# Patient Record
Sex: FEMALE | Race: WHITE | NOT HISPANIC OR LATINO | Employment: OTHER | URBAN - METROPOLITAN AREA
[De-identification: names, ages, dates, MRNs, and addresses within clinical notes are randomized per-mention and may not be internally consistent; named-entity substitution may affect disease eponyms.]

---

## 2017-01-17 ENCOUNTER — OFFICE VISIT (OUTPATIENT)
Dept: ENDOCRINOLOGY | Facility: CLINIC | Age: 47
End: 2017-01-17

## 2017-01-17 VITALS — DIASTOLIC BLOOD PRESSURE: 60 MMHG | SYSTOLIC BLOOD PRESSURE: 102 MMHG | OXYGEN SATURATION: 95 % | HEART RATE: 68 BPM

## 2017-01-17 DIAGNOSIS — IMO0001 UNCONTROLLED TYPE 2 DIABETES MELLITUS WITHOUT COMPLICATION, WITH LONG-TERM CURRENT USE OF INSULIN: Primary | ICD-10-CM

## 2017-01-17 DIAGNOSIS — E55.9 VITAMIN D DEFICIENCY: ICD-10-CM

## 2017-01-17 DIAGNOSIS — E11.40 TYPE 2 DIABETES MELLITUS WITH DIABETIC NEUROPATHY, WITH LONG-TERM CURRENT USE OF INSULIN (HCC): ICD-10-CM

## 2017-01-17 DIAGNOSIS — Z79.4 TYPE 2 DIABETES MELLITUS WITH DIABETIC NEUROPATHY, WITH LONG-TERM CURRENT USE OF INSULIN (HCC): ICD-10-CM

## 2017-01-17 DIAGNOSIS — E03.9 ACQUIRED HYPOTHYROIDISM: ICD-10-CM

## 2017-01-17 LAB
GLUCOSE BLDC GLUCOMTR-MCNC: 201 MG/DL (ref 70–130)
HBA1C MFR BLD: 7.9 %

## 2017-01-17 PROCEDURE — 82947 ASSAY GLUCOSE BLOOD QUANT: CPT | Performed by: INTERNAL MEDICINE

## 2017-01-17 PROCEDURE — 99214 OFFICE O/P EST MOD 30 MIN: CPT | Performed by: INTERNAL MEDICINE

## 2017-01-17 PROCEDURE — 83036 HEMOGLOBIN GLYCOSYLATED A1C: CPT | Performed by: INTERNAL MEDICINE

## 2017-01-17 NOTE — MR AVS SNAPSHOT
"                        Vilma Ayala   1/17/2017 11:30 AM   Office Visit    Dept Phone:  [phone]   Encounter #:  [id number]    Provider:  Selina Lopez MD   Department:  [facility] INTERNAL MEDICINE AND ENDOCRINOLOGY                Your Full Care Plan              Today's Medication Changes          These changes are accurate as of: 1/17/17  1:17 PM.  If you have any questions, ask your nurse or doctor.               New Medication(s)Ordered:     SITagliptin 100 MG tablet   Commonly known as:  JANUVIA   Take 1 tablet by mouth Daily.   Started by:  Selina Lopez MD         Stop taking medication(s)listed here:     INVOKANA 300 MG tablet   Generic drug:  Canagliflozin   Stopped by:  Selina Lopez MD                Where to Get Your Medications      These medications were sent to [facility] [address] [place] [address] - [phone]  - [phone] [place], [place] [address]     Phone:  [phone]     SITagliptin 100 MG tablet                  Your Updated Medication List          This list is accurate as of: 1/17/17  1:17 PM.  Always use your most recent med list.                CANDIDO BREEZE 2 SYSTEM W/DEVICE kit       * CANDIDO BREEZE 2 TEST disk   Generic drug:  Glucose Blood       * RELION PRIME TEST VI       * RELION CONFIRM/MICRO TEST VI       BD INSULIN SYRINGE ULTRAFINE 31G X 15/64\" 0.5 ML misc   Generic drug:  Insulin Syringe-Needle U-100       busPIRone 10 MG tablet   Commonly known as:  BUSPAR       citalopram 40 MG tablet   Commonly known as:  CeleXA       glipiZIDE 10 MG tablet   Commonly known as:  GLUCOTROL       insulin lispro protamine-insulin lispro (75-25) 100 UNIT/ML suspension injection   Commonly known as:  HUMALOG MIX 75/25   14 units q am and 50 units q pm       levothyroxine 200 MCG tablet   Commonly known as:  SYNTHROID, LEVOTHROID   Take 1 tablet by mouth daily.       metFORMIN 500 MG tablet   Commonly " known as:  GLUCOPHAGE       * RISPERDAL 2 MG tablet   Generic drug:  risperiDONE       * RISPERDAL 4 MG tablet   Generic drug:  risperiDONE       SITagliptin 100 MG tablet   Commonly known as:  JANUVIA   Take 1 tablet by mouth Daily.       vitamin D 57641 UNITS capsule capsule   Commonly known as:  ERGOCALCIFEROL       * Notice:  This list has 5 medication(s) that are the same as other medications prescribed for you. Read the directions carefully, and ask your doctor or other care provider to review them with you.            We Performed the Following     POC Glucose Fingerstick     POC Glycated Hemoglobin, Total       You Were Diagnosed With        Codes Comments    Uncontrolled type 2 diabetes mellitus without complication, with long-term current use of insulin    -  Primary ICD-10-CM: E11.65, Z79.4  ICD-9-CM: 250.02, V58.67     Acquired hypothyroidism     ICD-10-CM: E03.9  ICD-9-CM: 244.9     Type 2 diabetes mellitus with diabetic neuropathy, with long-term current use of insulin     ICD-10-CM: E11.40, Z79.4  ICD-9-CM: 250.60, 357.2, V58.67     Vitamin D deficiency     ICD-10-CM: E55.9  ICD-9-CM: 268.9       Instructions     None    Patient Instructions History      Upcoming Appointments     Visit Type Date Time Department    OFFICE VISIT 2017 11:30 AM [facility]    OFFICE VISIT 2017  2:00 PM [facility]      MyChart Signup     [facility] [employer] allows you to send messages to your doctor, view your test results, renew your prescriptions, schedule appointments, and more. To sign up, go to [employer] and click on the Sign Up Now link in the New User? box. Enter your [employer] Activation Code exactly as it appears below along with the last four digits of your Social Security Number and your Date of Birth () to complete the sign-up process. If you do not sign up before the expiration date, you must request a new code.    [employer] Activation Code: [email or web address]  Expires:  1/31/2017  1:17 PM    If you have questions, you can email [email or web address] or call [phone] to talk to our MyChart staff. Remember, [employer] is NOT to be used for urgent needs. For medical emergencies, dial 911.               Other Info from Your Visit           Your Appointments     May 18, 2017  2:00 PM EDT   Office Visit with Selina Lopez MD   [facility] INTERNAL MEDICINE AND ENDOCRINOLOGY (--)    [address] [place] [id number] [phone]           Arrive 15 minutes prior to appointment.              Allergies     Other        Reason for Visit     Follow-up     Diabetes Type II with diabetic neuropathy    Hypothyroidism     Vitamin D Deficiency           Vital Signs     Blood Pressure Pulse Oxygen Saturation Smoking Status          102/60 68 95% Never Smoker        Problems and Diagnoses Noted     Anxiety and depression    Diabetes with neurologic complications    Underactive thyroid    Obesity    Type II diabetes mellitus without control    Vitamin D deficiency      Results     POC Glucose Fingerstick      Component Value Standard Range & Units    Glucose 201 70 - 130 mg/dL                POC Glycated Hemoglobin, Total      Component Value Standard Range & Units    Hemoglobin A1C 7.9 %

## 2017-01-17 NOTE — ASSESSMENT & PLAN NOTE
Blood sugar and 90 day average sugar reviewed  Results for orders placed or performed in visit on 01/17/17   POC Glucose Fingerstick   Result Value Ref Range    Glucose 201 (A) 70 - 130 mg/dL   POC Glycated Hemoglobin, Total   Result Value Ref Range    Hemoglobin A1C 7.9 %     Average is still higher than recommended- will add januvia 100 mg daily and have reminded her to stop drinking cokes  She is utd with eye exam  Neuropathy stable with preulcerative callus bilateral heels   Ur alb neg 8/16  Reviewed with her and discussed dietary goals, need to lose weight

## 2017-01-17 NOTE — PROGRESS NOTES
Vilma Ayala 46 y.o.  [facility]:Follow-up; Diabetes (Type II with diabetic neuropathy); Hypothyroidism; and Vitamin D Deficiency    [place]: Follow-up; Diabetes (Type II with diabetic neuropathy); Hypothyroidism; and Vitamin D Deficiency    Blood sugar and 90 day average sugar reviewed  Results for orders placed or performed in visit on 01/17/17   POC Glucose Fingerstick   Result Value Ref Range    Glucose 201 (A) 70 - 130 mg/dL   POC Glycated Hemoglobin, Total   Result Value Ref Range    Hemoglobin A1C 7.9 %     She is working on diet and exercise, is on 75/25 insulin   Average sugar discussed with her   She is trying to give up cokes  She is now drinking small bottles  Usually gets 2 cases at a time  She is up to date with eye exam  No neuropathy   Ur alb neg 8/16  Lab work in August reviewed  Phlegm clear to yellow- has had uri with sinus congestion for the last 2 weeks   She is using claritin     Allergies   Allergen Reactions   • Other        Current Outpatient Prescriptions:   •  busPIRone (BUSPAR) 10 MG tablet, Take  by mouth daily., Disp: , Rfl:   •  citalopram (CeleXA) 40 MG tablet, Take  by mouth daily., Disp: , Rfl:   •  glipiZIDE (GLUCOTROL) 10 MG tablet, Take 1 tablet by mouth 2 (two) times a day., Disp: , Rfl:   •  Glucose Blood (JAYLENE BREEZE 2 TEST) disk, Jaylene Breeze 2 Test In Vitro Disk; Patient Sig: Jaylene Breeze 2 Test In Vitro Disk Check blood sugar BID or as directed; 5; 1; 09-Jan-2015; Active, Disp: , Rfl:   •  Glucose Blood (RELION CONFIRM/MICRO TEST VI), 4 (four) times a day., Disp: , Rfl:   •  Glucose Blood (RELION PRIME TEST VI), ReliOn Prime Test In Vitro Strip; Patient Sig: ReliOn Prime Test In Vitro Strip TEST TWICE DAILY. DX E11.65; 60; 3; 21-Oct-2015; Active, Disp: , Rfl:   •  insulin lispro protamine-insulin lispro (HUMALOG MIX 75/25) (75-25) 100 UNIT/ML suspension injection, 14 units q am and 50 units q pm (Patient taking differently: Inject 22 units in am and 52 units before supper), Disp:  "20 mL, Rfl: 3  •  levothyroxine (SYNTHROID, LEVOTHROID) 200 MCG tablet, Take 1 tablet by mouth daily., Disp: 30 tablet, Rfl: 5  •  metFORMIN (GLUCOPHAGE) 500 MG tablet, Take  by mouth., Disp: , Rfl:   •  risperiDONE (RISPERDAL) 2 MG tablet, Take  by mouth., Disp: , Rfl:   •  risperiDONE (RISPERDAL) 4 MG tablet, Take  by mouth., Disp: , Rfl:   •  vitamin D (ERGOCALCIFEROL) 72909 UNITS capsule capsule, Take  by mouth., Disp: , Rfl:   •  Blood Glucose Monitoring Suppl (MeilimeiEZE 2 SYSTEM) W/DEVICE kit, , Disp: , Rfl:   •  Insulin Syringe-Needle U-100 (BD INSULIN SYRINGE ULTRAFINE) 31G X 15/64\" 0.5 ML misc, , Disp: , Rfl:   Patient Active Problem List    Diagnosis   • Bronchitis [J40]   • Anxiety and depression [F41.9, F32.9]     Overview Note:     Last Impression: 09 Jan 2015  on risperidone and has been stable  Selina Lopez (Endocrinology)       • Type II diabetes mellitus, uncontrolled [E11.65]     Overview Note:     Last Impression: 21 Jan 2016  blood sugar 89, hgn a1c 6.6% nighttime low sugars-      lower pm insulin to 40 units email or call if continued lows is up to date with eye      exam neuropathy stable wearing diabetic shoes discussed diet  Selina Lopez      (Endocrinology)       • Hypothyroid [E03.9]     Overview Note:     Last Impression: 21 Jan 2016  tachycardia- update tfts  Selina Lopez      (Endocrinology)       • Obesity [E66.9]     Overview Note:     Last Impression: 09 Jan 2015  provided diet infor and discussed goal of significant      weight loss  Selina Lopez (Endocrinology)       • Vitamin D deficiency [E55.9]   • Diabetic neuropathy, type II diabetes mellitus [E11.40]     Overview Note:     in specialty shoes- callus and bunion- seeing podiatry       Review of Systems   Constitutional: Negative for activity change, appetite change, chills, diaphoresis, fatigue, fever and unexpected weight change.   HENT: Negative for congestion, dental problem, " drooling, ear discharge, ear pain, facial swelling, hearing loss, mouth sores, nosebleeds, postnasal drip, rhinorrhea, sinus pressure, sneezing, sore throat, tinnitus, trouble swallowing and voice change.    Eyes: Negative for photophobia, pain, discharge, redness, itching and visual disturbance.   Respiratory: Negative for apnea, cough, choking, chest tightness, shortness of breath, wheezing and stridor.    Cardiovascular: Negative for chest pain, palpitations and leg swelling.   Gastrointestinal: Negative for abdominal distention, abdominal pain, anal bleeding, blood in stool, constipation, diarrhea, nausea, rectal pain and vomiting.   Endocrine: Negative for cold intolerance, heat intolerance, polydipsia, polyphagia and polyuria.   Genitourinary: Negative for decreased urine volume, difficulty urinating, dysuria, enuresis, flank pain, frequency, genital sores, hematuria and urgency.   Musculoskeletal: Negative for arthralgias, back pain, gait problem, joint swelling, myalgias, neck pain and neck stiffness.   Skin: Negative for color change, pallor, rash and wound.   Allergic/Immunologic: Negative for environmental allergies, food allergies and immunocompromised state.   Neurological: Negative for dizziness, tremors, seizures, syncope, facial asymmetry, speech difficulty, weakness, light-headedness, numbness and headaches.   Hematological: Negative for adenopathy. Does not bruise/bleed easily.   Psychiatric/Behavioral: Negative for agitation, behavioral problems, confusion, decreased concentration, dysphoric mood, hallucinations, self-injury, sleep disturbance and suicidal ideas. The patient is nervous/anxious. The patient is not hyperactive.      Social History     Social History   • Marital status: Single     Spouse name: N/A   • Number of children: N/A   • Years of education: N/A     Occupational History   • Not on file.     Social History Main Topics   • Smoking status: Never Smoker   • Smokeless tobacco: Not  on file   • Alcohol use Not on file   • Drug use: Not on file   • Sexual activity: Not on file     Other Topics Concern   • Not on file     Social History Narrative     Family History   Problem Relation Age of Onset   • Hypertension Other    • Thyroid disease Other    • Diabetes Other    • Obesity Other    • Diabetes Mother    • Hypertension Mother    • Heart disease Mother      Visit Vitals   • /60   • Pulse 68   • SpO2 95%     Physical Exam   Constitutional: She is oriented to person, place, and time. She appears well-developed and well-nourished.   HENT:   Head: Normocephalic and atraumatic.   Nose: Nose normal.   Mouth/Throat: Oropharynx is clear and moist.   Eyes: Conjunctivae, EOM and lids are normal. Pupils are equal, round, and reactive to light.   Neck: Trachea normal and normal range of motion. Neck supple. Carotid bruit is not present. No tracheal deviation present. No thyroid mass and no thyromegaly present.   Cardiovascular: Normal rate, regular rhythm, normal heart sounds and intact distal pulses.  Exam reveals no gallop and no friction rub.    No murmur heard.  Pulmonary/Chest: Effort normal and breath sounds normal. No respiratory distress. She has no wheezes.   Musculoskeletal: Normal range of motion. She exhibits no edema or deformity.    Diabetic foot exam performed: bilateral heel callus without ulceration or open wound     Neurological Sensory Findings - Altered hot/cold right ankle/foot discrimination and altered hot/cold left ankle/foot discrimination. Altered sharp/dull right ankle/foot discrimination and altered sharp/dull left ankle/foot discrimination. Right ankle/foot altered proprioception and left ankle/foot altered proprioception.    Vascular Status -  Her exam exhibits right foot vasculature normal. Her exam exhibits no right foot edema. Her exam exhibits left foot vasculature normal. Her exam exhibits no left foot edema.   Skin Integrity  -  Her right foot skin is intact.    Vilma's left foot skin is intact. .  Lymphadenopathy:     She has no cervical adenopathy.   Neurological: She is alert and oriented to person, place, and time. She has normal reflexes. She displays normal reflexes. No cranial nerve deficit.   Skin: Skin is warm and dry. No rash noted. No cyanosis or erythema. Nails show no clubbing.   Psychiatric: She has a normal mood and affect. Her speech is normal and behavior is normal. Judgment and thought content normal. Cognition and memory are normal.   Nursing note and vitals reviewed.    Results for orders placed or performed in visit on 08/16/16   Microalbumin / creatinine urine ratio   Result Value Ref Range    Creatinine, Urine 90.7 Not Estab. mg/dL    Microalbumin, Urine 3.4 Not Estab. ug/mL    Microalbumin/Creatinine Ratio Urine 3.7 0.0 - 30.0 mg/g creat   Comprehensive metabolic panel   Result Value Ref Range    Glucose 90 70 - 100 mg/dL    BUN 13 9 - 23 mg/dL    Creatinine 1.00 0.60 - 1.30 mg/dL    Sodium 139 132 - 146 mmol/L    Potassium 4.2 3.5 - 5.5 mmol/L    Chloride 105 99 - 109 mmol/L    CO2 17.0 (L) 20.0 - 31.0 mmol/L    Calcium 9.7 8.7 - 10.4 mg/dL    Total Protein 7.2 5.7 - 8.2 g/dL    Albumin 4.20 3.20 - 4.80 g/dL    ALT (SGPT) 34 7 - 40 U/L    AST (SGOT) 25 0 - 33 U/L    Alkaline Phosphatase 133 (H) 25 - 100 U/L    Total Bilirubin 0.3 0.3 - 1.2 mg/dL    eGFR Non African Amer 60 (L) >60 mL/min/1.73    Globulin 3.0 gm/dL    A/G Ratio 1.4 g/dL    BUN/Creatinine Ratio 13.0 7.0 - 25.0    Anion Gap 17.0 (H) 3.0 - 11.0 mmol/L   TSH   Result Value Ref Range    TSH 1.146 0.350 - 5.350 mIU/mL   T4, free   Result Value Ref Range    Free T4 0.86 (L) 0.89 - 1.76 ng/dL   Lipid panel   Result Value Ref Range    Total Cholesterol 185 0 - 200 mg/dL    Triglycerides 165 (H) 0 - 150 mg/dL    HDL Cholesterol 40 40 - 60 mg/dL    LDL Cholesterol  129 0 - 130 mg/dL   Vitamin D 25 hydroxy   Result Value Ref Range    25 Hydroxy, Vitamin D 26.4 ng/ml   POCT glucose  fingerstick   Result Value Ref Range    Glucose 77 70 - 130 mg/dL   POCT glycated hemoglobin, total   Result Value Ref Range    Hemoglobin A1C 7.0 %     Problem List Items Addressed This Visit        Digestive    Vitamin D deficiency     Is on vitamin D supplement             Endocrine    Uncontrolled type 2 diabetes mellitus - Primary     Blood sugar and 90 day average sugar reviewed  Results for orders placed or performed in visit on 01/17/17   POC Glucose Fingerstick   Result Value Ref Range    Glucose 201 (A) 70 - 130 mg/dL   POC Glycated Hemoglobin, Total   Result Value Ref Range    Hemoglobin A1C 7.9 %     Average is still higher than recommended- will add januvia 100 mg daily and have reminded her to stop drinking cokes  She is utd with eye exam  Neuropathy stable with preulcerative callus bilateral heels   Ur alb neg 8/16  Reviewed with her and discussed dietary goals, need to lose weight          Relevant Orders    POC Glucose Fingerstick (Completed)    POC Glycated Hemoglobin, Total (Completed)    Hypothyroid     Check tfts          Diabetic neuropathy, type II diabetes mellitus     Goals for hgn a1c and foot care discussed         Relevant Medications    SITagliptin (JANUVIA) 100 MG tablet        Return in about 4 months (around 5/17/2017) for Recheck 30 min .      Jayna Kapoor MA

## 2017-03-02 RX ORDER — BLOOD-GLUCOSE METER
KIT MISCELLANEOUS
Qty: 200 EACH | Refills: 5 | Status: SHIPPED | OUTPATIENT
Start: 2017-03-02 | End: 2017-03-02 | Stop reason: SDUPTHER

## 2017-03-02 NOTE — TELEPHONE ENCOUNTER
Received fax from pharmacy requesting test strips be resent with Diagnosis code per medicare guidelines, strips resent.

## 2017-03-06 ENCOUNTER — TELEPHONE (OUTPATIENT)
Dept: INTERNAL MEDICINE | Facility: CLINIC | Age: 47
End: 2017-03-06

## 2017-03-06 NOTE — TELEPHONE ENCOUNTER
----- Message from Ana Webster sent at 3/6/2017  1:28 PM EST -----  THE PATIENT WOULD LIKE FOR YOU TO GIVE HER A CALL BACK IN REGARDS TO HER MEDICATION CHANGE. PATIENT DIDN'T GIVE ME THE NAME OF THE MEDICATION THAT WAS BEING CHANGED. PT'S CALL BACK NUMBER [phone]

## 2017-03-06 NOTE — TELEPHONE ENCOUNTER
Ok.  Thanks, vickie Bhakta (pts mother) states pt is not at home but did have a question about a new rx that was ordered by Dr Corbin and they are asking if it is ok with Dr Lopez to take it but she does not know the name of it  She will ask pt to call here when she gets home

## 2017-03-07 ENCOUNTER — TELEPHONE (OUTPATIENT)
Dept: INTERNAL MEDICINE | Facility: CLINIC | Age: 47
End: 2017-03-07

## 2017-03-07 NOTE — TELEPHONE ENCOUNTER
----- Message from Sarah Gomez sent at 3/7/2017 12:08 PM EST -----  Contact: PATIENT  PATIENT WANTS TO TALK TO YOU ABOUT HER LISINPRIL MEDICATION. PATIENT HAS TAKEN 1 DOSE OF THE MEDICATION. SHE WANTS TO KNOW THE SIDE EFFECTS OF THIS MEDICATION AS SHE HAS ALREADY STARTED TAKING. YOU CAN REACH HER BACK -[phone]

## 2017-03-07 NOTE — TELEPHONE ENCOUNTER
Pt states Dr Corbin started her on Lisinopril for kidney protection and she is asking if there are any side effects   Pt was advised she would need to ask Dr Corbin or the pharmacist about side effects  Pt voiced understanding

## 2017-05-10 RX ORDER — LEVOTHYROXINE SODIUM 0.2 MG/1
TABLET ORAL
Qty: 30 TABLET | Refills: 5 | Status: SHIPPED | OUTPATIENT
Start: 2017-05-10 | End: 2017-10-21 | Stop reason: SDUPTHER

## 2017-06-06 ENCOUNTER — TELEPHONE (OUTPATIENT)
Dept: INTERNAL MEDICINE | Facility: CLINIC | Age: 47
End: 2017-06-06

## 2017-06-06 NOTE — TELEPHONE ENCOUNTER
ROB,    MS WOOTEN CALLED REQUESTING AN APPT WITH DR SWANSON ON 08/18/2017. HER MOTHER HAS AN APPT THIS DAY AND SHE WOULD LIKE TO BE SEEN AS WELL.  HER MOTHER IS LUCIO LOYOLA.

## 2017-07-13 ENCOUNTER — HOSPITAL ENCOUNTER (EMERGENCY)
Facility: HOSPITAL | Age: 47
Discharge: HOME | End: 2017-07-13
Attending: EMERGENCY MEDICINE | Admitting: EMERGENCY MEDICINE

## 2017-07-13 VITALS
WEIGHT: 245 LBS | DIASTOLIC BLOOD PRESSURE: 88 MMHG | RESPIRATION RATE: 16 BRPM | OXYGEN SATURATION: 98 % | HEART RATE: 99 BPM | TEMPERATURE: 98.1 F | SYSTOLIC BLOOD PRESSURE: 134 MMHG | BODY MASS INDEX: 36.29 KG/M2 | HEIGHT: 69 IN

## 2017-07-13 DIAGNOSIS — R21 FACIAL RASH: Primary | ICD-10-CM

## 2017-07-13 PROCEDURE — 99282 EMERGENCY DEPT VISIT SF MDM: CPT

## 2017-07-13 NOTE — ED PROVIDER NOTES
Subjective   HPI Comments: This patient is a 46-year-old  female who states that she has had a rash on the right lower jaw times one week.  She is not sexually active.  No herpes history by report.  No difficulty with chewing.  No rash elsewhere on the body.  No abdominal pain, nausea, vomiting, fevers, chills, chest pain, shortness of breath.  No ocular complaints.  Patient reports that the rash is very itchy.  She has not had any new soaps introduced.  No new facial moisturizers or creams.  No new medications.  Patient went to local pharmacy and talk to the pharmacist about what she should do.  She was instructed to use a Benadryl cream which she has been doing.  This is been helping with the pruritus but is not resolved the rash.  She states that the rash is not spreading, but continues to cause problems and therefore she came in for evaluation.  She has not seen her primary care physician for this.      Past Medical History: DM, Depression, Anxiety, No hx of HTN, HLD, or cardiac histories     Past Family History: Negative    Patient is a 46 y.o. female presenting with rash.   History provided by:  Patient  Rash   Location:  Face  Facial rash location:  Chin  Quality: itchiness    Severity:  Mild  Onset quality:  Sudden  Duration:  1 week  Timing:  Constant  Progression:  Unchanged  Chronicity:  New  Relieved by:  None tried  Worsened by:  Nothing  Ineffective treatments: Benadryl.  Associated symptoms: no diarrhea, no fatigue, no fever, no headaches, no myalgias, no nausea, no shortness of breath and not vomiting        Review of Systems   Constitutional: Negative for chills, fatigue, fever and unexpected weight change.   HENT: Negative for dental problem, ear pain, hearing loss and sinus pressure.    Eyes: Negative for pain and visual disturbance.   Respiratory: Negative for chest tightness and shortness of breath.    Cardiovascular: Negative for chest pain, palpitations and leg swelling.    Gastrointestinal: Negative for diarrhea, nausea and vomiting.   Genitourinary: Negative for difficulty urinating, dyspareunia, hematuria and urgency.   Musculoskeletal: Negative for myalgias, neck pain and neck stiffness.   Skin: Positive for rash.   Neurological: Negative for seizures, syncope, speech difficulty, light-headedness and headaches.   Psychiatric/Behavioral: Negative for confusion.   All other systems reviewed and are negative.      Past Medical History:   Diagnosis Date   • Diabetes mellitus        Allergies   Allergen Reactions   • Other        Past Surgical History:   Procedure Laterality Date   • EYE SURGERY     • TONSILLECTOMY         Family History   Problem Relation Age of Onset   • Hypertension Other    • Thyroid disease Other    • Diabetes Other    • Obesity Other    • Diabetes Mother    • Hypertension Mother    • Heart disease Mother        Social History     Social History   • Marital status: Single     Spouse name: N/A   • Number of children: N/A   • Years of education: N/A     Social History Main Topics   • Smoking status: Never Smoker   • Smokeless tobacco: None   • Alcohol use No   • Drug use: No   • Sexual activity: Defer     Other Topics Concern   • None     Social History Narrative   • None         Objective   Physical Exam   Constitutional: She is oriented to person, place, and time. She appears well-developed. No distress.   HENT:   Head: Normocephalic and atraumatic.   Right Ear: External ear normal.   Left Ear: External ear normal.   Nose: Nose normal.   Mouth/Throat: Oropharynx is clear and moist.   Eyes: EOM are normal. Pupils are equal, round, and reactive to light.   Neck: Normal range of motion. Neck supple. No JVD present.   Cardiovascular: Normal rate, regular rhythm and normal heart sounds.  Exam reveals no gallop and no friction rub.    Pulmonary/Chest: Effort normal and breath sounds normal. She has no wheezes. She has no rales. She exhibits no tenderness.   Abdominal:  Soft. Bowel sounds are normal. She exhibits no distension and no mass. There is no tenderness. There is no rebound and no guarding.   No signs of acute abdomen.  No pain at McBurney's point.  No pulsatile abdominal mass   Musculoskeletal: Normal range of motion. She exhibits no edema.   Lymphadenopathy:     She has no cervical adenopathy.   Neurological: She is alert and oriented to person, place, and time.   5/5 strength bilaterally with flexion and extension of fingers, wrist, elbows, knees and hips as well as plantar and dorsiflexion of the foot.   Skin: Skin is warm and dry. Rash noted. No erythema. No pallor.   Right chin rash that is 3x3 cm. Multiple small papules. No cellulitis.   Psychiatric: She has a normal mood and affect. Her behavior is normal. Judgment and thought content normal.   Nursing note and vitals reviewed.      Procedures         ED Course  ED Course   Comment By Time   I let the patient know I would talked her endocrinologist, per her request.  I'm not concerned about the medication she will be discharged with we have talked about the potential effect on her glycemic control.  The patient will follow up with her PCP, Dr. Oiv Corbin, in one week.  Return here immediately for new or changing concerns.  Impression will include facial rash plan to include oral steroids, Benadryl the RN as is already been instructed.  Avoid excoriation and scratching.  Watch for signs of secondary infection.  Patient verbalized an understanding of the plan.  She feels safe at home has good primary care, and will be discharged home accordingly after I discussed the case with her endocrinologist, Dr. Selina Noonan. Jayy Vega MD 07/13 1500   Dr. Vega is discussing case with Dr. Lopez. [facility] 07/13 1550   Dr. Vega is at the bedside re-evaluating the patient. [facility] 07/13 5253   I talked to the patient's endocrinologist.  She is going to call and steroids tomorrow if the patient is still  "feeling poorly.  Try oral Pepcid and Benadryl as needed.  She states that the patient is acting not well controlled and she is quite concerned about the patient's ability to stay up with her elevated glucose levels should steroids be provided.  I discussed this with the patient and she is agreeable to the plan.  She'll be discharged home with the impression that includes facial rash, unspecified.  He turned here immediately for new or changing concerns. Jayy Vega MD 07/13 6303     No results found for this or any previous visit (from the past 24 hour(s)).  Note: In addition to lab results from this visit, the labs listed above may include labs taken at another facility or during a different encounter within the last 24 hours. Please correlate lab times with ED admission and discharge times for further clarification of the services performed during this visit.    No orders to display     Vitals:    07/13/17 1258   BP: 139/82   BP Location: Left arm   Patient Position: Sitting   Pulse: 109   Resp: 16   Temp: 98 °F (36.7 °C)   TempSrc: Oral   SpO2: 96%   Weight: 245 lb (111 kg)   Height: 69\" (175.3 cm)     Medications - No data to display  ECG/EMG Results (last 24 hours)     ** No results found for the last 24 hours. **                          [facility]    Final diagnoses:   Facial rash   EMR Dragon/Transcription disclaimer:  Much of this encounter note is an electronic transcription/translation of spoken language to printed text. The electronic translation of spoken language may permit erroneous, or at times, nonsensical words or phrases to be inadvertently transcribed; Although I have reviewed the note for such errors, some may still exist.    Documentation assistance provided by vinay SULTANA.  Information recorded by the vinay was done at my direction and has been verified and validated by me.     Shane Sultana  07/13/17 1451       Shane Sultana  07/13/17 6546       Jayy Vega MD  07/13/17 1627    "

## 2017-07-13 NOTE — DISCHARGE INSTRUCTIONS
Follow up with your primary medical doctor in one week.    Avoid scratching the rash.     Pepcid and benadryl OTC as we discussed. Pepcid once daily and Benadryl twice daily.  Stop after 5 days.    Immediately return to the emergency department for any new or worsening symptoms or if rash is not improved.    Call endocrinologist tomorrow if no improvement or if any worsening.

## 2017-08-18 ENCOUNTER — OFFICE VISIT (OUTPATIENT)
Dept: ENDOCRINOLOGY | Facility: CLINIC | Age: 47
End: 2017-08-18

## 2017-08-18 VITALS
SYSTOLIC BLOOD PRESSURE: 122 MMHG | HEIGHT: 65 IN | WEIGHT: 244 LBS | BODY MASS INDEX: 40.65 KG/M2 | OXYGEN SATURATION: 97 % | DIASTOLIC BLOOD PRESSURE: 80 MMHG | HEART RATE: 101 BPM

## 2017-08-18 DIAGNOSIS — Z79.4 TYPE 2 DIABETES MELLITUS WITH DIABETIC NEUROPATHY, WITH LONG-TERM CURRENT USE OF INSULIN (HCC): Primary | ICD-10-CM

## 2017-08-18 DIAGNOSIS — E11.40 TYPE 2 DIABETES MELLITUS WITH DIABETIC NEUROPATHY, WITH LONG-TERM CURRENT USE OF INSULIN (HCC): Primary | ICD-10-CM

## 2017-08-18 DIAGNOSIS — E55.9 VITAMIN D DEFICIENCY: ICD-10-CM

## 2017-08-18 LAB
25(OH)D3 SERPL-MCNC: 20.6 NG/ML
ALBUMIN SERPL-MCNC: 4.4 G/DL (ref 3.2–4.8)
ALBUMIN/GLOB SERPL: 1.6 G/DL (ref 1.5–2.5)
ALP SERPL-CCNC: 142 U/L (ref 25–100)
ALT SERPL W P-5'-P-CCNC: 34 U/L (ref 7–40)
ANION GAP SERPL CALCULATED.3IONS-SCNC: 11 MMOL/L (ref 3–11)
ARTICHOKE IGE QN: 166 MG/DL (ref 0–130)
AST SERPL-CCNC: 23 U/L (ref 0–33)
BILIRUB SERPL-MCNC: 0.3 MG/DL (ref 0.3–1.2)
BUN BLD-MCNC: 13 MG/DL (ref 9–23)
BUN/CREAT SERPL: 13 (ref 7–25)
CALCIUM SPEC-SCNC: 9.7 MG/DL (ref 8.7–10.4)
CHLORIDE SERPL-SCNC: 103 MMOL/L (ref 99–109)
CHOLEST SERPL-MCNC: 206 MG/DL (ref 0–200)
CO2 SERPL-SCNC: 28 MMOL/L (ref 20–31)
CREAT BLD-MCNC: 1 MG/DL (ref 0.6–1.3)
GFR SERPL CREATININE-BSD FRML MDRD: 60 ML/MIN/1.73
GLOBULIN UR ELPH-MCNC: 2.8 GM/DL
GLUCOSE BLD-MCNC: 60 MG/DL (ref 70–100)
GLUCOSE BLDC GLUCOMTR-MCNC: 81 MG/DL (ref 70–130)
HBA1C MFR BLD: 8.1 %
HDLC SERPL-MCNC: 43 MG/DL (ref 40–60)
POTASSIUM BLD-SCNC: 4 MMOL/L (ref 3.5–5.5)
PROT SERPL-MCNC: 7.2 G/DL (ref 5.7–8.2)
SODIUM BLD-SCNC: 142 MMOL/L (ref 132–146)
T4 FREE SERPL-MCNC: 1.24 NG/DL (ref 0.89–1.76)
TRIGL SERPL-MCNC: 110 MG/DL (ref 0–150)
TSH SERPL DL<=0.05 MIU/L-ACNC: 2.01 MIU/ML (ref 0.35–5.35)

## 2017-08-18 PROCEDURE — 84443 ASSAY THYROID STIM HORMONE: CPT | Performed by: INTERNAL MEDICINE

## 2017-08-18 PROCEDURE — 80061 LIPID PANEL: CPT | Performed by: INTERNAL MEDICINE

## 2017-08-18 PROCEDURE — 80053 COMPREHEN METABOLIC PANEL: CPT | Performed by: INTERNAL MEDICINE

## 2017-08-18 PROCEDURE — 83036 HEMOGLOBIN GLYCOSYLATED A1C: CPT | Performed by: INTERNAL MEDICINE

## 2017-08-18 PROCEDURE — 84439 ASSAY OF FREE THYROXINE: CPT | Performed by: INTERNAL MEDICINE

## 2017-08-18 PROCEDURE — 82306 VITAMIN D 25 HYDROXY: CPT | Performed by: INTERNAL MEDICINE

## 2017-08-18 PROCEDURE — 82043 UR ALBUMIN QUANTITATIVE: CPT | Performed by: INTERNAL MEDICINE

## 2017-08-18 PROCEDURE — 82947 ASSAY GLUCOSE BLOOD QUANT: CPT | Performed by: INTERNAL MEDICINE

## 2017-08-18 PROCEDURE — 99214 OFFICE O/P EST MOD 30 MIN: CPT | Performed by: INTERNAL MEDICINE

## 2017-08-18 PROCEDURE — 82570 ASSAY OF URINE CREATININE: CPT | Performed by: INTERNAL MEDICINE

## 2017-08-18 NOTE — ASSESSMENT & PLAN NOTE
Blood sugar and 90 day average sugar reviewed  Results for orders placed or performed in visit on 08/18/17   POC Glycosylated Hemoglobin (Hb A1C)   Result Value Ref Range    Hemoglobin A1C 8.1 %   POC Glucose Fingerstick   Result Value Ref Range    Glucose 81 70 - 130 mg/dL     Discussed diet and she will cut portions and watch carb intake   Intol januvia  Is on metformin and glimepiride   Intol statin- ?lethargy   No low sugars- discussed raising insulin to 25 units in am   Discussed adjusting insulin if smaller meal   utd with eye exam  Neuropathy stable without callus or ulcer   Ur alb due today

## 2017-08-18 NOTE — PROGRESS NOTES
Vilma Ayala 46 y.o.  [facility]:Follow-up; Diabetes (Type II, last eye exam was 2 weeks ago by [facility]); Peripheral Neuropathy (stopped Januvia due to insomnia and also stopped atorvastatin due to insomnia); Hypothyroidism; and Vitamin D Deficiency (stopped weekly dose due to cost)      [place]: Follow-up; Diabetes (Type II, last eye exam was 2 weeks ago by [facility]); Peripheral Neuropathy (stopped Januvia due to insomnia and also stopped atorvastatin due to insomnia); Hypothyroidism; and Vitamin D Deficiency (stopped weekly dose due to cost)    Blood sugar and 90 day average sugar reviewed  Results for orders placed or performed in visit on 08/18/17   POC Glycosylated Hemoglobin (Hb A1C)   Result Value Ref Range    Hemoglobin A1C 8.1 %   POC Glucose Fingerstick   Result Value Ref Range    Glucose 81 70 - 130 mg/dL     Stopped januvia- trouble sleeping (also stopped atorvastatin for this reason as well)  90 day average sugar is higher compared to 1/17  Discussed goal average sugar 150 or less (hgn a1c 7 or less)   Is up to date with eye exam  Neuropathy without callus or ulcer  Ur alb due today   Rash on chin- using hydrocortisone cream   Dr Corbin recommended using twice daily    Allergies   Allergen Reactions   • Januvia [Sitagliptin]      Insomnia   • Other        Current Outpatient Prescriptions:   •  busPIRone (BUSPAR) 10 MG tablet, Take  by mouth daily., Disp: , Rfl:   •  citalopram (CeleXA) 40 MG tablet, Take  by mouth daily., Disp: , Rfl:   •  glipiZIDE (GLUCOTROL) 10 MG tablet, Take 1 tablet by mouth 2 (two) times a day., Disp: , Rfl:   •  insulin lispro protamine-insulin lispro (HUMALOG MIX 75/25) (75-25) 100 UNIT/ML suspension injection, 14 units q am and 50 units q pm (Patient taking differently: Inject 22 units in am and 52 units before supper), Disp: 20 mL, Rfl: 3  •  levothyroxine (SYNTHROID, LEVOTHROID) 200 MCG tablet, TAKE ONE TABLET BY MOUTH ONCE DAILY, Disp: 30 tablet, Rfl: 5  •  metFORMIN (GLUCOPHAGE)  "500 MG tablet, Take  by mouth., Disp: , Rfl:   •  risperiDONE (RISPERDAL) 2 MG tablet, Take  by mouth., Disp: , Rfl:   •  risperiDONE (RISPERDAL) 4 MG tablet, Take  by mouth., Disp: , Rfl:   •  Blood Glucose Monitoring Suppl (JAYLENE BREEZE 2 SYSTEM) W/DEVICE kit, , Disp: , Rfl:   •  Glucose Blood (JAYLENE BREEZE 2 TEST) disk, Jaylene Breeze 2 Test In Vitro Disk; Patient Sig: Jaylene Breeze 2 Test In Vitro Disk Check blood sugar BID or as directed; 5; 1; 09-Jan-2015; Active, Disp: , Rfl:   •  Glucose Blood (RELION CONFIRM/MICRO TEST VI), 4 (four) times a day., Disp: , Rfl:   •  glucose blood (RELION CONFIRM/MICRO TEST) test strip, Test four times daily., Disp: 200 each, Rfl: 5  •  Glucose Blood (RELION PRIME TEST VI), ReliOn Prime Test In Vitro Strip; Patient Sig: ReliOn Prime Test In Vitro Strip TEST TWICE DAILY. DX E11.65; 60; 3; 21-Oct-2015; Active, Disp: , Rfl:   •  Insulin Syringe-Needle U-100 (BD INSULIN SYRINGE ULTRAFINE) 31G X 15/64\" 0.5 ML misc, , Disp: , Rfl:   Patient Active Problem List    Diagnosis   • Bronchitis [J40]   • Anxiety and depression [F41.9, F32.9]     Overview Note:     Last Impression: 09 Jan 2015  on risperidone and has been stable  Selina Lopez (Endocrinology)       • Uncontrolled type 2 diabetes mellitus [E11.65]     Overview Note:     Last Impression: 21 Jan 2016  blood sugar 89, hgn a1c 6.6% nighttime low sugars-      lower pm insulin to 40 units email or call if continued lows is up to date with eye      exam neuropathy stable wearing diabetic shoes discussed diet  Selina Lopez      (Endocrinology)       • Hypothyroid [E03.9]     Overview Note:     Impression: 01/21/2016 - tachycardia- update tfts  Impression: 08/24/2015 - no c/o- tsh normal 1/15, repeat yearly  Impression: 04/02/2015 - normal tsh 1/9  Impression: 01/09/2015 - check tft;   Last Impression: 21 Jan 2016  tachycardia- update tfts  Selina Lopez      (Endocrinology)       • Obesity [E66.9]     " Overview Note:     Last Impression: 09 Jan 2015  provided diet infor and discussed goal of significant      weight loss  Selina Lopez (Endocrinology)       • Vitamin D deficiency [E55.9]   • Diabetic neuropathy, type II diabetes mellitus [E11.40]     Overview Note:     in specialty shoes- callus and bunion- seeing podiatry       Review of Systems   Constitutional: Negative for activity change, appetite change, chills, diaphoresis, fatigue, fever and unexpected weight change.   HENT: Negative for congestion, dental problem, drooling, ear discharge, ear pain, facial swelling, hearing loss, mouth sores, nosebleeds, postnasal drip, rhinorrhea, sinus pressure, sneezing, sore throat, tinnitus, trouble swallowing and voice change.    Eyes: Negative for photophobia, pain, discharge, redness, itching and visual disturbance.   Respiratory: Negative for apnea, cough, choking, chest tightness, shortness of breath, wheezing and stridor.    Cardiovascular: Negative for chest pain, palpitations and leg swelling.   Gastrointestinal: Negative for abdominal distention, abdominal pain, anal bleeding, blood in stool, constipation, diarrhea, nausea, rectal pain and vomiting.   Endocrine: Negative for cold intolerance, heat intolerance, polydipsia, polyphagia and polyuria.   Genitourinary: Negative for decreased urine volume, difficulty urinating, dysuria, enuresis, flank pain, frequency, genital sores, hematuria and urgency.   Musculoskeletal: Negative for arthralgias, back pain, gait problem, joint swelling, myalgias, neck pain and neck stiffness.   Skin: Negative for color change, pallor, rash and wound.   Allergic/Immunologic: Negative for environmental allergies, food allergies and immunocompromised state.   Neurological: Negative for dizziness, tremors, seizures, syncope, facial asymmetry, speech difficulty, weakness, light-headedness, numbness and headaches.   Hematological: Negative for adenopathy. Does not bruise/bleed  "easily.   Psychiatric/Behavioral: Negative for agitation, behavioral problems, confusion, decreased concentration, dysphoric mood, hallucinations, self-injury, sleep disturbance and suicidal ideas. The patient is not nervous/anxious and is not hyperactive.      Social History     Social History   • Marital status: Single     Spouse name: N/A   • Number of children: N/A   • Years of education: N/A     Occupational History   • Not on file.     Social History Main Topics   • Smoking status: Never Smoker   • Smokeless tobacco: Not on file   • Alcohol use No   • Drug use: No   • Sexual activity: Defer     Other Topics Concern   • Not on file     Social History Narrative     Family History   Problem Relation Age of Onset   • Hypertension Other    • Thyroid disease Other    • Diabetes Other    • Obesity Other    • Diabetes Mother    • Hypertension Mother    • Heart disease Mother      /80  Pulse 101  Ht 65\" (165.1 cm)  Wt 244 lb (111 kg)  SpO2 97%  BMI 40.6 kg/m2  Physical Exam   Constitutional: She is oriented to person, place, and time. She appears well-developed and well-nourished.   HENT:   Head: Normocephalic and atraumatic.   Nose: Nose normal.   Mouth/Throat: Oropharynx is clear and moist.   Eyes: Conjunctivae, EOM and lids are normal. Pupils are equal, round, and reactive to light.   Neck: Trachea normal and normal range of motion. Neck supple. Carotid bruit is not present. No tracheal deviation present. No thyroid mass and no thyromegaly present.   Cardiovascular: Normal rate, regular rhythm, normal heart sounds and intact distal pulses.  Exam reveals no gallop and no friction rub.    No murmur heard.  Pulmonary/Chest: Effort normal and breath sounds normal. No respiratory distress. She has no wheezes.   Musculoskeletal: Normal range of motion. She exhibits no edema or deformity.       Neurological Sensory Findings - Unaltered hot/cold right ankle/foot discrimination and unaltered hot/cold left " ankle/foot discrimination. Unaltered sharp/dull right ankle/foot discrimination and unaltered sharp/dull left ankle/foot discrimination. No right ankle/foot altered proprioception and no left ankle/foot altered proprioception    Vascular Status -  Her exam exhibits right foot vasculature normal. Her exam exhibits no right foot edema. Her exam exhibits left foot vasculature normal. Her exam exhibits no left foot edema.   Skin Integrity  -  Her right foot skin is intact.     Vilma 's left foot skin is intact. .  Lymphadenopathy:     She has no cervical adenopathy.   Neurological: She is alert and oriented to person, place, and time. She has normal reflexes. She displays normal reflexes. No cranial nerve deficit.   Skin: Skin is warm and dry. No rash noted. No cyanosis or erythema. Nails show no clubbing.   Psychiatric: She has a normal mood and affect. Her speech is normal and behavior is normal. Judgment and thought content normal. Cognition and memory are normal.   Nursing note and vitals reviewed.    Results for orders placed or performed in visit on 01/17/17   POC Glucose Fingerstick   Result Value Ref Range    Glucose 201 (A) 70 - 130 mg/dL   POC Glycated Hemoglobin, Total   Result Value Ref Range    Hemoglobin A1C 7.9 %     Problem List Items Addressed This Visit        Digestive    Vitamin D deficiency     Check vitamin d levels          Relevant Orders    Vitamin D 25 Hydroxy       Endocrine    Diabetic neuropathy, type II diabetes mellitus - Primary     Blood sugar and 90 day average sugar reviewed  Results for orders placed or performed in visit on 08/18/17   POC Glycosylated Hemoglobin (Hb A1C)   Result Value Ref Range    Hemoglobin A1C 8.1 %   POC Glucose Fingerstick   Result Value Ref Range    Glucose 81 70 - 130 mg/dL     Discussed diet and she will cut portions and watch carb intake   Intol tabathauvia  Is on metformin and glimepiride   Intol statin- ?lethargy   No low sugars- discussed raising insulin  to 25 units in am   Discussed adjusting insulin if smaller meal   utd with eye exam  Neuropathy stable without callus or ulcer   Ur alb due today              Relevant Medications    insulin lispro protamine-insulin lispro (HUMALOG MIX 75/25) (75-25) 100 UNIT/ML suspension injection    Other Relevant Orders    POC Glycosylated Hemoglobin (Hb A1C) (Completed)    POC Glucose Fingerstick (Completed)    Comprehensive Metabolic Panel    TSH    T4, Free    Microalbumin / Creatinine Urine Ratio    Lipid Panel    Vitamin D 25 Hydroxy        Return in about 4 months (around 12/18/2017) for Recheck 30 min .    Jayna Kapoor MA  Signed Selina Lopez MD

## 2017-08-20 LAB
CREAT 24H UR-MCNC: 179 MG/DL
MICROALB/CRT. RATIO UR: 2.3 MG/G CREAT (ref 0–30)
MICROALBUMIN UR-MCNC: 4.1 UG/ML

## 2017-10-09 NOTE — TELEPHONE ENCOUNTER
PATIENT NEEDS US TO CHANGE HER TEST STRIPS TO RELY ON PRIME. THE RELION CONFIRM TEST STRIPS IS NO LONGER AVAILABLE. SHE NEEDS A NEW SCIPRT FOR RELION PRIME TEST STRIPS.

## 2017-10-21 RX ORDER — LEVOTHYROXINE SODIUM 0.2 MG/1
TABLET ORAL
Qty: 30 TABLET | Refills: 5 | Status: SHIPPED | OUTPATIENT
Start: 2017-10-21 | End: 2018-03-12 | Stop reason: SDUPTHER

## 2017-12-29 ENCOUNTER — OFFICE VISIT (OUTPATIENT)
Dept: ENDOCRINOLOGY | Facility: CLINIC | Age: 47
End: 2017-12-29

## 2017-12-29 VITALS
WEIGHT: 247.2 LBS | SYSTOLIC BLOOD PRESSURE: 124 MMHG | BODY MASS INDEX: 41.19 KG/M2 | HEIGHT: 65 IN | DIASTOLIC BLOOD PRESSURE: 72 MMHG

## 2017-12-29 DIAGNOSIS — E11.40 TYPE 2 DIABETES MELLITUS WITH DIABETIC NEUROPATHY, WITH LONG-TERM CURRENT USE OF INSULIN (HCC): Primary | ICD-10-CM

## 2017-12-29 DIAGNOSIS — IMO0001 UNCONTROLLED TYPE 2 DIABETES MELLITUS WITHOUT COMPLICATION, WITH LONG-TERM CURRENT USE OF INSULIN: ICD-10-CM

## 2017-12-29 DIAGNOSIS — E03.9 ACQUIRED HYPOTHYROIDISM: ICD-10-CM

## 2017-12-29 DIAGNOSIS — Z79.4 TYPE 2 DIABETES MELLITUS WITH DIABETIC NEUROPATHY, WITH LONG-TERM CURRENT USE OF INSULIN (HCC): Primary | ICD-10-CM

## 2017-12-29 LAB
GLUCOSE BLDC GLUCOMTR-MCNC: 322 MG/DL (ref 70–130)
HBA1C MFR BLD: 9.3 %

## 2017-12-29 PROCEDURE — 99214 OFFICE O/P EST MOD 30 MIN: CPT | Performed by: INTERNAL MEDICINE

## 2017-12-29 PROCEDURE — 82962 GLUCOSE BLOOD TEST: CPT | Performed by: INTERNAL MEDICINE

## 2017-12-29 PROCEDURE — 83036 HEMOGLOBIN GLYCOSYLATED A1C: CPT | Performed by: INTERNAL MEDICINE

## 2017-12-29 NOTE — PROGRESS NOTES
"Vilma Ayala 47 y.o.  CC: Diabetes (F/u for type 2 diabetes, testing 2x qd)      [place]: Diabetes (F/u for type 2 diabetes, testing 2x qd)    Blood sugar and 90 day average sugar reviewed  Results for orders placed or performed in visit on 12/29/17   POC Glucose Fingerstick   Result Value Ref Range    Glucose 322 (A) 70 - 130 mg/dL   90 day average sugar 220   (hgn a1c 9.3%)  This is higher than last check but she   Is taking metformin and 75/25 insulin   Insulin is 25 u am and 55 u pm   Fasting sugar 194-334   Pp sugars 297-436  Discussed raising insulin dose   Also could consider adding oral agent   She is taking insulin shots  She is not following diet, cannot give up pop and went kind of crazy on pop  She will start drinking diet drinks again   Neuropathy in feet     Allergies   Allergen Reactions   • Januvia [Sitagliptin]      Insomnia   • Other        Current Outpatient Prescriptions:   •  Blood Glucose Monitoring Suppl (JAYLENE BREEZE 2 SYSTEM) W/DEVICE kit, , Disp: , Rfl:   •  busPIRone (BUSPAR) 10 MG tablet, Take  by mouth daily., Disp: , Rfl:   •  citalopram (CeleXA) 40 MG tablet, Take  by mouth daily., Disp: , Rfl:   •  glipiZIDE (GLUCOTROL) 10 MG tablet, Take 1 tablet by mouth 2 (two) times a day., Disp: , Rfl:   •  Glucose Blood (JAYLENE BREEZE 2 TEST) disk, Jaylene Breeze 2 Test In Vitro Disk; Patient Sig: Jaylene Breeze 2 Test In Vitro Disk Check blood sugar BID or as directed; 5; 1; 09-Jan-2015; Active, Disp: , Rfl:   •  Glucose Blood (RELION CONFIRM/MICRO TEST VI), 4 (four) times a day., Disp: , Rfl:   •  glucose blood (RELION PRIME TEST) test strip, Test tid, Disp: 100 each, Rfl: 5  •  insulin lispro protamine-insulin lispro (HUMALOG MIX 75/25) (75-25) 100 UNIT/ML suspension injection, Inject 25 units in am and 55 units before supper, Disp: 30 mL, Rfl: 3  •  Insulin Syringe-Needle U-100 (BD INSULIN SYRINGE ULTRAFINE) 31G X 15/64\" 0.5 ML misc, , Disp: , Rfl:   •  levothyroxine (SYNTHROID, LEVOTHROID) 200 " MCG tablet, TAKE ONE TABLET BY MOUTH ONCE DAILY, Disp: 30 tablet, Rfl: 5  •  metFORMIN (GLUCOPHAGE) 500 MG tablet, Take 2 tablets BID with food, Disp: 120 tablet, Rfl: 2  •  risperiDONE (RISPERDAL) 2 MG tablet, Take  by mouth., Disp: , Rfl:   •  risperiDONE (RISPERDAL) 4 MG tablet, Take  by mouth., Disp: , Rfl:   Patient Active Problem List    Diagnosis   • Bronchitis [J40]   • Anxiety and depression [F41.8]     Overview Note:     Last Impression: 09 Jan 2015  on risperidone and has been stable  Selina Lopez (Endocrinology)       • Uncontrolled type 2 diabetes mellitus [E11.65]     Overview Note:     Last Impression: 21 Jan 2016  blood sugar 89, hgn a1c 6.6% nighttime low sugars-      lower pm insulin to 40 units email or call if continued lows is up to date with eye      exam neuropathy stable wearing diabetic shoes discussed diet  Selina Lopez      (Endocrinology)       • Hypothyroid [E03.9]     Overview Note:     Impression: 01/21/2016 - tachycardia- update tfts  Impression: 08/24/2015 - no c/o- tsh normal 1/15, repeat yearly  Impression: 04/02/2015 - normal tsh 1/9  Impression: 01/09/2015 - check tft;   Last Impression: 21 Jan 2016  tachycardia- update tfts  Selina Lopez      (Endocrinology)       • Obesity [E66.9]     Overview Note:     Last Impression: 09 Jan 2015  provided diet infor and discussed goal of significant      weight loss  Selina Lopez (Endocrinology)       • Vitamin D deficiency [E55.9]   • Diabetic neuropathy, type II diabetes mellitus [E11.40]     Overview Note:     in specialty shoes- callus and bunion- seeing podiatry       Review of Systems   Constitutional: Positive for fatigue. Negative for activity change, appetite change, chills, diaphoresis, fever and unexpected weight change.   HENT: Negative for congestion, dental problem, drooling, ear discharge, ear pain, facial swelling, hearing loss, mouth sores, nosebleeds, postnasal drip, rhinorrhea,  sinus pressure, sneezing, sore throat, tinnitus, trouble swallowing and voice change.    Eyes: Negative for photophobia, pain, discharge, redness, itching and visual disturbance.   Respiratory: Negative for apnea, cough, choking, chest tightness, shortness of breath, wheezing and stridor.    Cardiovascular: Negative for chest pain, palpitations and leg swelling.   Gastrointestinal: Negative for abdominal distention, abdominal pain, anal bleeding, blood in stool, constipation, diarrhea, nausea, rectal pain and vomiting.   Endocrine: Negative for cold intolerance, heat intolerance, polydipsia, polyphagia and polyuria.   Genitourinary: Negative for decreased urine volume, difficulty urinating, dysuria, enuresis, flank pain, frequency, genital sores, hematuria and urgency.   Musculoskeletal: Negative for arthralgias, back pain, gait problem, joint swelling, myalgias, neck pain and neck stiffness.   Skin: Negative for color change, pallor, rash and wound.   Allergic/Immunologic: Negative for environmental allergies, food allergies and immunocompromised state.   Neurological: Negative for dizziness, tremors, seizures, syncope, facial asymmetry, speech difficulty, weakness, light-headedness, numbness and headaches.   Hematological: Negative for adenopathy. Does not bruise/bleed easily.   Psychiatric/Behavioral: Negative for agitation, behavioral problems, confusion, decreased concentration, dysphoric mood, hallucinations, self-injury, sleep disturbance and suicidal ideas. The patient is not nervous/anxious and is not hyperactive.      Social History     Social History   • Marital status: Single     Spouse name: N/A   • Number of children: N/A   • Years of education: N/A     Occupational History   • Not on file.     Social History Main Topics   • Smoking status: Never Smoker   • Smokeless tobacco: Not on file   • Alcohol use No   • Drug use: No   • Sexual activity: Defer     Other Topics Concern   • Not on file     Social  "History Narrative     Family History   Problem Relation Age of Onset   • Hypertension Other    • Thyroid disease Other    • Diabetes Other    • Obesity Other    • Diabetes Mother    • Hypertension Mother    • Heart disease Mother      /72  Ht 165.1 cm (65\")  Wt 112 kg (247 lb 3.2 oz)  BMI 41.14 kg/m2  Physical Exam   Constitutional: She is oriented to person, place, and time. She appears well-developed and well-nourished.   HENT:   Head: Normocephalic and atraumatic.   Nose: Nose normal.   Mouth/Throat: Oropharynx is clear and moist.   Eyes: Conjunctivae, EOM and lids are normal. Pupils are equal, round, and reactive to light.   Neck: Trachea normal and normal range of motion. Neck supple. Carotid bruit is not present. No tracheal deviation present. No thyroid mass and no thyromegaly present.   Cardiovascular: Normal rate, regular rhythm, normal heart sounds and intact distal pulses.  Exam reveals no gallop and no friction rub.    No murmur heard.  Pulmonary/Chest: Effort normal and breath sounds normal. No respiratory distress. She has no wheezes.   Musculoskeletal: Normal range of motion. She exhibits no edema or deformity.       Neurological Sensory Findings - Altered hot/cold right ankle/foot discrimination and altered hot/cold left ankle/foot discrimination. Altered sharp/dull right ankle/foot discrimination and altered sharp/dull left ankle/foot discrimination. Right ankle/foot altered proprioception and left ankle/foot altered proprioception.    Vascular Status -  Her exam exhibits right foot vasculature normal and right foot edema. Her exam exhibits left foot vasculature normal and left foot edema.   Skin Integrity  -  Her right foot skin is intact.     Vilma 's left foot skin is intact. .   Foot Structure and Biomechanics -  Her right foot exhibits hallux valgus.  Her left foot exhibits hallux valgus.  Lymphadenopathy:     She has no cervical adenopathy.   Neurological: She is alert and " oriented to person, place, and time. She has normal reflexes. She displays normal reflexes. No cranial nerve deficit.   Skin: Skin is warm and dry. No rash noted. No cyanosis or erythema. Nails show no clubbing.   Psychiatric: She has a normal mood and affect. Her speech is normal and behavior is normal. Judgment and thought content normal. Cognition and memory are normal.   Nursing note and vitals reviewed.    Results for orders placed or performed in visit on 12/29/17   POC Glucose Fingerstick   Result Value Ref Range    Glucose 322 (A) 70 - 130 mg/dL     Vilma was seen today for diabetes.    Diagnoses and all orders for this visit:    Type 2 diabetes mellitus with diabetic neuropathy, with long-term current use of insulin  -     POC Glucose Fingerstick  -     POC Glycosylated Hemoglobin (Hb A1C)      Problem List Items Addressed This Visit        Endocrine    Diabetic neuropathy, type II diabetes mellitus - Primary     Blood sugar and 90 day average sugar reviewed  Results for orders placed or performed in visit on 12/29/17   POC Glucose Fingerstick   Result Value Ref Range    Glucose 322 (A) 70 - 130 mg/dL   POC Glycosylated Hemoglobin (Hb A1C)   Result Value Ref Range    Hemoglobin A1C 9.3 %     She is back to drinking sugared sodas  She is increasing pm insulin - sugars as high as 400  Discussed risk of higher sugars including risk of progressive neuropathy, nephropathy, retinopathy   She will try to go back to diet sodas and understands the effect the sugared drinks have on her blood sugar  Reviewed foot care  Ur alb neg 8/17  Is utd with eye exam  Can increase insulin for higher sugars but does understand that no amount of insulin will control sugar if she is drinking regular sodas  Recommended f/u 3-4 months          Relevant Orders    POC Glucose Fingerstick (Completed)    POC Glycosylated Hemoglobin (Hb A1C) (Completed)    Hypothyroid     No missed doses - normal tsh at last visit           Uncontrolled type 2 diabetes mellitus     See above  Discussed goal overall improved fitness and weight loss              Return in about 3 months (around 3/29/2018) for Recheck 30 min .    Selina Lopez MD  Signed Selina Lopez MD

## 2017-12-30 NOTE — ASSESSMENT & PLAN NOTE
Blood sugar and 90 day average sugar reviewed  Results for orders placed or performed in visit on 12/29/17   POC Glucose Fingerstick   Result Value Ref Range    Glucose 322 (A) 70 - 130 mg/dL   POC Glycosylated Hemoglobin (Hb A1C)   Result Value Ref Range    Hemoglobin A1C 9.3 %     She is back to drinking sugared sodas  She is increasing pm insulin - sugars as high as 400  Discussed risk of higher sugars including risk of progressive neuropathy, nephropathy, retinopathy   She will try to go back to diet sodas and understands the effect the sugared drinks have on her blood sugar  Reviewed foot care  Ur alb neg 8/17  Is utd with eye exam  Can increase insulin for higher sugars but does understand that no amount of insulin will control sugar if she is drinking regular sodas  Recommended f/u 3-4 months

## 2018-03-06 ENCOUNTER — OFFICE VISIT (OUTPATIENT)
Dept: ENDOCRINOLOGY | Facility: CLINIC | Age: 48
End: 2018-03-06

## 2018-03-06 VITALS
BODY MASS INDEX: 40.77 KG/M2 | HEART RATE: 121 BPM | DIASTOLIC BLOOD PRESSURE: 68 MMHG | WEIGHT: 245 LBS | SYSTOLIC BLOOD PRESSURE: 130 MMHG | OXYGEN SATURATION: 99 %

## 2018-03-06 DIAGNOSIS — Z79.4 TYPE 2 DIABETES MELLITUS WITH DIABETIC NEUROPATHY, WITH LONG-TERM CURRENT USE OF INSULIN (HCC): ICD-10-CM

## 2018-03-06 DIAGNOSIS — E03.9 ACQUIRED HYPOTHYROIDISM: ICD-10-CM

## 2018-03-06 DIAGNOSIS — E11.40 TYPE 2 DIABETES MELLITUS WITH DIABETIC NEUROPATHY, WITH LONG-TERM CURRENT USE OF INSULIN (HCC): ICD-10-CM

## 2018-03-06 DIAGNOSIS — E78.00 PURE HYPERCHOLESTEROLEMIA: ICD-10-CM

## 2018-03-06 DIAGNOSIS — IMO0001 UNCONTROLLED TYPE 2 DIABETES MELLITUS WITHOUT COMPLICATION, WITH LONG-TERM CURRENT USE OF INSULIN: Primary | ICD-10-CM

## 2018-03-06 LAB
ALBUMIN SERPL-MCNC: 4.1 G/DL (ref 3.2–4.8)
ALBUMIN/GLOB SERPL: 1.7 G/DL (ref 1.5–2.5)
ALP SERPL-CCNC: 147 U/L (ref 25–100)
ALT SERPL W P-5'-P-CCNC: 23 U/L (ref 7–40)
ANION GAP SERPL CALCULATED.3IONS-SCNC: 7 MMOL/L (ref 3–11)
AST SERPL-CCNC: 18 U/L (ref 0–33)
BASOPHILS # BLD AUTO: 0.03 10*3/MM3 (ref 0–0.2)
BASOPHILS NFR BLD AUTO: 0.2 % (ref 0–1)
BILIRUB SERPL-MCNC: 0.3 MG/DL (ref 0.3–1.2)
BUN BLD-MCNC: 14 MG/DL (ref 9–23)
BUN/CREAT SERPL: 11.7 (ref 7–25)
CALCIUM SPEC-SCNC: 9.1 MG/DL (ref 8.7–10.4)
CHLORIDE SERPL-SCNC: 106 MMOL/L (ref 99–109)
CO2 SERPL-SCNC: 26 MMOL/L (ref 20–31)
CORTIS SERPL-MCNC: 8.1 MCG/DL
CREAT BLD-MCNC: 1.2 MG/DL (ref 0.6–1.3)
DEPRECATED RDW RBC AUTO: 48.4 FL (ref 37–54)
EOSINOPHIL # BLD AUTO: 0.58 10*3/MM3 (ref 0–0.3)
EOSINOPHIL NFR BLD AUTO: 4.1 % (ref 0–3)
ERYTHROCYTE [DISTWIDTH] IN BLOOD BY AUTOMATED COUNT: 14.9 % (ref 11.3–14.5)
GFR SERPL CREATININE-BSD FRML MDRD: 48 ML/MIN/1.73
GLOBULIN UR ELPH-MCNC: 2.4 GM/DL
GLUCOSE BLD-MCNC: 251 MG/DL (ref 70–100)
GLUCOSE BLDC GLUCOMTR-MCNC: 289 MG/DL (ref 70–130)
HBA1C MFR BLD: 8.6 %
HCT VFR BLD AUTO: 37 % (ref 34.5–44)
HGB BLD-MCNC: 11.9 G/DL (ref 11.5–15.5)
IMM GRANULOCYTES # BLD: 0.06 10*3/MM3 (ref 0–0.03)
IMM GRANULOCYTES NFR BLD: 0.4 % (ref 0–0.6)
LYMPHOCYTES # BLD AUTO: 3.73 10*3/MM3 (ref 0.6–4.8)
LYMPHOCYTES NFR BLD AUTO: 26.3 % (ref 24–44)
MCH RBC QN AUTO: 29 PG (ref 27–31)
MCHC RBC AUTO-ENTMCNC: 32.2 G/DL (ref 32–36)
MCV RBC AUTO: 90 FL (ref 80–99)
MONOCYTES # BLD AUTO: 0.66 10*3/MM3 (ref 0–1)
MONOCYTES NFR BLD AUTO: 4.7 % (ref 0–12)
NEUTROPHILS # BLD AUTO: 9.11 10*3/MM3 (ref 1.5–8.3)
NEUTROPHILS NFR BLD AUTO: 64.3 % (ref 41–71)
PLAT MORPH BLD: NORMAL
PLATELET # BLD AUTO: 375 10*3/MM3 (ref 150–450)
PMV BLD AUTO: 10.8 FL (ref 6–12)
POTASSIUM BLD-SCNC: 4.2 MMOL/L (ref 3.5–5.5)
PROT SERPL-MCNC: 6.5 G/DL (ref 5.7–8.2)
RBC # BLD AUTO: 4.11 10*6/MM3 (ref 3.89–5.14)
RBC MORPH BLD: NORMAL
SODIUM BLD-SCNC: 139 MMOL/L (ref 132–146)
TSH SERPL DL<=0.05 MIU/L-ACNC: 3.62 MIU/ML (ref 0.35–5.35)
WBC MORPH BLD: NORMAL
WBC NRBC COR # BLD: 14.17 10*3/MM3 (ref 3.5–10.8)

## 2018-03-06 PROCEDURE — 82024 ASSAY OF ACTH: CPT | Performed by: INTERNAL MEDICINE

## 2018-03-06 PROCEDURE — 80053 COMPREHEN METABOLIC PANEL: CPT | Performed by: INTERNAL MEDICINE

## 2018-03-06 PROCEDURE — 83036 HEMOGLOBIN GLYCOSYLATED A1C: CPT | Performed by: INTERNAL MEDICINE

## 2018-03-06 PROCEDURE — 85025 COMPLETE CBC W/AUTO DIFF WBC: CPT | Performed by: INTERNAL MEDICINE

## 2018-03-06 PROCEDURE — 99214 OFFICE O/P EST MOD 30 MIN: CPT | Performed by: INTERNAL MEDICINE

## 2018-03-06 PROCEDURE — 85007 BL SMEAR W/DIFF WBC COUNT: CPT | Performed by: INTERNAL MEDICINE

## 2018-03-06 PROCEDURE — 84443 ASSAY THYROID STIM HORMONE: CPT | Performed by: INTERNAL MEDICINE

## 2018-03-06 PROCEDURE — 82947 ASSAY GLUCOSE BLOOD QUANT: CPT | Performed by: INTERNAL MEDICINE

## 2018-03-06 PROCEDURE — 82533 TOTAL CORTISOL: CPT | Performed by: INTERNAL MEDICINE

## 2018-03-06 RX ORDER — LOSARTAN POTASSIUM 25 MG/1
1 TABLET ORAL DAILY
COMMUNITY
Start: 2018-02-07 | End: 2019-04-15 | Stop reason: SINTOL

## 2018-03-06 RX ORDER — LEVOTHYROXINE SODIUM 0.03 MG/1
1 TABLET ORAL DAILY
COMMUNITY
Start: 2018-03-04 | End: 2018-03-12 | Stop reason: SDUPTHER

## 2018-03-06 RX ORDER — TOPIRAMATE 25 MG/1
1 TABLET ORAL DAILY
COMMUNITY
Start: 2018-02-07 | End: 2018-06-21 | Stop reason: ALTCHOICE

## 2018-03-06 RX ORDER — ATORVASTATIN CALCIUM 40 MG/1
1 TABLET, FILM COATED ORAL DAILY
COMMUNITY
Start: 2018-03-04 | End: 2019-01-21 | Stop reason: SDDI

## 2018-03-06 RX ORDER — FEXOFENADINE HCL 180 MG/1
180 TABLET ORAL DAILY
Qty: 30 TABLET | Refills: 5 | Status: SHIPPED | OUTPATIENT
Start: 2018-03-06 | End: 2019-01-21

## 2018-03-06 RX ORDER — TOPIRAMATE 50 MG/1
1 TABLET, FILM COATED ORAL DAILY
COMMUNITY
Start: 2018-02-07 | End: 2018-06-21 | Stop reason: ALTCHOICE

## 2018-03-06 RX ORDER — ALBUTEROL SULFATE 90 UG/1
1 AEROSOL, METERED RESPIRATORY (INHALATION) EVERY 4 HOURS PRN
Qty: 8 G | Refills: 3 | Status: SHIPPED | OUTPATIENT
Start: 2018-03-06 | End: 2018-03-19 | Stop reason: CLARIF

## 2018-03-06 NOTE — ASSESSMENT & PLAN NOTE
Blood sugar and 90 day average sugar reviewed  Results for orders placed or performed in visit on 03/06/18   POC Glucose Fingerstick   Result Value Ref Range    Glucose 289 (A) 70 - 130 mg/dL   POC Glycosylated Hemoglobin (Hb A1C)   Result Value Ref Range    Hemoglobin A1C 8.6 %     Average sugar is high, but improved compared to last ov  On metformin and insulin - discussed once again need for her to stop drinking sugared drinks and she will continue to work on this  Take 5 extra units for coke or sugared beverage   F/u 3 months   Is utd with preventive care

## 2018-03-06 NOTE — PROGRESS NOTES
Vilma Ayala 47 y.o.  CC: Follow-up (type 2 diabetes)    [place]: Follow-up (type 2 diabetes)    Blood sugar and 90 day average sugar reviewed  Results for orders placed or performed in visit on 03/06/18   POC Glucose Fingerstick   Result Value Ref Range    Glucose 289 (A) 70 - 130 mg/dL   POC Glycosylated Hemoglobin (Hb A1C)   Result Value Ref Range    Hemoglobin A1C 8.6 %     Average sugar is improved from 9.2%  She is utd with eye exam  Neuropathy stable with foot deformity but no callus or ulcer  Ur alb neg 8/17  Reviewed outside lab work - has chronic white blood cell elevation and tsh was high 1/18 as well  She is taking synthroid 0.225 mg daily as prescribed  LDL was 84 at last ov   Coughing and wheezing every now and then (worse for last 2 days)   Non productive cough, no runny nose or fever    Allergies   Allergen Reactions   • Januvia [Sitagliptin]      Insomnia   • Other        Current Outpatient Prescriptions:   •  atorvastatin (LIPITOR) 40 MG tablet, Take 1 tablet by mouth Daily., Disp: , Rfl:   •  Blood Glucose Monitoring Suppl (JAYLENE BREEZE 2 SYSTEM) W/DEVICE kit, , Disp: , Rfl:   •  busPIRone (BUSPAR) 10 MG tablet, Take  by mouth daily., Disp: , Rfl:   •  citalopram (CeleXA) 40 MG tablet, Take  by mouth daily., Disp: , Rfl:   •  glipiZIDE (GLUCOTROL) 10 MG tablet, Take 1 tablet by mouth 2 (two) times a day., Disp: , Rfl:   •  Glucose Blood (JAYLENE BREEZE 2 TEST) disk, Jaylene Breeze 2 Test In Vitro Disk; Patient Sig: Jaylene Breeze 2 Test In Vitro Disk Check blood sugar BID or as directed; 5; 1; 09-Jan-2015; Active, Disp: , Rfl:   •  Glucose Blood (RELION CONFIRM/MICRO TEST VI), 4 (four) times a day., Disp: , Rfl:   •  glucose blood (RELION PRIME TEST) test strip, Test tid, Disp: 100 each, Rfl: 5  •  insulin lispro protamine-insulin lispro (HUMALOG MIX 75/25) (75-25) 100 UNIT/ML suspension injection, Inject 25 units in am and 55 units before supper, Disp: 30 mL, Rfl: 3  •  Insulin Syringe-Needle U-100 (BD  "INSULIN SYRINGE ULTRAFINE) 31G X 15/64\" 0.5 ML misc, , Disp: , Rfl:   •  levothyroxine (SYNTHROID, LEVOTHROID) 200 MCG tablet, TAKE ONE TABLET BY MOUTH ONCE DAILY, Disp: 30 tablet, Rfl: 5  •  levothyroxine (SYNTHROID, LEVOTHROID) 25 MCG tablet, Take 1 tablet by mouth Daily., Disp: , Rfl:   •  losartan (COZAAR) 25 MG tablet, Take 1 tablet by mouth Daily., Disp: , Rfl:   •  metFORMIN (GLUCOPHAGE) 500 MG tablet, Take 2 tablets BID with food, Disp: 120 tablet, Rfl: 2  •  risperiDONE (RISPERDAL) 2 MG tablet, Take  by mouth., Disp: , Rfl:   •  risperiDONE (RISPERDAL) 4 MG tablet, Take  by mouth., Disp: , Rfl:   •  topiramate (TOPAMAX) 25 MG tablet, Take 1 tablet by mouth Daily., Disp: , Rfl:   •  topiramate (TOPAMAX) 50 MG tablet, Take 1 tablet by mouth Daily., Disp: , Rfl:   Patient Active Problem List    Diagnosis   • Pure hypercholesterolemia [E78.00]     Assessment & Plan Note:     On lipitor - ldl 84  Continue to work on improving blood sugar control, low fat diet, exercise     • Bronchitis [J40]   • Anxiety and depression [F41.8]     Overview Note:     Last Impression: 09 Jan 2015  on risperidone and has been stable  Selina Lopez (Endocrinology)       • Uncontrolled type 2 diabetes mellitus [E11.65]     Overview Note:     Last Impression: 21 Jan 2016  blood sugar 89, hgn a1c 6.6% nighttime low sugars-      lower pm insulin to 40 units email or call if continued lows is up to date with eye      exam neuropathy stable wearing diabetic shoes discussed diet  Selina Lopez      (Endocrinology)         Assessment & Plan Note:     Blood sugar and 90 day average sugar reviewed  Results for orders placed or performed in visit on 03/06/18   POC Glucose Fingerstick   Result Value Ref Range    Glucose 289 (A) 70 - 130 mg/dL   POC Glycosylated Hemoglobin (Hb A1C)   Result Value Ref Range    Hemoglobin A1C 8.6 %     Average sugar is high, but improved compared to last ov  On metformin and insulin - discussed " once again need for her to stop drinking sugared drinks and she will continue to work on this  Take 5 extra units for coke or sugared beverage   F/u 3 months   Is utd with preventive care        • Hypothyroid [E03.9]     Overview Note:     Impression: 01/21/2016 - tachycardia- update tfts  Impression: 08/24/2015 - no c/o- tsh normal 1/15, repeat yearly  Impression: 04/02/2015 - normal tsh 1/9  Impression: 01/09/2015 - check tft;   Last Impression: 21 Jan 2016  tachycardia- update tfts  Selina Lopez      (Endocrinology)         Assessment & Plan Note:     Check tfts - on higher dose of medication now      • Obesity [E66.9]     Overview Note:     Last Impression: 09 Jan 2015  provided diet infor and discussed goal of significant      weight loss  Selina Lopez (Endocrinology)       • Vitamin D deficiency [E55.9]   • Diabetic neuropathy, type II diabetes mellitus [E11.40]     Overview Note:     in specialty shoes- callus and bunion- seeing podiatry       Assessment & Plan Note:     Sees podiatry- utd with exam- foot care reviewed  Higher risk due to deformity        Review of Systems   Constitutional: Negative for activity change, appetite change, chills, diaphoresis, fatigue, fever and unexpected weight change.   HENT: Negative for congestion, dental problem, drooling, ear discharge, ear pain, facial swelling, hearing loss, mouth sores, nosebleeds, postnasal drip, rhinorrhea, sinus pressure, sneezing, sore throat, tinnitus, trouble swallowing and voice change.    Eyes: Negative for photophobia, pain, discharge, redness, itching and visual disturbance.   Respiratory: Negative for apnea, cough, choking, chest tightness, shortness of breath, wheezing and stridor.    Cardiovascular: Negative for chest pain, palpitations and leg swelling.   Gastrointestinal: Negative for abdominal distention, abdominal pain, anal bleeding, blood in stool, constipation, diarrhea, nausea, rectal pain and vomiting.    Endocrine: Negative for cold intolerance, heat intolerance, polydipsia, polyphagia and polyuria.   Genitourinary: Negative for decreased urine volume, difficulty urinating, dysuria, enuresis, flank pain, frequency, genital sores, hematuria and urgency.   Musculoskeletal: Negative for arthralgias, back pain, gait problem, joint swelling, myalgias, neck pain and neck stiffness.   Skin: Negative for color change, pallor, rash and wound.   Allergic/Immunologic: Negative for environmental allergies, food allergies and immunocompromised state.   Neurological: Negative for dizziness, tremors, seizures, syncope, facial asymmetry, speech difficulty, weakness, light-headedness, numbness and headaches.   Hematological: Negative for adenopathy. Does not bruise/bleed easily.   Psychiatric/Behavioral: Negative for agitation, behavioral problems, confusion, decreased concentration, dysphoric mood, hallucinations, self-injury, sleep disturbance and suicidal ideas. The patient is not nervous/anxious and is not hyperactive.      Social History     Social History   • Marital status: Single     Spouse name: N/A   • Number of children: N/A   • Years of education: N/A     Occupational History   • Not on file.     Social History Main Topics   • Smoking status: Never Smoker   • Smokeless tobacco: Not on file   • Alcohol use No   • Drug use: No   • Sexual activity: Defer     Other Topics Concern   • Not on file     Social History Narrative     Family History   Problem Relation Age of Onset   • Hypertension Other    • Thyroid disease Other    • Diabetes Other    • Obesity Other    • Diabetes Mother    • Hypertension Mother    • Heart disease Mother      /68  Pulse (!) 121  Wt 111 kg (245 lb)  SpO2 99%  BMI 40.77 kg/m2  Physical Exam   Constitutional: She is oriented to person, place, and time. She appears well-developed and well-nourished.   HENT:   Head: Normocephalic and atraumatic.   Nose: Nose normal.   Mouth/Throat:  Oropharynx is clear and moist.   Eyes: Conjunctivae, EOM and lids are normal. Pupils are equal, round, and reactive to light.   Neck: Trachea normal and normal range of motion. Neck supple. Carotid bruit is not present. No tracheal deviation present. No thyroid mass and no thyromegaly present.   Cardiovascular: Normal rate, regular rhythm, normal heart sounds and intact distal pulses.  Exam reveals no gallop and no friction rub.    No murmur heard.  Pulmonary/Chest: Effort normal and breath sounds normal. No respiratory distress. She has no wheezes.   Musculoskeletal: Normal range of motion. She exhibits no edema or deformity.    Vilma had a diabetic foot exam performed today.   During the foot exam she had a monofilament test performed.    Neurological Sensory Findings - Altered hot/cold right ankle/foot discrimination and altered hot/cold left ankle/foot discrimination. Altered sharp/dull right ankle/foot discrimination and altered sharp/dull left ankle/foot discrimination. Right ankle/foot altered proprioception and left ankle/foot altered proprioception.    Vascular Status -  Her exam exhibits right foot vasculature normal. Her exam exhibits no right foot edema. Her exam exhibits left foot vasculature normal. Her exam exhibits no left foot edema.   Skin Integrity  -  Her right foot skin is intact.     Vilma 's left foot skin is intact. .     Lymphadenopathy:     She has no cervical adenopathy.   Neurological: She is alert and oriented to person, place, and time. She has normal reflexes. She displays normal reflexes. No cranial nerve deficit.   Skin: Skin is warm and dry. No rash noted. No cyanosis or erythema. Nails show no clubbing.   Psychiatric: She has a normal mood and affect. Her speech is normal and behavior is normal. Judgment and thought content normal. Cognition and memory are normal.   Nursing note and vitals reviewed.    Results for orders placed or performed in visit on 03/06/18   POC  Glucose Fingerstick   Result Value Ref Range    Glucose 289 (A) 70 - 130 mg/dL   POC Glycosylated Hemoglobin (Hb A1C)   Result Value Ref Range    Hemoglobin A1C 8.6 %     Problem List Items Addressed This Visit        Cardiovascular and Mediastinum    Pure hypercholesterolemia     On lipitor - ldl 84  Continue to work on improving blood sugar control, low fat diet, exercise         Relevant Medications    atorvastatin (LIPITOR) 40 MG tablet    Other Relevant Orders    Comprehensive Metabolic Panel    CBC & Differential    TSH    ACTH    Cortisol       Endocrine    Uncontrolled type 2 diabetes mellitus - Primary     Blood sugar and 90 day average sugar reviewed  Results for orders placed or performed in visit on 03/06/18   POC Glucose Fingerstick   Result Value Ref Range    Glucose 289 (A) 70 - 130 mg/dL   POC Glycosylated Hemoglobin (Hb A1C)   Result Value Ref Range    Hemoglobin A1C 8.6 %     Average sugar is high, but improved compared to last ov  On metformin and insulin - discussed once again need for her to stop drinking sugared drinks and she will continue to work on this  Take 5 extra units for coke or sugared beverage   F/u 3 months   Is utd with preventive care            Relevant Orders    POC Glucose Fingerstick (Completed)    POC Glycosylated Hemoglobin (Hb A1C) (Completed)    Comprehensive Metabolic Panel    CBC & Differential    TSH    ACTH    Cortisol    Hypothyroid     Check tfts - on higher dose of medication now          Relevant Medications    levothyroxine (SYNTHROID, LEVOTHROID) 25 MCG tablet    Other Relevant Orders    Comprehensive Metabolic Panel    CBC & Differential    TSH    ACTH    Cortisol    Diabetic neuropathy, type II diabetes mellitus     Sees podiatry- utd with exam- foot care reviewed  Higher risk due to deformity          Relevant Orders    Comprehensive Metabolic Panel    CBC & Differential    TSH    ACTH    Cortisol        Return in about 3 months (around 6/6/2018) for Recheck  30 min .    Selina Lopez MD  Signed Selina Lopez MD

## 2018-03-08 LAB — ACTH PLAS-MCNC: 41.9 PG/ML (ref 7.2–63.3)

## 2018-03-09 ENCOUNTER — TELEPHONE (OUTPATIENT)
Dept: INTERNAL MEDICINE | Facility: CLINIC | Age: 48
End: 2018-03-09

## 2018-03-14 RX ORDER — LEVOTHYROXINE SODIUM 0.2 MG/1
200 TABLET ORAL DAILY
Qty: 90 TABLET | Refills: 1 | Status: SHIPPED | OUTPATIENT
Start: 2018-03-14 | End: 2018-06-12 | Stop reason: SDUPTHER

## 2018-03-14 RX ORDER — LEVOTHYROXINE SODIUM 0.03 MG/1
25 TABLET ORAL DAILY
Qty: 90 TABLET | Refills: 1 | Status: SHIPPED | OUTPATIENT
Start: 2018-03-14 | End: 2018-07-19 | Stop reason: SDUPTHER

## 2018-03-19 ENCOUNTER — TELEPHONE (OUTPATIENT)
Dept: INTERNAL MEDICINE | Facility: CLINIC | Age: 48
End: 2018-03-19

## 2018-03-19 RX ORDER — ALBUTEROL SULFATE 90 UG/1
2 AEROSOL, METERED RESPIRATORY (INHALATION) EVERY 4 HOURS PRN
Qty: 1 INHALER | Refills: 2 | Status: SHIPPED | OUTPATIENT
Start: 2018-03-19 | End: 2019-01-10 | Stop reason: SDUPTHER

## 2018-03-19 NOTE — TELEPHONE ENCOUNTER
MARYCRUZ FROM [facility] HAS CALLED TO INFORM YOU THAT THE PHARMACIST HAD A FEW THINGS THAT NEEDED CLARITY FOR PROVENTIL    PROVENTIL:  WANT TO VERIFY QUANTITY, DOSE,  DAY SUPPLY, AND STRENGTH    PT WILL NEED TO HAVE TRIED THESE   PRO AIR HFA  LEV ALBUTEROL HFA    OR NEED A SUPPORTING STATEMENT FOR AS TO WHY THE MEDICATION IS APPROPRIATE.       ORDER # [id number]

## 2018-03-20 ENCOUNTER — PRIOR AUTHORIZATION (OUTPATIENT)
Dept: ENDOCRINOLOGY | Facility: CLINIC | Age: 48
End: 2018-03-20

## 2018-03-20 NOTE — TELEPHONE ENCOUNTER
PA denied for proventil HFA inhaler per [facility]  rx was changed to proair inhaler and rx sent to pharmacy

## 2018-05-01 RX ORDER — LEVOTHYROXINE SODIUM 0.2 MG/1
TABLET ORAL
Qty: 30 TABLET | Refills: 5 | Status: SHIPPED | OUTPATIENT
Start: 2018-05-01 | End: 2018-06-21 | Stop reason: SDUPTHER

## 2018-05-29 ENCOUNTER — TELEPHONE (OUTPATIENT)
Dept: INTERNAL MEDICINE | Facility: CLINIC | Age: 48
End: 2018-05-29

## 2018-05-29 DIAGNOSIS — E03.9 ACQUIRED HYPOTHYROIDISM: Primary | ICD-10-CM

## 2018-05-29 NOTE — TELEPHONE ENCOUNTER
Patient is having problems sleeping and thinks she is taking too much levothyroxine and would like the dosage decreased.  Pt advised she will need labs and lab order will not be created until Thursday  Please put in lab order

## 2018-06-04 LAB
T4 FREE SERPL-MCNC: 1.29 NG/DL (ref 0.89–1.76)
TSH SERPL DL<=0.05 MIU/L-ACNC: 2.24 MIU/ML (ref 0.35–5.35)

## 2018-06-04 PROCEDURE — 84443 ASSAY THYROID STIM HORMONE: CPT | Performed by: INTERNAL MEDICINE

## 2018-06-04 PROCEDURE — 84439 ASSAY OF FREE THYROXINE: CPT | Performed by: INTERNAL MEDICINE

## 2018-06-12 RX ORDER — LEVOTHYROXINE SODIUM 0.2 MG/1
200 TABLET ORAL DAILY
Qty: 90 TABLET | Refills: 1 | Status: SHIPPED | OUTPATIENT
Start: 2018-06-12 | End: 2019-09-13

## 2018-06-13 RX ORDER — BUSPIRONE HYDROCHLORIDE 10 MG/1
10 TABLET ORAL DAILY
Qty: 90 TABLET | Refills: 1 | Status: SHIPPED | OUTPATIENT
Start: 2018-06-13 | End: 2022-05-18 | Stop reason: SDUPTHER

## 2018-06-13 RX ORDER — CITALOPRAM 40 MG/1
40 TABLET ORAL DAILY
Qty: 90 TABLET | Refills: 1 | Status: SHIPPED | OUTPATIENT
Start: 2018-06-13 | End: 2022-05-18 | Stop reason: SDUPTHER

## 2018-06-21 ENCOUNTER — OFFICE VISIT (OUTPATIENT)
Dept: ENDOCRINOLOGY | Facility: CLINIC | Age: 48
End: 2018-06-21

## 2018-06-21 VITALS — SYSTOLIC BLOOD PRESSURE: 112 MMHG | HEART RATE: 102 BPM | DIASTOLIC BLOOD PRESSURE: 68 MMHG | OXYGEN SATURATION: 98 %

## 2018-06-21 DIAGNOSIS — E03.9 ACQUIRED HYPOTHYROIDISM: ICD-10-CM

## 2018-06-21 DIAGNOSIS — E55.9 VITAMIN D DEFICIENCY: ICD-10-CM

## 2018-06-21 DIAGNOSIS — IMO0001 UNCONTROLLED TYPE 2 DIABETES MELLITUS WITHOUT COMPLICATION, WITH LONG-TERM CURRENT USE OF INSULIN: Primary | ICD-10-CM

## 2018-06-21 DIAGNOSIS — E78.00 PURE HYPERCHOLESTEROLEMIA: ICD-10-CM

## 2018-06-21 LAB
GLUCOSE BLDC GLUCOMTR-MCNC: 150 MG/DL (ref 70–130)
HBA1C MFR BLD: 8.8 %

## 2018-06-21 PROCEDURE — 82947 ASSAY GLUCOSE BLOOD QUANT: CPT | Performed by: INTERNAL MEDICINE

## 2018-06-21 PROCEDURE — 83036 HEMOGLOBIN GLYCOSYLATED A1C: CPT | Performed by: INTERNAL MEDICINE

## 2018-06-21 PROCEDURE — 99214 OFFICE O/P EST MOD 30 MIN: CPT | Performed by: INTERNAL MEDICINE

## 2018-06-21 RX ORDER — RISPERIDONE 3 MG/1
3 TABLET ORAL DAILY
COMMUNITY
End: 2019-09-13

## 2018-06-21 NOTE — PROGRESS NOTES
Vilma Ayala 47 y.o.  CC:Follow-up; Diabetes (Type II, last eye exam was over one year ago); Peripheral Neuropathy; Hypothyroidism; Hyperlipidemia; and Hypertension    Sun'aq: Follow-up; Diabetes (Type II, last eye exam was over one year ago); Peripheral Neuropathy; Hypothyroidism; Hyperlipidemia; and Hypertension    Blood sugar and 90 day average sugar reviewed  Results for orders placed or performed in visit on 06/21/18   POC Glycosylated Hemoglobin (Hb A1C)   Result Value Ref Range    Hemoglobin A1C 8.8 %   POC Glucose Fingerstick   Result Value Ref Range    Glucose 150 (A) 70 - 130 mg/dL     Average sugar is 200   She is due for eye exam - reminded   Neuropathy without callus or ulcer  Energy is good   Ur alb 8/17 neg  LDL high - discussed chol lowering medication   Psychiatry is adjusting medication   No low sugars  She is trying to cut back on sugared drinks    Allergies   Allergen Reactions   • Januvia [Sitagliptin]      Insomnia   • Other        Current Outpatient Prescriptions:   •  risperiDONE (risperDAL) 3 MG tablet, Take 3 mg by mouth 2 (Two) Times a Day., Disp: , Rfl:   •  albuterol (PROVENTIL HFA;VENTOLIN HFA) 108 (90 Base) MCG/ACT inhaler, Inhale 2 puffs Every 4 (Four) Hours As Needed for Wheezing., Disp: 1 inhaler, Rfl: 2  •  atorvastatin (LIPITOR) 40 MG tablet, Take 1 tablet by mouth Daily., Disp: , Rfl:   •  Blood Glucose Monitoring Suppl (JAYLENE BREEZE 2 SYSTEM) W/DEVICE kit, , Disp: , Rfl:   •  busPIRone (BUSPAR) 10 MG tablet, Take 1 tablet by mouth Daily. (Patient taking differently: Take 10 mg by mouth 2 (Two) Times a Day.), Disp: 90 tablet, Rfl: 1  •  citalopram (CeleXA) 40 MG tablet, Take 1 tablet by mouth Daily., Disp: 90 tablet, Rfl: 1  •  fexofenadine (ALLEGRA ALLERGY) 180 MG tablet, Take 1 tablet by mouth Daily., Disp: 30 tablet, Rfl: 5  •  glipiZIDE (GLUCOTROL) 10 MG tablet, Take 1 tablet by mouth 2 (two) times a day., Disp: , Rfl:   •  Glucose Blood (JAYLENE BREEZE 2 TEST) disk, Jaylene  "Breeze 2 Test In Vitro Disk; Patient Sig: Jaylene Breeze 2 Test In Vitro Disk Check blood sugar BID or as directed; 5; 1; 09-Jan-2015; Active, Disp: , Rfl:   •  Glucose Blood (RELION CONFIRM/MICRO TEST VI), 4 (four) times a day., Disp: , Rfl:   •  glucose blood (RELION PRIME TEST) test strip, Test tid, Disp: 100 each, Rfl: 5  •  insulin lispro protamine-insulin lispro (HUMALOG MIX 75/25) (75-25) 100 UNIT/ML suspension injection, Inject 25 units in am and 55 units before supper (Patient taking differently: Inject 26 units in am and 55 units before supper), Disp: 30 mL, Rfl: 3  •  Insulin Syringe-Needle U-100 (BD INSULIN SYRINGE ULTRAFINE) 31G X 15/64\" 0.5 ML misc, , Disp: , Rfl:   •  levothyroxine (SYNTHROID, LEVOTHROID) 200 MCG tablet, Take 1 tablet by mouth Daily., Disp: 90 tablet, Rfl: 1  •  levothyroxine (SYNTHROID, LEVOTHROID) 25 MCG tablet, Take 1 tablet by mouth Daily., Disp: 90 tablet, Rfl: 1  •  losartan (COZAAR) 25 MG tablet, Take 1 tablet by mouth Daily., Disp: , Rfl:   •  metFORMIN (GLUCOPHAGE) 500 MG tablet, Take 2 tablets BID with food, Disp: 120 tablet, Rfl: 2  •  risperiDONE (RISPERDAL) 4 MG tablet, Take  by mouth., Disp: , Rfl:   Patient Active Problem List    Diagnosis   • Pure hypercholesterolemia [E78.00]   • Bronchitis [J40]   • Anxiety and depression [F41.8]     Overview Note:     Last Impression: 09 Jan 2015  on risperidone and has been stable  Selina Lopez (Endocrinology)       • Uncontrolled type 2 diabetes mellitus [E11.65]     Overview Note:     Last Impression: 21 Jan 2016  blood sugar 89, hgn a1c 6.6% nighttime low sugars-      lower pm insulin to 40 units email or call if continued lows is up to date with eye      exam neuropathy stable wearing diabetic shoes discussed diet  Selina Lopez      (Endocrinology)       • Hypothyroid [E03.9]     Overview Note:     Impression: 01/21/2016 - tachycardia- update tfts  Impression: 08/24/2015 - no c/o- tsh normal 1/15, repeat " yearly  Impression: 04/02/2015 - normal tsh 1/9  Impression: 01/09/2015 - check tft;   Last Impression: 21 Jan 2016  tachycardia- update tfts  Selina Lopez      (Endocrinology)       • Obesity [E66.9]     Overview Note:     Last Impression: 09 Jan 2015  provided diet infor and discussed goal of significant      weight loss  Selina Lopez (Endocrinology)       • Vitamin D deficiency [E55.9]   • Diabetic neuropathy, type II diabetes mellitus [E11.40]     Overview Note:     in specialty shoes- callus and bunion- seeing podiatry       Review of Systems   Constitutional: Positive for fatigue. Negative for activity change, appetite change, chills, diaphoresis, fever and unexpected weight change.   HENT: Negative for congestion, dental problem, drooling, ear discharge, ear pain, facial swelling, hearing loss, mouth sores, nosebleeds, postnasal drip, rhinorrhea, sinus pressure, sneezing, sore throat, tinnitus, trouble swallowing and voice change.    Eyes: Negative for photophobia, pain, discharge, redness, itching and visual disturbance.   Respiratory: Positive for cough. Negative for apnea, choking, chest tightness, shortness of breath, wheezing and stridor.    Cardiovascular: Positive for leg swelling. Negative for chest pain and palpitations.   Gastrointestinal: Negative for abdominal distention, abdominal pain, anal bleeding, blood in stool, constipation, diarrhea, nausea, rectal pain and vomiting.   Endocrine: Negative for cold intolerance, heat intolerance, polydipsia, polyphagia and polyuria.   Genitourinary: Negative for decreased urine volume, difficulty urinating, dysuria, enuresis, flank pain, frequency, genital sores, hematuria and urgency.   Musculoskeletal: Negative for arthralgias, back pain, gait problem, joint swelling, myalgias, neck pain and neck stiffness.   Skin: Negative for color change, pallor, rash and wound.   Allergic/Immunologic: Negative for environmental allergies, food  allergies and immunocompromised state.   Neurological: Negative for dizziness, tremors, seizures, syncope, facial asymmetry, speech difficulty, weakness, light-headedness, numbness and headaches.   Hematological: Negative for adenopathy. Does not bruise/bleed easily.   Psychiatric/Behavioral: Negative for agitation, behavioral problems, confusion, decreased concentration, dysphoric mood, hallucinations, self-injury, sleep disturbance and suicidal ideas. The patient is not nervous/anxious and is not hyperactive.      Social History     Social History   • Marital status: Single     Spouse name: N/A   • Number of children: N/A   • Years of education: N/A     Occupational History   • Not on file.     Social History Main Topics   • Smoking status: Never Smoker   • Smokeless tobacco: Not on file   • Alcohol use No   • Drug use: No   • Sexual activity: Defer     Other Topics Concern   • Not on file     Social History Narrative   • No narrative on file     Family History   Problem Relation Age of Onset   • Hypertension Other    • Thyroid disease Other    • Diabetes Other    • Obesity Other    • Diabetes Mother    • Hypertension Mother    • Heart disease Mother      /68   Pulse 102   SpO2 98%   Physical Exam   Constitutional: She is oriented to person, place, and time. She appears well-developed and well-nourished.   HENT:   Head: Normocephalic and atraumatic.   Nose: Nose normal.   Mouth/Throat: Oropharynx is clear and moist.   Eyes: Conjunctivae, EOM and lids are normal. Pupils are equal, round, and reactive to light.   Neck: Trachea normal and normal range of motion. Neck supple. Carotid bruit is not present. No tracheal deviation present. No thyroid mass and no thyromegaly present.   Cardiovascular: Normal rate, regular rhythm, normal heart sounds and intact distal pulses.  Exam reveals no gallop and no friction rub.    No murmur heard.  Pulmonary/Chest: Effort normal and breath sounds normal. No respiratory  distress. She has no wheezes.   Musculoskeletal: Normal range of motion. She exhibits no edema or deformity.    Vilma had a diabetic foot exam performed today.   During the foot exam she had a monofilament test performed.    Neurological Sensory Findings - Unaltered hot/cold right ankle/foot discrimination and unaltered hot/cold left ankle/foot discrimination. Altered sharp/dull right ankle/foot discrimination and altered sharp/dull left ankle/foot discrimination. No right ankle/foot altered proprioception and no left ankle/foot altered proprioception  Vascular Status -  Her right foot exhibits abnormal foot edema. Her right foot exhibits normal foot vasculature . Her left foot exhibits abnormal foot edema. Her left foot exhibits normal foot vasculature .  Skin Integrity  -  Her right foot skin is intact.Her left foot skin is intact..  Lymphadenopathy:     She has no cervical adenopathy.   Neurological: She is alert and oriented to person, place, and time. She has normal reflexes. She displays normal reflexes. No cranial nerve deficit.   Skin: Skin is warm and dry. No rash noted. No cyanosis or erythema. Nails show no clubbing.   Psychiatric: She has a normal mood and affect. Her speech is normal and behavior is normal. Judgment and thought content normal. Cognition and memory are normal.   Nursing note and vitals reviewed.    Results for orders placed or performed in visit on 05/29/18   T4, Free   Result Value Ref Range    Free T4 1.29 0.89 - 1.76 ng/dL   TSH   Result Value Ref Range    TSH 2.240 0.350 - 5.350 mIU/mL     Vilma was seen today for follow-up, diabetes, peripheral neuropathy, hypothyroidism, hyperlipidemia and hypertension.    Diagnoses and all orders for this visit:    Uncontrolled type 2 diabetes mellitus without complication, with long-term current use of insulin  -     POC Glycosylated Hemoglobin (Hb A1C)  -     POC Glucose Fingerstick      Problem List Items Addressed This Visit         Cardiovascular and Mediastinum    Pure hypercholesterolemia     On low fat diet   Is not on statin currently             Digestive    Vitamin D deficiency     Continue supplement             Endocrine    Uncontrolled type 2 diabetes mellitus - Primary     Blood sugar and 90 day average sugar reviewed  Results for orders placed or performed in visit on 06/21/18   POC Glycosylated Hemoglobin (Hb A1C)   Result Value Ref Range    Hemoglobin A1C 8.8 %   POC Glucose Fingerstick   Result Value Ref Range    Glucose 150 (A) 70 - 130 mg/dL     Average sugar is 200   Goal average sugar 150 or less  Is utd with eye exam   Neuropathy without foot lesion   Foot care reviewed  Ur alb 8/17          Relevant Orders    POC Glycosylated Hemoglobin (Hb A1C) (Completed)    POC Glucose Fingerstick (Completed)    Hypothyroid     Continue supplement- last tfts acceptable             Return in about 3 months (around 9/21/2018) for Recheck 30 min .    Jayna Kapoor MA  Signed Selina Lopez MD

## 2018-06-23 NOTE — ASSESSMENT & PLAN NOTE
Blood sugar and 90 day average sugar reviewed  Results for orders placed or performed in visit on 06/21/18   POC Glycosylated Hemoglobin (Hb A1C)   Result Value Ref Range    Hemoglobin A1C 8.8 %   POC Glucose Fingerstick   Result Value Ref Range    Glucose 150 (A) 70 - 130 mg/dL     Average sugar is 200   Goal average sugar 150 or less  Is utd with eye exam   Neuropathy without foot lesion   Foot care reviewed  Ur alb 8/17

## 2018-07-19 ENCOUNTER — TELEPHONE (OUTPATIENT)
Dept: INTERNAL MEDICINE | Facility: CLINIC | Age: 48
End: 2018-07-19

## 2018-07-19 NOTE — TELEPHONE ENCOUNTER
Patient called in regards to her losartan medication. This is not prescribed by Dr. Lopez but the patient stated at her last appt Dr. Lopez gave her a specific time to take her medication she just could not remember what she was told. Pt thinks she was told to take it before dinner but wanted to clarify.   Please advise.

## 2018-07-20 RX ORDER — LEVOTHYROXINE SODIUM 0.03 MG/1
25 TABLET ORAL DAILY
Qty: 90 TABLET | Refills: 0 | Status: SHIPPED | OUTPATIENT
Start: 2018-07-20 | End: 2019-04-15 | Stop reason: DRUGHIGH

## 2018-08-13 ENCOUNTER — TELEPHONE (OUTPATIENT)
Dept: INTERNAL MEDICINE | Facility: CLINIC | Age: 48
End: 2018-08-13

## 2018-08-13 NOTE — TELEPHONE ENCOUNTER
Patient states her HS blood sugars have been around  300 - 334 and the fasting numbers are about the same.  She admits to drinking regular soda with supper but is trying to cut back on it.  She states it is usually 200 - 300 during the day  She is currently taking 26 units in the am and 55 units in the evening of insulin  Please advise

## 2018-08-13 NOTE — TELEPHONE ENCOUNTER
PT'S SUGARS ARE RUNNING HIGH- MOSTLY IN THE MORNING. RUNNING IN 's. SHE HAD AN APPT SCHEDULED FOR TOMORROW WITH [facility] BUT HAD TO CANCEL BECAUSE SHE HAS TO GO TO THE [facility]. SHE WOULD LIKE TO KNOW WHAT DR. SWANSON ADVISES HER TO DO. PLEASE ADVICE. THANKS.

## 2018-08-14 NOTE — TELEPHONE ENCOUNTER
lmom to advise pt with insulin changes and recommendations from Dr Lopez  Advised her to call if any questions

## 2018-08-14 NOTE — TELEPHONE ENCOUNTER
Increase insulin by 4 units am and pm  Take prior to meals  Stop drinking regular soda   Update 1-2 days

## 2018-08-20 NOTE — TELEPHONE ENCOUNTER
Patient called to report that the insulin she was given is working and her numbers are back to normal.

## 2018-10-04 ENCOUNTER — OFFICE VISIT (OUTPATIENT)
Dept: ENDOCRINOLOGY | Facility: CLINIC | Age: 48
End: 2018-10-04

## 2018-10-04 VITALS
SYSTOLIC BLOOD PRESSURE: 100 MMHG | BODY MASS INDEX: 40.48 KG/M2 | HEART RATE: 107 BPM | OXYGEN SATURATION: 98 % | HEIGHT: 66 IN | WEIGHT: 251.9 LBS | DIASTOLIC BLOOD PRESSURE: 64 MMHG

## 2018-10-04 DIAGNOSIS — E78.00 PURE HYPERCHOLESTEROLEMIA: ICD-10-CM

## 2018-10-04 DIAGNOSIS — E11.40 TYPE 2 DIABETES MELLITUS WITH DIABETIC NEUROPATHY, WITH LONG-TERM CURRENT USE OF INSULIN (HCC): ICD-10-CM

## 2018-10-04 DIAGNOSIS — E55.9 VITAMIN D DEFICIENCY: ICD-10-CM

## 2018-10-04 DIAGNOSIS — E03.9 ACQUIRED HYPOTHYROIDISM: ICD-10-CM

## 2018-10-04 DIAGNOSIS — Z29.9 PREVENTIVE MEASURE: ICD-10-CM

## 2018-10-04 DIAGNOSIS — E11.65 UNCONTROLLED TYPE 2 DIABETES MELLITUS WITH HYPERGLYCEMIA (HCC): Primary | ICD-10-CM

## 2018-10-04 DIAGNOSIS — Z79.4 TYPE 2 DIABETES MELLITUS WITH DIABETIC NEUROPATHY, WITH LONG-TERM CURRENT USE OF INSULIN (HCC): ICD-10-CM

## 2018-10-04 LAB
GLUCOSE BLDC GLUCOMTR-MCNC: 279 MG/DL (ref 70–130)
HBA1C MFR BLD: 7.9 %

## 2018-10-04 PROCEDURE — 90632 HEPA VACCINE ADULT IM: CPT | Performed by: INTERNAL MEDICINE

## 2018-10-04 PROCEDURE — 90674 CCIIV4 VAC NO PRSV 0.5 ML IM: CPT | Performed by: INTERNAL MEDICINE

## 2018-10-04 PROCEDURE — 82947 ASSAY GLUCOSE BLOOD QUANT: CPT | Performed by: INTERNAL MEDICINE

## 2018-10-04 PROCEDURE — 83036 HEMOGLOBIN GLYCOSYLATED A1C: CPT | Performed by: INTERNAL MEDICINE

## 2018-10-04 PROCEDURE — 90471 IMMUNIZATION ADMIN: CPT | Performed by: INTERNAL MEDICINE

## 2018-10-04 PROCEDURE — 99214 OFFICE O/P EST MOD 30 MIN: CPT | Performed by: INTERNAL MEDICINE

## 2018-10-04 PROCEDURE — G0008 ADMIN INFLUENZA VIRUS VAC: HCPCS | Performed by: INTERNAL MEDICINE

## 2018-10-04 NOTE — PROGRESS NOTES
Vilma Ayala 47 y.o.  CC:Follow-up; Diabetes (Type II, eye exam was one year ago); Peripheral Neuropathy; Hypothyroidism; Hyperlipidemia; and Hypertension      [place]: Follow-up; Diabetes (Type II, eye exam was one year ago); Peripheral Neuropathy; Hypothyroidism; Hyperlipidemia; and Hypertension    Blood sugar and 90 day average sugar reviewed  Results for orders placed or performed in visit on 10/04/18   POC Glycosylated Hemoglobin (Hb A1C)   Result Value Ref Range    Hemoglobin A1C 7.9 %   POC Glucose Fingerstick   Result Value Ref Range    Glucose 279 (A) 70 - 130 mg/dL     Sugar is high- had peppermint patties for lunch   Reviewed outside lab work   Would like hep a and flu shot   Is due for eye exam- Dr Osorio reminded   Neuropathy - bilateral medial toe callus- using pumice stone and we discussed foot care, risk of callus (related to ulcer and amputation) - podiatry also reviewed  She is still drinking sugared drinks/sodas  She is trying to cut back on this  Ur alb neg 8/16  Reviewed outside lab work on file     Allergies   Allergen Reactions   • Januvia [Sitagliptin]      Insomnia   • Other        Current Outpatient Prescriptions:   •  albuterol (PROVENTIL HFA;VENTOLIN HFA) 108 (90 Base) MCG/ACT inhaler, Inhale 2 puffs Every 4 (Four) Hours As Needed for Wheezing., Disp: 1 inhaler, Rfl: 2  •  atorvastatin (LIPITOR) 40 MG tablet, Take 1 tablet by mouth Daily., Disp: , Rfl:   •  Blood Glucose Monitoring Suppl (Excelsoft BREEZE 2 SYSTEM) W/DEVICE kit, , Disp: , Rfl:   •  busPIRone (BUSPAR) 10 MG tablet, Take 1 tablet by mouth Daily. (Patient taking differently: Take 10 mg by mouth 2 (Two) Times a Day.), Disp: 90 tablet, Rfl: 1  •  citalopram (CeleXA) 40 MG tablet, Take 1 tablet by mouth Daily., Disp: 90 tablet, Rfl: 1  •  fexofenadine (ALLEGRA ALLERGY) 180 MG tablet, Take 1 tablet by mouth Daily., Disp: 30 tablet, Rfl: 5  •  glipiZIDE (GLUCOTROL) 10 MG tablet, Take 1 tablet by mouth 2 (two) times a day., Disp: , Rfl:  "  •  Glucose Blood (JAYLENE BREEZE 2 TEST) disk, Jaylene Breeze 2 Test In Vitro Disk; Patient Sig: Jaylene Breeze 2 Test In Vitro Disk Check blood sugar BID or as directed; 5; 1; 09-Jan-2015; Active, Disp: , Rfl:   •  Glucose Blood (RELION CONFIRM/MICRO TEST VI), 4 (four) times a day., Disp: , Rfl:   •  glucose blood (RELION PRIME TEST) test strip, Test tid, Disp: 100 each, Rfl: 5  •  insulin lispro protamine-insulin lispro (HUMALOG MIX 75/25) (75-25) 100 UNIT/ML suspension injection, Inject 30 units in am and 59 units before supper (Patient taking differently: Inject 30 units in am and 60units before supper), Disp: 30 mL, Rfl: 3  •  Insulin Syringe-Needle U-100 (BD INSULIN SYRINGE ULTRAFINE) 31G X 15/64\" 0.5 ML misc, , Disp: , Rfl:   •  levothyroxine (SYNTHROID, LEVOTHROID) 200 MCG tablet, Take 1 tablet by mouth Daily., Disp: 90 tablet, Rfl: 1  •  levothyroxine (SYNTHROID, LEVOTHROID) 25 MCG tablet, TAKE 1 TABLET BY MOUTH DAILY, Disp: 90 tablet, Rfl: 0  •  losartan (COZAAR) 25 MG tablet, Take 1 tablet by mouth Daily., Disp: , Rfl:   •  metFORMIN (GLUCOPHAGE) 500 MG tablet, Take 2 tablets BID with food, Disp: 120 tablet, Rfl: 2  •  risperiDONE (risperDAL) 3 MG tablet, Take 3 mg by mouth 2 (Two) Times a Day., Disp: , Rfl:   •  risperiDONE (RISPERDAL) 4 MG tablet, Take  by mouth., Disp: , Rfl:   Patient Active Problem List    Diagnosis   • Pure hypercholesterolemia [E78.00]   • Bronchitis [J40]   • Anxiety and depression [F41.9, F32.9]     Overview Note:     Last Impression: 09 Jan 2015  on risperidone and has been stable  Selina Lopez (Endocrinology)       • Uncontrolled type 2 diabetes mellitus (CMS/[facility]) [E11.65]     Overview Note:     Last Impression: 21 Jan 2016  blood sugar 89, hgn a1c 6.6% nighttime low sugars-      lower pm insulin to 40 units email or call if continued lows is up to date with eye      exam neuropathy stable wearing diabetic shoes discussed Selina Phillips      " (Endocrinology)       • Hypothyroid [E03.9]     Overview Note:     Impression: 01/21/2016 - tachycardia- update tfts  Impression: 08/24/2015 - no c/o- tsh normal 1/15, repeat yearly  Impression: 04/02/2015 - normal tsh 1/9  Impression: 01/09/2015 - check tft;   Last Impression: 21 Jan 2016  tachycardia- update tfts  Selina Lopez      (Endocrinology)       • Obesity [E66.9]     Overview Note:     Last Impression: 09 Jan 2015  provided diet infor and discussed goal of significant      weight loss  Selina Lopez (Endocrinology)       • Vitamin D deficiency [E55.9]   • Diabetic neuropathy, type II diabetes mellitus (CMS/[facility]) [E11.40]     Overview Note:     in specialty shoes- callus and bunion- seeing podiatry       Review of Systems   Constitutional: Negative for activity change, appetite change, chills, diaphoresis, fatigue, fever and unexpected weight change.   HENT: Negative for congestion, dental problem, drooling, ear discharge, ear pain, facial swelling, hearing loss, mouth sores, nosebleeds, postnasal drip, rhinorrhea, sinus pressure, sneezing, sore throat, tinnitus, trouble swallowing and voice change.    Eyes: Negative for photophobia, pain, discharge, redness, itching and visual disturbance.   Respiratory: Negative for apnea, cough, choking, chest tightness, shortness of breath, wheezing and stridor.    Cardiovascular: Negative for chest pain, palpitations and leg swelling.   Gastrointestinal: Negative for abdominal distention, abdominal pain, anal bleeding, blood in stool, constipation, diarrhea, nausea, rectal pain and vomiting.   Endocrine: Negative for cold intolerance, heat intolerance, polydipsia, polyphagia and polyuria.   Genitourinary: Negative for decreased urine volume, difficulty urinating, dysuria, enuresis, flank pain, frequency, genital sores, hematuria and urgency.   Musculoskeletal: Negative for arthralgias, back pain, gait problem, joint swelling, myalgias, neck pain and  "neck stiffness.   Skin: Negative for color change, pallor, rash and wound.   Allergic/Immunologic: Negative for environmental allergies, food allergies and immunocompromised state.   Neurological: Negative for dizziness, tremors, seizures, syncope, facial asymmetry, speech difficulty, weakness, light-headedness, numbness and headaches.   Hematological: Negative for adenopathy. Does not bruise/bleed easily.   Psychiatric/Behavioral: Negative for agitation, behavioral problems, confusion, decreased concentration, dysphoric mood, hallucinations, self-injury, sleep disturbance and suicidal ideas. The patient is not nervous/anxious and is not hyperactive.      Social History     Social History   • Marital status: Single     Spouse name: N/A   • Number of children: N/A   • Years of education: N/A     Occupational History   • Not on file.     Social History Main Topics   • Smoking status: Never Smoker   • Smokeless tobacco: Not on file   • Alcohol use No   • Drug use: No   • Sexual activity: Defer     Other Topics Concern   • Not on file     Social History Narrative   • No narrative on file     Family History   Problem Relation Age of Onset   • Hypertension Other    • Thyroid disease Other    • Diabetes Other    • Obesity Other    • Diabetes Mother    • Hypertension Mother    • Heart disease Mother      /64   Pulse 107   Ht 167.6 cm (66\")   Wt 114 kg (251 lb 14.4 oz)   SpO2 98%   Breastfeeding? No   BMI 40.66 kg/m²   Physical Exam   Constitutional: She is oriented to person, place, and time. She appears well-developed and well-nourished.   HENT:   Head: Normocephalic and atraumatic.   Nose: Nose normal.   Mouth/Throat: Oropharynx is clear and moist.   Eyes: Pupils are equal, round, and reactive to light. Conjunctivae, EOM and lids are normal.   Neck: Trachea normal and normal range of motion. Neck supple. Carotid bruit is not present. No tracheal deviation present. No thyroid mass and no thyromegaly present. "   Cardiovascular: Normal rate, regular rhythm, normal heart sounds and intact distal pulses.  Exam reveals no gallop and no friction rub.    No murmur heard.  Pulmonary/Chest: Effort normal and breath sounds normal. No respiratory distress. She has no wheezes.   Musculoskeletal: Normal range of motion. She exhibits no edema or deformity.       Neurological Sensory Findings - Altered hot/cold right ankle/foot discrimination and altered hot/cold left ankle/foot discrimination. Unaltered sharp/dull right ankle/foot discrimination and unaltered sharp/dull left ankle/foot discrimination. No right ankle/foot altered proprioception and no left ankle/foot altered proprioception  Vascular Status -  Her right foot exhibits normal foot vasculature  and no edema. Her left foot exhibits normal foot vasculature  and no edema.  Skin Integrity  -  Her right foot skin is intact.Her left foot skin is intact..     Lymphadenopathy:     She has no cervical adenopathy.   Neurological: She is alert and oriented to person, place, and time. She has normal reflexes. She displays normal reflexes. No cranial nerve deficit.   Skin: Skin is warm and dry. No rash noted. No cyanosis or erythema. Nails show no clubbing.   Psychiatric: She has a normal mood and affect. Her speech is normal and behavior is normal. Judgment and thought content normal. Cognition and memory are normal.   Nursing note and vitals reviewed.    Results for orders placed or performed in visit on 06/21/18   POC Glycosylated Hemoglobin (Hb A1C)   Result Value Ref Range    Hemoglobin A1C 8.8 %   POC Glucose Fingerstick   Result Value Ref Range    Glucose 150 (A) 70 - 130 mg/dL     Vilma was seen today for follow-up, diabetes, peripheral neuropathy, hypothyroidism, hyperlipidemia and hypertension.    Diagnoses and all orders for this visit:    Uncontrolled type 2 diabetes mellitus with hyperglycemia (CMS/[facility])  -     POC Glycosylated Hemoglobin (Hb A1C)  -     POC Glucose  Fingerstick      Problem List Items Addressed This Visit        Cardiovascular and Mediastinum    Pure hypercholesterolemia     Continue low fat diet  Update FLP via PCP Dr Corbin- last lab work 8/18  Consider statin therapy             Digestive    Vitamin D deficiency     Continue daily vitamin D supplement             Endocrine    Uncontrolled type 2 diabetes mellitus (CMS/HCC) - Primary     Blood sugar and 90 day average sugar reviewed  Results for orders placed or performed in visit on 10/04/18   POC Glycosylated Hemoglobin (Hb A1C)   Result Value Ref Range    Hemoglobin A1C 7.9 %   POC Glucose Fingerstick   Result Value Ref Range    Glucose 279 (A) 70 - 130 mg/dL     Recommended stop sugared drinks  Recommended update eye exam  Ur alb neg 8/18  Neuropathy- foot care discussed - also discussed podiatry / foot care   Continue all current medications  Ok to raise insulin if not following diet  Also discussed candy intake and need to be more careful with carb sources         Relevant Orders    POC Glycosylated Hemoglobin (Hb A1C) (Completed)    POC Glucose Fingerstick (Completed)    Hypothyroid     tsh 3.2   No changes - outside lab work reviewed         Diabetic neuropathy, type II diabetes mellitus (CMS/HCC)     With callus - foot care reviewed           Other Visit Diagnoses     Preventive measure        Relevant Medications    hepatitis A (HAVRIX) vaccine 1,440 Units (Completed)        Return in about 3 months (around 1/4/2019) for Recheck 30 min .    Jayna Kapoor MA  Signed Selina Lopez MD

## 2018-10-07 NOTE — ASSESSMENT & PLAN NOTE
Blood sugar and 90 day average sugar reviewed  Results for orders placed or performed in visit on 10/04/18   POC Glycosylated Hemoglobin (Hb A1C)   Result Value Ref Range    Hemoglobin A1C 7.9 %   POC Glucose Fingerstick   Result Value Ref Range    Glucose 279 (A) 70 - 130 mg/dL     Recommended stop sugared drinks  Recommended update eye exam  Ur alb neg 8/18  Neuropathy- foot care discussed - also discussed podiatry / foot care   Continue all current medications  Ok to raise insulin if not following diet  Also discussed candy intake and need to be more careful with carb sources

## 2019-01-10 ENCOUNTER — OFFICE VISIT (OUTPATIENT)
Dept: PULMONOLOGY | Facility: CLINIC | Age: 49
End: 2019-01-10

## 2019-01-10 VITALS
HEART RATE: 104 BPM | SYSTOLIC BLOOD PRESSURE: 118 MMHG | TEMPERATURE: 98.7 F | HEIGHT: 62 IN | OXYGEN SATURATION: 99 % | BODY MASS INDEX: 45.64 KG/M2 | DIASTOLIC BLOOD PRESSURE: 68 MMHG | WEIGHT: 248 LBS

## 2019-01-10 DIAGNOSIS — E66.01 CLASS 3 SEVERE OBESITY DUE TO EXCESS CALORIES WITH SERIOUS COMORBIDITY AND BODY MASS INDEX (BMI) OF 45.0 TO 49.9 IN ADULT (HCC): ICD-10-CM

## 2019-01-10 DIAGNOSIS — R06.02 SOB (SHORTNESS OF BREATH): Primary | ICD-10-CM

## 2019-01-10 DIAGNOSIS — J45.991 COUGH VARIANT ASTHMA: ICD-10-CM

## 2019-01-10 DIAGNOSIS — R06.09 DYSPNEA ON EXERTION: ICD-10-CM

## 2019-01-10 DIAGNOSIS — R05.9 COUGH: ICD-10-CM

## 2019-01-10 PROCEDURE — 86003 ALLG SPEC IGE CRUDE XTRC EA: CPT | Performed by: INTERNAL MEDICINE

## 2019-01-10 PROCEDURE — 99204 OFFICE O/P NEW MOD 45 MIN: CPT | Performed by: INTERNAL MEDICINE

## 2019-01-10 PROCEDURE — 94618 PULMONARY STRESS TESTING: CPT | Performed by: INTERNAL MEDICINE

## 2019-01-10 PROCEDURE — 86480 TB TEST CELL IMMUN MEASURE: CPT | Performed by: INTERNAL MEDICINE

## 2019-01-10 PROCEDURE — 86606 ASPERGILLUS ANTIBODY: CPT | Performed by: INTERNAL MEDICINE

## 2019-01-10 PROCEDURE — 86698 HISTOPLASMA ANTIBODY: CPT | Performed by: INTERNAL MEDICINE

## 2019-01-10 PROCEDURE — 86738 MYCOPLASMA ANTIBODY: CPT | Performed by: INTERNAL MEDICINE

## 2019-01-10 PROCEDURE — 36415 COLL VENOUS BLD VENIPUNCTURE: CPT | Performed by: INTERNAL MEDICINE

## 2019-01-10 PROCEDURE — 86615 BORDETELLA ANTIBODY: CPT | Performed by: INTERNAL MEDICINE

## 2019-01-10 PROCEDURE — 94375 RESPIRATORY FLOW VOLUME LOOP: CPT | Performed by: INTERNAL MEDICINE

## 2019-01-10 PROCEDURE — 94729 DIFFUSING CAPACITY: CPT | Performed by: INTERNAL MEDICINE

## 2019-01-10 PROCEDURE — 94726 PLETHYSMOGRAPHY LUNG VOLUMES: CPT | Performed by: INTERNAL MEDICINE

## 2019-01-10 PROCEDURE — 86612 BLASTOMYCES ANTIBODY: CPT | Performed by: INTERNAL MEDICINE

## 2019-01-10 RX ORDER — DESVENLAFAXINE SUCCINATE 50 MG/1
TABLET, EXTENDED RELEASE ORAL
COMMUNITY
Start: 2018-10-09 | End: 2019-01-21 | Stop reason: ALTCHOICE

## 2019-01-10 RX ORDER — BUPROPION HYDROCHLORIDE 75 MG/1
TABLET ORAL
COMMUNITY
Start: 2018-10-17 | End: 2019-01-21 | Stop reason: SDDI

## 2019-01-10 RX ORDER — BENZONATATE 200 MG/1
200 CAPSULE ORAL 3 TIMES DAILY PRN
Qty: 42 CAPSULE | Refills: 3 | Status: SHIPPED | OUTPATIENT
Start: 2019-01-10 | End: 2019-01-21

## 2019-01-10 RX ORDER — ALBUTEROL SULFATE 90 UG/1
2 AEROSOL, METERED RESPIRATORY (INHALATION) EVERY 4 HOURS PRN
Qty: 1 INHALER | Refills: 2 | Status: SHIPPED | OUTPATIENT
Start: 2019-01-10 | End: 2020-02-20 | Stop reason: SDUPTHER

## 2019-01-10 NOTE — PROGRESS NOTES
Subjective   Vilma Ayala is a 48 y.o. female is being seen for consultation today at the request of Dr. Corbin for the evaluation of persistent cough and dyspnea.    History of Present Illness  She states she's had a cough for at least 2 months.  She cannot remember being around others who are coughing.  She has paroxysmal coughing and sometimes coughs until she vomits.  This is occurring up to 3 times per week.  She denies any fevers or chills.  She has not been on antibiotics.  She has received an albuterol inhaler.  She denies any history of asthma.  She is a nonsmoker.  She denies any occupational exposure history.  She denies any history of TB exposure or positive PPD.  She cannot identify any particular triggers.  sHe still is not around others who are coughing.  She is not taking any cough medicines.  She complains occasional dyspnea with coughing and has noted some slight wheezing after coughing episode.  She has occasional dyspnea on exertion she's noted this after mopping.  She says she can walk a half mile or climb one flight of steps.    She has a history of diabetes known for roughly 5 years.  She denies any history of hypertension coronary artery disease.  She has a history of hypothyroidism.  Allergies   Allergen Reactions   • Januvia [Sitagliptin]      Insomnia   • Other           Current Outpatient Medications:   •  albuterol sulfate  (90 Base) MCG/ACT inhaler, Inhale 2 puffs Every 4 (Four) Hours As Needed for Wheezing., Disp: 1 inhaler, Rfl: 2  •  Blood Glucose Monitoring Suppl (CANDIDO BREEZE 2 SYSTEM) W/DEVICE kit, , Disp: , Rfl:   •  busPIRone (BUSPAR) 10 MG tablet, Take 1 tablet by mouth Daily. (Patient taking differently: Take 10 mg by mouth 2 (Two) Times a Day.), Disp: 90 tablet, Rfl: 1  •  citalopram (CeleXA) 40 MG tablet, Take 1 tablet by mouth Daily., Disp: 90 tablet, Rfl: 1  •  fexofenadine (ALLEGRA ALLERGY) 180 MG tablet, Take 1 tablet by mouth Daily., Disp: 30 tablet, Rfl: 5  •   "glipiZIDE (GLUCOTROL) 10 MG tablet, Take 1 tablet by mouth 2 (two) times a day., Disp: , Rfl:   •  Glucose Blood (JAYLENE BREEZE 2 TEST) disk, Jaylene Breeze 2 Test In Vitro Disk; Patient Sig: Jaylene Breeze 2 Test In Vitro Disk Check blood sugar BID or as directed; 5; 1; 09-Jan-2015; Active, Disp: , Rfl:   •  Glucose Blood (RELION CONFIRM/MICRO TEST VI), 4 (four) times a day., Disp: , Rfl:   •  glucose blood (RELION PRIME TEST) test strip, Test tid, Disp: 100 each, Rfl: 5  •  insulin lispro protamine-insulin lispro (HUMALOG MIX 75/25) (75-25) 100 UNIT/ML suspension injection, Inject 30 units in am and 59 units before supper (Patient taking differently: Inject 30 units in am and 60units before supper), Disp: 30 mL, Rfl: 3  •  Insulin Syringe-Needle U-100 (BD INSULIN SYRINGE ULTRAFINE) 31G X 15/64\" 0.5 ML misc, , Disp: , Rfl:   •  levothyroxine (SYNTHROID, LEVOTHROID) 200 MCG tablet, Take 1 tablet by mouth Daily., Disp: 90 tablet, Rfl: 1  •  levothyroxine (SYNTHROID, LEVOTHROID) 25 MCG tablet, TAKE 1 TABLET BY MOUTH DAILY, Disp: 90 tablet, Rfl: 0  •  losartan (COZAAR) 25 MG tablet, Take 1 tablet by mouth Daily., Disp: , Rfl:   •  metFORMIN (GLUCOPHAGE) 500 MG tablet, Take 2 tablets BID with food, Disp: 120 tablet, Rfl: 2  •  risperiDONE (risperDAL) 3 MG tablet, Take 3 mg by mouth 2 (Two) Times a Day., Disp: , Rfl:   •  risperiDONE (RISPERDAL) 4 MG tablet, Take  by mouth., Disp: , Rfl:   •  atorvastatin (LIPITOR) 40 MG tablet, Take 1 tablet by mouth Daily., Disp: , Rfl:   •  benzonatate (TESSALON) 200 MG capsule, Take 1 capsule by mouth 3 (Three) Times a Day As Needed for Cough., Disp: 42 capsule, Rfl: 3  •  buPROPion (WELLBUTRIN) 75 MG tablet, , Disp: , Rfl:   •  desvenlafaxine (PRISTIQ) 50 MG 24 hr tablet, , Disp: , Rfl:     Social History    Tobacco Use      Smoking status: Never Smoker       Social History     Substance and Sexual Activity   Alcohol Use No       Caffeine: She has 2-3 fanny per day    Past Medical " "History:   Diagnosis Date   • Diabetes mellitus (CMS/[facility])        Past Surgical History:   Procedure Laterality Date   • EYE SURGERY     • TONSILLECTOMY         Family History   Problem Relation Age of Onset   • Hypertension Other    • Thyroid disease Other    • Diabetes Other    • Obesity Other    • Diabetes Mother    • Hypertension Mother    • Heart disease Mother    • Cancer Father        The following portions of the patient's history were reviewed and updated as appropriate: allergies, current medications, past family history, past medical history, past social history, past surgical history and problem list.    Review of Systems   Constitutional: Negative.    HENT: Positive for sinus pressure and sneezing.    Eyes: Negative.    Respiratory: Positive for cough.    Cardiovascular: Negative.    Gastrointestinal: Positive for vomiting.   Endocrine: Positive for polydipsia and polyuria.   Genitourinary: Negative.    Musculoskeletal: Negative.    Skin: Negative.    Allergic/Immunologic: Negative.    Neurological: Negative.    Hematological: Negative.    Psychiatric/Behavioral: Positive for dysphoric mood. The patient is nervous/anxious.        Objective     /68   Pulse 104   Temp 98.7 °F (37.1 °C)   Ht 157.5 cm (62\")   Wt 112 kg (248 lb)   SpO2 99% Comment: room air at rest  BMI 45.36 kg/m²      Physical Exam   Constitutional: She is oriented to person, place, and time. She appears well-developed and well-nourished.   She is morbidly obese.   HENT:   Head: Normocephalic and atraumatic.   His Mallampati class for anatomy.   Eyes: EOM are normal. Pupils are equal, round, and reactive to light.   Neck: Normal range of motion. Neck supple.   Cardiovascular: Normal rate, regular rhythm and normal heart sounds.   Pulmonary/Chest: Effort normal and breath sounds normal.   Abdominal: Soft. Bowel sounds are normal.   Musculoskeletal: Normal range of motion. She exhibits no edema.   Neurological: She is alert and " oriented to person, place, and time.   Skin: Skin is warm and dry.   Psychiatric: She has a normal mood and affect. Her behavior is normal.   X-rays remarkable for low lung volumes but otherwise fairly clear.    Pulmonary function tests show small airways obstruction.  TLC is increased consistent air trapping and seen obstruction diffusion capacity is mildly decreased in absolute value but normal and corrected for alveolar volume.      Assessment/Plan   Vilma was seen today for shortness of breath and cough.    Diagnoses and all orders for this visit:    SOB (shortness of breath)  -     XR Chest PA & Lateral  -     Pulmonary Function Test  -     albuterol sulfate  (90 Base) MCG/ACT inhaler; Inhale 2 puffs Every 4 (Four) Hours As Needed for Wheezing.  -     Walking Oximetry  -     Allergens, Zone 8  -     Bordetella Pertussis Antibody  -     Fungal Antibodies, DID  -     Mycoplasma Pneu. IgG / IgM Antibodies  -     QuantiFERON TB Plus Client Incubated    Cough  -     benzonatate (TESSALON) 200 MG capsule; Take 1 capsule by mouth 3 (Three) Times a Day As Needed for Cough.  -     Allergens, Zone 8  -     Bordetella Pertussis Antibody  -     Fungal Antibodies, DID  -     Mycoplasma Pneu. IgG / IgM Antibodies  -     QuantiFERON TB Plus Client Incubated    Dyspnea on exertion  -     Walking Oximetry; Future  -     albuterol sulfate  (90 Base) MCG/ACT inhaler; Inhale 2 puffs Every 4 (Four) Hours As Needed for Wheezing.  -     Allergens, Zone 8  -     Bordetella Pertussis Antibody  -     Fungal Antibodies, DID  -     Mycoplasma Pneu. IgG / IgM Antibodies  -     QuantiFERON TB Plus Client Incubated    Class 3 severe obesity due to excess calories with serious comorbidity and body mass index (BMI) of 45.0 to 49.9 in adult (CMS/[facility])  -     Allergens, Zone 8  -     Bordetella Pertussis Antibody  -     Fungal Antibodies, DID  -     Mycoplasma Pneu. IgG / IgM Antibodies  -     QuantiFERON TB Plus Client  Incubated    Cough variant asthma   -     Allergens, Zone 8    Patient presents with persistent cough and occasional slight wheezing.  Inc. She may have experienced an upper respiratory infection her that triggered significant airway hyperreactivity.  We will check labs for AFB and fungus.  We will check for mycoplasma and pertussis.  We will also check the rast panel. We will try to suppress her cough with Tessalon Perles.  She is to continue with albuterol inhaler as needed.  I've refilled that.  See her back in 6 weeks.  She is to contact us earlier symptoms worsen.         Magno Mckenzie MD [facility]  Sleep Medicine  Pulmonary and Critical Care Medicine

## 2019-01-10 NOTE — PATIENT INSTRUCTIONS
Cough, Adult  Coughing is a reflex that clears your throat and your airways. Coughing helps to heal and protect your lungs. It is normal to cough occasionally, but a cough that happens with other symptoms or lasts a long time may be a sign of a condition that needs treatment. A cough may last only 2-3 weeks (acute), or it may last longer than 8 weeks (chronic).  What are the causes?  Coughing is commonly caused by:  · Breathing in substances that irritate your lungs.  · A viral or bacterial respiratory infection.  · Allergies.  · Asthma.  · Postnasal drip.  · Smoking.  · Acid backing up from the stomach into the esophagus (gastroesophageal reflux).  · Certain medicines.  · Chronic lung problems, including COPD (or rarely, lung cancer).  · Other medical conditions such as heart failure.    Follow these instructions at home:  Pay attention to any changes in your symptoms. Take these actions to help with your discomfort:  · Take medicines only as told by your health care provider.  ? If you were prescribed an antibiotic medicine, take it as told by your health care provider. Do not stop taking the antibiotic even if you start to feel better.  ? Talk with your health care provider before you take a cough suppressant medicine.  · Drink enough fluid to keep your urine clear or pale yellow.  · If the air is dry, use a cold steam vaporizer or humidifier in your bedroom or your home to help loosen secretions.  · Avoid anything that causes you to cough at work or at home.  · If your cough is worse at night, try sleeping in a semi-upright position.  · Avoid cigarette smoke. If you smoke, quit smoking. If you need help quitting, ask your health care provider.  · Avoid caffeine.  · Avoid alcohol.  · Rest as needed.    Contact a health care provider if:  · You have new symptoms.  · You cough up pus.  · Your cough does not get better after 2-3 weeks, or your cough gets worse.  · You cannot control your cough with suppressant  medicines and you are losing sleep.  · You develop pain that is getting worse or pain that is not controlled with pain medicines.  · You have a fever.  · You have unexplained weight loss.  · You have night sweats.  Get help right away if:  · You cough up blood.  · You have difficulty breathing.  · Your heartbeat is very fast.  This information is not intended to replace advice given to you by your health care provider. Make sure you discuss any questions you have with your health care provider.  Document Released: 06/15/2012 Document Revised: 05/25/2017 Document Reviewed: 02/24/2016  [employer] Patient Education © 2018 [employer]

## 2019-01-14 LAB
M PNEUMONIAE IGG ABS: 491 U/ML (ref 0–99)
M PNEUMONIAE IGM ABS: <770 U/ML (ref 0–769)

## 2019-01-15 LAB
A ALTERNATA IGE QN: <0.1 KU/L
A FUMIGATUS IGE QN: <0.1 KU/L
AMER ROACH IGE QN: <0.1 KU/L
B PERT IGA SER QL IA: <1 INDEX (ref 0–0.9)
B PERT IGG SER-ACNC: <0.95 INDEX (ref 0–0.94)
B PERT IGM SER QL IA: 1.8 INDEX (ref 0–0.9)
BAHIA GRASS IGE QN: <0.1 KU/L
BERMUDA GRASS IGE QN: <0.1 KU/L
BOXELDER IGE QN: <0.1 KU/L
C HERBARUM IGE QN: <0.1 KU/L
CAT DANDER IGG QN: <0.1 KU/L
CMN PIGWEED IGE QN: <0.1 KU/L
COMMON RAGWEED IGE QN: <0.1 KU/L
CONV CLASS DESCRIPTION: NORMAL
D FARINAE IGE QN: <0.1 KU/L
D PTERONYSS IGE QN: <0.1 KU/L
DOG DANDER IGE QN: <0.1 KU/L
ENGL PLANTAIN IGE QN: <0.1 KU/L
HAZELNUT POLN IGE QN: <0.1 KU/L
JOHNSON GRASS IGE QN: <0.1 KU/L
KENT BLUE GRASS IGE QN: <0.1 KU/L
M RACEMOSUS IGE QN: <0.1 KU/L
MT JUNIPER IGE QN: <0.1 KU/L
MUGWORT IGE QN: <0.1 KU/L
NETTLE IGE QN: <0.1 KU/L
P NOTATUM IGE QN: <0.1 KU/L
QUANTIFERON CRITERIA: NORMAL
QUANTIFERON MITOGEN VALUE: >10 IU/ML
QUANTIFERON NIL VALUE: 0.18 IU/ML
QUANTIFERON TB1 AG VALUE: 0.13 IU/ML
QUANTIFERON TB2 AG VALUE: 0.2 IU/ML
QUANTIFERON-TB GOLD PLUS: NEGATIVE
S BOTRYOSUM IGE QN: <0.1 KU/L
SHEEP SORREL IGE QN: <0.1 KU/L
SWEET GUM IGE QN: <0.1 KU/L
T011-IGE MAPLE LEAF SYCAMORE: <0.1 KU/L
WHITE ELM IGE QN: <0.1 KU/L
WHITE HICKORY IGE QN: <0.1 KU/L
WHITE MULBERRY IGE QN: <0.1 KU/L
WHITE OAK IGE QN: <0.1 KU/L

## 2019-01-16 DIAGNOSIS — R05.9 COUGH: Primary | ICD-10-CM

## 2019-01-16 LAB
A FLAVUS AB SER QL ID: NEGATIVE
A FUMIGATUS AB SER QL ID: NEGATIVE
A NIGER AB SER QL ID: NEGATIVE
B DERMAT AB TITR SER: NEGATIVE {TITER}
H CAPSUL AB TITR SER ID: NEGATIVE {TITER}

## 2019-01-16 RX ORDER — AZITHROMYCIN 250 MG/1
TABLET, FILM COATED ORAL
Qty: 6 TABLET | Refills: 0 | Status: SHIPPED | OUTPATIENT
Start: 2019-01-16 | End: 2019-01-21

## 2019-01-16 NOTE — PROGRESS NOTES
Lab positive for pertussis. Will prescribe azithromycin. Called and left message for patient.    Magno Mckenzie MD [facility]  Sleep Medicine  Pulmonary and Critical Care Medicine

## 2019-01-21 ENCOUNTER — OFFICE VISIT (OUTPATIENT)
Dept: ENDOCRINOLOGY | Facility: CLINIC | Age: 49
End: 2019-01-21

## 2019-01-21 VITALS
HEART RATE: 91 BPM | HEIGHT: 66 IN | WEIGHT: 249 LBS | OXYGEN SATURATION: 99 % | SYSTOLIC BLOOD PRESSURE: 128 MMHG | DIASTOLIC BLOOD PRESSURE: 72 MMHG | BODY MASS INDEX: 40.02 KG/M2

## 2019-01-21 DIAGNOSIS — E78.00 PURE HYPERCHOLESTEROLEMIA: ICD-10-CM

## 2019-01-21 DIAGNOSIS — E55.9 VITAMIN D DEFICIENCY: ICD-10-CM

## 2019-01-21 DIAGNOSIS — E11.65 UNCONTROLLED TYPE 2 DIABETES MELLITUS WITH HYPERGLYCEMIA (HCC): Primary | ICD-10-CM

## 2019-01-21 DIAGNOSIS — E03.9 ACQUIRED HYPOTHYROIDISM: ICD-10-CM

## 2019-01-21 LAB
25(OH)D3 SERPL-MCNC: 24.8 NG/ML
ALBUMIN SERPL-MCNC: 4.32 G/DL (ref 3.2–4.8)
ALBUMIN/GLOB SERPL: 2.1 G/DL (ref 1.5–2.5)
ALP SERPL-CCNC: 122 U/L (ref 25–100)
ALT SERPL W P-5'-P-CCNC: 36 U/L (ref 7–40)
ANION GAP SERPL CALCULATED.3IONS-SCNC: 4 MMOL/L (ref 3–11)
ARTICHOKE IGE QN: 140 MG/DL (ref 0–130)
AST SERPL-CCNC: 26 U/L (ref 0–33)
BILIRUB SERPL-MCNC: 0.3 MG/DL (ref 0.3–1.2)
BUN BLD-MCNC: 14 MG/DL (ref 9–23)
BUN/CREAT SERPL: 12.1 (ref 7–25)
CALCIUM SPEC-SCNC: 9.5 MG/DL (ref 8.7–10.4)
CHLORIDE SERPL-SCNC: 103 MMOL/L (ref 99–109)
CHOLEST SERPL-MCNC: 178 MG/DL (ref 0–200)
CO2 SERPL-SCNC: 30 MMOL/L (ref 20–31)
CREAT BLD-MCNC: 1.16 MG/DL (ref 0.6–1.3)
GFR SERPL CREATININE-BSD FRML MDRD: 50 ML/MIN/1.73
GLOBULIN UR ELPH-MCNC: 2.1 GM/DL
GLUCOSE BLD-MCNC: 68 MG/DL (ref 70–100)
GLUCOSE BLDC GLUCOMTR-MCNC: 115 MG/DL (ref 70–130)
HBA1C MFR BLD: 8.2 %
HDLC SERPL-MCNC: 37 MG/DL (ref 40–60)
POTASSIUM BLD-SCNC: 4.3 MMOL/L (ref 3.5–5.5)
PROT SERPL-MCNC: 6.4 G/DL (ref 5.7–8.2)
SODIUM BLD-SCNC: 137 MMOL/L (ref 132–146)
TRIGL SERPL-MCNC: 131 MG/DL (ref 0–150)
TSH SERPL DL<=0.05 MIU/L-ACNC: 0.99 MIU/ML (ref 0.35–5.35)

## 2019-01-21 PROCEDURE — 83036 HEMOGLOBIN GLYCOSYLATED A1C: CPT | Performed by: INTERNAL MEDICINE

## 2019-01-21 PROCEDURE — 84443 ASSAY THYROID STIM HORMONE: CPT | Performed by: INTERNAL MEDICINE

## 2019-01-21 PROCEDURE — 82947 ASSAY GLUCOSE BLOOD QUANT: CPT | Performed by: INTERNAL MEDICINE

## 2019-01-21 PROCEDURE — 82570 ASSAY OF URINE CREATININE: CPT | Performed by: INTERNAL MEDICINE

## 2019-01-21 PROCEDURE — 82306 VITAMIN D 25 HYDROXY: CPT | Performed by: INTERNAL MEDICINE

## 2019-01-21 PROCEDURE — 82043 UR ALBUMIN QUANTITATIVE: CPT | Performed by: INTERNAL MEDICINE

## 2019-01-21 PROCEDURE — 80053 COMPREHEN METABOLIC PANEL: CPT | Performed by: INTERNAL MEDICINE

## 2019-01-21 PROCEDURE — 99214 OFFICE O/P EST MOD 30 MIN: CPT | Performed by: INTERNAL MEDICINE

## 2019-01-21 PROCEDURE — 80061 LIPID PANEL: CPT | Performed by: INTERNAL MEDICINE

## 2019-01-21 NOTE — PROGRESS NOTES
Vilma Ayala 48 y.o.  CC:Follow-up; Diabetes (TYpe II, eye exam was over one year ago); Peripheral Neuropathy; Hypothyroidism; Hyperlipidemia; and Hypertension      [place]: Follow-up; Diabetes (TYpe II, eye exam was over one year ago); Peripheral Neuropathy; Hypothyroidism; Hyperlipidemia; and Hypertension    Blood sugar and 90 day average sugar reviewed  Results for orders placed or performed in visit on 01/21/19   POC Glycosylated Hemoglobin (Hb A1C)   Result Value Ref Range    Hemoglobin A1C 8.2 %   POC Glucose Fingerstick   Result Value Ref Range    Glucose 115 70 - 130 mg/dL     Average sugar higher due to stress related to her mother's hospitalization   Discussed lower sugars around 10 pm- recommended reducing pre dinner insulin to 45 units   She is still drinking sugared drinks   Discussed cutting these back /goals of control discussed  Is utd with eye exam  No foot ulcer- callus bilateral heels   Ur alb due today   Is on low fat diet  Energy is good overall  bp is good     Allergies   Allergen Reactions   • Januvia [Sitagliptin]      Insomnia   • Other        Current Outpatient Medications:   •  levothyroxine (SYNTHROID, LEVOTHROID) 200 MCG tablet, Take 1 tablet by mouth Daily., Disp: 90 tablet, Rfl: 1  •  levothyroxine (SYNTHROID, LEVOTHROID) 25 MCG tablet, TAKE 1 TABLET BY MOUTH DAILY, Disp: 90 tablet, Rfl: 0  •  albuterol sulfate  (90 Base) MCG/ACT inhaler, Inhale 2 puffs Every 4 (Four) Hours As Needed for Wheezing., Disp: 1 inhaler, Rfl: 2  •  Blood Glucose Monitoring Suppl (9SLIDES BREEZE 2 SYSTEM) W/DEVICE kit, , Disp: , Rfl:   •  busPIRone (BUSPAR) 10 MG tablet, Take 1 tablet by mouth Daily. (Patient taking differently: Take 10 mg by mouth 2 (Two) Times a Day.), Disp: 90 tablet, Rfl: 1  •  citalopram (CeleXA) 40 MG tablet, Take 1 tablet by mouth Daily., Disp: 90 tablet, Rfl: 1  •  glipiZIDE (GLUCOTROL) 10 MG tablet, Take 1 tablet by mouth 2 (two) times a day., Disp: , Rfl:   •  Glucose Blood  "(RELION CONFIRM/MICRO TEST VI), 4 (four) times a day., Disp: , Rfl:   •  glucose blood (RELION PRIME TEST) test strip, Test tid, Disp: 100 each, Rfl: 5  •  insulin lispro protamine-insulin lispro (HUMALOG MIX 75/25) (75-25) 100 UNIT/ML suspension injection, Inject 30 units in am and 59 units before supper (Patient taking differently: Inject 30 units in am and 50 units before supper), Disp: 30 mL, Rfl: 3  •  Insulin Syringe-Needle U-100 (BD INSULIN SYRINGE ULTRAFINE) 31G X 15/64\" 0.5 ML misc, , Disp: , Rfl:   •  losartan (COZAAR) 25 MG tablet, Take 1 tablet by mouth Daily., Disp: , Rfl:   •  metFORMIN (GLUCOPHAGE) 500 MG tablet, Take 2 tablets BID with food, Disp: 120 tablet, Rfl: 2  •  risperiDONE (risperDAL) 3 MG tablet, Take 3 mg by mouth 2 (Two) Times a Day., Disp: , Rfl:   •  risperiDONE (RISPERDAL) 4 MG tablet, Take  by mouth., Disp: , Rfl:   Patient Active Problem List    Diagnosis   • Cough [R05]   • Dyspnea on exertion [R06.09]   • Pure hypercholesterolemia [E78.00]   • Bronchitis [J40]   • Anxiety and depression [F41.9, F32.9]   • Uncontrolled type 2 diabetes mellitus (CMS/HCC) [E11.65]   • Hypothyroid [E03.9]   • Obesity [E66.9]   • Vitamin D deficiency [E55.9]   • Diabetic neuropathy, type II diabetes mellitus (CMS/HCC) [E11.40]     Review of Systems   Constitutional: Negative for activity change, appetite change, chills, diaphoresis, fatigue, fever and unexpected weight change.   HENT: Negative for congestion, dental problem, drooling, ear discharge, ear pain, facial swelling, hearing loss, mouth sores, nosebleeds, postnasal drip, rhinorrhea, sinus pressure, sneezing, sore throat, tinnitus, trouble swallowing and voice change.    Eyes: Negative for photophobia, pain, discharge, redness, itching and visual disturbance.   Respiratory: Negative for apnea, cough, choking, chest tightness, shortness of breath, wheezing and stridor.    Cardiovascular: Negative for chest pain, palpitations and leg swelling. "   Gastrointestinal: Negative for abdominal distention, abdominal pain, anal bleeding, blood in stool, constipation, diarrhea, nausea, rectal pain and vomiting.   Endocrine: Negative for cold intolerance, heat intolerance, polydipsia, polyphagia and polyuria.   Genitourinary: Negative for decreased urine volume, difficulty urinating, dysuria, enuresis, flank pain, frequency, genital sores, hematuria and urgency.   Musculoskeletal: Negative for arthralgias, back pain, gait problem, joint swelling, myalgias, neck pain and neck stiffness.   Skin: Negative for color change, pallor, rash and wound.   Allergic/Immunologic: Negative for environmental allergies, food allergies and immunocompromised state.   Neurological: Negative for dizziness, tremors, seizures, syncope, facial asymmetry, speech difficulty, weakness, light-headedness, numbness and headaches.   Hematological: Negative for adenopathy. Does not bruise/bleed easily.   Psychiatric/Behavioral: Negative for agitation, behavioral problems, confusion, decreased concentration, dysphoric mood, hallucinations, self-injury, sleep disturbance and suicidal ideas. The patient is not nervous/anxious and is not hyperactive.      Social History     Socioeconomic History   • Marital status: Single     Spouse name: Not on file   • Number of children: Not on file   • Years of education: Not on file   • Highest education level: Not on file   Social Needs   • Financial resource strain: Not on file   • Food insecurity - worry: Not on file   • Food insecurity - inability: Not on file   • Transportation needs - medical: Not on file   • Transportation needs - non-medical: Not on file   Occupational History   • Not on file   Tobacco Use   • Smoking status: Never Smoker   • Smokeless tobacco: Never Used   Substance and Sexual Activity   • Alcohol use: No   • Drug use: No   • Sexual activity: Defer   Other Topics Concern   • Not on file   Social History Narrative   • Not on file  "    Family History   Problem Relation Age of Onset   • Hypertension Other    • Thyroid disease Other    • Diabetes Other    • Obesity Other    • Diabetes Mother    • Hypertension Mother    • Heart disease Mother    • Cancer Father      /72   Pulse 91   Ht 167.6 cm (66\")   Wt 113 kg (249 lb)   SpO2 99%   Breastfeeding? No   BMI 40.19 kg/m²   Physical Exam   Constitutional: She is oriented to person, place, and time. She appears well-developed and well-nourished.   HENT:   Head: Normocephalic and atraumatic.   Nose: Nose normal.   Mouth/Throat: Oropharynx is clear and moist.   Eyes: Conjunctivae, EOM and lids are normal. Pupils are equal, round, and reactive to light.   Neck: Trachea normal and normal range of motion. Neck supple. Carotid bruit is not present. No tracheal deviation present. No thyroid mass and no thyromegaly present.   Cardiovascular: Normal rate, regular rhythm, normal heart sounds and intact distal pulses. Exam reveals no gallop and no friction rub.   No murmur heard.  Pulmonary/Chest: Effort normal and breath sounds normal. No respiratory distress. She has no wheezes.   Musculoskeletal: Normal range of motion. She exhibits no edema or deformity.    Vilma had a diabetic foot exam performed today.   During the foot exam she had a monofilament test performed.    Neurological Sensory Findings - Altered hot/cold right ankle/foot discrimination and altered hot/cold left ankle/foot discrimination. Altered sharp/dull right ankle/foot discrimination and altered sharp/dull left ankle/foot discrimination. No right ankle/foot altered proprioception and no left ankle/foot altered proprioception  Vascular Status -  Her right foot exhibits normal foot vasculature  and no edema. Her left foot exhibits normal foot vasculature  and no edema.  Skin Integrity  -  Her right foot skin is intact.Her left foot skin is intact..     Lymphadenopathy:     She has no cervical adenopathy.   Neurological: She is " alert and oriented to person, place, and time. She has normal reflexes. She displays normal reflexes. No cranial nerve deficit.   Skin: Skin is warm and dry. No rash noted. No cyanosis or erythema. Nails show no clubbing.   Psychiatric: She has a normal mood and affect. Her speech is normal and behavior is normal. Judgment and thought content normal. Cognition and memory are normal.   Nursing note and vitals reviewed.    Results for orders placed or performed in visit on 01/10/19   Allergens, Zone 8   Result Value Ref Range    Class Description Comment     D. pteronyssinus (dust mite) <0.10 Class 0 kU/L    D. farinae (dust mite) <0.10 Class 0 kU/L    Cat Dander <0.10 Class 0 kU/L    Dog Dander, IgE <0.10 Class 0 kU/L    [place] Grass <0.10 Class 0 kU/L    [place] Bluegrass IgE <0.10 Class 0 kU/L    [place] Grass <0.10 Class 0 kU/L    [place] Grass <0.10 Class 0 kU/L    Cockroach, American <0.10 Class 0 kU/L    Penicillium chrysogen <0.10 Class 0 kU/L    Cladosporium herbarum <0.10 Class 0 kU/L    Aspergillus fumigatus <0.10 Class 0 kU/L    Mucor racemosus <0.10 Class 0 kU/L    Alternaria alternata <0.10 Class 0 kU/L    Stemphylium herbarum <0.10 Class 0 kU/L    [place], White <0.10 Class 0 kU/L    Elm, American <0.10 Class 0 kU/L    Maple/[place] <0.10 Class 0 kU/L    Hazelnut Tree <0.10 Class 0 kU/L    [place], White <0.10 Class 0 kU/L    Maple [place] <0.10 Class 0 kU/L    White [place] <0.10 Class 0 kU/L    [place], Mountain <0.10 Class 0 kU/L    Sweet Gum <0.10 Class 0 kU/L    Ragweed, Common/Short <0.10 Class 0 kU/L    Mugwort <0.10 Class 0 kU/L    English Plantain <0.10 Class 0 kU/L    Pigweed, Rough/Common <0.10 Class 0 kU/L    Sheep Sorrel <0.10 Class 0 kU/L    Nettle <0.10 Class 0 kU/L   Bordetella Pertussis Antibody   Result Value Ref Range    B pertussis IgG Ab, Quant <0.95 0.00 - 0.94 index    B pertussis IgM Ab, Quant 1.8 (H) 0.0 - 0.9 index    Bordetella pertussis IgA <1.0 0.0 - 0.9 index   Fungal  Antibodies, DID   Result Value Ref Range    Aspergillus fumigatus Negative Neg:<1:1    Aspergillus flavus Negative Neg:<1:1    Aspergillus niger Negative Neg:<1:1    Blastomyces Abs, Qn, DID Negative Neg:<1:1    Histoplasma Ab, Qn DID Negative Neg:<1:1   Mycoplasma Pneu. IgG / IgM Antibodies   Result Value Ref Range    M pneumoniae IgG Abs 491 (H) 0 - 99 U/mL    M pneumoniae IgM Abs <770 0 - 769 U/mL   QuantiFERON TB Plus Client Incubated   Result Value Ref Range    QuantiFERON Criteria Comment     QUANTIFERON TB1 AG VALUE 0.13 IU/mL    QUANTIFERON TB2 AG VALUE 0.20 IU/mL    QuantiFERON Nil Value 0.18 IU/mL    QuantiFERON Mitogen Value >10.00 IU/mL    QUANTIFERON-TB GOLD PLUS Negative Negative     Vilma was seen today for follow-up, diabetes, peripheral neuropathy, hypothyroidism, hyperlipidemia and hypertension.    Diagnoses and all orders for this visit:    Uncontrolled type 2 diabetes mellitus with hyperglycemia (CMS/HCC)  -     POC Glycosylated Hemoglobin (Hb A1C)  -     POC Glucose Fingerstick      Problem List Items Addressed This Visit        Cardiovascular and Mediastinum    Pure hypercholesterolemia     Check flp          Relevant Orders    Lipid Panel (Completed)       Digestive    Vitamin D deficiency     Update vitamin D levels   Continue supplement          Relevant Orders    Vitamin D 25 Hydroxy (Completed)       Endocrine    Uncontrolled type 2 diabetes mellitus (CMS/HCC) - Primary     Blood sugar and 90 day average sugar reviewed  Results for orders placed or performed in visit on 01/21/19   Comprehensive Metabolic Panel   Result Value Ref Range    Glucose 68 (L) 70 - 100 mg/dL    BUN 14 9 - 23 mg/dL    Creatinine 1.16 0.60 - 1.30 mg/dL    Sodium 137 132 - 146 mmol/L    Potassium 4.3 3.5 - 5.5 mmol/L    Chloride 103 99 - 109 mmol/L    CO2 30.0 20.0 - 31.0 mmol/L    Calcium 9.5 8.7 - 10.4 mg/dL    Total Protein 6.4 5.7 - 8.2 g/dL    Albumin 4.32 3.20 - 4.80 g/dL    ALT (SGPT) 36 7 - 40 U/L    AST  (SGOT) 26 0 - 33 U/L    Alkaline Phosphatase 122 (H) 25 - 100 U/L    Total Bilirubin 0.3 0.3 - 1.2 mg/dL    eGFR Non African Amer 50 (L) >60 mL/min/1.73    Globulin 2.1 gm/dL    A/G Ratio 2.1 1.5 - 2.5 g/dL    BUN/Creatinine Ratio 12.1 7.0 - 25.0    Anion Gap 4.0 3.0 - 11.0 mmol/L   Lipid Panel   Result Value Ref Range    Total Cholesterol 178 0 - 200 mg/dL    Triglycerides 131 0 - 150 mg/dL    HDL Cholesterol 37 (L) 40 - 60 mg/dL    LDL Cholesterol  140 (H) 0 - 130 mg/dL   TSH   Result Value Ref Range    TSH 0.989 0.350 - 5.350 mIU/mL   Vitamin D 25 Hydroxy   Result Value Ref Range    25 Hydroxy, Vitamin D 24.8 ng/ml   POC Glycosylated Hemoglobin (Hb A1C)   Result Value Ref Range    Hemoglobin A1C 8.2 %   POC Glucose Fingerstick   Result Value Ref Range    Glucose 115 70 - 130 mg/dL     Average sugar is 180   Is utd with eye exam  No foot callus or ulcer/ stable neuropathy   High average sugar (180) with low sugar in the evening   Lower pre dinner insulin to 45 units   Cut back on sweets and sugared drinks  Discussed goal average 150 or less  F/u 3-4 months          Relevant Orders    POC Glycosylated Hemoglobin (Hb A1C) (Completed)    POC Glucose Fingerstick (Completed)    Comprehensive Metabolic Panel (Completed)    Microalbumin / Creatinine Urine Ratio - Urine, Clean Catch    Hypothyroid     Update tsh          Relevant Orders    TSH (Completed)        Return in about 4 months (around 5/21/2019) for Recheck 30 min .    Jayna Kapoor MA  Signed Selina Lopez MD

## 2019-01-21 NOTE — ASSESSMENT & PLAN NOTE
Blood sugar and 90 day average sugar reviewed  Results for orders placed or performed in visit on 01/21/19   Comprehensive Metabolic Panel   Result Value Ref Range    Glucose 68 (L) 70 - 100 mg/dL    BUN 14 9 - 23 mg/dL    Creatinine 1.16 0.60 - 1.30 mg/dL    Sodium 137 132 - 146 mmol/L    Potassium 4.3 3.5 - 5.5 mmol/L    Chloride 103 99 - 109 mmol/L    CO2 30.0 20.0 - 31.0 mmol/L    Calcium 9.5 8.7 - 10.4 mg/dL    Total Protein 6.4 5.7 - 8.2 g/dL    Albumin 4.32 3.20 - 4.80 g/dL    ALT (SGPT) 36 7 - 40 U/L    AST (SGOT) 26 0 - 33 U/L    Alkaline Phosphatase 122 (H) 25 - 100 U/L    Total Bilirubin 0.3 0.3 - 1.2 mg/dL    eGFR Non African Amer 50 (L) >60 mL/min/1.73    Globulin 2.1 gm/dL    A/G Ratio 2.1 1.5 - 2.5 g/dL    BUN/Creatinine Ratio 12.1 7.0 - 25.0    Anion Gap 4.0 3.0 - 11.0 mmol/L   Lipid Panel   Result Value Ref Range    Total Cholesterol 178 0 - 200 mg/dL    Triglycerides 131 0 - 150 mg/dL    HDL Cholesterol 37 (L) 40 - 60 mg/dL    LDL Cholesterol  140 (H) 0 - 130 mg/dL   TSH   Result Value Ref Range    TSH 0.989 0.350 - 5.350 mIU/mL   Vitamin D 25 Hydroxy   Result Value Ref Range    25 Hydroxy, Vitamin D 24.8 ng/ml   POC Glycosylated Hemoglobin (Hb A1C)   Result Value Ref Range    Hemoglobin A1C 8.2 %   POC Glucose Fingerstick   Result Value Ref Range    Glucose 115 70 - 130 mg/dL     Average sugar is 180   Is utd with eye exam  No foot callus or ulcer/ stable neuropathy   High average sugar (180) with low sugar in the evening   Lower pre dinner insulin to 45 units   Cut back on sweets and sugared drinks  Discussed goal average 150 or less  F/u 3-4 months

## 2019-01-23 LAB
CREAT 24H UR-MCNC: 208.1 MG/DL
MICROALBUMIN UR-MCNC: 4.3 UG/ML
MICROALBUMIN/CREAT UR: 2.1 MG/G CREAT (ref 0–30)

## 2019-01-24 ENCOUNTER — TELEPHONE (OUTPATIENT)
Dept: ENDOCRINOLOGY | Facility: CLINIC | Age: 49
End: 2019-01-24

## 2019-01-24 RX ORDER — ATORVASTATIN CALCIUM 10 MG/1
10 TABLET, FILM COATED ORAL DAILY
Qty: 30 TABLET | Refills: 11 | Status: SHIPPED | OUTPATIENT
Start: 2019-01-24 | End: 2019-04-15 | Stop reason: ALTCHOICE

## 2019-02-05 ENCOUNTER — TELEPHONE (OUTPATIENT)
Dept: INTERNAL MEDICINE | Facility: CLINIC | Age: 49
End: 2019-02-05

## 2019-02-05 RX ORDER — L. ACIDOPHILUS/BIFIDO. LONGUM 15 MG
CAPSULE,DELAYED RELEASE (ENTERIC COATED) ORAL
Qty: 100 EACH | Refills: 12 | Status: SHIPPED | OUTPATIENT
Start: 2019-02-05 | End: 2020-01-02 | Stop reason: SDUPTHER

## 2019-02-05 NOTE — TELEPHONE ENCOUNTER
Patient would like [employer] test strips called in for her. Please advise and send to [facility]. Thanks.

## 2019-04-15 ENCOUNTER — OFFICE VISIT (OUTPATIENT)
Dept: ENDOCRINOLOGY | Facility: CLINIC | Age: 49
End: 2019-04-15

## 2019-04-15 VITALS
DIASTOLIC BLOOD PRESSURE: 68 MMHG | BODY MASS INDEX: 39.89 KG/M2 | HEART RATE: 89 BPM | WEIGHT: 248.2 LBS | HEIGHT: 66 IN | OXYGEN SATURATION: 98 % | SYSTOLIC BLOOD PRESSURE: 112 MMHG

## 2019-04-15 DIAGNOSIS — E78.00 PURE HYPERCHOLESTEROLEMIA: ICD-10-CM

## 2019-04-15 DIAGNOSIS — E11.649 UNCONTROLLED TYPE 2 DIABETES MELLITUS WITH HYPOGLYCEMIA WITHOUT COMA (HCC): Primary | ICD-10-CM

## 2019-04-15 DIAGNOSIS — E03.9 ACQUIRED HYPOTHYROIDISM: ICD-10-CM

## 2019-04-15 LAB
GLUCOSE BLDC GLUCOMTR-MCNC: 160 MG/DL (ref 70–130)
HBA1C MFR BLD: 9.4 %

## 2019-04-15 PROCEDURE — 83036 HEMOGLOBIN GLYCOSYLATED A1C: CPT | Performed by: INTERNAL MEDICINE

## 2019-04-15 PROCEDURE — 82947 ASSAY GLUCOSE BLOOD QUANT: CPT | Performed by: INTERNAL MEDICINE

## 2019-04-15 PROCEDURE — 99214 OFFICE O/P EST MOD 30 MIN: CPT | Performed by: INTERNAL MEDICINE

## 2019-04-15 RX ORDER — ROSUVASTATIN CALCIUM 5 MG/1
5 TABLET, COATED ORAL NIGHTLY
COMMUNITY
Start: 2019-04-06 | End: 2022-03-30 | Stop reason: SDUPTHER

## 2019-04-15 NOTE — PROGRESS NOTES
Vilma Ayala 48 y.o.  CC:Follow-up; Diabetes (Type II, eye exam was 2 weeks ago [facility]); Peripheral Neuropathy; Hypothyroidism; Hyperlipidemia; and Hypertension      Torres Martinez: Follow-up; Diabetes (Type II, eye exam was 2 weeks ago [facility]); Peripheral Neuropathy; Hypothyroidism; Hyperlipidemia; and Hypertension    Blood sugar and 90 day average sugar reviewed  Results for orders placed or performed in visit on 04/15/19   POC Glycosylated Hemoglobin (Hb A1C)   Result Value Ref Range    Hemoglobin A1C 9.4 %   POC Glucose Fingerstick   Result Value Ref Range    Glucose 160 (A) 70 - 130 mg/dL     Average sugar is 225   Is still drinking sugared drinks but is trying harder   Is eating cheezits during appt today and we discussed lower carb options for snacks  Regular exercise recommended.  bp is good  Is on lipitor   Is on glucotrol 10 mg twice daily and metformin 500 mg (2 pills twice a day)  Is on 75/25/ insulin- 30 u am and 59 u pm premeal  No low sugars   Is utd with eye exam  No change in neuropathy     Allergies   Allergen Reactions   • Januvia [Sitagliptin]      Insomnia   • Other        Current Outpatient Medications:   •  albuterol sulfate  (90 Base) MCG/ACT inhaler, Inhale 2 puffs Every 4 (Four) Hours As Needed for Wheezing., Disp: 1 inhaler, Rfl: 2  •  atorvastatin (LIPITOR) 10 MG tablet, Take 1 tablet by mouth Daily., Disp: 30 tablet, Rfl: 11  •  Blood Glucose Monitoring Suppl ([employer] BREEZE 2 SYSTEM) W/DEVICE kit, , Disp: , Rfl:   •  busPIRone (BUSPAR) 10 MG tablet, Take 1 tablet by mouth Daily. (Patient taking differently: Take 10 mg by mouth 2 (Two) Times a Day.), Disp: 90 tablet, Rfl: 1  •  citalopram (CeleXA) 40 MG tablet, Take 1 tablet by mouth Daily., Disp: 90 tablet, Rfl: 1  •  glipiZIDE (GLUCOTROL) 10 MG tablet, Take 1 tablet by mouth 2 (two) times a day., Disp: , Rfl:   •  insulin lispro protamine-insulin lispro (HUMALOG MIX 75/25) (75-25) 100 UNIT/ML suspension injection, Inject 30  "units in am and 59 units before supper (Patient taking differently: Inject 30 units in am and 50 units before supper), Disp: 30 mL, Rfl: 3  •  Insulin Syringe-Needle U-100 (BD INSULIN SYRINGE ULTRAFINE) 31G X 15/64\" 0.5 ML misc, , Disp: , Rfl:   •  [employer] BLOOD GLUCOSE TEST test strip, Test blood sugars TID, Disp: 100 each, Rfl: 12  •  levothyroxine (SYNTHROID, LEVOTHROID) 200 MCG tablet, Take 1 tablet by mouth Daily., Disp: 90 tablet, Rfl: 1  •  levothyroxine (SYNTHROID, LEVOTHROID) 25 MCG tablet, TAKE 1 TABLET BY MOUTH DAILY, Disp: 90 tablet, Rfl: 0  •  losartan (COZAAR) 25 MG tablet, Take 1 tablet by mouth Daily., Disp: , Rfl:   •  metFORMIN (GLUCOPHAGE) 500 MG tablet, Take 2 tablets BID with food, Disp: 120 tablet, Rfl: 2  •  risperiDONE (risperDAL) 3 MG tablet, Take 3 mg by mouth 2 (Two) Times a Day., Disp: , Rfl:   •  risperiDONE (RISPERDAL) 4 MG tablet, Take  by mouth., Disp: , Rfl:   Patient Active Problem List    Diagnosis   • Cough [R05]   • Dyspnea on exertion [R06.09]   • Pure hypercholesterolemia [E78.00]   • Bronchitis [J40]   • Anxiety and depression [F41.9, F32.9]   • Uncontrolled type 2 diabetes mellitus (CMS/HCC) [E11.65]   • Hypothyroid [E03.9]   • Obesity [E66.9]   • Vitamin D deficiency [E55.9]   • Diabetic neuropathy, type II diabetes mellitus (CMS/HCC) [E11.40]     Review of Systems   Constitutional: Positive for fatigue. Negative for activity change, appetite change, chills, diaphoresis, fever and unexpected weight change.   HENT: Negative for congestion, dental problem, drooling, ear discharge, ear pain, facial swelling, hearing loss, mouth sores, nosebleeds, postnasal drip, rhinorrhea, sinus pressure, sneezing, sore throat, tinnitus, trouble swallowing and voice change.    Eyes: Negative for photophobia, pain, discharge, redness, itching and visual disturbance.   Respiratory: Positive for shortness of breath. Negative for apnea, cough, choking, chest tightness, wheezing and stridor.  " "  Cardiovascular: Negative for chest pain, palpitations and leg swelling.   Gastrointestinal: Negative for abdominal distention, abdominal pain, anal bleeding, blood in stool, constipation, diarrhea, nausea, rectal pain and vomiting.   Endocrine: Negative for cold intolerance, heat intolerance, polydipsia, polyphagia and polyuria.   Genitourinary: Negative for decreased urine volume, difficulty urinating, dysuria, enuresis, flank pain, frequency, genital sores, hematuria and urgency.   Musculoskeletal: Negative for arthralgias, back pain, gait problem, joint swelling, myalgias, neck pain and neck stiffness.   Skin: Negative for color change, pallor, rash and wound.   Allergic/Immunologic: Negative for environmental allergies, food allergies and immunocompromised state.   Neurological: Negative for dizziness, tremors, seizures, syncope, facial asymmetry, speech difficulty, weakness, light-headedness, numbness and headaches.   Hematological: Negative for adenopathy. Does not bruise/bleed easily.   Psychiatric/Behavioral: Negative for agitation, behavioral problems, confusion, decreased concentration, dysphoric mood, hallucinations, self-injury, sleep disturbance and suicidal ideas. The patient is not nervous/anxious and is not hyperactive.      Social History     Socioeconomic History   • Marital status: Single     Spouse name: Not on file   • Number of children: Not on file   • Years of education: Not on file   • Highest education level: Not on file   Tobacco Use   • Smoking status: Never Smoker   • Smokeless tobacco: Never Used   Substance and Sexual Activity   • Alcohol use: No   • Drug use: No   • Sexual activity: Defer     Family History   Problem Relation Age of Onset   • Hypertension Other    • Thyroid disease Other    • Diabetes Other    • Obesity Other    • Diabetes Mother    • Hypertension Mother    • Heart disease Mother    • Cancer Father      /68   Pulse 89   Ht 167.6 cm (66\")   Wt 113 kg (248 lb " 3.2 oz)   SpO2 98%   BMI 40.06 kg/m²   Physical Exam   Constitutional: She is oriented to person, place, and time. She appears well-developed and well-nourished.   HENT:   Head: Normocephalic and atraumatic.   Nose: Nose normal.   Mouth/Throat: Oropharynx is clear and moist.   Eyes: Conjunctivae, EOM and lids are normal. Pupils are equal, round, and reactive to light.   Neck: Trachea normal and normal range of motion. Neck supple. Carotid bruit is not present. No tracheal deviation present. No thyroid mass and no thyromegaly present.   Cardiovascular: Normal rate, regular rhythm, normal heart sounds and intact distal pulses. Exam reveals no gallop and no friction rub.   No murmur heard.  Pulmonary/Chest: Effort normal and breath sounds normal. No respiratory distress. She has no wheezes.   Musculoskeletal: Normal range of motion. She exhibits no edema or deformity.    Vilma had a diabetic foot exam performed today.   During the foot exam she had a monofilament test performed.    Neurological Sensory Findings - Unaltered hot/cold right ankle/foot discrimination and unaltered hot/cold left ankle/foot discrimination. Unaltered sharp/dull right ankle/foot discrimination and unaltered sharp/dull left ankle/foot discrimination. No right ankle/foot altered proprioception and no left ankle/foot altered proprioception  Vascular Status -  Her right foot exhibits normal foot vasculature  and no edema. Her left foot exhibits normal foot vasculature  and no edema.  Skin Integrity  -  Her right foot skin is intact.Her left foot skin is intact..  Lymphadenopathy:     She has no cervical adenopathy.   Neurological: She is alert and oriented to person, place, and time. She has normal reflexes. She displays normal reflexes. No cranial nerve deficit.   Skin: Skin is warm and dry. No rash noted. No cyanosis or erythema. Nails show no clubbing.   Psychiatric: She has a normal mood and affect. Her speech is normal and behavior is  normal. Judgment and thought content normal. Cognition and memory are normal.   Nursing note and vitals reviewed.    Results for orders placed or performed in visit on 01/21/19   Comprehensive Metabolic Panel   Result Value Ref Range    Glucose 68 (L) 70 - 100 mg/dL    BUN 14 9 - 23 mg/dL    Creatinine 1.16 0.60 - 1.30 mg/dL    Sodium 137 132 - 146 mmol/L    Potassium 4.3 3.5 - 5.5 mmol/L    Chloride 103 99 - 109 mmol/L    CO2 30.0 20.0 - 31.0 mmol/L    Calcium 9.5 8.7 - 10.4 mg/dL    Total Protein 6.4 5.7 - 8.2 g/dL    Albumin 4.32 3.20 - 4.80 g/dL    ALT (SGPT) 36 7 - 40 U/L    AST (SGOT) 26 0 - 33 U/L    Alkaline Phosphatase 122 (H) 25 - 100 U/L    Total Bilirubin 0.3 0.3 - 1.2 mg/dL    eGFR Non African Amer 50 (L) >60 mL/min/1.73    Globulin 2.1 gm/dL    A/G Ratio 2.1 1.5 - 2.5 g/dL    BUN/Creatinine Ratio 12.1 7.0 - 25.0    Anion Gap 4.0 3.0 - 11.0 mmol/L   Microalbumin / Creatinine Urine Ratio - Urine, Clean Catch   Result Value Ref Range    Creatinine, Urine 208.1 Not Estab. mg/dL    Microalbumin, Urine 4.3 Not Estab. ug/mL    Microalbumin/Creatinine Ratio 2.1 0.0 - 30.0 mg/g creat   Lipid Panel   Result Value Ref Range    Total Cholesterol 178 0 - 200 mg/dL    Triglycerides 131 0 - 150 mg/dL    HDL Cholesterol 37 (L) 40 - 60 mg/dL    LDL Cholesterol  140 (H) 0 - 130 mg/dL   TSH   Result Value Ref Range    TSH 0.989 0.350 - 5.350 mIU/mL   Vitamin D 25 Hydroxy   Result Value Ref Range    25 Hydroxy, Vitamin D 24.8 ng/ml   POC Glycosylated Hemoglobin (Hb A1C)   Result Value Ref Range    Hemoglobin A1C 8.2 %   POC Glucose Fingerstick   Result Value Ref Range    Glucose 115 70 - 130 mg/dL     Vilma was seen today for follow-up, diabetes, peripheral neuropathy, hypothyroidism, hyperlipidemia and hypertension.    Diagnoses and all orders for this visit:    Uncontrolled type 2 diabetes mellitus with hypoglycemia without coma (CMS/HCC)  -     POC Glycosylated Hemoglobin (Hb A1C)  -     POC Glucose  Fingerstick      Problem List Items Addressed This Visit        Cardiovascular and Mediastinum    Pure hypercholesterolemia     High LDL reviewed- continue low fat diet   Be sure she is taking crestor 5 mg daily          Relevant Medications    rosuvastatin (CRESTOR) 5 MG tablet       Endocrine    Uncontrolled type 2 diabetes mellitus (CMS/HCC) - Primary     Blood sugar and 90 day average sugar reviewed  Results for orders placed or performed in visit on 04/15/19   POC Glycosylated Hemoglobin (Hb A1C)   Result Value Ref Range    Hemoglobin A1C 9.4 %   POC Glucose Fingerstick   Result Value Ref Range    Glucose 160 (A) 70 - 130 mg/dL     Average sugar is high  Is utd with eye exam  No neuropathy / callus or ulcer  Ur alb neg 1/19  Discussed better choices with diet, avoid sweet/sugared drinks   If sugar over 200 take 5 extra units of insulin pre breakfast and pre dinner  F/u 3 months   F/u 3-4 months          Relevant Orders    POC Glycosylated Hemoglobin (Hb A1C) (Completed)    POC Glucose Fingerstick (Completed)    Hypothyroid     TSH is normal 1/19   Continue current supplement              Return in about 3 months (around 7/15/2019) for Recheck 30 min .    Jayna Kapoor MA  Signed Selina Lopez MD

## 2019-04-16 NOTE — ASSESSMENT & PLAN NOTE
Blood sugar and 90 day average sugar reviewed  Results for orders placed or performed in visit on 04/15/19   POC Glycosylated Hemoglobin (Hb A1C)   Result Value Ref Range    Hemoglobin A1C 9.4 %   POC Glucose Fingerstick   Result Value Ref Range    Glucose 160 (A) 70 - 130 mg/dL     Average sugar is high  Is utd with eye exam  No neuropathy / callus or ulcer  Ur alb neg 1/19  Discussed better choices with diet, avoid sweet/sugared drinks   If sugar over 200 take 5 extra units of insulin pre breakfast and pre dinner  F/u 3 months   F/u 3-4 months

## 2019-05-30 PROBLEM — R05.9 COUGH: Status: RESOLVED | Noted: 2019-01-10 | Resolved: 2019-05-30

## 2019-05-30 PROBLEM — E78.2 MIXED HYPERLIPIDEMIA: Status: ACTIVE | Noted: 2018-03-06

## 2019-05-30 PROCEDURE — 93000 ELECTROCARDIOGRAM COMPLETE: CPT | Performed by: INTERNAL MEDICINE

## 2019-05-30 NOTE — PROGRESS NOTES
Subjective   Vilma Ayala is a 48 y.o. female.  Primary Care: Ovi Corbin MD  Referring: Ovi Corbin MD  [address] [facility] [place] [address]      Chief Complaint   Patient presents with   • Dyspnea on exertion       Patient Active Problem List    Diagnosis Date Noted   1 Dyspnea on exertion      A. PFT 1-10-19: Suggest small airway disease   2 Uncontrolled type 2 diabetes mellitus (CMS/HCC)    3 Obesity    4 Cough    5 Anxiety and depression    6 Hypothyroid    7 Vitamin D deficiency    8 Diabetic neuropathy, type II diabetes mellitus (CMS/HCC)       History of Present Illness   This is a 48-year old dyslipidemic diabetic female with no prior cardiac history.  She is referred by primary care for exertional dyspnea.  She complains of cough resulting in vomiting occurring once every 1 to 2 days.  She has been evaluated by pulmonology and has a pending follow-up with that service.  She complains of occasional exertional dyspnea while walking which has been present for approximately 5 years.  She denies orthopnea, PND, claudication.  She has no prior history of cardiac testing.  She has never had a sleep study.  She was recently started on Crestor 5 mg by endocrinology.  She has no awareness of tachyarrhythmias, no dizziness or syncope.  She admits to sedentary lifestyle.    Past Surgical History:   Procedure Laterality Date   • EYE SURGERY     • TONSILLECTOMY         The following portions of the patient's history were reviewed and updated as appropriate: allergies, current medications, past family history, past medical history, past social history, past surgical history and problem list.    Allergies   Allergen Reactions   • Januvia [Sitagliptin]      Insomnia   • Other          Current Outpatient Medications:   •  albuterol sulfate  (90 Base) MCG/ACT inhaler, Inhale 2 puffs Every 4 (Four) Hours As Needed for Wheezing., Disp: 1 inhaler, Rfl: 2  •  Blood Glucose Monitoring Suppl  "([employer] BREEZE 2 SYSTEM) W/DEVICE kit, , Disp: , Rfl:   •  busPIRone (BUSPAR) 10 MG tablet, Take 1 tablet by mouth Daily. (Patient taking differently: Take 10 mg by mouth 2 (Two) Times a Day.), Disp: 90 tablet, Rfl: 1  •  citalopram (CeleXA) 40 MG tablet, Take 1 tablet by mouth Daily., Disp: 90 tablet, Rfl: 1  •  glipiZIDE (GLUCOTROL) 10 MG tablet, Take 1 tablet by mouth 2 (two) times a day., Disp: , Rfl:   •  insulin lispro protamine-insulin lispro (HUMALOG MIX 75/25) (75-25) 100 UNIT/ML suspension injection, Inject 30 units in am and 59 units before supper (Patient taking differently: Inject 30 units in am and 40 units before supper), Disp: 30 mL, Rfl: 3  •  Insulin Syringe-Needle U-100 (BD INSULIN SYRINGE ULTRAFINE) 31G X 15/64\" 0.5 ML misc, , Disp: , Rfl:   •  [employer] BLOOD GLUCOSE TEST test strip, Test blood sugars TID, Disp: 100 each, Rfl: 12  •  levothyroxine (SYNTHROID, LEVOTHROID) 200 MCG tablet, Take 1 tablet by mouth Daily., Disp: 90 tablet, Rfl: 1  •  levothyroxine (SYNTHROID, LEVOTHROID) 25 MCG tablet, Take 1 tablet by mouth Daily., Disp: , Rfl:   •  metFORMIN (GLUCOPHAGE) 500 MG tablet, Take 2 tablets BID with food, Disp: 120 tablet, Rfl: 2  •  risperiDONE (risperDAL) 3 MG tablet, Take 3 mg by mouth Daily., Disp: , Rfl:   •  risperiDONE (RISPERDAL) 4 MG tablet, Take 4 mg by mouth Every Night., Disp: , Rfl:   •  rosuvastatin (CRESTOR) 5 MG tablet, Take 5 mg by mouth Every Night. Take one po daily, Disp: , Rfl:     Review of Systems   Constitution: Negative.   HENT: Negative.    Eyes: Negative.    Cardiovascular: Positive for dyspnea on exertion.   Respiratory: Positive for cough.    Endocrine: Positive for heat intolerance.   Hematologic/Lymphatic: Negative.    Skin: Negative.    Musculoskeletal: Negative.    Gastrointestinal: Positive for vomiting.   Genitourinary: Positive for bladder incontinence and frequency.   Neurological: Negative.    Psychiatric/Behavioral: Positive for depression. The patient is " "nervous/anxious.    Allergic/Immunologic: Negative.    All other systems reviewed and are negative.      Social History     Socioeconomic History   • Marital status: Single     Spouse name: Not on file   • Number of children: Not on file   • Years of education: Not on file   • Highest education level: Not on file   Tobacco Use   • Smoking status: Never Smoker   • Smokeless tobacco: Never Used   Substance and Sexual Activity   • Alcohol use: No   • Drug use: No   • Sexual activity: Defer       Family History   Problem Relation Age of Onset   • Hypertension Other    • Thyroid disease Other    • Diabetes Other    • Obesity Other    • Diabetes Mother    • Hypertension Mother    • Heart disease Mother    • Cancer Father    • Diabetes Sister    • Diabetes Brother    • No Known Problems Brother        Objective      /64 (BP Location: Left arm, Patient Position: Sitting)   Pulse 93   Ht 167.6 cm (66\")   Wt 111 kg (245 lb 12.8 oz)   SpO2 98%   BMI 39.67 kg/m²     Physical Exam   Constitutional: She is oriented to person, place, and time. She appears well-developed and well-nourished. No distress.   HENT:   Head: Normocephalic and atraumatic.   Mouth/Throat: Oropharynx is clear and moist.   Eyes: Pupils are equal, round, and reactive to light. No scleral icterus.   Neck: Neck supple. No JVD present. No tracheal deviation present. No thyromegaly present.   Cardiovascular: Normal rate, regular rhythm and normal heart sounds. Exam reveals no gallop and no friction rub.   No murmur heard.  Pulmonary/Chest: Effort normal and breath sounds normal. No respiratory distress. She has no wheezes. She has no rales.   Abdominal: Soft. Bowel sounds are normal. She exhibits no distension and no mass. There is no tenderness. There is no rebound and no guarding.   Musculoskeletal: Normal range of motion. She exhibits no edema or deformity.   Lymphadenopathy:     She has no cervical adenopathy.   Neurological: She is alert and " oriented to person, place, and time. No cranial nerve deficit.   Skin: Skin is warm and dry. No rash noted. She is not diaphoretic.   Psychiatric: She has a normal mood and affect.   Nursing note and vitals reviewed.        ECG 12 Lead  Date/Time: 5/30/2019 12:03 PM  Performed by: Colton Barahona MD  Authorized by: Colton Barahona MD   Previous ECG: no previous ECG available  Rhythm: sinus rhythm  Rate: normal  BPM: 93  Conduction: conduction normal  ST Segments: ST segments normal  T Waves: T waves normal  QRS axis: normal  Other: no other findings    Clinical impression: normal ECG            Lab Review:   Lab Results   Component Value Date    GLUCOSE 68 (L) 01/21/2019    BUN 14 01/21/2019    CREATININE 1.16 01/21/2019    EGFRIFNONA 50 (L) 01/21/2019    BCR 12.1 01/21/2019    CO2 30.0 01/21/2019    CALCIUM 9.5 01/21/2019    ALBUMIN 4.32 01/21/2019    AST 26 01/21/2019    ALT 36 01/21/2019       Lab Results   Component Value Date    WBC 14.17 (H) 03/06/2018    HGB 11.9 03/06/2018    HCT 37.0 03/06/2018    MCV 90.0 03/06/2018     03/06/2018       Lab Results   Component Value Date    HGBA1C 9.4 04/15/2019       Lab Results   Component Value Date    TSH 0.989 01/21/2019       Lab Results   Component Value Date    CHOL 178 01/21/2019    CHOL 206 (H) 08/18/2017     Lab Results   Component Value Date    TRIG 131 01/21/2019    TRIG 110 08/18/2017     Lab Results   Component Value Date    HDL 37 (L) 01/21/2019    HDL 43 08/18/2017     Lab Results   Component Value Date     (H) 01/21/2019     (H) 08/18/2017     Assessment:   Diagnosis Plan   1. Dyspnea, unspecified type  ECG 12 Lead    Adult Transthoracic Echo Complete W/ Cont if Necessary Per Protocol   2. Dyspnea on exertion     3. Cough   it appears that some of this may be related to acid reflux disease, we recommended Zantac 150 mill grams twice daily.       4. Mixed hyperlipidemia   agree with Crestor 5 mg daily      Plan:  1. Patient reports  episodes of coughing and associated shortness of breath, it appears that she may have acid reflux disease triggering the symptoms.  2. She already has pulmonology consultation scheduled and was advised to keep that appointment.  We will give her a trial of Zantac 150 mg twice daily and get an echocardiogram to assess her cardiac function.  If the echo is unremarkable there would be no need for further cardiac testing.  3. Her history and physical exam is unremarkable for any evidence of significant chest pain, heart failure or valvular disease type symptoms.  4. Continue all other current medications.   5. Follow-up in 3 months, sooner as needed.  6. Thank you for allowing us to participate in the care of your patient.     Scribed for Colton Barahona MD by Electronically signed by Electronically signed by MANUELA Howard, 05/31/19, 2:25 PM.    I, Colton Barahona MD, personally performed the services described in this documentation as scribed by the above named individual in my presence, and it is both accurate and complete.  5/31/2019  4:25 PM

## 2019-05-31 ENCOUNTER — CONSULT (OUTPATIENT)
Dept: CARDIOLOGY | Facility: CLINIC | Age: 49
End: 2019-05-31

## 2019-05-31 VITALS
OXYGEN SATURATION: 98 % | SYSTOLIC BLOOD PRESSURE: 104 MMHG | WEIGHT: 245.8 LBS | HEIGHT: 66 IN | DIASTOLIC BLOOD PRESSURE: 64 MMHG | BODY MASS INDEX: 39.5 KG/M2 | HEART RATE: 93 BPM

## 2019-05-31 DIAGNOSIS — R06.09 DYSPNEA ON EXERTION: ICD-10-CM

## 2019-05-31 DIAGNOSIS — E78.2 MIXED HYPERLIPIDEMIA: ICD-10-CM

## 2019-05-31 DIAGNOSIS — R05.9 COUGH: ICD-10-CM

## 2019-05-31 DIAGNOSIS — R06.00 DYSPNEA, UNSPECIFIED TYPE: Primary | ICD-10-CM

## 2019-05-31 PROCEDURE — 99204 OFFICE O/P NEW MOD 45 MIN: CPT | Performed by: INTERNAL MEDICINE

## 2019-05-31 RX ORDER — RANITIDINE 150 MG/1
150 TABLET ORAL 2 TIMES DAILY
Status: DISCONTINUED | OUTPATIENT
Start: 2019-05-31 | End: 2019-11-26 | Stop reason: SDUPTHER

## 2019-05-31 RX ORDER — LEVOTHYROXINE SODIUM 0.03 MG/1
1 TABLET ORAL DAILY
COMMUNITY
Start: 2019-05-13 | End: 2019-07-12 | Stop reason: SDUPTHER

## 2019-06-19 ENCOUNTER — TRANSCRIBE ORDERS (OUTPATIENT)
Dept: DIABETES SERVICES | Facility: HOSPITAL | Age: 49
End: 2019-06-19

## 2019-06-19 DIAGNOSIS — IMO0002 UNCONTROLLED DIABETES MELLITUS WITH COMPLICATIONS: Primary | ICD-10-CM

## 2019-07-05 ENCOUNTER — APPOINTMENT (OUTPATIENT)
Dept: DIABETES SERVICES | Facility: HOSPITAL | Age: 49
End: 2019-07-05

## 2019-07-11 ENCOUNTER — APPOINTMENT (OUTPATIENT)
Dept: CARDIOLOGY | Facility: HOSPITAL | Age: 49
End: 2019-07-11

## 2019-07-15 RX ORDER — LEVOTHYROXINE SODIUM 0.03 MG/1
25 TABLET ORAL DAILY
Qty: 90 TABLET | Refills: 1 | Status: SHIPPED | OUTPATIENT
Start: 2019-07-15 | End: 2022-11-02 | Stop reason: SDUPTHER

## 2019-08-05 ENCOUNTER — APPOINTMENT (OUTPATIENT)
Dept: CARDIOLOGY | Facility: HOSPITAL | Age: 49
End: 2019-08-05

## 2019-08-09 ENCOUNTER — HOSPITAL ENCOUNTER (OUTPATIENT)
Dept: DIABETES SERVICES | Facility: HOSPITAL | Age: 49
Setting detail: RECURRING SERIES
Discharge: HOME | End: 2019-08-09

## 2019-08-09 PROCEDURE — G0109 DIAB MANAGE TRN IND/GROUP: HCPCS | Performed by: DIETITIAN, REGISTERED

## 2019-08-23 ENCOUNTER — TELEPHONE (OUTPATIENT)
Dept: INTERNAL MEDICINE | Facility: CLINIC | Age: 49
End: 2019-08-23

## 2019-08-23 ENCOUNTER — OFFICE VISIT (OUTPATIENT)
Dept: ENDOCRINOLOGY | Facility: CLINIC | Age: 49
End: 2019-08-23

## 2019-08-23 VITALS
BODY MASS INDEX: 40.08 KG/M2 | DIASTOLIC BLOOD PRESSURE: 66 MMHG | OXYGEN SATURATION: 99 % | HEART RATE: 82 BPM | SYSTOLIC BLOOD PRESSURE: 108 MMHG | WEIGHT: 248.3 LBS

## 2019-08-23 DIAGNOSIS — E78.2 MIXED HYPERLIPIDEMIA: ICD-10-CM

## 2019-08-23 DIAGNOSIS — E55.9 VITAMIN D DEFICIENCY: ICD-10-CM

## 2019-08-23 DIAGNOSIS — E03.9 ACQUIRED HYPOTHYROIDISM: ICD-10-CM

## 2019-08-23 DIAGNOSIS — E11.65 UNCONTROLLED TYPE 2 DIABETES MELLITUS WITH HYPERGLYCEMIA (HCC): Primary | ICD-10-CM

## 2019-08-23 LAB
25(OH)D3 SERPL-MCNC: 23.4 NG/ML (ref 30–100)
ALBUMIN SERPL-MCNC: 4.6 G/DL (ref 3.5–5.2)
ALBUMIN/GLOB SERPL: 1.7 G/DL
ALP SERPL-CCNC: 138 U/L (ref 39–117)
ALT SERPL W P-5'-P-CCNC: 16 U/L (ref 1–33)
ANION GAP SERPL CALCULATED.3IONS-SCNC: 17.3 MMOL/L (ref 5–15)
AST SERPL-CCNC: 15 U/L (ref 1–32)
BILIRUB SERPL-MCNC: 0.2 MG/DL (ref 0.2–1.2)
BUN BLD-MCNC: 14 MG/DL (ref 6–20)
BUN/CREAT SERPL: 11.5 (ref 7–25)
CALCIUM SPEC-SCNC: 10.3 MG/DL (ref 8.6–10.5)
CHLORIDE SERPL-SCNC: 99 MMOL/L (ref 98–107)
CHOLEST SERPL-MCNC: 119 MG/DL (ref 0–200)
CO2 SERPL-SCNC: 22.7 MMOL/L (ref 22–29)
CREAT BLD-MCNC: 1.22 MG/DL (ref 0.57–1)
GFR SERPL CREATININE-BSD FRML MDRD: 47 ML/MIN/1.73
GLOBULIN UR ELPH-MCNC: 2.7 GM/DL
GLUCOSE BLD-MCNC: 176 MG/DL (ref 65–99)
GLUCOSE BLDC GLUCOMTR-MCNC: 222 MG/DL (ref 70–130)
HBA1C MFR BLD: 8.8 %
HDLC SERPL-MCNC: 34 MG/DL (ref 40–60)
LDLC SERPL CALC-MCNC: 63 MG/DL (ref 0–100)
LDLC/HDLC SERPL: 1.86 {RATIO}
POTASSIUM BLD-SCNC: 4.7 MMOL/L (ref 3.5–5.2)
PROT SERPL-MCNC: 7.3 G/DL (ref 6–8.5)
SODIUM BLD-SCNC: 139 MMOL/L (ref 136–145)
T4 FREE SERPL-MCNC: 1.48 NG/DL (ref 0.93–1.7)
TRIGL SERPL-MCNC: 108 MG/DL (ref 0–150)
TSH SERPL DL<=0.05 MIU/L-ACNC: 1.68 MIU/ML (ref 0.27–4.2)
VLDLC SERPL-MCNC: 21.6 MG/DL (ref 5–40)

## 2019-08-23 PROCEDURE — 84443 ASSAY THYROID STIM HORMONE: CPT | Performed by: INTERNAL MEDICINE

## 2019-08-23 PROCEDURE — 83036 HEMOGLOBIN GLYCOSYLATED A1C: CPT | Performed by: INTERNAL MEDICINE

## 2019-08-23 PROCEDURE — 80061 LIPID PANEL: CPT | Performed by: INTERNAL MEDICINE

## 2019-08-23 PROCEDURE — 82947 ASSAY GLUCOSE BLOOD QUANT: CPT | Performed by: INTERNAL MEDICINE

## 2019-08-23 PROCEDURE — 99214 OFFICE O/P EST MOD 30 MIN: CPT | Performed by: INTERNAL MEDICINE

## 2019-08-23 PROCEDURE — 84439 ASSAY OF FREE THYROXINE: CPT | Performed by: INTERNAL MEDICINE

## 2019-08-23 PROCEDURE — 82306 VITAMIN D 25 HYDROXY: CPT | Performed by: INTERNAL MEDICINE

## 2019-08-23 PROCEDURE — 80053 COMPREHEN METABOLIC PANEL: CPT | Performed by: INTERNAL MEDICINE

## 2019-08-23 NOTE — PROGRESS NOTES
Vilma Ayala 48 y.o.  CC:  Chief Complaint   Patient presents with   • Follow-up     Type 2   • Diabetes     [place]: Follow-up (Type 2) and Diabetes    Blood sugar and 90 day average sugar reviewed   Results for orders placed or performed in visit on 08/23/19   POC Glucose Fingerstick   Result Value Ref Range    Glucose 222 (A) 70 - 130 mg/dL   POC Glycosylated Hemoglobin (Hb A1C)   Result Value Ref Range    Hemoglobin A1C 8.8 %     Average sugar is 200   Is on glucotrol 10 mg twice daily, metformin 1000 mg twice daily and humalog 75/25 - 30 u in am and 59 u at dinner  Average sugar is higher - with 90 day average sugar 200 or so  Goal average sugar 150 or less   She is trying to reduce drinking sodas and juices   She is utd with eye exam  No neuropathy, callus or ulcer  Ur alb 1/19 neg  Rationale for goal and risk of higher sugars discussed including risk of neuropathy, nephropathy and retinopathy   Sugar today is elevated at 222  She asks if she should increase insulin dosing due to higher sugar and I discussed her diet with her at length and encouraged her to try to cut out processed carbs and sodas, increase exercise/activity to 30 min daily first and we can consider additional medication or insulin titration If continued elevated sugars    Allergies   Allergen Reactions   • Januvia [Sitagliptin]      Insomnia   • Other        Current Outpatient Medications:   •  albuterol sulfate  (90 Base) MCG/ACT inhaler, Inhale 2 puffs Every 4 (Four) Hours As Needed for Wheezing., Disp: 1 inhaler, Rfl: 2  •  busPIRone (BUSPAR) 10 MG tablet, Take 1 tablet by mouth Daily. (Patient taking differently: Take 10 mg by mouth 2 (Two) Times a Day.), Disp: 90 tablet, Rfl: 1  •  citalopram (CeleXA) 40 MG tablet, Take 1 tablet by mouth Daily., Disp: 90 tablet, Rfl: 1  •  glipiZIDE (GLUCOTROL) 10 MG tablet, Take 1 tablet by mouth 2 (two) times a day., Disp: , Rfl:   •  insulin lispro protamine-insulin lispro (HUMALOG MIX  "75/25) (75-25) 100 UNIT/ML suspension injection, Inject 30 units in am and 59 units before supper (Patient taking differently: Inject 30 units in am and 40 units before supper), Disp: 30 mL, Rfl: 3  •  Insulin Syringe-Needle U-100 (BD INSULIN SYRINGE ULTRAFINE) 31G X 15/64\" 0.5 ML misc, , Disp: , Rfl:   •  [employer] BLOOD GLUCOSE TEST test strip, Test blood sugars TID, Disp: 100 each, Rfl: 12  •  levothyroxine (SYNTHROID, LEVOTHROID) 200 MCG tablet, Take 1 tablet by mouth Daily., Disp: 90 tablet, Rfl: 1  •  levothyroxine (SYNTHROID, LEVOTHROID) 25 MCG tablet, Take 1 tablet by mouth Daily., Disp: 90 tablet, Rfl: 1  •  metFORMIN (GLUCOPHAGE) 500 MG tablet, Take 2 tablets BID with food, Disp: 120 tablet, Rfl: 2  •  risperiDONE (risperDAL) 3 MG tablet, Take 3 mg by mouth Daily., Disp: , Rfl:   •  risperiDONE (RISPERDAL) 4 MG tablet, Take 4 mg by mouth Every Night., Disp: , Rfl:   •  rosuvastatin (CRESTOR) 5 MG tablet, Take 5 mg by mouth Every Night. Take one po daily, Disp: , Rfl:     Current Facility-Administered Medications:   •  raNITIdine (ZANTAC) tablet 150 mg, 150 mg, Oral, BID, Ovi Hernandez PA  Patient Active Problem List    Diagnosis   • Cough [R05]   • Dyspnea on exertion [R06.09]   • Mixed hyperlipidemia [E78.2]   • Anxiety and depression [F41.9, F32.9]   • Uncontrolled type 2 diabetes mellitus (CMS/HCC) [E11.65]   • Hypothyroid [E03.9]   • Obesity [E66.9]   • Vitamin D deficiency [E55.9]   • Diabetic neuropathy, type II diabetes mellitus (CMS/HCC) [E11.40]     Review of Systems   Constitutional: Negative for activity change, appetite change, chills, diaphoresis, fatigue, fever and unexpected weight change.   HENT: Negative for congestion, dental problem, drooling, ear discharge, ear pain, facial swelling, hearing loss, mouth sores, nosebleeds, postnasal drip, rhinorrhea, sinus pressure, sneezing, sore throat, tinnitus, trouble swallowing and voice change.    Eyes: Negative for photophobia, pain, " discharge, redness, itching and visual disturbance.   Respiratory: Negative for apnea, cough, choking, chest tightness, shortness of breath, wheezing and stridor.    Cardiovascular: Negative for chest pain, palpitations and leg swelling.   Gastrointestinal: Negative for abdominal distention, abdominal pain, anal bleeding, blood in stool, constipation, diarrhea, nausea, rectal pain and vomiting.   Endocrine: Negative for cold intolerance, heat intolerance, polydipsia, polyphagia and polyuria.   Genitourinary: Negative for decreased urine volume, difficulty urinating, dysuria, enuresis, flank pain, frequency, genital sores, hematuria and urgency.   Musculoskeletal: Negative for arthralgias, back pain, gait problem, joint swelling, myalgias, neck pain and neck stiffness.   Skin: Negative for color change, pallor, rash and wound.   Allergic/Immunologic: Negative for environmental allergies, food allergies and immunocompromised state.   Neurological: Negative for dizziness, tremors, seizures, syncope, facial asymmetry, speech difficulty, weakness, light-headedness, numbness and headaches.   Hematological: Negative for adenopathy. Does not bruise/bleed easily.   Psychiatric/Behavioral: Negative for agitation, behavioral problems, confusion, decreased concentration, dysphoric mood, hallucinations, self-injury, sleep disturbance and suicidal ideas. The patient is not nervous/anxious and is not hyperactive.      Social History     Socioeconomic History   • Marital status: Single     Spouse name: Not on file   • Number of children: Not on file   • Years of education: Not on file   • Highest education level: Not on file   Tobacco Use   • Smoking status: Never Smoker   • Smokeless tobacco: Never Used   Substance and Sexual Activity   • Alcohol use: No   • Drug use: No   • Sexual activity: Defer     Family History   Problem Relation Age of Onset   • Hypertension Other    • Thyroid disease Other    • Diabetes Other    • Obesity  Other    • Diabetes Mother    • Hypertension Mother    • Heart disease Mother    • Cancer Father    • Diabetes Sister    • Diabetes Brother    • No Known Problems Brother      /66   Pulse 82   Wt 113 kg (248 lb 4.8 oz)   SpO2 99%   Breastfeeding? No   BMI 40.08 kg/m²   Physical Exam   Constitutional: She is oriented to person, place, and time. She appears well-developed and well-nourished.   HENT:   Head: Normocephalic and atraumatic.   Nose: Nose normal.   Mouth/Throat: Oropharynx is clear and moist.   Eyes: Conjunctivae, EOM and lids are normal. Pupils are equal, round, and reactive to light.   Neck: Trachea normal and normal range of motion. Neck supple. Carotid bruit is not present. No tracheal deviation present. No thyroid mass and no thyromegaly present.   Cardiovascular: Normal rate, regular rhythm, normal heart sounds and intact distal pulses. Exam reveals no gallop and no friction rub.   No murmur heard.  Pulmonary/Chest: Effort normal and breath sounds normal. No respiratory distress. She has no wheezes.   Musculoskeletal: Normal range of motion. She exhibits no edema or deformity.    Vilma had a diabetic foot exam performed today.   During the foot exam she had a monofilament test performed.    Neurological Sensory Findings - Unaltered hot/cold right ankle/foot discrimination and unaltered hot/cold left ankle/foot discrimination. Unaltered sharp/dull right ankle/foot discrimination and unaltered sharp/dull left ankle/foot discrimination. No right ankle/foot altered proprioception and no left ankle/foot altered proprioception  Vascular Status -  Her right foot exhibits normal foot vasculature  and no edema. Her left foot exhibits normal foot vasculature  and no edema.  Skin Integrity  -  Her right foot skin is intact.Her left foot skin is intact..     Lymphadenopathy:     She has no cervical adenopathy.   Neurological: She is alert and oriented to person, place, and time. She has normal  reflexes. She displays normal reflexes. No cranial nerve deficit.   Skin: Skin is warm and dry. No rash noted. No cyanosis or erythema. Nails show no clubbing.   Psychiatric: She has a normal mood and affect. Her speech is normal and behavior is normal. Judgment and thought content normal. Cognition and memory are normal.   Nursing note and vitals reviewed.    Results for orders placed or performed in visit on 04/15/19   POC Glycosylated Hemoglobin (Hb A1C)   Result Value Ref Range    Hemoglobin A1C 9.4 %   POC Glucose Fingerstick   Result Value Ref Range    Glucose 160 (A) 70 - 130 mg/dL     Vilma was seen today for follow-up and diabetes.    Diagnoses and all orders for this visit:    Uncontrolled type 2 diabetes mellitus with hyperglycemia (CMS/[facility])  -     POC Glucose Fingerstick  -     POC Glycosylated Hemoglobin (Hb A1C)      Problem List Items Addressed This Visit        Cardiovascular and Mediastinum    Mixed hyperlipidemia     Continue low fat diet and crestor 5 mg daily             Digestive    Vitamin D deficiency     Continue vitamin D supplement and update levels yearly          Relevant Orders    Vitamin D 25 Hydroxy       Endocrine    Uncontrolled type 2 diabetes mellitus (CMS/[facility]) - Primary     Blood sugar and 90 day average sugar reviewed  Results for orders placed or performed in visit on 08/23/19   POC Glucose Fingerstick   Result Value Ref Range    Glucose 222 (A) 70 - 130 mg/dL   POC Glycosylated Hemoglobin (Hb A1C)   Result Value Ref Range    Hemoglobin A1C 8.8 %     Average sugar is higher- continue current medications   Start monitoring sugars am and pre dinner  Is utd with eye exam  No neuropathy but thick callus- foot care reviewed  Encouraged podiatry   Ur alb due today   F/u 3-4 months  Can titrate insulin am if continued high daytime sugars           Relevant Orders    POC Glucose Fingerstick (Completed)    POC Glycosylated Hemoglobin (Hb A1C) (Completed)    Comprehensive Metabolic  Panel    Lipid Panel    T4, Free    TSH    Vitamin D 25 Hydroxy    Hypothyroid     Check tfts              Return in about 3 months (around 11/23/2019) for Recheck 30 min .    Marlen Luna  Signed Selina Lopez MD

## 2019-08-23 NOTE — TELEPHONE ENCOUNTER
Pt was given instructions concerning her diet and insulin levels. I wasn't sure if patient understood what I was saying so I asked again to be sure. Pt stated that she did , informed patient to please give us a call if she needs any clarification, patient verbalized understanding.

## 2019-08-23 NOTE — TELEPHONE ENCOUNTER
PATIENT WANTS TO KNOW IF SHE SHOULD GO UP ON HER INSULIN. SHE STATES THAT WHEN CHECKED TODAY DURING HER APPT, IT WAS A LITTLE HIGH.    CALL BACK  [phone]

## 2019-08-23 NOTE — TELEPHONE ENCOUNTER
I would first ask if she can work on her diet and cut back on carbs .  Maybe she can make a diet log.  She should know what she should be eating because she just got done with diabetes education.  I would have her test sugars morning and before dinner twice a day, record meals for any sugar over 250 and email some information or call and give Jayna an update in 3-5 days.  Thanks, artur

## 2019-08-24 NOTE — ASSESSMENT & PLAN NOTE
Blood sugar and 90 day average sugar reviewed  Results for orders placed or performed in visit on 08/23/19   POC Glucose Fingerstick   Result Value Ref Range    Glucose 222 (A) 70 - 130 mg/dL   POC Glycosylated Hemoglobin (Hb A1C)   Result Value Ref Range    Hemoglobin A1C 8.8 %     Average sugar is higher- continue current medications   Start monitoring sugars am and pre dinner  Is utd with eye exam  No neuropathy but thick callus- foot care reviewed  Encouraged podiatry   Ur alb due today   F/u 3-4 months  Can titrate insulin am if continued high daytime sugars

## 2019-09-05 ENCOUNTER — HOSPITAL ENCOUNTER (OUTPATIENT)
Dept: CARDIOLOGY | Facility: HOSPITAL | Age: 49
Discharge: HOME | End: 2019-09-05
Admitting: PHYSICIAN ASSISTANT

## 2019-09-05 DIAGNOSIS — R06.00 DYSPNEA, UNSPECIFIED TYPE: ICD-10-CM

## 2019-09-05 PROCEDURE — 93306 TTE W/DOPPLER COMPLETE: CPT | Performed by: INTERNAL MEDICINE

## 2019-09-05 PROCEDURE — 93306 TTE W/DOPPLER COMPLETE: CPT

## 2019-09-06 LAB
BH CV ECHO MEAS - AO MAX PG (FULL): 0.43 MMHG
BH CV ECHO MEAS - AO MAX PG: 7 MMHG
BH CV ECHO MEAS - AO MEAN PG (FULL): 0.42 MMHG
BH CV ECHO MEAS - AO MEAN PG: 3.7 MMHG
BH CV ECHO MEAS - AO ROOT AREA (BSA CORRECTED): 1.3
BH CV ECHO MEAS - AO ROOT AREA: 6.4 CM^2
BH CV ECHO MEAS - AO ROOT DIAM: 2.8 CM
BH CV ECHO MEAS - AO V2 MAX: 132.5 CM/SEC
BH CV ECHO MEAS - AO V2 MEAN: 92.3 CM/SEC
BH CV ECHO MEAS - AO V2 VTI: 25 CM
BH CV ECHO MEAS - AVA(I,A): 2.2 CM^2
BH CV ECHO MEAS - AVA(I,D): 2.2 CM^2
BH CV ECHO MEAS - AVA(V,A): 2.4 CM^2
BH CV ECHO MEAS - AVA(V,D): 2.4 CM^2
BH CV ECHO MEAS - BSA(HAYCOCK): 2.3 M^2
BH CV ECHO MEAS - BSA: 2.2 M^2
BH CV ECHO MEAS - BZI_BMI: 40 KILOGRAMS/M^2
BH CV ECHO MEAS - BZI_METRIC_HEIGHT: 167.6 CM
BH CV ECHO MEAS - BZI_METRIC_WEIGHT: 112.5 KG
BH CV ECHO MEAS - EDV(CUBED): 98.5 ML
BH CV ECHO MEAS - EDV(MOD-SP2): 55 ML
BH CV ECHO MEAS - EDV(MOD-SP4): 59 ML
BH CV ECHO MEAS - EDV(TEICH): 98.2 ML
BH CV ECHO MEAS - EF(CUBED): 63.2 %
BH CV ECHO MEAS - EF(MOD-BP): 55 %
BH CV ECHO MEAS - EF(MOD-SP2): 60 %
BH CV ECHO MEAS - EF(MOD-SP4): 55.9 %
BH CV ECHO MEAS - EF(TEICH): 54.8 %
BH CV ECHO MEAS - ESV(CUBED): 36.2 ML
BH CV ECHO MEAS - ESV(MOD-SP2): 22 ML
BH CV ECHO MEAS - ESV(MOD-SP4): 26 ML
BH CV ECHO MEAS - ESV(TEICH): 44.4 ML
BH CV ECHO MEAS - FS: 28.3 %
BH CV ECHO MEAS - IVS/LVPW: 1.1
BH CV ECHO MEAS - IVSD: 0.78 CM
BH CV ECHO MEAS - LA DIMENSION: 3.7 CM
BH CV ECHO MEAS - LA/AO: 1.3
BH CV ECHO MEAS - LAD MAJOR: 4.9 CM
BH CV ECHO MEAS - LAT PEAK E' VEL: 7.9 CM/SEC
BH CV ECHO MEAS - LATERAL E/E' RATIO: 8.9
BH CV ECHO MEAS - LV DIASTOLIC VOL/BSA (35-75): 26.9 ML/M^2
BH CV ECHO MEAS - LV MASS(C)D: 109.7 GRAMS
BH CV ECHO MEAS - LV MASS(C)DI: 50 GRAMS/M^2
BH CV ECHO MEAS - LV MAX PG: 6.6 MMHG
BH CV ECHO MEAS - LV MEAN PG: 3.3 MMHG
BH CV ECHO MEAS - LV SYSTOLIC VOL/BSA (12-30): 11.9 ML/M^2
BH CV ECHO MEAS - LV V1 MAX: 128.3 CM/SEC
BH CV ECHO MEAS - LV V1 MEAN: 85.1 CM/SEC
BH CV ECHO MEAS - LV V1 VTI: 22.5 CM
BH CV ECHO MEAS - LVIDD: 4.6 CM
BH CV ECHO MEAS - LVIDS: 3.3 CM
BH CV ECHO MEAS - LVLD AP2: 7.2 CM
BH CV ECHO MEAS - LVLD AP4: 7.4 CM
BH CV ECHO MEAS - LVLS AP2: 5.8 CM
BH CV ECHO MEAS - LVLS AP4: 6.5 CM
BH CV ECHO MEAS - LVOT AREA (M): 2.5 CM^2
BH CV ECHO MEAS - LVOT AREA: 2.4 CM^2
BH CV ECHO MEAS - LVOT DIAM: 1.8 CM
BH CV ECHO MEAS - LVPWD: 0.72 CM
BH CV ECHO MEAS - MED PEAK E' VEL: 5.3 CM/SEC
BH CV ECHO MEAS - MEDIAL E/E' RATIO: 13.3
BH CV ECHO MEAS - MV A MAX VEL: 91.3 CM/SEC
BH CV ECHO MEAS - MV DEC TIME: 0.2 SEC
BH CV ECHO MEAS - MV E MAX VEL: 72.1 CM/SEC
BH CV ECHO MEAS - MV E/A: 0.79
BH CV ECHO MEAS - PA ACC SLOPE: 660.8 CM/SEC^2
BH CV ECHO MEAS - PA ACC TIME: 0.11 SEC
BH CV ECHO MEAS - PA MAX PG: 3.5 MMHG
BH CV ECHO MEAS - PA PR(ACCEL): 30.7 MMHG
BH CV ECHO MEAS - PA V2 MAX: 93.6 CM/SEC
BH CV ECHO MEAS - SI(AO): 72.7 ML/M^2
BH CV ECHO MEAS - SI(CUBED): 28.4 ML/M^2
BH CV ECHO MEAS - SI(LVOT): 24.9 ML/M^2
BH CV ECHO MEAS - SI(MOD-SP2): 15.1 ML/M^2
BH CV ECHO MEAS - SI(MOD-SP4): 15.1 ML/M^2
BH CV ECHO MEAS - SI(TEICH): 24.6 ML/M^2
BH CV ECHO MEAS - SV(AO): 159.3 ML
BH CV ECHO MEAS - SV(CUBED): 62.2 ML
BH CV ECHO MEAS - SV(LVOT): 54.6 ML
BH CV ECHO MEAS - SV(MOD-SP2): 33 ML
BH CV ECHO MEAS - SV(MOD-SP4): 33 ML
BH CV ECHO MEAS - SV(TEICH): 53.8 ML
BH CV ECHO MEAS - TAPSE (>1.6): 1.9 CM2
BH CV ECHO MEASUREMENTS AVERAGE E/E' RATIO: 10.92
BH CV XLRA - RV BASE: 2.3 CM
BH CV XLRA - RV LENGTH: 6.5 CM
BH CV XLRA - RV MID: 1.7 CM
BH CV XLRA - TDI S': 12.5 CM/SEC
LEFT ATRIUM VOLUME INDEX: 14.6 ML/M^2
LEFT ATRIUM VOLUME: 32 ML
LV EF 2D ECHO EST: 60 %
MAXIMAL PREDICTED HEART RATE: 172 BPM
STRESS TARGET HR: 146 BPM

## 2019-09-12 ENCOUNTER — TELEPHONE (OUTPATIENT)
Dept: INTERNAL MEDICINE | Facility: CLINIC | Age: 49
End: 2019-09-12

## 2019-09-12 NOTE — TELEPHONE ENCOUNTER
Please see if she can tell you if she has been testing blood sugars (what time of day and what are the readings).    Thanks, vickie

## 2019-09-12 NOTE — TELEPHONE ENCOUNTER
Pt saw her gp the yesterday and they would like her to increase her insulin dosage.  She would like your opinion on the increase    36 in the am   30 was  Previous dosage     Please contact the patient at [phone]

## 2019-09-13 ENCOUNTER — OFFICE VISIT (OUTPATIENT)
Dept: CARDIOLOGY | Facility: CLINIC | Age: 49
End: 2019-09-13

## 2019-09-13 VITALS
HEIGHT: 66 IN | WEIGHT: 241 LBS | OXYGEN SATURATION: 96 % | DIASTOLIC BLOOD PRESSURE: 78 MMHG | HEART RATE: 88 BPM | SYSTOLIC BLOOD PRESSURE: 104 MMHG | BODY MASS INDEX: 38.73 KG/M2

## 2019-09-13 DIAGNOSIS — E78.2 MIXED HYPERLIPIDEMIA: Primary | ICD-10-CM

## 2019-09-13 DIAGNOSIS — R06.09 DYSPNEA ON EXERTION: ICD-10-CM

## 2019-09-13 PROCEDURE — 99213 OFFICE O/P EST LOW 20 MIN: CPT | Performed by: INTERNAL MEDICINE

## 2019-09-13 RX ORDER — RANITIDINE 150 MG/1
150 TABLET ORAL 2 TIMES DAILY
COMMUNITY
End: 2020-06-29 | Stop reason: RX

## 2019-09-13 NOTE — PROGRESS NOTES
"[facility] Cardiology    Encounter Date:09/13/2019        Patient ID: Vilma Ayala is a 48 y.o. female.    PCP: Ovi Corbin MD     Chief Complaint: Hyperlipidemia      PROBLEM LIST:  1. Dyspnea on exertion:  a. PFT, 01/10/2019: Small airway disease.  b. Echo, 09/05/2019: EF 60%. No significant VHD.  2. DM2, uncontrolled.  3. Obesity.  4. Anxiety/depression.  5. Hypothyroid.  6. Vitamin D deficiency.  7. Diabetic neuropathy.    History of Present Illness   Patient presents today for 3 monthfollow-up with a history of BAINS and cardiac risk factors. Since last visit, she has been feeling well overall from a cardiovascular standpoint. She feels that her Zantac has been helping with her symptoms. Denies chest pain, shortness of breath, leg swelling, palpitations, and syncope. Remains busy and active with no significant limitations.    Allergies   Allergen Reactions   • Januvia [Sitagliptin]      Insomnia   • Other          Current Outpatient Medications:   •  albuterol sulfate  (90 Base) MCG/ACT inhaler, Inhale 2 puffs Every 4 (Four) Hours As Needed for Wheezing., Disp: 1 inhaler, Rfl: 2  •  busPIRone (BUSPAR) 10 MG tablet, Take 1 tablet by mouth Daily. (Patient taking differently: Take 10 mg by mouth 2 (Two) Times a Day.), Disp: 90 tablet, Rfl: 1  •  Cholecalciferol (VITAMIN D3 PO), Take 1 tablet by mouth Daily., Disp: , Rfl:   •  citalopram (CeleXA) 40 MG tablet, Take 1 tablet by mouth Daily., Disp: 90 tablet, Rfl: 1  •  glipiZIDE (GLUCOTROL) 10 MG tablet, Take 1 tablet by mouth 2 (two) times a day., Disp: , Rfl:   •  insulin lispro protamine-insulin lispro (HUMALOG MIX 75/25) (75-25) 100 UNIT/ML suspension injection, Inject 30 units in am and 59 units before supper (Patient taking differently: Inject 33 units in am and 40 units before supper), Disp: 30 mL, Rfl: 3  •  Insulin Syringe-Needle U-100 (BD INSULIN SYRINGE ULTRAFINE) 31G X 15/64\" 0.5 ML misc, , Disp: , Rfl: " "  •  [employer] BLOOD GLUCOSE TEST test strip, Test blood sugars TID, Disp: 100 each, Rfl: 12  •  levothyroxine (SYNTHROID, LEVOTHROID) 25 MCG tablet, Take 1 tablet by mouth Daily., Disp: 90 tablet, Rfl: 1  •  metFORMIN (GLUCOPHAGE) 500 MG tablet, Take 2 tablets BID with food, Disp: 120 tablet, Rfl: 2  •  raNITIdine (ZANTAC) 150 MG tablet, Take 150 mg by mouth 2 (Two) Times a Day., Disp: , Rfl:   •  risperiDONE (RISPERDAL) 4 MG tablet, Take 4 mg by mouth Every Night., Disp: , Rfl:   •  rosuvastatin (CRESTOR) 5 MG tablet, Take 5 mg by mouth Every Night. Take one po daily, Disp: , Rfl:     Current Facility-Administered Medications:   •  raNITIdine (ZANTAC) tablet 150 mg, 150 mg, Oral, BID, Ovi Hernandez PA    The following portions of the patient's history were reviewed and updated as appropriate: allergies, current medications, past family history, past medical history, past social history, past surgical history and problem list.    ROS  Review of Systems   Constitution: Negative for chills, fatigue, fever, generalized weakness, weight gain and weight loss.   Cardiovascular: Negative for chest pain, claudication, dyspnea on exertion, leg swelling, orthopnea, palpitations, paroxysmal nocturnal dyspnea and syncope.   Respiratory: Negative for cough, shortness of breath, and wheezing.  HENT: Negative for ear pain, nosebleeds, and tinnitus.  Gastrointestinal: Negative for abdominal pain, constipation, diarrhea, nausea and vomiting.   Genitourinary: No urinary symptoms   Musculoskeletal: Negative for muscle cramps.  Neurological: Negative for dizziness, headaches, loss of balance, numbness, and symptoms of stroke.  Psychiatric: Normal mental status.     All other systems reviewed and are negative.    Objective:     /78 (BP Location: Left arm, Patient Position: Sitting)   Pulse 88   Ht 167.6 cm (66\")   Wt 109 kg (241 lb)   SpO2 96%   BMI 38.90 kg/m²        Physical Exam  Constitutional: Patient appears " well-developed and well-nourished.   HENT: HEENT exam unremarkable.   Neck: Neck supple. No JVD present. No carotid bruits.   Cardiovascular: Normal rate, regular rhythm and normal heart sounds. No murmur heard.   2+ symmetric pulses.   Pulmonary/Chest: Breath sounds normal. Does not exhibit tenderness.   Abdominal: Abdomen benign.   Musculoskeletal: Does not exhibit edema.   Neurological: Neurological exam unremarkable.   Vitals reviewed.    Lab Review:   Lab Results   Component Value Date    GLUCOSE 176 (H) 08/23/2019    BUN 14 08/23/2019    CREATININE 1.22 (H) 08/23/2019    EGFRIFNONA 47 (L) 08/23/2019    BCR 11.5 08/23/2019    K 4.7 08/23/2019    CO2 22.7 08/23/2019    CALCIUM 10.3 08/23/2019    ALBUMIN 4.60 08/23/2019    AST 15 08/23/2019    ALT 16 08/23/2019     Lab Results   Component Value Date    CHOL 119 08/23/2019    CHLPL 199 01/09/2015    TRIG 108 08/23/2019    HDL 34 (L) 08/23/2019    LDL 63 08/23/2019      Lab Results   Component Value Date    WBC 14.17 (H) 03/06/2018    HGB 11.9 03/06/2018    HCT 37.0 03/06/2018    MCV 90.0 03/06/2018     03/06/2018     Lab Results   Component Value Date    TSH 1.680 08/23/2019     Lab Results   Component Value Date    HGBA1C 8.8 08/23/2019        Procedures       Assessment:      Diagnosis Plan   1. Dyspnea on exertion with coughing symptoms suggestive of spasm in the setting of acid reflux remarkable echocardiogram.  Resolved since addition of Zantac. F/up with Dr. Corbin for refills.   2. Mixed hyperlipidemia  Well-controlled, continue rosuvastatin 5 mg daily.     Plan:   F/up with Dr. Corbin for Zantac refills and further medical management.  Stable cardiac status.  Will echocardiogram discussed and she was reassured.  Continue current medications.   FU in one year, sooner as needed.  Thank you for allowing us to participate in the care of your patient.     Scribed for Colton Barahona MD by Aziza Freeman. 9/13/2019  2:46 PM      Colton STORM MD, personally  performed the services described in this documentation as scribed by the above named individual in my presence, and it is both accurate and complete.  9/13/2019  2:52 PM        Please note that portions of this note may have been completed with a voice recognition program. Efforts were made to edit the dictations, but occasionally words are mistranscribed.

## 2019-09-17 ENCOUNTER — TELEPHONE (OUTPATIENT)
Dept: INTERNAL MEDICINE | Facility: CLINIC | Age: 49
End: 2019-09-17

## 2019-09-17 NOTE — TELEPHONE ENCOUNTER
Pt states she saw Dr Corbin yesterday and he recommended increasing AM insulin to 35u and continue dinner dose of 40 units- this is due to low blood sugars at HS  I really did not understand this and asked her to clarify the dosage 2 different times but she states he didn't change the evening dose - just the am dose but she wanted [facility] opinion on this before she changes it  Please advise

## 2019-09-17 NOTE — TELEPHONE ENCOUNTER
She has called with this question recently and I told her she needed to follow a consistent carb diet and stop drinking sugared sodas  I would have her reduce insulin to 35 units at night and please be sure she is getting her medication prior to breakfast and dinner twice a day.  Check blood sugars am and pre dinner and call these, fax them or email on Monday.    Thanks, vickie

## 2019-09-18 NOTE — TELEPHONE ENCOUNTER
Previous note about [facility], noted by error in the wrong chart.  Please disregard previous note.

## 2019-09-18 NOTE — TELEPHONE ENCOUNTER
Patient states she received the letter we sent, asking for;  If she checks her blood sugar and what are the readings.    Patient states she checks her blood sugars twice a day,  before breakfast and before dinner.  The following are the blood sugar readings.    Wednesday - 9/18/19   62 before bedtime    Tuesday - 9/17/19  123 before breakfast  Also patient states blood sugar was two hundred and something before dinner.  Patient had no other reading to provide.    Patient states her PCP, Dr. Corbin recommended AM insulin 35u.        Sent letter to patient asking information requested by Dr. Lopez.  We have been trying to contact patient, have left 3 messages.

## 2019-09-18 NOTE — TELEPHONE ENCOUNTER
Faxed copy of referral from Dr. Patrick and letter with patient's appointment information and location  To [facility] - [phone].    Fax was sent successfully.

## 2019-09-22 NOTE — TELEPHONE ENCOUNTER
I would reverse dosing to 40 u am and 35 u pm  She should be taking these shots before she eats.    Check sugars am and pre dinner and have her send us an update on Friday.  Thanks, vickie

## 2019-09-23 NOTE — TELEPHONE ENCOUNTER
Informed patient, patient states she will use her mother's e-mail to send Dr. Lopez her blood sugar logs.  Provided pt Dr. Lopez e-mail.

## 2019-10-04 ENCOUNTER — TELEPHONE (OUTPATIENT)
Dept: ENDOCRINOLOGY | Facility: CLINIC | Age: 49
End: 2019-10-04

## 2019-11-26 ENCOUNTER — OFFICE VISIT (OUTPATIENT)
Dept: ENDOCRINOLOGY | Facility: CLINIC | Age: 49
End: 2019-11-26

## 2019-11-26 VITALS
BODY MASS INDEX: 41.66 KG/M2 | WEIGHT: 244 LBS | SYSTOLIC BLOOD PRESSURE: 124 MMHG | DIASTOLIC BLOOD PRESSURE: 60 MMHG | HEIGHT: 64 IN

## 2019-11-26 DIAGNOSIS — E11.9 TYPE 2 DIABETES MELLITUS WITHOUT COMPLICATION, WITH LONG-TERM CURRENT USE OF INSULIN (HCC): Primary | ICD-10-CM

## 2019-11-26 DIAGNOSIS — E55.9 VITAMIN D DEFICIENCY: ICD-10-CM

## 2019-11-26 DIAGNOSIS — E11.65 UNCONTROLLED TYPE 2 DIABETES MELLITUS WITH HYPERGLYCEMIA (HCC): ICD-10-CM

## 2019-11-26 DIAGNOSIS — Z79.4 TYPE 2 DIABETES MELLITUS WITHOUT COMPLICATION, WITH LONG-TERM CURRENT USE OF INSULIN (HCC): Primary | ICD-10-CM

## 2019-11-26 DIAGNOSIS — E78.2 MIXED HYPERLIPIDEMIA: ICD-10-CM

## 2019-11-26 DIAGNOSIS — E03.9 ACQUIRED HYPOTHYROIDISM: ICD-10-CM

## 2019-11-26 LAB
GLUCOSE BLDC GLUCOMTR-MCNC: 132 MG/DL (ref 70–130)
HBA1C MFR BLD: 8 %

## 2019-11-26 PROCEDURE — 82947 ASSAY GLUCOSE BLOOD QUANT: CPT | Performed by: INTERNAL MEDICINE

## 2019-11-26 PROCEDURE — 83036 HEMOGLOBIN GLYCOSYLATED A1C: CPT | Performed by: INTERNAL MEDICINE

## 2019-11-26 PROCEDURE — 99214 OFFICE O/P EST MOD 30 MIN: CPT | Performed by: INTERNAL MEDICINE

## 2019-11-26 NOTE — PROGRESS NOTES
"Vilma Ayala 49 y.o.  CC:Follow-up and Diabetes (Type II, eye exam was over on year ago)    [place]: Follow-up and Diabetes (Type II, eye exam was over on year ago)    Blood sugars \"pretty good\" - fasting 198  Blood sugar and 90 day average sugar reviewed  Is trying to cut back on sodas  bp is good   Is on glucotrol, metformin and 75/25/ insulin 40 u am and 25 u before dinner  No low sugars  Was having low sugars just prior to bedtime and cut insulin from 59 u pre dinner to 25 u pre dinner  a1c 8.0  Over due for eye exam - reminded  Right gt toe callus   Recommended podiatry     Allergies   Allergen Reactions   • Januvia [Sitagliptin]      Insomnia   • Other    • Doxepin Rash       Current Outpatient Medications:   •  albuterol sulfate  (90 Base) MCG/ACT inhaler, Inhale 2 puffs Every 4 (Four) Hours As Needed for Wheezing., Disp: 1 inhaler, Rfl: 2  •  busPIRone (BUSPAR) 10 MG tablet, Take 1 tablet by mouth Daily. (Patient taking differently: Take 10 mg by mouth 2 (Two) Times a Day.), Disp: 90 tablet, Rfl: 1  •  Cholecalciferol (VITAMIN D3 PO), Take 1 tablet by mouth Daily., Disp: , Rfl:   •  citalopram (CeleXA) 40 MG tablet, Take 1 tablet by mouth Daily., Disp: 90 tablet, Rfl: 1  •  glipiZIDE (GLUCOTROL) 10 MG tablet, Take 1 tablet by mouth 2 (two) times a day., Disp: , Rfl:   •  insulin lispro protamine-insulin lispro (HUMALOG MIX 75/25) (75-25) 100 UNIT/ML suspension injection, Inject 30 units in am and 59 units before supper (Patient taking differently: Inject 40 units in am and 25 units before supper), Disp: 30 mL, Rfl: 3  •  Insulin Syringe-Needle U-100 (BD INSULIN SYRINGE ULTRAFINE) 31G X 15/64\" 0.5 ML misc, , Disp: , Rfl:   •  [employer] BLOOD GLUCOSE TEST test strip, Test blood sugars TID, Disp: 100 each, Rfl: 12  •  levothyroxine (SYNTHROID, LEVOTHROID) 25 MCG tablet, Take 1 tablet by mouth Daily., Disp: 90 tablet, Rfl: 1  •  metFORMIN (GLUCOPHAGE) 500 MG tablet, Take 2 tablets BID with food, Disp: 120 " tablet, Rfl: 2  •  raNITIdine (ZANTAC) 150 MG tablet, Take 150 mg by mouth 2 (Two) Times a Day., Disp: , Rfl:   •  risperiDONE (RISPERDAL) 4 MG tablet, Take 4 mg by mouth Every Night., Disp: , Rfl:   •  rosuvastatin (CRESTOR) 5 MG tablet, Take 5 mg by mouth Every Night. Take one po daily, Disp: , Rfl:   No current facility-administered medications for this visit.   Patient Active Problem List    Diagnosis   • Cough [R05]   • Dyspnea on exertion [R06.09]   • Mixed hyperlipidemia [E78.2]   • Anxiety and depression [F41.9, F32.9]   • Uncontrolled type 2 diabetes mellitus (CMS/HCC) [E11.65]   • Hypothyroid [E03.9]   • Obesity [E66.9]   • Vitamin D deficiency [E55.9]   • Diabetic neuropathy, type II diabetes mellitus (CMS/HCC) [E11.40]     Review of Systems   Constitutional: Positive for activity change and unexpected weight change. Negative for appetite change, chills, diaphoresis, fatigue and fever.   HENT: Negative for congestion, dental problem, drooling, ear discharge, ear pain, facial swelling, hearing loss, mouth sores, nosebleeds, postnasal drip, rhinorrhea, sinus pressure, sneezing, sore throat, tinnitus, trouble swallowing and voice change.    Eyes: Negative for photophobia, pain, discharge, redness, itching and visual disturbance.   Respiratory: Negative for apnea, cough, choking, chest tightness, shortness of breath, wheezing and stridor.    Cardiovascular: Positive for leg swelling. Negative for chest pain and palpitations.   Gastrointestinal: Negative for abdominal distention, abdominal pain, anal bleeding, blood in stool, constipation, diarrhea, nausea, rectal pain and vomiting.   Endocrine: Negative for cold intolerance, heat intolerance, polydipsia, polyphagia and polyuria.   Genitourinary: Negative for decreased urine volume, difficulty urinating, dysuria, enuresis, flank pain, frequency, genital sores, hematuria and urgency.   Musculoskeletal: Negative for arthralgias, back pain, gait problem, joint  "swelling, myalgias, neck pain and neck stiffness.   Skin: Negative for color change, pallor, rash and wound.        Bunion with thick right medial great toe callus   Partial flap from callus    Allergic/Immunologic: Negative for environmental allergies, food allergies and immunocompromised state.   Neurological: Negative for dizziness, tremors, seizures, syncope, facial asymmetry, speech difficulty, weakness, light-headedness, numbness and headaches.   Hematological: Negative for adenopathy. Does not bruise/bleed easily.   Psychiatric/Behavioral: Negative for agitation, behavioral problems, confusion, decreased concentration, dysphoric mood, hallucinations, self-injury, sleep disturbance and suicidal ideas. The patient is not nervous/anxious and is not hyperactive.      Social History     Socioeconomic History   • Marital status: Single     Spouse name: Not on file   • Number of children: Not on file   • Years of education: Not on file   • Highest education level: Not on file   Tobacco Use   • Smoking status: Never Smoker   • Smokeless tobacco: Never Used   Substance and Sexual Activity   • Alcohol use: No   • Drug use: No   • Sexual activity: Defer     Family History   Problem Relation Age of Onset   • Hypertension Other    • Thyroid disease Other    • Diabetes Other    • Obesity Other    • Diabetes Mother    • Hypertension Mother    • Heart disease Mother    • Cancer Father    • Diabetes Sister    • Diabetes Brother    • No Known Problems Brother      /60   Ht 162.6 cm (64\")   Wt 111 kg (244 lb)   BMI 41.88 kg/m²   Physical Exam   Constitutional: She is oriented to person, place, and time. She appears well-developed and well-nourished.   HENT:   Head: Normocephalic and atraumatic.   Nose: Nose normal.   Mouth/Throat: Oropharynx is clear and moist.   Eyes: Conjunctivae, EOM and lids are normal. Pupils are equal, round, and reactive to light.   Neck: Trachea normal and normal range of motion. Neck supple. " Carotid bruit is not present. No tracheal deviation present. No thyroid mass and no thyromegaly present.   Cardiovascular: Normal rate, regular rhythm, normal heart sounds and intact distal pulses. Exam reveals no gallop and no friction rub.   No murmur heard.  Pulmonary/Chest: Effort normal and breath sounds normal. No respiratory distress. She has no wheezes.   Musculoskeletal: Normal range of motion. She exhibits edema. She exhibits no deformity.        Lymphadenopathy:     She has no cervical adenopathy.   Neurological: She is alert and oriented to person, place, and time. She has normal reflexes. She displays normal reflexes. No cranial nerve deficit.   Skin: Skin is warm and dry. No rash noted. No cyanosis or erythema. Nails show no clubbing.   Psychiatric: She has a normal mood and affect. Her speech is normal and behavior is normal. Judgment and thought content normal. Cognition and memory are normal.   Nursing note and vitals reviewed.    Results for orders placed or performed during the hospital encounter of 09/05/19   Adult Transthoracic Echo Complete W/ Cont if Necessary Per Protocol   Result Value Ref Range    BSA 2.2 m^2    IVSd 0.78 cm    LVIDd 4.6 cm    LVIDs 3.3 cm    LVPWd 0.72 cm    IVS/LVPW 1.1     FS 28.3 %    EDV(Teich) 98.2 ml    ESV(Teich) 44.4 ml    EF(Teich) 54.8 %    EDV(cubed) 98.5 ml    ESV(cubed) 36.2 ml    EF(cubed) 63.2 %    LV mass(C)d 109.7 grams    LV mass(C)dI 50.0 grams/m^2    SV(Teich) 53.8 ml    SI(Teich) 24.6 ml/m^2    SV(cubed) 62.2 ml    SI(cubed) 28.4 ml/m^2    Ao root diam 2.8 cm    Ao root area 6.4 cm^2    LA dimension 3.7 cm    LA/Ao 1.3     LVOT diam 1.8 cm    LVOT area 2.4 cm^2    LVOT area(traced) 2.5 cm^2    LAd major 4.9 cm    LVLd ap4 7.4 cm    EDV(MOD-sp4) 59.0 ml    LVLs ap4 6.5 cm    ESV(MOD-sp4) 26.0 ml    EF(MOD-sp4) 55.9 %    LVLd ap2 7.2 cm    EDV(MOD-sp2) 55.0 ml    LVLs ap2 5.8 cm    ESV(MOD-sp2) 22.0 ml    EF(MOD-sp2) 60.0 %    LA volume 32.0 ml     EF(MOD-bp) 55.0 %    SV(MOD-sp4) 33.0 ml    SI(MOD-sp4) 15.1 ml/m^2    SV(MOD-sp2) 33.0 ml    SI(MOD-sp2) 15.1 ml/m^2    Ao root area (BSA corrected) 1.3     LV Mckenna Vol (BSA corrected) 26.9 ml/m^2    LV Sys Vol (BSA corrected) 11.9 ml/m^2    LA Volume Index 14.6 ml/m^2    MV E max dominik 72.1 cm/sec    MV A max dominik 91.3 cm/sec    MV E/A 0.79     MV dec time 0.2 sec    Ao pk dominik 132.5 cm/sec    Ao max PG 7.0 mmHg    Ao max PG (full) 0.43 mmHg    Ao V2 mean 92.3 cm/sec    Ao mean PG 3.7 mmHg    Ao mean PG (full) 0.42 mmHg    Ao V2 VTI 25.0 cm    NADIRA(I,A) 2.2 cm^2    NADIRA(I,D) 2.2 cm^2    NADIRA(V,A) 2.4 cm^2    NADIRA(V,D) 2.4 cm^2    LV V1 max PG 6.6 mmHg    LV V1 mean PG 3.3 mmHg    LV V1 max 128.3 cm/sec    LV V1 mean 85.1 cm/sec    LV V1 VTI 22.5 cm    SV(Ao) 159.3 ml    SI(Ao) 72.7 ml/m^2    SV(LVOT) 54.6 ml    SI(LVOT) 24.9 ml/m^2    PA V2 max 93.6 cm/sec    PA max PG 3.5 mmHg    PA acc slope 660.8 cm/sec^2    PA acc time 0.11 sec    PA pr(Accel) 30.7 mmHg    Lat E/e'  8.9     Med E/e' 13.3     Lat Peak E' Dominik 7.9 cm/sec    Med Peak E' Dominik 5.3 cm/sec     CV ECHO PATRICIA - BZI_BMI 40.0 kilograms/m^2     CV ECHO PATRICIA - BSA([facility] 2.3 m^2     CV ECHO PATRICIA - BZI_METRIC_WEIGHT 112.5 kg     CV ECHO PATRICIA - BZI_METRIC_HEIGHT 167.6 cm    Avg E/e' ratio 10.92     Target HR (85%) 146 bpm    Max. Pred. HR (100%) 172 bpm    TDI S' 12.50 cm/sec    RV Base 2.30 cm    RV Length 6.50 cm    RV Mid 1.70 cm    TAPSE (>1.6) 1.90 cm2    Echo EF Estimated 60 %     Diagnoses and all orders for this visit:    Type 2 diabetes mellitus without complication, with long-term current use of insulin (CMS/[facility])  -     POC Glycosylated Hemoglobin (Hb A1C)  -     POC Glucose Fingerstick      Problem List Items Addressed This Visit        Cardiovascular and Mediastinum    Mixed hyperlipidemia     On crestor with recent lipids reviewed  Continue medication and monitoring, low fat diet  Work on adding some exercise daily             Digestive     Vitamin D deficiency     Continue vitamin d supplement daily   Check levels yearly            Endocrine    Uncontrolled type 2 diabetes mellitus (CMS/[facility])     Blood sugar and 90 day average sugar reviewed  Results for orders placed or performed in visit on 11/26/19   POC Glycosylated Hemoglobin (Hb A1C)   Result Value Ref Range    Hemoglobin A1C 8.0 %   POC Glucose Fingerstick   Result Value Ref Range    Glucose 132 (A) 70 - 130 mg/dL     Average sugar is 180  Discussed goal average 150 or less  Has reduced insulin due to nocturnal low sugar  No further low sugar  Continue to discuss getting rid of sugared drinks, sodas  Add exercise daily   F/u 3-4 months          Type 2 diabetes mellitus without complication, with long-term current use of insulin (CMS/[facility]) - Primary     See below   Test sugars ac and hs          Relevant Orders    POC Glycosylated Hemoglobin (Hb A1C) (Completed)    POC Glucose Fingerstick (Completed)    Hypothyroid     On stable supplement   Recent normal tsh   No change for now            due to thick callus right foot it was recommended to Vilma and her mother that they call podiatry today and get an appt for callus debridement asap  Risk of ulceration, infection and amputation related to the above was discussed   Mother states she will call today   Return in about 3 months (around 2/26/2020) for Recheck 30 min .    Jayna Kapoor MA  Signed Selina Lopez MD

## 2019-11-27 PROBLEM — Z79.4 TYPE 2 DIABETES MELLITUS WITHOUT COMPLICATION, WITH LONG-TERM CURRENT USE OF INSULIN: Status: ACTIVE | Noted: 2019-11-27

## 2019-11-27 PROBLEM — E11.9 TYPE 2 DIABETES MELLITUS WITHOUT COMPLICATION, WITH LONG-TERM CURRENT USE OF INSULIN (HCC): Status: ACTIVE | Noted: 2019-11-27

## 2019-11-27 NOTE — ASSESSMENT & PLAN NOTE
On crestor with recent lipids reviewed  Continue medication and monitoring, low fat diet  Work on adding some exercise daily

## 2019-11-27 NOTE — ASSESSMENT & PLAN NOTE
Blood sugar and 90 day average sugar reviewed  Results for orders placed or performed in visit on 11/26/19   POC Glycosylated Hemoglobin (Hb A1C)   Result Value Ref Range    Hemoglobin A1C 8.0 %   POC Glucose Fingerstick   Result Value Ref Range    Glucose 132 (A) 70 - 130 mg/dL     Average sugar is 180  Discussed goal average 150 or less  Has reduced insulin due to nocturnal low sugar  No further low sugar  Continue to discuss getting rid of sugared drinks, sodas  Add exercise daily   F/u 3-4 months

## 2020-01-02 RX ORDER — L. ACIDOPHILUS/BIFIDO. LONGUM 15 MG
CAPSULE,DELAYED RELEASE (ENTERIC COATED) ORAL
Qty: 100 EACH | Refills: 12 | Status: SHIPPED | OUTPATIENT
Start: 2020-01-02 | End: 2021-11-18 | Stop reason: SDUPTHER

## 2020-01-02 NOTE — TELEPHONE ENCOUNTER
PATIENT IS NEEDING REFILLS [employer] BRAND TEST STRIPS. SHE NEEDS SENT TO [facility].

## 2020-02-20 ENCOUNTER — OFFICE VISIT (OUTPATIENT)
Dept: PULMONOLOGY | Facility: CLINIC | Age: 50
End: 2020-02-20

## 2020-02-20 VITALS
SYSTOLIC BLOOD PRESSURE: 128 MMHG | BODY MASS INDEX: 42 KG/M2 | OXYGEN SATURATION: 98 % | TEMPERATURE: 97.8 F | HEIGHT: 64 IN | HEART RATE: 85 BPM | DIASTOLIC BLOOD PRESSURE: 70 MMHG | WEIGHT: 246 LBS

## 2020-02-20 DIAGNOSIS — R06.02 SOB (SHORTNESS OF BREATH): ICD-10-CM

## 2020-02-20 DIAGNOSIS — R05.9 COUGH: Primary | ICD-10-CM

## 2020-02-20 DIAGNOSIS — R06.09 DYSPNEA ON EXERTION: ICD-10-CM

## 2020-02-20 PROCEDURE — 99213 OFFICE O/P EST LOW 20 MIN: CPT | Performed by: INTERNAL MEDICINE

## 2020-02-20 RX ORDER — TRAZODONE HYDROCHLORIDE 50 MG/1
TABLET ORAL
COMMUNITY
Start: 2020-02-18 | End: 2020-06-29

## 2020-02-20 RX ORDER — ALBUTEROL SULFATE 90 UG/1
2 AEROSOL, METERED RESPIRATORY (INHALATION) EVERY 4 HOURS PRN
Qty: 1 INHALER | Refills: 11 | Status: SHIPPED | OUTPATIENT
Start: 2020-02-20 | End: 2020-02-28

## 2020-02-20 NOTE — PATIENT INSTRUCTIONS
Asthma, Adult    Asthma is a long-term (chronic) condition that causes recurrent episodes in which the airways become tight and narrow. The airways are the passages that lead from the nose and mouth down into the lungs. Asthma episodes, also called asthma attacks, can cause coughing, wheezing, shortness of breath, and chest pain. The airways can also fill with mucus. During an attack, it can be difficult to breathe. Asthma attacks can range from minor to life threatening.  Asthma cannot be cured, but medicines and lifestyle changes can help control it and treat acute attacks.  What are the causes?  This condition is believed to be caused by inherited (genetic) and environmental factors, but its exact cause is not known.  There are many things that can bring on an asthma attack or make asthma symptoms worse (triggers). Asthma triggers are different for each person. Common triggers include:  · Mold.  · Dust.  · Cigarette smoke.  · Cockroaches.  · Things that can cause allergy symptoms (allergens), such as animal dander or pollen from trees or grass.  · Air pollutants such as household , wood smoke, smog, or chemical odors.  · Cold air, weather changes, and winds (which increase molds and pollen in the air).  · Strong emotional expressions such as crying or laughing hard.  · Stress.  · Certain medicines (such as aspirin) or types of medicines (such as beta-blockers).  · Sulfites in foods and drinks. Foods and drinks that may contain sulfites include dried fruit, potato chips, and sparkling grape juice.  · Infections or inflammatory conditions such as the flu, a cold, or inflammation of the nasal membranes (rhinitis).  · Gastroesophageal reflux disease (GERD).  · Exercise or strenuous activity.  What are the signs or symptoms?  Symptoms of this condition may occur right after asthma is triggered or many hours later. Symptoms include:  · Wheezing. This can sound like whistling when you breathe.  · Excessive  nighttime or early morning coughing.  · Frequent or severe coughing with a common cold.  · Chest tightness.  · Shortness of breath.  · Tiredness (fatigue) with minimal activity.  How is this diagnosed?  This condition is diagnosed based on:  · Your medical history.  · A physical exam.  · Tests, which may include:  ? Lung function studies and pulmonary studies (spirometry). These tests can evaluate the flow of air in your lungs.  ? Allergy tests.  ? Imaging tests, such as X-rays.  How is this treated?  There is no cure for this condition, but treatment can help control your symptoms. Treatment for asthma usually involves:  · Identifying and avoiding your asthma triggers.  · Using medicines to control your symptoms. Generally, two types of medicines are used to treat asthma:  ? Controller medicines. These help prevent asthma symptoms from occurring. They are usually taken every day.  ? Fast-acting reliever or rescue medicines. These quickly relieve asthma symptoms by widening the narrow and tight airways. They are used as needed and provide short-term relief.  · Using supplemental oxygen. This may be needed during a severe episode.  · Using other medicines, such as:  ? Allergy medicines, such as antihistamines, if your asthma attacks are triggered by allergens.  ? Immune medicines (immunomodulators). These are medicines that help control the immune system.  · Creating an asthma action plan. An asthma action plan is a written plan for managing and treating your asthma attacks. This plan includes:  ? A list of your asthma triggers and how to avoid them.  ? Information about when medicines should be taken and when their dosage should be changed.  ? Instructions about using a device called a peak flow meter. A peak flow meter measures how well the lungs are working and the severity of your asthma. It helps you monitor your condition.  Follow these instructions at home:  Controlling your home environment  Control your home  environment in the following ways to help avoid triggers and prevent asthma attacks:  · Change your heating and air conditioning filter regularly.  · Limit your use of fireplaces and wood stoves.  · Get rid of pests (such as roaches and mice) and their droppings.  · Throw away plants if you see mold on them.  · Clean floors and dust surfaces regularly. Use unscented cleaning products.  · Try to have someone else vacuum for you regularly. Stay out of rooms while they are being vacuumed and for a short while afterward. If you vacuum, use a dust mask from a hardware store, a double-layered or microfilter vacuum  bag, or a vacuum  with a HEPA filter.  · Replace carpet with wood, tile, or vinyl andre. Carpet can trap dander and dust.  · Use allergy-proof pillows, mattress covers, and box spring covers.  · Keep your bedroom a trigger-free room.  · Avoid pets and keep windows closed when allergens are in the air.  · Wash beddings every week in hot water and dry them in a dryer.  · Use blankets that are made of polyester or cotton.  · Clean bathrooms and hayde with bleach. If possible, have someone repaint the walls in these rooms with mold-resistant paint. Stay out of the rooms that are being cleaned and painted.  · Wash your hands often with soap and water. If soap and water are not available, use hand .  · Do not allow anyone to smoke in your home.  General instructions  · Take over-the-counter and prescription medicines only as told by your health care provider.  ? Speak with your health care provider if you have questions about how or when to take the medicines.  ? Make note if you are requiring more frequent dosages.  · Do not use any products that contain nicotine or tobacco, such as cigarettes and e-cigarettes. If you need help quitting, ask your health care provider. Also, avoid being exposed to secondhand smoke.  · Use a peak flow meter as told by your health care provider. Record and  keep track of the readings.  · Understand and use the asthma action plan to help minimize, or stop an asthma attack, without needing to seek medical care.  · Make sure you stay up to date on your yearly vaccinations as told by your health care provider. This may include vaccines for the flu and pneumonia.  · Avoid outdoor activities when allergen counts are high and when air quality is low.  · Wear a ski mask that covers your nose and mouth during outdoor winter activities. Exercise indoors on cold days if you can.  · Warm up before exercising, and take time for a cool-down period after exercise.  · Keep all follow-up visits as told by your health care provider. This is important.  Where to find more information  · For information about asthma, turn to the Centers for Disease Control and Prevention at www.cdc.gov/asthma/faqs.htm  · For air quality information, turn to [employer]Now at https://airnow.gov/  Contact a health care provider if:  · You have wheezing, shortness of breath, or a cough even while you are taking medicine to prevent attacks.  · The mucus you cough up (sputum) is thicker than usual.  · Your sputum changes from clear or white to yellow, green, gray, or bloody.  · Your medicines are causing side effects, such as a rash, itching, swelling, or trouble breathing.  · You need to use a reliever medicine more than 2-3 times a week.  · Your peak flow reading is still at 50-79% of your personal best after following your action plan for 1 hour.  · You have a fever.  Get help right away if:  · You are getting worse and do not respond to treatment during an asthma attack.  · You are short of breath when at rest or when doing very little physical activity.  · You have difficulty eating, drinking, or talking.  · You have chest pain or tightness.  · You develop a fast heartbeat or palpitations.  · You have a bluish color to your lips or fingernails.  · You are light-headed or dizzy, or you faint.  · Your peak flow  reading is less than 50% of your personal best.  · You feel too tired to breathe normally.  Summary  · Asthma is a long-term (chronic) condition that causes recurrent episodes in which the airways become tight and narrow. These episodes can cause coughing, wheezing, shortness of breath, and chest pain.  · Asthma cannot be cured, but medicines and lifestyle changes can help control it and treat acute attacks.  · Make sure you understand how to avoid triggers and how and when to use your medicines.  · Asthma attacks can range from minor to life threatening. Get help right away if you have an asthma attack and do not respond to treatment with your usual rescue medicines.  This information is not intended to replace advice given to you by your health care provider. Make sure you discuss any questions you have with your health care provider.  Document Released: 12/18/2006 Document Revised: 01/22/2018 Document Reviewed: 01/22/2018  [employer] Patient Education © 2020 [employer]

## 2020-02-20 NOTE — PROGRESS NOTES
"Subjective   Vilma Ayala is a 49 y.o. female is here today for follow-up.  She is seen for wheezing and bronchospasm.  Her primary care physician is Dr. Corbin    History of Present Illness  Patient was last seen January 10, 2019.  She had a history of cough and dyspnea.  She had wheezing at that time was placed on bronchodilators.  She says she is been doing much better.  She rarely has a cough now she says.  She been having some problems with enuresis.  She complains of feeling tired during the day but denies any history of snoring.  Past Medical History:   Diagnosis Date   • Chicken pox    • Measles        Past Surgical History:   Procedure Laterality Date   • EYE SURGERY     • TONSILLECTOMY             Current Outpatient Medications:   •  albuterol sulfate  (90 Base) MCG/ACT inhaler, Inhale 2 puffs Every 4 (Four) Hours As Needed for Wheezing., Disp: 1 inhaler, Rfl: 11  •  busPIRone (BUSPAR) 10 MG tablet, Take 1 tablet by mouth Daily. (Patient taking differently: Take 10 mg by mouth 2 (Two) Times a Day.), Disp: 90 tablet, Rfl: 1  •  Cholecalciferol (VITAMIN D3 PO), Take 1 tablet by mouth Daily., Disp: , Rfl:   •  citalopram (CeleXA) 40 MG tablet, Take 1 tablet by mouth Daily., Disp: 90 tablet, Rfl: 1  •  glipiZIDE (GLUCOTROL) 10 MG tablet, Take 1 tablet by mouth 2 (two) times a day., Disp: , Rfl:   •  insulin lispro protamine-insulin lispro (HUMALOG MIX 75/25) (75-25) 100 UNIT/ML suspension injection, Inject 30 units in am and 59 units before supper (Patient taking differently: Inject 40 units in am and 25 units before supper), Disp: 30 mL, Rfl: 3  •  Insulin Syringe-Needle U-100 (BD INSULIN SYRINGE ULTRAFINE) 31G X 15/64\" 0.5 ML misc, , Disp: , Rfl:   •  [employer]ER BLOOD GLUCOSE TEST test strip, Test blood sugars TID, Disp: 100 each, Rfl: 12  •  levothyroxine (SYNTHROID, LEVOTHROID) 25 MCG tablet, Take 1 tablet by mouth Daily., Disp: 90 tablet, Rfl: 1  •  metFORMIN (GLUCOPHAGE) 500 MG tablet, Take 2 " "tablets BID with food, Disp: 120 tablet, Rfl: 2  •  raNITIdine (ZANTAC) 150 MG tablet, Take 150 mg by mouth 2 (Two) Times a Day., Disp: , Rfl:   •  risperiDONE (RISPERDAL) 4 MG tablet, Take 4 mg by mouth Every Night., Disp: , Rfl:   •  rosuvastatin (CRESTOR) 5 MG tablet, Take 5 mg by mouth Every Night. Take one po daily, Disp: , Rfl:   •  traZODone (DESYREL) 50 MG tablet, , Disp: , Rfl:     Allergies   Allergen Reactions   • Januvia [Sitagliptin]      Insomnia   • Other    • Doxepin Rash       The following portions of the patient's history were reviewed and updated as appropriate: allergies, current medications and problem list.    Review of Systems   Constitutional: Negative.    HENT: Positive for sinus pressure.    Eyes: Negative.    Respiratory: Negative.    Cardiovascular: Negative.    Gastrointestinal: Negative.    Endocrine: Positive for polyuria.   Genitourinary: Negative.    Musculoskeletal: Negative.    Skin: Positive for rash.   Allergic/Immunologic: Negative.    Neurological: Negative.    Hematological: Negative.    Psychiatric/Behavioral: Positive for dysphoric mood. The patient is nervous/anxious.        Objective     /70   Pulse 85   Temp 97.8 °F (36.6 °C)   Ht 162.6 cm (64\")   Wt 112 kg (246 lb)   LMP  (LMP Unknown)   SpO2 98% Comment: resting, room air  Breastfeeding No   BMI 42.23 kg/m²     Physical Exam   Constitutional: She is oriented to person, place, and time. She appears well-developed and well-nourished.   She is morbidly obese.   HENT:   Head: Normocephalic and atraumatic.   She has Mallampati class IV anatomy.   Eyes: Pupils are equal, round, and reactive to light. EOM are normal.   Neck: Normal range of motion. Neck supple.   Cardiovascular: Normal rate, regular rhythm and normal heart sounds.   Pulmonary/Chest: Effort normal and breath sounds normal.   Abdominal: Soft. Bowel sounds are normal.   Musculoskeletal: Normal range of motion. She exhibits no edema.   Neurological: " She is alert and oriented to person, place, and time.   Skin: Skin is warm and dry.   Psychiatric: She has a normal mood and affect. Her behavior is normal.   Labs from her last visit showed negative studies for AFB and fungus.  Her Rast panel was negative.  Her mycoplasma IgG level was elevated.      Assessment/Plan   Vilma was seen today for shortness of breath and follow-up.    Diagnoses and all orders for this visit:    Cough    Dyspnea on exertion  -     albuterol sulfate  (90 Base) MCG/ACT inhaler; Inhale 2 puffs Every 4 (Four) Hours As Needed for Wheezing.    SOB (shortness of breath)  -     albuterol sulfate  (90 Base) MCG/ACT inhaler; Inhale 2 puffs Every 4 (Four) Hours As Needed for Wheezing.      Seems to be doing very well with just her albuterol inhaler.  We will continue on that.  She may well have had mycoplasma pneumonia prior to her last visit.  She seems to be doing much better now.  We will renew her prescription for albuterol.  We will plan to see her back in 1 year.  She is contact us earlier if symptoms worsen.           Magno Mckenzie MD [facility]  Sleep Medicine  Pulmonary and Critical Care Medicine      02/20/20  3:21 PM

## 2020-02-24 ENCOUNTER — TELEPHONE (OUTPATIENT)
Dept: ENDOCRINOLOGY | Facility: CLINIC | Age: 50
End: 2020-02-24

## 2020-02-24 NOTE — TELEPHONE ENCOUNTER
Pt states she saw her psychiatrist today and they had invokana on her list and she is asking if we have it on her med list  She was advised it is not currently on her list and at last OV was not as well  She voiced understanding

## 2020-02-28 ENCOUNTER — TELEPHONE (OUTPATIENT)
Dept: PULMONOLOGY | Facility: CLINIC | Age: 50
End: 2020-02-28

## 2020-02-28 DIAGNOSIS — R05.9 COUGH: Primary | ICD-10-CM

## 2020-02-28 RX ORDER — LEVALBUTEROL TARTRATE 45 UG/1
2 AEROSOL, METERED ORAL 4 TIMES DAILY PRN
Qty: 15 G | Refills: 5 | Status: SHIPPED | OUTPATIENT
Start: 2020-02-28 | End: 2022-04-22

## 2020-02-28 NOTE — TELEPHONE ENCOUNTER
Letter received from [employer] will no longer cover  Albuterol but will cover xoponex will send new order per Dr Mckenzie

## 2020-06-19 ENCOUNTER — TELEPHONE (OUTPATIENT)
Dept: ENDOCRINOLOGY | Facility: CLINIC | Age: 50
End: 2020-06-19

## 2020-06-29 ENCOUNTER — OFFICE VISIT (OUTPATIENT)
Dept: ENDOCRINOLOGY | Facility: CLINIC | Age: 50
End: 2020-06-29

## 2020-06-29 ENCOUNTER — LAB (OUTPATIENT)
Dept: LAB | Facility: HOSPITAL | Age: 50
End: 2020-06-29

## 2020-06-29 VITALS
SYSTOLIC BLOOD PRESSURE: 108 MMHG | OXYGEN SATURATION: 98 % | BODY MASS INDEX: 41.66 KG/M2 | HEIGHT: 64 IN | HEART RATE: 88 BPM | DIASTOLIC BLOOD PRESSURE: 60 MMHG | WEIGHT: 244 LBS

## 2020-06-29 DIAGNOSIS — E55.9 VITAMIN D DEFICIENCY: ICD-10-CM

## 2020-06-29 DIAGNOSIS — Z79.4 TYPE 2 DIABETES MELLITUS WITHOUT COMPLICATION, WITH LONG-TERM CURRENT USE OF INSULIN (HCC): Primary | ICD-10-CM

## 2020-06-29 DIAGNOSIS — E11.9 TYPE 2 DIABETES MELLITUS WITHOUT COMPLICATION, WITH LONG-TERM CURRENT USE OF INSULIN (HCC): Primary | ICD-10-CM

## 2020-06-29 DIAGNOSIS — E78.2 MIXED HYPERLIPIDEMIA: ICD-10-CM

## 2020-06-29 DIAGNOSIS — E03.9 ACQUIRED HYPOTHYROIDISM: ICD-10-CM

## 2020-06-29 LAB
GLUCOSE BLDC GLUCOMTR-MCNC: 169 MG/DL (ref 70–130)
HBA1C MFR BLD: 8.8 %

## 2020-06-29 PROCEDURE — 80053 COMPREHEN METABOLIC PANEL: CPT | Performed by: INTERNAL MEDICINE

## 2020-06-29 PROCEDURE — 82947 ASSAY GLUCOSE BLOOD QUANT: CPT | Performed by: INTERNAL MEDICINE

## 2020-06-29 PROCEDURE — 82306 VITAMIN D 25 HYDROXY: CPT | Performed by: INTERNAL MEDICINE

## 2020-06-29 PROCEDURE — 80061 LIPID PANEL: CPT | Performed by: INTERNAL MEDICINE

## 2020-06-29 PROCEDURE — 99214 OFFICE O/P EST MOD 30 MIN: CPT | Performed by: INTERNAL MEDICINE

## 2020-06-29 PROCEDURE — 83036 HEMOGLOBIN GLYCOSYLATED A1C: CPT | Performed by: INTERNAL MEDICINE

## 2020-06-29 PROCEDURE — 84443 ASSAY THYROID STIM HORMONE: CPT | Performed by: INTERNAL MEDICINE

## 2020-06-29 RX ORDER — ALBUTEROL SULFATE 90 UG/1
2 AEROSOL, METERED RESPIRATORY (INHALATION)
COMMUNITY
Start: 2020-06-11

## 2020-06-29 RX ORDER — LEVOTHYROXINE SODIUM 0.2 MG/1
200 TABLET ORAL DAILY
COMMUNITY
Start: 2020-05-10 | End: 2022-11-02 | Stop reason: SDUPTHER

## 2020-06-29 RX ORDER — BENZTROPINE MESYLATE 0.5 MG/1
0.5 TABLET ORAL AS NEEDED
COMMUNITY
Start: 2020-05-12

## 2020-06-29 NOTE — PROGRESS NOTES
"Vilma Ayala 49 y.o.  [facility]:Follow-up; Diabetes (Type II, eye exam was one year ago); and Hypothyroidism        [place]: Follow-up; Diabetes (Type II, eye exam was one year ago); and Hypothyroidism    Blood sugar and 90 day average sugar reviewed  Results for orders placed or performed in visit on 06/29/20   POC Glycosylated Hemoglobin (Hb A1C)   Result Value Ref Range    Hemoglobin A1C 8.8 %   POC Glucose Fingerstick   Result Value Ref Range    Glucose 169 (A) 70 - 130 mg/dL     Energy is good on synthroid 25 mcg daily   preulcerative callus right gt toe at base of toe  Eating bowl of cereal at bedtime   Discussed stopping that   Has not been using pumice stone but plans to start   Discussed progression of preulcerative callus with underlying ulceration, infection, debridement and ultimately amputation.  Encouraged her to start using pumice stone to keep area debrided and instructed her to call podiatry today or tomorrow to get area cleaned up   Risk reviewed   Recommended yearly eye exam      Allergies   Allergen Reactions   • Januvia [Sitagliptin]      Insomnia   • Other    • Doxepin Rash       Current Outpatient Medications:   •  busPIRone (BUSPAR) 10 MG tablet, Take 1 tablet by mouth Daily. (Patient taking differently: Take 10 mg by mouth 2 (Two) Times a Day.), Disp: 90 tablet, Rfl: 1  •  Cholecalciferol (VITAMIN D3 PO), Take 1 tablet by mouth Daily., Disp: , Rfl:   •  citalopram (CeleXA) 40 MG tablet, Take 1 tablet by mouth Daily., Disp: 90 tablet, Rfl: 1  •  glipiZIDE (GLUCOTROL) 10 MG tablet, Take 1 tablet by mouth 2 (two) times a day., Disp: , Rfl:   •  insulin lispro protamine-insulin lispro (HumaLOG Mix 75/25) (75-25) 100 UNIT/ML suspension injection, Inject 40 units in am and 25 units before supper, Disp: 30 mL, Rfl: 3  •  Insulin Syringe-Needle U-100 (BD INSULIN SYRINGE ULTRAFINE) 31G X 15/64\" 0.5 ML misc, , Disp: , Rfl:   •  KR[employer]ER BLOOD GLUCOSE TEST test strip, Test blood sugars TID, Disp: 100 each, " Rfl: 12  •  levalbuterol (XOPENEX HFA) 45 MCG/ACT inhaler, Inhale 2 puffs 4 (Four) Times a Day As Needed for Wheezing., Disp: 15 g, Rfl: 5  •  levothyroxine (SYNTHROID, LEVOTHROID) 25 MCG tablet, Take 1 tablet by mouth Daily., Disp: 90 tablet, Rfl: 1  •  metFORMIN (GLUCOPHAGE) 500 MG tablet, Take 2 tablets BID with food, Disp: 120 tablet, Rfl: 2  •  risperiDONE (RISPERDAL) 4 MG tablet, Take 4 mg by mouth Every Night., Disp: , Rfl:   •  rosuvastatin (CRESTOR) 5 MG tablet, Take 5 mg by mouth Every Night. Take one po daily, Disp: , Rfl:   Patient Active Problem List    Diagnosis   • Type 2 diabetes mellitus without complication, with long-term current use of insulin (CMS/[facility]) [E11.9, Z79.4]   • Cough [R05]   • Dyspnea on exertion [R06.00]   • Mixed hyperlipidemia [E78.2]   • Anxiety and depression [F41.9, F32.9]   • Uncontrolled type 2 diabetes mellitus (CMS/[facility]) [E11.65]   • Hypothyroid [E03.9]   • Obesity [E66.9]   • Vitamin D deficiency [E55.9]   • Diabetic neuropathy, type II diabetes mellitus (CMS/[facility]) [E11.40]     Review of Systems   Constitutional: Positive for activity change, appetite change and fatigue. Negative for chills, diaphoresis, fever and unexpected weight change.   HENT: Negative for congestion, dental problem, drooling, ear discharge, ear pain, facial swelling, hearing loss, mouth sores, nosebleeds, postnasal drip, rhinorrhea, sinus pressure, sneezing, sore throat, tinnitus, trouble swallowing and voice change.    Eyes: Negative for photophobia, pain, discharge, redness, itching and visual disturbance.   Respiratory: Negative for apnea, cough, choking, chest tightness, shortness of breath, wheezing and stridor.    Cardiovascular: Negative for chest pain, palpitations and leg swelling.   Gastrointestinal: Negative for abdominal distention, abdominal pain, anal bleeding, blood in stool, constipation, diarrhea, nausea, rectal pain and vomiting.   Endocrine: Negative for cold intolerance, heat  "intolerance, polydipsia, polyphagia and polyuria.   Genitourinary: Negative for decreased urine volume, difficulty urinating, dysuria, enuresis, flank pain, frequency, genital sores, hematuria and urgency.   Musculoskeletal: Positive for arthralgias. Negative for back pain, gait problem, joint swelling, myalgias, neck pain and neck stiffness.   Skin: Negative for color change, pallor, rash and wound.   Allergic/Immunologic: Negative for environmental allergies, food allergies and immunocompromised state.   Neurological: Positive for numbness. Negative for dizziness, tremors, seizures, syncope, facial asymmetry, speech difficulty, weakness, light-headedness and headaches.   Hematological: Negative for adenopathy. Does not bruise/bleed easily.   Psychiatric/Behavioral: Positive for behavioral problems. Negative for agitation, confusion, decreased concentration, dysphoric mood, hallucinations, self-injury, sleep disturbance and suicidal ideas. The patient is not nervous/anxious and is not hyperactive.      Social History     Socioeconomic History   • Marital status: Single     Spouse name: Not on file   • Number of children: Not on file   • Years of education: Not on file   • Highest education level: Not on file   Tobacco Use   • Smoking status: Never Smoker   • Smokeless tobacco: Never Used   Substance and Sexual Activity   • Alcohol use: No   • Drug use: No   • Sexual activity: Defer     Family History   Problem Relation Age of Onset   • Hypertension Other    • Thyroid disease Other    • Diabetes Other    • Obesity Other    • Diabetes Mother    • Hypertension Mother    • Heart disease Mother    • Cancer Father    • Diabetes Sister    • Diabetes Brother    • No Known Problems Brother      /60   Pulse 88   Ht 162.6 cm (64\")   Wt 111 kg (244 lb)   LMP  (LMP Unknown)   SpO2 98%   BMI 41.88 kg/m²   Physical Exam   Constitutional: She is oriented to person, place, and time. She appears well-developed and " well-nourished.   HENT:   Head: Normocephalic and atraumatic.   Nose: Nose normal.   Mouth/Throat: Oropharynx is clear and moist.   Eyes: Pupils are equal, round, and reactive to light. Conjunctivae, EOM and lids are normal.   Neck: Trachea normal and normal range of motion. Neck supple. Carotid bruit is not present. No tracheal deviation present. No thyroid mass and no thyromegaly present.   Cardiovascular: Normal rate, regular rhythm, normal heart sounds and intact distal pulses. Exam reveals no gallop and no friction rub.   No murmur heard.  Pulmonary/Chest: Effort normal and breath sounds normal. No respiratory distress. She has no wheezes.   Musculoskeletal: Normal range of motion. She exhibits edema and deformity.       Neurological Sensory Findings - Altered hot/cold right ankle/foot discrimination and altered hot/cold left ankle/foot discrimination. Altered sharp/dull right ankle/foot discrimination and altered sharp/dull left ankle/foot discrimination. Right ankle/foot altered proprioception and left ankle/foot altered proprioception.  Vascular Status -  Her right foot exhibits abnormal foot edema. Her right foot exhibits normal foot vasculature . Her left foot exhibits abnormal foot edema. Her left foot exhibits normal foot vasculature .  Skin Integrity  -  Her right foot skin is not intact.Her left foot skin is intact..     Lymphadenopathy:     She has no cervical adenopathy.   Neurological: She is alert and oriented to person, place, and time. She displays abnormal reflex. No cranial nerve deficit.   Skin: Skin is warm and dry. No rash noted. No cyanosis or erythema. Nails show no clubbing.   Thick callus medial gt toe  Bunion bilaterally   Callus heels    Psychiatric: She has a normal mood and affect. Her speech is normal and behavior is normal. Judgment and thought content normal. Cognition and memory are normal.   Nursing note and vitals reviewed.    Results for orders placed or performed in visit on  11/26/19   POC Glycosylated Hemoglobin (Hb A1C)   Result Value Ref Range    Hemoglobin A1C 8.0 %   POC Glucose Fingerstick   Result Value Ref Range    Glucose 132 (A) 70 - 130 mg/dL     Problem List Items Addressed This Visit        Cardiovascular and Mediastinum    Mixed hyperlipidemia     Is on low fat diet and crestor   Check flp          Relevant Orders    Lipid Panel       Digestive    Vitamin D deficiency     Is on sporadic vitamin D supplement- check levels          Relevant Orders    Vitamin D 25 Hydroxy       Endocrine    Type 2 diabetes mellitus without complication, with long-term current use of insulin (CMS/[facility]) - Primary     Blood sugar and 90 day average sugar reviewed  Results for orders placed or performed in visit on 06/29/20   POC Glycosylated Hemoglobin (Hb A1C)   Result Value Ref Range    Hemoglobin A1C 8.8 %   POC Glucose Fingerstick   Result Value Ref Range    Glucose 169 (A) 70 - 130 mg/dL     Average sugar is 200  Goal average 150   She is eating cereal at bedtime and we discussed better choices for snacking  Reminded her to update eye exam  Neuropathy with pre ulcerative callus right   Debridement/foot care discussed  Recommended call to podiatry for help with area  She is also waiting on specialty shoes   F/u 3 months          Relevant Orders    POC Glycosylated Hemoglobin (Hb A1C) (Completed)    POC Glucose Fingerstick (Completed)    Comprehensive Metabolic Panel    Microalbumin / Creatinine Urine Ratio - Urine, Random Void    Hypothyroid     On synthroid 225 mcg daily   Check tfts          Relevant Medications    levothyroxine (SYNTHROID, LEVOTHROID) 200 MCG tablet    Other Relevant Orders    TSH        Return in about 3 months (around 9/29/2020) for Recheck 30 min .    Jayna Kapoor MA  Signed Selina Lopez MD

## 2020-06-30 LAB
25(OH)D3 SERPL-MCNC: 29.4 NG/ML (ref 30–100)
ALBUMIN SERPL-MCNC: 4.2 G/DL (ref 3.5–5.2)
ALBUMIN/GLOB SERPL: 1.6 G/DL
ALP SERPL-CCNC: 114 U/L (ref 39–117)
ALT SERPL W P-5'-P-CCNC: 18 U/L (ref 1–33)
ANION GAP SERPL CALCULATED.3IONS-SCNC: 12.6 MMOL/L (ref 5–15)
AST SERPL-CCNC: 18 U/L (ref 1–32)
BILIRUB SERPL-MCNC: 0.3 MG/DL (ref 0.2–1.2)
BUN SERPL-MCNC: 12 MG/DL (ref 6–20)
BUN/CREAT SERPL: 9.8 (ref 7–25)
CALCIUM SPEC-SCNC: 9.8 MG/DL (ref 8.6–10.5)
CHLORIDE SERPL-SCNC: 100 MMOL/L (ref 98–107)
CHOLEST SERPL-MCNC: 117 MG/DL (ref 0–200)
CO2 SERPL-SCNC: 25.4 MMOL/L (ref 22–29)
CREAT SERPL-MCNC: 1.23 MG/DL (ref 0.57–1)
GFR SERPL CREATININE-BSD FRML MDRD: 46 ML/MIN/1.73
GLOBULIN UR ELPH-MCNC: 2.7 GM/DL
GLUCOSE SERPL-MCNC: 160 MG/DL (ref 65–99)
HDLC SERPL-MCNC: 32 MG/DL (ref 40–60)
LDLC SERPL CALC-MCNC: 62 MG/DL (ref 0–100)
LDLC/HDLC SERPL: 1.94 {RATIO}
POTASSIUM SERPL-SCNC: 4.3 MMOL/L (ref 3.5–5.2)
PROT SERPL-MCNC: 6.9 G/DL (ref 6–8.5)
SODIUM SERPL-SCNC: 138 MMOL/L (ref 136–145)
TRIGL SERPL-MCNC: 114 MG/DL (ref 0–150)
TSH SERPL DL<=0.05 MIU/L-ACNC: 0.81 UIU/ML (ref 0.27–4.2)
VLDLC SERPL-MCNC: 22.8 MG/DL (ref 5–40)

## 2020-06-30 NOTE — ASSESSMENT & PLAN NOTE
Blood sugar and 90 day average sugar reviewed  Results for orders placed or performed in visit on 06/29/20   POC Glycosylated Hemoglobin (Hb A1C)   Result Value Ref Range    Hemoglobin A1C 8.8 %   POC Glucose Fingerstick   Result Value Ref Range    Glucose 169 (A) 70 - 130 mg/dL     Average sugar is 200  Goal average 150   She is eating cereal at bedtime and we discussed better choices for snacking  Reminded her to update eye exam  Neuropathy with pre ulcerative callus right   Debridement/foot care discussed  Recommended call to podiatry for help with area  She is also waiting on specialty shoes   F/u 3 months

## 2020-07-06 ENCOUNTER — TELEPHONE (OUTPATIENT)
Dept: ENDOCRINOLOGY | Facility: CLINIC | Age: 50
End: 2020-07-06

## 2020-07-06 NOTE — TELEPHONE ENCOUNTER
Patient would like a call back to discuss her blood pressure - it has been running low for about 2 days. Pt would not give much more information, she would just like a return call. Thanks,

## 2020-07-07 ENCOUNTER — TELEPHONE (OUTPATIENT)
Dept: ENDOCRINOLOGY | Facility: CLINIC | Age: 50
End: 2020-07-07

## 2020-07-13 ENCOUNTER — TELEPHONE (OUTPATIENT)
Dept: ENDOCRINOLOGY | Facility: CLINIC | Age: 50
End: 2020-07-13

## 2020-07-13 DIAGNOSIS — E11.65 UNCONTROLLED TYPE 2 DIABETES MELLITUS WITH HYPERGLYCEMIA (HCC): Primary | ICD-10-CM

## 2020-07-13 NOTE — TELEPHONE ENCOUNTER
PATIENT STATES WE TOLD HER SHE NEEDS TO COME BACK IN AND LEAVE A URINE SAMPLE. SHE NEEDS ORDERS IN CHART AND TO SEE IF SHE IS SUPPOSE TO COME IN TO GIVE URINE SAMPLE. PHONE NUMBER [phone]

## 2020-07-14 NOTE — TELEPHONE ENCOUNTER
Pt was advised to come in for the urine test and the order has been created  Pt voiced understanding

## 2020-07-14 NOTE — TELEPHONE ENCOUNTER
Pt states someone called her and told her to come back in for a urine because the one she collected leaked out in the bag.  I do not see an order for urine and I am not sure someone from this office called her  Please advise and create order if she needs a test

## 2020-07-17 ENCOUNTER — LAB (OUTPATIENT)
Dept: ENDOCRINOLOGY | Facility: CLINIC | Age: 50
End: 2020-07-17

## 2020-07-17 ENCOUNTER — LAB (OUTPATIENT)
Dept: LAB | Facility: HOSPITAL | Age: 50
End: 2020-07-17

## 2020-07-17 LAB
BACTERIA UR QL AUTO: ABNORMAL /HPF
BILIRUB UR QL STRIP: NEGATIVE
CLARITY UR: CLEAR
COLOR UR: YELLOW
GLUCOSE UR STRIP-MCNC: ABNORMAL MG/DL
HGB UR QL STRIP.AUTO: NEGATIVE
HYALINE CASTS UR QL AUTO: ABNORMAL /LPF
KETONES UR QL STRIP: NEGATIVE
LEUKOCYTE ESTERASE UR QL STRIP.AUTO: NEGATIVE
NITRITE UR QL STRIP: POSITIVE
PH UR STRIP.AUTO: 5.5 [PH] (ref 5–8)
PROT UR QL STRIP: ABNORMAL
RBC # UR: ABNORMAL /HPF
REF LAB TEST METHOD: ABNORMAL
SP GR UR STRIP: >=1.03 (ref 1–1.03)
SQUAMOUS #/AREA URNS HPF: ABNORMAL /HPF
UROBILINOGEN UR QL STRIP: ABNORMAL
WBC UR QL AUTO: ABNORMAL /HPF

## 2020-07-17 PROCEDURE — 82570 ASSAY OF URINE CREATININE: CPT | Performed by: INTERNAL MEDICINE

## 2020-07-17 PROCEDURE — 87186 SC STD MICRODIL/AGAR DIL: CPT | Performed by: INTERNAL MEDICINE

## 2020-07-17 PROCEDURE — 82043 UR ALBUMIN QUANTITATIVE: CPT | Performed by: INTERNAL MEDICINE

## 2020-07-17 PROCEDURE — 87088 URINE BACTERIA CULTURE: CPT | Performed by: INTERNAL MEDICINE

## 2020-07-17 PROCEDURE — 81001 URINALYSIS AUTO W/SCOPE: CPT | Performed by: INTERNAL MEDICINE

## 2020-07-17 PROCEDURE — 87086 URINE CULTURE/COLONY COUNT: CPT | Performed by: INTERNAL MEDICINE

## 2020-07-18 LAB
ALBUMIN UR-MCNC: <1.2 MG/DL
CREAT UR-MCNC: 203 MG/DL
MICROALBUMIN/CREAT UR: NORMAL MG/G{CREAT}

## 2020-07-19 LAB — BACTERIA SPEC AEROBE CULT: ABNORMAL

## 2020-07-20 ENCOUNTER — TELEPHONE (OUTPATIENT)
Dept: ENDOCRINOLOGY | Facility: CLINIC | Age: 50
End: 2020-07-20

## 2020-07-20 RX ORDER — NITROFURANTOIN 25; 75 MG/1; MG/1
100 CAPSULE ORAL 2 TIMES DAILY
Qty: 14 CAPSULE | Refills: 0 | Status: SHIPPED | OUTPATIENT
Start: 2020-07-20 | End: 2020-09-18

## 2020-09-14 ENCOUNTER — OFFICE VISIT (OUTPATIENT)
Dept: OBSTETRICS AND GYNECOLOGY | Facility: CLINIC | Age: 50
End: 2020-09-14

## 2020-09-14 VITALS
WEIGHT: 236 LBS | HEIGHT: 63 IN | DIASTOLIC BLOOD PRESSURE: 70 MMHG | BODY MASS INDEX: 41.82 KG/M2 | SYSTOLIC BLOOD PRESSURE: 116 MMHG

## 2020-09-14 DIAGNOSIS — Z12.39 SCREENING FOR BREAST CANCER: ICD-10-CM

## 2020-09-14 DIAGNOSIS — N39.42 URINARY INCONTINENCE WITHOUT SENSORY AWARENESS: ICD-10-CM

## 2020-09-14 DIAGNOSIS — Z12.11 SCREENING FOR COLON CANCER: ICD-10-CM

## 2020-09-14 DIAGNOSIS — Z01.419 ENCOUNTER FOR WELL WOMAN EXAM WITH ROUTINE GYNECOLOGICAL EXAM: Primary | ICD-10-CM

## 2020-09-14 DIAGNOSIS — Z12.31 ENCOUNTER FOR SCREENING MAMMOGRAM FOR MALIGNANT NEOPLASM OF BREAST: ICD-10-CM

## 2020-09-14 PROBLEM — R32 URINARY INCONTINENCE: Status: ACTIVE | Noted: 2020-09-14

## 2020-09-14 PROCEDURE — G0101 CA SCREEN;PELVIC/BREAST EXAM: HCPCS | Performed by: OBSTETRICS & GYNECOLOGY

## 2020-09-14 RX ORDER — RISPERIDONE 3 MG/1
3 TABLET ORAL 2 TIMES DAILY
COMMUNITY
Start: 2020-09-09

## 2020-09-14 NOTE — PROGRESS NOTES
Subjective   Chief Complaint   Patient presents with   • Annual Exam     Vilma Ayala is a 49 y.o. year old  menopausal female presenting to be seen for her annual exam.  There has not been vaginal bleeding in the last 12 months.  Hot flashes and night sweats are not a significant problem.  She last had a period about 10 years ago.  She is never been sexually active.  Reports she had a Pap smear last year that was within normal limits reports she had a mammogram here at [facility] last year but I do not see anything in the computer system.  No problems on account of vaginal discharge  She does have urinary incontinence and initially said it was when she gets up but also when she coughs laughs or sneezes she will leak and wears depends daily.  Has never done Kegel exercises never been pregnant did have an E. coli bladder infection in July so this may partially be related to diabetes as well may need to follow-up urinalysis today to be sure it is cleared    SEXUAL Hx:  She is not sexually active.  Vaginal dryness is not a problem.  [place] is painful:not asked  She has concerns about domestic violence: no    HEALTH Hx:  She exercises regularly: no.  She wears her seat belt:yes.  Self breast awareness: no  She has noticed changes in height: no              Calcium intake is not adequate 1 daily              Caffeine intake: no or mild caffeine use    The following portions of the patient's history were reviewed and updated as appropriate:problem list, current medications, allergies, past family history, past medical history, past social history and past surgical history.      Current Outpatient Medications:   •  benztropine (COGENTIN) 0.5 MG tablet, , Disp: , Rfl:   •  busPIRone (BUSPAR) 10 MG tablet, Take 1 tablet by mouth Daily. (Patient taking differently: Take 10 mg by mouth 2 (Two) Times a Day.), Disp: 90 tablet, Rfl: 1  •  Cholecalciferol (VITAMIN D3 PO), Take 1 tablet by mouth Daily., Disp: , Rfl:  "  •  citalopram (CeleXA) 40 MG tablet, Take 1 tablet by mouth Daily., Disp: 90 tablet, Rfl: 1  •  glipiZIDE (GLUCOTROL) 10 MG tablet, Take 1 tablet by mouth 2 (two) times a day., Disp: , Rfl:   •  insulin lispro protamine-insulin lispro (HumaLOG Mix 75/25) (75-25) 100 UNIT/ML suspension injection, Inject 40 units in am and 25 units before supper, Disp: 30 mL, Rfl: 3  •  Insulin Syringe-Needle U-100 (BD INSULIN SYRINGE ULTRAFINE) 31G X 15/64\" 0.5 ML misc, , Disp: , Rfl:   •  [employer] BLOOD GLUCOSE TEST test strip, Test blood sugars TID, Disp: 100 each, Rfl: 12  •  levalbuterol (XOPENEX HFA) 45 MCG/ACT inhaler, Inhale 2 puffs 4 (Four) Times a Day As Needed for Wheezing., Disp: 15 g, Rfl: 5  •  levothyroxine (SYNTHROID, LEVOTHROID) 200 MCG tablet, , Disp: , Rfl:   •  levothyroxine (SYNTHROID, LEVOTHROID) 25 MCG tablet, Take 1 tablet by mouth Daily., Disp: 90 tablet, Rfl: 1  •  metFORMIN (GLUCOPHAGE) 500 MG tablet, Take 2 tablets BID with food, Disp: 120 tablet, Rfl: 2  •  nitrofurantoin, macrocrystal-monohydrate, (Macrobid) 100 MG capsule, Take 1 capsule by mouth 2 (Two) Times a Day., Disp: 14 capsule, Rfl: 0  •  risperiDONE (risperDAL) 3 MG tablet, , Disp: , Rfl:   •  risperiDONE (RISPERDAL) 4 MG tablet, Take 4 mg by mouth Every Night., Disp: , Rfl:   •  rosuvastatin (CRESTOR) 5 MG tablet, Take 5 mg by mouth Every Night. Take one po daily, Disp: , Rfl:   •  VENTOLIN  (90 Base) MCG/ACT inhaler, , Disp: , Rfl:     Social History    Tobacco Use      Smoking status: Never Smoker      Smokeless tobacco: Never Used    Social History     Substance and Sexual Activity   Alcohol Use No       Review of systems  Constitutional   POS nothing reported                           NEG fatigue, malaise and night sweats  Breast               POS nothing reported and had normal mammogram in the past year - results are not in record for review                           NEG persistent breast lump, skin dimpling or nipple discharge  GI " "                    POS nothing reported                           NEG bloating, change in bowel habits, melena or reflux symptoms                      POS Urinary incontinence is present and she wears depends daily                           NEG dysuria or hematuria           Objective   /70   Ht 158.8 cm (62.5\")   Wt 107 kg (236 lb)   LMP  (LMP Unknown)   Breastfeeding No   BMI 42.48 kg/m²      General:  well developed; well nourished  no acute distress  appears stated age   Skin:  No suspicious lesions seen   Thyroid: not examined   Breasts:  Not performed.   Abdomen: soft, non-tender; no masses  no umbilical or inguinal hernias are present  no hepato-splenomegaly  Protuberant apple shape body habitus   Pelvis: Clinical staff was present for exam  External genitalia:  normal appearance of the external genitalia including Bartholin's and [place]'s glands. Brownish discoloration bilaterally  :  urethral meatus normal; urethral hypermobility is absent.  Vaginal:  atrophic mucosal changes are present; caliber of the introitus is Narrowed consistent with virginal status;  Cervix:  normal appearance.  Uterus:  normal size, shape and consistency.  Adnexa:  non palpable bilaterally.  Rectal:  digital rectal exam not performed; anus visually normal appearing.   She was unable to do a Kegel exercise today.       Lab Review   CMP, LIPIDS, TSH, UA, Urine culture and Vitamin D  Imaging review  No data reviewed       Assessment     1. Postmenopausal GYN examination with issues of  2. Urinary incontinence which may be stress and overflow related.  May or may not be related to her diabetes which is not an the best of control according to the patient  3. I do not see a formal diagnosis in the problem list but there may be some intellectual delay       Plan     1. Annual or sooner as needed  2. Calcium discussed  1200 mg daily in divided doses ideally in diet  3. Regular weight bearing exercise  4. Breast self " awareness and mammograms discussed- [facility] ( Dr Corbin)  5. Colonoscopy discussed  6. Timed voiding Kegel's for 5 minutes daily if these are effective and as needed we will give her some information sheets on performing a Kegel.    No orders of the defined types were placed in this encounter.     Orders Placed This Encounter   Procedures   • Ambulatory Referral For Screening Colonoscopy     Referral Priority:   Routine     Referral Type:   Diagnostic Medical     Referral Reason:   Specialty Services Required     Referred to Provider:   Doron Lau MD     Number of Visits Requested:   1              This note was electronically signed.    Anuel Mcghee M.D.  September 14, 2020

## 2020-09-15 ENCOUNTER — TELEPHONE (OUTPATIENT)
Dept: GASTROENTEROLOGY | Facility: CLINIC | Age: 50
End: 2020-09-15

## 2020-09-16 ENCOUNTER — TELEPHONE (OUTPATIENT)
Dept: ENDOCRINOLOGY | Facility: CLINIC | Age: 50
End: 2020-09-16

## 2020-09-16 NOTE — TELEPHONE ENCOUNTER
Patient is requesting to speak with Jayna regarding a medical issue. Patient would not give any details about the issue.      Call back [phone]

## 2020-09-17 NOTE — TELEPHONE ENCOUNTER
Pt is actually calling about her mother that is in a rehab facility and is asking how to get gabapentin for her  She was advised the physician at the facility will have to prescribe it and it will be given there  She voiced understanding

## 2020-09-18 ENCOUNTER — OFFICE VISIT (OUTPATIENT)
Dept: CARDIOLOGY | Facility: CLINIC | Age: 50
End: 2020-09-18

## 2020-09-18 VITALS
HEIGHT: 63 IN | OXYGEN SATURATION: 98 % | WEIGHT: 241.4 LBS | SYSTOLIC BLOOD PRESSURE: 122 MMHG | HEART RATE: 94 BPM | BODY MASS INDEX: 42.77 KG/M2 | TEMPERATURE: 97.8 F | DIASTOLIC BLOOD PRESSURE: 64 MMHG

## 2020-09-18 DIAGNOSIS — E78.2 MIXED HYPERLIPIDEMIA: ICD-10-CM

## 2020-09-18 DIAGNOSIS — R06.09 DYSPNEA ON EXERTION: Primary | ICD-10-CM

## 2020-09-18 PROCEDURE — 99213 OFFICE O/P EST LOW 20 MIN: CPT | Performed by: INTERNAL MEDICINE

## 2020-09-18 NOTE — PROGRESS NOTES
"[facility]    Encounter Date: 2020    Patient ID: Vilma Ayala is a 49 y.o. female.  : 1970     PCP: Ovi Corbin MD       Chief Complaint: Mixed hyperlipidemia and Shortness of Breath      PROBLEM LIST:  1. Dyspnea on exertion:  a. PFT, 01/10/2019: Small airway disease.  b. Echo, 2019: EF 60%. No significant VHD.  2. DM2, uncontrolled.  3. Obesity.  4. Anxiety/depression.  5. Hypothyroid.  6. Vitamin D deficiency.  7. Diabetic neuropathy.    History of Present Illness  Patient presents today for an annual follow-up with a history of dyspnea on  and cardiac risk factors. Since last visit, she has been feeling well overall from a cardiovascular standpoint. She lives with her mother and has been staying active by walking.  She is no longer experiencing shortness of breath. Patient also denies chest pain, PND, edema, palpitations, syncope, or presyncope at this time. Patient denies smoking.      Allergies   Allergen Reactions   • Doxepin Rash   • Januvia [Sitagliptin] Other (See Comments)     Insomnia         Current Outpatient Medications:   •  benztropine (COGENTIN) 0.5 MG tablet, Take 0.5 mg by mouth As Needed., Disp: , Rfl:   •  busPIRone (BUSPAR) 10 MG tablet, Take 1 tablet by mouth Daily., Disp: 90 tablet, Rfl: 1  •  Cholecalciferol (VITAMIN D3 PO), Take 1 tablet by mouth Daily., Disp: , Rfl:   •  citalopram (CeleXA) 40 MG tablet, Take 1 tablet by mouth Daily., Disp: 90 tablet, Rfl: 1  •  glipiZIDE (GLUCOTROL) 10 MG tablet, Take 1 tablet by mouth 2 (two) times a day., Disp: , Rfl:   •  insulin lispro protamine-insulin lispro (HumaLOG Mix 75/25) (75-25) 100 UNIT/ML suspension injection, Inject 40 units in am and 25 units before supper (Patient taking differently: Inject 42 units in am and 22 units before supper), Disp: 30 mL, Rfl: 3  •  Insulin Syringe-Needle U-100 (BD INSULIN SYRINGE ULTRAFINE) 31G X 15/64\" 0.5 ML misc, , Disp: , Rfl: "   •  [employer] BLOOD GLUCOSE TEST test strip, Test blood sugars TID, Disp: 100 each, Rfl: 12  •  levalbuterol (XOPENEX HFA) 45 MCG/ACT inhaler, Inhale 2 puffs 4 (Four) Times a Day As Needed for Wheezing., Disp: 15 g, Rfl: 5  •  levothyroxine (SYNTHROID, LEVOTHROID) 200 MCG tablet, Take 200 mcg by mouth Daily., Disp: , Rfl:   •  levothyroxine (SYNTHROID, LEVOTHROID) 25 MCG tablet, Take 1 tablet by mouth Daily., Disp: 90 tablet, Rfl: 1  •  metFORMIN (GLUCOPHAGE) 500 MG tablet, Take 2 tablets BID with food, Disp: 120 tablet, Rfl: 2  •  risperiDONE (risperDAL) 3 MG tablet, Take 3 mg by mouth Daily., Disp: , Rfl:   •  risperiDONE (RISPERDAL) 4 MG tablet, Take 4 mg by mouth Every Night., Disp: , Rfl:   •  rosuvastatin (CRESTOR) 5 MG tablet, Take 5 mg by mouth Every Night. Take one po daily, Disp: , Rfl:   •  VENTOLIN  (90 Base) MCG/ACT inhaler, Inhale 2 puffs 4 (Four) Times a Day., Disp: , Rfl:     The following portions of the patient's history were reviewed and updated as appropriate: allergies, current medications, past family history, past medical history, past social history, past surgical history and problem list.    ROS  Review of Systems   Constitution: Negative for chills, fever, fatigue, generalized weakness.   Cardiovascular: Negative for chest pain, dyspnea on exertion, leg swelling, palpitations, orthopnea, and syncope.   Respiratory: Negative for cough, shortness of breath, and wheezing.  HENT: Negative for ear pain, nosebleeds, and tinnitus.  Gastrointestinal: Negative for abdominal pain, constipation, diarrhea, nausea and vomiting.   Genitourinary: No urinary symptoms.  Musculoskeletal: Negative for muscle cramps.  Neurological: Negative for dizziness, headaches, loss of balance, numbness, and symptoms of stroke.  Psychiatric: Normal mental status.     All other systems reviewed and are negative.        Objective:     /64 (BP Location: Right arm, Patient Position: Sitting)   Pulse 94   Temp  "97.8 °F (36.6 °C)   Ht 160 cm (63\")   Wt 109 kg (241 lb 6.4 oz)   LMP  (LMP Unknown)   SpO2 98%   BMI 42.76 kg/m²      Physical Exam  Constitutional: Patient appears well-developed and well-nourished.   HENT: HEENT exam unremarkable.   Neck: Neck supple. No JVD present. No carotid bruits.   Cardiovascular: Normal rate, regular rhythm and normal heart sounds. No murmur heard.   2+ symmetric pulses.   Pulmonary/Chest: Breath sounds normal. Does not exhibit tenderness.   Abdominal: Abdomen benign.   Musculoskeletal: Does not exhibit edema.   Neurological: Neurological exam unremarkable.   Vitals reviewed.    Data Review:   Lab Results   Component Value Date    GLUCOSE 160 (H) 06/29/2020    BUN 12 06/29/2020    CREATININE 1.23 (H) 06/29/2020    EGFRIFNONA 46 (L) 06/29/2020    BCR 9.8 06/29/2020    K 4.3 06/29/2020    CO2 25.4 06/29/2020    CALCIUM 9.8 06/29/2020    ALBUMIN 4.20 06/29/2020    AST 18 06/29/2020    ALT 18 06/29/2020     Lab Results   Component Value Date    CHOL 117 06/29/2020    TRIG 114 06/29/2020    HDL 32 (L) 06/29/2020    LDL 62 06/29/2020      Lab Results   Component Value Date    TSH 0.811 06/29/2020     Lab Results   Component Value Date    HGBA1C 8.8 06/29/2020        Procedures       Assessment:      Diagnosis Plan   1. Dyspnea on exertion, multifactorial in the setting of asthma, deconditioning and obesity. Resolved/stable; continue current medications and follow up with PCP.    2. Mixed hyperlipidemia  Well-controlled; continue rosuvastatin 5 mg.      Plan:   Stable cardiac status.  No current angina or CHF type symptoms.  Risk factors managed by PCP.  Continue current medications.   FU with PCP for further care, cardiology follow-up with us as needed.  Thank you for allowing us to participate in the care of your patient.     Scribed for Colton Barahona MD by Stefani Wiggins. 9/18/2020  13:32 EDT        IColton MD, personally performed the services described in this documentation as " scribed by the above named individual in my presence, and it is both accurate and complete.  9/18/2020  13:36 EDT        Please note that portions of this note may have been completed with a voice recognition program. Efforts were made to edit the dictations, but occasionally words are mistranscribed.

## 2020-09-23 DIAGNOSIS — Z12.11 SCREENING FOR COLON CANCER: Primary | ICD-10-CM

## 2020-09-27 ENCOUNTER — APPOINTMENT (OUTPATIENT)
Dept: PREADMISSION TESTING | Facility: HOSPITAL | Age: 50
End: 2020-09-27

## 2020-10-12 DIAGNOSIS — Z12.11 SCREENING FOR COLON CANCER: Primary | ICD-10-CM

## 2020-10-12 RX ORDER — SODIUM, POTASSIUM,MAG SULFATES 17.5-3.13G
2 SOLUTION, RECONSTITUTED, ORAL ORAL EVERY 12 HOURS
Qty: 2 BOTTLE | Refills: 0 | Status: SHIPPED | OUTPATIENT
Start: 2020-10-12 | End: 2021-01-13

## 2020-10-16 ENCOUNTER — APPOINTMENT (OUTPATIENT)
Dept: PREADMISSION TESTING | Facility: HOSPITAL | Age: 50
End: 2020-10-16

## 2021-01-13 ENCOUNTER — OFFICE VISIT (OUTPATIENT)
Dept: ENDOCRINOLOGY | Facility: CLINIC | Age: 51
End: 2021-01-13

## 2021-01-13 ENCOUNTER — LAB (OUTPATIENT)
Dept: LAB | Facility: HOSPITAL | Age: 51
End: 2021-01-13

## 2021-01-13 VITALS
TEMPERATURE: 97.3 F | HEART RATE: 101 BPM | BODY MASS INDEX: 43.66 KG/M2 | WEIGHT: 246.4 LBS | HEIGHT: 63 IN | DIASTOLIC BLOOD PRESSURE: 84 MMHG | SYSTOLIC BLOOD PRESSURE: 124 MMHG | OXYGEN SATURATION: 98 %

## 2021-01-13 DIAGNOSIS — E11.40 TYPE 2 DIABETES MELLITUS WITH DIABETIC NEUROPATHY, WITH LONG-TERM CURRENT USE OF INSULIN (HCC): Primary | ICD-10-CM

## 2021-01-13 DIAGNOSIS — Z79.4 TYPE 2 DIABETES MELLITUS WITH DIABETIC NEUROPATHY, WITH LONG-TERM CURRENT USE OF INSULIN (HCC): Primary | ICD-10-CM

## 2021-01-13 DIAGNOSIS — E55.9 VITAMIN D DEFICIENCY, UNSPECIFIED: ICD-10-CM

## 2021-01-13 DIAGNOSIS — E03.9 ACQUIRED HYPOTHYROIDISM: ICD-10-CM

## 2021-01-13 LAB
EXPIRATION DATE: ABNORMAL
EXPIRATION DATE: NORMAL
GLUCOSE BLDC GLUCOMTR-MCNC: 240 MG/DL (ref 70–130)
HBA1C MFR BLD: 8.3 %
Lab: ABNORMAL
Lab: NORMAL

## 2021-01-13 PROCEDURE — 99214 OFFICE O/P EST MOD 30 MIN: CPT | Performed by: INTERNAL MEDICINE

## 2021-01-13 PROCEDURE — 84443 ASSAY THYROID STIM HORMONE: CPT | Performed by: INTERNAL MEDICINE

## 2021-01-13 PROCEDURE — 82947 ASSAY GLUCOSE BLOOD QUANT: CPT | Performed by: INTERNAL MEDICINE

## 2021-01-13 PROCEDURE — 83036 HEMOGLOBIN GLYCOSYLATED A1C: CPT | Performed by: INTERNAL MEDICINE

## 2021-01-13 PROCEDURE — 82043 UR ALBUMIN QUANTITATIVE: CPT | Performed by: INTERNAL MEDICINE

## 2021-01-13 PROCEDURE — 80061 LIPID PANEL: CPT | Performed by: INTERNAL MEDICINE

## 2021-01-13 PROCEDURE — 82306 VITAMIN D 25 HYDROXY: CPT | Performed by: INTERNAL MEDICINE

## 2021-01-13 PROCEDURE — 80053 COMPREHEN METABOLIC PANEL: CPT | Performed by: INTERNAL MEDICINE

## 2021-01-13 PROCEDURE — 82570 ASSAY OF URINE CREATININE: CPT | Performed by: INTERNAL MEDICINE

## 2021-01-13 NOTE — PROGRESS NOTES
"Vilmaaria Glass Jamie 50 y.o.  CC: Diabetes (Follow UP)      [place]: Diabetes (Follow UP)    Blood sugar and 90 day average sugar reviewed  Results for orders placed or performed in visit on 01/13/21   POC Glucose, Blood    Specimen: Blood   Result Value Ref Range    Glucose 240 (A) 70 - 130 mg/dL    Lot Number [id number]     Expiration Date 07/01/2021    POC Glycosylated Hemoglobin (Hb A1C)    Specimen: Blood   Result Value Ref Range    Hemoglobin A1C 8.3 %    Lot Number [id number]     Expiration Date 09/14/2022      Is home alone now -mother in nursing home  Recent uti   Discussed current sugar- goal less than 200 at all times with average less than 150   bp is good  Sister and brother are helping her care for herself at home     Allergies   Allergen Reactions   • Doxepin Rash   • Januvia [Sitagliptin] Other (See Comments)     Insomnia       Current Outpatient Medications:   •  benztropine (COGENTIN) 0.5 MG tablet, Take 0.5 mg by mouth As Needed., Disp: , Rfl:   •  busPIRone (BUSPAR) 10 MG tablet, Take 1 tablet by mouth Daily., Disp: 90 tablet, Rfl: 1  •  Cholecalciferol (VITAMIN D3 PO), Take 1 tablet by mouth Daily., Disp: , Rfl:   •  citalopram (CeleXA) 40 MG tablet, Take 1 tablet by mouth Daily., Disp: 90 tablet, Rfl: 1  •  glipiZIDE (GLUCOTROL) 10 MG tablet, Take 1 tablet by mouth 2 (two) times a day., Disp: , Rfl:   •  insulin lispro protamine-insulin lispro (HumaLOG Mix 75/25) (75-25) 100 UNIT/ML suspension injection, Inject 40 units in am and 25 units before supper (Patient taking differently: Inject 42 units in am and 22 units before supper), Disp: 30 mL, Rfl: 3  •  Insulin Syringe-Needle U-100 (BD INSULIN SYRINGE ULTRAFINE) 31G X 15/64\" 0.5 ML misc, , Disp: , Rfl:   •  [employer] BLOOD GLUCOSE TEST test strip, Test blood sugars TID, Disp: 100 each, Rfl: 12  •  levalbuterol (XOPENEX HFA) 45 MCG/ACT inhaler, Inhale 2 puffs 4 (Four) Times a Day As Needed for Wheezing., Disp: 15 g, Rfl: 5  •  levothyroxine (SYNTHROID, " LEVOTHROID) 200 MCG tablet, Take 200 mcg by mouth Daily., Disp: , Rfl:   •  levothyroxine (SYNTHROID, LEVOTHROID) 25 MCG tablet, Take 1 tablet by mouth Daily., Disp: 90 tablet, Rfl: 1  •  metFORMIN (GLUCOPHAGE) 500 MG tablet, Take 2 tablets BID with food, Disp: 120 tablet, Rfl: 2  •  risperiDONE (risperDAL) 3 MG tablet, Take 3 mg by mouth Daily., Disp: , Rfl:   •  risperiDONE (RISPERDAL) 4 MG tablet, Take 4 mg by mouth Every Night., Disp: , Rfl:   •  rosuvastatin (CRESTOR) 5 MG tablet, Take 5 mg by mouth Every Night. Take one po daily, Disp: , Rfl:   •  VENTOLIN  (90 Base) MCG/ACT inhaler, Inhale 2 puffs 4 (Four) Times a Day., Disp: , Rfl:   Patient Active Problem List    Diagnosis   • Urinary incontinence [R32]   • Type 2 diabetes mellitus without complication, with long-term current use of insulin (CMS/[facility]) [E11.9, Z79.4]   • Cough [R05]   • Dyspnea on exertion [R06.00]   • Mixed hyperlipidemia [E78.2]   • Anxiety and depression [F41.9, F32.9]   • Uncontrolled type 2 diabetes mellitus (CMS/[facility]) [E11.65]   • Hypothyroid [E03.9]   • Obesity [E66.9]   • Vitamin D deficiency [E55.9]   • Diabetic neuropathy, type II diabetes mellitus (CMS/[facility]) [E11.40]     Review of Systems   Constitutional: Positive for activity change. Negative for appetite change and unexpected weight change.   HENT: Negative for congestion and rhinorrhea.    Eyes: Negative for visual disturbance.   Respiratory: Positive for shortness of breath. Negative for cough.    Cardiovascular: Negative for palpitations and leg swelling.   Gastrointestinal: Negative for constipation, diarrhea and nausea.   Genitourinary: Negative for hematuria.   Musculoskeletal: Positive for arthralgias and gait problem. Negative for back pain, joint swelling and myalgias.   Skin: Negative for color change, rash and wound.   Allergic/Immunologic: Negative for environmental allergies, food allergies and immunocompromised state.   Neurological: Positive for weakness.  "Negative for dizziness and light-headedness.   Psychiatric/Behavioral: Negative for confusion, decreased concentration, dysphoric mood and sleep disturbance. The patient is not nervous/anxious.      Social History     Socioeconomic History   • Marital status: Single     Spouse name: Not on file   • Number of children: Not on file   • Years of education: Not on file   • Highest education level: Not on file   Tobacco Use   • Smoking status: Never Smoker   • Smokeless tobacco: Never Used   Substance and Sexual Activity   • Alcohol use: No   • Drug use: No   • Sexual activity: Not Currently     Family History   Problem Relation Age of Onset   • Hypertension Other    • Thyroid disease Other    • Diabetes Other    • Obesity Other    • Diabetes Mother    • Hypertension Mother    • Heart disease Mother    • Cancer Father    • Diabetes Sister    • Diabetes Brother    • No Known Problems Brother      /84   Pulse 101   Temp 97.3 °F (36.3 °C)   Ht 160 cm (63\")   Wt 112 kg (246 lb 6.4 oz)   LMP  (LMP Unknown)   SpO2 98%   BMI 43.65 kg/m²   Physical Exam  Vitals signs and nursing note reviewed.   Constitutional:       Appearance: Normal appearance. She is well-developed. She is obese.   HENT:      Head: Normocephalic and atraumatic.   Eyes:      General: Lids are normal.      Extraocular Movements: Extraocular movements intact.      Conjunctiva/sclera: Conjunctivae normal.      Pupils: Pupils are equal, round, and reactive to light.   Neck:      Musculoskeletal: Normal range of motion and neck supple.      Thyroid: No thyroid mass or thyromegaly.      Vascular: No carotid bruit.      Trachea: Trachea normal. No tracheal deviation.   Cardiovascular:      Rate and Rhythm: Normal rate and regular rhythm.      Pulses: Normal pulses.      Heart sounds: Normal heart sounds. No murmur. No friction rub. No gallop.    Pulmonary:      Effort: Pulmonary effort is normal. No respiratory distress.      Breath sounds: Normal " breath sounds. No wheezing.   Musculoskeletal: Normal range of motion.         General: No deformity.   Lymphadenopathy:      Cervical: No cervical adenopathy.   Skin:     General: Skin is warm and dry.      Findings: No erythema or rash.      Nails: There is no clubbing.     Neurological:      General: No focal deficit present.      Mental Status: She is alert and oriented to person, place, and time.      Cranial Nerves: No cranial nerve deficit.      Deep Tendon Reflexes: Reflexes are normal and symmetric. Reflexes normal.   Psychiatric:         Speech: Speech normal.         Behavior: Behavior normal.       Results for orders placed or performed in visit on 01/13/21   POC Glucose, Blood    Specimen: Blood   Result Value Ref Range    Glucose 240 (A) 70 - 130 mg/dL    Lot Number [id number]     Expiration Date 07/01/2021    POC Glycosylated Hemoglobin (Hb A1C)    Specimen: Blood   Result Value Ref Range    Hemoglobin A1C 8.3 %    Lot Number [id number]     Expiration Date 09/14/2022      Diagnoses and all orders for this visit:    1. Type 2 diabetes mellitus with diabetic neuropathy, with long-term current use of insulin (CMS/[facility]) (Primary)  -     POC Glucose, Blood  -     POC Glycosylated Hemoglobin (Hb A1C)      Problems Addressed this Visit        Other    Vitamin D deficiency, unspecified    Relevant Orders    Vitamin D 25 Hydroxy (Completed)    Hypothyroid     Check tfts   Levothyroxine 25 mcg daily          Diabetic neuropathy, type II diabetes mellitus (CMS/[facility]) - Primary     Blood sugar and 90 day average sugar reviewed  Results for orders placed or performed in visit on 01/13/21   Comprehensive Metabolic Panel    Specimen: Blood   Result Value Ref Range    Glucose 171 (H) 65 - 99 mg/dL    BUN 11 6 - 20 mg/dL    Creatinine 1.08 (H) 0.57 - 1.00 mg/dL    Sodium 137 136 - 145 mmol/L    Potassium 4.2 3.5 - 5.2 mmol/L    Chloride 101 98 - 107 mmol/L    CO2 25.7 22.0 - 29.0 mmol/L    Calcium 9.5 8.6 - 10.5 mg/dL     Total Protein 6.8 6.0 - 8.5 g/dL    Albumin 4.30 3.50 - 5.20 g/dL    ALT (SGPT) 20 1 - 33 U/L    AST (SGOT) 23 1 - 32 U/L    Alkaline Phosphatase 119 (H) 39 - 117 U/L    Total Bilirubin 0.3 0.0 - 1.2 mg/dL    eGFR Non African Amer 54 (L) >60 mL/min/1.73    Globulin 2.5 gm/dL    A/G Ratio 1.7 g/dL    BUN/Creatinine Ratio 10.2 7.0 - 25.0    Anion Gap 10.3 5.0 - 15.0 mmol/L   Lipid Panel    Specimen: Blood   Result Value Ref Range    Total Cholesterol 118 0 - 200 mg/dL    Triglycerides 70 0 - 150 mg/dL    HDL Cholesterol 42 40 - 60 mg/dL    LDL Cholesterol  61 0 - 100 mg/dL    VLDL Cholesterol 15 5 - 40 mg/dL    LDL/HDL Ratio 1.48    Microalbumin / Creatinine Urine Ratio - Urine, Clean Catch    Specimen: Urine, Clean Catch   Result Value Ref Range    Microalbumin/Creatinine Ratio      Creatinine, Urine 98.1 mg/dL    Microalbumin, Urine <1.2 mg/dL   TSH    Specimen: Blood   Result Value Ref Range    TSH 4.650 (H) 0.270 - 4.200 uIU/mL   Vitamin D 25 Hydroxy    Specimen: Blood   Result Value Ref Range    25 Hydroxy, Vitamin D 27.9 (L) 30.0 - 100.0 ng/ml   POC Glucose, Blood    Specimen: Blood   Result Value Ref Range    Glucose 240 (A) 70 - 130 mg/dL    Lot Number [id number]     Expiration Date 07/01/2021    POC Glycosylated Hemoglobin (Hb A1C)    Specimen: Blood   Result Value Ref Range    Hemoglobin A1C 8.3 %    Lot Number [id number]     Expiration Date 09/14/2022      Average sugar is 190  Reminded her to update eye exam  No callus or ulcer  Ur alb due   F/u 3-4 months          Relevant Orders    POC Glucose, Blood (Completed)    POC Glycosylated Hemoglobin (Hb A1C) (Completed)    Comprehensive Metabolic Panel (Completed)    Lipid Panel (Completed)    Microalbumin / Creatinine Urine Ratio - Urine, Clean Catch (Completed)    TSH (Completed)    Vitamin D 25 Hydroxy (Completed)      Diagnoses       Codes Comments    Type 2 diabetes mellitus with diabetic neuropathy, with long-term current use of insulin (CMS/[facility])    -   Primary ICD-10-CM: E11.40, Z79.4  ICD-9-CM: 250.60, 357.2, V58.67     Vitamin D deficiency, unspecified      ICD-10-CM: E55.9  ICD-9-CM: 268.9     Acquired hypothyroidism     ICD-10-CM: E03.9  ICD-9-CM: 244.9         Return in about 4 months (around 5/13/2021).    Selina Lopez MD  Signed Selina Lopez MD

## 2021-01-14 LAB
25(OH)D3 SERPL-MCNC: 27.9 NG/ML (ref 30–100)
ALBUMIN SERPL-MCNC: 4.3 G/DL (ref 3.5–5.2)
ALBUMIN UR-MCNC: <1.2 MG/DL
ALBUMIN/GLOB SERPL: 1.7 G/DL
ALP SERPL-CCNC: 119 U/L (ref 39–117)
ALT SERPL W P-5'-P-CCNC: 20 U/L (ref 1–33)
ANION GAP SERPL CALCULATED.3IONS-SCNC: 10.3 MMOL/L (ref 5–15)
AST SERPL-CCNC: 23 U/L (ref 1–32)
BILIRUB SERPL-MCNC: 0.3 MG/DL (ref 0–1.2)
BUN SERPL-MCNC: 11 MG/DL (ref 6–20)
BUN/CREAT SERPL: 10.2 (ref 7–25)
CALCIUM SPEC-SCNC: 9.5 MG/DL (ref 8.6–10.5)
CHLORIDE SERPL-SCNC: 101 MMOL/L (ref 98–107)
CHOLEST SERPL-MCNC: 118 MG/DL (ref 0–200)
CO2 SERPL-SCNC: 25.7 MMOL/L (ref 22–29)
CREAT SERPL-MCNC: 1.08 MG/DL (ref 0.57–1)
CREAT UR-MCNC: 98.1 MG/DL
GFR SERPL CREATININE-BSD FRML MDRD: 54 ML/MIN/1.73
GLOBULIN UR ELPH-MCNC: 2.5 GM/DL
GLUCOSE SERPL-MCNC: 171 MG/DL (ref 65–99)
HDLC SERPL-MCNC: 42 MG/DL (ref 40–60)
LDLC SERPL CALC-MCNC: 61 MG/DL (ref 0–100)
LDLC/HDLC SERPL: 1.48 {RATIO}
MICROALBUMIN/CREAT UR: NORMAL MG/G{CREAT}
POTASSIUM SERPL-SCNC: 4.2 MMOL/L (ref 3.5–5.2)
PROT SERPL-MCNC: 6.8 G/DL (ref 6–8.5)
SODIUM SERPL-SCNC: 137 MMOL/L (ref 136–145)
TRIGL SERPL-MCNC: 70 MG/DL (ref 0–150)
TSH SERPL DL<=0.05 MIU/L-ACNC: 4.65 UIU/ML (ref 0.27–4.2)
VLDLC SERPL-MCNC: 15 MG/DL (ref 5–40)

## 2021-01-15 NOTE — ASSESSMENT & PLAN NOTE
Blood sugar and 90 day average sugar reviewed  Results for orders placed or performed in visit on 01/13/21   Comprehensive Metabolic Panel    Specimen: Blood   Result Value Ref Range    Glucose 171 (H) 65 - 99 mg/dL    BUN 11 6 - 20 mg/dL    Creatinine 1.08 (H) 0.57 - 1.00 mg/dL    Sodium 137 136 - 145 mmol/L    Potassium 4.2 3.5 - 5.2 mmol/L    Chloride 101 98 - 107 mmol/L    CO2 25.7 22.0 - 29.0 mmol/L    Calcium 9.5 8.6 - 10.5 mg/dL    Total Protein 6.8 6.0 - 8.5 g/dL    Albumin 4.30 3.50 - 5.20 g/dL    ALT (SGPT) 20 1 - 33 U/L    AST (SGOT) 23 1 - 32 U/L    Alkaline Phosphatase 119 (H) 39 - 117 U/L    Total Bilirubin 0.3 0.0 - 1.2 mg/dL    eGFR Non African Amer 54 (L) >60 mL/min/1.73    Globulin 2.5 gm/dL    A/G Ratio 1.7 g/dL    BUN/Creatinine Ratio 10.2 7.0 - 25.0    Anion Gap 10.3 5.0 - 15.0 mmol/L   Lipid Panel    Specimen: Blood   Result Value Ref Range    Total Cholesterol 118 0 - 200 mg/dL    Triglycerides 70 0 - 150 mg/dL    HDL Cholesterol 42 40 - 60 mg/dL    LDL Cholesterol  61 0 - 100 mg/dL    VLDL Cholesterol 15 5 - 40 mg/dL    LDL/HDL Ratio 1.48    Microalbumin / Creatinine Urine Ratio - Urine, Clean Catch    Specimen: Urine, Clean Catch   Result Value Ref Range    Microalbumin/Creatinine Ratio      Creatinine, Urine 98.1 mg/dL    Microalbumin, Urine <1.2 mg/dL   TSH    Specimen: Blood   Result Value Ref Range    TSH 4.650 (H) 0.270 - 4.200 uIU/mL   Vitamin D 25 Hydroxy    Specimen: Blood   Result Value Ref Range    25 Hydroxy, Vitamin D 27.9 (L) 30.0 - 100.0 ng/ml   POC Glucose, Blood    Specimen: Blood   Result Value Ref Range    Glucose 240 (A) 70 - 130 mg/dL    Lot Number [id number]     Expiration Date 07/01/2021    POC Glycosylated Hemoglobin (Hb A1C)    Specimen: Blood   Result Value Ref Range    Hemoglobin A1C 8.3 %    Lot Number [id number]     Expiration Date 09/14/2022      Average sugar is 190  Reminded her to update eye exam  No callus or ulcer  Ur alb due   F/u 3-4 months

## 2021-01-16 ENCOUNTER — TELEPHONE (OUTPATIENT)
Dept: ENDOCRINOLOGY | Facility: CLINIC | Age: 51
End: 2021-01-16

## 2021-01-16 NOTE — TELEPHONE ENCOUNTER
We have both levothyroxine 25 and levothyroxine 200 mcg daily dose on her med list- please clarify with her which one she is currently taking  Thanks, vickie

## 2021-01-19 NOTE — TELEPHONE ENCOUNTER
Pt states she takes both  One of each daily of 25mcg and 200mcg and she verified the colors for me

## 2021-02-25 ENCOUNTER — TELEPHONE (OUTPATIENT)
Dept: ENDOCRINOLOGY | Facility: CLINIC | Age: 51
End: 2021-02-25

## 2021-04-12 DIAGNOSIS — Z01.812 BLOOD TESTS PRIOR TO TREATMENT OR PROCEDURE: Primary | ICD-10-CM

## 2021-04-27 ENCOUNTER — OFFICE VISIT (OUTPATIENT)
Dept: ENDOCRINOLOGY | Facility: CLINIC | Age: 51
End: 2021-04-27

## 2021-04-27 VITALS
DIASTOLIC BLOOD PRESSURE: 90 MMHG | HEIGHT: 66 IN | TEMPERATURE: 98 F | BODY MASS INDEX: 39.95 KG/M2 | SYSTOLIC BLOOD PRESSURE: 170 MMHG | WEIGHT: 248.6 LBS

## 2021-04-27 DIAGNOSIS — E03.9 ACQUIRED HYPOTHYROIDISM: ICD-10-CM

## 2021-04-27 DIAGNOSIS — E11.40 TYPE 2 DIABETES MELLITUS WITH DIABETIC NEUROPATHY, WITH LONG-TERM CURRENT USE OF INSULIN (HCC): Primary | ICD-10-CM

## 2021-04-27 DIAGNOSIS — E78.2 MIXED HYPERLIPIDEMIA: ICD-10-CM

## 2021-04-27 DIAGNOSIS — Z79.4 TYPE 2 DIABETES MELLITUS WITH DIABETIC NEUROPATHY, WITH LONG-TERM CURRENT USE OF INSULIN (HCC): Primary | ICD-10-CM

## 2021-04-27 LAB
EXPIRATION DATE: ABNORMAL
EXPIRATION DATE: NORMAL
GLUCOSE BLDC GLUCOMTR-MCNC: 228 MG/DL (ref 70–130)
HBA1C MFR BLD: 8.6 %
Lab: ABNORMAL
Lab: NORMAL

## 2021-04-27 PROCEDURE — 83036 HEMOGLOBIN GLYCOSYLATED A1C: CPT | Performed by: INTERNAL MEDICINE

## 2021-04-27 PROCEDURE — 82947 ASSAY GLUCOSE BLOOD QUANT: CPT | Performed by: INTERNAL MEDICINE

## 2021-04-27 PROCEDURE — 99214 OFFICE O/P EST MOD 30 MIN: CPT | Performed by: INTERNAL MEDICINE

## 2021-04-27 NOTE — PROGRESS NOTES
"Vilma Glass Ayala 50 y.o.  [facility]: Follow-up, Diabetes (type 2), Diabetic Eye Exam (due for one ), Hypothyroidism, and Hyperlipidemia      [place]: Follow-up, Diabetes (type 2), Diabetic Eye Exam (due for one ), Hypothyroidism, and Hyperlipidemia    Blood sugar and 90 day average sugar reviewed  Results for orders placed or performed in visit on 04/27/21   POC Glucose, Blood    Specimen: Blood   Result Value Ref Range    Glucose 228 (A) 70 - 130 mg/dL    Lot Number [id number]     Expiration Date 12/15/21    POC Glycosylated Hemoglobin (Hb A1C)    Specimen: Blood   Result Value Ref Range    Hemoglobin A1C 8.6 %    Lot Number [id number]     Expiration Date 01/14/23      Average sugar is 195  Last tsh 1/21 was slightly elevated   Chol was good   Average sugar last ov was 190  Is on crestor 5 mg daily   Is on synthroid 225 mcg daily     Allergies   Allergen Reactions   • Doxepin Rash   • Januvia [Sitagliptin] Other (See Comments)     Insomnia       Current Outpatient Medications:   •  benztropine (COGENTIN) 0.5 MG tablet, Take 0.5 mg by mouth As Needed., Disp: , Rfl:   •  busPIRone (BUSPAR) 10 MG tablet, Take 1 tablet by mouth Daily., Disp: 90 tablet, Rfl: 1  •  Cholecalciferol (VITAMIN D3 PO), Take 1 tablet by mouth Daily., Disp: , Rfl:   •  citalopram (CeleXA) 40 MG tablet, Take 1 tablet by mouth Daily., Disp: 90 tablet, Rfl: 1  •  glipiZIDE (GLUCOTROL) 10 MG tablet, Take 1 tablet by mouth 2 (two) times a day., Disp: , Rfl:   •  insulin lispro protamine-insulin lispro (HumaLOG Mix 75/25) (75-25) 100 UNIT/ML suspension injection, Inject 40 units in am and 25 units before supper (Patient taking differently: Inject 42 units in am and 22 units before supper), Disp: 30 mL, Rfl: 3  •  Insulin Syringe-Needle U-100 (BD INSULIN SYRINGE ULTRAFINE) 31G X 15/64\" 0.5 ML misc, , Disp: , Rfl:   •  [employer] BLOOD GLUCOSE TEST test strip, Test blood sugars TID, Disp: 100 each, Rfl: 12  •  levalbuterol (XOPENEX HFA) 45 MCG/ACT inhaler, Inhale 2 " puffs 4 (Four) Times a Day As Needed for Wheezing., Disp: 15 g, Rfl: 5  •  levothyroxine (SYNTHROID, LEVOTHROID) 200 MCG tablet, Take 200 mcg by mouth Daily., Disp: , Rfl:   •  levothyroxine (SYNTHROID, LEVOTHROID) 25 MCG tablet, Take 1 tablet by mouth Daily., Disp: 90 tablet, Rfl: 1  •  metFORMIN (GLUCOPHAGE) 500 MG tablet, Take 2 tablets BID with food, Disp: 120 tablet, Rfl: 2  •  risperiDONE (risperDAL) 3 MG tablet, Take 3 mg by mouth Daily., Disp: , Rfl:   •  risperiDONE (RISPERDAL) 4 MG tablet, Take 4 mg by mouth Every Night., Disp: , Rfl:   •  rosuvastatin (CRESTOR) 5 MG tablet, Take 5 mg by mouth Every Night. Take one po daily, Disp: , Rfl:   •  VENTOLIN  (90 Base) MCG/ACT inhaler, Inhale 2 puffs 4 (Four) Times a Day., Disp: , Rfl:   Patient Active Problem List    Diagnosis    • Urinary incontinence [R32]    • Type 2 diabetes mellitus without complication, with long-term current use of insulin (CMS/[facility]) [E11.9, Z79.4]    • Cough [R05]    • Dyspnea on exertion [R06.00]    • Mixed hyperlipidemia [E78.2]    • Anxiety and depression [F41.9, F32.9]    • Uncontrolled type 2 diabetes mellitus (CMS/[facility]) [E11.65]    • Hypothyroid [E03.9]    • Obesity [E66.9]    • Vitamin D deficiency, unspecified [E55.9]    • Diabetic neuropathy, type II diabetes mellitus (CMS/[facility]) [E11.40]      Review of Systems   Constitutional: Positive for activity change, appetite change and unexpected weight change.   HENT: Negative for congestion and rhinorrhea.    Eyes: Negative for visual disturbance.   Respiratory: Negative for cough and shortness of breath.    Cardiovascular: Negative for palpitations and leg swelling.   Gastrointestinal: Negative for constipation and diarrhea.   Musculoskeletal: Negative for arthralgias and myalgias.   Skin: Negative for color change and wound.   Neurological: Negative for dizziness and light-headedness.   Psychiatric/Behavioral: Negative for agitation. The patient is nervous/anxious. The patient is  not hyperactive.      Social History     Socioeconomic History   • Marital status: Single     Spouse name: Not on file   • Number of children: Not on file   • Years of education: Not on file   • Highest education level: Not on file   Tobacco Use   • Smoking status: Never Smoker   • Smokeless tobacco: Never Used   Substance and Sexual Activity   • Alcohol use: No   • Drug use: No   • Sexual activity: Not Currently     Family History   Problem Relation Age of Onset   • Hypertension Other    • Thyroid disease Other    • Diabetes Other    • Obesity Other    • Diabetes Mother    • Hypertension Mother    • Heart disease Mother    • Cancer Father    • Diabetes Sister    • Diabetes Brother    • No Known Problems Brother      LMP  (LMP Unknown)   Physical Exam  Vitals and nursing note reviewed.   Constitutional:       Appearance: Normal appearance. She is well-developed.   HENT:      Head: Normocephalic and atraumatic.   Eyes:      General: Lids are normal.      Extraocular Movements: Extraocular movements intact.      Conjunctiva/sclera: Conjunctivae normal.      Pupils: Pupils are equal, round, and reactive to light.   Neck:      Thyroid: No thyroid mass or thyromegaly.      Vascular: No carotid bruit.      Trachea: Trachea normal. No tracheal deviation.   Cardiovascular:      Rate and Rhythm: Normal rate and regular rhythm.      Pulses: Normal pulses.      Heart sounds: Normal heart sounds. No murmur heard.   No friction rub. No gallop.    Pulmonary:      Effort: Pulmonary effort is normal. No respiratory distress.      Breath sounds: Normal breath sounds. No wheezing.   Musculoskeletal:         General: No deformity. Normal range of motion.      Cervical back: Normal range of motion and neck supple.   Lymphadenopathy:      Cervical: No cervical adenopathy.   Skin:     General: Skin is warm and dry.      Findings: No erythema or rash.      Nails: There is no clubbing.   Neurological:      Mental Status: She is alert and  oriented to person, place, and time.      Cranial Nerves: No cranial nerve deficit.      Deep Tendon Reflexes: Reflexes are normal and symmetric. Reflexes normal.   Psychiatric:         Speech: Speech normal.         Behavior: Behavior normal.         Thought Content: Thought content normal.         Judgment: Judgment normal.       Results for orders placed or performed in visit on 01/13/21   Comprehensive Metabolic Panel    Specimen: Blood   Result Value Ref Range    Glucose 171 (H) 65 - 99 mg/dL    BUN 11 6 - 20 mg/dL    Creatinine 1.08 (H) 0.57 - 1.00 mg/dL    Sodium 137 136 - 145 mmol/L    Potassium 4.2 3.5 - 5.2 mmol/L    Chloride 101 98 - 107 mmol/L    CO2 25.7 22.0 - 29.0 mmol/L    Calcium 9.5 8.6 - 10.5 mg/dL    Total Protein 6.8 6.0 - 8.5 g/dL    Albumin 4.30 3.50 - 5.20 g/dL    ALT (SGPT) 20 1 - 33 U/L    AST (SGOT) 23 1 - 32 U/L    Alkaline Phosphatase 119 (H) 39 - 117 U/L    Total Bilirubin 0.3 0.0 - 1.2 mg/dL    eGFR Non African Amer 54 (L) >60 mL/min/1.73    Globulin 2.5 gm/dL    A/G Ratio 1.7 g/dL    BUN/Creatinine Ratio 10.2 7.0 - 25.0    Anion Gap 10.3 5.0 - 15.0 mmol/L   Lipid Panel    Specimen: Blood   Result Value Ref Range    Total Cholesterol 118 0 - 200 mg/dL    Triglycerides 70 0 - 150 mg/dL    HDL Cholesterol 42 40 - 60 mg/dL    LDL Cholesterol  61 0 - 100 mg/dL    VLDL Cholesterol 15 5 - 40 mg/dL    LDL/HDL Ratio 1.48    Microalbumin / Creatinine Urine Ratio - Urine, Clean Catch    Specimen: Urine, Clean Catch   Result Value Ref Range    Microalbumin/Creatinine Ratio      Creatinine, Urine 98.1 mg/dL    Microalbumin, Urine <1.2 mg/dL   TSH    Specimen: Blood   Result Value Ref Range    TSH 4.650 (H) 0.270 - 4.200 uIU/mL   Vitamin D 25 Hydroxy    Specimen: Blood   Result Value Ref Range    25 Hydroxy, Vitamin D 27.9 (L) 30.0 - 100.0 ng/ml   POC Glucose, Blood    Specimen: Blood   Result Value Ref Range    Glucose 240 (A) 70 - 130 mg/dL    Lot Number [id number]     Expiration Date 07/01/2021     POC Glycosylated Hemoglobin (Hb A1C)    Specimen: Blood   Result Value Ref Range    Hemoglobin A1C 8.3 %    Lot Number [id number]     Expiration Date 09/14/2022      Problems Addressed this Visit        Other    Mixed hyperlipidemia     Is on crestor 5 mcg daily   Update flp next ov            Hypothyroid     Recent tsh slightly high- reinforced using Monday through Sunday pill pack for dosing medication (is on 225 mcg daily)   F/u lab work in August with next appt          Diabetic neuropathy, type II diabetes mellitus (CMS/[facility]) - Primary     Blood sugar and 90 day average sugar reviewed  Results for orders placed or performed in visit on 04/27/21   POC Glucose, Blood    Specimen: Blood   Result Value Ref Range    Glucose 228 (A) 70 - 130 mg/dL    Lot Number [id number]     Expiration Date 12/15/21    POC Glycosylated Hemoglobin (Hb A1C)    Specimen: Blood   Result Value Ref Range    Hemoglobin A1C 8.6 %    Lot Number [id number]     Expiration Date 01/14/23      Average sugar is 195  She is living on her own without her mother (now in a nursing home)   She is living in [facility] with eye exam   No neuropathy - bilateral callus  Ur alb neg  Goal average sugar 150 or less discussed  She has been drinking more soft drinks and juice  She is getting ready to move in to a group home setting  Diet reviewed with her   No low sugars  F/u 3-4 months          Relevant Orders    POC Glucose, Blood (Completed)    POC Glycosylated Hemoglobin (Hb A1C) (Completed)      Diagnoses       Codes Comments    Type 2 diabetes mellitus with diabetic neuropathy, with long-term current use of insulin (CMS/[facility])    -  Primary ICD-10-CM: E11.40, Z79.4  ICD-9-CM: 250.60, 357.2, V58.67     Acquired hypothyroidism     ICD-10-CM: E03.9  ICD-9-CM: 244.9     Mixed hyperlipidemia     ICD-10-CM: E78.2  ICD-9-CM: 272.2         Return in about 3 months (around 7/27/2021) for Recheck.    Kim Pierson MA  Signed Selina Lopez  MD

## 2021-04-27 NOTE — ASSESSMENT & PLAN NOTE
Recent tsh slightly high- reinforced using Monday through Sunday pill pack for dosing medication (is on 225 mcg daily)   F/u lab work in August with next appt

## 2021-04-27 NOTE — ASSESSMENT & PLAN NOTE
Blood sugar and 90 day average sugar reviewed  Results for orders placed or performed in visit on 04/27/21   POC Glucose, Blood    Specimen: Blood   Result Value Ref Range    Glucose 228 (A) 70 - 130 mg/dL    Lot Number [id number]     Expiration Date 12/15/21    POC Glycosylated Hemoglobin (Hb A1C)    Specimen: Blood   Result Value Ref Range    Hemoglobin A1C 8.6 %    Lot Number [id number]     Expiration Date 01/14/23      Average sugar is 195  She is living on her own without her mother (now in a nursing home)   She is living in [facility]   Is [facility] with eye exam   No neuropathy - bilateral callus  Ur alb neg  Goal average sugar 150 or less discussed  She has been drinking more soft drinks and juice  She is getting ready to move in to a group home setting  Diet reviewed with her   No low sugars  F/u 3-4 months

## 2021-05-14 DIAGNOSIS — Z01.812 BLOOD TESTS PRIOR TO TREATMENT OR PROCEDURE: Primary | ICD-10-CM

## 2021-05-17 ENCOUNTER — LAB (OUTPATIENT)
Dept: PULMONOLOGY | Facility: CLINIC | Age: 51
End: 2021-05-17

## 2021-05-17 DIAGNOSIS — Z01.812 BLOOD TESTS PRIOR TO TREATMENT OR PROCEDURE: ICD-10-CM

## 2021-05-17 PROCEDURE — U0004 COV-19 TEST NON-CDC HGH THRU: HCPCS | Performed by: INTERNAL MEDICINE

## 2021-05-17 PROCEDURE — U0005 INFEC AGEN DETEC AMPLI PROBE: HCPCS | Performed by: INTERNAL MEDICINE

## 2021-05-17 PROCEDURE — 99211 OFF/OP EST MAY X REQ PHY/QHP: CPT | Performed by: INTERNAL MEDICINE

## 2021-05-18 LAB — SARS-COV-2 RNA NOSE QL NAA+PROBE: NOT DETECTED

## 2021-05-19 ENCOUNTER — OFFICE VISIT (OUTPATIENT)
Dept: PULMONOLOGY | Facility: CLINIC | Age: 51
End: 2021-05-19

## 2021-05-19 VITALS
TEMPERATURE: 97.7 F | SYSTOLIC BLOOD PRESSURE: 126 MMHG | DIASTOLIC BLOOD PRESSURE: 84 MMHG | WEIGHT: 248.5 LBS | HEART RATE: 103 BPM | BODY MASS INDEX: 57.51 KG/M2 | OXYGEN SATURATION: 98 % | HEIGHT: 55 IN | RESPIRATION RATE: 20 BRPM

## 2021-05-19 DIAGNOSIS — J45.20 MILD INTERMITTENT ASTHMA WITHOUT COMPLICATION: Primary | ICD-10-CM

## 2021-05-19 PROCEDURE — 99213 OFFICE O/P EST LOW 20 MIN: CPT | Performed by: INTERNAL MEDICINE

## 2021-05-19 NOTE — PROGRESS NOTES
"Follow Up Office Note       Patient Name: Vilma Ayala    Referring Physician: No ref. provider found    Chief Complaint:    Chief Complaint   Patient presents with   • Shortness of Breath     f/u       History of Present Illness: Vilma Ayala is a 50 y.o. female who is here today to follow-up care with Pulmonary.  Patient has a past medical history significant for obesity, diabetes mellitus type 2, anxiety, and dyspnea on exertion.  States that she is currently doing very well.  She uses albuterol inhaler as needed.  No hospitalizations ER visits or prednisone use since her last evaluation.  She has no other acute complaints.    Review of Systems:   Review of Systems   Constitutional: Negative for chills, fatigue and fever.   HENT: Negative for congestion and voice change.    Eyes: Negative for blurred vision.   Respiratory: Positive for shortness of breath. Negative for cough and wheezing.    Cardiovascular: Negative for chest pain.   Skin: Negative for dry skin.   Hematological: Negative for adenopathy.   Psychiatric/Behavioral: Negative for agitation and depressed mood.       The following portions of the patient's history were reviewed and updated as appropriate: allergies, current medications, past family history, past medical history, past social history, past surgical history and problem list.    Physical Exam:  Vital Signs:   Vitals:    05/19/21 1457   BP: 126/84   BP Location: Right arm   Patient Position: Sitting   Cuff Size: Adult   Pulse: 103   Resp: 20   Temp: 97.7 °F (36.5 °C)   SpO2: 98%  Comment: room air at rest   Weight: 113 kg (248 lb 8 oz)   Height: 63 cm (24.8\")       Physical Exam  Vitals and nursing note reviewed.   Constitutional:       General: She is not in acute distress.     Appearance: She is well-developed. She is obese. She is not ill-appearing or toxic-appearing.   HENT:      Head: Normocephalic and atraumatic.   Cardiovascular:      Rate and Rhythm: Normal rate and " regular rhythm.      Pulses: Normal pulses.      Heart sounds: Normal heart sounds. No murmur heard.   No friction rub. No gallop.    Pulmonary:      Effort: Pulmonary effort is normal. No respiratory distress.      Breath sounds: Normal breath sounds. No wheezing, rhonchi or rales.   Musculoskeletal:      Right lower leg: No edema.      Left lower leg: No edema.   Skin:     General: Skin is warm and dry.   Neurological:      Mental Status: She is alert and oriented to person, place, and time.         Immunization History   Administered Date(s) Administered   • Flu Vaccine Quad PF >18YRS 10/04/2015   • Flu Vaccine Quad PF >36MO 10/04/2017   • Hepatitis A 05/09/2018, 10/04/2018, 01/10/2020   • Influenza, Unspecified 10/04/2018   • Pneumococcal Polysaccharide (PPSV23) 05/28/2016   • flucelvax quad pfs =>4 YRS 10/04/2018       Results Review:   - I personally reviewed the pts imaging from chest x-ray from 5/19/2021 showed no acute cardiopulmonary process.  - I personally reviewed the pts PFT from 5/19/2021 showed no obstruction or restriction.  There is a nonspecific reduction in FEV1 and FVC, test that was inadequate.    Assessment / Plan:   1. Mild intermittent asthma without complication (Primary)  -Patient currently well controlled with an albuterol inhaler.  I will go ahead and continue the albuterol inhaler for now.  No other medication needed.  Hold on any long-term inhaler therapy.    Follow Up:   Return in about 1 year (around 5/19/2022).       JULIO CESAR Fall, DO  Pulmonary and Critical Care Medicine  Note Electronically Signed    Please note that portions of this note may have been completed with a voice recognition program. Efforts were made to edit the dictations, but occasionally words are mistranscribed.

## 2021-08-30 ENCOUNTER — OFFICE VISIT (OUTPATIENT)
Dept: ENDOCRINOLOGY | Facility: CLINIC | Age: 51
End: 2021-08-30

## 2021-08-30 VITALS
HEIGHT: 63 IN | DIASTOLIC BLOOD PRESSURE: 60 MMHG | OXYGEN SATURATION: 96 % | HEART RATE: 100 BPM | WEIGHT: 240.6 LBS | BODY MASS INDEX: 42.63 KG/M2 | SYSTOLIC BLOOD PRESSURE: 100 MMHG

## 2021-08-30 DIAGNOSIS — E03.9 ACQUIRED HYPOTHYROIDISM: ICD-10-CM

## 2021-08-30 DIAGNOSIS — E78.2 MIXED HYPERLIPIDEMIA: ICD-10-CM

## 2021-08-30 DIAGNOSIS — E11.65 UNCONTROLLED TYPE 2 DIABETES MELLITUS WITH HYPERGLYCEMIA (HCC): Primary | ICD-10-CM

## 2021-08-30 LAB
EXPIRATION DATE: ABNORMAL
EXPIRATION DATE: NORMAL
GLUCOSE BLDC GLUCOMTR-MCNC: 196 MG/DL (ref 70–130)
HBA1C MFR BLD: 7.4 %
Lab: ABNORMAL
Lab: NORMAL

## 2021-08-30 PROCEDURE — 83036 HEMOGLOBIN GLYCOSYLATED A1C: CPT | Performed by: INTERNAL MEDICINE

## 2021-08-30 PROCEDURE — 82947 ASSAY GLUCOSE BLOOD QUANT: CPT | Performed by: INTERNAL MEDICINE

## 2021-08-30 PROCEDURE — 99214 OFFICE O/P EST MOD 30 MIN: CPT | Performed by: INTERNAL MEDICINE

## 2021-08-30 NOTE — ASSESSMENT & PLAN NOTE
Blood sugar and 90 day average sugar reviewed  Results for orders placed or performed in visit on 08/30/21   POC Glycosylated Hemoglobin (Hb A1C)    Specimen: Blood   Result Value Ref Range    Hemoglobin A1C 7.4 %    Lot Number [id number]     Expiration Date 05/11/2023    POC Glucose, Blood    Specimen: Blood   Result Value Ref Range    Glucose 196 (A) 70 - 130 mg/dL    Lot Number [id number]     Expiration Date 04/14/2022      Average sugar is improved, commended   Is utd with eye exam  Foot care reviewed   Ur alb neg   F/u 3-4 months

## 2021-08-30 NOTE — PROGRESS NOTES
"Vilma Ayala 50 y.o.  CC:Follow-up, Diabetes (Type II, eye exam over one year ago), Hypothyroidism, and Hyperlipidemia      [place]: Follow-up, Diabetes (Type II, eye exam over one year ago), Hypothyroidism, and Hyperlipidemia    She needs a meter and test strips- lost meter in the move  Blood sugar and 90 day average sugar reviewed  Results for orders placed or performed in visit on 08/30/21   POC Glycosylated Hemoglobin (Hb A1C)    Specimen: Blood   Result Value Ref Range    Hemoglobin A1C 7.4 %    Lot Number [id number]     Expiration Date 05/11/2023    POC Glucose, Blood    Specimen: Blood   Result Value Ref Range    Glucose 196 (A) 70 - 130 mg/dL    Lot Number [id number]     Expiration Date 04/14/2022      Is on synthroid 225 mcg daily   Has stopped drinking pop - drinking more water  bp is good today   Is on crestor 5 mg daily   Dry skin, heel callus    Allergies   Allergen Reactions   • Doxepin Rash   • Januvia [Sitagliptin] Other (See Comments)     Insomnia       Current Outpatient Medications:   •  benztropine (COGENTIN) 0.5 MG tablet, Take 0.5 mg by mouth As Needed., Disp: , Rfl:   •  busPIRone (BUSPAR) 10 MG tablet, Take 1 tablet by mouth Daily., Disp: 90 tablet, Rfl: 1  •  Cholecalciferol (VITAMIN D3 PO), Take 1 tablet by mouth Daily., Disp: , Rfl:   •  citalopram (CeleXA) 40 MG tablet, Take 1 tablet by mouth Daily., Disp: 90 tablet, Rfl: 1  •  glipiZIDE (GLUCOTROL) 10 MG tablet, Take 1 tablet by mouth 2 (two) times a day., Disp: , Rfl:   •  insulin lispro protamine-insulin lispro (HumaLOG Mix 75/25) (75-25) 100 UNIT/ML suspension injection, Inject 40 units in am and 25 units before supper (Patient taking differently: Inject 40 units in am and 20 units before supper), Disp: 30 mL, Rfl: 3  •  Insulin Syringe-Needle U-100 (BD INSULIN SYRINGE ULTRAFINE) 31G X 15/64\" 0.5 ML misc, , Disp: , Rfl:   •  [employer] BLOOD GLUCOSE TEST test strip, Test blood sugars TID, Disp: 100 each, Rfl: 12  •  levalbuterol " (XOPENEX HFA) 45 MCG/ACT inhaler, Inhale 2 puffs 4 (Four) Times a Day As Needed for Wheezing., Disp: 15 g, Rfl: 5  •  levothyroxine (SYNTHROID, LEVOTHROID) 200 MCG tablet, Take 200 mcg by mouth Daily., Disp: , Rfl:   •  levothyroxine (SYNTHROID, LEVOTHROID) 25 MCG tablet, Take 1 tablet by mouth Daily., Disp: 90 tablet, Rfl: 1  •  metFORMIN (GLUCOPHAGE) 500 MG tablet, Take 2 tablets BID with food, Disp: 120 tablet, Rfl: 2  •  risperiDONE (risperDAL) 3 MG tablet, Take 3 mg by mouth Daily., Disp: , Rfl:   •  risperiDONE (RISPERDAL) 4 MG tablet, Take 4 mg by mouth Every Night., Disp: , Rfl:   •  rosuvastatin (CRESTOR) 5 MG tablet, Take 5 mg by mouth Every Night. Take one po daily, Disp: , Rfl:   •  VENTOLIN  (90 Base) MCG/ACT inhaler, Inhale 2 puffs 4 (Four) Times a Day., Disp: , Rfl:   Patient Active Problem List    Diagnosis    • Urinary incontinence [R32]    • Type 2 diabetes mellitus without complication, with long-term current use of insulin (CMS/[facility]) [E11.9, Z79.4]    • Cough [R05]    • Dyspnea on exertion [R06.00]    • Mixed hyperlipidemia [E78.2]    • Anxiety and depression [F41.9, F32.9]    • Uncontrolled type 2 diabetes mellitus (CMS/[facility]) [E11.65]    • Hypothyroid [E03.9]    • Obesity [E66.9]    • Vitamin D deficiency, unspecified [E55.9]    • Diabetic neuropathy, type II diabetes mellitus (CMS/[facility]) [E11.40]      Review of Systems   Constitutional: Positive for activity change. Negative for appetite change and unexpected weight change.   HENT: Negative for congestion and rhinorrhea.    Eyes: Negative for visual disturbance.   Respiratory: Negative for cough and shortness of breath.    Cardiovascular: Negative for palpitations and leg swelling.   Gastrointestinal: Negative for constipation, diarrhea and nausea.   Genitourinary: Negative for hematuria.   Musculoskeletal: Negative for arthralgias, back pain, gait problem, joint swelling and myalgias.   Skin: Positive for rash (dry skin heels and feet).  "Negative for color change and wound.   Allergic/Immunologic: Negative for environmental allergies, food allergies and immunocompromised state.   Neurological: Negative for dizziness, weakness and light-headedness.   Psychiatric/Behavioral: Negative for confusion, decreased concentration, dysphoric mood and sleep disturbance. The patient is not nervous/anxious.      Social History     Socioeconomic History   • Marital status: Single     Spouse name: Not on file   • Number of children: Not on file   • Years of education: Not on file   • Highest education level: Not on file   Tobacco Use   • Smoking status: Never Smoker   • Smokeless tobacco: Never Used   Vaping Use   • Vaping Use: Never used   Substance and Sexual Activity   • Alcohol use: No   • Drug use: No   • Sexual activity: Not Currently     Family History   Problem Relation Age of Onset   • Hypertension Other    • Thyroid disease Other    • Diabetes Other    • Obesity Other    • Diabetes Mother    • Hypertension Mother    • Heart disease Mother    • Cancer Father    • Diabetes Sister    • Diabetes Brother    • No Known Problems Brother      /60   Pulse 100   Ht 160 cm (63\")   Wt 109 kg (240 lb 9.6 oz)   LMP  (LMP Unknown)   SpO2 96%   BMI 42.62 kg/m²   Physical Exam  Vitals and nursing note reviewed.   Constitutional:       Appearance: Normal appearance. She is well-developed.   HENT:      Head: Normocephalic and atraumatic.   Eyes:      General: Lids are normal.      Extraocular Movements: Extraocular movements intact.      Conjunctiva/sclera: Conjunctivae normal.      Pupils: Pupils are equal, round, and reactive to light.   Neck:      Thyroid: No thyroid mass or thyromegaly.      Vascular: No carotid bruit.      Trachea: Trachea normal. No tracheal deviation.   Cardiovascular:      Rate and Rhythm: Normal rate and regular rhythm.      Pulses: Normal pulses.      Heart sounds: Normal heart sounds. No murmur heard.   No friction rub. No gallop.  "   Pulmonary:      Effort: Pulmonary effort is normal. No respiratory distress.      Breath sounds: Normal breath sounds. No wheezing.   Musculoskeletal:         General: No deformity. Normal range of motion.      Cervical back: Normal range of motion and neck supple.        Feet:    Feet:      Comments: Diabetic foot exam:   Left: Filament test absent   Pulses Dorsalis Pedis:  present   Reflexes absent    Vibratory sensation diminished   Proprioception normal   Sharp/dull discrimination normal       Right: Filament test absent   Pulses Dorsalis Pedis:  present   Reflexes absent    Vibratory sensation diminished   Proprioception normal   Sharp/dull discrimination normal Diabetic Foot Exam Performed and Monofilament Test Performed    Lymphadenopathy:      Cervical: No cervical adenopathy.   Skin:     General: Skin is warm and dry.      Findings: Rash present. No erythema.      Nails: There is no clubbing.   Neurological:      General: No focal deficit present.      Mental Status: She is alert and oriented to person, place, and time.      Cranial Nerves: No cranial nerve deficit.      Deep Tendon Reflexes: Reflexes are normal and symmetric. Reflexes normal.   Psychiatric:         Speech: Speech normal.         Behavior: Behavior normal.         Thought Content: Thought content normal.         Judgment: Judgment normal.       Results for orders placed or performed in visit on 08/30/21   POC Glycosylated Hemoglobin (Hb A1C)    Specimen: Blood   Result Value Ref Range    Hemoglobin A1C 7.4 %    Lot Number [id number]     Expiration Date 05/11/2023    POC Glucose, Blood    Specimen: Blood   Result Value Ref Range    Glucose 196 (A) 70 - 130 mg/dL    Lot Number [id number]     Expiration Date 04/14/2022      Diagnoses and all orders for this visit:    1. Uncontrolled type 2 diabetes mellitus with hyperglycemia (CMS/[facility]) (Primary)  Assessment & Plan:  Blood sugar and 90 day average sugar reviewed  Results for orders placed or  performed in visit on 08/30/21   POC Glycosylated Hemoglobin (Hb A1C)    Specimen: Blood   Result Value Ref Range    Hemoglobin A1C 7.4 %    Lot Number [id number]     Expiration Date 05/11/2023    POC Glucose, Blood    Specimen: Blood   Result Value Ref Range    Glucose 196 (A) 70 - 130 mg/dL    Lot Number [id number]     Expiration Date 04/14/2022      Average sugar is improved, commended   Is utd with eye exam  Foot care reviewed   Ur alb neg   F/u 3-4 months     Orders:  -     POC Glycosylated Hemoglobin (Hb A1C)  -     POC Glucose, Blood  -     Comprehensive Metabolic Panel  -     Lipid Panel  -     TSH  -     Hepatitis C Antibody    2. Acquired hypothyroidism  Assessment & Plan:  Is on synthroid 225 mcg daily   Recent lab work Dr Corbin- will try to get copy       3. Mixed hyperlipidemia  Assessment & Plan:  Is on low fat diet and crestor 5 mg daily   Check flp     Return in about 3 months (around 11/30/2021) for Recheck.   will try to get any recent lab work from Dr Corbin - patient did not have a copy for review   Selina Lopez MD  Signed Selina Lopez MD

## 2021-09-07 RX ORDER — LANCETS 30 GAUGE
EACH MISCELLANEOUS
Qty: 100 EACH | Refills: 5 | Status: SHIPPED | OUTPATIENT
Start: 2021-09-07 | End: 2023-01-06 | Stop reason: CLARIF

## 2021-09-07 NOTE — TELEPHONE ENCOUNTER
PATIENT NEEDS PRESCRIPTION FOR NEEDLES FOR HER GLUCOSE MONITOR. PATIENTS NUMBER [phone] PLEASE CALL PATIENT ABOUT PRESCRIPTION

## 2021-11-19 RX ORDER — L. ACIDOPHILUS/BIFIDO. LONGUM 15 MG
CAPSULE,DELAYED RELEASE (ENTERIC COATED) ORAL
Qty: 300 EACH | Refills: 3 | Status: SHIPPED | OUTPATIENT
Start: 2021-11-19 | End: 2021-11-22 | Stop reason: SDUPTHER

## 2021-11-19 NOTE — TELEPHONE ENCOUNTER
LOV 8-30-21  NOV 12-28-21  LMOM to advise pt the rx is being sent in and to call back if any questions

## 2021-11-22 ENCOUNTER — TELEPHONE (OUTPATIENT)
Dept: ENDOCRINOLOGY | Facility: CLINIC | Age: 51
End: 2021-11-22

## 2021-11-22 RX ORDER — L. ACIDOPHILUS/BIFIDO. LONGUM 15 MG
CAPSULE,DELAYED RELEASE (ENTERIC COATED) ORAL
Qty: 300 EACH | Refills: 3 | Status: SHIPPED | OUTPATIENT
Start: 2021-11-22 | End: 2021-12-28 | Stop reason: ALTCHOICE

## 2021-11-22 NOTE — TELEPHONE ENCOUNTER
Pt needs glucose test strips called into the [facility] on [address] as this is the pharmacy she will be using.

## 2021-12-28 ENCOUNTER — TELEPHONE (OUTPATIENT)
Dept: ENDOCRINOLOGY | Facility: CLINIC | Age: 51
End: 2021-12-28

## 2021-12-28 ENCOUNTER — OFFICE VISIT (OUTPATIENT)
Dept: ENDOCRINOLOGY | Facility: CLINIC | Age: 51
End: 2021-12-28

## 2021-12-28 ENCOUNTER — LAB (OUTPATIENT)
Dept: LAB | Facility: HOSPITAL | Age: 51
End: 2021-12-28

## 2021-12-28 VITALS
DIASTOLIC BLOOD PRESSURE: 74 MMHG | WEIGHT: 239.6 LBS | OXYGEN SATURATION: 99 % | HEIGHT: 63 IN | BODY MASS INDEX: 42.45 KG/M2 | HEART RATE: 84 BPM | SYSTOLIC BLOOD PRESSURE: 118 MMHG

## 2021-12-28 DIAGNOSIS — E11.65 UNCONTROLLED TYPE 2 DIABETES MELLITUS WITH HYPERGLYCEMIA (HCC): Primary | ICD-10-CM

## 2021-12-28 DIAGNOSIS — E03.9 ACQUIRED HYPOTHYROIDISM: ICD-10-CM

## 2021-12-28 DIAGNOSIS — E78.2 MIXED HYPERLIPIDEMIA: ICD-10-CM

## 2021-12-28 PROBLEM — N39.44 NOCTURNAL ENURESIS: Status: ACTIVE | Noted: 2021-12-07

## 2021-12-28 PROBLEM — R31.29 MICROSCOPIC HEMATURIA: Status: ACTIVE | Noted: 2021-12-07

## 2021-12-28 PROBLEM — N39.41 URGE INCONTINENCE: Status: ACTIVE | Noted: 2021-12-07

## 2021-12-28 PROBLEM — R35.1 NOCTURIA: Status: ACTIVE | Noted: 2021-12-07

## 2021-12-28 PROBLEM — N39.3 STRESS INCONTINENCE: Status: ACTIVE | Noted: 2021-12-07

## 2021-12-28 LAB
ALBUMIN SERPL-MCNC: 4.4 G/DL (ref 3.5–5.2)
ALBUMIN/GLOB SERPL: 1.8 G/DL
ALP SERPL-CCNC: 107 U/L (ref 39–117)
ALT SERPL W P-5'-P-CCNC: 15 U/L (ref 1–33)
ANION GAP SERPL CALCULATED.3IONS-SCNC: 8.6 MMOL/L (ref 5–15)
AST SERPL-CCNC: 15 U/L (ref 1–32)
BILIRUB SERPL-MCNC: 0.2 MG/DL (ref 0–1.2)
BUN SERPL-MCNC: 10 MG/DL (ref 6–20)
BUN/CREAT SERPL: 7.9 (ref 7–25)
CALCIUM SPEC-SCNC: 9.6 MG/DL (ref 8.6–10.5)
CHLORIDE SERPL-SCNC: 101 MMOL/L (ref 98–107)
CHOLEST SERPL-MCNC: 157 MG/DL (ref 0–200)
CO2 SERPL-SCNC: 28.4 MMOL/L (ref 22–29)
CREAT SERPL-MCNC: 1.27 MG/DL (ref 0.57–1)
EXPIRATION DATE: ABNORMAL
EXPIRATION DATE: NORMAL
GFR SERPL CREATININE-BSD FRML MDRD: 44 ML/MIN/1.73
GLOBULIN UR ELPH-MCNC: 2.5 GM/DL
GLUCOSE BLDC GLUCOMTR-MCNC: 166 MG/DL (ref 70–130)
GLUCOSE SERPL-MCNC: 117 MG/DL (ref 65–99)
HBA1C MFR BLD: 6.8 %
HCV AB SER DONR QL: NORMAL
HDLC SERPL-MCNC: 42 MG/DL (ref 40–60)
LDLC SERPL CALC-MCNC: 94 MG/DL (ref 0–100)
LDLC/HDLC SERPL: 2.18 {RATIO}
Lab: ABNORMAL
Lab: NORMAL
POTASSIUM SERPL-SCNC: 3.9 MMOL/L (ref 3.5–5.2)
PROT SERPL-MCNC: 6.9 G/DL (ref 6–8.5)
SODIUM SERPL-SCNC: 138 MMOL/L (ref 136–145)
TRIGL SERPL-MCNC: 117 MG/DL (ref 0–150)
TSH SERPL DL<=0.05 MIU/L-ACNC: 4.98 UIU/ML (ref 0.27–4.2)
VLDLC SERPL-MCNC: 21 MG/DL (ref 5–40)

## 2021-12-28 PROCEDURE — 83036 HEMOGLOBIN GLYCOSYLATED A1C: CPT | Performed by: INTERNAL MEDICINE

## 2021-12-28 PROCEDURE — 80061 LIPID PANEL: CPT | Performed by: INTERNAL MEDICINE

## 2021-12-28 PROCEDURE — 84443 ASSAY THYROID STIM HORMONE: CPT | Performed by: INTERNAL MEDICINE

## 2021-12-28 PROCEDURE — 80053 COMPREHEN METABOLIC PANEL: CPT | Performed by: INTERNAL MEDICINE

## 2021-12-28 PROCEDURE — 86803 HEPATITIS C AB TEST: CPT | Performed by: INTERNAL MEDICINE

## 2021-12-28 PROCEDURE — 82947 ASSAY GLUCOSE BLOOD QUANT: CPT | Performed by: INTERNAL MEDICINE

## 2021-12-28 PROCEDURE — 99214 OFFICE O/P EST MOD 30 MIN: CPT | Performed by: INTERNAL MEDICINE

## 2021-12-28 PROCEDURE — 3044F HG A1C LEVEL LT 7.0%: CPT | Performed by: INTERNAL MEDICINE

## 2021-12-28 NOTE — ASSESSMENT & PLAN NOTE
Blood sugar and 90 day average sugar reviewed  Results for orders placed or performed in visit on 12/28/21   POC Glycosylated Hemoglobin (Hb A1C)    Specimen: Blood   Result Value Ref Range    Hemoglobin A1C 6.8 %    Lot Number [id number]     Expiration Date 08/30/2023    POC Glucose, Blood    Specimen: Blood   Result Value Ref Range    Glucose 166 (A) 70 - 130 mg/dL    Lot Number [id number]     Expiration Date 05/25/2022      Average sugar improved due to more controlled diet  Is utd with eye exam  No foot ulcer  Ur alb due next ov   F/u 3-4 months

## 2021-12-28 NOTE — PROGRESS NOTES
"Vilma Ayala 51 y.o.  CC:Follow-up, Diabetes (TYpe II, eye exam over one year ago), Hypothyroidism, and Hyperlipidemia      [place]: Follow-up, Diabetes (TYpe II, eye exam over one year ago), Hypothyroidism, and Hyperlipidemia    Blood sugars 200-300 after dinner  Meals are prepared for her at [facility]  They are trying to reduce sweet drinks  Blood sugar and 90 day average sugar reviewed    Results for orders placed or performed in visit on 12/28/21   POC Glycosylated Hemoglobin (Hb A1C)    Specimen: Blood   Result Value Ref Range    Hemoglobin A1C 6.8 %    Lot Number [id number]     Expiration Date 08/30/2023    POC Glucose, Blood    Specimen: Blood   Result Value Ref Range    Glucose 166 (A) 70 - 130 mg/dL    Lot Number [id number]     Expiration Date 05/25/2022      Has been having more sweets with holidays  BP is good  She is injecting insulin twice daily and prn higher sugar (will take 2 u if over 200)  Is on synthroid 225 mcg daily   Is on crestor and low fat diet    Allergies   Allergen Reactions   • Doxepin Rash   • Januvia [Sitagliptin] Other (See Comments)     Insomnia       Current Outpatient Medications:   •  benztropine (COGENTIN) 0.5 MG tablet, Take 0.5 mg by mouth As Needed., Disp: , Rfl:   •  busPIRone (BUSPAR) 10 MG tablet, Take 1 tablet by mouth Daily., Disp: 90 tablet, Rfl: 1  •  Cholecalciferol (VITAMIN D3 PO), Take 1 tablet by mouth Daily., Disp: , Rfl:   •  citalopram (CeleXA) 40 MG tablet, Take 1 tablet by mouth Daily., Disp: 90 tablet, Rfl: 1  •  glipiZIDE (GLUCOTROL) 10 MG tablet, Take 1 tablet by mouth 2 (two) times a day., Disp: , Rfl:   •  insulin lispro protamine-insulin lispro (HumaLOG Mix 75/25) (75-25) 100 UNIT/ML suspension injection, Inject 40 units in am and 25 units before supper (Patient taking differently: Inject 40 units in am and 20 units before supper), Disp: 30 mL, Rfl: 3  •  Insulin Syringe-Needle U-100 (BD INSULIN SYRINGE ULTRAFINE) 31G X 15/64\" 0.5 ML misc, , Disp: " , Rfl:   •  Lancets misc, Test three times daily, Disp: 100 each, Rfl: 5  •  levalbuterol (XOPENEX HFA) 45 MCG/ACT inhaler, Inhale 2 puffs 4 (Four) Times a Day As Needed for Wheezing., Disp: 15 g, Rfl: 5  •  levothyroxine (SYNTHROID, LEVOTHROID) 200 MCG tablet, Take 200 mcg by mouth Daily., Disp: , Rfl:   •  levothyroxine (SYNTHROID, LEVOTHROID) 25 MCG tablet, Take 1 tablet by mouth Daily., Disp: 90 tablet, Rfl: 1  •  metFORMIN (GLUCOPHAGE) 500 MG tablet, Take 2 tablets BID with food, Disp: 120 tablet, Rfl: 2  •  risperiDONE (risperDAL) 3 MG tablet, Take 3 mg by mouth Daily., Disp: , Rfl:   •  risperiDONE (RISPERDAL) 4 MG tablet, Take 4 mg by mouth Every Night. Take 3 in am and 3 at hs, Disp: , Rfl:   •  rosuvastatin (CRESTOR) 5 MG tablet, Take 5 mg by mouth Every Night. Take one po daily, Disp: , Rfl:   •  VENTOLIN  (90 Base) MCG/ACT inhaler, Inhale 2 puffs 4 (Four) Times a Day., Disp: , Rfl:   Patient Active Problem List    Diagnosis    • Microscopic hematuria [R31.29]    • Nocturia [R35.1]    • Nocturnal enuresis [N39.44]    • Stress incontinence [N39.3]    • Urge incontinence [N39.41]    • Urinary incontinence [R32]    • Type 2 diabetes mellitus without complication, with long-term current use of insulin (HCC) [E11.9, Z79.4]    • Cough [R05.9]    • Dyspnea on exertion [R06.00]    • Mixed hyperlipidemia [E78.2]    • Anxiety and depression [F41.9, F32.A]    • Uncontrolled type 2 diabetes mellitus (HCC) [E11.65]    • Hypothyroid [E03.9]    • Obesity [E66.9]    • Vitamin D deficiency, unspecified [E55.9]    • Diabetic neuropathy, type II diabetes mellitus (HCC) [E11.40]      Review of Systems   Constitutional: Positive for activity change and unexpected weight change (eating healthier diet in group home). Negative for appetite change.   HENT: Negative for congestion and rhinorrhea.    Eyes: Negative for visual disturbance.   Respiratory: Negative for cough and shortness of breath.    Cardiovascular: Negative  "for palpitations and leg swelling.   Gastrointestinal: Negative for constipation, diarrhea and nausea.   Genitourinary: Negative for hematuria.   Musculoskeletal: Negative for arthralgias, back pain, gait problem, joint swelling and myalgias.   Skin: Negative for color change, rash and wound.   Allergic/Immunologic: Negative for environmental allergies, food allergies and immunocompromised state.   Neurological: Negative for dizziness, weakness and light-headedness.   Psychiatric/Behavioral: Negative for confusion, decreased concentration, dysphoric mood and sleep disturbance. The patient is not nervous/anxious.      Social History     Socioeconomic History   • Marital status: Single   Tobacco Use   • Smoking status: Never Smoker   • Smokeless tobacco: Never Used   Vaping Use   • Vaping Use: Never used   Substance and Sexual Activity   • Alcohol use: No   • Drug use: No   • Sexual activity: Not Currently     Family History   Problem Relation Age of Onset   • Hypertension Other    • Thyroid disease Other    • Diabetes Other    • Obesity Other    • Diabetes Mother    • Hypertension Mother    • Heart disease Mother    • Cancer Father    • Diabetes Sister    • Diabetes Brother    • No Known Problems Brother      /74   Pulse 84   Ht 160 cm (63\")   Wt 109 kg (239 lb 9.6 oz)   LMP  (LMP Unknown)   SpO2 99%   BMI 42.44 kg/m²   Physical Exam  Vitals and nursing note reviewed.   Constitutional:       Appearance: Normal appearance. She is well-developed.   HENT:      Head: Normocephalic and atraumatic.   Eyes:      General: Lids are normal.      Extraocular Movements: Extraocular movements intact.      Conjunctiva/sclera: Conjunctivae normal.      Pupils: Pupils are equal, round, and reactive to light.   Neck:      Thyroid: No thyroid mass or thyromegaly.      Vascular: No carotid bruit.      Trachea: Trachea normal. No tracheal deviation.   Cardiovascular:      Rate and Rhythm: Normal rate and regular rhythm.      " Pulses: Normal pulses.      Heart sounds: Normal heart sounds. No murmur heard.  No friction rub. No gallop.    Pulmonary:      Effort: Pulmonary effort is normal. No respiratory distress.      Breath sounds: Normal breath sounds. No wheezing.   Musculoskeletal:         General: No deformity. Normal range of motion.      Cervical back: Normal range of motion and neck supple.   Lymphadenopathy:      Cervical: No cervical adenopathy.   Skin:     General: Skin is warm and dry.      Findings: No erythema or rash.      Nails: There is no clubbing.      Comments: No ulcer  Bilateral heel callus    Neurological:      General: No focal deficit present.      Mental Status: She is alert and oriented to person, place, and time.      Cranial Nerves: No cranial nerve deficit.      Sensory: Sensory deficit present.      Deep Tendon Reflexes: Reflexes abnormal.   Psychiatric:         Mood and Affect: Mood normal.         Speech: Speech normal.         Behavior: Behavior normal.         Thought Content: Thought content normal.         Judgment: Judgment normal.       Results for orders placed or performed in visit on 12/28/21   POC Glycosylated Hemoglobin (Hb A1C)    Specimen: Blood   Result Value Ref Range    Hemoglobin A1C 6.8 %    Lot Number [id number]     Expiration Date 08/30/2023    POC Glucose, Blood    Specimen: Blood   Result Value Ref Range    Glucose 166 (A) 70 - 130 mg/dL    Lot Number [id number]     Expiration Date 05/25/2022      Diagnoses and all orders for this visit:    1. Uncontrolled type 2 diabetes mellitus with hyperglycemia (HCC) (Primary)  Assessment & Plan:  Blood sugar and 90 day average sugar reviewed  Results for orders placed or performed in visit on 12/28/21   POC Glycosylated Hemoglobin (Hb A1C)    Specimen: Blood   Result Value Ref Range    Hemoglobin A1C 6.8 %    Lot Number [id number]     Expiration Date 08/30/2023    POC Glucose, Blood    Specimen: Blood   Result Value Ref Range    Glucose 166 (A) 70  - 130 mg/dL    Lot Number [id number]     Expiration Date 05/25/2022      Average sugar improved due to more controlled diet  Is utd with eye exam  No foot ulcer  Ur alb due next ov   F/u 3-4 months     Orders:  -     POC Glycosylated Hemoglobin (Hb A1C)  -     POC Glucose, Blood    2. Mixed hyperlipidemia  Assessment & Plan:  Taking crestor 5 mg daily   Check flp      3. Acquired hypothyroidism  Assessment & Plan:  Taking synthroid 225 mcg daily   Check tfts       Selina Lopez MD  Signed Selina Lopez MD

## 2021-12-29 ENCOUNTER — TELEPHONE (OUTPATIENT)
Dept: ENDOCRINOLOGY | Facility: CLINIC | Age: 51
End: 2021-12-29

## 2021-12-29 NOTE — TELEPHONE ENCOUNTER
PT called and has questions:    She wanted to see if she could cut back on her metformin, she currently takes 2000mg a day and is inquiring about cutting back on it. PT states she takes glipizide and heard it can make you can weight, should she continue to take it?    Please give her a call [phone]    Last OV 12/28/21  F/U 3/30/22

## 2022-01-03 RX ORDER — GLIPIZIDE 10 MG/1
10 TABLET ORAL DAILY
Qty: 1 TABLET | Refills: 0
Start: 2022-01-03 | End: 2022-01-11

## 2022-01-10 ENCOUNTER — TELEPHONE (OUTPATIENT)
Dept: ENDOCRINOLOGY | Facility: CLINIC | Age: 52
End: 2022-01-10

## 2022-01-10 NOTE — TELEPHONE ENCOUNTER
Pt states she is having a several low blood sugars and she means numbers in the 70's and mostly fasting  She has decreased the metformin to once a day and the glipizide to once per day as recommendation  Insulin is 40 in am and 20 in evening  I did advise her to eat PB and crackers at HS today to avoid a fasting low number   Please advise

## 2022-01-11 RX ORDER — INSULIN LISPRO PROTAMIN/LISPRO 75-25/ML
VIAL (ML) SUBCUTANEOUS
Qty: 30 ML | Refills: 3
Start: 2022-01-11 | End: 2022-06-07 | Stop reason: SDUPTHER

## 2022-01-11 NOTE — TELEPHONE ENCOUNTER
Pt was advised to stop glipizide and decrease evening insulin to 15U and to call back in one week with an update  Pt voiced understanding

## 2022-01-18 ENCOUNTER — TELEPHONE (OUTPATIENT)
Dept: ENDOCRINOLOGY | Facility: CLINIC | Age: 52
End: 2022-01-18

## 2022-01-18 NOTE — TELEPHONE ENCOUNTER
Pt states her blood sugars have been higher and last night was 388  When I asked her if she was drinking regular soda she states she had one yesterday and she admits to eating desserts and sweets  Pt was advised her blood sugars will be high as long as she is drinking regular soda and eating sweets on a daily basis.  She states she thinks she can switch to diet drinks and eat less sweets  She was advised to try that and to call back if numbers are still elevated  She voiced understanding

## 2022-01-18 NOTE — TELEPHONE ENCOUNTER
PT called and would like Jayna to call her back to discuss her diabetic medication. Number [phone]  Is current.    Last OV 12/28/21  F/U 3/30/22

## 2022-02-09 ENCOUNTER — TELEPHONE (OUTPATIENT)
Dept: ENDOCRINOLOGY | Facility: CLINIC | Age: 52
End: 2022-02-09

## 2022-02-09 NOTE — TELEPHONE ENCOUNTER
Patient called wanting to speak with Jayna about her blood sugar. She would not give me any details and I did make her aware that Jayna is not in the office on Wednesdays. She stated it would be okay for Jayna to call her tomorrow. Please advise.

## 2022-02-10 ENCOUNTER — TELEPHONE (OUTPATIENT)
Dept: ENDOCRINOLOGY | Facility: CLINIC | Age: 52
End: 2022-02-10

## 2022-02-10 NOTE — TELEPHONE ENCOUNTER
Pt states her blood sugars have been around 230 fasting and up to 500 after dinnner  She states she is taking 15U in am and 20U before evening meal of 75/25 insulin  She was advised she is not taking the amount we discussed a month ago and should be taking 40U in the am and 15U in the evening  She was advised to increase the dosage and then call in a few days if BS not getting better and she voiced understanding

## 2022-03-14 ENCOUNTER — HOSPITAL ENCOUNTER (EMERGENCY)
Facility: HOSPITAL | Age: 52
Discharge: HOME | End: 2022-03-15
Attending: EMERGENCY MEDICINE | Admitting: EMERGENCY MEDICINE

## 2022-03-14 DIAGNOSIS — N39.0 ACUTE UTI: ICD-10-CM

## 2022-03-14 DIAGNOSIS — R73.09 ELEVATED HEMOGLOBIN A1C: ICD-10-CM

## 2022-03-14 DIAGNOSIS — N28.9 RENAL INSUFFICIENCY: ICD-10-CM

## 2022-03-14 DIAGNOSIS — R73.9 HYPERGLYCEMIA: Primary | ICD-10-CM

## 2022-03-14 LAB
ALBUMIN SERPL-MCNC: 4.1 G/DL (ref 3.5–5.2)
ALBUMIN/GLOB SERPL: 1.5 G/DL
ALP SERPL-CCNC: 142 U/L (ref 39–117)
ALT SERPL W P-5'-P-CCNC: 13 U/L (ref 1–33)
ANION GAP SERPL CALCULATED.3IONS-SCNC: 11 MMOL/L (ref 5–15)
AST SERPL-CCNC: 12 U/L (ref 1–32)
BASOPHILS # BLD AUTO: 0.05 10*3/MM3 (ref 0–0.2)
BASOPHILS NFR BLD AUTO: 0.3 % (ref 0–1.5)
BILIRUB SERPL-MCNC: 0.3 MG/DL (ref 0–1.2)
BUN SERPL-MCNC: 16 MG/DL (ref 6–20)
BUN/CREAT SERPL: 12.3 (ref 7–25)
CALCIUM SPEC-SCNC: 9.1 MG/DL (ref 8.6–10.5)
CHLORIDE SERPL-SCNC: 98 MMOL/L (ref 98–107)
CO2 SERPL-SCNC: 25 MMOL/L (ref 22–29)
CREAT SERPL-MCNC: 1.3 MG/DL (ref 0.57–1)
DEPRECATED RDW RBC AUTO: 45.1 FL (ref 37–54)
EGFRCR SERPLBLD CKD-EPI 2021: 49.9 ML/MIN/1.73
EOSINOPHIL # BLD AUTO: 0.45 10*3/MM3 (ref 0–0.4)
EOSINOPHIL NFR BLD AUTO: 2.9 % (ref 0.3–6.2)
ERYTHROCYTE [DISTWIDTH] IN BLOOD BY AUTOMATED COUNT: 14.2 % (ref 12.3–15.4)
GLOBULIN UR ELPH-MCNC: 2.7 GM/DL
GLUCOSE SERPL-MCNC: 347 MG/DL (ref 65–99)
HCT VFR BLD AUTO: 35.8 % (ref 34–46.6)
HGB BLD-MCNC: 12.2 G/DL (ref 12–15.9)
IMM GRANULOCYTES # BLD AUTO: 0.06 10*3/MM3 (ref 0–0.05)
IMM GRANULOCYTES NFR BLD AUTO: 0.4 % (ref 0–0.5)
LYMPHOCYTES # BLD AUTO: 4.43 10*3/MM3 (ref 0.7–3.1)
LYMPHOCYTES NFR BLD AUTO: 28.6 % (ref 19.6–45.3)
MCH RBC QN AUTO: 30 PG (ref 26.6–33)
MCHC RBC AUTO-ENTMCNC: 34.1 G/DL (ref 31.5–35.7)
MCV RBC AUTO: 88 FL (ref 79–97)
MONOCYTES # BLD AUTO: 0.74 10*3/MM3 (ref 0.1–0.9)
MONOCYTES NFR BLD AUTO: 4.8 % (ref 5–12)
NEUTROPHILS NFR BLD AUTO: 63 % (ref 42.7–76)
NEUTROPHILS NFR BLD AUTO: 9.77 10*3/MM3 (ref 1.7–7)
NRBC BLD AUTO-RTO: 0 /100 WBC (ref 0–0.2)
PLAT MORPH BLD: NORMAL
PLATELET # BLD AUTO: 339 10*3/MM3 (ref 140–450)
PMV BLD AUTO: 10.6 FL (ref 6–12)
POTASSIUM SERPL-SCNC: 3.9 MMOL/L (ref 3.5–5.2)
PROT SERPL-MCNC: 6.8 G/DL (ref 6–8.5)
RBC # BLD AUTO: 4.07 10*6/MM3 (ref 3.77–5.28)
RBC MORPH BLD: NORMAL
SODIUM SERPL-SCNC: 134 MMOL/L (ref 136–145)
WBC MORPH BLD: NORMAL
WBC NRBC COR # BLD: 15.5 10*3/MM3 (ref 3.4–10.8)

## 2022-03-14 PROCEDURE — 36415 COLL VENOUS BLD VENIPUNCTURE: CPT

## 2022-03-14 PROCEDURE — 80053 COMPREHEN METABOLIC PANEL: CPT | Performed by: EMERGENCY MEDICINE

## 2022-03-14 PROCEDURE — 85025 COMPLETE CBC W/AUTO DIFF WBC: CPT | Performed by: EMERGENCY MEDICINE

## 2022-03-14 PROCEDURE — 83036 HEMOGLOBIN GLYCOSYLATED A1C: CPT | Performed by: EMERGENCY MEDICINE

## 2022-03-14 PROCEDURE — 99284 EMERGENCY DEPT VISIT MOD MDM: CPT

## 2022-03-14 PROCEDURE — 63710000001 INSULIN REGULAR HUMAN PER 5 UNITS: Performed by: EMERGENCY MEDICINE

## 2022-03-14 PROCEDURE — 85007 BL SMEAR W/DIFF WBC COUNT: CPT | Performed by: EMERGENCY MEDICINE

## 2022-03-14 RX ORDER — SODIUM CHLORIDE 0.9 % (FLUSH) 0.9 %
10 SYRINGE (ML) INJECTION AS NEEDED
Status: DISCONTINUED | OUTPATIENT
Start: 2022-03-14 | End: 2022-03-15 | Stop reason: HOSPADM

## 2022-03-14 RX ADMIN — INSULIN HUMAN 6 UNITS: 100 INJECTION, SOLUTION PARENTERAL at 23:36

## 2022-03-14 RX ADMIN — SODIUM CHLORIDE 1000 ML: 9 INJECTION, SOLUTION INTRAVENOUS at 23:36

## 2022-03-15 VITALS
SYSTOLIC BLOOD PRESSURE: 111 MMHG | BODY MASS INDEX: 34.36 KG/M2 | RESPIRATION RATE: 18 BRPM | OXYGEN SATURATION: 98 % | HEIGHT: 69 IN | DIASTOLIC BLOOD PRESSURE: 76 MMHG | TEMPERATURE: 97.9 F | WEIGHT: 232 LBS | HEART RATE: 108 BPM

## 2022-03-15 LAB
BACTERIA UR QL AUTO: ABNORMAL /HPF
BILIRUB UR QL STRIP: NEGATIVE
CLARITY UR: CLEAR
COLOR UR: YELLOW
GLUCOSE BLDC GLUCOMTR-MCNC: 201 MG/DL (ref 70–130)
GLUCOSE UR STRIP-MCNC: ABNORMAL MG/DL
HBA1C MFR BLD: 9.3 % (ref 4.8–5.6)
HGB UR QL STRIP.AUTO: NEGATIVE
HYALINE CASTS UR QL AUTO: ABNORMAL /LPF
KETONES UR QL STRIP: NEGATIVE
LEUKOCYTE ESTERASE UR QL STRIP.AUTO: ABNORMAL
NITRITE UR QL STRIP: NEGATIVE
PH UR STRIP.AUTO: 5.5 [PH] (ref 5–8)
PROT UR QL STRIP: NEGATIVE
RBC # UR STRIP: ABNORMAL /HPF
REF LAB TEST METHOD: ABNORMAL
SP GR UR STRIP: 1.02 (ref 1–1.03)
SQUAMOUS #/AREA URNS HPF: ABNORMAL /HPF
UROBILINOGEN UR QL STRIP: ABNORMAL
WBC # UR STRIP: ABNORMAL /HPF

## 2022-03-15 PROCEDURE — 81001 URINALYSIS AUTO W/SCOPE: CPT | Performed by: EMERGENCY MEDICINE

## 2022-03-15 PROCEDURE — 82962 GLUCOSE BLOOD TEST: CPT

## 2022-03-15 RX ORDER — CEFDINIR 300 MG/1
300 CAPSULE ORAL 2 TIMES DAILY
Qty: 14 CAPSULE | Refills: 0 | Status: SHIPPED | OUTPATIENT
Start: 2022-03-15 | End: 2022-03-22

## 2022-03-15 RX ORDER — CEFDINIR 300 MG/1
300 CAPSULE ORAL ONCE
Status: COMPLETED | OUTPATIENT
Start: 2022-03-15 | End: 2022-03-15

## 2022-03-15 RX ADMIN — CEFDINIR 300 MG: 300 CAPSULE ORAL at 02:50

## 2022-03-15 NOTE — ED PROVIDER NOTES
Subjective   Patient is a pleasant 51-year-old female who presents today with hyperglycemia.  She states that she has a diabetic, insulin-dependent diabetic, and this evening had a big meal at a party for one of her friends.  She also had some cake with this meal.  After she had checked her blood glucose following the meal was up in the 300s.  Where she normally takes 40 units of insulin in the morning and 15 at night, given the hypoglycemia and she took 40 tonight.  After administration of the insulin she rechecked her blood glucose was up in the 400s prompting visit to the emergency department.  She states that she feels like she had a mild tremor and jitteriness but denies other complaints.  Denies fever, chills, chest pain, shortness of breath, abdominal pain, nausea, vomiting, diarrhea, or other acute complaints.          Review of Systems   All other systems reviewed and are negative.      Past Medical History:   Diagnosis Date   • Anxiety    • Chicken pox    • Depression    • Diabetes mellitus (HCC)    • Disease of thyroid gland    • Measles        Allergies   Allergen Reactions   • Doxepin Rash   • Januvia [Sitagliptin] Other (See Comments)     Insomnia       Past Surgical History:   Procedure Laterality Date   • EYE SURGERY     • TONSILLECTOMY         Family History   Problem Relation Age of Onset   • Hypertension Other    • Thyroid disease Other    • Diabetes Other    • Obesity Other    • Diabetes Mother    • Hypertension Mother    • Heart disease Mother    • Cancer Father    • Diabetes Sister    • Diabetes Brother    • No Known Problems Brother        Social History     Socioeconomic History   • Marital status: Single   Tobacco Use   • Smoking status: Never Smoker   • Smokeless tobacco: Never Used   Vaping Use   • Vaping Use: Never used   Substance and Sexual Activity   • Alcohol use: No   • Drug use: No   • Sexual activity: Not Currently           Objective   Physical Exam  Vitals and nursing note  reviewed.   Constitutional:       General: She is not in acute distress.     Appearance: Normal appearance.   HENT:      Head: Normocephalic and atraumatic.   Eyes:      Conjunctiva/sclera: Conjunctivae normal.      Pupils: Pupils are equal, round, and reactive to light.   Neck:      Thyroid: No thyromegaly.   Cardiovascular:      Rate and Rhythm: Normal rate and regular rhythm.      Heart sounds: Normal heart sounds. No murmur heard.    No friction rub. No gallop.   Pulmonary:      Effort: Pulmonary effort is normal. No respiratory distress.      Breath sounds: Normal breath sounds.   Abdominal:      General: Bowel sounds are normal.      Palpations: Abdomen is soft.      Tenderness: There is no abdominal tenderness.   Musculoskeletal:         General: Normal range of motion.      Cervical back: Normal range of motion and neck supple.   Lymphadenopathy:      Cervical: No cervical adenopathy.   Skin:     General: Skin is warm and dry.      Capillary Refill: Capillary refill takes less than 2 seconds.   Neurological:      General: No focal deficit present.      Mental Status: She is alert and oriented to person, place, and time.   Psychiatric:         Behavior: Behavior normal.         Thought Content: Thought content normal.         Procedures           ED Course      Recent Results (from the past 24 hour(s))   Comprehensive Metabolic Panel    Collection Time: 03/14/22  9:52 PM    Specimen: Blood   Result Value Ref Range    Glucose 347 (H) 65 - 99 mg/dL    BUN 16 6 - 20 mg/dL    Creatinine 1.30 (H) 0.57 - 1.00 mg/dL    Sodium 134 (L) 136 - 145 mmol/L    Potassium 3.9 3.5 - 5.2 mmol/L    Chloride 98 98 - 107 mmol/L    CO2 25.0 22.0 - 29.0 mmol/L    Calcium 9.1 8.6 - 10.5 mg/dL    Total Protein 6.8 6.0 - 8.5 g/dL    Albumin 4.10 3.50 - 5.20 g/dL    ALT (SGPT) 13 1 - 33 U/L    AST (SGOT) 12 1 - 32 U/L    Alkaline Phosphatase 142 (H) 39 - 117 U/L    Total Bilirubin 0.3 0.0 - 1.2 mg/dL    Globulin 2.7 gm/dL    A/G Ratio  1.5 g/dL    BUN/Creatinine Ratio 12.3 7.0 - 25.0    Anion Gap 11.0 5.0 - 15.0 mmol/L    eGFR 49.9 (L) >60.0 mL/min/1.73   CBC Auto Differential    Collection Time: 03/14/22  9:52 PM    Specimen: Blood   Result Value Ref Range    WBC 15.50 (H) 3.40 - 10.80 10*3/mm3    RBC 4.07 3.77 - 5.28 10*6/mm3    Hemoglobin 12.2 12.0 - 15.9 g/dL    Hematocrit 35.8 34.0 - 46.6 %    MCV 88.0 79.0 - 97.0 fL    MCH 30.0 26.6 - 33.0 pg    MCHC 34.1 31.5 - 35.7 g/dL    RDW 14.2 12.3 - 15.4 %    RDW-SD 45.1 37.0 - 54.0 fl    MPV 10.6 6.0 - 12.0 fL    Platelets 339 140 - 450 10*3/mm3    Neutrophil % 63.0 42.7 - 76.0 %    Lymphocyte % 28.6 19.6 - 45.3 %    Monocyte % 4.8 (L) 5.0 - 12.0 %    Eosinophil % 2.9 0.3 - 6.2 %    Basophil % 0.3 0.0 - 1.5 %    Immature Grans % 0.4 0.0 - 0.5 %    Neutrophils, Absolute 9.77 (H) 1.70 - 7.00 10*3/mm3    Lymphocytes, Absolute 4.43 (H) 0.70 - 3.10 10*3/mm3    Monocytes, Absolute 0.74 0.10 - 0.90 10*3/mm3    Eosinophils, Absolute 0.45 (H) 0.00 - 0.40 10*3/mm3    Basophils, Absolute 0.05 0.00 - 0.20 10*3/mm3    Immature Grans, Absolute 0.06 (H) 0.00 - 0.05 10*3/mm3    nRBC 0.0 0.0 - 0.2 /100 WBC   Scan Slide    Collection Time: 03/14/22  9:52 PM    Specimen: Blood   Result Value Ref Range    RBC Morphology Normal Normal    WBC Morphology Normal Normal    Platelet Morphology Normal Normal   Hemoglobin A1c    Collection Time: 03/14/22  9:52 PM    Specimen: Blood   Result Value Ref Range    Hemoglobin A1C 9.30 (H) 4.80 - 5.60 %   Urinalysis With Microscopic If Indicated (No Culture) - Urine, Clean Catch    Collection Time: 03/15/22  1:22 AM    Specimen: Urine, Clean Catch   Result Value Ref Range    Color, UA Yellow Yellow, Straw    Appearance, UA Clear Clear    pH, UA 5.5 5.0 - 8.0    Specific Gravity, UA 1.017 1.001 - 1.030    Glucose,  mg/dL (Trace) (A) Negative    Ketones, UA Negative Negative    Bilirubin, UA Negative Negative    Blood, UA Negative Negative    Protein, UA Negative Negative    Leuk  Esterase, UA Trace (A) Negative    Nitrite, UA Negative Negative    Urobilinogen, UA 0.2 E.U./dL 0.2 - 1.0 E.U./dL   Urinalysis, Microscopic Only - Urine, Clean Catch    Collection Time: 03/15/22  1:22 AM    Specimen: Urine, Clean Catch   Result Value Ref Range    RBC, UA 0-2 None Seen, 0-2 /HPF    WBC, UA 3-5 (A) None Seen, 0-2 /HPF    Bacteria, UA Trace None Seen, Trace /HPF    Squamous Epithelial Cells, UA 0-2 None Seen, 0-2 /HPF    Hyaline Casts, UA None Seen 0 - 6 /LPF    Methodology Automated Microscopy    POC Glucose Once    Collection Time: 03/15/22  1:38 AM    Specimen: Blood   Result Value Ref Range    Glucose 201 (H) 70 - 130 mg/dL     Note: In addition to lab results from this visit, the labs listed above may include labs taken at another facility or during a different encounter within the last 24 hours. Please correlate lab times with ED admission and discharge times for further clarification of the services performed during this visit.    No orders to display     Vitals:    03/14/22 2230 03/14/22 2300 03/15/22 0015 03/15/22 0047   BP: 121/78 111/76     BP Location:       Patient Position:       Pulse:       Resp:       Temp:       TempSrc:       SpO2: 98% 98% 98% 98%   Weight:       Height:         Medications   sodium chloride 0.9 % bolus 1,000 mL (0 mL Intravenous Stopped 3/14/22 2346)   insulin regular (humuLIN R,novoLIN R) injection 6 Units (6 Units Intravenous Given 3/14/22 2336)   cefdinir (OMNICEF) capsule 300 mg (300 mg Oral Given 3/15/22 0250)     ECG/EMG Results (last 24 hours)     ** No results found for the last 24 hours. **        No orders to display                                                Patient responded well to treatment in the emergency department.  She understands the importance of close glucose control.  She understands to follow-up with her primary care provider at next availability and continue to have a low threshold to return to the emergency department if symptoms  worsen or other concerns arise.  MDM    Final diagnoses:   Hyperglycemia   Elevated hemoglobin A1c   Acute UTI   Renal insufficiency       ED Disposition  ED Disposition     ED Disposition   Discharge    Condition   Stable    Comment   --             Ovi Corbin MD  [address] [facility]   [place] [address]  [phone]    Schedule an appointment as soon as possible for a visit       [facility] Emergency Department  [address] [place] [address] [phone]  Schedule an appointment as soon as possible for a visit            Medication List      New Prescriptions    cefdinir 300 MG capsule  Commonly known as: OMNICEF  Take 1 capsule by mouth 2 (Two) Times a Day for 7 days.           Where to Get Your Medications      These medications were sent to [address] - [address] [facility] - [phone]  - [phone]   [address] [address]    Phone: [phone]   · cefdinir 300 MG capsule          Aaron Greene DO  03/15/22 0858

## 2022-03-30 ENCOUNTER — OFFICE VISIT (OUTPATIENT)
Dept: ENDOCRINOLOGY | Facility: CLINIC | Age: 52
End: 2022-03-30

## 2022-03-30 VITALS
HEIGHT: 69 IN | SYSTOLIC BLOOD PRESSURE: 110 MMHG | DIASTOLIC BLOOD PRESSURE: 62 MMHG | OXYGEN SATURATION: 98 % | HEART RATE: 87 BPM | BODY MASS INDEX: 34.8 KG/M2 | WEIGHT: 235 LBS

## 2022-03-30 DIAGNOSIS — E03.9 ACQUIRED HYPOTHYROIDISM: ICD-10-CM

## 2022-03-30 DIAGNOSIS — E11.65 UNCONTROLLED TYPE 2 DIABETES MELLITUS WITH HYPERGLYCEMIA: Primary | ICD-10-CM

## 2022-03-30 LAB
EXPIRATION DATE: NORMAL
GLUCOSE BLDC GLUCOMTR-MCNC: 124 MG/DL (ref 70–130)
Lab: NORMAL

## 2022-03-30 PROCEDURE — 99214 OFFICE O/P EST MOD 30 MIN: CPT | Performed by: INTERNAL MEDICINE

## 2022-03-30 PROCEDURE — 82947 ASSAY GLUCOSE BLOOD QUANT: CPT | Performed by: INTERNAL MEDICINE

## 2022-03-30 RX ORDER — ROSUVASTATIN CALCIUM 5 MG/1
5 TABLET, COATED ORAL NIGHTLY
Qty: 30 TABLET | Refills: 11 | Status: SHIPPED | OUTPATIENT
Start: 2022-03-30 | End: 2022-11-02 | Stop reason: SINTOL

## 2022-03-30 RX ORDER — BLOOD SUGAR DIAGNOSTIC
STRIP MISCELLANEOUS
Qty: 200 EACH | Refills: 12 | Status: SHIPPED | OUTPATIENT
Start: 2022-03-30 | End: 2023-01-05 | Stop reason: SDUPTHER

## 2022-03-30 NOTE — ASSESSMENT & PLAN NOTE
With hyperglycemia  She, by her own history, admits she has been drinking more sodas, eating desserts, etc  She states after her recent ER visit they are more careful about food available to her at the group home  She is working hard to improve her diet and we discussed being consistent with carb intake, avoid sweets and sugared drinks  Risk of higher sugars to her reviewed and she understands that high sugar is not healthy and occurs due to her not following her diet plan   She is utd with eye exam  Neuropathy with thick heel callus- send lachydrin for this and foot care reviewed  Ur alb due at next ov   Uncontrolled diabetes with hyperglycemia (type 2)

## 2022-03-30 NOTE — PROGRESS NOTES
"Vilma Ayala 51 y.o.  [facility]: Follow-up, Hyperlipidemia, and Diabetes      [place]: Follow-up, Hyperlipidemia, and Diabetes    In interim had high sugar over 400, to er   Had large meal, birthday cake  Results for orders placed or performed in visit on 03/30/22   POC Glucose, Blood    Specimen: Blood   Result Value Ref Range    Glucose 124 70 - 130 mg/dL    Lot Number [id number]     Expiration Date 07/14/22      Current sugar reviewed   a1c in ER 9.3%  Needs glasses  Due for eye exam  Bilateral heel callus   Cracking heels   occ low sugars    Allergies   Allergen Reactions   • Doxepin Rash   • Januvia [Sitagliptin] Other (See Comments)     Insomnia       Current Outpatient Medications:   •  benztropine (COGENTIN) 0.5 MG tablet, Take 0.5 mg by mouth As Needed., Disp: , Rfl:   •  busPIRone (BUSPAR) 10 MG tablet, Take 1 tablet by mouth Daily., Disp: 90 tablet, Rfl: 1  •  Cholecalciferol (VITAMIN D3 PO), Take 1 tablet by mouth Daily., Disp: , Rfl:   •  citalopram (CeleXA) 40 MG tablet, Take 1 tablet by mouth Daily., Disp: 90 tablet, Rfl: 1  •  insulin lispro protamine-insulin lispro (HumaLOG Mix 75/25) (75-25) 100 UNIT/ML suspension injection, Inject 40 units in am and 15 units before supper, Disp: 30 mL, Rfl: 3  •  Insulin Syringe-Needle U-100 31G X 15/64\" 0.5 ML misc, , Disp: , Rfl:   •  Lancets misc, Test three times daily, Disp: 100 each, Rfl: 5  •  levalbuterol (XOPENEX HFA) 45 MCG/ACT inhaler, Inhale 2 puffs 4 (Four) Times a Day As Needed for Wheezing., Disp: 15 g, Rfl: 5  •  levothyroxine (SYNTHROID, LEVOTHROID) 200 MCG tablet, Take 200 mcg by mouth Daily., Disp: , Rfl:   •  levothyroxine (SYNTHROID, LEVOTHROID) 25 MCG tablet, Take 1 tablet by mouth Daily., Disp: 90 tablet, Rfl: 1  •  metFORMIN (GLUCOPHAGE) 500 MG tablet, Take 2 tablets BID with food, Disp: 120 tablet, Rfl: 2  •  risperiDONE (risperDAL) 3 MG tablet, Take 3 mg by mouth Daily., Disp: , Rfl:   •  risperiDONE (risperDAL) 4 MG tablet, Take 3 mg by mouth " Every Night. Take 3 in am and 3 at hs, Disp: , Rfl:   •  rosuvastatin (CRESTOR) 5 MG tablet, Take 1 tablet by mouth Every Night. Take one po daily, Disp: 30 tablet, Rfl: 11  •  VENTOLIN  (90 Base) MCG/ACT inhaler, Inhale 2 puffs 4 (Four) Times a Day., Disp: , Rfl:   •  glucose blood (OneTouch Verio) test strip, Use as instructed to test blood sugar 6 times daily, Disp: 200 each, Rfl: 12  Patient Active Problem List    Diagnosis    • Microscopic hematuria [R31.29]    • Nocturia [R35.1]    • Nocturnal enuresis [N39.44]    • Stress incontinence [N39.3]    • Urge incontinence [N39.41]    • Urinary incontinence [R32]    • Type 2 diabetes mellitus without complication, with long-term current use of insulin (HCC) [E11.9, Z79.4]    • Cough [R05.9]    • Dyspnea on exertion [R06.00]    • Mixed hyperlipidemia [E78.2]    • Anxiety and depression [F41.9, F32.A]    • Uncontrolled type 2 diabetes mellitus (HCC) [E11.65]    • Hypothyroid [E03.9]    • Obesity [E66.9]    • Vitamin D deficiency, unspecified [E55.9]    • Diabetic neuropathy, type II diabetes mellitus (HCC) [E11.40]      Review of Systems   Constitutional: Positive for activity change and appetite change. Negative for unexpected weight change.   HENT: Negative for congestion and rhinorrhea.    Eyes: Negative for visual disturbance.   Respiratory: Negative for cough and shortness of breath.    Cardiovascular: Positive for leg swelling. Negative for chest pain.   Gastrointestinal: Negative for abdominal distention and diarrhea.   Musculoskeletal: Positive for gait problem. Negative for arthralgias.   Skin: Negative for rash.   Neurological: Positive for tremors and weakness.   Psychiatric/Behavioral: Positive for agitation. The patient is nervous/anxious.      Social History     Socioeconomic History   • Marital status: Single   Tobacco Use   • Smoking status: Never Smoker   • Smokeless tobacco: Never Used   Vaping Use   • Vaping Use: Never used   Substance and  "Sexual Activity   • Alcohol use: No   • Drug use: No   • Sexual activity: Not Currently     Family History   Problem Relation Age of Onset   • Hypertension Other    • Thyroid disease Other    • Diabetes Other    • Obesity Other    • Diabetes Mother    • Hypertension Mother    • Heart disease Mother    • Cancer Father    • Diabetes Sister    • Diabetes Brother    • No Known Problems Brother      /62 (BP Location: Left arm, Patient Position: Sitting, Cuff Size: Adult)   Pulse 87   Ht 175.3 cm (69\")   Wt 107 kg (235 lb)   LMP  (LMP Unknown)   SpO2 98%   BMI 34.70 kg/m²   Physical Exam  Constitutional:       Appearance: Normal appearance.   Eyes:      Extraocular Movements: Extraocular movements intact.      Pupils: Pupils are equal, round, and reactive to light.   Cardiovascular:      Rate and Rhythm: Normal rate and regular rhythm.      Pulses: Normal pulses.      Heart sounds: No murmur heard.  Pulmonary:      Effort: Pulmonary effort is normal. No respiratory distress.   Musculoskeletal:      Right lower leg: Edema present.      Left lower leg: Edema present.   Skin:     Comments: Bilateral thick heel callus with cracking    Neurological:      General: No focal deficit present.      Mental Status: She is alert.      Deep Tendon Reflexes: Reflexes abnormal.   Psychiatric:         Mood and Affect: Mood normal.       Results for orders placed or performed in visit on 03/30/22   POC Glucose, Blood    Specimen: Blood   Result Value Ref Range    Glucose 124 70 - 130 mg/dL    Lot Number [id number]     Expiration Date 07/14/22      Diagnoses and all orders for this visit:    1. Uncontrolled type 2 diabetes mellitus with hyperglycemia (HCC) (Primary)  Assessment & Plan:  With hyperglycemia  She, by her own history, admits she has been drinking more sodas, eating desserts, etc  She states after her recent ER visit they are more careful about food available to her at the group home  She is working hard to improve " her diet and we discussed being consistent with carb intake, avoid sweets and sugared drinks  Risk of higher sugars to her reviewed and she understands that high sugar is not healthy and occurs due to her not following her diet plan   She is utd with eye exam  Neuropathy with thick heel callus- send lachydrin for this and foot care reviewed  Ur alb due at next ov   Uncontrolled diabetes with hyperglycemia (type 2)     Orders:  -     POC Glucose, Blood    2. Acquired hypothyroidism  Assessment & Plan:  Taking synthroid 225 mcg daily   TSH 4.8 on 12/21  Continue supplement  Update tfts at next ov       Other orders  -     glucose blood (OneTouch Verio) test strip; Use as instructed to test blood sugar 6 times daily  Dispense: 200 each; Refill: 12  -     rosuvastatin (CRESTOR) 5 MG tablet; Take 1 tablet by mouth Every Night. Take one po daily  Dispense: 30 tablet; Refill: 11  will try to get a sensor- she is doing fingersticks 6 times daily and knows how to adjust diet and insulin based on sugar level   She has had comprehensive education and sensor would be beneficial in a group home setting for safety / warning about low and high sugar  Return in about 3 months (around 6/30/2022) for Recheck.    Selina Lopez MD  Signed Selina Lopez MD

## 2022-04-08 NOTE — TELEPHONE ENCOUNTER
Patient called stated Dr. Lopez was going to send in rx for a diabetic lotion for her feet. Please advise.

## 2022-04-11 RX ORDER — AMMONIUM LACTATE 12 G/100G
LOTION TOPICAL
Qty: 225 G | Refills: 3 | Status: SHIPPED | OUTPATIENT
Start: 2022-04-11

## 2022-04-19 ENCOUNTER — HOSPITAL ENCOUNTER (EMERGENCY)
Facility: HOSPITAL | Age: 52
Discharge: HOME | End: 2022-04-19
Attending: EMERGENCY MEDICINE | Admitting: EMERGENCY MEDICINE

## 2022-04-19 VITALS
TEMPERATURE: 98.3 F | HEIGHT: 66 IN | OXYGEN SATURATION: 99 % | WEIGHT: 235 LBS | RESPIRATION RATE: 18 BRPM | HEART RATE: 80 BPM | DIASTOLIC BLOOD PRESSURE: 79 MMHG | BODY MASS INDEX: 37.77 KG/M2 | SYSTOLIC BLOOD PRESSURE: 104 MMHG

## 2022-04-19 DIAGNOSIS — R73.9 HYPERGLYCEMIA: ICD-10-CM

## 2022-04-19 DIAGNOSIS — R25.1 SHAKING: ICD-10-CM

## 2022-04-19 DIAGNOSIS — F41.9 ANXIETY: Primary | ICD-10-CM

## 2022-04-19 LAB
ALBUMIN SERPL-MCNC: 4.1 G/DL (ref 3.5–5.2)
ALBUMIN/GLOB SERPL: 1.4 G/DL
ALP SERPL-CCNC: 155 U/L (ref 39–117)
ALT SERPL W P-5'-P-CCNC: 11 U/L (ref 1–33)
ANION GAP SERPL CALCULATED.3IONS-SCNC: 9 MMOL/L (ref 5–15)
AST SERPL-CCNC: 12 U/L (ref 1–32)
BASOPHILS # BLD AUTO: 0.04 10*3/MM3 (ref 0–0.2)
BASOPHILS NFR BLD AUTO: 0.3 % (ref 0–1.5)
BILIRUB SERPL-MCNC: 0.2 MG/DL (ref 0–1.2)
BUN SERPL-MCNC: 15 MG/DL (ref 6–20)
BUN/CREAT SERPL: 12.9 (ref 7–25)
CALCIUM SPEC-SCNC: 9.3 MG/DL (ref 8.6–10.5)
CHLORIDE SERPL-SCNC: 100 MMOL/L (ref 98–107)
CO2 SERPL-SCNC: 26 MMOL/L (ref 22–29)
CREAT SERPL-MCNC: 1.16 MG/DL (ref 0.57–1)
DEPRECATED RDW RBC AUTO: 45.5 FL (ref 37–54)
EGFRCR SERPLBLD CKD-EPI 2021: 57.2 ML/MIN/1.73
EOSINOPHIL # BLD AUTO: 0.44 10*3/MM3 (ref 0–0.4)
EOSINOPHIL NFR BLD AUTO: 3.2 % (ref 0.3–6.2)
ERYTHROCYTE [DISTWIDTH] IN BLOOD BY AUTOMATED COUNT: 14.3 % (ref 12.3–15.4)
GLOBULIN UR ELPH-MCNC: 3 GM/DL
GLUCOSE SERPL-MCNC: 221 MG/DL (ref 65–99)
HCT VFR BLD AUTO: 35.9 % (ref 34–46.6)
HGB BLD-MCNC: 12.3 G/DL (ref 12–15.9)
IMM GRANULOCYTES # BLD AUTO: 0.05 10*3/MM3 (ref 0–0.05)
IMM GRANULOCYTES NFR BLD AUTO: 0.4 % (ref 0–0.5)
LYMPHOCYTES # BLD AUTO: 4.11 10*3/MM3 (ref 0.7–3.1)
LYMPHOCYTES NFR BLD AUTO: 29.5 % (ref 19.6–45.3)
MCH RBC QN AUTO: 29.8 PG (ref 26.6–33)
MCHC RBC AUTO-ENTMCNC: 34.3 G/DL (ref 31.5–35.7)
MCV RBC AUTO: 86.9 FL (ref 79–97)
MONOCYTES # BLD AUTO: 0.73 10*3/MM3 (ref 0.1–0.9)
MONOCYTES NFR BLD AUTO: 5.2 % (ref 5–12)
NEUTROPHILS NFR BLD AUTO: 61.4 % (ref 42.7–76)
NEUTROPHILS NFR BLD AUTO: 8.57 10*3/MM3 (ref 1.7–7)
NRBC BLD AUTO-RTO: 0 /100 WBC (ref 0–0.2)
PLATELET # BLD AUTO: 369 10*3/MM3 (ref 140–450)
PMV BLD AUTO: 10.2 FL (ref 6–12)
POTASSIUM SERPL-SCNC: 3.7 MMOL/L (ref 3.5–5.2)
PROT SERPL-MCNC: 7.1 G/DL (ref 6–8.5)
RBC # BLD AUTO: 4.13 10*6/MM3 (ref 3.77–5.28)
SODIUM SERPL-SCNC: 135 MMOL/L (ref 136–145)
TROPONIN T SERPL-MCNC: <0.01 NG/ML (ref 0–0.03)
WBC NRBC COR # BLD: 13.94 10*3/MM3 (ref 3.4–10.8)

## 2022-04-19 PROCEDURE — 84484 ASSAY OF TROPONIN QUANT: CPT | Performed by: EMERGENCY MEDICINE

## 2022-04-19 PROCEDURE — 99283 EMERGENCY DEPT VISIT LOW MDM: CPT

## 2022-04-19 PROCEDURE — 93005 ELECTROCARDIOGRAM TRACING: CPT | Performed by: EMERGENCY MEDICINE

## 2022-04-19 PROCEDURE — 80053 COMPREHEN METABOLIC PANEL: CPT | Performed by: EMERGENCY MEDICINE

## 2022-04-19 PROCEDURE — 85025 COMPLETE CBC W/AUTO DIFF WBC: CPT | Performed by: EMERGENCY MEDICINE

## 2022-04-19 PROCEDURE — 36415 COLL VENOUS BLD VENIPUNCTURE: CPT

## 2022-04-20 NOTE — ED PROVIDER NOTES
[place]    EMERGENCY DEPARTMENT ENCOUNTER      Pt Name: Vilma Ayala  MRN: [id number]  YOB: 1970  Date of evaluation: 4/19/2022  Provider: Teddy Contreras MD    CHIEF COMPLAINT       Chief Complaint   Patient presents with   • Anxiety         HISTORY OF PRESENT ILLNESS  (Location/Symptom, Timing/Onset, Context/Setting, Quality, Duration, Modifying Factors, Severity.)   Vilma Ayala is a 51 y.o. female who presents to the emergency department with chief complaint of anxiousness.  Patient states that she has a long history of anxiety and panic attacks and states that this episode felt similar.  She describes it is moderate in severity and without any obvious inciting or modifying factors.  She states that she is previously prescribed hydroxyzine, however this makes her very sleepy and she wanted to check about possibly having a different prescription.  She is completely asymptomatic at this time and her anxiety has resolved.  She denies any headache, chest pain, shortness of breath, or other associated symptoms.      Nursing notes were reviewed.    REVIEW OF SYSTEMS    (2-9 systems for level 4, 10 or more for level 5)   ROS:  General:  No fevers, no chills, no weakness  Cardiovascular:  No chest pain, no palpitations  Respiratory:  No shortness of breath, no cough, no wheezing  Gastrointestinal:  No pain, no nausea, no vomiting, no diarrhea  Musculoskeletal:  No muscle pain, no joint pain  Skin:  No rash  Neurologic:  No speech problems, no headache, no extremity numbness, no extremity tingling, no extremity weakness  Psychiatric: Anxiousness  Genitourinary:  No dysuria, no hematuria    Except as noted above the remainder of the review of systems was reviewed and negative.       PAST MEDICAL HISTORY     Past Medical History:   Diagnosis Date   • Anxiety    • Chicken pox    • Depression    • Diabetes mellitus (HCC)    • Disease of thyroid gland    • Measles    • Type 2 diabetes mellitus  "(HCC)          SURGICAL HISTORY       Past Surgical History:   Procedure Laterality Date   • EYE SURGERY     • TONSILLECTOMY           CURRENT MEDICATIONS     No current facility-administered medications for this encounter.    Current Outpatient Medications:   •  ammonium lactate (Lac-Hydrin) 12 % lotion, Apply BID to feet, Disp: 225 g, Rfl: 3  •  benztropine (COGENTIN) 0.5 MG tablet, Take 0.5 mg by mouth As Needed., Disp: , Rfl:   •  busPIRone (BUSPAR) 10 MG tablet, Take 1 tablet by mouth Daily., Disp: 90 tablet, Rfl: 1  •  Cholecalciferol (VITAMIN D3 PO), Take 1 tablet by mouth Daily., Disp: , Rfl:   •  citalopram (CeleXA) 40 MG tablet, Take 1 tablet by mouth Daily., Disp: 90 tablet, Rfl: 1  •  Continuous Blood Gluc Sensor (FreeStyle Sukhi 2 Sensor) misc, 1 Units Every 14 (Fourteen) Days., Disp: 2 each, Rfl: 6  •  glucose blood (OneTouch Verio) test strip, Use as instructed to test blood sugar 6 times daily, Disp: 200 each, Rfl: 12  •  insulin lispro protamine-insulin lispro (HumaLOG Mix 75/25) (75-25) 100 UNIT/ML suspension injection, Inject 40 units in am and 15 units before supper, Disp: 30 mL, Rfl: 3  •  Insulin Syringe-Needle U-100 31G X 15/64\" 0.5 ML misc, , Disp: , Rfl:   •  Lancets misc, Test three times daily, Disp: 100 each, Rfl: 5  •  levalbuterol (XOPENEX HFA) 45 MCG/ACT inhaler, Inhale 2 puffs 4 (Four) Times a Day As Needed for Wheezing., Disp: 15 g, Rfl: 5  •  levothyroxine (SYNTHROID, LEVOTHROID) 200 MCG tablet, Take 200 mcg by mouth Daily., Disp: , Rfl:   •  levothyroxine (SYNTHROID, LEVOTHROID) 25 MCG tablet, Take 1 tablet by mouth Daily., Disp: 90 tablet, Rfl: 1  •  metFORMIN (GLUCOPHAGE) 500 MG tablet, Take 2 tablets BID with food, Disp: 120 tablet, Rfl: 2  •  risperiDONE (risperDAL) 3 MG tablet, Take 3 mg by mouth Daily., Disp: , Rfl:   •  risperiDONE (risperDAL) 4 MG tablet, Take 3 mg by mouth Every Night. Take 3 in am and 3 at hs, Disp: , Rfl:   •  rosuvastatin (CRESTOR) 5 MG tablet, Take 1 " "tablet by mouth Every Night. Take one po daily, Disp: 30 tablet, Rfl: 11  •  VENTOLIN  (90 Base) MCG/ACT inhaler, Inhale 2 puffs 4 (Four) Times a Day., Disp: , Rfl:     ALLERGIES     Doxepin and Januvia [sitagliptin]    FAMILY HISTORY       Family History   Problem Relation Age of Onset   • Hypertension Other    • Thyroid disease Other    • Diabetes Other    • Obesity Other    • Diabetes Mother    • Hypertension Mother    • Heart disease Mother    • Cancer Father    • Diabetes Sister    • Diabetes Brother    • No Known Problems Brother           SOCIAL HISTORY       Social History     Socioeconomic History   • Marital status: Single   Tobacco Use   • Smoking status: Never Smoker   • Smokeless tobacco: Never Used   Vaping Use   • Vaping Use: Never used   Substance and Sexual Activity   • Alcohol use: No   • Drug use: No   • Sexual activity: Not Currently         PHYSICAL EXAM    (up to 7 for level 4, 8 or more for level 5)     Vitals:    04/19/22 2100 04/19/22 2200 04/19/22 2230   BP: 109/77 102/76 104/79   Pulse: 84 79 80   Resp: 18     Temp: 98.3 °F (36.8 °C)     TempSrc: Oral     SpO2: 97% 96% 99%   Weight: 107 kg (235 lb)     Height: 167.6 cm (66\")         Physical Exam  General: Awake, alert, no acute distress.  HEENT: Conjunctivae normal.  Neck: Trachea midline.  Cardiac: Heart regular rate, rhythm, no murmurs, rubs, or gallops  Lungs: Lungs are clear to auscultation, there is no wheezing, rhonchi, or rales. There is no use of accessory muscles.  Chest wall: There is no tenderness to palpation over the chest wall or over ribs  Abdomen: Abdomen is soft, nontender, nondistended. There are no firm or pulsatile masses, no rebound rigidity or guarding.   Musculoskeletal: No deformity.  Neuro: Alert and oriented x 4.  Dermatology: Skin is warm and dry  Psych: Mentation is grossly normal, cognition is grossly normal. Affect is appropriate.        DIAGNOSTIC RESULTS     EKG: All EKGs are interpreted by the " Emergency Department Physician who either signs or Co-signs this chart in the absence of a cardiologist.    ECG 12 Lead   Final Result   Test Reason : shaking   Blood Pressure :   */*   mmHG   Vent. Rate :  81 BPM     Atrial Rate :  81 BPM      P-R Int : 170 ms          QRS Dur :  80 ms       QT Int : 388 ms       P-R-T Axes :  35  -6  -5 degrees      QTc Int : 450 ms      Normal sinus rhythm   Normal ECG   No previous ECGs available   Confirmed by OVI MARTINEZ ([id number]) on 4/21/2022 5:14:27 AM      Referred By: MICHELLE           Confirmed By: OVI MARTINEZ          LABS:    I have reviewed and interpreted all of the currently available lab results from this visit (if applicable):  Results for orders placed or performed during the hospital encounter of 04/19/22   Comprehensive Metabolic Panel    Specimen: Blood   Result Value Ref Range    Glucose 221 (H) 65 - 99 mg/dL    BUN 15 6 - 20 mg/dL    Creatinine 1.16 (H) 0.57 - 1.00 mg/dL    Sodium 135 (L) 136 - 145 mmol/L    Potassium 3.7 3.5 - 5.2 mmol/L    Chloride 100 98 - 107 mmol/L    CO2 26.0 22.0 - 29.0 mmol/L    Calcium 9.3 8.6 - 10.5 mg/dL    Total Protein 7.1 6.0 - 8.5 g/dL    Albumin 4.10 3.50 - 5.20 g/dL    ALT (SGPT) 11 1 - 33 U/L    AST (SGOT) 12 1 - 32 U/L    Alkaline Phosphatase 155 (H) 39 - 117 U/L    Total Bilirubin 0.2 0.0 - 1.2 mg/dL    Globulin 3.0 gm/dL    A/G Ratio 1.4 g/dL    BUN/Creatinine Ratio 12.9 7.0 - 25.0    Anion Gap 9.0 5.0 - 15.0 mmol/L    eGFR 57.2 (L) >60.0 mL/min/1.73   Troponin    Specimen: Blood   Result Value Ref Range    Troponin T <0.010 0.000 - 0.030 ng/mL   CBC Auto Differential    Specimen: Blood   Result Value Ref Range    WBC 13.94 (H) 3.40 - 10.80 10*3/mm3    RBC 4.13 3.77 - 5.28 10*6/mm3    Hemoglobin 12.3 12.0 - 15.9 g/dL    Hematocrit 35.9 34.0 - 46.6 %    MCV 86.9 79.0 - 97.0 fL    MCH 29.8 26.6 - 33.0 pg    MCHC 34.3 31.5 - 35.7 g/dL    RDW 14.3 12.3 - 15.4 %    RDW-SD 45.5 37.0 - 54.0 fl    MPV 10.2 6.0 - 12.0 fL     "Platelets 369 140 - 450 10*3/mm3    Neutrophil % 61.4 42.7 - 76.0 %    Lymphocyte % 29.5 19.6 - 45.3 %    Monocyte % 5.2 5.0 - 12.0 %    Eosinophil % 3.2 0.3 - 6.2 %    Basophil % 0.3 0.0 - 1.5 %    Immature Grans % 0.4 0.0 - 0.5 %    Neutrophils, Absolute 8.57 (H) 1.70 - 7.00 10*3/mm3    Lymphocytes, Absolute 4.11 (H) 0.70 - 3.10 10*3/mm3    Monocytes, Absolute 0.73 0.10 - 0.90 10*3/mm3    Eosinophils, Absolute 0.44 (H) 0.00 - 0.40 10*3/mm3    Basophils, Absolute 0.04 0.00 - 0.20 10*3/mm3    Immature Grans, Absolute 0.05 0.00 - 0.05 10*3/mm3    nRBC 0.0 0.0 - 0.2 /100 WBC   ECG 12 Lead   Result Value Ref Range    QT Interval 388 ms    QTC Interval 450 ms        All other labs were within normal range or not returned as of this dictation.      EMERGENCY DEPARTMENT COURSE and DIFFERENTIAL DIAGNOSIS/MDM:   Vitals:    Vitals:    04/19/22 2100 04/19/22 2200 04/19/22 2230   BP: 109/77 102/76 104/79   Pulse: 84 79 80   Resp: 18     Temp: 98.3 °F (36.8 °C)     TempSrc: Oral     SpO2: 97% 96% 99%   Weight: 107 kg (235 lb)     Height: 167.6 cm (66\")              Patient very well-appearing throughout the duration of her stay in the emergency department with no symptoms.  She states that the symptoms she experienced tonight were consistent with her typical anxiety/panic attack.  She had no new concerning symptoms or findings on history, exam, or work-up that would raise concern for ACS, electrolyte derangement, significant anemia, or infectious process.  She is appropriate for discharge home with close follow-up with her PCP.    I had a discussion with the patient/family regarding diagnosis, diagnostic results, treatment plan, and medications.  The patient/family indicated understanding of these instructions.  I spent adequate time at the bedside preceding discharge necessary to personally discuss the aftercare instructions, giving patient education, providing explanations of the results of our evaluations/findings, and my " decision making to assure that the patient/family understand the plan of care.  Time was allotted to answer questions at that time and throughout the ED course.  Emphasis was placed on timely follow-up after discharge.  I also discussed the potential for the development of an acute emergent condition requiring further evaluation, admission, or even surgical intervention. I discussed that we found nothing during the visit today indicating the need for further workup, admission, or the presence of an unstable medical condition.  I encouraged the patient to return to the emergency department immediately for ANY concerns, worsening, new complaints, or if symptoms persist and unable to seek follow-up in a timely fashion.  The patient/family expressed understanding and agreement with this plan.  The patient will follow-up with their PCP in 1-2 days for reevaluation.         FINAL IMPRESSION      1. Anxiety    2. Shaking    3. Hyperglycemia          DISPOSITION/PLAN     ED Disposition     ED Disposition   Discharge    Condition   Stable    Comment   --             PATIENT REFERRED TO:  Ovi Corbin MD  [address] [place] [address]  [phone]    Schedule an appointment as soon as possible for a visit in 2 days      [facility] Emergency Department  [address] [place] [id number] [phone]    If symptoms worsen      DISCHARGE MEDICATIONS:     Medication List      CONTINUE taking these medications    ammonium lactate 12 % lotion  Commonly known as: Lac-Hydrin  Apply BID to feet     benztropine 0.5 MG tablet  Commonly known as: COGENTIN     busPIRone 10 MG tablet  Commonly known as: BUSPAR  Take 1 tablet by mouth Daily.     citalopram 40 MG tablet  Commonly known as: CeleXA  Take 1 tablet by mouth Daily.     FreeStyle Sukhi 2 Sensor misc  1 Units Every 14 (Fourteen) Days.     HumaLOG Mix 75/25 (75-25) 100 UNIT/ML suspension injection  Generic drug: insulin lispro  "protamine-insulin lispro  Inject 40 units in am and 15 units before supper     Insulin Syringe-Needle U-100 31G X 15/64\" 0.5 ML misc     Lancets misc  Test three times daily     levalbuterol 45 MCG/ACT inhaler  Commonly known as: XOPENEX HFA  Inhale 2 puffs 4 (Four) Times a Day As Needed for Wheezing.     * levothyroxine 25 MCG tablet  Commonly known as: SYNTHROID, LEVOTHROID  Take 1 tablet by mouth Daily.     * levothyroxine 200 MCG tablet  Commonly known as: SYNTHROID, LEVOTHROID     metFORMIN 500 MG tablet  Commonly known as: GLUCOPHAGE  Take 2 tablets BID with food     OneTouch Verio test strip  Generic drug: glucose blood  Use as instructed to test blood sugar 6 times daily     * risperiDONE 4 MG tablet  Commonly known as: risperDAL     * risperiDONE 3 MG tablet  Commonly known as: risperDAL     rosuvastatin 5 MG tablet  Commonly known as: CRESTOR  Take 1 tablet by mouth Every Night. Take one po daily     Ventolin  (90 Base) MCG/ACT inhaler  Generic drug: albuterol sulfate HFA     VITAMIN D3 PO         * This list has 4 medication(s) that are the same as other medications prescribed for you. Read the directions carefully, and ask your doctor or other care provider to review them with you.                    Comment: Please note this report has been produced using speech recognition software.      Teddy Contreras MD  Attending Emergency Physician               Teddy Contreras MD  04/22/22 0145    "

## 2022-04-21 LAB
QT INTERVAL: 388 MS
QTC INTERVAL: 450 MS

## 2022-04-22 ENCOUNTER — OFFICE VISIT (OUTPATIENT)
Dept: PULMONOLOGY | Facility: CLINIC | Age: 52
End: 2022-04-22

## 2022-04-22 VITALS
SYSTOLIC BLOOD PRESSURE: 110 MMHG | BODY MASS INDEX: 38.57 KG/M2 | HEIGHT: 66 IN | HEART RATE: 110 BPM | TEMPERATURE: 98.6 F | RESPIRATION RATE: 18 BRPM | OXYGEN SATURATION: 98 % | WEIGHT: 240 LBS | DIASTOLIC BLOOD PRESSURE: 74 MMHG

## 2022-04-22 DIAGNOSIS — J45.20 MILD INTERMITTENT ASTHMA WITHOUT COMPLICATION: Primary | ICD-10-CM

## 2022-04-22 PROBLEM — R06.09 DYSPNEA ON EXERTION: Status: RESOLVED | Noted: 2019-01-10 | Resolved: 2022-04-22

## 2022-04-22 PROBLEM — R05.9 COUGH: Status: RESOLVED | Noted: 2019-01-10 | Resolved: 2022-04-22

## 2022-04-22 PROCEDURE — 99213 OFFICE O/P EST LOW 20 MIN: CPT | Performed by: INTERNAL MEDICINE

## 2022-04-22 NOTE — PROGRESS NOTES
"Follow Up Office Note       Patient Name: Vilma Ayala    Referring Physician: No ref. provider found    Chief Complaint:    Chief Complaint   Patient presents with   • Asthma       History of Present Illness: Vilma Ayala is a 51 y.o. female who is here today to follow-up care with Pulmonary.  Patient has a past medical history significant for obesity, diabetes mellitus type 2, anxiety, and dyspnea on exertion.  Patient is doing very well at this time.  Denies any chest pain, nausea, fever, or chills.  Uses albuterol inhaler on as-needed basis.  She has no other acute complaints.    Review of Systems:   Review of Systems   Constitutional: Negative for chills, fatigue and fever.   HENT: Negative for congestion and voice change.    Eyes: Negative for blurred vision.   Respiratory: Negative for cough, shortness of breath and wheezing.    Cardiovascular: Negative for chest pain.   Skin: Negative for dry skin.   Hematological: Negative for adenopathy.   Psychiatric/Behavioral: Negative for agitation and depressed mood.       The following portions of the patient's history were reviewed and updated as appropriate: allergies, current medications, past family history, past medical history, past social history, past surgical history and problem list.    Physical Exam:  Vital Signs:   Vitals:    04/22/22 0824   BP: 110/74   BP Location: Left arm   Pulse: 110   Resp: 18   Temp: 98.6 °F (37 °C)   SpO2: 98%  Comment: room air  at rest   Weight: 109 kg (240 lb)   Height: 167.6 cm (66\")       Physical Exam  Vitals and nursing note reviewed.   Constitutional:       General: She is not in acute distress.     Appearance: She is well-developed and normal weight. She is not ill-appearing or toxic-appearing.   HENT:      Head: Normocephalic and atraumatic.   Cardiovascular:      Rate and Rhythm: Normal rate and regular rhythm.      Pulses: Normal pulses.      Heart sounds: Normal heart sounds. No murmur heard.    No " friction rub. No gallop.   Pulmonary:      Effort: Pulmonary effort is normal. No respiratory distress.      Breath sounds: Normal breath sounds. No wheezing, rhonchi or rales.   Musculoskeletal:      Right lower leg: No edema.      Left lower leg: No edema.   Skin:     General: Skin is warm and dry.   Neurological:      Mental Status: She is alert and oriented to person, place, and time.         Immunization History   Administered Date(s) Administered   • COVID-19 (MODERNA) 1st, 2nd, 3rd Dose Only 05/06/2021, 06/05/2021, 01/04/2022   • Flu Vaccine Quad PF >36MO 10/04/2017, 12/29/2021   • Fluzone Split Quad (Multi-dose) 10/04/2015   • Hepatitis A 05/09/2018, 05/09/2018, 10/04/2018, 01/10/2020, 01/10/2020   • Influenza, Unspecified 10/04/2018   • Pneumococcal Polysaccharide (PPSV23) 05/28/2016, 05/28/2016   • Shingrix 12/29/2021   • flucelvax quad pfs =>4 YRS 10/04/2018       Results Review:   - chest x-ray from 5/19/2021 showed no acute cardiopulmonary process.  - PFT from 5/19/2021 showed no obstruction or restriction.  There is a nonspecific reduction in FEV1 and FVC, test that was inadequate.    Assessment / Plan:   1. Mild intermittent asthma without complication (Primary)  -Asthma is currently very well controlled.  He wanted to go ahead and continue on the albuterol on a as needed basis.  We will continue to follow her on a yearly basis and can see her sooner if there is any acute issues.      Follow Up:   Return in about 1 year (around 4/22/2023).       JULIO CESAR Fall, DO  Pulmonary and Critical Care Medicine  Note Electronically Signed    Part of this note may be an electronic transcription/translation of spoken language to printed text using the Dragon Dictation System.

## 2022-05-14 ENCOUNTER — HOSPITAL ENCOUNTER (EMERGENCY)
Facility: HOSPITAL | Age: 52
Discharge: HOME | End: 2022-05-14
Attending: EMERGENCY MEDICINE | Admitting: EMERGENCY MEDICINE

## 2022-05-14 VITALS
SYSTOLIC BLOOD PRESSURE: 113 MMHG | BODY MASS INDEX: 34.36 KG/M2 | RESPIRATION RATE: 18 BRPM | OXYGEN SATURATION: 97 % | TEMPERATURE: 97.7 F | HEIGHT: 69 IN | WEIGHT: 232 LBS | DIASTOLIC BLOOD PRESSURE: 59 MMHG | HEART RATE: 80 BPM

## 2022-05-14 DIAGNOSIS — F41.9 ANXIETY: Primary | ICD-10-CM

## 2022-05-14 DIAGNOSIS — R73.9 HYPERGLYCEMIA: ICD-10-CM

## 2022-05-14 DIAGNOSIS — Z78.9 EXCESSIVE CAFFEINE INTAKE: ICD-10-CM

## 2022-05-14 LAB
ANION GAP SERPL CALCULATED.3IONS-SCNC: 11 MMOL/L (ref 5–15)
BASOPHILS # BLD AUTO: 0.04 10*3/MM3 (ref 0–0.2)
BASOPHILS NFR BLD AUTO: 0.3 % (ref 0–1.5)
BUN SERPL-MCNC: 16 MG/DL (ref 6–20)
BUN/CREAT SERPL: 12.9 (ref 7–25)
CALCIUM SPEC-SCNC: 9.3 MG/DL (ref 8.6–10.5)
CHLORIDE SERPL-SCNC: 103 MMOL/L (ref 98–107)
CO2 SERPL-SCNC: 28 MMOL/L (ref 22–29)
CREAT SERPL-MCNC: 1.24 MG/DL (ref 0.57–1)
DEPRECATED RDW RBC AUTO: 45.2 FL (ref 37–54)
EGFRCR SERPLBLD CKD-EPI 2021: 52.8 ML/MIN/1.73
EOSINOPHIL # BLD AUTO: 0.38 10*3/MM3 (ref 0–0.4)
EOSINOPHIL NFR BLD AUTO: 2.4 % (ref 0.3–6.2)
ERYTHROCYTE [DISTWIDTH] IN BLOOD BY AUTOMATED COUNT: 14.1 % (ref 12.3–15.4)
GLUCOSE SERPL-MCNC: 254 MG/DL (ref 65–99)
HCT VFR BLD AUTO: 38.3 % (ref 34–46.6)
HGB BLD-MCNC: 12.9 G/DL (ref 12–15.9)
IMM GRANULOCYTES # BLD AUTO: 0.05 10*3/MM3 (ref 0–0.05)
IMM GRANULOCYTES NFR BLD AUTO: 0.3 % (ref 0–0.5)
LYMPHOCYTES # BLD AUTO: 4.01 10*3/MM3 (ref 0.7–3.1)
LYMPHOCYTES NFR BLD AUTO: 25.7 % (ref 19.6–45.3)
MCH RBC QN AUTO: 29.6 PG (ref 26.6–33)
MCHC RBC AUTO-ENTMCNC: 33.7 G/DL (ref 31.5–35.7)
MCV RBC AUTO: 87.8 FL (ref 79–97)
MONOCYTES # BLD AUTO: 0.67 10*3/MM3 (ref 0.1–0.9)
MONOCYTES NFR BLD AUTO: 4.3 % (ref 5–12)
NEUTROPHILS NFR BLD AUTO: 10.48 10*3/MM3 (ref 1.7–7)
NEUTROPHILS NFR BLD AUTO: 67 % (ref 42.7–76)
NRBC BLD AUTO-RTO: 0.1 /100 WBC (ref 0–0.2)
PLATELET # BLD AUTO: 366 10*3/MM3 (ref 140–450)
PMV BLD AUTO: 10.4 FL (ref 6–12)
POTASSIUM SERPL-SCNC: 4.2 MMOL/L (ref 3.5–5.2)
QT INTERVAL: 386 MS
QTC INTERVAL: 453 MS
RBC # BLD AUTO: 4.36 10*6/MM3 (ref 3.77–5.28)
SODIUM SERPL-SCNC: 142 MMOL/L (ref 136–145)
TROPONIN T SERPL-MCNC: <0.01 NG/ML (ref 0–0.03)
WBC NRBC COR # BLD: 15.63 10*3/MM3 (ref 3.4–10.8)

## 2022-05-14 PROCEDURE — 36415 COLL VENOUS BLD VENIPUNCTURE: CPT

## 2022-05-14 PROCEDURE — 84484 ASSAY OF TROPONIN QUANT: CPT | Performed by: EMERGENCY MEDICINE

## 2022-05-14 PROCEDURE — 80048 BASIC METABOLIC PNL TOTAL CA: CPT | Performed by: EMERGENCY MEDICINE

## 2022-05-14 PROCEDURE — 85025 COMPLETE CBC W/AUTO DIFF WBC: CPT | Performed by: EMERGENCY MEDICINE

## 2022-05-14 PROCEDURE — 99283 EMERGENCY DEPT VISIT LOW MDM: CPT

## 2022-05-14 PROCEDURE — 93005 ELECTROCARDIOGRAM TRACING: CPT | Performed by: EMERGENCY MEDICINE

## 2022-05-14 RX ORDER — LORAZEPAM 2 MG/ML
0.5 INJECTION INTRAMUSCULAR ONCE
Status: DISCONTINUED | OUTPATIENT
Start: 2022-05-14 | End: 2022-05-15 | Stop reason: HOSPADM

## 2022-05-15 NOTE — ED PROVIDER NOTES
[place]    EMERGENCY DEPARTMENT ENCOUNTER      Pt Name: Vilma Ayala  MRN: [id number]  YOB: 1970  Date of evaluation: 5/14/2022  Provider: Teddy Contreras MD    CHIEF COMPLAINT       Chief Complaint   Patient presents with   • Anxiety         HISTORY OF PRESENT ILLNESS  (Location/Symptom, Timing/Onset, Context/Setting, Quality, Duration, Modifying Factors, Severity.)   Vilma Ayala is a 51 y.o. female who presents to the emergency department with complaint of moderate severity anxiousness that began spontaneously about 30 minutes prior to arrival and is now improving without any intervention.  Patient states that she typically drinks 2 diet sodas per day and today she had 3 and believes this may have triggered her anxiety episode.  She denies any other associated symptoms including any chest pain, shortness of breath, sweating, nausea, vomiting.      Nursing notes were reviewed.    REVIEW OF SYSTEMS    (2-9 systems for level 4, 10 or more for level 5)   ROS:  General:  No fevers, no chills, no weakness  Cardiovascular:  No chest pain, no palpitations  Respiratory:  No shortness of breath, no cough, no wheezing  Gastrointestinal:  No pain, no nausea, no vomiting, no diarrhea  Musculoskeletal:  No muscle pain, no joint pain  Skin:  No rash  Neurologic:  No speech problems, no headache, no extremity numbness, no extremity tingling, no extremity weakness  Psychiatric: + Anxious  Genitourinary:  No dysuria, no hematuria    Except as noted above the remainder of the review of systems was reviewed and negative.       PAST MEDICAL HISTORY     Past Medical History:   Diagnosis Date   • Anxiety    • Chicken pox    • Depression    • Diabetes mellitus (HCC)    • Disease of thyroid gland    • Measles    • Type 2 diabetes mellitus (HCC)          SURGICAL HISTORY       Past Surgical History:   Procedure Laterality Date   • EYE SURGERY     • TONSILLECTOMY           CURRENT MEDICATIONS       Current  "Facility-Administered Medications:   •  LORazepam (ATIVAN) injection 0.5 mg, 0.5 mg, Intravenous, Once, Teddy Contreras MD    Current Outpatient Medications:   •  ammonium lactate (Lac-Hydrin) 12 % lotion, Apply BID to feet, Disp: 225 g, Rfl: 3  •  benztropine (COGENTIN) 0.5 MG tablet, Take 0.5 mg by mouth As Needed., Disp: , Rfl:   •  busPIRone (BUSPAR) 10 MG tablet, Take 1 tablet by mouth Daily., Disp: 90 tablet, Rfl: 1  •  Cholecalciferol (VITAMIN D3 PO), Take 1 tablet by mouth Daily., Disp: , Rfl:   •  citalopram (CeleXA) 40 MG tablet, Take 1 tablet by mouth Daily., Disp: 90 tablet, Rfl: 1  •  Continuous Blood Gluc Sensor (FreeStyle Sukhi 2 Sensor) misc, 1 Units Every 14 (Fourteen) Days., Disp: 2 each, Rfl: 6  •  glucose blood (OneTouch Verio) test strip, Use as instructed to test blood sugar 6 times daily, Disp: 200 each, Rfl: 12  •  insulin lispro protamine-insulin lispro (HumaLOG Mix 75/25) (75-25) 100 UNIT/ML suspension injection, Inject 40 units in am and 15 units before supper, Disp: 30 mL, Rfl: 3  •  Insulin Syringe-Needle U-100 31G X 15/64\" 0.5 ML misc, , Disp: , Rfl:   •  Lancets misc, Test three times daily, Disp: 100 each, Rfl: 5  •  levothyroxine (SYNTHROID, LEVOTHROID) 200 MCG tablet, Take 200 mcg by mouth Daily., Disp: , Rfl:   •  levothyroxine (SYNTHROID, LEVOTHROID) 25 MCG tablet, Take 1 tablet by mouth Daily., Disp: 90 tablet, Rfl: 1  •  metFORMIN (GLUCOPHAGE) 500 MG tablet, Take 2 tablets BID with food, Disp: 120 tablet, Rfl: 2  •  risperiDONE (risperDAL) 3 MG tablet, Take 3 mg by mouth Daily., Disp: , Rfl:   •  risperiDONE (risperDAL) 4 MG tablet, Take 3 mg by mouth Every Night. Take 3 in am and 3 at hs, Disp: , Rfl:   •  rosuvastatin (CRESTOR) 5 MG tablet, Take 1 tablet by mouth Every Night. Take one po daily, Disp: 30 tablet, Rfl: 11  •  VENTOLIN  (90 Base) MCG/ACT inhaler, Inhale 2 puffs 4 (Four) Times a Day., Disp: , Rfl:     ALLERGIES     Doxepin and Januvia " "[sitagliptin]    FAMILY HISTORY       Family History   Problem Relation Age of Onset   • Hypertension Other    • Thyroid disease Other    • Diabetes Other    • Obesity Other    • Diabetes Mother    • Hypertension Mother    • Heart disease Mother    • Cancer Father    • Diabetes Sister    • Diabetes Brother    • No Known Problems Brother           SOCIAL HISTORY       Social History     Socioeconomic History   • Marital status: Single   Tobacco Use   • Smoking status: Never Smoker   • Smokeless tobacco: Never Used   Vaping Use   • Vaping Use: Never used   Substance and Sexual Activity   • Alcohol use: No   • Drug use: No   • Sexual activity: Not Currently         PHYSICAL EXAM    (up to 7 for level 4, 8 or more for level 5)     Vitals:    05/14/22 2115 05/14/22 2123   BP: 125/57    BP Location: Left arm    Patient Position: Sitting    Pulse: 87    Resp: 18    Temp:  97.7 °F (36.5 °C)   TempSrc:  Oral   SpO2: 97%    Weight: 105 kg (232 lb)    Height: 175.3 cm (69\")        Physical Exam  General: Awake, alert, no acute distress.  HEENT: Conjunctivae normal.  Neck: Trachea midline.  Cardiac: Heart regular rate, rhythm, no murmurs, rubs, or gallops  Lungs: Lungs are clear to auscultation, there is no wheezing, rhonchi, or rales. There is no use of accessory muscles.  Chest wall: There is no tenderness to palpation over the chest wall or over ribs  Abdomen: Abdomen is soft, nontender, nondistended. There are no firm or pulsatile masses, no rebound rigidity or guarding.   Musculoskeletal: No deformity.  Neuro: Alert and oriented x 4.  Dermatology: Skin is warm and dry  Psych: Mentation is grossly normal, cognition is grossly normal. Affect is appropriate.        DIAGNOSTIC RESULTS     EKG: All EKGs are interpreted by the Emergency Department Physician who either signs or Co-signs this chart in the absence of a cardiologist.    ECG 12 Lead   Final Result   Test Reason : anxiousness   Blood Pressure :   */*   mmHG   Vent. " Rate :  83 BPM     Atrial Rate :  83 BPM      P-R Int : 164 ms          QRS Dur :  76 ms       QT Int : 386 ms       P-R-T Axes :  33  -6  -5 degrees      QTc Int : 453 ms      Normal sinus rhythm   Normal ECG   When compared with ECG of 19-APR-2022 21:35,   No significant change was found   Confirmed by OVI MARTINEZ ([id number]) on 5/14/2022 9:51:15 PM      Referred By: EDMD           Confirmed By: OVI MARTINEZ          LABS:    I have reviewed and interpreted all of the currently available lab results from this visit (if applicable):  Results for orders placed or performed during the hospital encounter of 05/14/22   Basic Metabolic Panel    Specimen: Blood   Result Value Ref Range    Glucose 254 (H) 65 - 99 mg/dL    BUN 16 6 - 20 mg/dL    Creatinine 1.24 (H) 0.57 - 1.00 mg/dL    Sodium 142 136 - 145 mmol/L    Potassium 4.2 3.5 - 5.2 mmol/L    Chloride 103 98 - 107 mmol/L    CO2 28.0 22.0 - 29.0 mmol/L    Calcium 9.3 8.6 - 10.5 mg/dL    BUN/Creatinine Ratio 12.9 7.0 - 25.0    Anion Gap 11.0 5.0 - 15.0 mmol/L    eGFR 52.8 (L) >60.0 mL/min/1.73   Troponin    Specimen: Blood   Result Value Ref Range    Troponin T <0.010 0.000 - 0.030 ng/mL   CBC Auto Differential    Specimen: Blood   Result Value Ref Range    WBC 15.63 (H) 3.40 - 10.80 10*3/mm3    RBC 4.36 3.77 - 5.28 10*6/mm3    Hemoglobin 12.9 12.0 - 15.9 g/dL    Hematocrit 38.3 34.0 - 46.6 %    MCV 87.8 79.0 - 97.0 fL    MCH 29.6 26.6 - 33.0 pg    MCHC 33.7 31.5 - 35.7 g/dL    RDW 14.1 12.3 - 15.4 %    RDW-SD 45.2 37.0 - 54.0 fl    MPV 10.4 6.0 - 12.0 fL    Platelets 366 140 - 450 10*3/mm3    Neutrophil % 67.0 42.7 - 76.0 %    Lymphocyte % 25.7 19.6 - 45.3 %    Monocyte % 4.3 (L) 5.0 - 12.0 %    Eosinophil % 2.4 0.3 - 6.2 %    Basophil % 0.3 0.0 - 1.5 %    Immature Grans % 0.3 0.0 - 0.5 %    Neutrophils, Absolute 10.48 (H) 1.70 - 7.00 10*3/mm3    Lymphocytes, Absolute 4.01 (H) 0.70 - 3.10 10*3/mm3    Monocytes, Absolute 0.67 0.10 - 0.90 10*3/mm3    Eosinophils,  "Absolute 0.38 0.00 - 0.40 10*3/mm3    Basophils, Absolute 0.04 0.00 - 0.20 10*3/mm3    Immature Grans, Absolute 0.05 0.00 - 0.05 10*3/mm3    nRBC 0.1 0.0 - 0.2 /100 WBC   ECG 12 Lead   Result Value Ref Range    QT Interval 386 ms    QTC Interval 453 ms        All other labs were within normal range or not returned as of this dictation.      EMERGENCY DEPARTMENT COURSE and DIFFERENTIAL DIAGNOSIS/MDM:   Vitals:    Vitals:    05/14/22 2115 05/14/22 2123   BP: 125/57    BP Location: Left arm    Patient Position: Sitting    Pulse: 87    Resp: 18    Temp:  97.7 °F (36.5 °C)   TempSrc:  Oral   SpO2: 97%    Weight: 105 kg (232 lb)    Height: 175.3 cm (69\")        ED Course as of 05/14/22 2245   Sat May 14, 2022   2244 On reevaluation, the patient remains completely asymptomatic.  She is resting comfortably in bed and is well-appearing.  Her vital signs remained within normal limits.  ECG demonstrates no evidence of acute ischemic change and troponin is within normal limits.  She has some mild leukocytosis on her CBC, however is asymptomatic with normal vital signs and no findings to suggest underlying infection.  She has stable renal dysfunction and some hyperglycemia which she was counseled about but no evidence of DKA or underlying complication.  She is appropriate for discharge home with close outpatient follow-up. [NS]      ED Course User Index  [NS] Teddy Contreras MD       Patient with recurrent presentations for anxiety.  States that today she feels as if this is her typical anxiety attack.  She has no other concerning symptoms including no chest pain, shortness of breath, diaphoresis, or vomiting as well as no nausea to suggest underlying emergent condition such as ACS and I have very low clinical suspicion for this.  I have reviewed her ECG and there is no evidence of acute ischemic change, dysrhythmia, or other abnormality.  I have low clinical suspicion for any emergent process and the patient is overall very " well-appearing.  I will obtain labs to evaluate for any evidence of myocardial injury, electrolyte derangement, significant glucose abnormality, anemia, however suspect that the patient will be able to be discharged home with close outpatient follow-up.    I had a discussion with the patient/family regarding diagnosis, diagnostic results, treatment plan, and medications.  The patient/family indicated understanding of these instructions.  I spent adequate time at the bedside preceding discharge necessary to personally discuss the aftercare instructions, giving patient education, providing explanations of the results of our evaluations/findings, and my decision making to assure that the patient/family understand the plan of care.  Time was allotted to answer questions at that time and throughout the ED course.  Emphasis was placed on timely follow-up after discharge.  I also discussed the potential for the development of an acute emergent condition requiring further evaluation, admission, or even surgical intervention. I discussed that we found nothing during the visit today indicating the need for further workup, admission, or the presence of an unstable medical condition.  I encouraged the patient to return to the emergency department immediately for ANY concerns, worsening, new complaints, or if symptoms persist and unable to seek follow-up in a timely fashion.  The patient/family expressed understanding and agreement with this plan.  The patient will follow-up with their PCP in 1-2 days for reevaluation.       MEDICATIONS ADMINISTERED IN ED:  Medications   LORazepam (ATIVAN) injection 0.5 mg (has no administration in time range)           FINAL IMPRESSION      1. Anxiety    2. Excessive caffeine intake    3. Hyperglycemia          DISPOSITION/PLAN     ED Disposition     ED Disposition   Discharge    Condition   Stable    Comment   --             PATIENT REFERRED TO:  Ovi Corbin MD  [address] [place] [address]  "Dr [address] [place] [address]  [phone]    Schedule an appointment as soon as possible for a visit in 2 days      [facility] Emergency Department  [address] [facility] [address] [phone]    If symptoms worsen      DISCHARGE MEDICATIONS:     Medication List      CONTINUE taking these medications    ammonium lactate 12 % lotion  Commonly known as: Lac-Hydrin  Apply BID to feet     benztropine 0.5 MG tablet  Commonly known as: COGENTIN     busPIRone 10 MG tablet  Commonly known as: BUSPAR  Take 1 tablet by mouth Daily.     citalopram 40 MG tablet  Commonly known as: CeleXA  Take 1 tablet by mouth Daily.     FreeStyle Sukhi 2 Sensor misc  1 Units Every 14 (Fourteen) Days.     HumaLOG Mix 75/25 (75-25) 100 UNIT/ML suspension injection  Generic drug: insulin lispro protamine-insulin lispro  Inject 40 units in am and 15 units before supper     Insulin Syringe-Needle U-100 31G X 15/64\" 0.5 ML misc     Lancets misc  Test three times daily     * levothyroxine 25 MCG tablet  Commonly known as: SYNTHROID, LEVOTHROID  Take 1 tablet by mouth Daily.     * levothyroxine 200 MCG tablet  Commonly known as: SYNTHROID, LEVOTHROID     metFORMIN 500 MG tablet  Commonly known as: GLUCOPHAGE  Take 2 tablets BID with food     OneTouch Verio test strip  Generic drug: glucose blood  Use as instructed to test blood sugar 6 times daily     * risperiDONE 4 MG tablet  Commonly known as: risperDAL     * risperiDONE 3 MG tablet  Commonly known as: risperDAL     rosuvastatin 5 MG tablet  Commonly known as: CRESTOR  Take 1 tablet by mouth Every Night. Take one po daily     Ventolin  (90 Base) MCG/ACT inhaler  Generic drug: albuterol sulfate HFA     VITAMIN D3 PO         * This list has 4 medication(s) that are the same as other medications prescribed for you. Read the directions carefully, and ask your doctor or other care provider to review them with you.                    Comment: Please note this " report has been produced using speech recognition software.      Teddy Contreras MD  Attending Emergency Physician               Teddy Contreras MD  05/14/22 [id number]

## 2022-05-18 RX ORDER — CITALOPRAM 40 MG/1
40 TABLET ORAL DAILY
Qty: 90 TABLET | Refills: 1 | Status: SHIPPED | OUTPATIENT
Start: 2022-05-18

## 2022-05-18 RX ORDER — BUSPIRONE HYDROCHLORIDE 10 MG/1
10 TABLET ORAL DAILY
Qty: 90 TABLET | Refills: 1 | Status: SHIPPED | OUTPATIENT
Start: 2022-05-18

## 2022-05-18 NOTE — TELEPHONE ENCOUNTER
PT CALLED STATING SHE IS OUT OF TEST STRIPS. SHE REQUESTED WE SEND AN RX IN TO [facility] ON [address]. SHE STATED SHE IS OUT AND IT IS TOO SOON FOR HER TO  CURRENT RX.

## 2022-05-21 VITALS
WEIGHT: 232 LBS | SYSTOLIC BLOOD PRESSURE: 127 MMHG | HEART RATE: 97 BPM | BODY MASS INDEX: 37.28 KG/M2 | HEIGHT: 66 IN | RESPIRATION RATE: 18 BRPM | OXYGEN SATURATION: 96 % | TEMPERATURE: 98.4 F | DIASTOLIC BLOOD PRESSURE: 81 MMHG

## 2022-05-21 PROCEDURE — 99283 EMERGENCY DEPT VISIT LOW MDM: CPT

## 2022-05-22 ENCOUNTER — HOSPITAL ENCOUNTER (EMERGENCY)
Facility: HOSPITAL | Age: 52
Discharge: HOME | End: 2022-05-22
Attending: EMERGENCY MEDICINE | Admitting: EMERGENCY MEDICINE

## 2022-05-22 DIAGNOSIS — R73.9 HYPERGLYCEMIA: Primary | ICD-10-CM

## 2022-05-22 NOTE — ED PROVIDER NOTES
[facility]    EMERGENCY DEPARTMENT ENCOUNTER      Pt Name: Vilma Ayala  MRN: [id number]  YOB: 1970  Date of evaluation: 5/21/2022  Provider: Teddy Contreras MD    CHIEF COMPLAINT     Hyperglycemia      HISTORY OF PRESENT ILLNESS  (Location/Symptom, Timing/Onset, Context/Setting, Quality, Duration, Modifying Factors, Severity.)   Vilma Ayala is a 51 y.o. female who presents to the emergency department with primary complaint of hyperglycemia.  She checked her glucose at home and noted to be 230, prompting her to call an ambulance.  She states that she has had about 1 has been well and has had no symptoms whatsoever.  Specifically, she denies any headache, chest pain, shortness of breath, cough, fever, chills, Paul pain, vomiting, diarrhea, or urinary symptoms.  She states that she has been compliant with all of her medications.      Nursing notes were reviewed.    REVIEW OF SYSTEMS    (2-9 systems for level 4, 10 or more for level 5)   ROS:  General:  No fevers, no chills, no weakness  Cardiovascular:  No chest pain, no palpitations  Respiratory:  No shortness of breath, no cough, no wheezing  Gastrointestinal:  No pain, no nausea, no vomiting, no diarrhea  Musculoskeletal:  No muscle pain, no joint pain  Skin:  No rash  Neurologic:  No speech problems, no headache, no extremity numbness, no extremity tingling, no extremity weakness  Psychiatric:  No anxiety  Genitourinary:  No dysuria, no hematuria    Except as noted above the remainder of the review of systems was reviewed and negative.       PAST MEDICAL HISTORY     Past Medical History:   Diagnosis Date   • Anxiety    • Chicken pox    • Depression    • Diabetes mellitus (HCC)    • Disease of thyroid gland    • Measles    • Type 2 diabetes mellitus (HCC)          SURGICAL HISTORY       Past Surgical History:   Procedure Laterality Date   • EYE SURGERY     • TONSILLECTOMY           CURRENT MEDICATIONS     No current  "facility-administered medications for this encounter.    Current Outpatient Medications:   •  ammonium lactate (Lac-Hydrin) 12 % lotion, Apply BID to feet, Disp: 225 g, Rfl: 3  •  benztropine (COGENTIN) 0.5 MG tablet, Take 0.5 mg by mouth As Needed., Disp: , Rfl:   •  busPIRone (BUSPAR) 10 MG tablet, Take 1 tablet by mouth Daily., Disp: 90 tablet, Rfl: 1  •  Cholecalciferol (VITAMIN D3 PO), Take 1 tablet by mouth Daily., Disp: , Rfl:   •  citalopram (CeleXA) 40 MG tablet, Take 1 tablet by mouth Daily., Disp: 90 tablet, Rfl: 1  •  Continuous Blood Gluc Sensor (FreeStyle Sukhi 2 Sensor) misc, 1 Units Every 14 (Fourteen) Days., Disp: 2 each, Rfl: 6  •  glucose blood (OneTouch Verio) test strip, Use as instructed to test blood sugar 6 times daily, Disp: 200 each, Rfl: 12  •  insulin lispro protamine-insulin lispro (HumaLOG Mix 75/25) (75-25) 100 UNIT/ML suspension injection, Inject 40 units in am and 15 units before supper, Disp: 30 mL, Rfl: 3  •  Insulin Syringe-Needle U-100 31G X 15/64\" 0.5 ML misc, , Disp: , Rfl:   •  Lancets misc, Test three times daily, Disp: 100 each, Rfl: 5  •  levothyroxine (SYNTHROID, LEVOTHROID) 200 MCG tablet, Take 200 mcg by mouth Daily., Disp: , Rfl:   •  levothyroxine (SYNTHROID, LEVOTHROID) 25 MCG tablet, Take 1 tablet by mouth Daily., Disp: 90 tablet, Rfl: 1  •  metFORMIN (GLUCOPHAGE) 500 MG tablet, Take 2 tablets BID with food, Disp: 120 tablet, Rfl: 2  •  risperiDONE (risperDAL) 3 MG tablet, Take 3 mg by mouth Daily., Disp: , Rfl:   •  risperiDONE (risperDAL) 4 MG tablet, Take 3 mg by mouth Every Night. Take 3 in am and 3 at hs, Disp: , Rfl:   •  rosuvastatin (CRESTOR) 5 MG tablet, Take 1 tablet by mouth Every Night. Take one po daily, Disp: 30 tablet, Rfl: 11  •  VENTOLIN  (90 Base) MCG/ACT inhaler, Inhale 2 puffs 4 (Four) Times a Day., Disp: , Rfl:     ALLERGIES     Doxepin and Januvia [sitagliptin]    FAMILY HISTORY       Family History   Problem Relation Age of Onset   • " "Hypertension Other    • Thyroid disease Other    • Diabetes Other    • Obesity Other    • Diabetes Mother    • Hypertension Mother    • Heart disease Mother    • Cancer Father    • Diabetes Sister    • Diabetes Brother    • No Known Problems Brother           SOCIAL HISTORY       Social History     Socioeconomic History   • Marital status: Single   Tobacco Use   • Smoking status: Never Smoker   • Smokeless tobacco: Never Used   Vaping Use   • Vaping Use: Never used   Substance and Sexual Activity   • Alcohol use: No   • Drug use: No   • Sexual activity: Not Currently         PHYSICAL EXAM    (up to 7 for level 4, 8 or more for level 5)     Vitals:    05/21/22 2337   BP: 127/81   BP Location: Left arm   Patient Position: Sitting   Pulse: 97   Resp: 18   Temp: 98.4 °F (36.9 °C)   TempSrc: Oral   SpO2: 96%   Weight: 105 kg (232 lb)   Height: 167.6 cm (66\")       Physical Exam  General: Awake, alert, no acute distress.  HEENT: Conjunctivae normal.  Neck: Trachea midline.  Cardiac: Heart regular rate, rhythm, no murmurs, rubs, or gallops  Lungs: Lungs are clear to auscultation, there is no wheezing, rhonchi, or rales. There is no use of accessory muscles.  Chest wall: There is no tenderness to palpation over the chest wall or over ribs  Abdomen: Abdomen is soft, nontender, nondistended. There are no firm or pulsatile masses, no rebound rigidity or guarding.   Musculoskeletal: No deformity.  Neuro: Alert and oriented x 4.  Dermatology: Skin is warm and dry  Psych: Mentation is grossly normal, cognition is grossly normal. Affect is appropriate.          EMERGENCY DEPARTMENT COURSE and DIFFERENTIAL DIAGNOSIS/MDM:   Vitals:    Vitals:    05/21/22 2337   BP: 127/81   BP Location: Left arm   Patient Position: Sitting   Pulse: 97   Resp: 18   Temp: 98.4 °F (36.9 °C)   TempSrc: Oral   SpO2: 96%   Weight: 105 kg (232 lb)   Height: 167.6 cm (66\")       ED Course as of 05/21/22 2346   Sat May 21, 2022   2344 Patient completely " asymptomatic and well-appearing in the ED.  She came to the ED because she checked her glucose at home and noted to be 230.  She has had no other symptoms including no chest pain, shortness of breath, abdominal pain, vomiting, diarrhea, fever, chills, cough.  There is nothing to suggest underlying inciting process.  There is nothing to suggest DKA, HHS, or other hyperglycemic emergency.  Her vital signs are within normal limits.  Feel that she is appropriate for discharge home with monitoring of her diet and medications. [NS]      ED Course User Index  [NS] Teddy Contreras MD         I had a discussion with the patient/family regarding diagnosis, diagnostic results, treatment plan, and medications.  The patient/family indicated understanding of these instructions.  I spent adequate time at the bedside preceding discharge necessary to personally discuss the aftercare instructions, giving patient education, providing explanations of the results of our evaluations/findings, and my decision making to assure that the patient/family understand the plan of care.  Time was allotted to answer questions at that time and throughout the ED course.  Emphasis was placed on timely follow-up after discharge.  I also discussed the potential for the development of an acute emergent condition requiring further evaluation, admission, or even surgical intervention. I discussed that we found nothing during the visit today indicating the need for further workup, admission, or the presence of an unstable medical condition.  I encouraged the patient to return to the emergency department immediately for ANY concerns, worsening, new complaints, or if symptoms persist and unable to seek follow-up in a timely fashion.  The patient/family expressed understanding and agreement with this plan.  The patient will follow-up with their PCP in 1-2 days for reevaluation.           FINAL IMPRESSION      1. Hyperglycemia          DISPOSITION/PLAN  "    ED Disposition     ED Disposition   Discharge    Condition   Stable    Comment   --             PATIENT REFERRED TO:  Ovi Corbin MD  [address] [facility]   [place] [address]  [phone]    Schedule an appointment as soon as possible for a visit in 2 days      [facility] Emergency Department  [address] [facility] [address] [phone]    If symptoms worsen      DISCHARGE MEDICATIONS:     Medication List      CONTINUE taking these medications    ammonium lactate 12 % lotion  Commonly known as: Lac-Hydrin  Apply BID to feet     benztropine 0.5 MG tablet  Commonly known as: COGENTIN     busPIRone 10 MG tablet  Commonly known as: BUSPAR  Take 1 tablet by mouth Daily.     citalopram 40 MG tablet  Commonly known as: CeleXA  Take 1 tablet by mouth Daily.     FreeStyle Sukhi 2 Sensor misc  1 Units Every 14 (Fourteen) Days.     HumaLOG Mix 75/25 (75-25) 100 UNIT/ML suspension injection  Generic drug: insulin lispro protamine-insulin lispro  Inject 40 units in am and 15 units before supper     Insulin Syringe-Needle U-100 31G X 15/64\" 0.5 ML misc     Lancets misc  Test three times daily     * levothyroxine 25 MCG tablet  Commonly known as: SYNTHROID, LEVOTHROID  Take 1 tablet by mouth Daily.     * levothyroxine 200 MCG tablet  Commonly known as: SYNTHROID, LEVOTHROID     metFORMIN 500 MG tablet  Commonly known as: GLUCOPHAGE  Take 2 tablets BID with food     OneTouch Verio test strip  Generic drug: glucose blood  Use as instructed to test blood sugar 6 times daily     * risperiDONE 4 MG tablet  Commonly known as: risperDAL     * risperiDONE 3 MG tablet  Commonly known as: risperDAL     rosuvastatin 5 MG tablet  Commonly known as: CRESTOR  Take 1 tablet by mouth Every Night. Take one po daily     Ventolin  (90 Base) MCG/ACT inhaler  Generic drug: albuterol sulfate HFA     VITAMIN D3 PO         * This list has 4 medication(s) that are the same as other medications " prescribed for you. Read the directions carefully, and ask your doctor or other care provider to review them with you.                    Comment: Please note this report has been produced using speech recognition software.      Teddy Contreras MD  Attending Emergency Physician               Teddy Contreras MD  05/21/22 0805

## 2022-06-07 RX ORDER — INSULIN LISPRO PROTAMIN/LISPRO 75-25/ML
VIAL (ML) SUBCUTANEOUS
Qty: 30 ML | Refills: 3
Start: 2022-06-07 | End: 2022-11-02 | Stop reason: SDUPTHER

## 2022-06-16 PROCEDURE — 99283 EMERGENCY DEPT VISIT LOW MDM: CPT

## 2022-06-17 ENCOUNTER — HOSPITAL ENCOUNTER (EMERGENCY)
Facility: HOSPITAL | Age: 52
Discharge: HOME | End: 2022-06-17
Attending: EMERGENCY MEDICINE | Admitting: EMERGENCY MEDICINE

## 2022-06-17 VITALS
DIASTOLIC BLOOD PRESSURE: 83 MMHG | HEART RATE: 77 BPM | BODY MASS INDEX: 37.28 KG/M2 | OXYGEN SATURATION: 97 % | TEMPERATURE: 98.6 F | SYSTOLIC BLOOD PRESSURE: 124 MMHG | WEIGHT: 232 LBS | RESPIRATION RATE: 14 BRPM | HEIGHT: 66 IN

## 2022-06-17 DIAGNOSIS — F41.9 ANXIETY: ICD-10-CM

## 2022-06-17 DIAGNOSIS — Z13.9 ENCOUNTER FOR MEDICAL SCREENING EXAMINATION: Primary | ICD-10-CM

## 2022-06-17 NOTE — ED PROVIDER NOTES
EMERGENCY DEPARTMENT ENCOUNTER    Pt Name: Vilma Ayala  MRN: [id number]  Pt :   1970  Room Number:    Date of encounter:  2022  PCP: Ovi Corbin MD  ED Provider: Gato Chun MD    Historian: patient      HPI:  Chief Complaint: anxiety        Context: Vilma Ayala is a 51 y.o. female who presents to the ED c/o anxiety earlier, has been waiting here and self resolved, she was situationally anxious about running out of her supply of sodas with caffeine, she lives at a group home, she does have a history of anxiety and depression has had counseling and currently has medications, no medical complaints, no fevers chills or pain.      PAST MEDICAL HISTORY  Past Medical History:   Diagnosis Date   • Anxiety    • Chicken pox    • Depression    • Diabetes mellitus (HCC)    • Disease of thyroid gland    • Measles    • Type 2 diabetes mellitus (HCC)          PAST SURGICAL HISTORY  Past Surgical History:   Procedure Laterality Date   • EYE SURGERY     • TONSILLECTOMY           FAMILY HISTORY  Family History   Problem Relation Age of Onset   • Hypertension Other    • Thyroid disease Other    • Diabetes Other    • Obesity Other    • Diabetes Mother    • Hypertension Mother    • Heart disease Mother    • Cancer Father    • Diabetes Sister    • Diabetes Brother    • No Known Problems Brother          SOCIAL HISTORY  Social History     Socioeconomic History   • Marital status: Single   Tobacco Use   • Smoking status: Never Smoker   • Smokeless tobacco: Never Used   Vaping Use   • Vaping Use: Never used   Substance and Sexual Activity   • Alcohol use: No   • Drug use: No   • Sexual activity: Not Currently         ALLERGIES  Doxepin and Januvia [sitagliptin]        REVIEW OF SYSTEMS  Review of Systems       All systems reviewed and negative except for those discussed in HPI.       PHYSICAL EXAM    I have reviewed the triage vital signs and nursing notes.    ED Triage Vitals [22  0027]   Temp Heart Rate Resp BP SpO2   98.6 °F (37 °C) 77 14 124/83 97 %      Temp src Heart Rate Source Patient Position BP Location FiO2 (%)   Oral Monitor Sitting Left arm --       Physical Exam  GENERAL:   Appears alert, conversant, comfortable.  HENT: Nares patent.  EYES: No scleral icterus.  CV: Regular rhythm, regular rate.  RESPIRATORY: Normal effort.  No audible wheezes, rales or rhonchi.  ABDOMEN: Soft, nontender  MUSCULOSKELETAL: No deformities.   NEURO: Alert, moves all extremities, follows commands.  SKIN: Warm, dry, no rash visualized.          Medical decision making    With the information and clinical encounter, there is no indication of unstable medical illness after medical screening, no indication for imaging, or laboratory studies at this time.  I discussed the treatment plan with Ms. Dillon, and she agrees with this.    Differential diagnoses: Situational anxiety, depression, diabetes.                 AS OF 03:02 EDT VITALS:    BP - 124/83  HR - 77  TEMP - 98.6 °F (37 °C) (Oral)  O2 SATS - 97%                  DIAGNOSIS  Final diagnoses:   Encounter for medical screening examination   Anxiety         DISPOSITION  DISCHARGE    Patient discharged in stable condition.    Reviewed implications of results, diagnosis, meds, responsibility to follow up, warning signs and symptoms of possible worsening, potential complications and reasons to return to ER.    Patient/Family voiced understanding of above instructions.    Discussed plan for discharge, as there is no emergent indication for admission.  Pt/family is agreeable and understands need for follow up and possible repeat testing.  Pt/family is aware that discharge does not mean that nothing is wrong but that it indicates no emergency is currently present that requires admission and they must continue care with follow-up as given below or with a physician of their choice.     FOLLOW-UP  No follow-up provider specified.       Medication List      No  changes were made to your prescriptions during this visit.                  Gato Chun MD  06/17/22 6360

## 2022-07-28 ENCOUNTER — OFFICE VISIT (OUTPATIENT)
Dept: ENDOCRINOLOGY | Facility: CLINIC | Age: 52
End: 2022-07-28

## 2022-07-28 ENCOUNTER — LAB (OUTPATIENT)
Dept: LAB | Facility: HOSPITAL | Age: 52
End: 2022-07-28

## 2022-07-28 VITALS
SYSTOLIC BLOOD PRESSURE: 108 MMHG | BODY MASS INDEX: 36.9 KG/M2 | OXYGEN SATURATION: 98 % | DIASTOLIC BLOOD PRESSURE: 62 MMHG | WEIGHT: 229.6 LBS | HEART RATE: 90 BPM | HEIGHT: 66 IN

## 2022-07-28 DIAGNOSIS — Z79.4 TYPE 2 DIABETES MELLITUS WITHOUT COMPLICATION, WITH LONG-TERM CURRENT USE OF INSULIN: ICD-10-CM

## 2022-07-28 DIAGNOSIS — N39.42 URINARY INCONTINENCE WITHOUT SENSORY AWARENESS: ICD-10-CM

## 2022-07-28 DIAGNOSIS — E03.9 ACQUIRED HYPOTHYROIDISM: ICD-10-CM

## 2022-07-28 DIAGNOSIS — E11.9 TYPE 2 DIABETES MELLITUS WITHOUT COMPLICATION, WITH LONG-TERM CURRENT USE OF INSULIN: Primary | ICD-10-CM

## 2022-07-28 DIAGNOSIS — E55.9 VITAMIN D DEFICIENCY, UNSPECIFIED: ICD-10-CM

## 2022-07-28 DIAGNOSIS — E78.2 MIXED HYPERLIPIDEMIA: ICD-10-CM

## 2022-07-28 DIAGNOSIS — E66.01 CLASS 3 SEVERE OBESITY DUE TO EXCESS CALORIES WITH SERIOUS COMORBIDITY AND BODY MASS INDEX (BMI) OF 45.0 TO 49.9 IN ADULT: ICD-10-CM

## 2022-07-28 DIAGNOSIS — E11.9 TYPE 2 DIABETES MELLITUS WITHOUT COMPLICATION, WITH LONG-TERM CURRENT USE OF INSULIN: ICD-10-CM

## 2022-07-28 DIAGNOSIS — D72.823 LEUKEMOID REACTION: ICD-10-CM

## 2022-07-28 DIAGNOSIS — Z79.4 TYPE 2 DIABETES MELLITUS WITHOUT COMPLICATION, WITH LONG-TERM CURRENT USE OF INSULIN: Primary | ICD-10-CM

## 2022-07-28 DIAGNOSIS — E03.9 ACQUIRED HYPOTHYROIDISM: Primary | ICD-10-CM

## 2022-07-28 PROBLEM — R94.4 DECREASED GFR: Status: ACTIVE | Noted: 2022-07-15

## 2022-07-28 LAB
ALBUMIN SERPL-MCNC: 4.1 G/DL (ref 3.5–5.2)
ALBUMIN/GLOB SERPL: 1.5 G/DL
ALP SERPL-CCNC: 119 U/L (ref 39–117)
ALT SERPL W P-5'-P-CCNC: 15 U/L (ref 1–33)
ANION GAP SERPL CALCULATED.3IONS-SCNC: 9.2 MMOL/L (ref 5–15)
AST SERPL-CCNC: 14 U/L (ref 1–32)
BASOPHILS # BLD AUTO: 0.04 10*3/MM3 (ref 0–0.2)
BASOPHILS NFR BLD AUTO: 0.3 % (ref 0–1.5)
BILIRUB SERPL-MCNC: 0.2 MG/DL (ref 0–1.2)
BUN SERPL-MCNC: 15 MG/DL (ref 6–20)
BUN/CREAT SERPL: 10.3 (ref 7–25)
CALCIUM SPEC-SCNC: 9.8 MG/DL (ref 8.6–10.5)
CHLORIDE SERPL-SCNC: 103 MMOL/L (ref 98–107)
CHOLEST SERPL-MCNC: 190 MG/DL (ref 0–200)
CO2 SERPL-SCNC: 27.8 MMOL/L (ref 22–29)
CREAT SERPL-MCNC: 1.45 MG/DL (ref 0.57–1)
DEPRECATED RDW RBC AUTO: 42 FL (ref 37–54)
EGFRCR SERPLBLD CKD-EPI 2021: 43.8 ML/MIN/1.73
EOSINOPHIL # BLD AUTO: 0.29 10*3/MM3 (ref 0–0.4)
EOSINOPHIL NFR BLD AUTO: 2.2 % (ref 0.3–6.2)
ERYTHROCYTE [DISTWIDTH] IN BLOOD BY AUTOMATED COUNT: 13.7 % (ref 12.3–15.4)
EXPIRATION DATE: ABNORMAL
EXPIRATION DATE: NORMAL
GLOBULIN UR ELPH-MCNC: 2.8 GM/DL
GLUCOSE BLDC GLUCOMTR-MCNC: 217 MG/DL (ref 70–130)
GLUCOSE SERPL-MCNC: 169 MG/DL (ref 65–99)
HBA1C MFR BLD: 7.7 %
HCT VFR BLD AUTO: 36.9 % (ref 34–46.6)
HDLC SERPL-MCNC: 41 MG/DL (ref 40–60)
HGB BLD-MCNC: 12.6 G/DL (ref 12–15.9)
IMM GRANULOCYTES # BLD AUTO: 0.07 10*3/MM3 (ref 0–0.05)
IMM GRANULOCYTES NFR BLD AUTO: 0.5 % (ref 0–0.5)
LDLC SERPL CALC-MCNC: 132 MG/DL (ref 0–100)
LDLC/HDLC SERPL: 3.19 {RATIO}
LYMPHOCYTES # BLD AUTO: 3.25 10*3/MM3 (ref 0.7–3.1)
LYMPHOCYTES NFR BLD AUTO: 24.6 % (ref 19.6–45.3)
Lab: ABNORMAL
Lab: NORMAL
MCH RBC QN AUTO: 29.3 PG (ref 26.6–33)
MCHC RBC AUTO-ENTMCNC: 34.1 G/DL (ref 31.5–35.7)
MCV RBC AUTO: 85.8 FL (ref 79–97)
MONOCYTES # BLD AUTO: 0.54 10*3/MM3 (ref 0.1–0.9)
MONOCYTES NFR BLD AUTO: 4.1 % (ref 5–12)
NEUTROPHILS NFR BLD AUTO: 68.3 % (ref 42.7–76)
NEUTROPHILS NFR BLD AUTO: 9.02 10*3/MM3 (ref 1.7–7)
NRBC BLD AUTO-RTO: 0 /100 WBC (ref 0–0.2)
PLATELET # BLD AUTO: 393 10*3/MM3 (ref 140–450)
PMV BLD AUTO: 10.6 FL (ref 6–12)
POTASSIUM SERPL-SCNC: 4.4 MMOL/L (ref 3.5–5.2)
PROT SERPL-MCNC: 6.9 G/DL (ref 6–8.5)
RBC # BLD AUTO: 4.3 10*6/MM3 (ref 3.77–5.28)
SODIUM SERPL-SCNC: 140 MMOL/L (ref 136–145)
T4 FREE SERPL-MCNC: 1.6 NG/DL (ref 0.93–1.7)
TRIGL SERPL-MCNC: 91 MG/DL (ref 0–150)
TSH SERPL DL<=0.05 MIU/L-ACNC: 1.1 UIU/ML (ref 0.27–4.2)
VLDLC SERPL-MCNC: 17 MG/DL (ref 5–40)
WBC NRBC COR # BLD: 13.21 10*3/MM3 (ref 3.4–10.8)

## 2022-07-28 PROCEDURE — 82306 VITAMIN D 25 HYDROXY: CPT | Performed by: INTERNAL MEDICINE

## 2022-07-28 PROCEDURE — 84443 ASSAY THYROID STIM HORMONE: CPT | Performed by: INTERNAL MEDICINE

## 2022-07-28 PROCEDURE — 82947 ASSAY GLUCOSE BLOOD QUANT: CPT | Performed by: INTERNAL MEDICINE

## 2022-07-28 PROCEDURE — 80053 COMPREHEN METABOLIC PANEL: CPT | Performed by: INTERNAL MEDICINE

## 2022-07-28 PROCEDURE — 99214 OFFICE O/P EST MOD 30 MIN: CPT | Performed by: INTERNAL MEDICINE

## 2022-07-28 PROCEDURE — 83036 HEMOGLOBIN GLYCOSYLATED A1C: CPT | Performed by: INTERNAL MEDICINE

## 2022-07-28 PROCEDURE — 3051F HG A1C>EQUAL 7.0%<8.0%: CPT | Performed by: INTERNAL MEDICINE

## 2022-07-28 PROCEDURE — 85025 COMPLETE CBC W/AUTO DIFF WBC: CPT | Performed by: INTERNAL MEDICINE

## 2022-07-28 PROCEDURE — 80061 LIPID PANEL: CPT | Performed by: INTERNAL MEDICINE

## 2022-07-28 PROCEDURE — 84439 ASSAY OF FREE THYROXINE: CPT | Performed by: INTERNAL MEDICINE

## 2022-07-28 NOTE — ASSESSMENT & PLAN NOTE
On high dose supplement  Suspect previously erratic compliance  Now in supervised situation  Check tfts and adjust if levels high

## 2022-07-28 NOTE — ASSESSMENT & PLAN NOTE
Blood sugar and 90 day average sugar reviewed  Results for orders placed or performed in visit on 07/28/22   POC Glycosylated Hemoglobin (Hb A1C)    Specimen: Blood   Result Value Ref Range    Hemoglobin A1C 7.7 %    Lot Number [id number]     Expiration Date 04/03/2024    POC Glucose, Blood    Specimen: Blood   Result Value Ref Range    Glucose 217 (A) 70 - 130 mg/dL    Lot Number [id number]     Expiration Date 02/26/2023      Stable average sugar on current regimen   Liked sensor but insurer would not cover  She is going to see if [facility] would update eye exam for her- she needs new glasses  Heel callus with stable neuropathy - no foot ulcer  Check ur alb

## 2022-07-28 NOTE — PROGRESS NOTES
"Vilma Maria Luisa Jamie 51 y.o.  [facility]: Follow-up, Diabetes (Type II, eye exam one year ago), Hyperlipidemia, and Hypothyroidism    [place]: Follow-up, Diabetes (Type II, eye exam one year ago), Hyperlipidemia, and Hypothyroidism    Blood sugar and 90 day average sugar reviewed  Results for orders placed or performed in visit on 07/28/22   POC Glycosylated Hemoglobin (Hb A1C)    Specimen: Blood   Result Value Ref Range    Hemoglobin A1C 7.7 %    Lot Number [id number]     Expiration Date 04/03/2024    POC Glucose, Blood    Specimen: Blood   Result Value Ref Range    Glucose 217 (A) 70 - 130 mg/dL    Lot Number [id number]     Expiration Date 02/26/2023      Average sugar is   Is eating low fat diet and taking crestor 5 mg daily   Energy is good taking synthroid 225 mcg daily   Tachycardia noted today with recheck pulse 90   She denies c/o     Allergies   Allergen Reactions   • Doxepin Rash   • Januvia [Sitagliptin] Other (See Comments)     Insomnia       Current Outpatient Medications:   •  ammonium lactate (Lac-Hydrin) 12 % lotion, Apply BID to feet, Disp: 225 g, Rfl: 3  •  benztropine (COGENTIN) 0.5 MG tablet, Take 0.5 mg by mouth As Needed., Disp: , Rfl:   •  busPIRone (BUSPAR) 10 MG tablet, Take 1 tablet by mouth Daily., Disp: 90 tablet, Rfl: 1  •  Cholecalciferol (VITAMIN D3 PO), Take 1 tablet by mouth Daily., Disp: , Rfl:   •  citalopram (CeleXA) 40 MG tablet, Take 1 tablet by mouth Daily., Disp: 90 tablet, Rfl: 1  •  glucose blood (OneTouch Verio) test strip, Use as instructed to test blood sugar 6 times daily, Disp: 200 each, Rfl: 12  •  insulin lispro protamine-insulin lispro (HumaLOG Mix 75/25) (75-25) 100 UNIT/ML suspension injection, Inject 40 units in am and 15 units before supper, Disp: 30 mL, Rfl: 3  •  Insulin Syringe-Needle U-100 31G X 15/64\" 0.5 ML misc, , Disp: , Rfl:   •  Lancets misc, Test three times daily, Disp: 100 each, Rfl: 5  •  levothyroxine (SYNTHROID, LEVOTHROID) 200 MCG tablet, Take 200 mcg by mouth " Daily. Take one tablet po q day along with 25mcg daily, Disp: , Rfl:   •  levothyroxine (SYNTHROID, LEVOTHROID) 25 MCG tablet, Take 1 tablet by mouth Daily., Disp: 90 tablet, Rfl: 1  •  metFORMIN (GLUCOPHAGE) 500 MG tablet, Take 2 tablets BID with food, Disp: 120 tablet, Rfl: 2  •  risperiDONE (risperDAL) 3 MG tablet, Take 3 mg by mouth Daily., Disp: , Rfl:   •  risperiDONE (risperDAL) 4 MG tablet, Take 3 mg by mouth Every Night. Take 3 in am and 3 at hs, Disp: , Rfl:   •  rosuvastatin (CRESTOR) 5 MG tablet, Take 1 tablet by mouth Every Night. Take one po daily, Disp: 30 tablet, Rfl: 11  •  VENTOLIN  (90 Base) MCG/ACT inhaler, Inhale 2 puffs 4 (Four) Times a Day., Disp: , Rfl:   Patient Active Problem List    Diagnosis    • Decreased GFR [R94.4]    • Microscopic hematuria [R31.29]    • Nocturia [R35.1]    • Nocturnal enuresis [N39.44]    • Stress incontinence [N39.3]    • Urge incontinence [N39.41]    • Urinary incontinence [R32]    • Type 2 diabetes mellitus without complication, with long-term current use of insulin (HCC) [E11.9, Z79.4]    • Mixed hyperlipidemia [E78.2]    • Anxiety and depression [F41.9, F32.A]    • Uncontrolled type 2 diabetes mellitus [RBV2161]    • Hypothyroid [E03.9]    • Obesity [E66.9]    • Vitamin D deficiency, unspecified [E55.9]    • Diabetic neuropathy, type II diabetes mellitus (HCC) [E11.40]      Review of Systems   Constitutional: Positive for activity change. Negative for appetite change and unexpected weight change.   HENT: Negative for congestion and rhinorrhea.    Eyes: Negative for visual disturbance.   Respiratory: Negative for cough and shortness of breath.    Cardiovascular: Negative for palpitations and leg swelling.   Gastrointestinal: Negative for constipation, diarrhea and nausea.   Genitourinary: Negative for hematuria.   Musculoskeletal: Negative for arthralgias, back pain, gait problem, joint swelling and myalgias.   Skin: Negative for color change, rash and  "wound.   Allergic/Immunologic: Negative for environmental allergies, food allergies and immunocompromised state.   Neurological: Positive for tremors. Negative for dizziness, weakness and light-headedness.   Psychiatric/Behavioral: Negative for confusion, decreased concentration, dysphoric mood and sleep disturbance. The patient is not nervous/anxious.      Social History     Socioeconomic History   • Marital status: Single   Tobacco Use   • Smoking status: Never Smoker   • Smokeless tobacco: Never Used   Vaping Use   • Vaping Use: Never used   Substance and Sexual Activity   • Alcohol use: No   • Drug use: No   • Sexual activity: Not Currently     Family History   Problem Relation Age of Onset   • Hypertension Other    • Thyroid disease Other    • Diabetes Other    • Obesity Other    • Diabetes Mother    • Hypertension Mother    • Heart disease Mother    • Cancer Father    • Diabetes Sister    • Diabetes Brother    • No Known Problems Brother      /62   Pulse 90   Ht 167.6 cm (66\")   Wt 104 kg (229 lb 9.6 oz)   LMP  (LMP Unknown)   SpO2 98%   BMI 37.06 kg/m²   Physical Exam  Vitals and nursing note reviewed.   Constitutional:       Appearance: Normal appearance. She is well-developed.   HENT:      Head: Normocephalic and atraumatic.   Eyes:      General: Lids are normal.      Extraocular Movements: Extraocular movements intact.      Conjunctiva/sclera: Conjunctivae normal.      Pupils: Pupils are equal, round, and reactive to light.   Neck:      Thyroid: No thyroid mass or thyromegaly.      Vascular: No carotid bruit.      Trachea: Trachea normal. No tracheal deviation.   Cardiovascular:      Rate and Rhythm: Normal rate and regular rhythm.      Pulses: Normal pulses.      Heart sounds: Normal heart sounds. No murmur heard.    No friction rub. No gallop.   Pulmonary:      Effort: Pulmonary effort is normal. No respiratory distress.      Breath sounds: Normal breath sounds. No wheezing. "   Musculoskeletal:         General: No deformity. Normal range of motion.      Cervical back: Normal range of motion and neck supple.   Lymphadenopathy:      Cervical: No cervical adenopathy.   Skin:     General: Skin is warm and dry.      Findings: No erythema or rash.      Nails: There is no clubbing.   Neurological:      Mental Status: She is alert and oriented to person, place, and time. Mental status is at baseline.      Cranial Nerves: No cranial nerve deficit.      Deep Tendon Reflexes: Reflexes are normal and symmetric. Reflexes normal.      Comments: Tremor    Psychiatric:         Speech: Speech normal.         Behavior: Behavior normal.         Thought Content: Thought content normal.         Judgment: Judgment normal.       Results for orders placed or performed in visit on 07/28/22   POC Glycosylated Hemoglobin (Hb A1C)    Specimen: Blood   Result Value Ref Range    Hemoglobin A1C 7.7 %    Lot Number [id number]     Expiration Date 04/03/2024    POC Glucose, Blood    Specimen: Blood   Result Value Ref Range    Glucose 217 (A) 70 - 130 mg/dL    Lot Number [id number]     Expiration Date 02/26/2023      Diagnoses and all orders for this visit:    1. Type 2 diabetes mellitus without complication, with long-term current use of insulin (HCC) (Primary)  Assessment & Plan:  Blood sugar and 90 day average sugar reviewed  Results for orders placed or performed in visit on 07/28/22   POC Glycosylated Hemoglobin (Hb A1C)    Specimen: Blood   Result Value Ref Range    Hemoglobin A1C 7.7 %    Lot Number [id number]     Expiration Date 04/03/2024    POC Glucose, Blood    Specimen: Blood   Result Value Ref Range    Glucose 217 (A) 70 - 130 mg/dL    Lot Number [id number]     Expiration Date 02/26/2023      Stable average sugar on current regimen   Liked sensor but insurer would not cover  She is going to see if [facility] would update eye exam for her- she needs new glasses  Heel callus with stable neuropathy - no foot  ulcer  Check ur alb     Orders:  -     POC Glycosylated Hemoglobin (Hb A1C)  -     POC Glucose, Blood  -     Comprehensive Metabolic Panel  -     Microalbumin / Creatinine Urine Ratio - Urine, Clean Catch    2. Mixed hyperlipidemia  Assessment & Plan:  Update flp   Taking crestor 5 mg daily     Orders:  -     Lipid Panel  -     T4, Free  -     TSH    3. Vitamin D deficiency, unspecified  Assessment & Plan:  Taking supplement   Update levels     Orders:  -     Vitamin D 25 Hydroxy    4. Acquired hypothyroidism  Assessment & Plan:  On high dose supplement  Suspect previously erratic compliance  Now in supervised situation  Check tfts and adjust if levels high     Orders:  -     T4, Free  -     TSH    5. Leukemoid reaction  -     CBC Auto Differential  Return in about 3 months (around 10/28/2022) for Recheck.    Selina Lopez MD  Signed Selina Lopez MD

## 2022-07-29 ENCOUNTER — LAB (OUTPATIENT)
Dept: LAB | Facility: HOSPITAL | Age: 52
End: 2022-07-29

## 2022-07-29 ENCOUNTER — HOSPITAL ENCOUNTER (EMERGENCY)
Facility: HOSPITAL | Age: 52
Discharge: HOME | End: 2022-07-29
Attending: EMERGENCY MEDICINE | Admitting: EMERGENCY MEDICINE

## 2022-07-29 ENCOUNTER — LAB (OUTPATIENT)
Dept: ENDOCRINOLOGY | Facility: CLINIC | Age: 52
End: 2022-07-29

## 2022-07-29 VITALS
TEMPERATURE: 97.6 F | DIASTOLIC BLOOD PRESSURE: 76 MMHG | WEIGHT: 230 LBS | OXYGEN SATURATION: 99 % | BODY MASS INDEX: 36.96 KG/M2 | HEART RATE: 78 BPM | SYSTOLIC BLOOD PRESSURE: 138 MMHG | HEIGHT: 66 IN | RESPIRATION RATE: 18 BRPM

## 2022-07-29 DIAGNOSIS — E03.9 ACQUIRED HYPOTHYROIDISM: ICD-10-CM

## 2022-07-29 DIAGNOSIS — R25.1 SHAKING: Primary | ICD-10-CM

## 2022-07-29 DIAGNOSIS — N39.42 URINARY INCONTINENCE WITHOUT SENSORY AWARENESS: ICD-10-CM

## 2022-07-29 DIAGNOSIS — E66.01 CLASS 3 SEVERE OBESITY DUE TO EXCESS CALORIES WITH SERIOUS COMORBIDITY AND BODY MASS INDEX (BMI) OF 45.0 TO 49.9 IN ADULT: ICD-10-CM

## 2022-07-29 DIAGNOSIS — Z86.39 HISTORY OF DIABETES MELLITUS: ICD-10-CM

## 2022-07-29 DIAGNOSIS — Z79.4 TYPE 2 DIABETES MELLITUS WITHOUT COMPLICATION, WITH LONG-TERM CURRENT USE OF INSULIN: ICD-10-CM

## 2022-07-29 DIAGNOSIS — E11.9 TYPE 2 DIABETES MELLITUS WITHOUT COMPLICATION, WITH LONG-TERM CURRENT USE OF INSULIN: ICD-10-CM

## 2022-07-29 LAB
25(OH)D3 SERPL-MCNC: 23.3 NG/ML (ref 30–100)
ALBUMIN UR-MCNC: 1.5 MG/DL
CREAT UR-MCNC: 113.9 MG/DL
GLUCOSE BLDC GLUCOMTR-MCNC: 202 MG/DL (ref 70–130)
MICROALBUMIN/CREAT UR: 13.2 MG/G

## 2022-07-29 PROCEDURE — 82570 ASSAY OF URINE CREATININE: CPT

## 2022-07-29 PROCEDURE — 82962 GLUCOSE BLOOD TEST: CPT

## 2022-07-29 PROCEDURE — 82043 UR ALBUMIN QUANTITATIVE: CPT

## 2022-07-29 PROCEDURE — 99283 EMERGENCY DEPT VISIT LOW MDM: CPT

## 2022-09-13 ENCOUNTER — TELEPHONE (OUTPATIENT)
Dept: ENDOCRINOLOGY | Facility: CLINIC | Age: 52
End: 2022-09-13

## 2022-09-13 RX ORDER — BLOOD-GLUCOSE METER
1 KIT MISCELLANEOUS DAILY
Qty: 1 KIT | Refills: 0 | Status: SHIPPED | OUTPATIENT
Start: 2022-09-13 | End: 2022-12-28 | Stop reason: SDUPTHER

## 2022-09-13 NOTE — TELEPHONE ENCOUNTER
Pt called requesting prescription for One Touch Verio Reflect meter. Pt last f/u 07/28/22pt next f/u 11/02/22

## 2022-09-26 ENCOUNTER — TELEPHONE (OUTPATIENT)
Dept: ENDOCRINOLOGY | Facility: CLINIC | Age: 52
End: 2022-09-26

## 2022-09-27 NOTE — TELEPHONE ENCOUNTER
CALL BACK [phone]    PATIENT IS REQUESTING TO SPEAK WITH CLINIC STAFF REGARDING LOW GLUCOSE READINGS. PATIENT STATES THAT SHE HAS BEEN RANGING 70S TO 80S OVER ABOUT THE PAST WEEK AND WOULD LIKE TO BE ADVISED.   
Called the pt and read her the message. She verbalized understanding.    She has been taking 40 units in the am and 15 in the pm  
Ok to ask her to reduce morning insulin to 34 units (from 40 units) and evening insulin to 12 units (from 15 units)  Please confirm her current dosing so we are sure this is a reduction  Thanks,   Andre Lopez MD   
Yes

## 2022-10-03 ENCOUNTER — APPOINTMENT (OUTPATIENT)
Dept: GENERAL RADIOLOGY | Facility: HOSPITAL | Age: 52
End: 2022-10-03

## 2022-10-03 ENCOUNTER — HOSPITAL ENCOUNTER (EMERGENCY)
Facility: HOSPITAL | Age: 52
Discharge: HOME | End: 2022-10-03
Attending: EMERGENCY MEDICINE | Admitting: EMERGENCY MEDICINE

## 2022-10-03 VITALS
HEIGHT: 66 IN | DIASTOLIC BLOOD PRESSURE: 76 MMHG | HEART RATE: 98 BPM | WEIGHT: 232 LBS | SYSTOLIC BLOOD PRESSURE: 115 MMHG | BODY MASS INDEX: 37.28 KG/M2 | OXYGEN SATURATION: 98 % | TEMPERATURE: 98.6 F | RESPIRATION RATE: 18 BRPM

## 2022-10-03 DIAGNOSIS — S93.401A SPRAIN OF RIGHT ANKLE, UNSPECIFIED LIGAMENT, INITIAL ENCOUNTER: Primary | ICD-10-CM

## 2022-10-03 PROCEDURE — 99283 EMERGENCY DEPT VISIT LOW MDM: CPT

## 2022-10-03 PROCEDURE — 73610 X-RAY EXAM OF ANKLE: CPT

## 2022-10-04 NOTE — ED PROVIDER NOTES
Subjective   History of Present Illness  Pleasant patient presents the ER for right ankle pain.  She tells me she was at [facility] doing some grocery shopping when she felt some pain in her ankle.  She did not fall but she experienced some pain and she came to ER for further evaluation.  She denies any numbness or tingling.  She denies any chest pain difficulty breathing fever.    Ankle Pain  Location:  Ankle  Time since incident:  1 hour  Ankle location:  R ankle  Pain details:     Quality:  Aching    Radiates to:  Does not radiate    Severity:  Moderate    Onset quality:  Sudden    Timing:  Constant    Progression:  Unchanged  Chronicity:  New  Worsened by:  Bearing weight  Associated symptoms: no fever, no numbness, no swelling and no tingling    Risk factors: no concern for non-accidental trauma        Review of Systems   Constitutional: Negative for chills, diaphoresis and fever.   HENT: Negative for congestion and sore throat.    Respiratory: Negative for cough, choking, chest tightness, shortness of breath and wheezing.    Cardiovascular: Negative for chest pain and leg swelling.   Gastrointestinal: Negative for abdominal distention, abdominal pain, anal bleeding, blood in stool, constipation, diarrhea, nausea and vomiting.   Genitourinary: Negative for difficulty urinating, dysuria, flank pain, frequency, hematuria and urgency.   All other systems reviewed and are negative.      Past Medical History:   Diagnosis Date   • Anxiety    • Chicken pox    • Depression    • Diabetes mellitus (HCC)    • Disease of thyroid gland    • Measles    • Type 2 diabetes mellitus (HCC)        Allergies   Allergen Reactions   • Doxepin Rash   • Januvia [Sitagliptin] Other (See Comments)     Insomnia       Past Surgical History:   Procedure Laterality Date   • EYE SURGERY     • TONSILLECTOMY         Family History   Problem Relation Age of Onset   • Hypertension Other    • Thyroid disease Other    • Diabetes Other    • Obesity  Other    • Diabetes Mother    • Hypertension Mother    • Heart disease Mother    • Cancer Father    • Diabetes Sister    • Diabetes Brother    • No Known Problems Brother        Social History     Socioeconomic History   • Marital status: Single   Tobacco Use   • Smoking status: Never Smoker   • Smokeless tobacco: Never Used   Vaping Use   • Vaping Use: Never used   Substance and Sexual Activity   • Alcohol use: No   • Drug use: No   • Sexual activity: Not Currently           Objective   Physical Exam  Constitutional:       Appearance: She is well-developed.   HENT:      Head: Normocephalic and atraumatic.      Right Ear: External ear normal.      Left Ear: External ear normal.      Nose: Nose normal.   Eyes:      Conjunctiva/sclera: Conjunctivae normal.      Pupils: Pupils are equal, round, and reactive to light.   Cardiovascular:      Rate and Rhythm: Normal rate and regular rhythm.      Heart sounds: Normal heart sounds.   Pulmonary:      Effort: Pulmonary effort is normal.      Breath sounds: Normal breath sounds.   Abdominal:      General: Bowel sounds are normal.      Palpations: Abdomen is soft.   Musculoskeletal:         General: Normal range of motion.      Cervical back: Normal range of motion and neck supple.      Comments: Normal Robertson's test.  Good sensation good pulses.  Medial and lateral tenderness to her ankle with no swelling erythema or cellulitis.   Skin:     General: Skin is warm and dry.   Neurological:      Mental Status: She is alert and oriented to person, place, and time.   Psychiatric:         Behavior: Behavior normal.         Thought Content: Thought content normal.         Judgment: Judgment normal.         Procedures           ED Course  ED Course as of 10/03/22 2147   Mon Oct 03, 2022   2112 Awaiting XR [JM]   2141 Awaiting XR reading [JM]   2145 We will give patient a walking boot and crutches orthopedic follow-up. [NAPOLEON]      ED Course User Index  [NAPOLEON] Magno Bermeo, ZIA                 XR Ankle 3+ View Right   Final Result   No acute fracture or dislocation.       This report was finalized on 10/3/2022 9:41 PM by Lavell Macias MD.                                       [facility]    Final diagnoses:   Sprain of right ankle, unspecified ligament, initial encounter       ED Disposition  ED Disposition     ED Disposition   Discharge    Condition   Stable    Comment   --             Gato Butt MD  [address]  [address]  [place] [address]  [phone]    Schedule an appointment as soon as possible for a visit       Ovi Corbin MD  [address]   [place] [address]  [phone]    Schedule an appointment as soon as possible for a visit       [facility]  [address] [place] [id number] [phone]    If symptoms worsen         Medication List      No changes were made to your prescriptions during this visit.          Magno Bermeo, APRN  10/03/22 2149

## 2022-10-11 ENCOUNTER — TELEPHONE (OUTPATIENT)
Dept: ENDOCRINOLOGY | Facility: CLINIC | Age: 52
End: 2022-10-11

## 2022-10-11 NOTE — TELEPHONE ENCOUNTER
Pt states she is waking up very tired in the morning and is asking if the insulin is causing this.  She states FBS are running around 170 and is never low.  She was advised to check a 3am blood sugar and if low to call us back and if normal then it is not related to hypoglycemia or the insulin and to call Dr Corbin to be evaluated for fatigue  She voiced understanding

## 2022-10-12 ENCOUNTER — TELEPHONE (OUTPATIENT)
Dept: ENDOCRINOLOGY | Facility: CLINIC | Age: 52
End: 2022-10-12

## 2022-10-12 NOTE — TELEPHONE ENCOUNTER
BETH pt. Currently blood sugar greater than 300. Using humalog mix 31 units qam and 12 units qpm. Per Dr. Mark advised pt. To do 36 units qam and 15 units qpm. Pt. Voiced understanding and will elt us know in a couple days if blood sugars have improved or sooner if issues. KEVEN

## 2022-10-12 NOTE — TELEPHONE ENCOUNTER
PATIENT IS HAVING BLOOD SUGAR ISSUES. IT RAN HIGH AT 3 AM THIS MORNING. BLOOD SUGAR  AT 3 AM. SHE NEEDS TO BE ADVISED ON WHAT TO DO WITH BLOOD SUGAR ISSUES. PHONE NUMBER [phone]

## 2022-10-29 ENCOUNTER — HOSPITAL ENCOUNTER (EMERGENCY)
Facility: HOSPITAL | Age: 52
Discharge: HOME | End: 2022-10-29
Attending: EMERGENCY MEDICINE | Admitting: EMERGENCY MEDICINE

## 2022-10-29 VITALS
HEART RATE: 78 BPM | BODY MASS INDEX: 37.28 KG/M2 | WEIGHT: 232 LBS | DIASTOLIC BLOOD PRESSURE: 65 MMHG | TEMPERATURE: 98.1 F | RESPIRATION RATE: 16 BRPM | SYSTOLIC BLOOD PRESSURE: 101 MMHG | OXYGEN SATURATION: 97 % | HEIGHT: 66 IN

## 2022-10-29 DIAGNOSIS — T50.Z95A SIDE EFFECTS OF VACCINATION, INITIAL ENCOUNTER: ICD-10-CM

## 2022-10-29 DIAGNOSIS — M79.602 LEFT ARM PAIN: Primary | ICD-10-CM

## 2022-10-29 PROCEDURE — 99283 EMERGENCY DEPT VISIT LOW MDM: CPT

## 2022-10-29 RX ORDER — IBUPROFEN 800 MG/1
800 TABLET ORAL EVERY 8 HOURS PRN
Qty: 30 TABLET | Refills: 0 | Status: SHIPPED | OUTPATIENT
Start: 2022-10-29

## 2022-10-29 RX ORDER — IBUPROFEN 800 MG/1
800 TABLET ORAL ONCE
Status: COMPLETED | OUTPATIENT
Start: 2022-10-29 | End: 2022-10-29

## 2022-10-29 RX ADMIN — IBUPROFEN 800 MG: 800 TABLET, FILM COATED ORAL at 09:19

## 2022-11-02 ENCOUNTER — OFFICE VISIT (OUTPATIENT)
Dept: ENDOCRINOLOGY | Facility: CLINIC | Age: 52
End: 2022-11-02

## 2022-11-02 ENCOUNTER — TELEPHONE (OUTPATIENT)
Dept: ENDOCRINOLOGY | Facility: CLINIC | Age: 52
End: 2022-11-02

## 2022-11-02 VITALS
SYSTOLIC BLOOD PRESSURE: 114 MMHG | HEART RATE: 80 BPM | OXYGEN SATURATION: 98 % | HEIGHT: 66 IN | RESPIRATION RATE: 18 BRPM | BODY MASS INDEX: 38.22 KG/M2 | DIASTOLIC BLOOD PRESSURE: 68 MMHG | WEIGHT: 237.8 LBS

## 2022-11-02 DIAGNOSIS — E03.9 ACQUIRED HYPOTHYROIDISM: ICD-10-CM

## 2022-11-02 DIAGNOSIS — E11.9 TYPE 2 DIABETES MELLITUS WITHOUT COMPLICATION, WITH LONG-TERM CURRENT USE OF INSULIN: Primary | ICD-10-CM

## 2022-11-02 DIAGNOSIS — Z79.4 TYPE 2 DIABETES MELLITUS WITHOUT COMPLICATION, WITH LONG-TERM CURRENT USE OF INSULIN: Primary | ICD-10-CM

## 2022-11-02 DIAGNOSIS — E78.2 MIXED HYPERLIPIDEMIA: ICD-10-CM

## 2022-11-02 LAB
EXPIRATION DATE: ABNORMAL
EXPIRATION DATE: NORMAL
GLUCOSE BLDC GLUCOMTR-MCNC: 156 MG/DL (ref 70–130)
HBA1C MFR BLD: 7.4 %
Lab: ABNORMAL
Lab: NORMAL

## 2022-11-02 PROCEDURE — 99214 OFFICE O/P EST MOD 30 MIN: CPT | Performed by: INTERNAL MEDICINE

## 2022-11-02 PROCEDURE — 83036 HEMOGLOBIN GLYCOSYLATED A1C: CPT | Performed by: INTERNAL MEDICINE

## 2022-11-02 PROCEDURE — 3051F HG A1C>EQUAL 7.0%<8.0%: CPT | Performed by: INTERNAL MEDICINE

## 2022-11-02 PROCEDURE — 82962 GLUCOSE BLOOD TEST: CPT | Performed by: INTERNAL MEDICINE

## 2022-11-02 RX ORDER — INSULIN LISPRO PROTAMIN/LISPRO 75-25/ML
VIAL (ML) SUBCUTANEOUS
Qty: 30 ML | Refills: 3
Start: 2022-11-02 | End: 2023-01-25 | Stop reason: SDUPTHER

## 2022-11-02 RX ORDER — LEVOTHYROXINE SODIUM 0.2 MG/1
200 TABLET ORAL DAILY
Qty: 90 TABLET | Refills: 1 | Status: SHIPPED | OUTPATIENT
Start: 2022-11-02 | End: 2023-01-25 | Stop reason: SDUPTHER

## 2022-11-02 RX ORDER — LEVOTHYROXINE SODIUM 0.03 MG/1
25 TABLET ORAL DAILY
Qty: 90 TABLET | Refills: 1 | Status: SHIPPED | OUTPATIENT
Start: 2022-11-02 | End: 2023-02-15 | Stop reason: SDUPTHER

## 2022-11-02 NOTE — TELEPHONE ENCOUNTER
Please call and let her know that based on blood sugar review I would recommend reducing morning insulin to 34 units and pre dinner dosing to 12 units  Let me know if any further low sugar  Thanks,   Andre Lopez MD

## 2022-11-02 NOTE — PROGRESS NOTES
Vilma Ayala 51 y.o.  CC: Follow-up, Diabetes, Hyperlipidemia, and Hypothyroidism      [place]: Follow-up, Diabetes, Hyperlipidemia, and Hypothyroidism    Blood sugar and 90 day average sugar reviewed  Results for orders placed or performed in visit on 11/02/22   POC Glucose Fingerstick    Specimen: Blood   Result Value Ref Range    Glucose 156 (A) 70 - 130 mg/dL    Lot Number [id number]     Expiration Date 05-    POC Glycosylated Hemoglobin (Hb A1C)    Specimen: Blood   Result Value Ref Range    Hemoglobin A1C 7.4 %    Lot Number [id number]     Expiration Date 07-      Doing well overall  No low sugar  Is much more consistent with diet  bp is good   Low vitamin D 7/22, normal tsh and ldl 132 (not taking the crestor at that time)  She is having low sugar 1-2 hours after waking  Is taking shot but not eating  Discussed waiting until she eats breakfast to take shot- timing of insulin with meals reviewed  Downloaded meter and she is actually having lower sugars during the day as well  Likely due to more consistent diet   Will recommend reducing insulin to 34 u am and 12 u pm     Allergies   Allergen Reactions   • Doxepin Rash   • Januvia [Sitagliptin] Other (See Comments)     Insomnia       Current Outpatient Medications:   •  ammonium lactate (Lac-Hydrin) 12 % lotion, Apply BID to feet, Disp: 225 g, Rfl: 3  •  benztropine (COGENTIN) 0.5 MG tablet, Take 0.5 mg by mouth As Needed., Disp: , Rfl:   •  Blood Glucose Monitoring Suppl (OneTouch Verio Reflect) w/Device kit, 1 each Daily., Disp: 1 kit, Rfl: 0  •  busPIRone (BUSPAR) 10 MG tablet, Take 1 tablet by mouth Daily., Disp: 90 tablet, Rfl: 1  •  Cholecalciferol (VITAMIN D3 PO), Take 1 tablet by mouth Daily., Disp: , Rfl:   •  citalopram (CeleXA) 40 MG tablet, Take 1 tablet by mouth Daily., Disp: 90 tablet, Rfl: 1  •  glucose blood (OneTouch Verio) test strip, Use as instructed to test blood sugar 6 times daily, Disp: 200 each, Rfl: 12  •  ibuprofen  "(ADVIL,MOTRIN) 800 MG tablet, Take 1 tablet by mouth Every 8 (Eight) Hours As Needed for Mild Pain or Moderate Pain for up to 30 doses., Disp: 30 tablet, Rfl: 0  •  insulin lispro protamine-insulin lispro (HumaLOG Mix 75/25) (75-25) 100 UNIT/ML suspension injection, Inject 34 units in am and 12 units before supper PLEASE NOTE DOSE REDUCTION, Disp: 30 mL, Rfl: 3  •  Insulin Syringe-Needle U-100 31G X 15/64\" 0.5 ML misc, , Disp: , Rfl:   •  Lancets misc, Test three times daily, Disp: 100 each, Rfl: 5  •  levothyroxine (SYNTHROID, LEVOTHROID) 200 MCG tablet, Take 1 tablet by mouth Daily. Take one tablet po q day along with 25mcg daily, Disp: 90 tablet, Rfl: 1  •  levothyroxine (SYNTHROID, LEVOTHROID) 25 MCG tablet, Take 1 tablet by mouth Daily., Disp: 90 tablet, Rfl: 1  •  metFORMIN (GLUCOPHAGE) 500 MG tablet, Take 2 tablets daily with food, Disp: 60 tablet, Rfl: 5  •  risperiDONE (risperDAL) 3 MG tablet, Take 1 tablet by mouth 2 (Two) Times a Day., Disp: , Rfl:   •  VENTOLIN  (90 Base) MCG/ACT inhaler, Inhale 2 puffs 4 (Four) Times a Day., Disp: , Rfl:   Patient Active Problem List    Diagnosis    • Decreased GFR [R94.4]    • Microscopic hematuria [R31.29]    • Nocturia [R35.1]    • Nocturnal enuresis [N39.44]    • Stress incontinence [N39.3]    • Urge incontinence [N39.41]    • Urinary incontinence [R32]    • Type 2 diabetes mellitus without complication, with long-term current use of insulin (HCC) [E11.9, Z79.4]    • Mixed hyperlipidemia [E78.2]    • Anxiety and depression [F41.9, F32.A]    • Uncontrolled type 2 diabetes mellitus [SEK4009]    • Hypothyroid [E03.9]    • Obesity [E66.9]    • Vitamin D deficiency, unspecified [E55.9]    • Diabetic neuropathy, type II diabetes mellitus (HCC) [E11.40]      Review of Systems   Constitutional: Negative for activity change, appetite change and unexpected weight change.   HENT: Negative for congestion and rhinorrhea.    Eyes: Negative for visual disturbance. " "  Respiratory: Negative for cough and shortness of breath.    Cardiovascular: Negative for palpitations and leg swelling.   Gastrointestinal: Negative for constipation, diarrhea and nausea.   Genitourinary: Negative for hematuria.   Musculoskeletal: Positive for arthralgias and gait problem. Negative for back pain, joint swelling and myalgias.   Skin: Negative for color change, rash and wound.   Allergic/Immunologic: Negative for environmental allergies, food allergies and immunocompromised state.   Neurological: Negative for dizziness, weakness and light-headedness.   Psychiatric/Behavioral: Negative for confusion, decreased concentration, dysphoric mood and sleep disturbance. The patient is not nervous/anxious.      Social History     Socioeconomic History   • Marital status: Single   Tobacco Use   • Smoking status: Never   • Smokeless tobacco: Never   Vaping Use   • Vaping Use: Never used   Substance and Sexual Activity   • Alcohol use: No   • Drug use: No   • Sexual activity: Not Currently     Family History   Problem Relation Age of Onset   • Hypertension Other    • Thyroid disease Other    • Diabetes Other    • Obesity Other    • Diabetes Mother    • Hypertension Mother    • Heart disease Mother    • Cancer Father    • Diabetes Sister    • Diabetes Brother    • No Known Problems Brother      /68 (BP Location: Left arm, Patient Position: Sitting, Cuff Size: Adult)   Pulse 80   Resp 18   Ht 167.6 cm (66\")   Wt 108 kg (237 lb 12.8 oz) Comment: pt has a boot on right foot  LMP  (LMP Unknown)   SpO2 98%   BMI 38.38 kg/m²   Physical Exam  Vitals and nursing note reviewed.   Constitutional:       Appearance: She is well-developed.   HENT:      Head: Normocephalic and atraumatic.   Eyes:      General: Lids are normal.      Extraocular Movements: Extraocular movements intact.      Conjunctiva/sclera: Conjunctivae normal.      Pupils: Pupils are equal, round, and reactive to light.   Neck:      Thyroid: No " thyroid mass or thyromegaly.      Vascular: No carotid bruit.      Trachea: Trachea normal. No tracheal deviation.   Cardiovascular:      Rate and Rhythm: Normal rate and regular rhythm.      Pulses: Normal pulses.      Heart sounds: Normal heart sounds. No murmur heard.    No friction rub. No gallop.   Pulmonary:      Effort: Pulmonary effort is normal. No respiratory distress.      Breath sounds: Normal breath sounds. No wheezing.   Musculoskeletal:         General: No deformity. Normal range of motion.      Cervical back: Normal range of motion and neck supple.   Lymphadenopathy:      Cervical: No cervical adenopathy.   Skin:     General: Skin is warm and dry.      Findings: No erythema or rash.      Nails: There is no clubbing.   Neurological:      General: No focal deficit present.      Mental Status: She is alert and oriented to person, place, and time.      Cranial Nerves: No cranial nerve deficit.      Deep Tendon Reflexes: Reflexes abnormal.   Psychiatric:         Mood and Affect: Mood normal.         Speech: Speech normal.         Behavior: Behavior normal.         Thought Content: Thought content normal.         Judgment: Judgment normal.       Results for orders placed or performed in visit on 11/02/22   POC Glucose Fingerstick    Specimen: Blood   Result Value Ref Range    Glucose 156 (A) 70 - 130 mg/dL    Lot Number [id number]     Expiration Date 05-    POC Glycosylated Hemoglobin (Hb A1C)    Specimen: Blood   Result Value Ref Range    Hemoglobin A1C 7.4 %    Lot Number [id number]     Expiration Date 07-      Diagnoses and all orders for this visit:    1. Type 2 diabetes mellitus without complication, with long-term current use of insulin ([facility]) (Primary)  Assessment & Plan:  Blood sugar and 90 day average sugar reviewed  Results for orders placed or performed in visit on 11/02/22   POC Glucose Fingerstick    Specimen: Blood   Result Value Ref Range    Glucose 156 (A) 70 - 130 mg/dL    Lot  Number [id number]     Expiration Date 05-    POC Glycosylated Hemoglobin (Hb A1C)    Specimen: Blood   Result Value Ref Range    Hemoglobin A1C 7.4 %    Lot Number [id number]     Expiration Date 07-      Average sugar is 165  Commended improvement  Downloaded glucose meter- low sugar for all times of day  Per patient they are maintaining a NCS diet and have taken away access to soda  This likely accounts for changes in sugars  Reduction in insulin recommended- rx sent  Stable neuropathy   Ur alb due nov   F/u 3=4 months     Orders:  -     POC Glucose Fingerstick  -     POC Glycosylated Hemoglobin (Hb A1C)    2. Mixed hyperlipidemia  Assessment & Plan:  Last ldl 134  crestor has been stopped- would reconsider substitute - goal ldl 70 or less      3. Acquired hypothyroidism  Assessment & Plan:  tsh 1.1 on 7/22  Continue monitoring and current supplement- refill sent per patient request       Other orders  -     levothyroxine (SYNTHROID, LEVOTHROID) 25 MCG tablet; Take 1 tablet by mouth Daily.  Dispense: 90 tablet; Refill: 1  -     levothyroxine (SYNTHROID, LEVOTHROID) 200 MCG tablet; Take 1 tablet by mouth Daily. Take one tablet po q day along with 25mcg daily  Dispense: 90 tablet; Refill: 1  -     insulin lispro protamine-insulin lispro (HumaLOG Mix 75/25) (75-25) 100 UNIT/ML suspension injection; Inject 34 units in am and 12 units before supper  PLEASE NOTE DOSE REDUCTION  Dispense: 30 mL; Refill: 3  Return in about 4 months (around 3/2/2023) for Recheck.    Selina Lopez MD  Signed Selina Lopez MD

## 2022-11-02 NOTE — ASSESSMENT & PLAN NOTE
Blood sugar and 90 day average sugar reviewed  Results for orders placed or performed in visit on 11/02/22   POC Glucose Fingerstick    Specimen: Blood   Result Value Ref Range    Glucose 156 (A) 70 - 130 mg/dL    Lot Number [id number]     Expiration Date 05-    POC Glycosylated Hemoglobin (Hb A1C)    Specimen: Blood   Result Value Ref Range    Hemoglobin A1C 7.4 %    Lot Number [id number]     Expiration Date 07-      Average sugar is 165  Commended improvement  Downloaded glucose meter- low sugar for all times of day  Per patient they are maintaining a NCS diet and have taken away access to soda  This likely accounts for changes in sugars  Reduction in insulin recommended- rx sent  Stable neuropathy   Ur alb due nov   F/u 3=4 months

## 2022-12-12 ENCOUNTER — HOSPITAL ENCOUNTER (EMERGENCY)
Facility: HOSPITAL | Age: 52
Discharge: HOME | End: 2022-12-13
Attending: EMERGENCY MEDICINE | Admitting: EMERGENCY MEDICINE

## 2022-12-12 DIAGNOSIS — R73.9 HYPERGLYCEMIA: Primary | ICD-10-CM

## 2022-12-12 LAB
ALBUMIN SERPL-MCNC: 3.9 G/DL (ref 3.5–5.2)
ALBUMIN/GLOB SERPL: 1.3 G/DL
ALP SERPL-CCNC: 135 U/L (ref 39–117)
ALT SERPL W P-5'-P-CCNC: 11 U/L (ref 1–33)
ANION GAP SERPL CALCULATED.3IONS-SCNC: 9 MMOL/L (ref 5–15)
AST SERPL-CCNC: 11 U/L (ref 1–32)
B-HCG UR QL: NEGATIVE
BACTERIA UR QL AUTO: ABNORMAL /HPF
BASOPHILS # BLD AUTO: 0.04 10*3/MM3 (ref 0–0.2)
BASOPHILS NFR BLD AUTO: 0.3 % (ref 0–1.5)
BILIRUB SERPL-MCNC: 0.2 MG/DL (ref 0–1.2)
BILIRUB UR QL STRIP: NEGATIVE
BUN SERPL-MCNC: 16 MG/DL (ref 6–20)
BUN/CREAT SERPL: 13.8 (ref 7–25)
CALCIUM SPEC-SCNC: 9.3 MG/DL (ref 8.6–10.5)
CHLORIDE SERPL-SCNC: 103 MMOL/L (ref 98–107)
CLARITY UR: CLEAR
CO2 SERPL-SCNC: 28 MMOL/L (ref 22–29)
COLOR UR: YELLOW
CREAT SERPL-MCNC: 1.16 MG/DL (ref 0.57–1)
DEPRECATED RDW RBC AUTO: 44.7 FL (ref 37–54)
EGFRCR SERPLBLD CKD-EPI 2021: 56.8 ML/MIN/1.73
EOSINOPHIL # BLD AUTO: 0.33 10*3/MM3 (ref 0–0.4)
EOSINOPHIL NFR BLD AUTO: 2.7 % (ref 0.3–6.2)
ERYTHROCYTE [DISTWIDTH] IN BLOOD BY AUTOMATED COUNT: 13.8 % (ref 12.3–15.4)
EXPIRATION DATE: NORMAL
GLOBULIN UR ELPH-MCNC: 3.1 GM/DL
GLUCOSE BLDC GLUCOMTR-MCNC: 169 MG/DL (ref 70–130)
GLUCOSE SERPL-MCNC: 241 MG/DL (ref 65–99)
GLUCOSE UR STRIP-MCNC: ABNORMAL MG/DL
HCT VFR BLD AUTO: 37.7 % (ref 34–46.6)
HGB BLD-MCNC: 12.3 G/DL (ref 12–15.9)
HGB UR QL STRIP.AUTO: NEGATIVE
HYALINE CASTS UR QL AUTO: ABNORMAL /LPF
IMM GRANULOCYTES # BLD AUTO: 0.03 10*3/MM3 (ref 0–0.05)
IMM GRANULOCYTES NFR BLD AUTO: 0.2 % (ref 0–0.5)
INTERNAL NEGATIVE CONTROL: NEGATIVE
INTERNAL POSITIVE CONTROL: POSITIVE
KETONES UR QL STRIP: NEGATIVE
LEUKOCYTE ESTERASE UR QL STRIP.AUTO: NEGATIVE
LIPASE SERPL-CCNC: 35 U/L (ref 13–60)
LYMPHOCYTES # BLD AUTO: 3.42 10*3/MM3 (ref 0.7–3.1)
LYMPHOCYTES NFR BLD AUTO: 28.3 % (ref 19.6–45.3)
Lab: NORMAL
MCH RBC QN AUTO: 29.2 PG (ref 26.6–33)
MCHC RBC AUTO-ENTMCNC: 32.6 G/DL (ref 31.5–35.7)
MCV RBC AUTO: 89.5 FL (ref 79–97)
MONOCYTES # BLD AUTO: 0.59 10*3/MM3 (ref 0.1–0.9)
MONOCYTES NFR BLD AUTO: 4.9 % (ref 5–12)
NEUTROPHILS NFR BLD AUTO: 63.6 % (ref 42.7–76)
NEUTROPHILS NFR BLD AUTO: 7.66 10*3/MM3 (ref 1.7–7)
NITRITE UR QL STRIP: POSITIVE
NRBC BLD AUTO-RTO: 0 /100 WBC (ref 0–0.2)
PH UR STRIP.AUTO: 6 [PH] (ref 5–8)
PLATELET # BLD AUTO: 323 10*3/MM3 (ref 140–450)
PMV BLD AUTO: 10.2 FL (ref 6–12)
POTASSIUM SERPL-SCNC: 4.2 MMOL/L (ref 3.5–5.2)
PROT SERPL-MCNC: 7 G/DL (ref 6–8.5)
PROT UR QL STRIP: NEGATIVE
RBC # BLD AUTO: 4.21 10*6/MM3 (ref 3.77–5.28)
RBC # UR STRIP: ABNORMAL /HPF
REF LAB TEST METHOD: ABNORMAL
SODIUM SERPL-SCNC: 140 MMOL/L (ref 136–145)
SP GR UR STRIP: 1.02 (ref 1–1.03)
SQUAMOUS #/AREA URNS HPF: ABNORMAL /HPF
UROBILINOGEN UR QL STRIP: ABNORMAL
WBC # UR STRIP: ABNORMAL /HPF
WBC NRBC COR # BLD: 12.07 10*3/MM3 (ref 3.4–10.8)

## 2022-12-12 PROCEDURE — 82962 GLUCOSE BLOOD TEST: CPT

## 2022-12-12 PROCEDURE — 85025 COMPLETE CBC W/AUTO DIFF WBC: CPT | Performed by: EMERGENCY MEDICINE

## 2022-12-12 PROCEDURE — 99283 EMERGENCY DEPT VISIT LOW MDM: CPT

## 2022-12-12 PROCEDURE — 83690 ASSAY OF LIPASE: CPT | Performed by: EMERGENCY MEDICINE

## 2022-12-12 PROCEDURE — 36415 COLL VENOUS BLD VENIPUNCTURE: CPT

## 2022-12-12 PROCEDURE — 81001 URINALYSIS AUTO W/SCOPE: CPT | Performed by: EMERGENCY MEDICINE

## 2022-12-12 PROCEDURE — 81025 URINE PREGNANCY TEST: CPT | Performed by: EMERGENCY MEDICINE

## 2022-12-12 PROCEDURE — 80053 COMPREHEN METABOLIC PANEL: CPT | Performed by: EMERGENCY MEDICINE

## 2022-12-12 RX ORDER — SULFAMETHOXAZOLE AND TRIMETHOPRIM 800; 160 MG/1; MG/1
1 TABLET ORAL 2 TIMES DAILY
Qty: 14 TABLET | Refills: 0 | Status: SHIPPED | OUTPATIENT
Start: 2022-12-12

## 2022-12-12 RX ADMIN — SODIUM CHLORIDE 1000 ML: 9 INJECTION, SOLUTION INTRAVENOUS at 22:39

## 2022-12-13 VITALS
HEIGHT: 66 IN | OXYGEN SATURATION: 99 % | RESPIRATION RATE: 16 BRPM | HEART RATE: 69 BPM | WEIGHT: 237 LBS | DIASTOLIC BLOOD PRESSURE: 72 MMHG | SYSTOLIC BLOOD PRESSURE: 139 MMHG | TEMPERATURE: 97.8 F | BODY MASS INDEX: 38.09 KG/M2

## 2022-12-13 NOTE — ED PROVIDER NOTES
EMERGENCY DEPARTMENT ENCOUNTER    Pt Name: Vilma Ayala  MRN: [id number]  Pt :   1970  Room Number:  15/15  Date of encounter:  2022  PCP: Ovi Corbin MD  ED Provider: Gato Chun MD    Historian: Patient      HPI:  Chief Complaint: High blood sugar        Context: Vilma Ayala is a 52 y.o. female who presents to the ED c/o high blood sugar today, in the 300s, she has administered 70 units total of insulin, with history of insulin-dependent diabetes with high blood sugar today she says precipitating factors and they had been drinking a small amount of soda but denies fever cough, or other illness she does feel slightly weak or dehydrated.      PAST MEDICAL HISTORY  Past Medical History:   Diagnosis Date   • Anxiety    • Chicken pox    • Depression    • Diabetes mellitus (HCC)    • Disease of thyroid gland    • Measles    • Type 2 diabetes mellitus (HCC)          PAST SURGICAL HISTORY  Past Surgical History:   Procedure Laterality Date   • EYE SURGERY     • TONSILLECTOMY           FAMILY HISTORY  Family History   Problem Relation Age of Onset   • Hypertension Other    • Thyroid disease Other    • Diabetes Other    • Obesity Other    • Diabetes Mother    • Hypertension Mother    • Heart disease Mother    • Cancer Father    • Diabetes Sister    • Diabetes Brother    • No Known Problems Brother          SOCIAL HISTORY  Social History     Socioeconomic History   • Marital status: Single   Tobacco Use   • Smoking status: Never   • Smokeless tobacco: Never   Vaping Use   • Vaping Use: Never used   Substance and Sexual Activity   • Alcohol use: No   • Drug use: No   • Sexual activity: Not Currently         ALLERGIES  Doxepin and Januvia [sitagliptin]        REVIEW OF SYSTEMS  Review of Systems       Constitutional: Negative. No fever,+ weakness  HENT: Negative for sneezing and sore throat.    Respiratory: Negative for cough. Negative for shortness of breath.    Cardiovascular:  Negative.  Negative for chest pain.   Gastrointestinal: Negative.  Negative for abdominal pain.   Genitourinary: Negative.  Negative for difficulty urinating.     All systems reviewwed and negative except as noted in HPI.    PHYSICAL EXAM    I have reviewed the triage vital signs and nursing notes.    ED Triage Vitals [12/12/22 2045]   Temp Heart Rate Resp BP SpO2   97.8 °F (36.6 °C) 74 18 98/65 100 %      Temp src Heart Rate Source Patient Position BP Location FiO2 (%)   Oral Monitor Lying Left arm --       Physical Exam  GENERAL:   Appears alert conversant  HENT: Nares patent.  Dry mucous membranes  EYES: No scleral icterus.  CV: Regular rhythm, regular rate.  RESPIRATORY: Normal effort.  No audible wheezes, rales or rhonchi.  ABDOMEN: Soft, nontender  MUSCULOSKELETAL: No deformities.   NEURO: Alert, moves all extremities, follows commands.  SKIN: Warm, dry, no rash visualized.        LAB RESULTS  Recent Results (from the past 24 hour(s))   Comprehensive Metabolic Panel    Collection Time: 12/12/22  9:02 PM    Specimen: Blood   Result Value Ref Range    Glucose 241 (H) 65 - 99 mg/dL    BUN 16 6 - 20 mg/dL    Creatinine 1.16 (H) 0.57 - 1.00 mg/dL    Sodium 140 136 - 145 mmol/L    Potassium 4.2 3.5 - 5.2 mmol/L    Chloride 103 98 - 107 mmol/L    CO2 28.0 22.0 - 29.0 mmol/L    Calcium 9.3 8.6 - 10.5 mg/dL    Total Protein 7.0 6.0 - 8.5 g/dL    Albumin 3.90 3.50 - 5.20 g/dL    ALT (SGPT) 11 1 - 33 U/L    AST (SGOT) 11 1 - 32 U/L    Alkaline Phosphatase 135 (H) 39 - 117 U/L    Total Bilirubin 0.2 0.0 - 1.2 mg/dL    Globulin 3.1 gm/dL    A/G Ratio 1.3 g/dL    BUN/Creatinine Ratio 13.8 7.0 - 25.0    Anion Gap 9.0 5.0 - 15.0 mmol/L    eGFR 56.8 (L) >60.0 mL/min/1.73   Lipase    Collection Time: 12/12/22  9:02 PM    Specimen: Blood   Result Value Ref Range    Lipase 35 13 - 60 U/L   CBC Auto Differential    Collection Time: 12/12/22  9:02 PM    Specimen: Blood   Result Value Ref Range    WBC 12.07 (H) 3.40 - 10.80 10*3/mm3     RBC 4.21 3.77 - 5.28 10*6/mm3    Hemoglobin 12.3 12.0 - 15.9 g/dL    Hematocrit 37.7 34.0 - 46.6 %    MCV 89.5 79.0 - 97.0 fL    MCH 29.2 26.6 - 33.0 pg    MCHC 32.6 31.5 - 35.7 g/dL    RDW 13.8 12.3 - 15.4 %    RDW-SD 44.7 37.0 - 54.0 fl    MPV 10.2 6.0 - 12.0 fL    Platelets 323 140 - 450 10*3/mm3    Neutrophil % 63.6 42.7 - 76.0 %    Lymphocyte % 28.3 19.6 - 45.3 %    Monocyte % 4.9 (L) 5.0 - 12.0 %    Eosinophil % 2.7 0.3 - 6.2 %    Basophil % 0.3 0.0 - 1.5 %    Immature Grans % 0.2 0.0 - 0.5 %    Neutrophils, Absolute 7.66 (H) 1.70 - 7.00 10*3/mm3    Lymphocytes, Absolute 3.42 (H) 0.70 - 3.10 10*3/mm3    Monocytes, Absolute 0.59 0.10 - 0.90 10*3/mm3    Eosinophils, Absolute 0.33 0.00 - 0.40 10*3/mm3    Basophils, Absolute 0.04 0.00 - 0.20 10*3/mm3    Immature Grans, Absolute 0.03 0.00 - 0.05 10*3/mm3    nRBC 0.0 0.0 - 0.2 /100 WBC   Urinalysis With Microscopic If Indicated (No Culture) - Urine, Clean Catch    Collection Time: 12/12/22 10:40 PM    Specimen: Urine, Clean Catch   Result Value Ref Range    Color, UA Yellow Yellow, Straw    Appearance, UA Clear Clear    pH, UA 6.0 5.0 - 8.0    Specific Gravity, UA 1.018 1.001 - 1.030    Glucose,  mg/dL (2+) (A) Negative    Ketones, UA Negative Negative    Bilirubin, UA Negative Negative    Blood, UA Negative Negative    Protein, UA Negative Negative    Leuk Esterase, UA Negative Negative    Nitrite, UA Positive (A) Negative    Urobilinogen, UA 0.2 E.U./dL 0.2 - 1.0 E.U./dL   Urinalysis, Microscopic Only - Urine, Clean Catch    Collection Time: 12/12/22 10:40 PM    Specimen: Urine, Clean Catch   Result Value Ref Range    RBC, UA 0-2 None Seen, 0-2 /HPF    WBC, UA 6-12 (A) None Seen, 0-2 /HPF    Bacteria, UA 4+ (A) None Seen, Trace /HPF    Squamous Epithelial Cells, UA 0-2 None Seen, 0-2 /HPF    Hyaline Casts, UA 0-6 0 - 6 /LPF    Methodology Automated Microscopy    POCT, urine preg    Collection Time: 12/12/22 10:45 PM    Specimen: Urine   Result Value Ref  Range    HCG, Urine, QL Negative Negative    Lot Number [id number]     Internal Positive Control Positive Positive, Passed    Internal Negative Control Negative Negative, Passed    Expiration Date 02-    POC Glucose Once    Collection Time: 12/12/22 11:39 PM    Specimen: Blood   Result Value Ref Range    Glucose 169 (H) 70 - 130 mg/dL       If labs were ordered, I independently reviewed the results.          PROCEDURES    Procedures    No orders to display       MEDICATIONS GIVEN IN ER    Medications   sodium chloride 0.9 % bolus 1,000 mL (0 mL Intravenous Stopped 12/13/22 0011)         PROGRESS, DATA ANALYSIS, CONSULTS, AND MEDICAL DECISION MAKING    All labs have been independently reviewed by me.  All radiology studies have been reviewed by me and the radiologist dictating the report.   EKG's have been independently viewed and interpreted by me.      MDM  Prior history of insulin-dependent diabetes presenting with high blood sugar, after patient took insulin at home we administered IV fluids here, reevaluation showing improving blood sugars.  Concern over diabetic acidosis and unstable conditions relating to high blood sugars.    Differential diagnoses: Dehydration, diabetic ketoacidosis, hyperglycemia, with a history of insulin-dependent diabetes,    Findings of leukocytosis and bacteria, no findings of DKA on lab or clinical evaluation after ED medical screening, considering diabetes, I discussed with Ms. Ayala covering for UTI.  She does feel well at this time, and after reexamination is clinically stable, and appropriate for outpatient follow-up.             AS OF 09:02 EST VITALS:    BP - 139/72  HR - 69  TEMP - 97.8 °F (36.6 °C) (Oral)  O2 SATS - 99%                  DIAGNOSIS  Final diagnoses:   Hyperglycemia   UTI      DISPOSITION  DISCHARGE    Patient discharged in stable condition.    Reviewed implications of results, diagnosis, meds, responsibility to follow up, warning signs and symptoms of  possible worsening, potential complications and reasons to return to ER.    Patient/Family voiced understanding of above instructions.    Discussed plan for discharge, as there is no emergent indication for admission.  Pt/family is agreeable and understands need for follow up and possible repeat testing.  Pt/family is aware that discharge does not mean that nothing is wrong but that it indicates no emergency is currently present that requires admission and they must continue care with follow-up as given below or with a physician of their choice.     FOLLOW-UP  Ovi Corbin MD  [address] [place] [address] [phone]               Medication List      New Prescriptions    sulfamethoxazole-trimethoprim 800-160 MG per tablet  Commonly known as: BACTRIM DS,SEPTRA DS  Take 1 tablet by mouth 2 (Two) Times a Day.           Where to Get Your Medications      These medications were sent to [facility] [id number] - [place], [place] - [address] [place] [address] - [phone]  - [phone]   [address] [address]    Phone: [phone]   · sulfamethoxazole-trimethoprim 800-160 MG per tablet                  Gato Chun MD  12/13/22 4680

## 2022-12-16 ENCOUNTER — HOSPITAL ENCOUNTER (EMERGENCY)
Facility: HOSPITAL | Age: 52
Discharge: HOME | End: 2022-12-17
Attending: STUDENT IN AN ORGANIZED HEALTH CARE EDUCATION/TRAINING PROGRAM | Admitting: STUDENT IN AN ORGANIZED HEALTH CARE EDUCATION/TRAINING PROGRAM

## 2022-12-16 VITALS
TEMPERATURE: 98 F | WEIGHT: 230 LBS | RESPIRATION RATE: 16 BRPM | SYSTOLIC BLOOD PRESSURE: 126 MMHG | BODY MASS INDEX: 36.96 KG/M2 | OXYGEN SATURATION: 99 % | DIASTOLIC BLOOD PRESSURE: 71 MMHG | HEART RATE: 83 BPM | HEIGHT: 66 IN

## 2022-12-16 DIAGNOSIS — N39.0 ACUTE UTI (URINARY TRACT INFECTION): Primary | ICD-10-CM

## 2022-12-16 DIAGNOSIS — F20.9 SCHIZOPHRENIA, UNSPECIFIED TYPE: ICD-10-CM

## 2022-12-16 DIAGNOSIS — N39.42 URINARY INCONTINENCE WITHOUT SENSORY AWARENESS: ICD-10-CM

## 2022-12-16 DIAGNOSIS — Z79.4 TYPE 2 DIABETES MELLITUS WITHOUT COMPLICATION, WITH LONG-TERM CURRENT USE OF INSULIN: ICD-10-CM

## 2022-12-16 DIAGNOSIS — E11.9 TYPE 2 DIABETES MELLITUS WITHOUT COMPLICATION, WITH LONG-TERM CURRENT USE OF INSULIN: ICD-10-CM

## 2022-12-16 LAB
BACTERIA UR QL AUTO: ABNORMAL /HPF
BILIRUB UR QL STRIP: NEGATIVE
CLARITY UR: ABNORMAL
COLOR UR: YELLOW
GLUCOSE UR STRIP-MCNC: NEGATIVE MG/DL
HGB UR QL STRIP.AUTO: NEGATIVE
HYALINE CASTS UR QL AUTO: ABNORMAL /LPF
KETONES UR QL STRIP: NEGATIVE
LEUKOCYTE ESTERASE UR QL STRIP.AUTO: NEGATIVE
NITRITE UR QL STRIP: NEGATIVE
PH UR STRIP.AUTO: 6 [PH] (ref 5–8)
PROT UR QL STRIP: NEGATIVE
RBC # UR STRIP: ABNORMAL /HPF
REF LAB TEST METHOD: ABNORMAL
SP GR UR STRIP: 1.01 (ref 1–1.03)
SQUAMOUS #/AREA URNS HPF: ABNORMAL /HPF
UROBILINOGEN UR QL STRIP: ABNORMAL
WBC # UR STRIP: ABNORMAL /HPF

## 2022-12-16 PROCEDURE — 99283 EMERGENCY DEPT VISIT LOW MDM: CPT

## 2022-12-16 PROCEDURE — 81001 URINALYSIS AUTO W/SCOPE: CPT | Performed by: STUDENT IN AN ORGANIZED HEALTH CARE EDUCATION/TRAINING PROGRAM

## 2022-12-16 RX ORDER — GRANULES FOR ORAL 3 G/1
3 POWDER ORAL ONCE
Status: COMPLETED | OUTPATIENT
Start: 2022-12-16 | End: 2022-12-16

## 2022-12-16 RX ADMIN — GRANULES FOR ORAL SOLUTION 3 G: 3 POWDER ORAL at 23:31

## 2022-12-17 RX ORDER — ONDANSETRON 4 MG/1
4 TABLET, ORALLY DISINTEGRATING ORAL 4 TIMES DAILY PRN
Qty: 12 TABLET | Refills: 0 | Status: SHIPPED | OUTPATIENT
Start: 2022-12-17

## 2022-12-17 NOTE — DISCHARGE INSTRUCTIONS
You can stop taking the Bactrim the medication you were given here tonight should treat your urinary tract infection.  Continue to maintain good oral hydration and take your other medications as directed.  Follow-up with your PCP.  While today's work-up was reassuring if your symptoms change or worsen please return to the ED or seek other medical care.

## 2022-12-18 NOTE — ED PROVIDER NOTES
EMERGENCY DEPARTMENT ENCOUNTER    Pt Name: Vilma Ayala  MRN: [id number]  Pt :   1970  Room Number:  15/15  Date of encounter:  2022  PCP: Ovi Corbin MD  ED Provider: Quintin Coronel MD    Historian: Patient      HPI:  Chief Complaint: Generalized malaise after taking Bactrim        Context: Vilma Ayala is a 52-year-old lady who has history of diabetes, schizophrenia, incontinence who was recently diagnosed with urinary tract infection and presents today because of inability to tolerate her medication.  She says she was started on Bactrim when she was seen here last and has been taking it for 4 days and says it has been making her feel weak.  She feels like the medicine has also been exacerbating her schizophrenia she had an episode in which she was hearing voices earlier.  She says she has been taking all of her medications as directed but had to stop taking the Bactrim yesterday.  She does not know if her urinary tract infection has been treated or not and as such presents here for evaluation of the poor response to Bactrim.  Denies fevers or systemic symptoms.  Currently feels like her schizophrenia is controlled.  Denies actual pain only the urinary symptoms.  T no other complaints at this time.    PAST MEDICAL HISTORY  Past Medical History:   Diagnosis Date   • Anxiety    • Chicken pox    • Depression    • Diabetes mellitus (HCC)    • Disease of thyroid gland    • Measles    • Type 2 diabetes mellitus (HCC)          PAST SURGICAL HISTORY  Past Surgical History:   Procedure Laterality Date   • EYE SURGERY     • TONSILLECTOMY           FAMILY HISTORY  Family History   Problem Relation Age of Onset   • Hypertension Other    • Thyroid disease Other    • Diabetes Other    • Obesity Other    • Diabetes Mother    • Hypertension Mother    • Heart disease Mother    • Cancer Father    • Diabetes Sister    • Diabetes Brother    • No Known Problems Brother          SOCIAL  HISTORY  Social History     Socioeconomic History   • Marital status: Single   Tobacco Use   • Smoking status: Never   • Smokeless tobacco: Never   Vaping Use   • Vaping Use: Never used   Substance and Sexual Activity   • Alcohol use: No   • Drug use: No   • Sexual activity: Not Currently         ALLERGIES  Doxepin and Januvia [sitagliptin]        REVIEW OF SYSTEMS  Review of Systems       All systems reviewed and negative except for those discussed in HPI.       PHYSICAL EXAM    I have reviewed the triage vital signs and nursing notes.    ED Triage Vitals [12/16/22 2018]   Temp Heart Rate Resp BP SpO2   98 °F (36.7 °C) 83 16 126/71 99 %      Temp src Heart Rate Source Patient Position BP Location FiO2 (%)   Oral Monitor Sitting Right arm --       Physical Exam  GENERAL:   Appears in no acute distress.   HENT: Nares patent.  EYES: No scleral icterus.  CV: Regular rhythm, regular rate.  RESPIRATORY: Normal effort.  No audible wheezes, rales or rhonchi.  ABDOMEN: Soft, nontender, no CVA tenderness  MUSCULOSKELETAL: No deformities.   NEURO: Alert, moves all extremities, follows commands.  SKIN: Warm, dry, no rash visualized.      LAB RESULTS  Recent Results (from the past 24 hour(s))   Urinalysis With Microscopic If Indicated (No Culture) - Urine, Clean Catch    Collection Time: 12/16/22 10:36 PM    Specimen: Urine, Clean Catch   Result Value Ref Range    Color, UA Yellow Yellow, Straw    Appearance, UA Cloudy (A) Clear    pH, UA 6.0 5.0 - 8.0    Specific Gravity, UA 1.014 1.001 - 1.030    Glucose, UA Negative Negative    Ketones, UA Negative Negative    Bilirubin, UA Negative Negative    Blood, UA Negative Negative    Protein, UA Negative Negative    Leuk Esterase, UA Negative Negative    Nitrite, UA Negative Negative    Urobilinogen, UA 1.0 E.U./dL 0.2 - 1.0 E.U./dL   Urinalysis, Microscopic Only - Urine, Clean Catch    Collection Time: 12/16/22 10:36 PM    Specimen: Urine, Clean Catch   Result Value Ref Range     RBC, UA 21-30 (A) None Seen, 0-2 /HPF    WBC, UA 3-5 (A) None Seen, 0-2 /HPF    Bacteria, UA None Seen None Seen, Trace /HPF    Squamous Epithelial Cells, UA 7-12 (A) None Seen, 0-2 /HPF    Hyaline Casts, UA 0-6 0 - 6 /LPF    Methodology Manual Light Microscopy        If labs were ordered, I independently reviewed the results and considered them in treating the patient.        RADIOLOGY  No Radiology Exams Resulted Within Past 24 Hours    I ordered and independently reviewed the above noted radiographic studies.        See radiologist's dictation for official interpretation.        PROCEDURES    Procedures    No orders to display       MEDICATIONS GIVEN IN ER    Medications   fosfomycin (MONUROL) packet 3 g (3 g Oral Given 12/16/22 2351)         MEDICAL DECISION MAKING, PROGRESS, and CONSULTS    All labs have been independently reviewed by me.  All radiology studies have been reviewed by me and the radiologist dictating the report.  All EKG's have been independently viewed and interpreted by me.      Discussion below represents my analysis of pertinent findings related to patient's condition, differential diagnosis, treatment plan and final disposition.      Differential diagnosis:    UTI, drug reaction, pyelonephritis          Orders placed during this visit:  Orders Placed This Encounter   Procedures   • Urinalysis With Microscopic If Indicated (No Culture) - Urine, Clean Catch   • Urinalysis, Microscopic Only - Urine, Clean Catch             ED Course:    Consultants:      ED Course as of 12/17/22 1946   Sat Dec 17, 2022   0018 In summary is a 52-year-old lady who has history of diabetes, schizophrenia, incontinence who was recently diagnosed with urinary tract infection and presents today because of inability to tolerate her medication.  She says she was started on Bactrim when she was seen here last and has been taking it for 4 days and says it has been making her feel weak.  She feels like the medicine has  also been exacerbating her schizophrenia she had an episode in which she was hearing voices earlier.  She says she has been taking all of her medications as directed but had to stop taking the Bactrim yesterday.  She does not know if her urinary tract infection has been treated or not and as such presents here for evaluation of the poor response to Bactrim.  Denies fevers or systemic symptoms.  Currently feels like her schizophrenia is controlled.  Denies actual pain only the urinary symptoms.  T no other complaints at this time. [CC]      ED Course User Index  [CC] Quintin Coronel MD       She arrived awake and alert afebrile vitals within normal limits generally well-appearing.  She does not have CVA tenderness.  She was not tolerating the Bactrim and has not completed the course of that for her urinary tract infection.  Urinalysis here continues to demonstrate evidence of urinary tract infection.  Urine cultures have been sent.  Treated here with single dose fosfomycin because she was having difficulty tolerating the antibiotic treatment.  She is pleased with this plan.  Think she can safely discharge her follow-up.  Counseled on strict return precautions verbally expressed understanding of these.           AS OF 19:46 EST VITALS:    BP - 126/71  HR - 83  TEMP - 98 °F (36.7 °C) (Oral)  O2 SATS - 99%                  DIAGNOSIS  Final diagnoses:   Acute UTI (urinary tract infection)   Type 2 diabetes mellitus without complication, with long-term current use of insulin (HCC)   Schizophrenia, unspecified type (HCC)   Urinary incontinence without sensory awareness         DISPOSITION  DISCHARGE    Patient discharged in stable condition.    Reviewed implications of results, diagnosis, meds, responsibility to follow up, warning signs and symptoms of possible worsening, potential complications and reasons to return to ER.    Patient/Family voiced understanding of above instructions.    Discussed plan for  discharge, as there is no emergent indication for admission.  Pt/family is agreeable and understands need for follow up and possible repeat testing.  Pt/family is aware that discharge does not mean that nothing is wrong but that it indicates no emergency is currently present that requires admission and they must continue care with follow-up as given below or with a physician of their choice.     FOLLOW-UP  Ovi Corbin MD  [address] [facility]   [place] [id number] [phone]    Call   For evaluation         Medication List      New Prescriptions    ondansetron ODT 4 MG disintegrating tablet  Commonly known as: ZOFRAN-ODT  Place 1 tablet on the tongue 4 (Four) Times a Day As Needed for Nausea or Vomiting.           Where to Get Your Medications      These medications were sent to [facility] [id number] - [place], [place] - [address] [place] [address] - [phone]  - [phone]   [address] [place] [address]    Phone: [phone]   · ondansetron ODT 4 MG disintegrating tablet             Please note that portions of this document were completed with voice recognition software.        Quintin Coronel MD  12/17/22 5979

## 2022-12-23 ENCOUNTER — HOSPITAL ENCOUNTER (EMERGENCY)
Facility: HOSPITAL | Age: 52
Discharge: HOME | End: 2022-12-23
Attending: EMERGENCY MEDICINE | Admitting: EMERGENCY MEDICINE

## 2022-12-23 VITALS
SYSTOLIC BLOOD PRESSURE: 100 MMHG | BODY MASS INDEX: 35.36 KG/M2 | DIASTOLIC BLOOD PRESSURE: 63 MMHG | HEIGHT: 66 IN | HEART RATE: 87 BPM | OXYGEN SATURATION: 96 % | RESPIRATION RATE: 20 BRPM | TEMPERATURE: 98.9 F | WEIGHT: 220 LBS

## 2022-12-23 DIAGNOSIS — R73.9 ACUTE HYPERGLYCEMIA: Primary | ICD-10-CM

## 2022-12-23 LAB
ALBUMIN SERPL-MCNC: 3.9 G/DL (ref 3.5–5.2)
ALBUMIN/GLOB SERPL: 1.3 G/DL
ALP SERPL-CCNC: 112 U/L (ref 39–117)
ALT SERPL W P-5'-P-CCNC: 15 U/L (ref 1–33)
ANION GAP SERPL CALCULATED.3IONS-SCNC: 10 MMOL/L (ref 5–15)
AST SERPL-CCNC: 25 U/L (ref 1–32)
BASOPHILS # BLD AUTO: 0.04 10*3/MM3 (ref 0–0.2)
BASOPHILS NFR BLD AUTO: 0.3 % (ref 0–1.5)
BILIRUB SERPL-MCNC: 0.3 MG/DL (ref 0–1.2)
BILIRUB UR QL STRIP: NEGATIVE
BUN SERPL-MCNC: 15 MG/DL (ref 6–20)
BUN/CREAT SERPL: 13.6 (ref 7–25)
CALCIUM SPEC-SCNC: 9.2 MG/DL (ref 8.6–10.5)
CHLORIDE SERPL-SCNC: 104 MMOL/L (ref 98–107)
CLARITY UR: CLEAR
CO2 SERPL-SCNC: 25 MMOL/L (ref 22–29)
COLOR UR: YELLOW
CREAT SERPL-MCNC: 1.1 MG/DL (ref 0.57–1)
DEPRECATED RDW RBC AUTO: 46.6 FL (ref 37–54)
EGFRCR SERPLBLD CKD-EPI 2021: 60.6 ML/MIN/1.73
EOSINOPHIL # BLD AUTO: 0.16 10*3/MM3 (ref 0–0.4)
EOSINOPHIL NFR BLD AUTO: 1.3 % (ref 0.3–6.2)
ERYTHROCYTE [DISTWIDTH] IN BLOOD BY AUTOMATED COUNT: 14.4 % (ref 12.3–15.4)
GLOBULIN UR ELPH-MCNC: 3.1 GM/DL
GLUCOSE BLDC GLUCOMTR-MCNC: 203 MG/DL (ref 70–130)
GLUCOSE SERPL-MCNC: 159 MG/DL (ref 65–99)
GLUCOSE UR STRIP-MCNC: ABNORMAL MG/DL
HCT VFR BLD AUTO: 38.1 % (ref 34–46.6)
HGB BLD-MCNC: 12.7 G/DL (ref 12–15.9)
HGB UR QL STRIP.AUTO: NEGATIVE
IMM GRANULOCYTES # BLD AUTO: 0.04 10*3/MM3 (ref 0–0.05)
IMM GRANULOCYTES NFR BLD AUTO: 0.3 % (ref 0–0.5)
KETONES UR QL STRIP: NEGATIVE
LEUKOCYTE ESTERASE UR QL STRIP.AUTO: NEGATIVE
LYMPHOCYTES # BLD AUTO: 2.71 10*3/MM3 (ref 0.7–3.1)
LYMPHOCYTES NFR BLD AUTO: 22.8 % (ref 19.6–45.3)
MCH RBC QN AUTO: 30 PG (ref 26.6–33)
MCHC RBC AUTO-ENTMCNC: 33.3 G/DL (ref 31.5–35.7)
MCV RBC AUTO: 90.1 FL (ref 79–97)
MONOCYTES # BLD AUTO: 0.55 10*3/MM3 (ref 0.1–0.9)
MONOCYTES NFR BLD AUTO: 4.6 % (ref 5–12)
NEUTROPHILS NFR BLD AUTO: 70.7 % (ref 42.7–76)
NEUTROPHILS NFR BLD AUTO: 8.4 10*3/MM3 (ref 1.7–7)
NITRITE UR QL STRIP: NEGATIVE
NRBC BLD AUTO-RTO: 0 /100 WBC (ref 0–0.2)
PH UR STRIP.AUTO: 5.5 [PH] (ref 5–8)
PLATELET # BLD AUTO: 345 10*3/MM3 (ref 140–450)
PMV BLD AUTO: 9.9 FL (ref 6–12)
POTASSIUM SERPL-SCNC: 4.5 MMOL/L (ref 3.5–5.2)
PROT SERPL-MCNC: 7 G/DL (ref 6–8.5)
PROT UR QL STRIP: NEGATIVE
RBC # BLD AUTO: 4.23 10*6/MM3 (ref 3.77–5.28)
SODIUM SERPL-SCNC: 139 MMOL/L (ref 136–145)
SP GR UR STRIP: 1.02 (ref 1–1.03)
UROBILINOGEN UR QL STRIP: ABNORMAL
WBC NRBC COR # BLD: 11.9 10*3/MM3 (ref 3.4–10.8)

## 2022-12-23 PROCEDURE — 82962 GLUCOSE BLOOD TEST: CPT

## 2022-12-23 PROCEDURE — 99284 EMERGENCY DEPT VISIT MOD MDM: CPT

## 2022-12-23 PROCEDURE — 85025 COMPLETE CBC W/AUTO DIFF WBC: CPT | Performed by: EMERGENCY MEDICINE

## 2022-12-23 PROCEDURE — 81003 URINALYSIS AUTO W/O SCOPE: CPT | Performed by: EMERGENCY MEDICINE

## 2022-12-23 PROCEDURE — 80053 COMPREHEN METABOLIC PANEL: CPT | Performed by: EMERGENCY MEDICINE

## 2022-12-23 RX ORDER — SODIUM CHLORIDE 0.9 % (FLUSH) 0.9 %
10 SYRINGE (ML) INJECTION AS NEEDED
Status: DISCONTINUED | OUTPATIENT
Start: 2022-12-23 | End: 2022-12-23 | Stop reason: HOSPADM

## 2022-12-23 RX ADMIN — SODIUM CHLORIDE 1000 ML: 9 INJECTION, SOLUTION INTRAVENOUS at 14:43

## 2022-12-23 NOTE — CASE MANAGEMENT/SOCIAL WORK
Continued Stay Note  [facility]     Patient Name: Vilma Ayala  MRN: [id number]  Today's Date: 12/23/2022    Admit Date: 12/23/2022    Plan: transport   Discharge Plan     Row Name 12/23/22 1552       Plan    Plan transport    Plan Comments MSW was contacted by RN for transportation assistance. MSW scheduled Lyft ride and provided car details to Pt. RN was updated. MSW available.    Final Discharge Disposition Code 30 - still a patient               Discharge Codes    No documentation.                     VALARIE Hawkins

## 2022-12-23 NOTE — ED PROVIDER NOTES
Subjective   History of Present Illness  52-year-old female who is a known diabetic who presents for evaluation of hyperglycemia.  The patient reportedly checked a blood sugar up to 400 earlier today.  She did administer some insulin but continued to remain elevated.  She therefore has decided to present for further evaluation.  She reports some mild fatigue, and dizziness.  She denies chest pain, cough, shortness of breath.  No reported sick contacts.  No new medications.  She is a type II diabetic and primarily takes metformin for blood sugar control.  She denies any dysuria.  She does report some baseline frequency secondary to diabetes.  No diarrhea or blood in the stool.  No other acute complaints        Review of Systems   Constitutional: Positive for fatigue. Negative for chills and fever.   HENT: Negative for congestion, ear pain, postnasal drip, sinus pressure and sore throat.    Eyes: Negative for pain, redness and visual disturbance.   Respiratory: Negative for cough, chest tightness and shortness of breath.    Cardiovascular: Negative for chest pain, palpitations and leg swelling.   Gastrointestinal: Negative for abdominal pain, anal bleeding, blood in stool, diarrhea, nausea and vomiting.   Endocrine: Negative for polydipsia and polyuria.   Genitourinary: Positive for frequency. Negative for difficulty urinating, dysuria and urgency.   Musculoskeletal: Negative for arthralgias, back pain and neck pain.   Skin: Negative for pallor and rash.   Allergic/Immunologic: Negative for environmental allergies and immunocompromised state.   Neurological: Positive for dizziness. Negative for weakness and headaches.   Hematological: Negative for adenopathy.   Psychiatric/Behavioral: Negative for confusion, self-injury and suicidal ideas. The patient is not nervous/anxious.    All other systems reviewed and are negative.      Past Medical History:   Diagnosis Date   • Anxiety    • Chicken pox    • Depression    •  Diabetes mellitus (HCC)    • Disease of thyroid gland    • Measles    • Type 2 diabetes mellitus (HCC)        Allergies   Allergen Reactions   • Doxepin Rash   • Januvia [Sitagliptin] Other (See Comments)     Insomnia       Past Surgical History:   Procedure Laterality Date   • EYE SURGERY     • TONSILLECTOMY         Family History   Problem Relation Age of Onset   • Hypertension Other    • Thyroid disease Other    • Diabetes Other    • Obesity Other    • Diabetes Mother    • Hypertension Mother    • Heart disease Mother    • Cancer Father    • Diabetes Sister    • Diabetes Brother    • No Known Problems Brother        Social History     Socioeconomic History   • Marital status: Single   Tobacco Use   • Smoking status: Never   • Smokeless tobacco: Never   Vaping Use   • Vaping Use: Never used   Substance and Sexual Activity   • Alcohol use: No   • Drug use: No   • Sexual activity: Not Currently           Objective   Physical Exam  Vitals and nursing note reviewed.   Constitutional:       General: She is not in acute distress.     Appearance: Normal appearance. She is well-developed. She is not toxic-appearing or diaphoretic.   HENT:      Head: Normocephalic and atraumatic.      Right Ear: External ear normal.      Left Ear: External ear normal.      Nose: Nose normal.   Eyes:      General: Lids are normal.      Pupils: Pupils are equal, round, and reactive to light.   Neck:      Trachea: No tracheal deviation.   Cardiovascular:      Rate and Rhythm: Normal rate and regular rhythm.      Pulses: No decreased pulses.      Heart sounds: Normal heart sounds. No murmur heard.    No friction rub. No gallop.   Pulmonary:      Effort: Pulmonary effort is normal. No respiratory distress.      Breath sounds: Normal breath sounds. No decreased breath sounds, wheezing, rhonchi or rales.   Abdominal:      General: Bowel sounds are normal.      Palpations: Abdomen is soft.      Tenderness: There is no abdominal tenderness. There  is no guarding or rebound.   Musculoskeletal:         General: No deformity. Normal range of motion.      Cervical back: Normal range of motion and neck supple.   Lymphadenopathy:      Cervical: No cervical adenopathy.   Skin:     General: Skin is warm and dry.      Findings: No rash.   Neurological:      Mental Status: She is alert and oriented to person, place, and time.      Cranial Nerves: No cranial nerve deficit.      Sensory: No sensory deficit.   Psychiatric:         Speech: Speech normal.         Behavior: Behavior normal.         Thought Content: Thought content normal.         Judgment: Judgment normal.         Procedures           ED Course                                           MDM  Number of Diagnoses or Management Options  Acute hyperglycemia: new and requires workup  Diagnosis management comments: The patient's blood sugar was elevated but continue to improve without administration of medication while here in the ER.  The patient was given IV fluids.  Blood sugar improved down to roughly 150.    Discharged with the advised to observe her symptoms and return to the ER as needed.       Amount and/or Complexity of Data Reviewed  Clinical lab tests: ordered and reviewed  Tests in the radiology section of CPT®: ordered and reviewed  Review and summarize past medical records: yes  Independent visualization of images, tracings, or specimens: yes        Final diagnoses:   Acute hyperglycemia       ED Disposition  ED Disposition     ED Disposition   Discharge    Condition   Stable    Comment   --             No follow-up provider specified.       Medication List      No changes were made to your prescriptions during this visit.          Leona Ramachandran MD  12/24/22 0008

## 2022-12-28 ENCOUNTER — HOSPITAL ENCOUNTER (EMERGENCY)
Facility: HOSPITAL | Age: 52
Discharge: HOME | End: 2022-12-28
Attending: EMERGENCY MEDICINE | Admitting: EMERGENCY MEDICINE
Payer: MEDICARE

## 2022-12-28 ENCOUNTER — TELEPHONE (OUTPATIENT)
Dept: ENDOCRINOLOGY | Facility: CLINIC | Age: 52
End: 2022-12-28

## 2022-12-28 VITALS
DIASTOLIC BLOOD PRESSURE: 61 MMHG | WEIGHT: 232 LBS | RESPIRATION RATE: 18 BRPM | HEART RATE: 86 BPM | SYSTOLIC BLOOD PRESSURE: 104 MMHG | TEMPERATURE: 97.9 F | HEIGHT: 66 IN | BODY MASS INDEX: 37.28 KG/M2 | OXYGEN SATURATION: 98 %

## 2022-12-28 DIAGNOSIS — R73.9 HYPERGLYCEMIA: Primary | ICD-10-CM

## 2022-12-28 DIAGNOSIS — E11.9 TYPE 2 DIABETES MELLITUS WITHOUT COMPLICATION, WITH LONG-TERM CURRENT USE OF INSULIN: ICD-10-CM

## 2022-12-28 DIAGNOSIS — Z79.4 TYPE 2 DIABETES MELLITUS WITHOUT COMPLICATION, WITH LONG-TERM CURRENT USE OF INSULIN: ICD-10-CM

## 2022-12-28 LAB
ALBUMIN SERPL-MCNC: 3.8 G/DL (ref 3.5–5.2)
ALBUMIN/GLOB SERPL: 1.4 G/DL
ALP SERPL-CCNC: 107 U/L (ref 39–117)
ALT SERPL W P-5'-P-CCNC: 9 U/L (ref 1–33)
ANION GAP SERPL CALCULATED.3IONS-SCNC: 8 MMOL/L (ref 5–15)
AST SERPL-CCNC: 12 U/L (ref 1–32)
BASOPHILS # BLD AUTO: 0.03 10*3/MM3 (ref 0–0.2)
BASOPHILS NFR BLD AUTO: 0.2 % (ref 0–1.5)
BILIRUB SERPL-MCNC: 0.3 MG/DL (ref 0–1.2)
BUN SERPL-MCNC: 19 MG/DL (ref 6–20)
BUN/CREAT SERPL: 17.8 (ref 7–25)
CALCIUM SPEC-SCNC: 9 MG/DL (ref 8.6–10.5)
CHLORIDE SERPL-SCNC: 102 MMOL/L (ref 98–107)
CO2 SERPL-SCNC: 29 MMOL/L (ref 22–29)
CREAT SERPL-MCNC: 1.07 MG/DL (ref 0.57–1)
DEPRECATED RDW RBC AUTO: 45 FL (ref 37–54)
EGFRCR SERPLBLD CKD-EPI 2021: 62.6 ML/MIN/1.73
EOSINOPHIL # BLD AUTO: 0.21 10*3/MM3 (ref 0–0.4)
EOSINOPHIL NFR BLD AUTO: 1.7 % (ref 0.3–6.2)
ERYTHROCYTE [DISTWIDTH] IN BLOOD BY AUTOMATED COUNT: 13.8 % (ref 12.3–15.4)
GLOBULIN UR ELPH-MCNC: 2.8 GM/DL
GLUCOSE BLDC GLUCOMTR-MCNC: 176 MG/DL (ref 70–130)
GLUCOSE SERPL-MCNC: 187 MG/DL (ref 65–99)
HCT VFR BLD AUTO: 33.9 % (ref 34–46.6)
HGB BLD-MCNC: 11.2 G/DL (ref 12–15.9)
IMM GRANULOCYTES # BLD AUTO: 0.04 10*3/MM3 (ref 0–0.05)
IMM GRANULOCYTES NFR BLD AUTO: 0.3 % (ref 0–0.5)
LYMPHOCYTES # BLD AUTO: 3.94 10*3/MM3 (ref 0.7–3.1)
LYMPHOCYTES NFR BLD AUTO: 31.6 % (ref 19.6–45.3)
MCH RBC QN AUTO: 29.8 PG (ref 26.6–33)
MCHC RBC AUTO-ENTMCNC: 33 G/DL (ref 31.5–35.7)
MCV RBC AUTO: 90.2 FL (ref 79–97)
MONOCYTES # BLD AUTO: 0.75 10*3/MM3 (ref 0.1–0.9)
MONOCYTES NFR BLD AUTO: 6 % (ref 5–12)
NEUTROPHILS NFR BLD AUTO: 60.2 % (ref 42.7–76)
NEUTROPHILS NFR BLD AUTO: 7.5 10*3/MM3 (ref 1.7–7)
NRBC BLD AUTO-RTO: 0 /100 WBC (ref 0–0.2)
PLATELET # BLD AUTO: 303 10*3/MM3 (ref 140–450)
PMV BLD AUTO: 10 FL (ref 6–12)
POTASSIUM SERPL-SCNC: 3.9 MMOL/L (ref 3.5–5.2)
PROT SERPL-MCNC: 6.6 G/DL (ref 6–8.5)
RBC # BLD AUTO: 3.76 10*6/MM3 (ref 3.77–5.28)
SODIUM SERPL-SCNC: 139 MMOL/L (ref 136–145)
WBC NRBC COR # BLD: 12.47 10*3/MM3 (ref 3.4–10.8)

## 2022-12-28 PROCEDURE — 99283 EMERGENCY DEPT VISIT LOW MDM: CPT

## 2022-12-28 PROCEDURE — 36415 COLL VENOUS BLD VENIPUNCTURE: CPT

## 2022-12-28 PROCEDURE — 85025 COMPLETE CBC W/AUTO DIFF WBC: CPT | Performed by: PHYSICIAN ASSISTANT

## 2022-12-28 PROCEDURE — 80053 COMPREHEN METABOLIC PANEL: CPT | Performed by: PHYSICIAN ASSISTANT

## 2022-12-28 PROCEDURE — 82962 GLUCOSE BLOOD TEST: CPT

## 2022-12-28 RX ORDER — BLOOD-GLUCOSE METER
1 KIT MISCELLANEOUS DAILY
Qty: 1 KIT | Refills: 0 | Status: SHIPPED | OUTPATIENT
Start: 2022-12-28 | End: 2023-02-01

## 2022-12-28 NOTE — TELEPHONE ENCOUNTER
PATIENT IS CALLING STATING HER CURRENT CGM IS NOT WORKING CORRECTLY, SHE IS WANTING A PRESCRIPTION FOR NEW CGM. PATIENTS PHARMACY IS [facility] ON [facility]. PATIENTS NUMBER [id number]

## 2022-12-28 NOTE — TELEPHONE ENCOUNTER
Returned pt call, she actually needs a meter sent to pharmacy  [facility]  Last OV 11/2/22 Future appt 2/8/23

## 2022-12-29 NOTE — DISCHARGE INSTRUCTIONS
Continue to monitor your blood glucose.  Your blood glucose levels are satisfactory here today.  Continue your present medication regimen, and follow-up with your primary care provider as needed.

## 2022-12-29 NOTE — ED PROVIDER NOTES
EMERGENCY DEPARTMENT ENCOUNTER    Pt Name: Vilma Ayala  MRN: [id number]  Pt :   1970  Room Number:    Date of encounter:  2022  PCP: Ovi Corbin MD  ED Provider: Scott Ruggiero PA-C    Historian: Patient      HPI:  Chief Complaint: High blood glucose        Context: Vilma Ayala is a 52 y.o. female who presents to the ED c/o high blood glucose.  Patient checked her blood glucose tonight and it was 209.  She states she can usually get it to come down after drinking a glass of water but it did not help today.  She has a history of type 2 diabetes, is controlled with insulin.  She is a resident of [facility].  Her past medical history is further remarkable for chickenpox measles thyroid disease depression and anxiety.  Her medication list has been reviewed.  She is allergic to Januvia and doxepin      PAST MEDICAL HISTORY  Past Medical History:   Diagnosis Date   • Anxiety    • Chicken pox    • Depression    • Diabetes mellitus (HCC)    • Disease of thyroid gland    • Measles    • Type 2 diabetes mellitus (HCC)          PAST SURGICAL HISTORY  Past Surgical History:   Procedure Laterality Date   • EYE SURGERY     • TONSILLECTOMY           FAMILY HISTORY  Family History   Problem Relation Age of Onset   • Hypertension Other    • Thyroid disease Other    • Diabetes Other    • Obesity Other    • Diabetes Mother    • Hypertension Mother    • Heart disease Mother    • Cancer Father    • Diabetes Sister    • Diabetes Brother    • No Known Problems Brother          SOCIAL HISTORY  Social History     Socioeconomic History   • Marital status: Single   Tobacco Use   • Smoking status: Never   • Smokeless tobacco: Never   Vaping Use   • Vaping Use: Never used   Substance and Sexual Activity   • Alcohol use: No   • Drug use: No   • Sexual activity: Not Currently         ALLERGIES  Doxepin and Januvia [sitagliptin]        REVIEW OF SYSTEMS  Review of Systems    Constitutional: Negative for activity change, appetite change, fatigue and fever.   HENT: Negative for congestion, rhinorrhea and sore throat.    Respiratory: Negative for cough, shortness of breath and wheezing.    Cardiovascular: Negative for chest pain and palpitations.   Gastrointestinal: Negative for abdominal pain, nausea and vomiting.   Endocrine: Negative for polydipsia, polyphagia and polyuria.   Genitourinary: Negative for difficulty urinating, dysuria, flank pain and hematuria.   Musculoskeletal: Negative for arthralgias, back pain, myalgias and neck stiffness.   Neurological: Negative for dizziness, seizures, syncope, weakness and headaches.          All systems reviewed and negative except for those discussed in HPI.       PHYSICAL EXAM    I have reviewed the triage vital signs and nursing notes.    ED Triage Vitals   Temp Heart Rate Resp BP SpO2   12/28/22 2025 12/28/22 2018 12/28/22 2018 12/28/22 2018 12/28/22 2018   97.9 °F (36.6 °C) 86 18 130/74 98 %      Temp src Heart Rate Source Patient Position BP Location FiO2 (%)   12/28/22 2025 -- -- -- --   Oral           Physical Exam  GENERAL:   Appears in no acute distress.   HENT: Nares patent.  EYES: No scleral icterus.  CV: Regular rhythm, regular rate.  RESPIRATORY: Normal effort.  No audible wheezes, rales or rhonchi.  ABDOMEN: Soft, nontender  MUSCULOSKELETAL: No deformities.   NEURO: Alert, moves all extremities, follows commands.  SKIN: Warm, dry, no rash visualized.      LAB RESULTS  Recent Results (from the past 24 hour(s))   POC Glucose Once    Collection Time: 12/28/22  8:24 PM    Specimen: Blood   Result Value Ref Range    Glucose 176 (H) 70 - 130 mg/dL   Comprehensive Metabolic Panel    Collection Time: 12/28/22  8:49 PM    Specimen: Blood   Result Value Ref Range    Glucose 187 (H) 65 - 99 mg/dL    BUN 19 6 - 20 mg/dL    Creatinine 1.07 (H) 0.57 - 1.00 mg/dL    Sodium 139 136 - 145 mmol/L    Potassium 3.9 3.5 - 5.2 mmol/L    Chloride 102  98 - 107 mmol/L    CO2 29.0 22.0 - 29.0 mmol/L    Calcium 9.0 8.6 - 10.5 mg/dL    Total Protein 6.6 6.0 - 8.5 g/dL    Albumin 3.8 3.5 - 5.2 g/dL    ALT (SGPT) 9 1 - 33 U/L    AST (SGOT) 12 1 - 32 U/L    Alkaline Phosphatase 107 39 - 117 U/L    Total Bilirubin 0.3 0.0 - 1.2 mg/dL    Globulin 2.8 gm/dL    A/G Ratio 1.4 g/dL    BUN/Creatinine Ratio 17.8 7.0 - 25.0    Anion Gap 8.0 5.0 - 15.0 mmol/L    eGFR 62.6 >60.0 mL/min/1.73   CBC Auto Differential    Collection Time: 12/28/22  9:14 PM    Specimen: Blood   Result Value Ref Range    WBC 12.47 (H) 3.40 - 10.80 10*3/mm3    RBC 3.76 (L) 3.77 - 5.28 10*6/mm3    Hemoglobin 11.2 (L) 12.0 - 15.9 g/dL    Hematocrit 33.9 (L) 34.0 - 46.6 %    MCV 90.2 79.0 - 97.0 fL    MCH 29.8 26.6 - 33.0 pg    MCHC 33.0 31.5 - 35.7 g/dL    RDW 13.8 12.3 - 15.4 %    RDW-SD 45.0 37.0 - 54.0 fl    MPV 10.0 6.0 - 12.0 fL    Platelets 303 140 - 450 10*3/mm3    Neutrophil % 60.2 42.7 - 76.0 %    Lymphocyte % 31.6 19.6 - 45.3 %    Monocyte % 6.0 5.0 - 12.0 %    Eosinophil % 1.7 0.3 - 6.2 %    Basophil % 0.2 0.0 - 1.5 %    Immature Grans % 0.3 0.0 - 0.5 %    Neutrophils, Absolute 7.50 (H) 1.70 - 7.00 10*3/mm3    Lymphocytes, Absolute 3.94 (H) 0.70 - 3.10 10*3/mm3    Monocytes, Absolute 0.75 0.10 - 0.90 10*3/mm3    Eosinophils, Absolute 0.21 0.00 - 0.40 10*3/mm3    Basophils, Absolute 0.03 0.00 - 0.20 10*3/mm3    Immature Grans, Absolute 0.04 0.00 - 0.05 10*3/mm3    nRBC 0.0 0.0 - 0.2 /100 WBC       If labs were ordered, I independently reviewed the results and considered them in treating the patient.        RADIOLOGY  No Radiology Exams Resulted Within Past 24 Hours      PROCEDURES    Procedures    No orders to display       MEDICATIONS GIVEN IN ER    Medications - No data to display      MEDICAL DECISION MAKING, PROGRESS, and CONSULTS    All labs have been independently reviewed by me.  All radiology studies have been reviewed by me and the radiologist dictating the report.  All EKG's have been  independently viewed and interpreted by me.      Discussion below represents my analysis of pertinent findings related to patient's condition, differential diagnosis, treatment plan and final disposition.            Orders placed during this visit:  Orders Placed This Encounter   Procedures   • Comprehensive Metabolic Panel   • CBC Auto Differential   • POC Glucose Once   • CBC & Differential           ED Course:    Consultants:      ED Course as of 12/28/22 2322   Wed Dec 28, 2022   2122 Glucose(!): 187 [TG]      ED Course User Index  [TG] Scott Ruggiero PA-C                  AS OF 23:22 EST VITALS:    BP - 104/61  HR - 86  TEMP - 97.9 °F (36.6 °C) (Oral)  O2 SATS - 98%    Recheck fingerstick here is 176, and serum glucose was 187.  Reassured patient that these are acceptable levels, and no further intervention is required.  She was advised to return if any change or worsening.  She verbalizes understanding and is agreeable with plan              DIAGNOSIS  Final diagnoses:   Hyperglycemia   Type 2 diabetes mellitus without complication, with long-term current use of insulin ([facility])         DISPOSITION  DISCHARGE    Patient discharged in stable condition.    Reviewed implications of results, diagnosis, meds, responsibility to follow up, warning signs and symptoms of possible worsening, potential complications and reasons to return to ER.    Patient/Family voiced understanding of above instructions.    Discussed plan for discharge, as there is no emergent indication for admission.  Pt/family is agreeable and understands need for follow up and possible repeat testing.  Pt/family is aware that discharge does not mean that nothing is wrong but that it indicates no emergency is currently present that requires admission and they must continue care with follow-up as given below or with a physician of their choice.     FOLLOW-UP  Ovi Corbin MD  [address] [place] [address] [phone]    Schedule  an appointment as soon as possible for a visit   As needed         Medication List      No changes were made to your prescriptions during this visit.             Please note that portions of this document were completed with voice recognition software.      Scott Ruggiero PA-C  12/28/22 9065

## 2022-12-30 VITALS
SYSTOLIC BLOOD PRESSURE: 110 MMHG | TEMPERATURE: 97.9 F | HEIGHT: 66 IN | DIASTOLIC BLOOD PRESSURE: 100 MMHG | RESPIRATION RATE: 18 BRPM | OXYGEN SATURATION: 98 % | WEIGHT: 232 LBS | BODY MASS INDEX: 37.28 KG/M2 | HEART RATE: 82 BPM

## 2022-12-30 LAB — GLUCOSE BLDC GLUCOMTR-MCNC: 163 MG/DL (ref 70–130)

## 2022-12-30 PROCEDURE — 82962 GLUCOSE BLOOD TEST: CPT

## 2022-12-30 PROCEDURE — 99283 EMERGENCY DEPT VISIT LOW MDM: CPT

## 2022-12-31 ENCOUNTER — HOSPITAL ENCOUNTER (EMERGENCY)
Facility: HOSPITAL | Age: 52
Discharge: HOME | End: 2022-12-31
Attending: STUDENT IN AN ORGANIZED HEALTH CARE EDUCATION/TRAINING PROGRAM | Admitting: STUDENT IN AN ORGANIZED HEALTH CARE EDUCATION/TRAINING PROGRAM
Payer: MEDICARE

## 2022-12-31 DIAGNOSIS — Z86.39 HISTORY OF HYPERLIPIDEMIA: ICD-10-CM

## 2022-12-31 DIAGNOSIS — E66.9 CLASS 2 OBESITY: ICD-10-CM

## 2022-12-31 DIAGNOSIS — Z13.9 ENCOUNTER FOR MEDICAL SCREENING EXAMINATION: ICD-10-CM

## 2022-12-31 DIAGNOSIS — E11.65 HYPERGLYCEMIA DUE TO DIABETES MELLITUS: Primary | ICD-10-CM

## 2022-12-31 DIAGNOSIS — Z86.39 HISTORY OF HYPOTHYROIDISM: ICD-10-CM

## 2022-12-31 NOTE — DISCHARGE INSTRUCTIONS
Symptomatic care is recommended. Take all medications as prescribed and instructed. Follow up with primary care as directed or return to Emergency Department with worsening of symptoms.

## 2023-01-05 ENCOUNTER — TELEPHONE (OUTPATIENT)
Dept: ENDOCRINOLOGY | Facility: CLINIC | Age: 53
End: 2023-01-05
Payer: MEDICARE

## 2023-01-06 RX ORDER — LANCETS 30 GAUGE
EACH MISCELLANEOUS
Qty: 200 EACH | Refills: 3 | Status: SHIPPED | OUTPATIENT
Start: 2023-01-06

## 2023-01-06 NOTE — TELEPHONE ENCOUNTER
PA required for onetouch verio strips so rx will be changed and sent for a new meter, strips and lancets that are covered on insurance

## 2023-01-06 NOTE — ED PROVIDER NOTES
EMERGENCY DEPARTMENT ENCOUNTER    Pt Name: Vilma Ayala  MRN: [id number]  Pt :   1970  Room Number:  TRI2/T2  Date of encounter:  2022  PCP: Ovi Corbin MD  ED Provider: MANUELA Avila    Historian: Patient    HPI:  Chief Complaint: Elevated Blood SUgar    Context: Vilma Ayala is a 52 y.o. female who presents to the ED c/o being concerned about her blood sugars being elevated. Patient presents from the [facility] via EMS as she checked her blood glucose and found it to be 288. Patient took her correction insulin. She has no symptoms on exam.   HPI     REVIEW OF SYSTEMS  A chief complaint appropriate review of systems was completed and is negative except as noted in the HPI.     PAST MEDICAL HISTORY  Past Medical History:   Diagnosis Date   • Anxiety    • Chicken pox    • Depression    • Diabetes mellitus (HCC)    • Disease of thyroid gland    • Measles    • Type 2 diabetes mellitus (HCC)        PAST SURGICAL HISTORY  Past Surgical History:   Procedure Laterality Date   • EYE SURGERY     • TONSILLECTOMY         FAMILY HISTORY  Family History   Problem Relation Age of Onset   • Hypertension Other    • Thyroid disease Other    • Diabetes Other    • Obesity Other    • Diabetes Mother    • Hypertension Mother    • Heart disease Mother    • Cancer Father    • Diabetes Sister    • Diabetes Brother    • No Known Problems Brother        SOCIAL HISTORY  Social History     Socioeconomic History   • Marital status: Single   Tobacco Use   • Smoking status: Never   • Smokeless tobacco: Never   Vaping Use   • Vaping Use: Never used   Substance and Sexual Activity   • Alcohol use: No   • Drug use: No   • Sexual activity: Not Currently       ALLERGIES  Doxepin and Januvia [sitagliptin]    PHYSICAL EXAM  Physical Exam  GENERAL:   Appears in no acute distress. Non-toxic  HENT: Nares patent.  EYES: No scleral icterus.  CV: Regular rhythm, regular rate.  RESPIRATORY: Normal effort.   No audible wheezes, rales or rhonchi.  ABDOMEN: Soft, nontender  MUSCULOSKELETAL: No deformities.   NEURO: Alert, moves all extremities, follows commands.  SKIN: Warm, dry, no rash visualized.    I have reviewed the triage vital signs and nursing notes.    Physical Exam     LAB RESULTS  Results for orders placed or performed during the hospital encounter of 12/31/22   POC Glucose Once    Specimen: Blood   Result Value Ref Range    Glucose 163 (H) 70 - 130 mg/dL       If labs were ordered, I independently reviewed the results and considered them in treating the patient.    RADIOLOGY  No orders to display       I ordered and independently reviewed the above noted radiographic studies.      See radiologist's dictation for official interpretation.      PROCEDURES    Procedures    No orders to display       MEDICATIONS GIVEN IN ER    Medications - No data to display    MEDICAL DECISION MAKING, PROGRESS, and CONSULTS  MDM  Number of Diagnoses or Management Options  Class 2 obesity: minor  Encounter for medical screening examination: new, needed workup  History of hyperlipidemia: minor  History of hypothyroidism: minor  Hyperglycemia due to diabetes mellitus (HCC): new, needed workup  Risk of Complications, Morbidity, and/or Mortality  Presenting problems: high  Diagnostic procedures: high  Management options: high    Patient Progress  Patient progress: improved     All labs have been independently reviewed by me.  All radiology studies have been reviewed by me and the radiologist dictating the report.  All EKG's have been independently viewed by me.    [] Discussed with radiology regarding test interpretation:    Discussion below represents my analysis of pertinent findings related to patient's condition, differential diagnosis, treatment plan and final disposition.    Differential diagnosis:  The differential diagnosis associated with the patient's presentation includes: DKA, HHS, sepsis.    Additional  sources  Discussed/ obtained information from independent historians:   [] Spouse  [] Parent  [] Family member  [] Friend  [x] EMS   [] Other:  External (non-ED) record review:   [] Inpatient record:   [] Office record:   [] Outpatient record:   [x] Prior Outpatient labs:   [] Prior Outpatient radiology:   [] Primary Care record:   [] Outside ED record:   [x] Other: Endo records, Neuro records  Patient's care impacted by:   [x] Diabetes  [] Hypertension  [x] Hyperlipidemia  [] Coronary Artery Disease   [] COPD   [] Cancer   [] Tobacco Abuse   [] Substance Abuse    [x] Other: obesity, hypothyroid  Care significantly affected by Social Determinants of Health (housing and economic circumstances, unemployment)    [] Yes     [] No   If yes, Patient's care significantly limited by  Social Determinants of Health including:   [] Inadequate housing   [] Low income   [] Alcoholism and drug addiction in family   [] Problems related to primary support group   [] Unemployment   [] Problems related to employment   [] Other Social Determinants of Health:     Shared decision making: I had a discussion with the patient/family regarding diagnosis, diagnostic results, treatment plan, and medications.  The patient/family indicated understanding of these instructions.  I spent adequate time at the bedside preceding discharge necessary to personally discuss the aftercare instructions, giving patient education, providing explanations of the results of our evaluations/findings, and my decision making to assure that the patient/family understand the plan of care.  Time was allotted to answer questions at that time and throughout the ED course.  Emphasis was placed on timely follow-up after discharge.  I also discussed the potential for the development of an acute emergent condition requiring further evaluation, admission, or even surgical intervention. I discussed that we found nothing during the visit today indicating the need for further  workup, admission, or the presence of an unstable medical condition.  I encouraged the patient to return to the emergency department immediately for ANY concerns, worsening, new complaints, or if symptoms persist and unable to seek follow-up in a timely fashion.  The patient/family expressed understanding and agreement with this plan.  The patient will follow-up with primary care for reevaluation.     Orders placed during this visit:  Orders Placed This Encounter   Procedures   • POC Glucose Once       I considered prescription management  with:   [] Pain medication  [] Antiviral  [] Antibiotic   [x] Other:Insulin however patient's repeat blood glucose was 163 after patient administered her own insulin at home.     Additional orders considered but not ordered:  The following testing was considered but ultimately not selected after discussion with patient/family: Considered additional labs but based on clinical presentation, POC glucose and improvement of patient's blood glucose in addition to recent office and ER visits with normal workups, no additional labs were obtained.     ED Course:    Consultants:    Management of the patient was discussed with:   [] Hospitalist:   [] Consultant:   [] Behavioral Health provider:   [] Primary Care provider:    ED Course as of 01/06/23 1254   Sat Dec 31, 2022   0156 In summary this is a 52 year old female who presents to the ED with an elevated blood sugar reading. History of poorly controlled diabetes. Took her correction insulin with blood sugar at time of my exam of 163. Asymptomatic. At time of discharge disposition patient is afebrile, nontoxic appearing, vital signs stable and able to maintain O2 sats of 98% on room air. Patient will be discharged home with symptomatic care and outpatient follow up.  [JG]      ED Course User Index  [JG] Luis Mariano PA            DIAGNOSIS  Final diagnoses:   Hyperglycemia due to diabetes mellitus (HCC)   Encounter for medical  screening examination   History of hyperlipidemia   History of hypothyroidism   Class 2 obesity     DISPOSITION    ED Disposition     ED Disposition   Discharge    Condition   Stable    Comment   --           Please note that portions of this document were completed with voice recognition software.      Luis Mariano, PA  01/06/23 7011

## 2023-01-15 ENCOUNTER — HOSPITAL ENCOUNTER (EMERGENCY)
Facility: HOSPITAL | Age: 53
Discharge: HOME | End: 2023-01-15
Attending: EMERGENCY MEDICINE | Admitting: EMERGENCY MEDICINE
Payer: MEDICARE

## 2023-01-15 VITALS
OXYGEN SATURATION: 92 % | BODY MASS INDEX: 37.28 KG/M2 | SYSTOLIC BLOOD PRESSURE: 102 MMHG | TEMPERATURE: 98.4 F | HEART RATE: 93 BPM | HEIGHT: 66 IN | DIASTOLIC BLOOD PRESSURE: 92 MMHG | RESPIRATION RATE: 20 BRPM | WEIGHT: 232 LBS

## 2023-01-15 DIAGNOSIS — E11.65 HYPERGLYCEMIA DUE TO DIABETES MELLITUS: Primary | ICD-10-CM

## 2023-01-15 DIAGNOSIS — Z86.39 HISTORY OF THYROID DISEASE: ICD-10-CM

## 2023-01-15 DIAGNOSIS — Z86.59 HISTORY OF ANXIETY: ICD-10-CM

## 2023-01-15 DIAGNOSIS — Z86.39 HISTORY OF HYPERLIPIDEMIA: ICD-10-CM

## 2023-01-15 DIAGNOSIS — Z86.59 HISTORY OF DEPRESSION: ICD-10-CM

## 2023-01-15 LAB
ALBUMIN SERPL-MCNC: 4.2 G/DL (ref 3.5–5.2)
ALBUMIN/GLOB SERPL: 1.6 G/DL
ALP SERPL-CCNC: 135 U/L (ref 39–117)
ALT SERPL W P-5'-P-CCNC: 15 U/L (ref 1–33)
ANION GAP SERPL CALCULATED.3IONS-SCNC: 9 MMOL/L (ref 5–15)
AST SERPL-CCNC: 14 U/L (ref 1–32)
BACTERIA UR QL AUTO: NORMAL /HPF
BASOPHILS # BLD AUTO: 0.04 10*3/MM3 (ref 0–0.2)
BASOPHILS NFR BLD AUTO: 0.3 % (ref 0–1.5)
BILIRUB SERPL-MCNC: 0.3 MG/DL (ref 0–1.2)
BILIRUB UR QL STRIP: NEGATIVE
BUN SERPL-MCNC: 12 MG/DL (ref 6–20)
BUN/CREAT SERPL: 10.3 (ref 7–25)
CALCIUM SPEC-SCNC: 9.1 MG/DL (ref 8.6–10.5)
CHLORIDE SERPL-SCNC: 98 MMOL/L (ref 98–107)
CLARITY UR: ABNORMAL
CO2 SERPL-SCNC: 29 MMOL/L (ref 22–29)
COLOR UR: YELLOW
CREAT SERPL-MCNC: 1.17 MG/DL (ref 0.57–1)
DEPRECATED RDW RBC AUTO: 44.6 FL (ref 37–54)
EGFRCR SERPLBLD CKD-EPI 2021: 56.3 ML/MIN/1.73
EOSINOPHIL # BLD AUTO: 0.27 10*3/MM3 (ref 0–0.4)
EOSINOPHIL NFR BLD AUTO: 2.3 % (ref 0.3–6.2)
ERYTHROCYTE [DISTWIDTH] IN BLOOD BY AUTOMATED COUNT: 13.9 % (ref 12.3–15.4)
GLOBULIN UR ELPH-MCNC: 2.6 GM/DL
GLUCOSE BLDC GLUCOMTR-MCNC: 160 MG/DL (ref 70–130)
GLUCOSE SERPL-MCNC: 218 MG/DL (ref 65–99)
GLUCOSE UR STRIP-MCNC: ABNORMAL MG/DL
HCT VFR BLD AUTO: 37.8 % (ref 34–46.6)
HGB BLD-MCNC: 12.8 G/DL (ref 12–15.9)
HGB UR QL STRIP.AUTO: NEGATIVE
HYALINE CASTS UR QL AUTO: NORMAL /LPF
IMM GRANULOCYTES # BLD AUTO: 0.05 10*3/MM3 (ref 0–0.05)
IMM GRANULOCYTES NFR BLD AUTO: 0.4 % (ref 0–0.5)
KETONES UR QL STRIP: NEGATIVE
LEUKOCYTE ESTERASE UR QL STRIP.AUTO: NEGATIVE
LYMPHOCYTES # BLD AUTO: 3.07 10*3/MM3 (ref 0.7–3.1)
LYMPHOCYTES NFR BLD AUTO: 26.6 % (ref 19.6–45.3)
MCH RBC QN AUTO: 29.8 PG (ref 26.6–33)
MCHC RBC AUTO-ENTMCNC: 33.9 G/DL (ref 31.5–35.7)
MCV RBC AUTO: 87.9 FL (ref 79–97)
MONOCYTES # BLD AUTO: 0.61 10*3/MM3 (ref 0.1–0.9)
MONOCYTES NFR BLD AUTO: 5.3 % (ref 5–12)
NEUTROPHILS NFR BLD AUTO: 65.1 % (ref 42.7–76)
NEUTROPHILS NFR BLD AUTO: 7.5 10*3/MM3 (ref 1.7–7)
NITRITE UR QL STRIP: NEGATIVE
NRBC BLD AUTO-RTO: 0 /100 WBC (ref 0–0.2)
PH UR STRIP.AUTO: 6 [PH] (ref 5–8)
PLATELET # BLD AUTO: 364 10*3/MM3 (ref 140–450)
PMV BLD AUTO: 9.9 FL (ref 6–12)
POTASSIUM SERPL-SCNC: 4 MMOL/L (ref 3.5–5.2)
PROT SERPL-MCNC: 6.8 G/DL (ref 6–8.5)
PROT UR QL STRIP: NEGATIVE
RBC # BLD AUTO: 4.3 10*6/MM3 (ref 3.77–5.28)
RBC # UR STRIP: NORMAL /HPF
REF LAB TEST METHOD: NORMAL
SODIUM SERPL-SCNC: 136 MMOL/L (ref 136–145)
SP GR UR STRIP: 1.01 (ref 1–1.03)
SQUAMOUS #/AREA URNS HPF: NORMAL /HPF
UROBILINOGEN UR QL STRIP: ABNORMAL
WBC # UR STRIP: NORMAL /HPF
WBC NRBC COR # BLD: 11.54 10*3/MM3 (ref 3.4–10.8)

## 2023-01-15 PROCEDURE — 80053 COMPREHEN METABOLIC PANEL: CPT | Performed by: PHYSICIAN ASSISTANT

## 2023-01-15 PROCEDURE — 85025 COMPLETE CBC W/AUTO DIFF WBC: CPT | Performed by: PHYSICIAN ASSISTANT

## 2023-01-15 PROCEDURE — 99284 EMERGENCY DEPT VISIT MOD MDM: CPT

## 2023-01-15 PROCEDURE — 36415 COLL VENOUS BLD VENIPUNCTURE: CPT

## 2023-01-15 PROCEDURE — 81001 URINALYSIS AUTO W/SCOPE: CPT | Performed by: PHYSICIAN ASSISTANT

## 2023-01-15 PROCEDURE — 82962 GLUCOSE BLOOD TEST: CPT

## 2023-01-15 RX ORDER — SODIUM CHLORIDE 0.9 % (FLUSH) 0.9 %
10 SYRINGE (ML) INJECTION AS NEEDED
Status: DISCONTINUED | OUTPATIENT
Start: 2023-01-15 | End: 2023-01-16 | Stop reason: HOSPADM

## 2023-01-15 RX ADMIN — SODIUM CHLORIDE, POTASSIUM CHLORIDE, SODIUM LACTATE AND CALCIUM CHLORIDE 1000 ML: 600; 310; 30; 20 INJECTION, SOLUTION INTRAVENOUS at 20:35

## 2023-01-16 ENCOUNTER — TELEPHONE (OUTPATIENT)
Dept: ENDOCRINOLOGY | Facility: CLINIC | Age: 53
End: 2023-01-16
Payer: MEDICARE

## 2023-01-16 NOTE — ED PROVIDER NOTES
EMERGENCY DEPARTMENT ENCOUNTER    Pt Name: Vilma Ayala  MRN: [id number]  Pt :   1970  Room Number:    Date of encounter:  1/15/2023  PCP: Ovi Corbin MD  ED Provider: MANUELA Avila    Historian: Patient    HPI:  Chief Complaint: Elevated Blood Sugar    Context: Vilma Ayala is a 52 y.o. female who presents to the ED c/o of elevated blood sugar.  Patient states that she woke up this morning and her sugar was elevated.  She shares that she took her morning insulin and she had to leave early so after taking her insulin she ate peanut butter and crackers for breakfast.  She reports that she went throughout her day today and had an appointment and upon returning home from her appointment had dinner.  She said that dinner was prepared and they had chicken and rice bowls with Leo-Aid.  She states that she believes the Leo-Aid may have caused her blood sugar to be high again.  After dinner she checked her sugar and it was found to be 394.  She called EMS and upon their arrival over there her glucose reading was 291.  Patient without any additional complaints on exam.  HPI     REVIEW OF SYSTEMS  A chief complaint appropriate review of systems was completed and is negative except as noted in the HPI.     PAST MEDICAL HISTORY  Past Medical History:   Diagnosis Date   • Anxiety    • Chicken pox    • Depression    • Diabetes mellitus (HCC)    • Disease of thyroid gland    • Measles    • Type 2 diabetes mellitus (HCC)        PAST SURGICAL HISTORY  Past Surgical History:   Procedure Laterality Date   • EYE SURGERY     • TONSILLECTOMY         FAMILY HISTORY  Family History   Problem Relation Age of Onset   • Hypertension Other    • Thyroid disease Other    • Diabetes Other    • Obesity Other    • Diabetes Mother    • Hypertension Mother    • Heart disease Mother    • Cancer Father    • Diabetes Sister    • Diabetes Brother    • No Known Problems Brother        SOCIAL HISTORY  Social  History     Socioeconomic History   • Marital status: Single   Tobacco Use   • Smoking status: Never   • Smokeless tobacco: Never   Vaping Use   • Vaping Use: Never used   Substance and Sexual Activity   • Alcohol use: No   • Drug use: No   • Sexual activity: Not Currently       ALLERGIES  Doxepin and Januvia [sitagliptin]    PHYSICAL EXAM  Physical Exam  GENERAL:   Appears in no acute distress.  HENT: Nares patent.  EYES: No scleral icterus.  CV: Regular rhythm, regular rate.  RESPIRATORY: Normal effort.  No audible wheezes, rales or rhonchi.  ABDOMEN: Soft, nontender  MUSCULOSKELETAL: No deformities.   NEURO: Alert, moves all extremities, follows commands.  SKIN: Warm, dry, no rash visualized.    I have reviewed the triage vital signs and nursing notes.    Physical Exam     LAB RESULTS  Results for orders placed or performed during the hospital encounter of 01/15/23   Comprehensive Metabolic Panel    Specimen: Blood   Result Value Ref Range    Glucose 218 (H) 65 - 99 mg/dL    BUN 12 6 - 20 mg/dL    Creatinine 1.17 (H) 0.57 - 1.00 mg/dL    Sodium 136 136 - 145 mmol/L    Potassium 4.0 3.5 - 5.2 mmol/L    Chloride 98 98 - 107 mmol/L    CO2 29.0 22.0 - 29.0 mmol/L    Calcium 9.1 8.6 - 10.5 mg/dL    Total Protein 6.8 6.0 - 8.5 g/dL    Albumin 4.2 3.5 - 5.2 g/dL    ALT (SGPT) 15 1 - 33 U/L    AST (SGOT) 14 1 - 32 U/L    Alkaline Phosphatase 135 (H) 39 - 117 U/L    Total Bilirubin 0.3 0.0 - 1.2 mg/dL    Globulin 2.6 gm/dL    A/G Ratio 1.6 g/dL    BUN/Creatinine Ratio 10.3 7.0 - 25.0    Anion Gap 9.0 5.0 - 15.0 mmol/L    eGFR 56.3 (L) >60.0 mL/min/1.73   Urinalysis With Microscopic If Indicated (No Culture) - Urine, Clean Catch    Specimen: Urine, Clean Catch   Result Value Ref Range    Color, UA Yellow Yellow, Straw    Appearance, UA Cloudy (A) Clear    pH, UA 6.0 5.0 - 8.0    Specific Gravity, UA 1.011 1.001 - 1.030    Glucose, UA >=1000 mg/dL (3+) (A) Negative    Ketones, UA Negative Negative    Bilirubin, UA Negative  Negative    Blood, UA Negative Negative    Protein, UA Negative Negative    Leuk Esterase, UA Negative Negative    Nitrite, UA Negative Negative    Urobilinogen, UA 0.2 E.U./dL 0.2 - 1.0 E.U./dL   CBC Auto Differential    Specimen: Blood   Result Value Ref Range    WBC 11.54 (H) 3.40 - 10.80 10*3/mm3    RBC 4.30 3.77 - 5.28 10*6/mm3    Hemoglobin 12.8 12.0 - 15.9 g/dL    Hematocrit 37.8 34.0 - 46.6 %    MCV 87.9 79.0 - 97.0 fL    MCH 29.8 26.6 - 33.0 pg    MCHC 33.9 31.5 - 35.7 g/dL    RDW 13.9 12.3 - 15.4 %    RDW-SD 44.6 37.0 - 54.0 fl    MPV 9.9 6.0 - 12.0 fL    Platelets 364 140 - 450 10*3/mm3    Neutrophil % 65.1 42.7 - 76.0 %    Lymphocyte % 26.6 19.6 - 45.3 %    Monocyte % 5.3 5.0 - 12.0 %    Eosinophil % 2.3 0.3 - 6.2 %    Basophil % 0.3 0.0 - 1.5 %    Immature Grans % 0.4 0.0 - 0.5 %    Neutrophils, Absolute 7.50 (H) 1.70 - 7.00 10*3/mm3    Lymphocytes, Absolute 3.07 0.70 - 3.10 10*3/mm3    Monocytes, Absolute 0.61 0.10 - 0.90 10*3/mm3    Eosinophils, Absolute 0.27 0.00 - 0.40 10*3/mm3    Basophils, Absolute 0.04 0.00 - 0.20 10*3/mm3    Immature Grans, Absolute 0.05 0.00 - 0.05 10*3/mm3    nRBC 0.0 0.0 - 0.2 /100 WBC   Urinalysis, Microscopic Only - Urine, Clean Catch    Specimen: Urine, Clean Catch   Result Value Ref Range    RBC, UA None Seen None Seen, 0-2 /HPF    WBC, UA None Seen None Seen, 0-2 /HPF    Bacteria, UA None Seen None Seen, Trace /HPF    Squamous Epithelial Cells, UA 0-2 None Seen, 0-2 /HPF    Hyaline Casts, UA None Seen 0 - 6 /LPF    Methodology Automated Microscopy    POC Glucose Once    Specimen: Blood   Result Value Ref Range    Glucose 160 (H) 70 - 130 mg/dL       If labs were ordered, I independently reviewed the results and considered them in treating the patient.    RADIOLOGY  No orders to display     [] Radiologist's Report Reviewed:  I ordered and independently reviewed the above noted radiographic studies.  See radiologist's dictation for official interpretation.       PROCEDURES    Procedures    No orders to display       MEDICATIONS GIVEN IN ER    Medications   lactated ringers bolus 1,000 mL (0 mL Intravenous Stopped 1/15/23 7605)       MEDICAL DECISION MAKING, PROGRESS, and CONSULTS   Medical Decision Making  History of anxiety: chronic illness or injury  History of depression: chronic illness or injury  History of hyperlipidemia: chronic illness or injury  History of thyroid disease: chronic illness or injury  Hyperglycemia due to diabetes mellitus (HCC): acute illness or injury  Amount and/or Complexity of Data Reviewed  Labs: ordered. Decision-making details documented in ED Course.          All labs have been independently reviewed by me.  All radiology studies have been reviewed by me and the radiologist dictating the report.  All EKG's have been independently viewed by me.    [] Discussed with radiology regarding test interpretation:    Discussion below represents my analysis of pertinent findings related to patient's condition, differential diagnosis, treatment plan and final disposition.    Differential diagnosis:  The differential diagnosis associated with the patient's presentation includes: DKA, HHS, sepsis    Additional sources  Discussed/ obtained information from independent historians:   [] Spouse  [] Parent  [] Family member  [] Friend  [] EMS   [] Other:  External (non-ED) record review:   [] Inpatient record:   [] Office record:   [] Outpatient record:   [] Prior Outpatient labs:   [] Prior Outpatient radiology:   [x] Primary Care record:   [] Outside ED record:   [x] Other: Endo records  Patient's care impacted by:   [x] Diabetes  [] Hypertension  [x] Hyperlipidemia  [] Coronary Artery Disease   [] COPD   [] Cancer   [] Tobacco Abuse   [] Substance Abuse    [] Other:   Care significantly affected by Social Determinants of Health (housing and economic circumstances, unemployment)    [x] Yes     [] No   If yes, Patient's care significantly limited by   Social Determinants of Health including:   [] Inadequate housing   [] Low income   [] Alcoholism and drug addiction in family   [] Problems related to primary support group   [] Unemployment   [] Problems related to employment   [x] Other Social Determinants of Health:  Lives in group home    Shared decision making: I had a discussion with the patient/family regarding diagnosis, diagnostic results, treatment plan, and medications.  The patient/family indicated understanding of these instructions.  I spent adequate time at the bedside preceding discharge necessary to personally discuss the aftercare instructions, giving patient education, providing explanations of the results of our evaluations/findings, and my decision making to assure that the patient/family understand the plan of care.  Time was allotted to answer questions at that time and throughout the ED course.  Emphasis was placed on timely follow-up after discharge.  I also discussed the potential for the development of an acute emergent condition requiring further evaluation, admission, or even surgical intervention. I discussed that we found nothing during the visit today indicating the need for further workup, admission, or the presence of an unstable medical condition.  I encouraged the patient to return to the emergency department immediately for ANY concerns, worsening, new complaints, or if symptoms persist and unable to seek follow-up in a timely fashion.  The patient/family expressed understanding and agreement with this plan.  The patient will follow-up with primary care and endocrinology for reevaluation.     Orders placed during this visit:  Orders Placed This Encounter   Procedures   • Comprehensive Metabolic Panel   • Urinalysis With Microscopic If Indicated (No Culture) - Urine, Clean Catch   • CBC Auto Differential   • Urinalysis, Microscopic Only - Urine, Clean Catch   • POC Glucose Once   • POC Glucose Once   • CBC & Differential       I  considered prescription management  with:   [] Pain medication  [] Antiviral  [] Antibiotic   [x] Other: considered insulin but not needed as patient's blood sugar was 160 on recheck    ED Course:    ED Course as of 01/23/23 1407   Sun Jonathan 15, 2023   2307 Glucose(!): 160 [JG]   2307 In summary this is a 52-year-old female with history of uncontrolled diabetes who presents to the ER with concern over elevated blood sugar.  No acute or emergent findings demonstrated on physical exam.  Initial blood glucose on presentation of 218.  Patient responded well to IV fluids with repeat blood glucose of 160.  No additional acute or emergent findings on labs.  UA without abnormalities.At time of discharge disposition patient is afebrile, nontoxic appearing, vital signs stable and able to maintain O2 sats of 94% on room air. Patient will be discharged home with symptomatic care and outpatient follow up.  [JG]      ED Course User Index  [JG] Luis Mariano PA            DIAGNOSIS  Final diagnoses:   Hyperglycemia due to diabetes mellitus (HCC)   History of anxiety   History of depression   History of thyroid disease   History of hyperlipidemia       DISPOSITION    ED Disposition     ED Disposition   Discharge    Condition   Stable    Comment   --             Please note that portions of this document were completed with voice recognition software.      Luis Mariano PA  01/23/23 1407

## 2023-01-16 NOTE — TELEPHONE ENCOUNTER
Pt was calling to report she went to ED yesterday with an elevated blood sugar.  She states they had a new cook at the facility and she thought that is why it was elevated  FBS today was 172 and at 4:30 was 155  She was advised to continue same dose of insulin and to call if needed  Am 34u  Pm 20u

## 2023-01-16 NOTE — TELEPHONE ENCOUNTER
MS WOOTEN IS REQUESTING TO SPEAK WITH JUNG AS [employer] REGARDING ELEVATED GLUCOSE READINGS. MS WOOTEN DID REPORT A GLUCOSE READING  YESTERDAY MORNING.     CALL BACK [phone]

## 2023-01-19 ENCOUNTER — HOSPITAL ENCOUNTER (EMERGENCY)
Facility: HOSPITAL | Age: 53
Discharge: HOME | End: 2023-01-20
Attending: EMERGENCY MEDICINE | Admitting: EMERGENCY MEDICINE
Payer: MEDICARE

## 2023-01-19 DIAGNOSIS — Z86.39 HISTORY OF DIABETES MELLITUS: ICD-10-CM

## 2023-01-19 DIAGNOSIS — R73.9 HYPERGLYCEMIA: Primary | ICD-10-CM

## 2023-01-19 LAB — GLUCOSE BLDC GLUCOMTR-MCNC: 330 MG/DL (ref 70–130)

## 2023-01-19 PROCEDURE — 82962 GLUCOSE BLOOD TEST: CPT

## 2023-01-19 PROCEDURE — 99283 EMERGENCY DEPT VISIT LOW MDM: CPT

## 2023-01-19 RX ORDER — SODIUM CHLORIDE 0.9 % (FLUSH) 0.9 %
10 SYRINGE (ML) INJECTION AS NEEDED
Status: DISCONTINUED | OUTPATIENT
Start: 2023-01-19 | End: 2023-01-20 | Stop reason: HOSPADM

## 2023-01-20 VITALS
BODY MASS INDEX: 37.28 KG/M2 | RESPIRATION RATE: 18 BRPM | HEIGHT: 66 IN | TEMPERATURE: 98.9 F | DIASTOLIC BLOOD PRESSURE: 66 MMHG | HEART RATE: 88 BPM | OXYGEN SATURATION: 97 % | SYSTOLIC BLOOD PRESSURE: 111 MMHG | WEIGHT: 232 LBS

## 2023-01-20 LAB
BUN BLDA-MCNC: 11 MG/DL (ref 8–26)
CA-I BLDA-SCNC: 1.23 MMOL/L (ref 1.2–1.32)
CHLORIDE BLDA-SCNC: 99 MMOL/L (ref 98–109)
CO2 BLDA-SCNC: 31 MMOL/L (ref 24–29)
CREAT BLDA-MCNC: 1.1 MG/DL (ref 0.6–1.3)
EGFRCR SERPLBLD CKD-EPI 2021: 60.6 ML/MIN/1.73
GLUCOSE BLDC GLUCOMTR-MCNC: 136 MG/DL (ref 70–130)
HCT VFR BLDA CALC: 36 % (ref 38–51)
HGB BLDA-MCNC: 12.2 G/DL (ref 12–17)
POTASSIUM BLDA-SCNC: 4 MMOL/L (ref 3.5–4.9)
SODIUM BLD-SCNC: 139 MMOL/L (ref 138–146)

## 2023-01-20 PROCEDURE — 85014 HEMATOCRIT: CPT

## 2023-01-20 PROCEDURE — 80047 BASIC METABLC PNL IONIZED CA: CPT

## 2023-01-20 RX ADMIN — SODIUM CHLORIDE 1000 ML: 9 INJECTION, SOLUTION INTRAVENOUS at 00:27

## 2023-01-20 NOTE — ED PROVIDER NOTES
Subjective   History of Present Illness  52-year-old female with a history of type 2 diabetes presents for evaluation of elevated blood sugar readings this afternoon.  She notes that she checked her blood sugar this afternoon and found it to be nearly 300.  She was worried about the high readings and came to the ED to be evaluated.  She is unsure as to what may have caused her blood sugar to spike.  She feels well at this time and has no complaints.  She endorses compliance with all of her medications.        Review of Systems   Respiratory: Negative for cough and shortness of breath.    Cardiovascular: Negative for chest pain.   Gastrointestinal: Negative for diarrhea, nausea and vomiting.   Endocrine: Negative for polydipsia and polyuria.   All other systems reviewed and are negative.      Past Medical History:   Diagnosis Date   • Anxiety    • Chicken pox    • Depression    • Diabetes mellitus (HCC)    • Disease of thyroid gland    • Measles    • Type 2 diabetes mellitus (HCC)        Allergies   Allergen Reactions   • Doxepin Rash   • Januvia [Sitagliptin] Other (See Comments)     Insomnia       Past Surgical History:   Procedure Laterality Date   • EYE SURGERY     • TONSILLECTOMY         Family History   Problem Relation Age of Onset   • Hypertension Other    • Thyroid disease Other    • Diabetes Other    • Obesity Other    • Diabetes Mother    • Hypertension Mother    • Heart disease Mother    • Cancer Father    • Diabetes Sister    • Diabetes Brother    • No Known Problems Brother        Social History     Socioeconomic History   • Marital status: Single   Tobacco Use   • Smoking status: Never   • Smokeless tobacco: Never   Vaping Use   • Vaping Use: Never used   Substance and Sexual Activity   • Alcohol use: No   • Drug use: No   • Sexual activity: Not Currently           Objective   Physical Exam  Vitals and nursing note reviewed.   Constitutional:       General: She is not in acute distress.     Appearance:  She is well-developed. She is not diaphoretic.      Comments: Nontoxic-appearing female   HENT:      Head: Normocephalic and atraumatic.   Eyes:      Pupils: Pupils are equal, round, and reactive to light.   Cardiovascular:      Rate and Rhythm: Normal rate and regular rhythm.      Heart sounds: Normal heart sounds. No murmur heard.    No friction rub. No gallop.   Pulmonary:      Effort: Pulmonary effort is normal. No respiratory distress.      Breath sounds: Normal breath sounds. No wheezing or rales.   Abdominal:      General: Bowel sounds are normal. There is no distension.      Palpations: Abdomen is soft. There is no mass.      Tenderness: There is no abdominal tenderness. There is no guarding or rebound.   Musculoskeletal:         General: Normal range of motion.   Skin:     General: Skin is warm and dry.      Findings: No erythema or rash.   Neurological:      Mental Status: She is alert and oriented to person, place, and time.   Psychiatric:         Mood and Affect: Mood normal.         Thought Content: Thought content normal.         Judgment: Judgment normal.         Procedures           ED Course  ED Course as of 01/20/23 0155   Thu Jan 19, 2023   2231 52-year-old female with a history of diabetes presents for evaluation of elevated blood sugar readings this afternoon.  She notes that she checked her blood sugar and it was nearly 300.  As a result she came to the ED to be evaluated.  The patient has a known history of diabetes and endorses compliance with all of her medications.  She is currently completely asymptomatic and feels well.  Fingerstick glucose is 330.  We will obtain labs to ensure that the patient is not in DKA and will reassess following IV fluids. [DD]   Fri Jan 20, 2023   0024 Glucose is downtrending following IV fluids.  No evidence of DKA.  Glucose is currently 136.  I feel that the patient can be discharged and managed as an outpatient at this point.  Reassured.  She will follow-up  "with her primary care physician within the next week.  Agreeable with plan and given appropriate strict return precautions. [DD]      ED Course User Index  [DD] Stephan Loco MD                                          Recent Results (from the past 24 hour(s))   POC Glucose Once    Collection Time: 01/19/23  8:15 PM    Specimen: Blood   Result Value Ref Range    Glucose 330 (H) 70 - 130 mg/dL   POC CHEM 8    Collection Time: 01/20/23 12:22 AM    Specimen: Blood   Result Value Ref Range    Glucose 136 (H) 70 - 130 mg/dL    BUN 11 8 - 26 mg/dL    Creatinine 1.10 0.60 - 1.30 mg/dL    Sodium 139 138 - 146 mmol/L    POC Potassium 4.0 3.5 - 4.9 mmol/L    Chloride 99 98 - 109 mmol/L    Total CO2 31 (H) 24 - 29 mmol/L    Hemoglobin 12.2 12.0 - 17.0 g/dL    Hematocrit 36 (L) 38 - 51 %    Ionized Calcium 1.23 1.20 - 1.32 mmol/L    eGFR 60.6 >60.0 mL/min/1.73     Note: In addition to lab results from this visit, the labs listed above may include labs taken at another facility or during a different encounter within the last 24 hours. Please correlate lab times with ED admission and discharge times for further clarification of the services performed during this visit.    No orders to display     Vitals:    01/19/23 2010 01/20/23 0007   BP: 133/72 111/66   BP Location: Left arm    Patient Position: Sitting    Pulse: 88    Resp: 18    Temp: 98.9 °F (37.2 °C)    TempSrc: Oral    SpO2: 97%    Weight: 105 kg (232 lb)    Height: 167.6 cm (66\")      Medications   sodium chloride 0.9 % flush 10 mL (has no administration in time range)   sodium chloride 0.9 % bolus 1,000 mL (0 mL Intravenous Stopped 1/20/23 0100)     ECG/EMG Results (last 24 hours)     ** No results found for the last 24 hours. **        No orders to display           MDM    Final diagnoses:   Hyperglycemia   History of diabetes mellitus       ED Disposition  ED Disposition     ED Disposition   Discharge    Condition   Stable    Comment   --             [place], " Ovi Jacob MD  [address] [place] [address]  [phone]    In 1 week           Medication List      No changes were made to your prescriptions during this visit.          Stephan Loco MD  01/20/23 0156

## 2023-01-25 ENCOUNTER — TELEPHONE (OUTPATIENT)
Dept: ENDOCRINOLOGY | Facility: CLINIC | Age: 53
End: 2023-01-25
Payer: MEDICARE

## 2023-01-25 RX ORDER — PITAVASTATIN CALCIUM 1.04 MG/1
TABLET, FILM COATED ORAL
COMMUNITY
Start: 2022-11-15

## 2023-01-25 RX ORDER — INSULIN LISPRO PROTAMIN/LISPRO 75-25/ML
VIAL (ML) SUBCUTANEOUS
Qty: 30 ML | Refills: 3
Start: 2023-01-25 | End: 2023-01-27 | Stop reason: SDUPTHER

## 2023-01-25 RX ORDER — LEVOTHYROXINE SODIUM 0.2 MG/1
200 TABLET ORAL DAILY
Qty: 90 TABLET | Refills: 1 | Status: SHIPPED | OUTPATIENT
Start: 2023-01-25

## 2023-01-25 NOTE — TELEPHONE ENCOUNTER
PT CALLD REQUESTING LEVOTHYROXINE AND HUMALOG MIX TO BE SENT IN TO [facility] ON [address] IN [place]

## 2023-01-25 NOTE — TELEPHONE ENCOUNTER
Last seen on 11-2-22 and next appt is tomorrow, 1-26-23.    Refills sent on 11-2-22 on the 2 levothyroxines.

## 2023-01-27 RX ORDER — INSULIN LISPRO PROTAMIN/LISPRO 75-25/ML
VIAL (ML) SUBCUTANEOUS
Qty: 30 ML | Refills: 0 | Status: SHIPPED | OUTPATIENT
Start: 2023-01-27 | End: 2023-01-30

## 2023-01-27 NOTE — TELEPHONE ENCOUNTER
----- Message from Emiliana Bermudez sent at 7/7/2020 10:51 AM EDT -----  Regarding: /Telephone  General Message/Vendor Calls    Caller's first and last name:  - [address] [place] [address]     Reason for call:  \"Gabapentin\" 300mg    Callback required yes/no and why:  Yes, to let him know if the medication can be called in.       Best contact number(s):  [phone]    Details to clarify the request:      Emiliana Bermudez PATIENT NEEDS REFILLS ON HUMALOG THE PRESCRIPTION DID NOT GET SENT TO PHARMACY.

## 2023-01-29 ENCOUNTER — HOSPITAL ENCOUNTER (EMERGENCY)
Facility: HOSPITAL | Age: 53
Discharge: HOME | End: 2023-01-29
Attending: EMERGENCY MEDICINE | Admitting: EMERGENCY MEDICINE
Payer: MEDICARE

## 2023-01-29 VITALS
BODY MASS INDEX: 37.28 KG/M2 | OXYGEN SATURATION: 97 % | TEMPERATURE: 99.2 F | SYSTOLIC BLOOD PRESSURE: 131 MMHG | DIASTOLIC BLOOD PRESSURE: 76 MMHG | RESPIRATION RATE: 16 BRPM | WEIGHT: 232 LBS | HEIGHT: 66 IN | HEART RATE: 89 BPM

## 2023-01-29 DIAGNOSIS — E66.01 CLASS 3 SEVERE OBESITY DUE TO EXCESS CALORIES WITH SERIOUS COMORBIDITY AND BODY MASS INDEX (BMI) OF 45.0 TO 49.9 IN ADULT: ICD-10-CM

## 2023-01-29 DIAGNOSIS — E78.2 MIXED HYPERLIPIDEMIA: ICD-10-CM

## 2023-01-29 DIAGNOSIS — F32.A ANXIETY AND DEPRESSION: ICD-10-CM

## 2023-01-29 DIAGNOSIS — E11.65 HYPERGLYCEMIA DUE TO DIABETES MELLITUS: Primary | ICD-10-CM

## 2023-01-29 DIAGNOSIS — F41.9 ANXIETY AND DEPRESSION: ICD-10-CM

## 2023-01-29 LAB
GLUCOSE BLDC GLUCOMTR-MCNC: 215 MG/DL (ref 70–130)
GLUCOSE BLDC GLUCOMTR-MCNC: 266 MG/DL (ref 70–130)
GLUCOSE BLDC GLUCOMTR-MCNC: 278 MG/DL (ref 70–130)
GLUCOSE BLDC GLUCOMTR-MCNC: 293 MG/DL (ref 70–130)

## 2023-01-29 PROCEDURE — 82962 GLUCOSE BLOOD TEST: CPT

## 2023-01-29 PROCEDURE — 99283 EMERGENCY DEPT VISIT LOW MDM: CPT

## 2023-01-30 ENCOUNTER — TELEPHONE (OUTPATIENT)
Dept: ENDOCRINOLOGY | Facility: CLINIC | Age: 53
End: 2023-01-30
Payer: MEDICARE

## 2023-01-30 RX ORDER — INSULIN ASPART 100 [IU]/ML
INJECTION, SUSPENSION SUBCUTANEOUS
Qty: 20 ML | Refills: 5 | Status: SHIPPED | OUTPATIENT
Start: 2023-01-30

## 2023-02-01 ENCOUNTER — TELEPHONE (OUTPATIENT)
Dept: ENDOCRINOLOGY | Facility: CLINIC | Age: 53
End: 2023-02-01
Payer: MEDICARE

## 2023-02-01 NOTE — TELEPHONE ENCOUNTER
PATIENT IS REQUESTING RX FOR ACCU-CHEK TEST STRIPS. PATIENT STATES THAT RX SENT TO PHARMACY IN January  WAS FOR ONE TOUCH.

## 2023-02-12 ENCOUNTER — HOSPITAL ENCOUNTER (EMERGENCY)
Facility: HOSPITAL | Age: 53
Discharge: HOME | End: 2023-02-12
Attending: STUDENT IN AN ORGANIZED HEALTH CARE EDUCATION/TRAINING PROGRAM | Admitting: STUDENT IN AN ORGANIZED HEALTH CARE EDUCATION/TRAINING PROGRAM
Payer: MEDICARE

## 2023-02-12 VITALS
TEMPERATURE: 98.3 F | OXYGEN SATURATION: 100 % | HEART RATE: 72 BPM | SYSTOLIC BLOOD PRESSURE: 132 MMHG | WEIGHT: 232 LBS | DIASTOLIC BLOOD PRESSURE: 81 MMHG | HEIGHT: 66 IN | BODY MASS INDEX: 37.28 KG/M2 | RESPIRATION RATE: 18 BRPM

## 2023-02-12 DIAGNOSIS — E66.01 CLASS 2 SEVERE OBESITY WITH SERIOUS COMORBIDITY AND BODY MASS INDEX (BMI) OF 37.0 TO 37.9 IN ADULT, UNSPECIFIED OBESITY TYPE: ICD-10-CM

## 2023-02-12 DIAGNOSIS — E11.65 HYPERGLYCEMIA DUE TO DIABETES MELLITUS: Primary | ICD-10-CM

## 2023-02-12 DIAGNOSIS — E11.65 UNCONTROLLED TYPE 2 DIABETES MELLITUS WITH HYPERGLYCEMIA: ICD-10-CM

## 2023-02-12 DIAGNOSIS — Z86.59 HISTORY OF DEPRESSION: ICD-10-CM

## 2023-02-12 DIAGNOSIS — Z86.59 HISTORY OF ANXIETY: ICD-10-CM

## 2023-02-12 LAB
GLUCOSE BLDC GLUCOMTR-MCNC: 186 MG/DL (ref 70–130)
GLUCOSE BLDC GLUCOMTR-MCNC: 231 MG/DL (ref 70–130)

## 2023-02-12 PROCEDURE — 99283 EMERGENCY DEPT VISIT LOW MDM: CPT

## 2023-02-12 PROCEDURE — 82962 GLUCOSE BLOOD TEST: CPT

## 2023-02-12 RX ADMIN — SODIUM CHLORIDE 500 ML: 9 INJECTION, SOLUTION INTRAVENOUS at 22:28

## 2023-02-14 NOTE — ED PROVIDER NOTES
EMERGENCY DEPARTMENT ENCOUNTER    Pt Name: Vilma Ayala  MRN: [id number]  Pt :   1970  Room Number:    Date of encounter:  2023  PCP: Ovi Corbin MD  ED Provider: MANUELA Avila    Historian: Patient    HPI:  Chief Complaint: Elevated blood sugar readings    Context: Vilma Ayala is a 52 y.o. female who presents to the ED c/o elevated blood sugar readings. Patient reports that she has been checking her blood sugar and noticed it to be higher this evening after dinner. Patient reports a wonderful dinner of chicken and dumplings. Patient reports blood sugar reading of 260 and feels this is too high. She has been complaint with her insulin therapy. Patient without symptoms.   HPI     REVIEW OF SYSTEMS  A chief complaint appropriate review of systems was completed and is negative except as noted in the HPI.     PAST MEDICAL HISTORY  Past Medical History:   Diagnosis Date   • Anxiety    • Chicken pox    • Depression    • Diabetes mellitus (HCC)    • Disease of thyroid gland    • Measles    • Type 2 diabetes mellitus (HCC)        PAST SURGICAL HISTORY  Past Surgical History:   Procedure Laterality Date   • EYE SURGERY     • TONSILLECTOMY         FAMILY HISTORY  Family History   Problem Relation Age of Onset   • Hypertension Other    • Thyroid disease Other    • Diabetes Other    • Obesity Other    • Diabetes Mother    • Hypertension Mother    • Heart disease Mother    • Cancer Father    • Diabetes Sister    • Diabetes Brother    • No Known Problems Brother        SOCIAL HISTORY  Social History     Socioeconomic History   • Marital status: Single   Tobacco Use   • Smoking status: Never   • Smokeless tobacco: Never   Vaping Use   • Vaping Use: Never used   Substance and Sexual Activity   • Alcohol use: No   • Drug use: No   • Sexual activity: Not Currently       ALLERGIES  Doxepin and Januvia [sitagliptin]    PHYSICAL EXAM  Physical Exam  GENERAL:   Appears in no acute  distress.  HENT: Nares patent.  EYES: No scleral icterus.  CV: Regular rhythm, regular rate.  RESPIRATORY: Normal effort.  No audible wheezes, rales or rhonchi.  ABDOMEN: Soft, nontender  MUSCULOSKELETAL: No deformities.   NEURO: Alert, moves all extremities, follows commands.  SKIN: Warm, dry, no rash visualized.    I have reviewed the triage vital signs and nursing notes.    Physical Exam     LAB RESULTS  Results for orders placed or performed during the hospital encounter of 02/12/23   POC Glucose Once    Specimen: Blood   Result Value Ref Range    Glucose 231 (H) 70 - 130 mg/dL   POC Glucose Once    Specimen: Blood   Result Value Ref Range    Glucose 186 (H) 70 - 130 mg/dL       If labs were ordered, I independently reviewed the results and considered them in treating the patient.    RADIOLOGY  No orders to display     [] Radiologist's Report Reviewed:  I ordered and independently reviewed the above noted radiographic studies.  See radiologist's dictation for official interpretation.      PROCEDURES    Procedures    No orders to display       MEDICATIONS GIVEN IN ER    Medications   sodium chloride 0.9 % bolus 500 mL (0 mL Intravenous Stopped 2/12/23 3865)       MEDICAL DECISION MAKING, PROGRESS, and CONSULTS   Medical Decision Making  Class 2 severe obesity with serious comorbidity and body mass index (BMI) of 37.0 to 37.9 in adult, unspecified obesity type (HCC): chronic illness or injury  History of anxiety: chronic illness or injury  History of depression: chronic illness or injury  Hyperglycemia due to diabetes mellitus (HCC): complicated acute illness or injury  Uncontrolled type 2 diabetes mellitus with hyperglycemia (HCC): chronic illness or injury  Amount and/or Complexity of Data Reviewed  Labs: ordered.          All labs have been independently reviewed by me.  All radiology studies have been reviewed by me and the radiologist dictating the report.  All EKG's have been independently viewed by me.     [] Discussed with radiology regarding test interpretation:    Discussion below represents my analysis of pertinent findings related to patient's condition, differential diagnosis, treatment plan and final disposition.    Differential diagnosis:  The differential diagnosis associated with the patient's presentation includes: DKA, HHS, sepsis    Additional sources  Discussed/ obtained information from independent historians:   [] Spouse  [] Parent  [] Family member  [] Friend  [] EMS   [] Other:  External (non-ED) record review:   [] Inpatient record:   [x] Office record: Endocrinology notes reviewed   [] Outpatient record:   [] Prior Outpatient labs:   [] Prior Outpatient radiology:   [] Primary Care record:   [] Outside ED record:   [x] Other:  Numerous presentations to ED and outpatient clinics for elevated blood sugar readings and uncontrolled diabetes.   Patient's care impacted by:   [x] Diabetes  [] Hypertension  [x] Hyperlipidemia  [] Coronary Artery Disease   [] COPD   [] Cancer   [] Tobacco Abuse   [] Substance Abuse    [x] Other:  Anxiety and depression, obesity  Care significantly affected by Social Determinants of Health (housing and economic circumstances, unemployment)    [x] Yes     [] No   If yes, Patient's care significantly limited by  Social Determinants of Health including:   [] Inadequate housing   [] Low income   [] Alcoholism and drug addiction in family   [] Problems related to primary support group   [] Unemployment   [] Problems related to employment   [x] Other Social Determinants of Health: Patient resides in group home    Shared decision making: I had a discussion with the patient/family regarding diagnosis, diagnostic results, treatment plan, and medications.  The patient/family indicated understanding of these instructions.  I spent adequate time at the bedside preceding discharge necessary to personally discuss the aftercare instructions, giving patient education, providing  explanations of the results of our evaluations/findings, and my decision making to assure that the patient/family understand the plan of care.  Time was allotted to answer questions at that time and throughout the ED course.  Emphasis was placed on timely follow-up after discharge.  I also discussed the potential for the development of an acute emergent condition requiring further evaluation, admission, or even surgical intervention. I discussed that we found nothing during the visit today indicating the need for further workup, admission, or the presence of an unstable medical condition.  I encouraged the patient to return to the emergency department immediately for ANY concerns, worsening, new complaints, or if symptoms persist and unable to seek follow-up in a timely fashion.  The patient/family expressed understanding and agreement with this plan.  The patient will follow-up with PCP for reevaluation.     Orders placed during this visit:  Orders Placed This Encounter   Procedures   • POC Glucose Once   • POC Glucose Once   • POC Glucose Once       ED Course:    ED Course as of 02/14/23 1415   Sun Feb 12, 2023   [id number] In summary this is a 52 year old female with uncontrolled Type 2 diabetes who presents to ED from group home with elevated blood sugar reading after eating chicken and dumplings for dinner this evening. Patient asymptomatic. No acute or emergent findings demonstrated on physical exam.  Initial blood sugar reading in ED of 231, fluids in ED, repeat blood sugar 186. At time of discharge disposition patient is afebrile, nontoxic appearing, vital signs stable and able to maintain O2 sats of 100% on room air. Patient will be discharged home with symptomatic care and outpatient follow up.  [JG]      ED Course User Index  [JG] Luis Mariano PA            DIAGNOSIS  Final diagnoses:   Hyperglycemia due to diabetes mellitus (HCC)   History of anxiety   History of depression   Uncontrolled type 2 diabetes  mellitus with hyperglycemia (HCC)   Class 2 severe obesity with serious comorbidity and body mass index (BMI) of 37.0 to 37.9 in adult, unspecified obesity type (HCC)       DISPOSITION    ED Disposition     ED Disposition   Discharge    Condition   Stable    Comment   --             Please note that portions of this document were completed with voice recognition software.      Luis Mariano, PA  02/14/23 3426

## 2023-02-15 ENCOUNTER — OFFICE VISIT (OUTPATIENT)
Dept: ENDOCRINOLOGY | Facility: CLINIC | Age: 53
End: 2023-02-15
Payer: MEDICARE

## 2023-02-15 VITALS
HEART RATE: 68 BPM | BODY MASS INDEX: 37.93 KG/M2 | DIASTOLIC BLOOD PRESSURE: 64 MMHG | WEIGHT: 236 LBS | OXYGEN SATURATION: 98 % | SYSTOLIC BLOOD PRESSURE: 122 MMHG | HEIGHT: 66 IN

## 2023-02-15 DIAGNOSIS — E03.9 ACQUIRED HYPOTHYROIDISM: ICD-10-CM

## 2023-02-15 DIAGNOSIS — E78.2 MIXED HYPERLIPIDEMIA: ICD-10-CM

## 2023-02-15 DIAGNOSIS — E11.9 TYPE 2 DIABETES MELLITUS WITHOUT COMPLICATION, WITH LONG-TERM CURRENT USE OF INSULIN: Primary | ICD-10-CM

## 2023-02-15 DIAGNOSIS — Z79.4 TYPE 2 DIABETES MELLITUS WITHOUT COMPLICATION, WITH LONG-TERM CURRENT USE OF INSULIN: Primary | ICD-10-CM

## 2023-02-15 LAB
EXPIRATION DATE: ABNORMAL
EXPIRATION DATE: NORMAL
GLUCOSE BLDC GLUCOMTR-MCNC: 229 MG/DL (ref 70–130)
HBA1C MFR BLD: 7.2 %
Lab: ABNORMAL
Lab: NORMAL

## 2023-02-15 PROCEDURE — 99214 OFFICE O/P EST MOD 30 MIN: CPT | Performed by: INTERNAL MEDICINE

## 2023-02-15 PROCEDURE — 83036 HEMOGLOBIN GLYCOSYLATED A1C: CPT | Performed by: INTERNAL MEDICINE

## 2023-02-15 PROCEDURE — 82947 ASSAY GLUCOSE BLOOD QUANT: CPT | Performed by: INTERNAL MEDICINE

## 2023-02-15 PROCEDURE — 3051F HG A1C>EQUAL 7.0%<8.0%: CPT | Performed by: INTERNAL MEDICINE

## 2023-02-15 RX ORDER — HYDROXYZINE PAMOATE 50 MG/1
CAPSULE ORAL
COMMUNITY
Start: 2023-02-13

## 2023-02-15 RX ORDER — LEVOTHYROXINE SODIUM 0.03 MG/1
25 TABLET ORAL DAILY
Qty: 90 TABLET | Refills: 1 | Status: SHIPPED | OUTPATIENT
Start: 2023-02-15

## 2023-02-15 NOTE — PROGRESS NOTES
Vilma Ayala 52 y.o.  CC: Diabetes (Patient states she is taking 75/25 insulin but has an rx for the 70/30) and Hypothyroidism      [place]: Diabetes (Patient states she is taking 75/25 insulin but has an rx for the 70/30) and Hypothyroidism    She notices morning sugar is higher  She is giving insulin 75/25 insulin but we discussed that this is not covered by insurer and she will change to 70/30 novolog and take same dose  She needs refill on synthroid 25 mcg- has refills with 200 mcg   Results for orders placed or performed in visit on 02/15/23   POC Glycosylated Hemoglobin (Hb A1C)    Specimen: Blood   Result Value Ref Range    Hemoglobin A1C 7.2 %    Lot Number [id number]     Expiration Date 11/03/2024    POC Glucose, Blood    Specimen: Blood   Result Value Ref Range    Glucose 229 (A) 70 - 130 mg/dL    Lot Number [id number]     Expiration Date 09/03/2023      Average sugar is 150   Is utd with eye exam  No open foot lesion  She states she is taking insulin after meals   Discussed better timing to take insulin prior to meals   She will try to take shot prior to meals  Needs refill synthroid 25 mcg daily (takes along with 200 mcg)     Allergies   Allergen Reactions   • Doxepin Rash   • Januvia [Sitagliptin] Other (See Comments)     Insomnia       Current Outpatient Medications:   •  ammonium lactate (Lac-Hydrin) 12 % lotion, Apply BID to feet, Disp: 225 g, Rfl: 3  •  benztropine (COGENTIN) 0.5 MG tablet, Take 0.5 mg by mouth As Needed., Disp: , Rfl:   •  Blood Glucose Monitoring Suppl w/Device kit, Use daily to test blood sugars, Disp: 1 each, Rfl: 0  •  busPIRone (BUSPAR) 10 MG tablet, Take 1 tablet by mouth Daily., Disp: 90 tablet, Rfl: 1  •  Cholecalciferol (VITAMIN D3 PO), Take 1 tablet by mouth Daily., Disp: , Rfl:   •  citalopram (CeleXA) 40 MG tablet, Take 1 tablet by mouth Daily., Disp: 90 tablet, Rfl: 1  •  glucose blood (Accu-Chek Compact Plus) test strip, PRN, Disp: 60 each, Rfl: 5  •  glucose blood  "test strip, Use as instructed, Disp: 150 each, Rfl: 5  •  ibuprofen (ADVIL,MOTRIN) 800 MG tablet, Take 1 tablet by mouth Every 8 (Eight) Hours As Needed for Mild Pain or Moderate Pain for up to 30 doses., Disp: 30 tablet, Rfl: 0  •  insulin aspart prot-insulin aspart (NovoLOG Mix 70/30) (70-30) 100 UNIT/ML injection, Inject 34 units before breakfast and 12 units before dinner, Disp: 20 mL, Rfl: 5  •  Insulin Syringe-Needle U-100 31G X 15/64\" 0.5 ML misc, , Disp: , Rfl:   •  Lancets misc, Test blood sugars QID, Disp: 200 each, Rfl: 3  •  levothyroxine (SYNTHROID, LEVOTHROID) 200 MCG tablet, Take 1 tablet by mouth Daily. Take one tablet po q day along with 25mcg daily, Disp: 90 tablet, Rfl: 1  •  levothyroxine (SYNTHROID, LEVOTHROID) 25 MCG tablet, Take 1 tablet by mouth Daily., Disp: 90 tablet, Rfl: 1  •  Livalo 1 MG tablet tablet, , Disp: , Rfl:   •  metFORMIN (GLUCOPHAGE) 500 MG tablet, Take 2 tablets daily with food, Disp: 60 tablet, Rfl: 5  •  ondansetron ODT (ZOFRAN-ODT) 4 MG disintegrating tablet, Place 1 tablet on the tongue 4 (Four) Times a Day As Needed for Nausea or Vomiting., Disp: 12 tablet, Rfl: 0  •  risperiDONE (risperDAL) 3 MG tablet, Take 1 tablet by mouth 2 (Two) Times a Day., Disp: , Rfl:   •  sulfamethoxazole-trimethoprim (BACTRIM DS,SEPTRA DS) 800-160 MG per tablet, Take 1 tablet by mouth 2 (Two) Times a Day., Disp: 14 tablet, Rfl: 0  •  VENTOLIN  (90 Base) MCG/ACT inhaler, Inhale 2 puffs 4 (Four) Times a Day., Disp: , Rfl:   •  hydrOXYzine pamoate (VISTARIL) 50 MG capsule, , Disp: , Rfl:   Patient Active Problem List    Diagnosis    • Decreased GFR [R94.4]    • Microscopic hematuria [R31.29]    • Nocturia [R35.1]    • Nocturnal enuresis [N39.44]    • Stress incontinence [N39.3]    • Urge incontinence [N39.41]    • Urinary incontinence [R32]    • Type 2 diabetes mellitus without complication, with long-term current use of insulin (HCC) [E11.9, Z79.4]    • Mixed hyperlipidemia [E78.2]    • " "Anxiety and depression [F41.9, F32.A]    • Uncontrolled type 2 diabetes mellitus [EYF0649]    • Hypothyroid [E03.9]    • Obesity [E66.9]    • Vitamin D deficiency, unspecified [E55.9]    • Diabetic neuropathy, type II diabetes mellitus (HCC) [E11.40]      Review of Systems   Constitutional: Negative for activity change, appetite change and unexpected weight change.   HENT: Negative for congestion and rhinorrhea.    Eyes: Negative for visual disturbance.   Respiratory: Negative for cough and shortness of breath.    Cardiovascular: Negative for palpitations and leg swelling.   Gastrointestinal: Negative for constipation, diarrhea and nausea.   Genitourinary: Negative for hematuria.   Musculoskeletal: Negative for arthralgias, back pain, gait problem, joint swelling and myalgias.   Skin: Negative for color change, rash and wound.   Allergic/Immunologic: Negative for environmental allergies, food allergies and immunocompromised state.   Neurological: Negative for dizziness, weakness and light-headedness.   Psychiatric/Behavioral: Negative for confusion, decreased concentration, dysphoric mood and sleep disturbance. The patient is not nervous/anxious.      Social History     Socioeconomic History   • Marital status: Single   Tobacco Use   • Smoking status: Never   • Smokeless tobacco: Never   Vaping Use   • Vaping Use: Never used   Substance and Sexual Activity   • Alcohol use: No   • Drug use: No   • Sexual activity: Not Currently     Family History   Problem Relation Age of Onset   • Hypertension Other    • Thyroid disease Other    • Diabetes Other    • Obesity Other    • Diabetes Mother    • Hypertension Mother    • Heart disease Mother    • Cancer Father    • Diabetes Sister    • Diabetes Brother    • No Known Problems Brother      /64 (BP Location: Right arm, Patient Position: Sitting, Cuff Size: Adult)   Pulse 68   Ht 167.6 cm (65.98\")   Wt 107 kg (236 lb)   LMP  (LMP Unknown)   SpO2 98%   BMI 38.11 " kg/m²   Physical Exam  Vitals and nursing note reviewed.   Constitutional:       Appearance: Normal appearance. She is well-developed.   HENT:      Head: Normocephalic and atraumatic.   Eyes:      General: Lids are normal.      Extraocular Movements: Extraocular movements intact.      Conjunctiva/sclera: Conjunctivae normal.      Pupils: Pupils are equal, round, and reactive to light.   Neck:      Thyroid: No thyroid mass or thyromegaly.      Vascular: No carotid bruit.      Trachea: Trachea normal. No tracheal deviation.   Cardiovascular:      Rate and Rhythm: Normal rate and regular rhythm.      Pulses: Normal pulses.      Heart sounds: Normal heart sounds. No murmur heard.    No friction rub. No gallop.   Pulmonary:      Effort: Pulmonary effort is normal. No respiratory distress.      Breath sounds: Normal breath sounds. No wheezing.   Musculoskeletal:         General: No deformity. Normal range of motion.      Cervical back: Normal range of motion and neck supple.   Lymphadenopathy:      Cervical: No cervical adenopathy.   Skin:     General: Skin is warm and dry.      Findings: No erythema or rash.      Nails: There is no clubbing.   Neurological:      General: No focal deficit present.      Mental Status: She is alert and oriented to person, place, and time.      Cranial Nerves: No cranial nerve deficit.      Deep Tendon Reflexes: Reflexes are normal and symmetric. Reflexes normal.   Psychiatric:         Mood and Affect: Mood normal.         Speech: Speech normal.         Behavior: Behavior normal.         Thought Content: Thought content normal.         Judgment: Judgment normal.       Results for orders placed or performed in visit on 02/15/23   POC Glycosylated Hemoglobin (Hb A1C)    Specimen: Blood   Result Value Ref Range    Hemoglobin A1C 7.2 %    Lot Number [id number]     Expiration Date 11/03/2024    POC Glucose, Blood    Specimen: Blood   Result Value Ref Range    Glucose 229 (A) 70 - 130 mg/dL    Lot  Number [id number]     Expiration Date 09/03/2023      Diagnoses and all orders for this visit:    1. Type 2 diabetes mellitus without complication, with long-term current use of insulin (HCC) (Primary)  Assessment & Plan:  Blood sugar and 90 day average sugar reviewed  Results for orders placed or performed in visit on 02/15/23   POC Glycosylated Hemoglobin (Hb A1C)    Specimen: Blood   Result Value Ref Range    Hemoglobin A1C 7.2 %    Lot Number [id number]     Expiration Date 11/03/2024    POC Glucose, Blood    Specimen: Blood   Result Value Ref Range    Glucose 229 (A) 70 - 130 mg/dL    Lot Number [id number]     Expiration Date 09/03/2023      Uncontrolled diabetes with hyperglycemia  Is utd with eye exam  No foot lesion today   Ur alb due nov   F/u 3-4 months  Change insulin dosing to prior to the meal     Orders:  -     POC Glycosylated Hemoglobin (Hb A1C)  -     POC Glucose, Blood    2. Acquired hypothyroidism  Assessment & Plan:  Normal tfts 7/22  Continue current dosing   Refill rx sent       3. Mixed hyperlipidemia  Assessment & Plan:  ldl 100 7/22  Continue low fat diet  Work on improving blood sugar control       Other orders  -     levothyroxine (SYNTHROID, LEVOTHROID) 25 MCG tablet; Take 1 tablet by mouth Daily.  Dispense: 90 tablet; Refill: 1  Return in about 3 months (around 5/15/2023) for Recheck.    Selina Lopez MD  Signed Selina Lopez MD

## 2023-02-15 NOTE — ASSESSMENT & PLAN NOTE
Blood sugar and 90 day average sugar reviewed  Results for orders placed or performed in visit on 02/15/23   POC Glycosylated Hemoglobin (Hb A1C)    Specimen: Blood   Result Value Ref Range    Hemoglobin A1C 7.2 %    Lot Number [id number]     Expiration Date 11/03/2024    POC Glucose, Blood    Specimen: Blood   Result Value Ref Range    Glucose 229 (A) 70 - 130 mg/dL    Lot Number [id number]     Expiration Date 09/03/2023      Uncontrolled diabetes with hyperglycemia  Is utd with eye exam  No foot lesion today   Ur alb due nov   F/u 3-4 months  Change insulin dosing to prior to the meal

## 2023-02-19 ENCOUNTER — HOSPITAL ENCOUNTER (EMERGENCY)
Facility: HOSPITAL | Age: 53
Discharge: HOME | End: 2023-02-19
Attending: EMERGENCY MEDICINE | Admitting: EMERGENCY MEDICINE
Payer: MEDICARE

## 2023-02-19 VITALS
OXYGEN SATURATION: 97 % | RESPIRATION RATE: 16 BRPM | SYSTOLIC BLOOD PRESSURE: 142 MMHG | BODY MASS INDEX: 37.77 KG/M2 | HEART RATE: 84 BPM | TEMPERATURE: 98.7 F | DIASTOLIC BLOOD PRESSURE: 98 MMHG | HEIGHT: 66 IN | WEIGHT: 235 LBS

## 2023-02-19 DIAGNOSIS — Z86.59 HISTORY OF ANXIETY: ICD-10-CM

## 2023-02-19 DIAGNOSIS — Z86.39 HISTORY OF UNCONTROLLED DIABETES: ICD-10-CM

## 2023-02-19 DIAGNOSIS — Z86.39 HISTORY OF HYPOTHYROIDISM: ICD-10-CM

## 2023-02-19 DIAGNOSIS — E66.01 CLASS 3 SEVERE OBESITY DUE TO EXCESS CALORIES WITH SERIOUS COMORBIDITY AND BODY MASS INDEX (BMI) OF 45.0 TO 49.9 IN ADULT: ICD-10-CM

## 2023-02-19 DIAGNOSIS — R25.1 SHAKING: Primary | ICD-10-CM

## 2023-02-19 DIAGNOSIS — Z86.59 HISTORY OF DEPRESSION: ICD-10-CM

## 2023-02-19 PROCEDURE — 99283 EMERGENCY DEPT VISIT LOW MDM: CPT

## 2023-02-19 RX ORDER — BENZTROPINE MESYLATE 1 MG/1
0.5 TABLET ORAL ONCE
Status: COMPLETED | OUTPATIENT
Start: 2023-02-19 | End: 2023-02-19

## 2023-02-19 RX ADMIN — BENZTROPINE MESYLATE 0.5 MG: 1 TABLET ORAL at 22:31

## 2023-02-24 ENCOUNTER — TELEPHONE (OUTPATIENT)
Dept: ENDOCRINOLOGY | Facility: CLINIC | Age: 53
End: 2023-02-24
Payer: MEDICARE

## 2023-02-24 NOTE — TELEPHONE ENCOUNTER
Called Dr Corbin's office and spoke with Deann  She was unable to get Vanessa or Raven to the phone and states she will give them a message to call me back

## 2023-02-24 NOTE — TELEPHONE ENCOUNTER
Vanessa with Dr. Corbin's office called wanting to know if they could speak with Dr. Lopez nurse. They are wanting more information about patients insulin to help her decrease her ER visits. Vanessa also said we could speak with Dr. Corbin's nurse Raven if needed. Please advise.     Vanessa's office # [phone] or her cell # [phone]

## 2023-02-24 NOTE — TELEPHONE ENCOUNTER
Spoke with Raven from Dr Corbin's office.  They have their  involved with the patient trying to decrease her ER visits.   She was advised the patient needs to check her BS BID before breakfast and before dinner and to take her insulin before meals instead of after meals as she reported at last OV  She was also advised there is a covering physician available after hours in case her BS is elevated after office hours  She voiced understanding of all above and will call back if needed

## 2023-03-05 ENCOUNTER — HOSPITAL ENCOUNTER (EMERGENCY)
Facility: HOSPITAL | Age: 53
Discharge: HOME | End: 2023-03-05
Attending: EMERGENCY MEDICINE | Admitting: EMERGENCY MEDICINE
Payer: MEDICARE

## 2023-03-05 VITALS
SYSTOLIC BLOOD PRESSURE: 122 MMHG | BODY MASS INDEX: 46.13 KG/M2 | DIASTOLIC BLOOD PRESSURE: 104 MMHG | HEART RATE: 89 BPM | RESPIRATION RATE: 16 BRPM | WEIGHT: 235 LBS | HEIGHT: 60 IN | OXYGEN SATURATION: 94 % | TEMPERATURE: 97.6 F

## 2023-03-05 DIAGNOSIS — E11.649 HYPOGLYCEMIA ASSOCIATED WITH TYPE 2 DIABETES MELLITUS: Primary | ICD-10-CM

## 2023-03-05 LAB
GLUCOSE BLDC GLUCOMTR-MCNC: 130 MG/DL (ref 70–130)
GLUCOSE BLDC GLUCOMTR-MCNC: 64 MG/DL (ref 70–130)

## 2023-03-05 PROCEDURE — 99283 EMERGENCY DEPT VISIT LOW MDM: CPT

## 2023-03-05 PROCEDURE — 82962 GLUCOSE BLOOD TEST: CPT

## 2023-03-06 NOTE — ED PROVIDER NOTES
Subjective   History of Present Illness    Pt presents feeling shaky. Onset this evening. She has a recurrent problem with this and takes cogentin but tonight it didn't help so she became worried her sugar was low.  She has DM and apparently has labile sugars, is seen here fairly often for either high or low levels.  No fever, vomiting, diarrhea or other acute complaints. No recent med changes.    History provided by:  Patient      Review of Systems   Constitutional: Negative for fever.   Respiratory: Negative for cough.    Cardiovascular: Negative for chest pain.   Gastrointestinal: Negative for vomiting.   All other systems reviewed and are negative.      Past Medical History:   Diagnosis Date   • Anxiety    • Chicken pox    • Depression    • Diabetes mellitus (HCC)    • Disease of thyroid gland    • Measles    • Type 2 diabetes mellitus (HCC)        Allergies   Allergen Reactions   • Doxepin Rash   • Januvia [Sitagliptin] Other (See Comments)     Insomnia       Past Surgical History:   Procedure Laterality Date   • EYE SURGERY     • TONSILLECTOMY         Family History   Problem Relation Age of Onset   • Hypertension Other    • Thyroid disease Other    • Diabetes Other    • Obesity Other    • Diabetes Mother    • Hypertension Mother    • Heart disease Mother    • Cancer Father    • Diabetes Sister    • Diabetes Brother    • No Known Problems Brother        Social History     Socioeconomic History   • Marital status: Single   Tobacco Use   • Smoking status: Never   • Smokeless tobacco: Never   Vaping Use   • Vaping Use: Never used   Substance and Sexual Activity   • Alcohol use: No   • Drug use: No   • Sexual activity: Not Currently           Objective   Physical Exam  Vitals and nursing note reviewed.   Constitutional:       General: She is not in acute distress.     Appearance: Normal appearance. She is not ill-appearing.   HENT:      Head: Normocephalic and atraumatic.   Eyes:      General: No scleral icterus.         Right eye: No discharge.         Left eye: No discharge.      Conjunctiva/sclera: Conjunctivae normal.   Cardiovascular:      Rate and Rhythm: Normal rate.   Pulmonary:      Effort: Pulmonary effort is normal. No respiratory distress.   Musculoskeletal:         General: No swelling or deformity.      Cervical back: Normal range of motion and neck supple.   Skin:     General: Skin is dry.      Findings: No rash.   Neurological:      General: No focal deficit present.      Mental Status: She is alert. Mental status is at baseline.   Psychiatric:         Mood and Affect: Mood is anxious.         Behavior: Behavior normal.         Thought Content: Thought content normal.         Procedures           ED Course         FSBS 64, pt given a lunch box and it came up.  Her symptoms resolved.  Reviewed records, numerous similar presentations.  She is not on a sulfonylurea.    Patient stable on serial rechecks.  Discussed findings, concerns, plan of care, expected course, reasons to return and followup.  Provided the opportunity to ask questions.                                    Medical Decision Making  Hypoglycemia associated with type 2 diabetes mellitus (HCC): acute illness or injury  Amount and/or Complexity of Data Reviewed  Labs: ordered. Decision-making details documented in ED Course.          Final diagnoses:   Hypoglycemia associated with type 2 diabetes mellitus (HCC)       ED Disposition  ED Disposition     ED Disposition   Discharge    Condition   Stable    Comment   --             Ovi Corbin MD  [address] [place] [address] [phone]    Call in 1 day           Medication List      No changes were made to your prescriptions during this visit.          Hal Saucedo MD  03/06/23 0110

## 2023-03-16 ENCOUNTER — HOSPITAL ENCOUNTER (EMERGENCY)
Facility: HOSPITAL | Age: 53
Discharge: HOME | End: 2023-03-16
Attending: EMERGENCY MEDICINE | Admitting: EMERGENCY MEDICINE
Payer: MEDICARE

## 2023-03-16 VITALS
BODY MASS INDEX: 37.28 KG/M2 | HEART RATE: 77 BPM | TEMPERATURE: 98.2 F | WEIGHT: 232 LBS | OXYGEN SATURATION: 98 % | RESPIRATION RATE: 16 BRPM | DIASTOLIC BLOOD PRESSURE: 73 MMHG | SYSTOLIC BLOOD PRESSURE: 128 MMHG | HEIGHT: 66 IN

## 2023-03-16 DIAGNOSIS — E11.65 HYPERGLYCEMIA DUE TO DIABETES MELLITUS: Primary | ICD-10-CM

## 2023-03-16 DIAGNOSIS — Z86.39 HISTORY OF DIABETES MELLITUS: ICD-10-CM

## 2023-03-16 DIAGNOSIS — Z86.59 HISTORY OF DEPRESSION: ICD-10-CM

## 2023-03-16 DIAGNOSIS — Z86.59 HISTORY OF ANXIETY: ICD-10-CM

## 2023-03-16 LAB
GLUCOSE BLDC GLUCOMTR-MCNC: 232 MG/DL (ref 70–130)
GLUCOSE BLDC GLUCOMTR-MCNC: 271 MG/DL (ref 70–130)
HOLD SPECIMEN: NORMAL
HOLD SPECIMEN: NORMAL
WHOLE BLOOD HOLD COAG: NORMAL
WHOLE BLOOD HOLD SPECIMEN: NORMAL

## 2023-03-16 PROCEDURE — 82962 GLUCOSE BLOOD TEST: CPT

## 2023-03-16 PROCEDURE — 99283 EMERGENCY DEPT VISIT LOW MDM: CPT

## 2023-03-16 RX ORDER — SODIUM CHLORIDE 0.9 % (FLUSH) 0.9 %
10 SYRINGE (ML) INJECTION AS NEEDED
Status: DISCONTINUED | OUTPATIENT
Start: 2023-03-16 | End: 2023-03-17 | Stop reason: HOSPADM

## 2023-03-17 ENCOUNTER — TELEPHONE (OUTPATIENT)
Dept: ENDOCRINOLOGY | Facility: CLINIC | Age: 53
End: 2023-03-17
Payer: MEDICARE

## 2023-03-17 LAB — HOLD SPECIMEN: NORMAL

## 2023-03-17 NOTE — TELEPHONE ENCOUNTER
PATIENT IS HAVING ISSUES WITH HIGH BLOOD SUGAR BEFORE DINNER TIME WHICH , SHE WOULD LIKE TO BE ADVISED WHAT TO DO ABOUT HIGH BLOOD SUGARS. PHONE NUMBER [phone]

## 2023-03-17 NOTE — TELEPHONE ENCOUNTER
Spoke with patient.  She is taking Novolog 70/30 35 units with breakfast and 14 units with dinner.  She reports taking her insulin after she eats instead of before eating.  She has been advised by Dr. Lopez to take before eating per last office note.    Frequent ED visits for hyperglycemia in 200s and 300s, one recent ED visit 10 days ago for mild hypoglycemia.  She reports that her blood sugar was 400 yesterday evening 3/16/2023.  She presented to ED at [facility].  Fingerstick BG was 271.  She reports blood sugar was 389 this morning.  Blood sugar was 350 this evening at 5 PM.  She reports that she ate dinner at 4 PM.  She resides in assisted living apartment ([facility]) and meals are prepared for her.  Advised to take 19 units of Novolog 70/30 this evening instead of her usual 14 units.  Encouraged to take insulin right before meals.  She reports that her PCP prescribed Humalog 2 units if blood sugar was >300.  She reports that she has not taken this yet as she wanted to discuss with Dr. Lopez.  Will defer to Dr. Lopez for recommendation on Monday.  Advised to continue current doses of Novolog 70/30 and call with blood sugars next week.

## 2023-03-20 ENCOUNTER — APPOINTMENT (OUTPATIENT)
Dept: GENERAL RADIOLOGY | Facility: HOSPITAL | Age: 53
End: 2023-03-20
Payer: MEDICARE

## 2023-03-20 VITALS
SYSTOLIC BLOOD PRESSURE: 135 MMHG | TEMPERATURE: 98 F | WEIGHT: 232 LBS | RESPIRATION RATE: 16 BRPM | BODY MASS INDEX: 37.28 KG/M2 | HEIGHT: 66 IN | DIASTOLIC BLOOD PRESSURE: 78 MMHG | HEART RATE: 78 BPM | OXYGEN SATURATION: 98 %

## 2023-03-20 LAB
BASOPHILS # BLD AUTO: 0.04 10*3/MM3 (ref 0–0.2)
BASOPHILS NFR BLD AUTO: 0.3 % (ref 0–1.5)
DEPRECATED RDW RBC AUTO: 44.9 FL (ref 37–54)
EOSINOPHIL # BLD AUTO: 0.36 10*3/MM3 (ref 0–0.4)
EOSINOPHIL NFR BLD AUTO: 2.9 % (ref 0.3–6.2)
ERYTHROCYTE [DISTWIDTH] IN BLOOD BY AUTOMATED COUNT: 14 % (ref 12.3–15.4)
HCT VFR BLD AUTO: 36.6 % (ref 34–46.6)
HGB BLD-MCNC: 12 G/DL (ref 12–15.9)
IMM GRANULOCYTES # BLD AUTO: 0.04 10*3/MM3 (ref 0–0.05)
IMM GRANULOCYTES NFR BLD AUTO: 0.3 % (ref 0–0.5)
LYMPHOCYTES # BLD AUTO: 4.19 10*3/MM3 (ref 0.7–3.1)
LYMPHOCYTES NFR BLD AUTO: 33.2 % (ref 19.6–45.3)
MCH RBC QN AUTO: 29 PG (ref 26.6–33)
MCHC RBC AUTO-ENTMCNC: 32.8 G/DL (ref 31.5–35.7)
MCV RBC AUTO: 88.4 FL (ref 79–97)
MONOCYTES # BLD AUTO: 0.58 10*3/MM3 (ref 0.1–0.9)
MONOCYTES NFR BLD AUTO: 4.6 % (ref 5–12)
NEUTROPHILS NFR BLD AUTO: 58.7 % (ref 42.7–76)
NEUTROPHILS NFR BLD AUTO: 7.42 10*3/MM3 (ref 1.7–7)
NRBC BLD AUTO-RTO: 0 /100 WBC (ref 0–0.2)
PLATELET # BLD AUTO: 311 10*3/MM3 (ref 140–450)
PMV BLD AUTO: 10 FL (ref 6–12)
RBC # BLD AUTO: 4.14 10*6/MM3 (ref 3.77–5.28)
TROPONIN T SERPL HS-MCNC: 12 NG/L
WBC NRBC COR # BLD: 12.63 10*3/MM3 (ref 3.4–10.8)

## 2023-03-20 PROCEDURE — 99283 EMERGENCY DEPT VISIT LOW MDM: CPT

## 2023-03-20 PROCEDURE — 84484 ASSAY OF TROPONIN QUANT: CPT | Performed by: EMERGENCY MEDICINE

## 2023-03-20 PROCEDURE — 93005 ELECTROCARDIOGRAM TRACING: CPT | Performed by: EMERGENCY MEDICINE

## 2023-03-20 PROCEDURE — 72040 X-RAY EXAM NECK SPINE 2-3 VW: CPT

## 2023-03-20 PROCEDURE — 71045 X-RAY EXAM CHEST 1 VIEW: CPT

## 2023-03-20 PROCEDURE — 36415 COLL VENOUS BLD VENIPUNCTURE: CPT

## 2023-03-20 PROCEDURE — 85025 COMPLETE CBC W/AUTO DIFF WBC: CPT | Performed by: EMERGENCY MEDICINE

## 2023-03-20 RX ORDER — ACETAMINOPHEN 500 MG
1000 TABLET ORAL ONCE
Status: COMPLETED | OUTPATIENT
Start: 2023-03-20 | End: 2023-03-20

## 2023-03-20 RX ADMIN — ACETAMINOPHEN 1000 MG: 500 TABLET ORAL at 23:06

## 2023-03-20 NOTE — TELEPHONE ENCOUNTER
VO from Dr Lopez:   Ok to take the lispro kwikpen insulin 2 units at lunch and bedtime if blood sugar is over 200  Cautioned her she cannot take the lispro at the same time she takes the 70/30 and she voiced understanding

## 2023-03-21 ENCOUNTER — HOSPITAL ENCOUNTER (EMERGENCY)
Facility: HOSPITAL | Age: 53
Discharge: HOME | End: 2023-03-21
Attending: EMERGENCY MEDICINE | Admitting: EMERGENCY MEDICINE
Payer: MEDICARE

## 2023-03-21 DIAGNOSIS — M62.838 MUSCLE SPASM: ICD-10-CM

## 2023-03-21 DIAGNOSIS — S16.1XXA NECK STRAIN, INITIAL ENCOUNTER: Primary | ICD-10-CM

## 2023-03-21 DIAGNOSIS — M54.12 CERVICAL RADICULOPATHY: ICD-10-CM

## 2023-03-21 LAB
QT INTERVAL: 404 MS
QTC INTERVAL: 436 MS

## 2023-03-21 RX ORDER — CYCLOBENZAPRINE HCL 10 MG
5 TABLET ORAL ONCE
Status: COMPLETED | OUTPATIENT
Start: 2023-03-21 | End: 2023-03-21

## 2023-03-21 RX ADMIN — CYCLOBENZAPRINE 5 MG: 10 TABLET, FILM COATED ORAL at 02:29

## 2023-03-21 NOTE — ED PROVIDER NOTES
EMERGENCY DEPARTMENT ENCOUNTER    Pt Name: Vilma Ayala  MRN: [id number]  Pt :   1970  Room Number:  ADELINE/ADELINE  Date of encounter:  3/20/2023  PCP: Ovi Corbin MD  ED Provider: Gato Chun MD    Historian: Patient      HPI:  Chief Complaint: Left arm aching        Context: Vilma Ayala is a 52 y.o. female who presents to the ED c/o left arm aching and paresthesias, the pain is located from the shoulder down the whole arm anterior and posteriorly.  It is not associated with weakness or lack of sensation although with the paresthesias it does feel tingly.  She awoke from sleep with this this morning.  She does feel some neck soreness with this as well.  Does not have chest pain or shortness of breath.      PAST MEDICAL HISTORY  Past Medical History:   Diagnosis Date   • Anxiety    • Chicken pox    • Depression    • Diabetes mellitus (HCC)    • Disease of thyroid gland    • Measles    • Type 2 diabetes mellitus (HCC)          PAST SURGICAL HISTORY  Past Surgical History:   Procedure Laterality Date   • EYE SURGERY     • TONSILLECTOMY           FAMILY HISTORY  Family History   Problem Relation Age of Onset   • Hypertension Other    • Thyroid disease Other    • Diabetes Other    • Obesity Other    • Diabetes Mother    • Hypertension Mother    • Heart disease Mother    • Cancer Father    • Diabetes Sister    • Diabetes Brother    • No Known Problems Brother          SOCIAL HISTORY  Social History     Socioeconomic History   • Marital status: Single   Tobacco Use   • Smoking status: Never   • Smokeless tobacco: Never   Vaping Use   • Vaping Use: Never used   Substance and Sexual Activity   • Alcohol use: No   • Drug use: No   • Sexual activity: Not Currently         ALLERGIES  Doxepin and Januvia [sitagliptin]        REVIEW OF SYSTEMS  Review of Systems       All systems reviewed and negative except for those discussed in HPI.       PHYSICAL EXAM    I have reviewed the triage vital  signs and nursing notes.    ED Triage Vitals   Temp Heart Rate Resp BP SpO2   03/20/23 2049 03/20/23 2049 03/20/23 2045 03/20/23 2049 03/20/23 2049   98 °F (36.7 °C) 78 18 135/78 98 %      Temp src Heart Rate Source Patient Position BP Location FiO2 (%)   03/20/23 2045 03/20/23 2045 03/20/23 2049 03/20/23 2049 --   Oral Monitor Sitting Right arm        Physical Exam  GENERAL:   Appears in no acute distress.   HENT: Nares patent.  EYES: No scleral icterus.  CV: Regular rhythm, regular rate.  RESPIRATORY: Normal effort.  No audible wheezes, rales or rhonchi.  ABDOMEN: Soft, nontender  MUSCULOSKELETAL: No deformities.  Tender at the left paracervical spinal muscles and shoulder, she says this reproduces her pain of presentation, does have intact function of the left arm including flexion extension, and extension of the fingers, intact sensation volar and dorsal hand and arm.  NEURO: Alert, moves all extremities, follows commands.  SKIN: Warm, dry, no rash visualized.      LAB RESULTS  Recent Results (from the past 24 hour(s))   Single High Sensitivity Troponin T    Collection Time: 03/20/23  9:32 PM    Specimen: Blood   Result Value Ref Range    HS Troponin T 12 (H) <10 ng/L   CBC Auto Differential    Collection Time: 03/20/23  9:32 PM    Specimen: Blood   Result Value Ref Range    WBC 12.63 (H) 3.40 - 10.80 10*3/mm3    RBC 4.14 3.77 - 5.28 10*6/mm3    Hemoglobin 12.0 12.0 - 15.9 g/dL    Hematocrit 36.6 34.0 - 46.6 %    MCV 88.4 79.0 - 97.0 fL    MCH 29.0 26.6 - 33.0 pg    MCHC 32.8 31.5 - 35.7 g/dL    RDW 14.0 12.3 - 15.4 %    RDW-SD 44.9 37.0 - 54.0 fl    MPV 10.0 6.0 - 12.0 fL    Platelets 311 140 - 450 10*3/mm3    Neutrophil % 58.7 42.7 - 76.0 %    Lymphocyte % 33.2 19.6 - 45.3 %    Monocyte % 4.6 (L) 5.0 - 12.0 %    Eosinophil % 2.9 0.3 - 6.2 %    Basophil % 0.3 0.0 - 1.5 %    Immature Grans % 0.3 0.0 - 0.5 %    Neutrophils, Absolute 7.42 (H) 1.70 - 7.00 10*3/mm3    Lymphocytes, Absolute 4.19 (H) 0.70 - 3.10  10*3/mm3    Monocytes, Absolute 0.58 0.10 - 0.90 10*3/mm3    Eosinophils, Absolute 0.36 0.00 - 0.40 10*3/mm3    Basophils, Absolute 0.04 0.00 - 0.20 10*3/mm3    Immature Grans, Absolute 0.04 0.00 - 0.05 10*3/mm3    nRBC 0.0 0.0 - 0.2 /100 WBC   ECG 12 Lead Chest Pain    Collection Time: 03/20/23 11:04 PM   Result Value Ref Range    QT Interval 404 ms    QTC Interval 436 ms       If labs were ordered, I independently reviewed the results and considered them in treating the patient.        RADIOLOGY  XR Spine Cervical 2 or 3 View    Result Date: 3/20/2023  XR SPINE CERVICAL 2 OR 3 VW Date of Exam: 3/20/2023 9:49 PM EDT Indication: neck/ arm pain. Comparison: None available. Findings: The cervical spine is visualized down to the C7 vertebral body on the lateral view. There is mild straightening of the normal cervical lordosis without traumatic subluxation or significant spondylolisthesis. No acute fracture. The vertebral body heights are grossly maintained. There is moderate degenerative disc disease at C6-C7 with intervertebral disc space narrowing and degenerative endplate changes including anterior vertebral body osteophyte and posterior disc osteophyte complex. There is more mild  appearing degenerative disc disease above this level. There is minimal facet arthropathy in the mid cervical spine. Normal appearance of the dens and atlantoaxial articulation on open-mouth view. The paravertebral soft tissues are normal. No prevertebral soft tissue swelling. Partially imaged upper chest is grossly clear.     Impression: No acute fracture or traumatic malalignment. Moderate degenerative disc disease at C6-C7. Electronically Signed: Mike Morales  3/20/2023 11:05 PM EDT  Workstation ID: [id number]    XR Chest 1 View    Result Date: 3/20/2023  XR CHEST 1 VW Date of Exam: 3/20/2023 9:49 PM EDT Indication: neck/ arm pain. Comparison: May 19, 2021 Findings: There are no airspace consolidations. No pleural fluid. No  pneumothorax. The pulmonary vasculature appears within normal limits. The cardiac and mediastinal silhouette appear unremarkable. No acute osseous abnormality identified.     Impression: No active disease Electronically Signed: Chandra Marino  3/20/2023 10:49 PM EDT  Workstation ID: [id number]      I ordered and independently reviewed the above noted radiographic studies.      I viewed images of chest x-ray which showed no acute process per my independent interpretation.    See radiologist's dictation for official interpretation.        PROCEDURES    Procedures    ECG 12 Lead Chest Pain   Final Result   Test Reason : Chest Pain   Blood Pressure :   */*   mmHG   Vent. Rate :  70 BPM     Atrial Rate :  70 BPM      P-R Int : 186 ms          QRS Dur :  80 ms       QT Int : 404 ms       P-R-T Axes :  30  -6 -10 degrees      QTc Int : 436 ms      Normal sinus rhythm   Normal ECG   When compared with ECG of 14-MAY-2022 21:40,   No significant change was found   Confirmed by ANGELITO MORGAN ([id number]) on 3/21/2023 1:03:28 AM      Referred By:            Confirmed By: ANGELITO MORGAN          MEDICATIONS GIVEN IN ER    Medications   acetaminophen (TYLENOL) tablet 1,000 mg (1,000 mg Oral Given 3/20/23 0866)   cyclobenzaprine (FLEXERIL) tablet 5 mg (5 mg Oral Given 3/21/23 6789)         MEDICAL DECISION MAKING, PROGRESS, and CONSULTS    All labs have been independently reviewed by me.  All radiology studies have been reviewed by me and the radiologist dictating the report.  All EKG's have been independently viewed and interpreted by me.      Discussion below represents my analysis of pertinent findings related to patient's condition, differential diagnosis, treatment plan and final disposition.      Differential diagnosis:    Differential diagnosis with left arm pain, for 8 hours, includes cervical strain, radial nerve palsy or neuropathy type syndrome after falling asleep on her arm, diabetic with occult chest pain presentation was  considered as well.          Orders placed during this visit:  Orders Placed This Encounter   Procedures   • XR Chest 1 View   • XR Spine Cervical 2 or 3 View   • Single High Sensitivity Troponin T   • CBC Auto Differential   • ECG 12 Lead Chest Pain   • CBC & Differential         Additional orders considered but not ordered:      ED Course:    Consultants:      ED Course as of 03/21/23 1726   Tue Mar 21, 2023   0057 I did discuss the troponin of 12 with her, she has had arm pain all day, associated with movements and musculoskeletal.  She does not have any chest pain.  She has seen cardiology, within the past 2 years [TA]   0100 Pain of presentation related per history and exam as musculoskeletal or radial nerve type palsy with pain in the left arm after sleeping on the arm and waking with the pain and paresthesia symptoms , but has not neurologic deficits here today.  With left arm complaint a cardiac screening evaluation was done in the ED and does not show acute cardiac ischemia on ED screening today. [TA]      ED Course User Index  [TA] Gato Chun MD                  AS OF 17:26 EDT VITALS:    BP - 135/78  HR - 78  TEMP - 98 °F (36.7 °C) (Oral)  O2 SATS - 98%                  DIAGNOSIS  Final diagnoses:   Neck strain, initial encounter   Cervical radiculopathy   Muscle spasm         DISPOSITION  DISCHARGE    Patient discharged in stable condition.    Reviewed implications of results, diagnosis, meds, responsibility to follow up, warning signs and symptoms of possible worsening, potential complications and reasons to return to ER.    Patient/Family voiced understanding of above instructions.    Discussed plan for discharge, as there is no emergent indication for admission.  Pt/family is agreeable and understands need for follow up and possible repeat testing.  Pt/family is aware that discharge does not mean that nothing is wrong but that it indicates no emergency is currently present that requires  admission and they must continue care with follow-up as given below or with a physician of their choice.     FOLLOW-UP  Ovi Corbin MD  [address] [address]  [phone]      Follow-up with your primary care physician, I do recommend rest, fluids, and symptomatic treatment.  If you experience any different symptoms that might indicate another problem please return to the ER for recheck and reevaluation.    [facility] Emergency Department  [address] [place] [id number] [phone]    Return to the ED if any new or worsening symptoms of concern.         Medication List      No changes were made to your prescriptions during this visit.             Please note that portions of this document were completed with voice recognition software.      Gato Chun MD  03/21/23 9915

## 2023-03-27 ENCOUNTER — HOSPITAL ENCOUNTER (EMERGENCY)
Facility: HOSPITAL | Age: 53
Discharge: HOME | End: 2023-03-27
Attending: EMERGENCY MEDICINE | Admitting: EMERGENCY MEDICINE
Payer: MEDICARE

## 2023-03-27 VITALS
HEIGHT: 66 IN | RESPIRATION RATE: 16 BRPM | HEART RATE: 73 BPM | OXYGEN SATURATION: 100 % | DIASTOLIC BLOOD PRESSURE: 84 MMHG | SYSTOLIC BLOOD PRESSURE: 143 MMHG | TEMPERATURE: 98.7 F | WEIGHT: 232 LBS | BODY MASS INDEX: 37.28 KG/M2

## 2023-03-27 DIAGNOSIS — M25.512 LEFT SHOULDER PAIN, UNSPECIFIED CHRONICITY: ICD-10-CM

## 2023-03-27 DIAGNOSIS — M54.12 CERVICAL RADICULOPATHY: Primary | ICD-10-CM

## 2023-03-27 LAB
QT INTERVAL: 390 MS
QTC INTERVAL: 435 MS

## 2023-03-27 PROCEDURE — 93005 ELECTROCARDIOGRAM TRACING: CPT | Performed by: NURSE PRACTITIONER

## 2023-03-27 PROCEDURE — 99283 EMERGENCY DEPT VISIT LOW MDM: CPT

## 2023-03-27 RX ORDER — METHOCARBAMOL 500 MG/1
500 TABLET, FILM COATED ORAL 4 TIMES DAILY PRN
Qty: 20 TABLET | Refills: 0 | Status: SHIPPED | OUTPATIENT
Start: 2023-03-27

## 2023-03-27 RX ORDER — ACETAMINOPHEN 500 MG
1000 TABLET ORAL ONCE
Status: COMPLETED | OUTPATIENT
Start: 2023-03-27 | End: 2023-03-27

## 2023-03-27 RX ADMIN — ACETAMINOPHEN 1000 MG: 500 TABLET ORAL at 16:09

## 2023-03-27 NOTE — ED PROVIDER NOTES
EMERGENCY DEPARTMENT ENCOUNTER    Pt Name: Vilma Ayala  MRN: [id number]  Pt :   1970  Room Number:  24SF/24  Date of encounter:  3/27/2023  PCP: Ovi Corbin MD  ED Provider: ZIA Davis    Historian: Patient    HPI:  Chief Complaint: Lt shoulder pain.    Context: Vilma Ayala is a 52 y.o. female who presents to the ED c/o Lt shoulder pain.  Pt has had has had left shoulder plain for over a week.  Patient reports she has been taking ibuprofen however when the medication wears off her pain returns.  Patient denies any chest pain or shortness of breath.  She denies any injury.  HPI     REVIEW OF SYSTEMS  A chief complaint appropriate review of systems was completed and is negative except as noted in the HPI.     PAST MEDICAL HISTORY  Past Medical History:   Diagnosis Date   • Anxiety    • Chicken pox    • Depression    • Diabetes mellitus (HCC)    • Disease of thyroid gland    • Measles    • Type 2 diabetes mellitus (HCC)        PAST SURGICAL HISTORY  Past Surgical History:   Procedure Laterality Date   • EYE SURGERY     • TONSILLECTOMY         FAMILY HISTORY  Family History   Problem Relation Age of Onset   • Hypertension Other    • Thyroid disease Other    • Diabetes Other    • Obesity Other    • Diabetes Mother    • Hypertension Mother    • Heart disease Mother    • Cancer Father    • Diabetes Sister    • Diabetes Brother    • No Known Problems Brother        SOCIAL HISTORY  Social History     Socioeconomic History   • Marital status: Single   Tobacco Use   • Smoking status: Never   • Smokeless tobacco: Never   Vaping Use   • Vaping Use: Never used   Substance and Sexual Activity   • Alcohol use: No   • Drug use: No   • Sexual activity: Not Currently       ALLERGIES  Doxepin and Januvia [sitagliptin]    PHYSICAL EXAM  Physical Exam  GENERAL:   Appears in no acute distress.  Not ill-appearing, nontoxic  HENT: Nares patent.  EYES: No scleral icterus.  CV: Regular rhythm,  regular rate.  RESPIRATORY: Normal effort.  No audible wheezes, rales or rhonchi.  ABDOMEN: Soft, nontender  MUSCULOSKELETAL: No deformities.  Left shoulder: Mild anterior reproducible tenderness no crepitus full range of motion.  NEURO: Alert, moves all extremities, follows commands.  SKIN: Warm, dry, no rash visualized.  PSYCH:   I have reviewed the triage vital signs and nursing notes.    Physical Exam     LAB RESULTS  Results for orders placed or performed during the hospital encounter of 03/27/23   ECG 12 Lead Other; Lt arm pain   Result Value Ref Range    QT Interval 390 ms    QTC Interval 435 ms       If labs were ordered, I independently reviewed the results and considered them in treating the patient.    RADIOLOGY  No orders to display     [] Radiologist's Report Reviewed:  I ordered and independently reviewed the above noted radiographic studies.  See radiologist's dictation for official interpretation.      PROCEDURES    Procedures    ECG 12 Lead Other; Lt arm pain   Final Result   Test Reason : Other~   Blood Pressure :   */*   mmHG   Vent. Rate :  75 BPM     Atrial Rate :  75 BPM      P-R Int : 164 ms          QRS Dur :  80 ms       QT Int : 390 ms       P-R-T Axes : 115  -5  -3 degrees      QTc Int : 435 ms      Normal sinus rhythm   Nonspecific T wave abnormality   Abnormal ECG   When compared with ECG of 20-MAR-2023 23:04,   No significant change was found   Confirmed by YUE MEZA MD ([id number]) on 3/27/2023 8:23:42 PM      Referred By: EDMD           Confirmed By: YUE MEZA MD          MEDICATIONS GIVEN IN ER    Medications   acetaminophen (TYLENOL) tablet 1,000 mg (1,000 mg Oral Given 3/27/23 1609)       MEDICAL DECISION MAKING, PROGRESS, and CONSULTS   Medical Decision Making  Vilma Ayala is a 52 y.o. female who presents to the ED c/o Lt shoulder pain.  Pt has had has had left shoulder plain for over a week.  Patient reports she has been taking ibuprofen however when the medication  wears off her pain returns.  Patient denies any chest pain or shortness of breath.  She denies any injury.    Imaging reviewed at this time for the patient previous visit.  Patient had degenerative disc in her cervical spine.    Cervical radiculopathy: acute illness or injury     Details: Imaging reviewed from previous visit.  EKG was performed at this visit to rule out a STEMI.  We will discharge the patient home with Robaxin for muscle spasm.  Left shoulder pain, unspecified chronicity:     Details: pt to take tylenol and robaxin.   Amount and/or Complexity of Data Reviewed  ECG/medicine tests: ordered and independent interpretation performed. Decision-making details documented in ED Course.      Risk  OTC drugs.  Prescription drug management.          All labs have been independently reviewed by me.  All radiology studies have been reviewed by me and the radiologist dictating the report.  All EKG's have been independently viewed by me.    [] Discussed with radiology regarding test interpretation:    Discussion below represents my analysis of pertinent findings related to patient's condition, differential diagnosis, treatment plan and final disposition.    Differential diagnosis:  The differential diagnosis associated with the patient's presentation includes: Cervical radiculopathy, DJD, STEMI.     Additional sources  Discussed/ obtained information from independent historians:   [] Spouse  [] Parent  [] Family member  [] Friend  [] EMS   [] Other:  External (non-ED) record review:   [x] Inpatient record:   [] Office record:   [] Outpatient record:   [] Prior Outpatient labs:   [x] Prior Outpatient radiology:   [] Primary Care record:   [] Outside ED record:   [] Other:   Patient's care impacted by:   [x] Diabetes  [x] Hypertension  [] Hyperlipidemia  [] Hypothyroidism   [] Coronary Artery Disease   [] COPD   [] Cancer   [x] Obesity   [] Tobacco Abuse   [] Substance Abuse    [] Anxiety   [] Depression   [] Other:    Care significantly affected by Social Determinants of Health (housing and economic circumstances, unemployment)    [] Yes     [x] No   If yes, Patient's care significantly limited by  Social Determinants of Health including:   [] Inadequate housing   [] Low income   [] Alcoholism and drug addiction in family   [] Problems related to primary support group   [] Unemployment   [] Problems related to employment   [] Other Social Determinants of Health:     Shared decision making: After reviewing patient's documentation.  It appears that this is related to cervical radiculopathy.  Patient has degenerative disc in her cervical spine.  EKG was reviewed and normal.  No additional imaging will be performed today.  We will order the patient a dose of Tylenol and discharge her home with muscle relaxers.  Orders placed during this visit:  Orders Placed This Encounter   Procedures   • ECG 12 Lead Other; Lt arm pain       I considered prescription management  with:   [] Pain medication  [] Antiviral  [] Antibiotic   [] Other:   Rationale:  Additional orders considered but not ordered:  The following testing was considered but ultimately not selected after discussion with patient/family:  ED Course:    ED Course as of 03/31/23 1456   Mon Mar 27, 2023   1614 EKG reviewed.  Heart rate 75.  Normal sinus rhythm with no obvious signs of ischemia, injury or infarct.  I did compare it with patient's previous EKG no changes. [KG]      ED Course User Index  [KG] Jennifer Mcnally APRN            DIAGNOSIS  Final diagnoses:   Cervical radiculopathy   Left shoulder pain, unspecified chronicity       DISPOSITION    ED Disposition     ED Disposition   Discharge    Condition   Stable    Comment   --             Please note that portions of this document were completed with voice recognition software.        Jennifer Mcnally APRN  03/31/23 0896

## 2023-03-29 NOTE — ED PROVIDER NOTES
EMERGENCY DEPARTMENT ENCOUNTER    Pt Name: Vilma Ayala  MRN: [id number]  Pt :   1970  Room Number:    Date of encounter:  3/16/2023  PCP: Ovi Corbin MD  ED Provider: MANUELA Avila    Historian: Patient    HPI:  Chief Complaint: Hyperglycemia    Context: Vilma Ayala is a 52 y.o. female who presents to the ED c/o elevated blood sugar reading. Patient well known to myself and this ED for multiple presentations secondary to elevated blood sugar readings. Patient is Type 2 diabetic and lives in a group home where managing carbohydrate intake from meals is challenging. Patient reports after dinner this evening she checked her blood sugar and found it to be HIGH at 470. She administered her insulin as directed and presents to ED for further evaluation. No additional complaints on interview and exam.   HPI     REVIEW OF SYSTEMS  A chief complaint appropriate review of systems was completed and is negative except as noted in the HPI.     PAST MEDICAL HISTORY  Past Medical History:   Diagnosis Date   • Anxiety    • Chicken pox    • Depression    • Diabetes mellitus (HCC)    • Disease of thyroid gland    • Measles    • Type 2 diabetes mellitus (HCC)        PAST SURGICAL HISTORY  Past Surgical History:   Procedure Laterality Date   • EYE SURGERY     • TONSILLECTOMY         FAMILY HISTORY  Family History   Problem Relation Age of Onset   • Hypertension Other    • Thyroid disease Other    • Diabetes Other    • Obesity Other    • Diabetes Mother    • Hypertension Mother    • Heart disease Mother    • Cancer Father    • Diabetes Sister    • Diabetes Brother    • No Known Problems Brother        SOCIAL HISTORY  Social History     Socioeconomic History   • Marital status: Single   Tobacco Use   • Smoking status: Never   • Smokeless tobacco: Never   Vaping Use   • Vaping Use: Never used   Substance and Sexual Activity   • Alcohol use: No   • Drug use: No   • Sexual activity: Not Currently        ALLERGIES  Doxepin and Januvia [sitagliptin]    PHYSICAL EXAM  Physical Exam  GENERAL:   Appears in no acute distress.  HENT: Nares patent.  EYES: No scleral icterus.  CV: Regular rhythm, regular rate.  RESPIRATORY: Normal effort.  No audible wheezes, rales or rhonchi.  ABDOMEN: Soft, nontender  MUSCULOSKELETAL: No deformities.   NEURO: Alert, moves all extremities, follows commands.  SKIN: Warm, dry, no rash visualized.  I have reviewed the triage vital signs and nursing notes.    Physical Exam     LAB RESULTS  Results for orders placed or performed during the hospital encounter of 03/16/23   POC Glucose Once    Specimen: Blood   Result Value Ref Range    Glucose 271 (H) 70 - 130 mg/dL   POC Glucose Once    Specimen: Blood   Result Value Ref Range    Glucose 232 (H) 70 - 130 mg/dL   Green Top (Gel)   Result Value Ref Range    Extra Tube Hold for add-ons.    Lavender Top   Result Value Ref Range    Extra Tube hold for add-on    Gold Top - SST   Result Value Ref Range    Extra Tube Hold for add-ons.    Gray Top   Result Value Ref Range    Extra Tube Hold for add-ons.    Light Blue Top   Result Value Ref Range    Extra Tube Hold for add-ons.        If labs were ordered, I independently reviewed the results and considered them in treating the patient.    RADIOLOGY  No orders to display     [] Radiologist's Report Reviewed:  I ordered and independently reviewed the above noted radiographic studies.  See radiologist's dictation for official interpretation.      PROCEDURES    Procedures    No orders to display       MEDICATIONS GIVEN IN ER    Medications - No data to display    MEDICAL DECISION MAKING, PROGRESS, and CONSULTS   Medical Decision Making  History of anxiety: chronic illness or injury  History of depression: chronic illness or injury  History of diabetes mellitus: chronic illness or injury  Hyperglycemia due to diabetes mellitus (HCC): acute illness or injury  Amount and/or Complexity of Data  Reviewed  Labs: ordered.        All labs have been independently reviewed by me.  All radiology studies have been reviewed by me and the radiologist dictating the report.  All EKG's have been independently viewed by me.    [] Discussed with radiology regarding test interpretation:    Discussion below represents my analysis of pertinent findings related to patient's condition, differential diagnosis, treatment plan and final disposition.    Differential diagnosis:  The differential diagnosis associated with the patient's presentation includes: DKA, HHS, sepsis    Additional sources  Discussed/ obtained information from independent historians:   [] Spouse  [] Parent  [] Family member  [] Friend  [] EMS   [] Other:  External (non-ED) record review:   [] Inpatient record:   [] Office record:   [x] Outpatient record:review of outpatient record with endo confirm history of diabetes and medication regimen   [] Prior Outpatient labs:   [] Prior Outpatient radiology:   [] Primary Care record:   [] Outside ED record:   [] Other:   Patient's care impacted by:   [x] Diabetes  [] Hypertension  [x] Hyperlipidemia  [x] Hypothyroidism   [] Coronary Artery Disease   [] COPD   [] Cancer   [x] Obesity   [] Tobacco Abuse   [] Substance Abuse    [x] Anxiety   [x] Depression   [] Other:   Care significantly affected by Social Determinants of Health (housing and economic circumstances, unemployment)    [x] Yes     [] No   If yes, Patient's care significantly limited by  Social Determinants of Health including:   [] Inadequate housing   [] Low income   [] Alcoholism and drug addiction in family   [] Problems related to primary support group   [] Unemployment   [] Problems related to employment   [x] Other Social Determinants of Health: Patient lives in a group home    Shared decision making:  I had a discussion with the patient/family regarding diagnosis, diagnostic results, treatment plan, and medications.  The patient/family indicated  understanding of these instructions.  I spent adequate time at the bedside preceding discharge necessary to personally discuss the aftercare instructions, giving patient education, providing explanations of the results of our evaluations/findings, and my decision making to assure that the patient/family understand the plan of care.  Time was allotted to answer questions at that time and throughout the ED course.  Emphasis was placed on timely follow-up after discharge.  I also discussed the potential for the development of an acute emergent condition requiring further evaluation, admission, or even surgical intervention. I discussed that we found nothing during the visit today indicating the need for further workup, admission, or the presence of an unstable medical condition.  I encouraged the patient to return to the emergency department immediately for ANY concerns, worsening, new complaints, or if symptoms persist and unable to seek follow-up in a timely fashion.  The patient/family expressed understanding and agreement with this plan.  The patient will follow-up with primary care and endocrinology for reevaluation.     Orders placed during this visit:  Orders Placed This Encounter   Procedures   • [place] Draw   • POC Glucose Once   • POC Glucose Once   • POC Glucose Once   • Green Top (Gel)   • Lavender Top   • Gold Top - SST   • Gray Top   • Light Blue Top         ED Course:    ED Course as of 03/29/23 1155   Thu Mar 16, 2023   7819 This patient is a 52 year old female, presenting with apparent acute hyperglycemia.. Considered DKA versus HHS, sepsis as possible etiologies of the patients current presentation. However, given the current history & physical, including current glucose level, the current presentation is consistent with acute, asymptomatic hyperglycemia. No indication for further workup at this time. At time of discharge disposition patient is afebrile, nontoxic appearing, vital signs stable and  able to maintain O2 sats of 98% on room air. Patient will be discharged home with symptomatic care and outpatient follow up.  [JG]      ED Course User Index  [JG] Luis Mariano PA            DIAGNOSIS  Final diagnoses:   Hyperglycemia due to diabetes mellitus (HCC)   History of anxiety   History of depression   History of diabetes mellitus       DISPOSITION    ED Disposition     ED Disposition   Discharge    Condition   Stable    Comment   --             Please note that portions of this document were completed with voice recognition software.      Luis Mariano PA  03/29/23 1152

## 2023-05-04 ENCOUNTER — TELEPHONE (OUTPATIENT)
Dept: ENDOCRINOLOGY | Facility: CLINIC | Age: 53
End: 2023-05-04
Payer: MEDICARE

## 2023-05-04 NOTE — TELEPHONE ENCOUNTER
PT CALLED REQUESTING RESPERIDONE TO BE SENT IN TO [facility] ON [address]

## 2023-05-04 NOTE — TELEPHONE ENCOUNTER
Spoke with patient.  She states that her psychiatrist fills this medication and she will contact their office.

## 2023-05-05 ENCOUNTER — OFFICE VISIT (OUTPATIENT)
Dept: PULMONOLOGY | Facility: CLINIC | Age: 53
End: 2023-05-05
Payer: MEDICARE

## 2023-05-05 VITALS
DIASTOLIC BLOOD PRESSURE: 88 MMHG | RESPIRATION RATE: 18 BRPM | HEART RATE: 74 BPM | WEIGHT: 234.25 LBS | OXYGEN SATURATION: 100 % | SYSTOLIC BLOOD PRESSURE: 120 MMHG | TEMPERATURE: 96.8 F | BODY MASS INDEX: 37.65 KG/M2 | HEIGHT: 66 IN

## 2023-05-05 DIAGNOSIS — R60.0 LOWER EXTREMITY EDEMA: ICD-10-CM

## 2023-05-05 DIAGNOSIS — J45.20 MILD INTERMITTENT ASTHMA WITHOUT COMPLICATION: Primary | ICD-10-CM

## 2023-05-05 NOTE — PROGRESS NOTES
"Follow Up Office Note       Patient Name: Vilma Ayala    Referring Physician: No ref. provider found    Chief Complaint:    Chief Complaint   Patient presents with   • Mild Intermittent Asthma w/out Complication     F/u       History of Present Illness: Vilma Ayala is a 52 y.o. female who is here today to follow-up care with Pulmonary.  Patient has a past medical history significant for obesity, diabetes mellitus type 2, and anxiety.  Patient denies any chest pain, nausea, fever, or chills.  Breathing overall stable most of her issues have been related to her blood sugar.  Not using albuterol very much at this time.  No other acute complaints.    Review of Systems:   Review of Systems   Constitutional: Negative for chills, fatigue and fever.   HENT: Negative for congestion and voice change.    Eyes: Negative for blurred vision.   Respiratory: Negative for cough, shortness of breath and wheezing.    Cardiovascular: Negative for chest pain.   Skin: Negative for dry skin.   Hematological: Negative for adenopathy.   Psychiatric/Behavioral: Negative for agitation and depressed mood.       The following portions of the patient's history were reviewed and updated as appropriate: allergies, current medications, past family history, past medical history, past social history, past surgical history and problem list.    Physical Exam:  Vital Signs:   Vitals:    05/05/23 0925   BP: 120/88   BP Location: Right arm   Patient Position: Sitting   Cuff Size: Adult   Pulse: 74   Resp: 18   Temp: 96.8 °F (36 °C)   SpO2: 100%  Comment: room air at rest   Weight: 106 kg (234 lb 4 oz)   Height: 167.6 cm (65.98\")       Physical Exam  Vitals and nursing note reviewed.   Constitutional:       General: She is not in acute distress.     Appearance: She is well-developed. She is obese. She is not ill-appearing or toxic-appearing.   HENT:      Head: Normocephalic and atraumatic.   Cardiovascular:      Rate and Rhythm: Normal rate " and regular rhythm.      Pulses: Normal pulses.      Heart sounds: Normal heart sounds. No murmur heard.    No friction rub. No gallop.   Pulmonary:      Effort: Pulmonary effort is normal. No respiratory distress.      Breath sounds: Normal breath sounds. No wheezing, rhonchi or rales.   Musculoskeletal:      Right lower leg: Edema present.      Left lower leg: Edema present.   Skin:     General: Skin is warm and dry.   Neurological:      Mental Status: She is alert and oriented to person, place, and time.         Immunization History   Administered Date(s) Administered   • COVID-19 (MODERNA) 1st,2nd,3rd Dose Monovalent 05/06/2021, 06/05/2021, 01/04/2022   • COVID-19 (MODERNA) BIVALENT 12+YRS 10/26/2022   • Flu Vaccine Quad PF >36MO 10/04/2017, 12/29/2021   • FluLaval/Fluzone >6mos 10/04/2017, 12/29/2021, 10/26/2022   • Fluzone Quad >6mos (Multi-dose) 10/04/2015   • Hepatitis A 05/09/2018, 05/09/2018, 10/04/2018, 01/10/2020, 01/10/2020   • Influenza, Unspecified 10/04/2018, 10/26/2022   • Pneumococcal Polysaccharide (PPSV23) 05/28/2016, 05/28/2016   • Shingrix 12/29/2021   • flucelvax quad pfs =>4 YRS 10/04/2018       Results Review:   -I personally reviewed the patient's chest x-ray from 3/20/2023 which showed no acute cardiopulmonary process   - chest x-ray from 5/19/2021 showed no acute cardiopulmonary process.  - PFT from 5/19/2021 showed no obstruction or restriction.  There is a nonspecific reduction in FEV1 and FVC, test that was inadequate.  -I reviewed multiple ER visits over the course the last year mainly due to the fact that she has uncontrolled blood sugars.    Assessment / Plan:   Diagnoses and all orders for this visit:    1. Mild intermittent asthma without complication (Primary)  -Symptoms well controlled, continue to utilize albuterol on as-needed basis.    2.  Lower extremity edema  -Patient has 1+ lower extremity edema, she has no signs of overt heart failure at this point.  Blood pressure is  well controlled I would ultimately recommend that she work on cutting back her sodium, and she should work on a low-sodium diet.  We discussed foods that do cause increased sodium.  And to cut back what she can.  She verbalized understanding and agreed with the plan.  I do not think she needs any diuretics at this time.    Follow Up:   Return in about 1 year (around 5/5/2024).       JULIO CESAR Fall, DO  Pulmonary and Critical Care Medicine  Note Electronically Signed    Part of this note may be an electronic transcription/translation of spoken language to printed text using the Dragon Dictation System.

## 2023-05-15 RX ORDER — LEVOTHYROXINE SODIUM 0.2 MG/1
TABLET ORAL
Qty: 90 TABLET | Refills: 1 | Status: SHIPPED | OUTPATIENT
Start: 2023-05-15

## 2023-05-15 NOTE — TELEPHONE ENCOUNTER
Rx Refill Note    Requested Prescriptions     Pending Prescriptions Disp Refills   • levothyroxine (SYNTHROID, LEVOTHROID) 200 MCG tablet [Pharmacy Med Name: Levothyroxine Sodium 200 MCG Oral Tablet] 90 tablet 0     Sig: TAKE 1 TABLET BY MOUTH ONCE DAILY ALONG  WITH  25MCG  DOSE        Last office visit with prescribing clinician: 2/15/2023         Next office visit with prescribing clinician: 6/7/2023   {    Jayna Kapoor MA  05/15/23, 08:29 EDT

## 2023-05-18 ENCOUNTER — TELEPHONE (OUTPATIENT)
Dept: ENDOCRINOLOGY | Facility: CLINIC | Age: 53
End: 2023-05-18
Payer: MEDICARE

## 2023-05-18 RX ORDER — LEVOTHYROXINE SODIUM 0.03 MG/1
25 TABLET ORAL DAILY
Qty: 90 TABLET | Refills: 1 | Status: SHIPPED | OUTPATIENT
Start: 2023-05-18

## 2023-05-18 NOTE — TELEPHONE ENCOUNTER
Rx Refill Note    Requested Prescriptions      No prescriptions requested or ordered in this encounter        Last office visit with prescribing clinician: 2/15/2023       Next office visit with prescribing clinician: 6/7/2023   {    Jayna Kapoor MA  05/18/23, 15:42 EDT

## 2023-05-23 ENCOUNTER — HOSPITAL ENCOUNTER (EMERGENCY)
Facility: HOSPITAL | Age: 53
Discharge: HOME | End: 2023-05-24
Attending: EMERGENCY MEDICINE | Admitting: EMERGENCY MEDICINE
Payer: MEDICARE

## 2023-05-23 VITALS
HEIGHT: 69 IN | SYSTOLIC BLOOD PRESSURE: 119 MMHG | OXYGEN SATURATION: 99 % | BODY MASS INDEX: 34.36 KG/M2 | DIASTOLIC BLOOD PRESSURE: 67 MMHG | HEART RATE: 77 BPM | WEIGHT: 232 LBS | TEMPERATURE: 97.9 F | RESPIRATION RATE: 18 BRPM

## 2023-05-23 DIAGNOSIS — R73.9 HYPERGLYCEMIA: Primary | ICD-10-CM

## 2023-05-23 LAB — GLUCOSE BLDC GLUCOMTR-MCNC: 272 MG/DL (ref 70–130)

## 2023-05-23 PROCEDURE — 99283 EMERGENCY DEPT VISIT LOW MDM: CPT

## 2023-05-23 PROCEDURE — 82948 REAGENT STRIP/BLOOD GLUCOSE: CPT

## 2023-05-24 NOTE — ED PROVIDER NOTES
[facility]    EMERGENCY DEPARTMENT ENCOUNTER      Pt Name: Vilma Ayala  MRN: [id number]  YOB: 1970  Date of evaluation: 5/23/2023  Provider: Teddy Contreras MD    CHIEF COMPLAINT       Chief Complaint   Patient presents with   • Hyperglycemia         HISTORY OF PRESENT ILLNESS   Vilma Ayala is a 52 y.o. female who presents to the emergency department with complaint of hyperglycemia.  Patient states that she had a large meal for dinner tonight and subsequently checked her blood sugar and noted it to be in the 200s.  She has no symptoms whatsoever associated with this including no chest pain, shortness of breath, abdominal pain, vomiting, or diarrhea.      Nursing notes were reviewed.    REVIEW OF SYSTEMS     ROS:  A chief complaint appropriate review of systems was completed and is negative except as noted in the HPI.      PAST MEDICAL HISTORY     Past Medical History:   Diagnosis Date   • Anxiety    • Chicken pox    • Depression    • Diabetes mellitus    • Disease of thyroid gland    • Measles    • Type 2 diabetes mellitus          SURGICAL HISTORY       Past Surgical History:   Procedure Laterality Date   • EYE SURGERY     • TONSILLECTOMY           CURRENT MEDICATIONS     No current facility-administered medications for this encounter.    Current Outpatient Medications:   •  ammonium lactate (Lac-Hydrin) 12 % lotion, Apply BID to feet, Disp: 225 g, Rfl: 3  •  benztropine (COGENTIN) 0.5 MG tablet, Take 1 tablet by mouth As Needed., Disp: , Rfl:   •  Blood Glucose Monitoring Suppl w/Device kit, Use daily to test blood sugars, Disp: 1 each, Rfl: 0  •  busPIRone (BUSPAR) 10 MG tablet, Take 1 tablet by mouth Daily., Disp: 90 tablet, Rfl: 1  •  Cholecalciferol (VITAMIN D3 PO), Take 1 tablet by mouth Daily., Disp: , Rfl:   •  citalopram (CeleXA) 40 MG tablet, Take 1 tablet by mouth Daily., Disp: 90 tablet, Rfl: 1  •  glucose blood (Accu-Chek Compact Plus) test strip, PRN, Disp: 60 each, Rfl:  "5  •  glucose blood test strip, Use as instructed, Disp: 150 each, Rfl: 5  •  hydrOXYzine pamoate (VISTARIL) 50 MG capsule, , Disp: , Rfl:   •  ibuprofen (ADVIL,MOTRIN) 800 MG tablet, Take 1 tablet by mouth Every 8 (Eight) Hours As Needed for Mild Pain or Moderate Pain for up to 30 doses., Disp: 30 tablet, Rfl: 0  •  insulin aspart prot-insulin aspart (NovoLOG Mix 70/30) (70-30) 100 UNIT/ML injection, Inject 34 units before breakfast and 12 units before dinner, Disp: 20 mL, Rfl: 5  •  Insulin Syringe-Needle U-100 31G X 15/64\" 0.5 ML misc, Use 2 per day to administer insulin, Disp: 200 each, Rfl: 1  •  Lancets misc, Test blood sugars QID, Disp: 200 each, Rfl: 3  •  levothyroxine (SYNTHROID, LEVOTHROID) 200 MCG tablet, TAKE 1 TABLET BY MOUTH ONCE DAILY ALONG  WITH  25MCG  DOSE, Disp: 90 tablet, Rfl: 1  •  levothyroxine (SYNTHROID, LEVOTHROID) 25 MCG tablet, Take 1 tablet by mouth Daily., Disp: 90 tablet, Rfl: 1  •  Livalo 1 MG tablet tablet, , Disp: , Rfl:   •  metFORMIN (GLUCOPHAGE) 500 MG tablet, Take 2 tablets daily with food, Disp: 60 tablet, Rfl: 5  •  methocarbamol (ROBAXIN) 500 MG tablet, Take 1 tablet by mouth 4 (Four) Times a Day As Needed for Muscle Spasms., Disp: 20 tablet, Rfl: 0  •  ondansetron ODT (ZOFRAN-ODT) 4 MG disintegrating tablet, Place 1 tablet on the tongue 4 (Four) Times a Day As Needed for Nausea or Vomiting., Disp: 12 tablet, Rfl: 0  •  risperiDONE (risperDAL) 3 MG tablet, Take 1 tablet by mouth 2 (Two) Times a Day., Disp: , Rfl:   •  VENTOLIN  (90 Base) MCG/ACT inhaler, Inhale 2 puffs 4 (Four) Times a Day., Disp: , Rfl:     ALLERGIES     Doxepin and Januvia [sitagliptin]    FAMILY HISTORY       Family History   Problem Relation Age of Onset   • Hypertension Other    • Thyroid disease Other    • Diabetes Other    • Obesity Other    • Diabetes Mother    • Hypertension Mother    • Heart disease Mother    • Cancer Father    • Diabetes Sister    • Diabetes Brother    • No Known Problems " "Brother           SOCIAL HISTORY       Social History     Socioeconomic History   • Marital status: Single   Tobacco Use   • Smoking status: Never   • Smokeless tobacco: Never   Vaping Use   • Vaping Use: Never used   Substance and Sexual Activity   • Alcohol use: No   • Drug use: No   • Sexual activity: Not Currently         PHYSICAL EXAM    (up to 7 for level 4, 8 or more for level 5)     Vitals:    05/23/23 2310   BP: 119/67   BP Location: Right arm   Patient Position: Sitting   Pulse: 77   Resp: 18   Temp: 97.9 °F (36.6 °C)   TempSrc: Oral   SpO2: 99%   Weight: 105 kg (232 lb)   Height: 175.3 cm (69\")       General: Awake, alert, no acute distress.  HEENT: Conjunctivae normal.  Neck: Trachea midline.  Cardiac: Heart regular rate, rhythm, no murmurs, rubs, or gallops  Lungs: Lungs are clear to auscultation, there is no wheezing, rhonchi, or rales. There is no use of accessory muscles.  Chest wall: There is no tenderness to palpation over the chest wall or over ribs  Abdomen: Abdomen is soft, nontender, nondistended. There are no firm or pulsatile masses, no rebound rigidity or guarding.   Musculoskeletal: No deformity.  Neuro: Alert and oriented x 4.  Dermatology: Skin is warm and dry  Psych: Mentation is grossly normal, cognition is grossly normal. Affect is appropriate.        DIAGNOSTIC RESULTS     EKG: All EKGs are interpreted by the Emergency Department Physician who either signs or Co-signs this chart in the absence of a cardiologist.    No orders to display         RADIOLOGY:   [x] Radiologist's Report Reviewed:  No orders to display       I ordered and independently reviewed the above noted radiographic studies.        LABS:    I have reviewed and interpreted all of the currently available lab results from this visit (if applicable):  Results for orders placed or performed during the hospital encounter of 05/23/23   POC Glucose Once    Specimen: Blood   Result Value Ref Range    Glucose 272 (H) 70 - 130 " mg/dL        If labs were ordered, I independently reviewed the results and considered them in treating the patient.      EMERGENCY DEPARTMENT COURSE and DIFFERENTIAL DIAGNOSIS/MDM:   Vitals:  AS OF 06:36 EDT    BP - 119/67  HR - 77  TEMP - 97.9 °F (36.6 °C) (Oral)  O2 SATS - 99%        Discussion below represents my analysis of pertinent findings related to patient's condition, differential diagnosis, treatment plan and final disposition.      Differential diagnosis:  The differential diagnosis associated with the patient's presentation includes: Hyperglycemia      Additional sources:  Discussed/obtained information from independent historians:   [] Spouse:   [] Parent:   [] Friend:   [x] EMS: Report taken from EMS.  Vital signs stable during transport.   [] Other:  External (non-ED) record review:   [] Inpatient record:   [] Office record:   [] Outpatient record:   [] Prior Outpatient labs:   [] Prior Outpatient radiology:   [] Primary Care record:   [] Outside ED record:   [] Other:       Patient's care impacted by:   [x] Diabetes   [] Hypertension   [] Coronary Artery Disease   [] Cancer   [] Other:     Care significantly affected by Social Determinants of Health (housing and economic circumstances, unemployment)    [] Yes     [x] No   If yes, Patient's care significantly limited by  Social Determinants of Health including:    [] Inadequate housing    [] Low income    [] Alcoholism and drug addiction in family    [] Problems related to primary support group    [] Unemployment    [] Problems related to employment    [] Other Social Determinants of Health:       I had a discussion with the patient/family regarding diagnosis, diagnostic results, treatment plan, and medications.  The patient/family indicated understanding of these instructions.  I spent adequate time at the bedside preceding discharge necessary to personally discuss the aftercare instructions, giving patient education, providing explanations of the  results of our evaluations/findings, and my decision making to assure that the patient/family understand the plan of care.  Time was allotted to answer questions at that time and throughout the ED course.  Emphasis was placed on timely follow-up after discharge.  I also discussed the potential for the development of an acute emergent condition requiring further evaluation, admission, or even surgical intervention. I discussed that we found nothing during the visit today indicating the need for further workup, admission, or the presence of an unstable medical condition.  I encouraged the patient to return to the emergency department immediately for ANY concerns, worsening, new complaints, or if symptoms persist and unable to seek follow-up in a timely fashion.  The patient/family expressed understanding and agreement with this plan.  The patient will follow-up with their PCP in 1-2 days for reevaluation.           PROCEDURES:  Procedures    CRITICAL CARE TIME        FINAL IMPRESSION      1. Hyperglycemia          DISPOSITION/PLAN     ED Disposition     ED Disposition   Discharge    Condition   Stable    Comment   --               Comment: Please note this report has been produced using speech recognition software.      Teddy Contreras MD  Attending Emergency Physician           Teddy Contreras MD  05/25/23 0637

## 2023-09-18 RX ORDER — INSULIN ASPART 100 [IU]/ML
INJECTION, SUSPENSION SUBCUTANEOUS
Qty: 40 ML | Refills: 0 | Status: SHIPPED | OUTPATIENT
Start: 2023-09-18

## 2023-10-25 ENCOUNTER — TELEPHONE (OUTPATIENT)
Dept: ENDOCRINOLOGY | Facility: CLINIC | Age: 53
End: 2023-10-25
Payer: MEDICARE

## 2023-10-25 NOTE — TELEPHONE ENCOUNTER
Caller: JENNIFER     Relationship to patient:     Best call back number: [phone]    Patient is needing: NEEDING TO KNOW IF PT CAN BE REFERRED TO [facility] SINCE SHE HAS [employer] AND IS ON FIXED INCOME  PLEASE CALL TO ADVISE

## 2023-10-26 NOTE — TELEPHONE ENCOUNTER
Hadley was advised the referral will need to come from Dr Corbin and she voiced understanding and will call him for the referral

## 2023-12-15 RX ORDER — INSULIN ASPART 100 [IU]/ML
INJECTION, SUSPENSION SUBCUTANEOUS
Qty: 40 ML | Refills: 0 | OUTPATIENT
Start: 2023-12-15

## 2023-12-15 NOTE — TELEPHONE ENCOUNTER
Rx Refill Note    Requested Prescriptions     Pending Prescriptions Disp Refills    NovoLOG Mix 70/30 (70-30) 100 UNIT/ML injection [Pharmacy Med Name: NovoLOG Mix 70/30 (70-30) 100 UNIT/ML Subcutaneous Suspension] 40 mL 0     Sig: INJECT 34 UNITS SUBCUTANEOUSLY BEFORE BREAKFAST AND 12 BEFORE SUPPER        Last office visit with prescribing clinician: 2/15/2023     Next office visit with prescribing clinician: 12/28/2023    Refill denied - pt is not being seen by provider at  due to insurance

## 2023-12-28 ENCOUNTER — OFFICE VISIT (OUTPATIENT)
Dept: ENDOCRINOLOGY | Facility: CLINIC | Age: 53
End: 2023-12-28
Payer: MEDICARE

## 2023-12-28 VITALS
OXYGEN SATURATION: 100 % | HEART RATE: 81 BPM | BODY MASS INDEX: 33.77 KG/M2 | SYSTOLIC BLOOD PRESSURE: 112 MMHG | WEIGHT: 228 LBS | DIASTOLIC BLOOD PRESSURE: 70 MMHG | HEIGHT: 69 IN

## 2023-12-28 DIAGNOSIS — Z79.4 TYPE 2 DIABETES MELLITUS WITHOUT COMPLICATION, WITH LONG-TERM CURRENT USE OF INSULIN: Primary | ICD-10-CM

## 2023-12-28 DIAGNOSIS — E11.9 TYPE 2 DIABETES MELLITUS WITHOUT COMPLICATION, WITH LONG-TERM CURRENT USE OF INSULIN: Primary | ICD-10-CM

## 2023-12-28 DIAGNOSIS — E03.9 ACQUIRED HYPOTHYROIDISM: ICD-10-CM

## 2023-12-28 DIAGNOSIS — E78.2 MIXED HYPERLIPIDEMIA: ICD-10-CM

## 2023-12-28 LAB
EXPIRATION DATE: NORMAL
GLUCOSE BLDC GLUCOMTR-MCNC: 72 MG/DL (ref 70–130)
Lab: NORMAL

## 2023-12-28 RX ORDER — BLOOD-GLUCOSE SENSOR
1 EACH MISCELLANEOUS
Qty: 6 EACH | Refills: 1 | Status: SHIPPED | OUTPATIENT
Start: 2023-12-28

## 2023-12-28 NOTE — ASSESSMENT & PLAN NOTE
High LDL in part due to high TSH but should consider statin therapy if persistent elevation once tsh is normalized

## 2023-12-28 NOTE — ASSESSMENT & PLAN NOTE
Low sugar in office today - juice and pb crackers provided with repeat 82, gave to go sprite (6 oz) as well because she is riding wheels and it is going to be another hour prior to    Discussed timing of 70/30 insulin - should be prior to a meal   She has been getting up and taking her morning shot hours before she eats breakfast   She states she understands that she should be taking shot immediately prior to the meal in the morning and at dinner time in order to avoid low sugar associated with the short acting insulin in the premixed insulin she takes  Sensor would be a very good option for her but she states it is unaffordable  She plans to follow up at  and has an appt in the near future  Her nails were very overgrown today, her podiatrist has stopped trimming nails  Nail trim x 10 today in office (mann concern small toes, where nails are thick)  Reminded her to update eye exam  We can see her back prn and will cc note to upcoming provider   Samples of 70/30 vials provided today

## 2023-12-28 NOTE — ASSESSMENT & PLAN NOTE
Last TSH 20 at [facility]  Increase to 200 + 50 mcg - samples provided and new rx sent   She has a lot of trouble with medications and would definitely benefit from a   She has some diarrhea but overall denies symptoms c/w malabsorption

## 2023-12-28 NOTE — LETTER
December 28, 2023       No Recipients    Patient: Vilma Ayala   YOB: 1970   Date of Visit: 12/28/2023     Dear Addie Mario MD:       Thank you for taking over for Vilma Ayala.  Unfortunately her insurance does not permit us to continue to follow her and she was very confused about her appointments.  I have attached my note for her - she was not able to afford the prescribed sensor (although it would be very beneficial for her).  Hopefully her group home will be able to provide an assistant to help her coordinate her medication- she states she runs out of medications and has not means to get them at times (categorically denies this was the case with thyroid supplement but she was very muddled in the office about what she was missing).  WIth TSH of 20, I did bump her dose slightly to 250 mcg daily and reinforced timing / dosing.     Sincerely,    Selina Lopez MD    CC:   No Recipients    Selina Lopez MD  12/28/23 0838  Sign when Signing Visit  Vilma Ayala 53 y.o.  CC:Follow-up, Diabetes (Type II), and Hypothyroidism      [place]: Follow-up, Diabetes (Type II), and Hypothyroidism    Blood sugar reviewed from recent visit BSB - A1c 6.9%  She has sugar in office this morning of 70  She did not eat breakfast, took medication including insulin   Juice provided along with peanut butter crackers  Sensor prescribed at clinic but she could not afford her part- was going to be 200$  Higher chol with lab work at clinic - also higher TSH   Glucose meter not working well- reading high   Goes to [facility] [address]- discussed cost of sensors- would be 100$ for 90 days which she states is still outside her budget   She ran out of insulin the other day - tried to use only metformin but made situation worse  She had no way of getting insulin but group home is working on getting her a  to help with medications   She denies running out of thyroid medication  "and is taking it 1 hour prior to other meals and medications   Discussed increasing thyroid supplement to 250 mcg with repeat tfts through  Clinic at [facility]     Allergies   Allergen Reactions   • Doxepin Rash   • Januvia [Sitagliptin] Other (See Comments)     Insomnia       Current Outpatient Medications:   •  ammonium lactate (Lac-Hydrin) 12 % lotion, Apply BID to feet, Disp: 225 g, Rfl: 3  •  benztropine (COGENTIN) 0.5 MG tablet, Take 1 tablet by mouth As Needed., Disp: , Rfl:   •  Blood Glucose Monitoring Suppl w/Device kit, Use daily to test blood sugars, Disp: 1 each, Rfl: 0  •  busPIRone (BUSPAR) 10 MG tablet, Take 1 tablet by mouth Daily., Disp: 90 tablet, Rfl: 1  •  Cholecalciferol (VITAMIN D3 PO), Take 1 tablet by mouth Daily., Disp: , Rfl:   •  citalopram (CeleXA) 40 MG tablet, Take 1 tablet by mouth Daily., Disp: 90 tablet, Rfl: 1  •  Continuous Blood Gluc Sensor (FreeStyle Sukhi 3 Sensor) misc, Use 1 Units Every 14 (Fourteen) Days., Disp: 6 each, Rfl: 1  •  glucose blood test strip, Test blood sugars QID, Disp: 150 each, Rfl: 5  •  hydrOXYzine pamoate (VISTARIL) 50 MG capsule, , Disp: , Rfl:   •  ibuprofen (ADVIL,MOTRIN) 800 MG tablet, Take 1 tablet by mouth Every 8 (Eight) Hours As Needed for Mild Pain or Moderate Pain for up to 30 doses., Disp: 30 tablet, Rfl: 0  •  Insulin Syringe-Needle U-100 31G X 15/64\" 0.5 ML misc, Use 2 per day to administer insulin, Disp: 200 each, Rfl: 1  •  Lancets misc, Test blood sugars QID, Disp: 200 each, Rfl: 3  •  levothyroxine (SYNTHROID, LEVOTHROID) 200 MCG tablet, TAKE 1 TABLET BY MOUTH ONCE DAILY ALONG  WITH  25MCG  DOSE, Disp: 90 tablet, Rfl: 1  •  Livalo 1 MG tablet tablet, , Disp: , Rfl:   •  metFORMIN (GLUCOPHAGE) 500 MG tablet, Take 2 tablets daily with food, Disp: 60 tablet, Rfl: 5  •  NovoLOG Mix 70/30 (70-30) 100 UNIT/ML injection, INJECT 34 UNITS SUBCUTANEOUSLY BEFORE BREAKFAST AND 12 BEFORE SUPPER (Patient taking differently: INJECT 34 UNITS SUBCUTANEOUSLY " BEFORE BREAKFAST AND 11 BEFORE SUPPER), Disp: 40 mL, Rfl: 0  •  risperiDONE (risperDAL) 3 MG tablet, Take 1 tablet by mouth 2 (Two) Times a Day., Disp: , Rfl:   •  VENTOLIN  (90 Base) MCG/ACT inhaler, Inhale 2 puffs 4 (Four) Times a Day., Disp: , Rfl:   Patient Active Problem List    Diagnosis    • Decreased GFR [R94.4]    • Microscopic hematuria [R31.29]    • Nocturia [R35.1]    • Nocturnal enuresis [N39.44]    • Stress incontinence [N39.3]    • Urge incontinence [N39.41]    • Urinary incontinence [R32]    • Type 2 diabetes mellitus without complication, with long-term current use of insulin [E11.9, Z79.4]    • Mixed hyperlipidemia [E78.2]    • Anxiety and depression [F41.9, F32.A]    • Uncontrolled type 2 diabetes mellitus [[id number]]    • Hypothyroid [E03.9]    • Obesity [E66.9]    • Vitamin D deficiency, unspecified [E55.9]    • Diabetic neuropathy, type II diabetes mellitus [E11.40]      Review of Systems   Constitutional:  Positive for activity change. Negative for appetite change and unexpected weight change.   HENT:  Negative for congestion and rhinorrhea.    Eyes:  Negative for visual disturbance.   Respiratory:  Negative for cough and shortness of breath.    Cardiovascular:  Positive for leg swelling. Negative for palpitations.   Gastrointestinal:  Negative for constipation, diarrhea and nausea.   Genitourinary:  Negative for hematuria.   Musculoskeletal:  Positive for gait problem. Negative for arthralgias, back pain, joint swelling and myalgias.   Skin:  Negative for color change, rash and wound.   Allergic/Immunologic: Negative for environmental allergies, food allergies and immunocompromised state.   Neurological:  Negative for dizziness, weakness and light-headedness.   Psychiatric/Behavioral:  Negative for confusion, decreased concentration, dysphoric mood and sleep disturbance. The patient is not nervous/anxious.      Social History     Socioeconomic History   • Marital status: Single   Tobacco  "Use   • Smoking status: Never   • Smokeless tobacco: Never   Vaping Use   • Vaping Use: Never used   Substance and Sexual Activity   • Alcohol use: No   • Drug use: No   • Sexual activity: Not Currently     Family History   Problem Relation Age of Onset   • Hypertension Other    • Thyroid disease Other    • Diabetes Other    • Obesity Other    • Diabetes Mother    • Hypertension Mother    • Heart disease Mother    • Cancer Father    • Diabetes Sister    • Diabetes Brother    • No Known Problems Brother      /70   Pulse 81   Ht 175.3 cm (69\")   Wt 103 kg (228 lb)   LMP  (LMP Unknown)   SpO2 100%   BMI 33.67 kg/m²   Physical Exam  Vitals and nursing note reviewed.   Constitutional:       Appearance: Normal appearance. She is well-developed.   HENT:      Head: Normocephalic and atraumatic.   Eyes:      General: Lids are normal.      Extraocular Movements: Extraocular movements intact.      Conjunctiva/sclera: Conjunctivae normal.      Pupils: Pupils are equal, round, and reactive to light.   Neck:      Thyroid: No thyroid mass or thyromegaly.      Vascular: No carotid bruit.      Trachea: Trachea normal. No tracheal deviation.   Cardiovascular:      Rate and Rhythm: Normal rate and regular rhythm.      Heart sounds: Normal heart sounds. No murmur heard.     No friction rub. No gallop.   Pulmonary:      Effort: Pulmonary effort is normal. No respiratory distress.      Breath sounds: Normal breath sounds. No wheezing.   Musculoskeletal:         General: No deformity. Normal range of motion.      Cervical back: Normal range of motion and neck supple.        Feet:    Feet:      Right foot:      Skin integrity: Skin integrity normal.      Left foot:      Skin integrity: Skin integrity normal.   Lymphadenopathy:      Cervical: No cervical adenopathy.   Skin:     General: Skin is warm and dry.      Findings: No erythema or rash.      Nails: There is no clubbing.   Neurological:      General: No focal deficit " present.      Mental Status: She is alert and oriented to person, place, and time.      Cranial Nerves: No cranial nerve deficit.      Deep Tendon Reflexes: Reflexes are normal and symmetric. Reflexes normal.   Psychiatric:         Speech: Speech normal.         Behavior: Behavior normal.         Thought Content: Thought content normal.         Judgment: Judgment normal.       Results for orders placed or performed in visit on 12/28/23   POC Glucose, Blood    Specimen: Blood   Result Value Ref Range    Glucose 72 70 - 130 mg/dL    Lot Number [id number]     Expiration Date 06/30/2024      Diagnoses and all orders for this visit:    1. Type 2 diabetes mellitus without complication, with long-term current use of insulin (Primary)  Assessment & Plan:  Low sugar in office today - juice and pb crackers provided with repeat 82, gave to go sprite (6 oz) as well because she is riding wheels and it is going to be another hour prior to    Discussed timing of 70/30 insulin - should be prior to a meal   She has been getting up and taking her morning shot hours before she eats breakfast   She states she understands that she should be taking shot immediately prior to the meal in the morning and at dinner time in order to avoid low sugar associated with the short acting insulin in the premixed insulin she takes  Sensor would be a very good option for her but she states it is unaffordable  She plans to follow up at  and has an appt in the near future  Her nails were very overgrown today, her podiatrist has stopped trimming nails  Nail trim x 10 today in office (mann concern small toes, where nails are thick)  Reminded her to update eye exam  We can see her back prn and will cc note to upcoming provider   Samples of 70/30 vials provided today     Orders:  -     POC Glucose, Blood    2. Acquired hypothyroidism  Assessment & Plan:  Last TSH 20 at [facility]  Increase to 200 + 50 mcg - samples provided and new rx sent   She has a lot  of trouble with medications and would definitely benefit from a   She has some diarrhea but overall denies symptoms c/w malabsorption       3. Mixed hyperlipidemia  Assessment & Plan:  High LDL in part due to high TSH but should consider statin therapy if persistent elevation once tsh is normalized       Other orders  -     Continuous Blood Gluc Sensor (FreeStyle Sukhi 3 Sensor) misc; Use 1 Units Every 14 (Fourteen) Days.  Dispense: 6 each; Refill: 1      Selina Lopez MD  Signed Selina Lopez MD

## 2023-12-28 NOTE — PROGRESS NOTES
Vilma Ayala 53 y.o.  CC:Follow-up, Diabetes (Type II), and Hypothyroidism      [place]: Follow-up, Diabetes (Type II), and Hypothyroidism    Blood sugar reviewed from recent visit BSB - A1c 6.9%  She has sugar in office this morning of 70  She did not eat breakfast, took medication including insulin   Juice provided along with peanut butter crackers  Sensor prescribed at clinic but she could not afford her part- was going to be 200$  Higher chol with lab work at clinic - also higher TSH   Glucose meter not working well- reading high   Goes to [employer]- discussed cost of sensors- would be 100$ for 90 days which she states is still outside her budget   She ran out of insulin the other day - tried to use only metformin but made situation worse  She had no way of getting insulin but group home is working on getting her a  to help with medications   She denies running out of thyroid medication and is taking it 1 hour prior to other meals and medications   Discussed increasing thyroid supplement to 250 mcg with repeat tfts through  Clinic at [facility]     Allergies   Allergen Reactions    Doxepin Rash    Januvia [Sitagliptin] Other (See Comments)     Insomnia       Current Outpatient Medications:     ammonium lactate (Lac-Hydrin) 12 % lotion, Apply BID to feet, Disp: 225 g, Rfl: 3    benztropine (COGENTIN) 0.5 MG tablet, Take 1 tablet by mouth As Needed., Disp: , Rfl:     Blood Glucose Monitoring Suppl w/Device kit, Use daily to test blood sugars, Disp: 1 each, Rfl: 0    busPIRone (BUSPAR) 10 MG tablet, Take 1 tablet by mouth Daily., Disp: 90 tablet, Rfl: 1    Cholecalciferol (VITAMIN D3 PO), Take 1 tablet by mouth Daily., Disp: , Rfl:     citalopram (CeleXA) 40 MG tablet, Take 1 tablet by mouth Daily., Disp: 90 tablet, Rfl: 1    Continuous Blood Gluc Sensor (FreeStyle Sukhi 3 Sensor) misc, Use 1 Units Every 14 (Fourteen) Days., Disp: 6 each, Rfl: 1    glucose blood test strip, Test blood  "sugars QID, Disp: 150 each, Rfl: 5    hydrOXYzine pamoate (VISTARIL) 50 MG capsule, , Disp: , Rfl:     ibuprofen (ADVIL,MOTRIN) 800 MG tablet, Take 1 tablet by mouth Every 8 (Eight) Hours As Needed for Mild Pain or Moderate Pain for up to 30 doses., Disp: 30 tablet, Rfl: 0    Insulin Syringe-Needle U-100 31G X 15/64\" 0.5 ML misc, Use 2 per day to administer insulin, Disp: 200 each, Rfl: 1    Lancets misc, Test blood sugars QID, Disp: 200 each, Rfl: 3    levothyroxine (SYNTHROID, LEVOTHROID) 200 MCG tablet, TAKE 1 TABLET BY MOUTH ONCE DAILY ALONG  WITH  25MCG  DOSE, Disp: 90 tablet, Rfl: 1    Livalo 1 MG tablet tablet, , Disp: , Rfl:     metFORMIN (GLUCOPHAGE) 500 MG tablet, Take 2 tablets daily with food, Disp: 60 tablet, Rfl: 5    NovoLOG Mix 70/30 (70-30) 100 UNIT/ML injection, INJECT 34 UNITS SUBCUTANEOUSLY BEFORE BREAKFAST AND 12 BEFORE SUPPER (Patient taking differently: INJECT 34 UNITS SUBCUTANEOUSLY BEFORE BREAKFAST AND 11 BEFORE SUPPER), Disp: 40 mL, Rfl: 0    risperiDONE (risperDAL) 3 MG tablet, Take 1 tablet by mouth 2 (Two) Times a Day., Disp: , Rfl:     VENTOLIN  (90 Base) MCG/ACT inhaler, Inhale 2 puffs 4 (Four) Times a Day., Disp: , Rfl:   Patient Active Problem List    Diagnosis     Decreased GFR [R94.4]     Microscopic hematuria [R31.29]     Nocturia [R35.1]     Nocturnal enuresis [N39.44]     Stress incontinence [N39.3]     Urge incontinence [N39.41]     Urinary incontinence [R32]     Type 2 diabetes mellitus without complication, with long-term current use of insulin [E11.9, Z79.4]     Mixed hyperlipidemia [E78.2]     Anxiety and depression [F41.9, F32.A]     Uncontrolled type 2 diabetes mellitus [[id number]]     Hypothyroid [E03.9]     Obesity [E66.9]     Vitamin D deficiency, unspecified [E55.9]     Diabetic neuropathy, type II diabetes mellitus [E11.40]      Review of Systems   Constitutional:  Positive for activity change. Negative for appetite change and unexpected weight change.   HENT:  " "Negative for congestion and rhinorrhea.    Eyes:  Negative for visual disturbance.   Respiratory:  Negative for cough and shortness of breath.    Cardiovascular:  Positive for leg swelling. Negative for palpitations.   Gastrointestinal:  Negative for constipation, diarrhea and nausea.   Genitourinary:  Negative for hematuria.   Musculoskeletal:  Positive for gait problem. Negative for arthralgias, back pain, joint swelling and myalgias.   Skin:  Negative for color change, rash and wound.   Allergic/Immunologic: Negative for environmental allergies, food allergies and immunocompromised state.   Neurological:  Negative for dizziness, weakness and light-headedness.   Psychiatric/Behavioral:  Negative for confusion, decreased concentration, dysphoric mood and sleep disturbance. The patient is not nervous/anxious.      Social History     Socioeconomic History    Marital status: Single   Tobacco Use    Smoking status: Never    Smokeless tobacco: Never   Vaping Use    Vaping Use: Never used   Substance and Sexual Activity    Alcohol use: No    Drug use: No    Sexual activity: Not Currently     Family History   Problem Relation Age of Onset    Hypertension Other     Thyroid disease Other     Diabetes Other     Obesity Other     Diabetes Mother     Hypertension Mother     Heart disease Mother     Cancer Father     Diabetes Sister     Diabetes Brother     No Known Problems Brother      /70   Pulse 81   Ht 175.3 cm (69\")   Wt 103 kg (228 lb)   LMP  (LMP Unknown)   SpO2 100%   BMI 33.67 kg/m²   Physical Exam  Vitals and nursing note reviewed.   Constitutional:       Appearance: Normal appearance. She is well-developed.   HENT:      Head: Normocephalic and atraumatic.   Eyes:      General: Lids are normal.      Extraocular Movements: Extraocular movements intact.      Conjunctiva/sclera: Conjunctivae normal.      Pupils: Pupils are equal, round, and reactive to light.   Neck:      Thyroid: No thyroid mass or " thyromegaly.      Vascular: No carotid bruit.      Trachea: Trachea normal. No tracheal deviation.   Cardiovascular:      Rate and Rhythm: Normal rate and regular rhythm.      Heart sounds: Normal heart sounds. No murmur heard.     No friction rub. No gallop.   Pulmonary:      Effort: Pulmonary effort is normal. No respiratory distress.      Breath sounds: Normal breath sounds. No wheezing.   Musculoskeletal:         General: No deformity. Normal range of motion.      Cervical back: Normal range of motion and neck supple.        Feet:    Feet:      Right foot:      Skin integrity: Skin integrity normal.      Left foot:      Skin integrity: Skin integrity normal.   Lymphadenopathy:      Cervical: No cervical adenopathy.   Skin:     General: Skin is warm and dry.      Findings: No erythema or rash.      Nails: There is no clubbing.   Neurological:      General: No focal deficit present.      Mental Status: She is alert and oriented to person, place, and time.      Cranial Nerves: No cranial nerve deficit.      Deep Tendon Reflexes: Reflexes are normal and symmetric. Reflexes normal.   Psychiatric:         Speech: Speech normal.         Behavior: Behavior normal.         Thought Content: Thought content normal.         Judgment: Judgment normal.       Results for orders placed or performed in visit on 12/28/23   POC Glucose, Blood    Specimen: Blood   Result Value Ref Range    Glucose 72 70 - 130 mg/dL    Lot Number [id number]     Expiration Date 06/30/2024      Diagnoses and all orders for this visit:    1. Type 2 diabetes mellitus without complication, with long-term current use of insulin (Primary)  Assessment & Plan:  Low sugar in office today - juice and pb crackers provided with repeat 82, gave to go sprite (6 oz) as well because she is riding wheels and it is going to be another hour prior to    Discussed timing of 70/30 insulin - should be prior to a meal   She has been getting up and taking her morning  shot hours before she eats breakfast   She states she understands that she should be taking shot immediately prior to the meal in the morning and at dinner time in order to avoid low sugar associated with the short acting insulin in the premixed insulin she takes  Sensor would be a very good option for her but she states it is unaffordable  She plans to follow up at  and has an appt in the near future  Her nails were very overgrown today, her podiatrist has stopped trimming nails  Nail trim x 10 today in office (mann concern small toes, where nails are thick)  Reminded her to update eye exam  We can see her back prn and will cc note to upcoming provider   Samples of 70/30 vials provided today     Orders:  -     POC Glucose, Blood    2. Acquired hypothyroidism  Assessment & Plan:  Last TSH 20 at [facility]  Increase to 200 + 50 mcg - samples provided and new rx sent   She has a lot of trouble with medications and would definitely benefit from a   She has some diarrhea but overall denies symptoms c/w malabsorption       3. Mixed hyperlipidemia  Assessment & Plan:  High LDL in part due to high TSH but should consider statin therapy if persistent elevation once tsh is normalized       Other orders  -     Continuous Blood Gluc Sensor (FreeStyle Sukhi 3 Sensor) misc; Use 1 Units Every 14 (Fourteen) Days.  Dispense: 6 each; Refill: 1      Selina Lopez MD  Signed Selina Lopez MD

## 2024-01-05 ENCOUNTER — PRIOR AUTHORIZATION (OUTPATIENT)
Dept: ENDOCRINOLOGY | Facility: CLINIC | Age: 54
End: 2024-01-05
Payer: MEDICARE

## 2024-01-05 NOTE — TELEPHONE ENCOUNTER
PA sent for freestyle lsia 3 sensors via [employer] to [employer]    Response:  Authorization already on file for this request. Authorization starting on 01/01/2024 and ending on 12/31/2024.

## 2024-03-19 ENCOUNTER — TELEPHONE (OUTPATIENT)
Dept: ENDOCRINOLOGY | Facility: CLINIC | Age: 54
End: 2024-03-19
Payer: MEDICARE

## 2024-03-19 RX ORDER — ALBUTEROL SULFATE 90 UG/1
2 AEROSOL, METERED RESPIRATORY (INHALATION)
Status: CANCELLED | OUTPATIENT
Start: 2024-03-19

## 2024-03-19 NOTE — TELEPHONE ENCOUNTER
CALLED PT - NO ANSWER, NO VOICE MAIL.  DR SWANSON IS OUT OF THE OFFICE TODAY.  PLEASE CONTACT PRESCRIBING MD TO FILL HER INHALER

## 2024-04-22 RX ORDER — INSULIN ASPART 100 [IU]/ML
INJECTION, SUSPENSION SUBCUTANEOUS
Qty: 40 ML | Refills: 0 | Status: SHIPPED | OUTPATIENT
Start: 2024-04-22

## 2024-04-22 NOTE — TELEPHONE ENCOUNTER
Rx Refill Note    Requested Prescriptions     Pending Prescriptions Disp Refills    NovoLOG Mix 70/30 (70-30) 100 UNIT/ML injection [Pharmacy Med Name: NovoLOG Mix 70/30 (70-30) 100 UNIT/ML Subcutaneous Suspension] 40 mL 0     Sig: INJECT 34 UNITS SUBCUTANEOUSLY BEFORE BREAKFAST AND 12 BEFORE SUPPER        Last office visit with prescribing clinician: 12/28/2023     Last OV office note stated the pt plans to FU with [facility] for [facility]      Next office visit with prescribing clinician: Visit date not found   {

## 2024-05-10 ENCOUNTER — OFFICE VISIT (OUTPATIENT)
Dept: PULMONOLOGY | Facility: CLINIC | Age: 54
End: 2024-05-10
Payer: MEDICARE

## 2024-05-10 VITALS
TEMPERATURE: 97.8 F | OXYGEN SATURATION: 98 % | SYSTOLIC BLOOD PRESSURE: 118 MMHG | BODY MASS INDEX: 34.66 KG/M2 | HEIGHT: 69 IN | DIASTOLIC BLOOD PRESSURE: 62 MMHG | WEIGHT: 234 LBS | HEART RATE: 86 BPM

## 2024-05-10 DIAGNOSIS — R06.09 DYSPNEA ON EXERTION: ICD-10-CM

## 2024-05-10 DIAGNOSIS — E66.01 CLASS 3 SEVERE OBESITY DUE TO EXCESS CALORIES WITH SERIOUS COMORBIDITY AND BODY MASS INDEX (BMI) OF 45.0 TO 49.9 IN ADULT: Primary | ICD-10-CM

## 2024-05-10 DIAGNOSIS — J45.30 MILD PERSISTENT ASTHMA, UNSPECIFIED WHETHER COMPLICATED: ICD-10-CM

## 2024-05-10 PROBLEM — R06.02 SHORTNESS OF BREATH: Status: ACTIVE | Noted: 2019-01-10

## 2024-05-10 PROCEDURE — 99214 OFFICE O/P EST MOD 30 MIN: CPT | Performed by: NURSE PRACTITIONER

## 2024-05-10 RX ORDER — LORAZEPAM 0.5 MG/1
0.5 TABLET ORAL 2 TIMES DAILY PRN
COMMUNITY
Start: 2024-04-02

## 2024-06-03 ENCOUNTER — TELEPHONE (OUTPATIENT)
Dept: PULMONOLOGY | Facility: CLINIC | Age: 54
End: 2024-06-03
Payer: MEDICARE

## 2024-06-03 DIAGNOSIS — J45.30 MILD PERSISTENT ASTHMA, UNSPECIFIED WHETHER COMPLICATED: Primary | ICD-10-CM

## 2024-06-03 RX ORDER — FLUTICASONE FUROATE, UMECLIDINIUM BROMIDE AND VILANTEROL TRIFENATATE 100; 62.5; 25 UG/1; UG/1; UG/1
1 POWDER RESPIRATORY (INHALATION)
Qty: 60 EACH | Refills: 0 | Status: SHIPPED | OUTPATIENT
Start: 2024-06-03

## 2024-06-03 RX ORDER — ALBUTEROL SULFATE 90 UG/1
2 AEROSOL, METERED RESPIRATORY (INHALATION)
Qty: 18 G | Refills: 0 | Status: SHIPPED | OUTPATIENT
Start: 2024-06-03

## 2024-06-03 NOTE — TELEPHONE ENCOUNTER
Attempted to reach patient, call went straight to voicemail box that is full and unable to accept messages at this time.

## 2024-06-18 RX ORDER — INSULIN ASPART 100 [IU]/ML
INJECTION, SUSPENSION SUBCUTANEOUS
Qty: 40 ML | Refills: 0 | OUTPATIENT
Start: 2024-06-18

## 2024-06-18 NOTE — TELEPHONE ENCOUNTER
Rx Refill Note    Requested Prescriptions     Pending Prescriptions Disp Refills    NovoLOG Mix 70/30 (70-30) 100 UNIT/ML injection [Pharmacy Med Name: NovoLOG Mix 70/30 (70-30) 100 UNIT/ML Subcutaneous Suspension] 40 mL 0     Sig: INJECT 34 UNITS SUBCUTANEOUSLY BEFORE BREAKFAST AND  12  UNITS  BEFORE  SUPPER        Last office visit with prescribing clinician: 12/28/2023     Next office visit with prescribing clinician: Visit date not found   Pt now being followed by [facility] for Diabetes  Rx will be denied  {

## 2024-06-24 ENCOUNTER — OFFICE VISIT (OUTPATIENT)
Dept: PULMONOLOGY | Facility: CLINIC | Age: 54
End: 2024-06-24
Payer: MEDICARE

## 2024-06-24 VITALS
OXYGEN SATURATION: 98 % | HEART RATE: 94 BPM | WEIGHT: 226 LBS | TEMPERATURE: 97.3 F | SYSTOLIC BLOOD PRESSURE: 114 MMHG | HEIGHT: 69 IN | BODY MASS INDEX: 33.47 KG/M2 | DIASTOLIC BLOOD PRESSURE: 70 MMHG

## 2024-06-24 DIAGNOSIS — E66.01 CLASS 3 SEVERE OBESITY DUE TO EXCESS CALORIES WITH SERIOUS COMORBIDITY AND BODY MASS INDEX (BMI) OF 45.0 TO 49.9 IN ADULT: ICD-10-CM

## 2024-06-24 DIAGNOSIS — J45.30 MILD PERSISTENT ASTHMA, UNSPECIFIED WHETHER COMPLICATED: Primary | ICD-10-CM

## 2024-06-24 DIAGNOSIS — R06.09 DYSPNEA ON EXERTION: ICD-10-CM

## 2024-06-24 PROCEDURE — 94729 DIFFUSING CAPACITY: CPT | Performed by: NURSE PRACTITIONER

## 2024-06-24 PROCEDURE — 99214 OFFICE O/P EST MOD 30 MIN: CPT | Performed by: NURSE PRACTITIONER

## 2024-06-24 PROCEDURE — 94726 PLETHYSMOGRAPHY LUNG VOLUMES: CPT | Performed by: NURSE PRACTITIONER

## 2024-06-24 PROCEDURE — 1159F MED LIST DOCD IN RCRD: CPT | Performed by: NURSE PRACTITIONER

## 2024-06-24 PROCEDURE — 94010 BREATHING CAPACITY TEST: CPT | Performed by: NURSE PRACTITIONER

## 2024-06-24 PROCEDURE — 1160F RVW MEDS BY RX/DR IN RCRD: CPT | Performed by: NURSE PRACTITIONER

## 2024-06-24 NOTE — PROGRESS NOTES
[facility] Follow [facility]    CHIEF COMPLAINT    Mild persistent asthma    HISTORY OF PRESENT ILLNESS    HPI:  Vilma Ayala is a 53 y.o.female followed by pulmonary regarding her mild persistent asthma.     Prior PFTs were unrevealing for obstruction or restriction and did show a nonspecific deficit.    Carries a history of mild intermittent asthma.     She feels like her weight has been relatively stable, however is very sedentary at baseline.     Has a history of T2DM with ongoing hyperglycemia followed by endocrinology.    She resides in a group home.    Interval history:  She was last seen in the office by me on 5/10/2024 for ongoing shortness of breath both at rest and exertion.  She was trialed on Trelegy 100 with improvement in her symptoms.  She remains on an albuterol rescue inhaler.    Denies recent ED visits, hospitalizations, or exacerbations.     Denies fever, chills, night sweats, or hemoptysis. No recent sick contacts. No chest pain or palpitations. Denies lower extremity swelling or calf tenderness.     Patient Active Problem List   Diagnosis    Anxiety and depression    Uncontrolled type 2 diabetes mellitus    Hypothyroid    Obesity    Vitamin D deficiency, unspecified    Diabetic neuropathy, type II diabetes mellitus    Mixed hyperlipidemia    Dyspnea on exertion    Type 2 diabetes mellitus without complication, with long-term current use of insulin    Urinary incontinence    Microscopic hematuria    Nocturia    Nocturnal enuresis    Stress incontinence    Urge incontinence    Decreased GFR    Mild persistent asthma       Allergies   Allergen Reactions    Doxepin Rash    Januvia [Sitagliptin] Other (See Comments)     Insomnia       Current Outpatient Medications:     ammonium lactate (Lac-Hydrin) 12 % lotion, Apply BID to feet, Disp: 225 g, Rfl: 3    benztropine (COGENTIN) 0.5 MG tablet, Take 1 tablet by mouth As Needed., Disp: , Rfl:     Blood Glucose Monitoring Suppl w/Device kit, Use  "daily to test blood sugars, Disp: 1 each, Rfl: 0    busPIRone (BUSPAR) 10 MG tablet, Take 1 tablet by mouth Daily., Disp: 90 tablet, Rfl: 1    Cholecalciferol (VITAMIN D3 PO), Take 1 tablet by mouth Daily., Disp: , Rfl:     citalopram (CeleXA) 40 MG tablet, Take 1 tablet by mouth Daily., Disp: 90 tablet, Rfl: 1    Continuous Blood Gluc Sensor (FreeStyle Sukhi 3 Sensor) misc, Use 1 Units Every 14 (Fourteen) Days., Disp: 6 each, Rfl: 1    Fluticasone-Umeclidin-Vilant (Trelegy Ellipta) 100-62.5-25 MCG/ACT inhaler, Inhale 1 puff Daily., Disp: 60 each, Rfl: 0    glucose blood test strip, Test blood sugars QID, Disp: 150 each, Rfl: 5    hydrOXYzine pamoate (VISTARIL) 50 MG capsule, , Disp: , Rfl:     ibuprofen (ADVIL,MOTRIN) 800 MG tablet, Take 1 tablet by mouth Every 8 (Eight) Hours As Needed for Mild Pain or Moderate Pain for up to 30 doses., Disp: 30 tablet, Rfl: 0    Insulin Syringe-Needle U-100 31G X 15/64\" 0.5 ML misc, Use 2 per day to administer insulin, Disp: 200 each, Rfl: 1    Lancets misc, Test blood sugars QID, Disp: 200 each, Rfl: 3    levothyroxine (SYNTHROID, LEVOTHROID) 200 MCG tablet, TAKE 1 TABLET BY MOUTH ONCE DAILY ALONG  WITH  25MCG  DOSE, Disp: 90 tablet, Rfl: 1    Livalo 1 MG tablet tablet, , Disp: , Rfl:     LORazepam (ATIVAN) 0.5 MG tablet, Take 1 tablet by mouth 2 (Two) Times a Day As Needed. for anxiety, Disp: , Rfl:     metFORMIN (GLUCOPHAGE) 500 MG tablet, Take 2 tablets daily with food, Disp: 60 tablet, Rfl: 5    NovoLOG Mix 70/30 (70-30) 100 UNIT/ML injection, INJECT 34 UNITS SUBCUTANEOUSLY BEFORE BREAKFAST AND 12 BEFORE SUPPER, Disp: 40 mL, Rfl: 0    risperiDONE (risperDAL) 3 MG tablet, Take 1 tablet by mouth 2 (Two) Times a Day., Disp: , Rfl:     Ventolin  (90 Base) MCG/ACT inhaler, Inhale 2 puffs 4 (Four) Times a Day., Disp: 18 g, Rfl: 0  MEDICATION LIST AND ALLERGIES REVIEWED.    Social History     Tobacco Use    Smoking status: Never    Smokeless tobacco: Never   Vaping Use    " "Vaping status: Never Used   Substance Use Topics    Alcohol use: No    Drug use: No       FAMILY AND SOCIAL HISTORY REVIEWED.    Review of Systems   Constitutional:  Negative for chills, fatigue and fever.   Respiratory:  Positive for shortness of breath. Negative for cough, chest tightness and wheezing.    Cardiovascular:  Negative for chest pain, palpitations and leg swelling.   Skin:  Negative for color change.   Psychiatric/Behavioral:  Negative for sleep disturbance.    All other systems reviewed and are negative.  .    /70   Pulse 94   Temp 97.3 °F (36.3 °C) (Infrared)   Ht 175.3 cm (69.02\")   Wt 103 kg (226 lb)   LMP  (LMP Unknown)   SpO2 98%   BMI 33.36 kg/m²     Immunization History   Administered Date(s) Administered    COVID-19 (MODERNA) 1st,2nd,3rd Dose Monovalent 05/06/2021, 06/05/2021, 01/04/2022    COVID-19 (MODERNA) BIVALENT 12+YRS 10/26/2022    COVID-19 F23 (MODERNA) 12YRS+ (SPIKEVAX) 10/16/2023    Flu Vaccine Quad PF >36MO 10/04/2017, 12/29/2021    Fluzone (or Fluarix & Flulaval for VFC) >6mos 10/04/2017, 12/29/2021, 10/26/2022, 11/14/2023    Fluzone Quad >6mos (Multi-dose) 10/04/2015    Hep A / Hep B 02/16/2024    Hepatitis A 05/09/2018, 05/09/2018, 10/04/2018, 01/10/2020, 01/10/2020    Influenza, Unspecified 10/04/2018, 10/26/2022    Pneumococcal Conjugate 20-Valent (PCV20) 09/11/2023    Pneumococcal Polysaccharide (PPSV23) 05/28/2016, 05/28/2016    Shingrix 12/29/2021, 09/11/2023, 02/16/2024    flucelvax quad pfs =>4 YRS 10/04/2018       Physical Exam  Vitals reviewed.   Constitutional:       General: She is not in acute distress.     Appearance: Normal appearance.   Cardiovascular:      Rate and Rhythm: Normal rate and regular rhythm.      Pulses: Normal pulses.      Heart sounds: Normal heart sounds.   Pulmonary:      Effort: Pulmonary effort is normal. No respiratory distress.      Breath sounds: No wheezing, rhonchi or rales.   Skin:     General: Skin is warm and dry. "   Neurological:      Mental Status: She is alert.      Comments: Tremors at rest throughout exam       RESULTS    PFTs:  6/20/2024: No obstruction by ATS guidelines.  Nonspecific deficit with reduction of FVC and FEV1.  Mild restriction with TLC 80% predicted and FVC 69% predicted.  Reduced DLCO that corrects for alveolar volume.    5/19/2021: No obstruction by ATS guidelines.  Nonspecific deficit with reduction of FVC and FEV1.  Reduced DLCO that corrects for alveolar volume    6 MWT 1/10/2019:  Unrevealing for exertional hypoxemia with lowest SpO2 98%.  Has dyspnea score 3 at the 5-minute richa.  She was able to ambulate 1200 feet.    PROBLEM LIST    Problem List Items Addressed This Visit       Obesity    Overview                Dyspnea on exertion    Mild persistent asthma - Primary    Relevant Orders    Breathing Capacity Test (Completed)     DISCUSSION    Ms. Ayala was seen today for pulmonary follow-up and is stable from a respiratory standpoint.    Dyspnea on exertion  Mild persistent asthma  Seasonal allergies  Obesity  Symptoms are likely multifactorial in the setting of her mild persistent asthma, seasonal allergies, obesity, and physical deconditioning  PFTs are unrevealing for obstruction and do show mild restriction with reduction in both FVC and TLC.  Continue Trelegy 100.  Denies need for refills.  Continue albuterol rescue inhaler every 4-6 hours as needed for shortness of breath or wheezing  Continue allergy regimen with Claritin and Flonase.  Currently does not have any symptoms of reflux however this may need to be explored at future visits.  She is very sedentary at baseline with a BMI of 33.36, which is reduced from 34 at her last visit.  I explained that obesity and physical deconditioning may largely be playing a role.  I did encourage her to to get 30 minutes of intentional exercise or a day.  Initially I would like her to walk for 10 minutes 2-3 times a day and increase her stamina.  They do  offer her Jazzercise classes at her group home and in the future I would like her to be more involved in this.    Return to clinic in 6 months or sooner if needed.    I personally spent a total of 33 minutes on patient visit today including chart review, face to face with the patient obtaining the history and physical exam, review of pertinent images and tests, counseling and discussion and/or coordination of care as described above, and documentation.  Total time excludes time spent on other separate services such as performing procedures or test interpretation, if applicable.    Electronically signed by ZIA Walker, 06/24/24, 9:49 AM EDT.    Please note that portions of this note were completed with a voice recognition program.        CC: Ovi Corbin MD

## 2024-06-26 RX ORDER — FLUTICASONE FUROATE, UMECLIDINIUM BROMIDE AND VILANTEROL TRIFENATATE 100; 62.5; 25 UG/1; UG/1; UG/1
1 POWDER RESPIRATORY (INHALATION)
Qty: 60 EACH | Refills: 5 | Status: SHIPPED | OUTPATIENT
Start: 2024-06-26

## 2024-06-26 NOTE — TELEPHONE ENCOUNTER
Refilled Rx Trelegy 100 per chart sent to [facility] per pharmacy's request.

## 2024-06-27 RX ORDER — INSULIN ASPART 100 [IU]/ML
INJECTION, SUSPENSION SUBCUTANEOUS
Qty: 40 ML | Refills: 0 | OUTPATIENT
Start: 2024-06-27

## 2024-07-02 RX ORDER — INSULIN ASPART 100 [IU]/ML
INJECTION, SUSPENSION SUBCUTANEOUS
Qty: 40 ML | Refills: 0 | Status: CANCELLED | OUTPATIENT
Start: 2024-07-02

## 2024-07-05 ENCOUNTER — HOSPITAL ENCOUNTER (EMERGENCY)
Facility: HOSPITAL | Age: 54
Discharge: HOME | End: 2024-07-05
Attending: EMERGENCY MEDICINE
Payer: MEDICARE

## 2024-07-05 VITALS
HEIGHT: 66 IN | OXYGEN SATURATION: 98 % | RESPIRATION RATE: 18 BRPM | WEIGHT: 232 LBS | BODY MASS INDEX: 37.28 KG/M2 | DIASTOLIC BLOOD PRESSURE: 89 MMHG | TEMPERATURE: 98.6 F | SYSTOLIC BLOOD PRESSURE: 138 MMHG | HEART RATE: 88 BPM

## 2024-07-05 DIAGNOSIS — Z76.0 MEDICATION REFILL: Primary | ICD-10-CM

## 2024-07-05 LAB — GLUCOSE BLDC GLUCOMTR-MCNC: 99 MG/DL (ref 70–130)

## 2024-07-05 PROCEDURE — 82948 REAGENT STRIP/BLOOD GLUCOSE: CPT

## 2024-07-05 PROCEDURE — 94640 AIRWAY INHALATION TREATMENT: CPT

## 2024-07-05 PROCEDURE — 99283 EMERGENCY DEPT VISIT LOW MDM: CPT

## 2024-07-05 RX ORDER — IPRATROPIUM BROMIDE AND ALBUTEROL SULFATE 2.5; .5 MG/3ML; MG/3ML
3 SOLUTION RESPIRATORY (INHALATION) ONCE
Status: COMPLETED | OUTPATIENT
Start: 2024-07-05 | End: 2024-07-05

## 2024-07-05 RX ADMIN — METFORMIN HYDROCHLORIDE 500 MG: 500 TABLET ORAL at 19:15

## 2024-07-05 RX ADMIN — IPRATROPIUM BROMIDE AND ALBUTEROL SULFATE 3 ML: .5; 3 SOLUTION RESPIRATORY (INHALATION) at 19:15

## 2024-07-05 NOTE — FSED PROVIDER NOTE
Subjective   History of Present Illness  Patient presents to the emergency department for medication issues.  Says that she has not gotten her medication yet and has run out of her metformin and albuterol and Trelegy.  Says she expects to get there tomorrow but needs her nighttime doses today.  Says she has no other complaints though she is nervous about not having her meds.  Otherwise says she feels well    History provided by:  Patient   used: Yes        Review of Systems   Constitutional:         Medication refill   All other systems reviewed and are negative.      Past Medical History:   Diagnosis Date    Anxiety     Chicken pox     Depression     Diabetes mellitus     Disease of thyroid gland     Measles     Type 2 diabetes mellitus        Allergies   Allergen Reactions    Doxepin Rash    Januvia [Sitagliptin] Other (See Comments)     Insomnia       Past Surgical History:   Procedure Laterality Date    EYE SURGERY      TONSILLECTOMY         Family History   Problem Relation Age of Onset    Hypertension Other     Thyroid disease Other     Diabetes Other     Obesity Other     Diabetes Mother     Hypertension Mother     Heart disease Mother     Cancer Father     Diabetes Sister     Diabetes Brother     No Known Problems Brother        Social History     Socioeconomic History    Marital status: Single   Tobacco Use    Smoking status: Never    Smokeless tobacco: Never   Vaping Use    Vaping status: Never Used   Substance and Sexual Activity    Alcohol use: No    Drug use: No    Sexual activity: Not Currently           Objective   Physical Exam  Vitals and nursing note reviewed.   Constitutional:       Appearance: Normal appearance. She is obese.   HENT:      Head: Normocephalic and atraumatic.      Nose: Nose normal.      Mouth/Throat:      Mouth: Mucous membranes are moist.      Pharynx: Oropharynx is clear.   Eyes:      Pupils: Pupils are equal, round, and reactive to light.   Cardiovascular:       Rate and Rhythm: Normal rate and regular rhythm.      Heart sounds: Normal heart sounds.   Pulmonary:      Effort: Pulmonary effort is normal. No respiratory distress.      Breath sounds: Normal breath sounds. No wheezing.   Abdominal:      General: There is no distension.      Palpations: Abdomen is soft.      Tenderness: There is no abdominal tenderness. There is no guarding.   Musculoskeletal:         General: No swelling or deformity. Normal range of motion.      Cervical back: Normal range of motion and neck supple.   Skin:     General: Skin is warm and dry.      Capillary Refill: Capillary refill takes less than 2 seconds.   Neurological:      General: No focal deficit present.      Mental Status: She is alert and oriented to person, place, and time.      Cranial Nerves: No cranial nerve deficit.   Psychiatric:         Mood and Affect: Mood normal.         Behavior: Behavior normal.         Procedures           ED Course                                           Medical Decision Making  Hemodynamically stable and afebrile.  Patient is well-appearing given a breathing treatment as we do not have Trelegy here as well as her nightly dose of metformin.  She is likely to receive her medications tomorrow as records indicate the medication has been dispensed should be sent to her tomorrow.  Patient has no other complaints otherwise well-appearing discharge    Problems Addressed:  Medication refill: complicated acute illness or injury    Risk  Prescription drug management.        Final diagnoses:   Medication refill       ED Disposition  ED Disposition       ED Disposition   Discharge    Condition   Stable    Comment   --               Maliha Contreras, APRN  [address] [id number] [phone]    Schedule an appointment as soon as possible for a visit   As needed    [facility] EMERGENCY DEPARTMENT [place]  [address]  [facility]  [address]  [phone]  Schedule an appointment as soon as possible for a visit   As needed         Medication List      No changes were made to your prescriptions during this visit.

## 2024-07-05 NOTE — TELEPHONE ENCOUNTER
Rx Refill Note  Requested Prescriptions     Pending Prescriptions Disp Refills    metFORMIN (GLUCOPHAGE) 500 MG tablet [Pharmacy Med Name: metFORMIN HCl 500 MG Oral Tablet] 60 tablet 0     Sig: TAKE 2 TABLETS BY MOUTH ONCE DAILY WITH FOOD        Last office visit with prescribing clinician: 12/28/23     Next office visit with prescribing clinician: 7/31/24      Lory Kenney (Jodi)  07/05/24, 10:40 EDT

## 2024-07-10 ENCOUNTER — HOSPITAL ENCOUNTER (EMERGENCY)
Facility: HOSPITAL | Age: 54
Discharge: HOME | End: 2024-07-10
Attending: STUDENT IN AN ORGANIZED HEALTH CARE EDUCATION/TRAINING PROGRAM
Payer: MEDICARE

## 2024-07-10 VITALS
RESPIRATION RATE: 20 BRPM | TEMPERATURE: 97.9 F | WEIGHT: 231.48 LBS | OXYGEN SATURATION: 98 % | HEART RATE: 96 BPM | DIASTOLIC BLOOD PRESSURE: 88 MMHG | SYSTOLIC BLOOD PRESSURE: 140 MMHG | HEIGHT: 66 IN | BODY MASS INDEX: 37.2 KG/M2

## 2024-07-10 DIAGNOSIS — N39.0 URINARY TRACT INFECTION WITHOUT HEMATURIA, SITE UNSPECIFIED: Primary | ICD-10-CM

## 2024-07-10 DIAGNOSIS — R73.9 HYPERGLYCEMIA: ICD-10-CM

## 2024-07-10 LAB
ALBUMIN SERPL-MCNC: 4.2 G/DL (ref 3.5–5.2)
ALBUMIN/GLOB SERPL: 1.4 G/DL
ALP SERPL-CCNC: 139 U/L (ref 39–117)
ALT SERPL W P-5'-P-CCNC: 17 U/L (ref 1–33)
ANION GAP SERPL CALCULATED.3IONS-SCNC: 11.5 MMOL/L (ref 5–15)
AST SERPL-CCNC: 16 U/L (ref 1–32)
BACTERIA UR QL AUTO: ABNORMAL /HPF
BASOPHILS # BLD AUTO: 0.01 10*3/MM3 (ref 0–0.2)
BASOPHILS NFR BLD AUTO: 0.1 % (ref 0–1.5)
BILIRUB SERPL-MCNC: 0.3 MG/DL (ref 0–1.2)
BILIRUB UR QL STRIP: NEGATIVE
BUN SERPL-MCNC: 15 MG/DL (ref 6–20)
BUN/CREAT SERPL: 11 (ref 7–25)
CALCIUM SPEC-SCNC: 9.3 MG/DL (ref 8.6–10.5)
CHLORIDE SERPL-SCNC: 99 MMOL/L (ref 98–107)
CLARITY UR: CLEAR
CO2 SERPL-SCNC: 27.5 MMOL/L (ref 22–29)
COLOR UR: YELLOW
CREAT SERPL-MCNC: 1.36 MG/DL (ref 0.57–1)
D-LACTATE SERPL-SCNC: 1.2 MMOL/L (ref 0.5–2)
DEPRECATED RDW RBC AUTO: 47.2 FL (ref 37–54)
EGFRCR SERPLBLD CKD-EPI 2021: 46.7 ML/MIN/1.73
EOSINOPHIL # BLD AUTO: 0.45 10*3/MM3 (ref 0–0.4)
EOSINOPHIL NFR BLD AUTO: 3.5 % (ref 0.3–6.2)
ERYTHROCYTE [DISTWIDTH] IN BLOOD BY AUTOMATED COUNT: 14.9 % (ref 12.3–15.4)
GLOBULIN UR ELPH-MCNC: 2.9 GM/DL
GLUCOSE BLDC GLUCOMTR-MCNC: 190 MG/DL (ref 70–130)
GLUCOSE BLDC GLUCOMTR-MCNC: 93 MG/DL (ref 70–130)
GLUCOSE SERPL-MCNC: 214 MG/DL (ref 65–99)
GLUCOSE UR STRIP-MCNC: NEGATIVE MG/DL
HCT VFR BLD AUTO: 37.6 % (ref 34–46.6)
HGB BLD-MCNC: 12.5 G/DL (ref 12–15.9)
HGB UR QL STRIP.AUTO: ABNORMAL
HYALINE CASTS UR QL AUTO: ABNORMAL /LPF
IMM GRANULOCYTES # BLD AUTO: 0.03 10*3/MM3 (ref 0–0.05)
IMM GRANULOCYTES NFR BLD AUTO: 0.2 % (ref 0–0.5)
KETONES UR QL STRIP: NEGATIVE
LEUKOCYTE ESTERASE UR QL STRIP.AUTO: ABNORMAL
LIPASE SERPL-CCNC: 33 U/L (ref 13–60)
LYMPHOCYTES # BLD AUTO: 3.73 10*3/MM3 (ref 0.7–3.1)
LYMPHOCYTES NFR BLD AUTO: 28.6 % (ref 19.6–45.3)
MCH RBC QN AUTO: 28.6 PG (ref 26.6–33)
MCHC RBC AUTO-ENTMCNC: 33.2 G/DL (ref 31.5–35.7)
MCV RBC AUTO: 86 FL (ref 79–97)
MONOCYTES # BLD AUTO: 0.63 10*3/MM3 (ref 0.1–0.9)
MONOCYTES NFR BLD AUTO: 4.8 % (ref 5–12)
NEUTROPHILS NFR BLD AUTO: 62.8 % (ref 42.7–76)
NEUTROPHILS NFR BLD AUTO: 8.18 10*3/MM3 (ref 1.7–7)
NITRITE UR QL STRIP: POSITIVE
PH UR STRIP.AUTO: 6 [PH] (ref 5–8)
PLATELET # BLD AUTO: 351 10*3/MM3 (ref 140–450)
PMV BLD AUTO: 9.6 FL (ref 6–12)
POTASSIUM SERPL-SCNC: 3.5 MMOL/L (ref 3.5–5.2)
PROCALCITONIN SERPL-MCNC: 0.09 NG/ML (ref 0–0.25)
PROT SERPL-MCNC: 7.1 G/DL (ref 6–8.5)
PROT UR QL STRIP: NEGATIVE
RBC # BLD AUTO: 4.37 10*6/MM3 (ref 3.77–5.28)
RBC # UR STRIP: ABNORMAL /HPF
REF LAB TEST METHOD: ABNORMAL
SODIUM SERPL-SCNC: 138 MMOL/L (ref 136–145)
SP GR UR STRIP: <=1.005 (ref 1–1.03)
SQUAMOUS #/AREA URNS HPF: ABNORMAL /HPF
UROBILINOGEN UR QL STRIP: ABNORMAL
WBC # UR STRIP: ABNORMAL /HPF
WBC NRBC COR # BLD AUTO: 13.03 10*3/MM3 (ref 3.4–10.8)

## 2024-07-10 PROCEDURE — 82948 REAGENT STRIP/BLOOD GLUCOSE: CPT

## 2024-07-10 PROCEDURE — 36415 COLL VENOUS BLD VENIPUNCTURE: CPT

## 2024-07-10 PROCEDURE — 80053 COMPREHEN METABOLIC PANEL: CPT

## 2024-07-10 PROCEDURE — 84145 PROCALCITONIN (PCT): CPT

## 2024-07-10 PROCEDURE — 81001 URINALYSIS AUTO W/SCOPE: CPT

## 2024-07-10 PROCEDURE — 83605 ASSAY OF LACTIC ACID: CPT

## 2024-07-10 PROCEDURE — 99283 EMERGENCY DEPT VISIT LOW MDM: CPT

## 2024-07-10 PROCEDURE — 25810000003 SODIUM CHLORIDE 0.9 % SOLUTION

## 2024-07-10 PROCEDURE — 85025 COMPLETE CBC W/AUTO DIFF WBC: CPT

## 2024-07-10 PROCEDURE — 63710000001 ONDANSETRON ODT 4 MG TABLET DISPERSIBLE

## 2024-07-10 PROCEDURE — 83690 ASSAY OF LIPASE: CPT

## 2024-07-10 RX ORDER — CEFDINIR 300 MG/1
300 CAPSULE ORAL ONCE
Status: COMPLETED | OUTPATIENT
Start: 2024-07-10 | End: 2024-07-10

## 2024-07-10 RX ORDER — CEFDINIR 300 MG/1
300 CAPSULE ORAL 2 TIMES DAILY
Qty: 14 CAPSULE | Refills: 0 | Status: SHIPPED | OUTPATIENT
Start: 2024-07-10 | End: 2024-07-17

## 2024-07-10 RX ORDER — NITROFURANTOIN MACROCRYSTALS 50 MG/1
100 CAPSULE ORAL ONCE
Status: DISCONTINUED | OUTPATIENT
Start: 2024-07-10 | End: 2024-07-10

## 2024-07-10 RX ORDER — ONDANSETRON 4 MG/1
4 TABLET, ORALLY DISINTEGRATING ORAL ONCE
Status: COMPLETED | OUTPATIENT
Start: 2024-07-10 | End: 2024-07-10

## 2024-07-10 RX ORDER — NITROFURANTOIN 25; 75 MG/1; MG/1
100 CAPSULE ORAL 2 TIMES DAILY
Qty: 14 CAPSULE | Refills: 0 | Status: SHIPPED | OUTPATIENT
Start: 2024-07-10 | End: 2024-07-10 | Stop reason: SDUPTHER

## 2024-07-10 RX ADMIN — SODIUM CHLORIDE 1000 ML: 9 INJECTION, SOLUTION INTRAVENOUS at 19:49

## 2024-07-10 RX ADMIN — ONDANSETRON 4 MG: 4 TABLET, ORALLY DISINTEGRATING ORAL at 22:32

## 2024-07-10 RX ADMIN — CEFDINIR 300 MG: 300 CAPSULE ORAL at 22:32

## 2024-07-11 NOTE — FSED PROVIDER NOTE
Subjective   History of Present Illness    Patient is a 53-year-old female presents to ED via EMS for complaints of high blood sugar.  Patient reports that her blood pressure today has not gone down despite her utilizing her insulin and diabetes medication as directed.  Patient states that blood sugars have been running in the high 200s.  Patient's blood sugar by EMS was 226.  Patient endorsing some urinary frequency as well as dysuria but denying any abdominal pain, flank pain, fevers, chills, nausea, vomiting.  Denies any recent known illness.  Patient is alert and oriented x 3 at bedside and denying any other complaints at this time.      Review of Systems   Constitutional:  Positive for fatigue. Negative for chills and fever.   HENT:  Negative for hearing loss.    Eyes:  Negative for visual disturbance.   Respiratory:  Negative for cough and shortness of breath.    Cardiovascular:  Negative for chest pain and leg swelling.   Gastrointestinal:  Negative for diarrhea, nausea and vomiting.   Genitourinary:  Positive for dysuria and frequency. Negative for flank pain and hematuria.   Skin:  Negative for color change and rash.   Neurological:  Negative for speech difficulty, weakness and headaches.       Past Medical History:   Diagnosis Date    Anxiety     Chicken pox     Depression     Diabetes mellitus     Disease of thyroid gland     Measles     Type 2 diabetes mellitus        Allergies   Allergen Reactions    Doxepin Rash    Januvia [Sitagliptin] Other (See Comments)     Insomnia       Past Surgical History:   Procedure Laterality Date    EYE SURGERY      TONSILLECTOMY         Family History   Problem Relation Age of Onset    Hypertension Other     Thyroid disease Other     Diabetes Other     Obesity Other     Diabetes Mother     Hypertension Mother     Heart disease Mother     Cancer Father     Diabetes Sister     Diabetes Brother     No Known Problems Brother        Social History     Socioeconomic History     Marital status: Single   Tobacco Use    Smoking status: Never    Smokeless tobacco: Never   Vaping Use    Vaping status: Never Used   Substance and Sexual Activity    Alcohol use: No    Drug use: No    Sexual activity: Not Currently           Objective   Physical Exam  Constitutional:       General: She is not in acute distress.     Appearance: Normal appearance.      Comments: Chronically ill-appearing    HENT:      Head: Normocephalic and atraumatic.      Mouth/Throat:      Mouth: Mucous membranes are dry.      Pharynx: Oropharynx is clear.   Cardiovascular:      Rate and Rhythm: Regular rhythm. Tachycardia present.   Pulmonary:      Effort: Pulmonary effort is normal.      Breath sounds: Normal breath sounds. No wheezing.   Abdominal:      General: Abdomen is flat.      Palpations: Abdomen is soft.      Tenderness: There is no abdominal tenderness. There is no guarding.   Skin:     General: Skin is warm and dry.      Capillary Refill: Capillary refill takes less than 2 seconds.   Neurological:      Mental Status: She is alert and oriented to person, place, and time.   Psychiatric:         Mood and Affect: Mood normal.         Behavior: Behavior normal.       Procedures           ED Course                                 Patient presenting to ED via EMS for complaints of hyperglycemia for the last day.  Patient also endorsing some dysuria and increased frequency.  Vital signs stable, patient afebrile.  Glucose and route via EMS to 26, POC glucose on arrival 190.  Patient given 1 L normal saline as well as some Zofran for some nausea she also endorsed.  CBC did demonstrate leukocytosis WBC 13.03.  Creatinine 1.36.  Lactic acid and procalcitonin within normal notes.  Lipase 33.  Patient's urinalysis did reveal evidence of urinary tract infection.  ED physician and pharmacist consulted, cefdinir given during ED course, Rx sent to pharmacy.  Blood sugar prior to discharge 93.  Patient to continue monitoring blood  sugar very closely and to follow-up with PCP for reevaluation of urine as well as today.  Importance of follow-up care extensively discussed with patient at bedside.  Very strict return precautions given.  Patient verbalized understanding.          Medical Decision Making  Problems Addressed:  Hyperglycemia: complicated acute illness or injury  Urinary tract infection without hematuria, site unspecified: complicated acute illness or injury    Amount and/or Complexity of Data Reviewed  Labs: ordered.    Risk  Prescription drug management.        Final diagnoses:   Urinary tract infection without hematuria, site unspecified   Hyperglycemia       ED Disposition  ED Disposition       ED Disposition   Discharge    Condition   Stable    Comment   --               Maliha Contreras, APRN  [address] [address]  [phone]    Schedule an appointment as soon as possible for a visit       [facility] EMERGENCY DEPARTMENT [place]  [address]  [facility] [address] [phone]    As needed, If symptoms worsen         Medication List        New Prescriptions      cefdinir 300 MG capsule  Commonly known as: OMNICEF  Take 1 capsule by mouth 2 (Two) Times a Day for 7 days.               Where to Get Your Medications        These medications were sent to [facility] [address] [place] [address] - [address] [facility] - [phone]  - [phone] FX  [address] [facility] [address]      Phone: [phone]   cefdinir 300 MG capsule

## 2024-07-11 NOTE — DISCHARGE INSTRUCTIONS
Today your workup demonstrated that you have a urinary tract infection.  We have given your first dose of antibiotic here and send the rest to your pharmacy.  Please continue to monitor blood sugars closely.  May follow-up appointment with PCP for reevaluation and to ensure resolution of your symptoms as well as repeat blood work to look at your kidney function.  Return to ED should symptoms worsen anytime.

## 2024-07-29 RX ORDER — INSULIN ASPART 100 [IU]/ML
INJECTION, SUSPENSION SUBCUTANEOUS
Qty: 40 ML | Refills: 0 | Status: SHIPPED | OUTPATIENT
Start: 2024-07-29

## 2024-07-29 NOTE — TELEPHONE ENCOUNTER
Called patient to let her know she needs an appointment but looks like she has a appt with .she said she wants to keep Dr. Lopez as her endocrinologist. She has made an appt with first available. She is aware if she does not make it to this f/u appt she would need to be seen before she received any more refills. She voiced understanding.

## 2024-08-05 RX ORDER — INSULIN ASPART 100 [IU]/ML
INJECTION, SUSPENSION SUBCUTANEOUS
Qty: 40 ML | Refills: 0 | OUTPATIENT
Start: 2024-08-05

## 2024-08-29 ENCOUNTER — TELEMEDICINE (OUTPATIENT)
Dept: FAMILY MEDICINE CLINIC | Facility: TELEHEALTH | Age: 54
End: 2024-08-29
Payer: MEDICARE

## 2024-08-29 DIAGNOSIS — U07.1 COVID-19 VIRUS INFECTION: Primary | ICD-10-CM

## 2024-08-29 RX ORDER — BUSPIRONE HYDROCHLORIDE 15 MG/1
1 TABLET ORAL EVERY 12 HOURS SCHEDULED
COMMUNITY
Start: 2024-08-16

## 2024-08-29 RX ORDER — FLURBIPROFEN SODIUM 0.3 MG/ML
SOLUTION/ DROPS OPHTHALMIC
COMMUNITY
Start: 2024-08-07

## 2024-08-29 RX ORDER — NIRMATRELVIR AND RITONAVIR 150-100 MG
2 KIT ORAL 2 TIMES DAILY
Qty: 20 TABLET | Refills: 0 | Status: SHIPPED | OUTPATIENT
Start: 2024-08-29 | End: 2024-09-03

## 2024-08-29 NOTE — PROGRESS NOTES
You have chosen to receive care through a telehealth visit.  Do you consent to use a video/audio connection for your medical care today? Yes     CHIEF COMPLAINT  No chief complaint on file.        HPI  Vilma Ayala is a 53 y.o. female  presents with complaint of tested positive for COVID yesterday, symptoms began 3 days ago, runny nose, weakness, scratchy throat, body aches, chills.      Review of Systems  See HPI    Past Medical History:   Diagnosis Date    Anxiety     Chicken pox     Depression     Diabetes mellitus     Disease of thyroid gland     Measles     Type 2 diabetes mellitus        Family History   Problem Relation Age of Onset    Hypertension Other     Thyroid disease Other     Diabetes Other     Obesity Other     Diabetes Mother     Hypertension Mother     Heart disease Mother     Cancer Father     Diabetes Sister     Diabetes Brother     No Known Problems Brother        Social History     Socioeconomic History    Marital status: Single   Tobacco Use    Smoking status: Never    Smokeless tobacco: Never   Vaping Use    Vaping status: Never Used   Substance and Sexual Activity    Alcohol use: No    Drug use: No    Sexual activity: Not Currently       Vilma Ayala  reports that she has never smoked. She has never used smokeless tobacco.               LMP  (LMP Unknown)     PHYSICAL EXAM  Physical Exam   Constitutional: She is oriented to person, place, and time. She appears well-developed and well-nourished. She does not have a sickly appearance. She appears ill.   HENT:   Head: Normocephalic and atraumatic.   Pulmonary/Chest: Effort normal.  No respiratory distress.  Neurological: She is alert and oriented to person, place, and time.       Results for orders placed or performed during the hospital encounter of 07/10/24   Comprehensive Metabolic Panel    Specimen: Blood   Result Value Ref Range    Glucose 214 (H) 65 - 99 mg/dL    BUN 15 6 - 20 mg/dL    Creatinine 1.36 (H) 0.57 - 1.00 mg/dL     Sodium 138 136 - 145 mmol/L    Potassium 3.5 3.5 - 5.2 mmol/L    Chloride 99 98 - 107 mmol/L    CO2 27.5 22.0 - 29.0 mmol/L    Calcium 9.3 8.6 - 10.5 mg/dL    Total Protein 7.1 6.0 - 8.5 g/dL    Albumin 4.2 3.5 - 5.2 g/dL    ALT (SGPT) 17 1 - 33 U/L    AST (SGOT) 16 1 - 32 U/L    Alkaline Phosphatase 139 (H) 39 - 117 U/L    Total Bilirubin 0.3 0.0 - 1.2 mg/dL    Globulin 2.9 gm/dL    A/G Ratio 1.4 g/dL    BUN/Creatinine Ratio 11.0 7.0 - 25.0    Anion Gap 11.5 5.0 - 15.0 mmol/L    eGFR 46.7 (L) >60.0 mL/min/1.73   Lipase    Specimen: Blood   Result Value Ref Range    Lipase 33 13 - 60 U/L   Urinalysis With Microscopic If Indicated (No Culture) - Urine, Clean Catch    Specimen: Urine, Clean Catch   Result Value Ref Range    Color, UA Yellow Yellow, Straw    Appearance, UA Clear Clear    pH, UA 6.0 5.0 - 8.0    Specific Gravity, UA <=1.005 1.005 - 1.030    Glucose, UA Negative Negative    Ketones, UA Negative Negative    Bilirubin, UA Negative Negative    Blood, UA Trace (A) Negative    Protein, UA Negative Negative    Leuk Esterase, UA Moderate (2+) (A) Negative    Nitrite, UA Positive (A) Negative    Urobilinogen, UA 0.2 E.U./dL 0.2 - 1.0 E.U./dL   CBC Auto Differential    Specimen: Blood   Result Value Ref Range    WBC 13.03 (H) 3.40 - 10.80 10*3/mm3    RBC 4.37 3.77 - 5.28 10*6/mm3    Hemoglobin 12.5 12.0 - 15.9 g/dL    Hematocrit 37.6 34.0 - 46.6 %    MCV 86.0 79.0 - 97.0 fL    MCH 28.6 26.6 - 33.0 pg    MCHC 33.2 31.5 - 35.7 g/dL    RDW 14.9 12.3 - 15.4 %    RDW-SD 47.2 37.0 - 54.0 fl    MPV 9.6 6.0 - 12.0 fL    Platelets 351 140 - 450 10*3/mm3    Neutrophil % 62.8 42.7 - 76.0 %    Lymphocyte % 28.6 19.6 - 45.3 %    Monocyte % 4.8 (L) 5.0 - 12.0 %    Eosinophil % 3.5 0.3 - 6.2 %    Basophil % 0.1 0.0 - 1.5 %    Immature Grans % 0.2 0.0 - 0.5 %    Neutrophils, Absolute 8.18 (H) 1.70 - 7.00 10*3/mm3    Lymphocytes, Absolute 3.73 (H) 0.70 - 3.10 10*3/mm3    Monocytes, Absolute 0.63 0.10 - 0.90 10*3/mm3     Eosinophils, Absolute 0.45 (H) 0.00 - 0.40 10*3/mm3    Basophils, Absolute 0.01 0.00 - 0.20 10*3/mm3    Immature Grans, Absolute 0.03 0.00 - 0.05 10*3/mm3   Urinalysis, Microscopic Only - Urine, Clean Catch    Specimen: Urine, Clean Catch   Result Value Ref Range    RBC, UA 0-2 None Seen, 0-2 /HPF    WBC, UA 21-50 (A) None Seen, 0-2 /HPF    Bacteria, UA 2+ (A) None Seen /HPF    Squamous Epithelial Cells, UA 3-6 (A) None Seen, 0-2 /HPF    Hyaline Casts, UA 0-2 None Seen /LPF    Methodology Manual Light Microscopy    Procalcitonin    Specimen: Blood   Result Value Ref Range    Procalcitonin 0.09 0.00 - 0.25 ng/mL   Lactic Acid, Plasma    Specimen: Blood   Result Value Ref Range    Lactate 1.2 0.5 - 2.0 mmol/L   POC Glucose Once    Specimen: Blood   Result Value Ref Range    Glucose 190 (H) 70 - 130 mg/dL   POC Glucose Once    Specimen: Blood   Result Value Ref Range    Glucose 93 70 - 130 mg/dL       Diagnoses and all orders for this visit:    1. COVID-19 virus infection (Primary)  -     Nirmatrelvir&Ritonavir 150/100 (Paxlovid, 150/100,) 10 x 150 MG & 10 x 100MG tablet therapy pack tablet (for renal adjustment); Take 2 tablets by mouth 2 (Two) Times a Day for 5 days.  Dispense: 20 tablet; Refill: 0    --take medications as prescribed  --increase fluids, rest as needed, tylenol or ibuprofen for pain  --f/u in 5-7 days if no improvement        FOLLOW-UP  As discussed during visit with PCP/[facility] if no improvement or Urgent Care/Emergency Department if worsening of symptoms    Patient verbalizes understanding of medication dosage, comfort measures, instructions for treatment and follow-up.    Dahiana Mera, ZIA  08/29/2024  11:13 EDT    The use of a video visit has been reviewed with the patient and verbal informed consent has been obtained. Myself and Vilma Ayala participated in this visit. The patient is located in [address] [place] [address].    I am located in [place], [place]. Rizwan and  Maliko were utilized. I spent 8 minutes in the patient's chart for this visit.      Note Disclaimer: At [facility], we believe that sharing information builds trust and better   relationships. You are receiving this note because you recently visited [facility]. It is possible you   will see health information before a provider has talked with you about it. This kind of information can   be easy to misunderstand. To help you fully understand what it means for your health, we urge you to   discuss this note with your provider.

## 2024-08-29 NOTE — PATIENT INSTRUCTIONS
Here are the facts about Paxlovid. Please review and acknowledge.      Authorized Use     The FDA has authorized the emergency use of PAXLOVID, an investigational medicine, for the treatment of mild-to-moderate COVID-19 in adults and children (12 years of age and older weighing at least 88 pounds [40 kg]) with a positive test for the virus that causes COVID-19, and who are at high risk for progression to severe COVID-19, including hospitalization or death, under an EUA.     PAXLOVID is investigational because it is still being studied. There is limited information about the safety and effectiveness of using PAXLOVID to treat people with mild-to-moderate COVID-19.  FACT SHEET FOR PATIENTS, PARENTS, AND CAREGIVERS  EMERGENCY USE AUTHORIZATION (EUA) OF PAXLOVID  FOR CORONAVIRUS DISEASE 2019 (COVID-19)  You are being given this Fact Sheet because your healthcare provider believes it is   necessary to provide you with PAXLOVID for the treatment of mild-to-moderate   coronavirus disease (COVID-19) caused by the SARS-CoV-2 virus. This Fact Sheet   contains information to help you understand the risks and benefits of taking the   PAXLOVID you have received or may receive.   The U.S. Food and Drug Administration (FDA) has issued an Emergency Use   Authorization (EUA) to make PAXLOVID available during the COVID-19 pandemic (for   more details about an EUA please see “What is an Emergency Use Authorization?”   at the end of this document). PAXLOVID is not an FDA-approved medicine in the   [place]. Read this Fact Sheet for information about PAXLOVID. Talk to your   healthcare provider about your options or if you have any questions. It is your choice to   take PAXLOVID.     What is COVID-19?  COVID-19 is caused by a virus called a coronavirus. You can get COVID-19 through   close contact with another person who has the virus.   COVID-19 illnesses have ranged from very mild-to-severe, including illness resulting in    death. While information so far suggests that most COVID-19 illness is mild, serious   illness can happen and may cause some of your other medical conditions to become   worse. Older people and people of all ages with severe, long lasting (chronic) medical   conditions like heart disease, lung disease, and diabetes, for example seem to be at   higher risk of being hospitalized for COVID-19.     What is PAXLOVID?  PAXLOVID is an investigational medicine used to treat mild-to-moderate COVID-19 in   adults and children [12 years of age and older weighing at least 88 pounds (40 kg)] with   positive results of direct SARS-CoV-2 viral testing, and who are at high risk for   progression to severe COVID-19, including hospitalization or death. PAXLOVID is   investigational because it is still being studied. There is limited information about the   safety and effectiveness of using PAXLOVID to treat people with mild-to-moderate   COVID-19.    The FDA has authorized the emergency use of PAXLOVID for the treatment of mild-tomoderate COVID-19 in adults and children [12 years of age and older weighing at least   88 pounds (40 kg)] with a positive test for the virus that causes COVID-19, and who are   at high risk for progression to severe COVID-19, including hospitalization or death,   under an EUA.  What should I tell my healthcare provider before I take PAXLOVID?  Tell your healthcare provider if you:   Have any allergies   Have liver or kidney disease   Are pregnant or plan to become pregnant   Are breastfeeding a child   Have any serious illnesses    Tell your healthcare provider about all the medicines you take, including   prescription and over-the-counter medicines, vitamins, and herbal supplements.   Some medicines may interact with PAXLOVID and may cause serious side effects.   Keep a list of your medicines to show your healthcare provider and pharmacist when   you get a new medicine.   You can ask your healthcare  provider or pharmacist for a list of medicines that interact   with PAXLOVID. Do not start taking a new medicine without telling your   healthcare provider. Your healthcare provider can tell you if it is safe to take   PAXLOVID with other medicines.   Tell your healthcare provider if you are taking combined hormonal contraceptive.  PAXLOVID may affect how your birth control pills work. Females who are able to   become pregnant should use another effective alternative form of contraception or an   additional barrier method of contraception. Talk to your healthcare provider if you have   any questions about contraceptive methods that might be right for you.    How do I take PAXLOVID?    PAXLOVID consists of 2 medicines: nirmatrelvir and ritonavir.  o Take 2 pink tablets of nirmatrelvir with 1 white tablet of ritonavir by mouth  2 times each day (in the morning and in the evening) for 5 days. For each  dose, take all 3 tablets at the same time.  o If you have kidney disease, talk to your healthcare provider. You  may need a different dose.   Swallow the tablets whole. Do not chew, break, or crush the tablets.   Take PAXLOVID with or without food.   Do not stop taking PAXLOVID without talking to your healthcare provider, even if  you feel better.   If you miss a dose of PAXLOVID within 8 hours of the time it is usually taken,  take it as soon as you remember. If you miss a dose by more than 8 hours, skip  the missed dose and take the next dose at your regular time. Do not take  2 doses of PAXLOVID at the same time.   If you take too much PAXLOVID, call your healthcare provider or go to the  nearest hospital emergency room right away.   If you are taking a ritonavir- or cobicistat-containing medicine to treat hepatitis C  or Human Immunodeficiency Virus (HIV), you should continue to take your  medicine as prescribed by your healthcare provider.  3   Talk to your healthcare provider if you do not feel better or if you  feel worse after   5 days.    Who should generally not take PAXLOVID?   Do not take PAXLOVID if:   You are allergic to nirmatrelvir, ritonavir, or any of the ingredients in PAXLOVID.   You are taking any of the following medicines:  o Alfuzosin  o Pethidine, piroxicam, propoxyphene  o Ranolazine  o Amiodarone, dronedarone, flecainide, propafenone, quinidine  o Colchicine  o Lurasidone, pimozide, clozapine  o Dihydroergotamine, ergotamine, methylergonovine  o Lovastatin, simvastatin  o Sildenafil (Revatio®) for pulmonary arterial hypertension (PAH)  o Triazolam, oral midazolam  o Apalutamide  o Carbamazepine, phenobarbital, phenytoin  o Rifampin  o Aron’s Wort (hypericum perforatum)  Taking PAXLOVID with these medicines may cause serious or life-threatening side   effects or affect how PAXLOVID works.     These are not the only medicines that may cause serious side effects if taken with   PAXLOVID. PAXLOVID may increase or decrease the levels of multiple other   medicines. It is very important to tell your healthcare provider about all of the medicines   you are taking because additional laboratory tests or changes in the dose of your other   medicines may be necessary while you are taking PAXLOVID. Your healthcare provider   may also tell you about specific symptoms to watch out for that may indicate that you   need to stop or decrease the dose of some of your other medicines.    What are the important possible side effects of PAXLOVID?   Possible side effects of PAXLOVID are:   Liver Problems. Tell your healthcare provider right away if you have any of  these signs and symptoms of liver problems: loss of appetite, yellowing of your  skin and the whites of eyes (jaundice), dark-colored urine, pale colored stools  and itchy skin, stomach area (abdominal) pain.   Resistance to HIV Medicines. If you have untreated HIV infection, PAXLOVID  may lead to some HIV medicines not working as well in the future.  4    Other  possible side effects include:  o altered sense of taste  o diarrhea  o high blood pressure  o muscle aches  These are not all the possible side effects of PAXLOVID. Not many people have taken  PAXLOVID. Serious and unexpected side effects may happen. PAXLOVID is still being   studied, so it is possible that all of the risks are not known at this time.    What other treatment choices are there?  Like PAXLOVID, FDA may allow for the emergency use of other medicines to treat   people with COVID-19. Go to https://www.fda.gov/emergency-preparedness-andresponse/mcm-legal-regulatory-and-policy-framework/emergency-use-authorization for   information on the emergency use of other medicines that are authorized by FDA to   treat people with COVID-19. Your healthcare provider may talk with you about clinical   trials for which you may be eligible.   It is your choice to be treated or not to be treated with PAXLOVID. Should you decide   not to receive it or for your child not to receive it, it will not change your standard   medical care.    What if I am pregnant or breastfeeding?   There is no experience treating pregnant women or breastfeeding mothers with   PAXLOVID. For a mother and unborn baby, the benefit of taking PAXLOVID may be   greater than the risk from the treatment. If you are pregnant, discuss your options and   specific situation with your healthcare provider.   It is recommended that you use effective barrier contraception or do not have sexual   activity while taking PAXLOVID.   If you are breastfeeding, discuss your options and specific situation with your   healthcare provider.     How do I report side effects with PAXLOVID?   Contact your healthcare provider if you have any side effects that bother you or do not   go away.   Report side effects to FDA MedWatch at www.fda.gov/medwatch or call 7-378-IEG4621 or you can report side effects to [employer] at the contact information provided   below.  Website Fax  number Telephone number  [employer] [phone]  5     How should I store PAXLOVID?   Store PAXLOVID tablets at room temperature, between 68?F to 77?F (20?C to 25?C).  How can I learn more about COVID-19?    Ask your healthcare provider.   Visit https://www.cdc.gov/COVID19.   Contact your local or state public health department.  What is an Emergency Use Authorization (EUA)?   The [place] FDA has made PAXLOVID available under an emergency access   mechanism called an Emergency Use Authorization (EUA). The EUA is supported by a    of Health and Human Service (HHS) declaration that circumstances exist to   justify the emergency use of drugs and biological products during the COVID-19   pandemic.     PAXLOVID for the treatment of mild-to-moderate COVID-19 in adults and children   [12 years of age and older weighing at least 88 pounds (40 kg)] with positive results of   direct SARS-CoV-2 viral testing, and who are at high risk for progression to severe   COVID-19, including hospitalization or death, has not undergone the same type of   review as an FDA-approved product. In issuing an EUA under the COVID-19 public   health emergency, the FDA has determined, among other things, that based on the   total amount of scientific evidence available including data from adequate and   well-controlled clinical trials, if available, it is reasonable to believe that the product may   be effective for diagnosing, treating, or preventing COVID-19, or a serious or   life-threatening disease or condition caused by COVID-19; that the known and potential   benefits of the product, when used to diagnose, treat, or prevent such disease or   condition, outweigh the known and potential risks of such product; and that there are no   adequate, approved, and available alternatives.    All of these criteria must be met to allow for the product to be used in the treatment of   patients during the  COVID-19 pandemic. The EUA for PAXLOVID is in effect for the   duration of the COVID-19 declaration justifying emergency use of this product, unless  terminated or revoked (after which the products may no longer be used under the   EUA).  6     Additional Information  For general questions, visit the website or call the telephone number provided below.   Website Telephone number  [facility]  [phone]  (9-996-F08-TESF)  You can also go to [email or web address] or call [phone] for more   information.  [id number]   Revised: 12/22/2021

## 2024-09-09 RX ORDER — INSULIN ASPART 100 [IU]/ML
INJECTION, SUSPENSION SUBCUTANEOUS
Qty: 40 ML | Refills: 0 | Status: SHIPPED | OUTPATIENT
Start: 2024-09-09

## 2024-09-09 NOTE — TELEPHONE ENCOUNTER
Rx Refill Note  Requested Prescriptions     Pending Prescriptions Disp Refills    NovoLOG Mix 70/30 (70-30) 100 UNIT/ML injection [Pharmacy Med Name: NovoLOG Mix 70/30 (70-30) 100 UNIT/ML Subcutaneous Suspension] 40 mL 0     Sig: INJECT 34 UNITS SUBCUTANEOUSLY BEFORE BREAKFAST AND 12 BEFORE SUPPER      Last office visit with prescribing clinician: 12/28/2023   Next office visit with prescribing clinician: 11/4/2024                           Monica Munoz MA  09/09/24, 15:06 EDT

## 2024-09-11 NOTE — TELEPHONE ENCOUNTER
Caller: Vilma Ayala    Relationship: Self    Best call back number: [phone]    Requested Prescriptions:   Requested Prescriptions     Pending Prescriptions Disp Refills    Blood Glucose Monitoring Suppl w/Device kit 1 each 0     Sig: Use daily to test blood sugars        Pharmacy where request should be sent:    [facility]  Last office visit with prescribing clinician: 12/28/2023   Last telemedicine visit with prescribing clinician: Visit date not found   Next office visit with prescribing clinician: 11/4/2024     Additional details provided by patient:      Does the patient have less than a 3 day supply:  [x] Yes  [] No    Would you like a call back once the refill request has been completed: [] Yes [] No    If the office needs to give you a call back, can they leave a voicemail: [] Yes [] No    Elvia Cruz Rep   09/11/24 12:50 EDT

## 2024-09-13 ENCOUNTER — LAB (OUTPATIENT)
Dept: INTERNAL MEDICINE | Facility: CLINIC | Age: 54
End: 2024-09-13
Payer: MEDICARE

## 2024-09-13 ENCOUNTER — OFFICE VISIT (OUTPATIENT)
Dept: INTERNAL MEDICINE | Facility: CLINIC | Age: 54
End: 2024-09-13
Payer: MEDICARE

## 2024-09-13 VITALS
SYSTOLIC BLOOD PRESSURE: 118 MMHG | OXYGEN SATURATION: 98 % | HEART RATE: 86 BPM | DIASTOLIC BLOOD PRESSURE: 76 MMHG | BODY MASS INDEX: 35.26 KG/M2 | TEMPERATURE: 98.6 F | WEIGHT: 219.4 LBS | HEIGHT: 66 IN

## 2024-09-13 DIAGNOSIS — Z79.4 TYPE 2 DIABETES MELLITUS WITHOUT COMPLICATION, WITH LONG-TERM CURRENT USE OF INSULIN: ICD-10-CM

## 2024-09-13 DIAGNOSIS — G21.19 DRUG-INDUCED PARKINSONISM: ICD-10-CM

## 2024-09-13 DIAGNOSIS — Z79.4 DIABETES MELLITUS TREATED WITH INSULIN: ICD-10-CM

## 2024-09-13 DIAGNOSIS — E66.9 OBESITY (BMI 35.0-39.9 WITHOUT COMORBIDITY): ICD-10-CM

## 2024-09-13 DIAGNOSIS — F32.A ANXIETY AND DEPRESSION: ICD-10-CM

## 2024-09-13 DIAGNOSIS — E11.9 TYPE 2 DIABETES MELLITUS WITHOUT COMPLICATION, WITH LONG-TERM CURRENT USE OF INSULIN: ICD-10-CM

## 2024-09-13 DIAGNOSIS — Z76.89 ENCOUNTER TO ESTABLISH CARE: Primary | ICD-10-CM

## 2024-09-13 DIAGNOSIS — E11.9 DIABETES MELLITUS TREATED WITH INSULIN: ICD-10-CM

## 2024-09-13 DIAGNOSIS — E03.9 HYPOTHYROIDISM, UNSPECIFIED TYPE: ICD-10-CM

## 2024-09-13 DIAGNOSIS — F41.9 ANXIETY AND DEPRESSION: ICD-10-CM

## 2024-09-13 DIAGNOSIS — J45.30 MILD PERSISTENT ASTHMA, UNSPECIFIED WHETHER COMPLICATED: ICD-10-CM

## 2024-09-13 LAB — GLUCOSE BLDC GLUCOMTR-MCNC: 122 MG/DL (ref 70–130)

## 2024-09-13 PROCEDURE — G0008 ADMIN INFLUENZA VIRUS VAC: HCPCS | Performed by: STUDENT IN AN ORGANIZED HEALTH CARE EDUCATION/TRAINING PROGRAM

## 2024-09-13 PROCEDURE — 90656 IIV3 VACC NO PRSV 0.5 ML IM: CPT | Performed by: STUDENT IN AN ORGANIZED HEALTH CARE EDUCATION/TRAINING PROGRAM

## 2024-09-13 PROCEDURE — 82948 REAGENT STRIP/BLOOD GLUCOSE: CPT | Performed by: STUDENT IN AN ORGANIZED HEALTH CARE EDUCATION/TRAINING PROGRAM

## 2024-09-13 PROCEDURE — 84443 ASSAY THYROID STIM HORMONE: CPT | Performed by: STUDENT IN AN ORGANIZED HEALTH CARE EDUCATION/TRAINING PROGRAM

## 2024-09-13 PROCEDURE — 36415 COLL VENOUS BLD VENIPUNCTURE: CPT | Performed by: STUDENT IN AN ORGANIZED HEALTH CARE EDUCATION/TRAINING PROGRAM

## 2024-09-13 PROCEDURE — 1126F AMNT PAIN NOTED NONE PRSNT: CPT | Performed by: STUDENT IN AN ORGANIZED HEALTH CARE EDUCATION/TRAINING PROGRAM

## 2024-09-13 PROCEDURE — 84439 ASSAY OF FREE THYROXINE: CPT | Performed by: STUDENT IN AN ORGANIZED HEALTH CARE EDUCATION/TRAINING PROGRAM

## 2024-09-13 PROCEDURE — 99214 OFFICE O/P EST MOD 30 MIN: CPT | Performed by: STUDENT IN AN ORGANIZED HEALTH CARE EDUCATION/TRAINING PROGRAM

## 2024-09-13 NOTE — ASSESSMENT & PLAN NOTE
Patient presenting to establish care. We reviewed past medical history, social history, family history and addressed chief complaints. Reviewed medications.    We reviewed health-maintenance. She is UTD on vaccines. Flu received today. She has recently done a mammogram and Cologuard. She is due for a pap smear which we will do at our next visit.

## 2024-09-13 NOTE — PATIENT INSTRUCTIONS
We will have our medicare wellness exam soon.     We will update your pap smear.     Please try to send us copies of your eye exam from [facility].     Diabetes:   Continue 20 U insulin in the morning.   Decrease to 26 units insulin in the evening. Make sure you eat within 5-10 minutes after taking.

## 2024-09-13 NOTE — PROGRESS NOTES
Office Note     Name: Vilma Ayala    : 1970     MRN: [id number]     Chief Complaint  Thyroid Problem and Diabetes    Subjective     History of Present Illness:  Vilma Ayala is a 53 y.o. female who presents today for establish care:     Previous doctor retired. Sees phychiatric, endo and pulmonology.     Diabetes Management:  Med: takes insulin twice a day; 15 mins before breakfast and 15 mins before dinner. Sugars are dropping down after evening insulin (40s-50s) 1-2x a week. Taking 20U and 30U at night. Very consistent with taking her insulin.     Past Medical History:   Chronic Medical Conditions: T2DM, hypothyroidism, asthma, depression/anxiety, parkinsonism   Hospitalizations: not in the last years   Surgeries: NA  Allergies:   LMP/menopause: no; went through menopause around age 40   Contraception: NA   Herbal/Vitamin/Supplements: NA    Social History:  Occupation:   Home: lives at [facility] Adult [facility] Facility. Gets help with meals. Iunspecified lifelong disability.   EtOH: None  Tobacco: Never  Other drug use: None   Risk for STI: None   Exercise: Does not exercises. Tries to exercise while sited in her chair.   Diet: Food is katlyn healthy at her group home.   Good support system.     Family History:  Mother:   Father:   MGM:   MGF:   PGM:   PGF:   Siblings: Sister (Jory), younger brother (diabetes mellitus) and older brother  Children: None    PHQ-2 Depression Screening  Little interest or pleasure in doing things? 0-->not at all   Feeling down, depressed, or hopeless? 0-->not at all   PHQ-2 Total Score 0     Health Maintenance:  Mammogram: UTD  Colonoscopy: Cologuard done this year and reportedly normal   Pap: due for pap smear   Pneumonia: up-to-date  Shingrix: up-to-date  Flu: due   COVID: recently had to COVID  DEXA: NA  RSV: NA    HgA1c: UTD   Lab Results   Component Value Date    HGBA1C 6.7 2024   Urine albumin: UTD  Lab Results   Component Value Date     MICROALBUR <1.2 12/14/2023   Statin therapy: not taking  The 10-year ASCVD risk score (Geronimo RENDON, et al., 2019) is: 3.5%    Values used to calculate the score:      Age: 53 years      Sex: Female      Is Non- : No      Diabetic: Yes      Tobacco smoker: No      Systolic Blood Pressure: 118 mmHg      Is BP treated: No      HDL Cholesterol: 41 mg/dL      Total Cholesterol: 190 mg/dL    ACE-I/ARB: not taking  Eye exam: thinks she had this done this year; they will try to get records  Diabetic foot exam: needs done    Review of Systems:   Review of Systems    Past Medical History:   Past Medical History:   Diagnosis Date    Anxiety     Chicken pox     Depression     Diabetes mellitus     Disease of thyroid gland     Measles     Type 2 diabetes mellitus        Past Surgical History:   Past Surgical History:   Procedure Laterality Date    EYE SURGERY      TONSILLECTOMY         Family History:   Family History   Problem Relation Age of Onset    Hypertension Other     Thyroid disease Other     Diabetes Other     Obesity Other     Diabetes Mother     Hypertension Mother     Heart disease Mother     Cancer Father     Diabetes Sister     Diabetes Brother     No Known Problems Brother        Social History:   Social History     Socioeconomic History    Marital status: Single   Tobacco Use    Smoking status: Never    Smokeless tobacco: Never   Vaping Use    Vaping status: Never Used   Substance and Sexual Activity    Alcohol use: No    Drug use: No    Sexual activity: Not Currently       Immunizations:   Immunization History   Administered Date(s) Administered    COVID-19 (MODERNA) 1st,2nd,3rd Dose Monovalent 05/06/2021, 06/05/2021, 01/04/2022    COVID-19 (MODERNA) BIVALENT 12+YRS 10/26/2022    COVID-19 F23 (MODERNA) 12YRS+ (SPIKEVAX) 10/16/2023    Flu Vaccine Quad PF >36MO 10/04/2017, 12/29/2021    Fluzone  >6mos 09/13/2024    Fluzone (or Fluarix & Flulaval for VFC) >6mos 10/04/2017, 12/29/2021,  "10/26/2022, 11/14/2023    Fluzone Quad >6mos (Multi-dose) 10/04/2015    Hep A / Hep B 02/16/2024    Hepatitis A 05/09/2018, 05/09/2018, 10/04/2018, 01/10/2020, 01/10/2020    Influenza, Unspecified 10/04/2018, 10/26/2022    Pneumococcal Conjugate 20-Valent (PCV20) 09/11/2023    Pneumococcal Polysaccharide (PPSV23) 05/28/2016, 05/28/2016    Shingrix 12/29/2021, 09/11/2023, 02/16/2024    Tdap 05/24/2024    flucelvax quad pfs =>4 YRS 10/04/2018        Medications:     Current Outpatient Medications:     B-D UF III MINI PEN NEEDLES 31G X 5 MM misc, USE TO INJECT INSULIN TWICE A DAY BEFORE MEALS, Disp: , Rfl:     benztropine (COGENTIN) 0.5 MG tablet, Take 1 tablet by mouth As Needed., Disp: , Rfl:     Blood Glucose Monitoring Suppl w/Device kit, Use daily to test blood sugars, Disp: 1 each, Rfl: 0    busPIRone (BUSPAR) 15 MG tablet, Take 1 tablet by mouth Every 12 (Twelve) Hours., Disp: , Rfl:     citalopram (CeleXA) 40 MG tablet, Take 1 tablet by mouth Daily., Disp: 90 tablet, Rfl: 1    Fluticasone-Umeclidin-Vilant (Trelegy Ellipta) 100-62.5-25 MCG/ACT inhaler, Inhale 1 puff Daily., Disp: 60 each, Rfl: 5    glucose blood test strip, Test blood sugars QID, Disp: 150 each, Rfl: 5    hydrOXYzine pamoate (VISTARIL) 50 MG capsule, , Disp: , Rfl:     ibuprofen (ADVIL,MOTRIN) 800 MG tablet, Take 1 tablet by mouth Every 8 (Eight) Hours As Needed for Mild Pain or Moderate Pain for up to 30 doses., Disp: 30 tablet, Rfl: 0    Insulin Syringe-Needle U-100 31G X 15/64\" 0.5 ML misc, Use 2 per day to administer insulin, Disp: 200 each, Rfl: 1    Lancets misc, Test blood sugars QID, Disp: 200 each, Rfl: 3    levothyroxine (SYNTHROID, LEVOTHROID) 200 MCG tablet, TAKE 1 TABLET BY MOUTH ONCE DAILY ALONG  WITH  25MCG  DOSE, Disp: 90 tablet, Rfl: 1    NovoLOG Mix 70/30 (70-30) 100 UNIT/ML injection, INJECT 34 UNITS SUBCUTANEOUSLY BEFORE BREAKFAST AND 12 BEFORE SUPPER, Disp: 40 mL, Rfl: 0    risperiDONE (risperDAL) 3 MG tablet, Take 1 tablet " "by mouth 2 (Two) Times a Day., Disp: , Rfl:     Ventolin  (90 Base) MCG/ACT inhaler, Inhale 2 puffs 4 (Four) Times a Day., Disp: 18 g, Rfl: 0    ammonium lactate (Lac-Hydrin) 12 % lotion, Apply BID to feet (Patient not taking: Reported on 9/13/2024), Disp: 225 g, Rfl: 3    Cholecalciferol (VITAMIN D3 PO), Take 1 tablet by mouth Daily. (Patient not taking: Reported on 9/13/2024), Disp: , Rfl:     Livalo 1 MG tablet tablet, , Disp: , Rfl:     LORazepam (ATIVAN) 0.5 MG tablet, Take 1 tablet by mouth 2 (Two) Times a Day As Needed. for anxiety (Patient not taking: Reported on 9/13/2024), Disp: , Rfl:     metFORMIN (GLUCOPHAGE) 500 MG tablet, TAKE 2 TABLETS BY MOUTH ONCE DAILY WITH FOOD (Patient not taking: Reported on 9/13/2024), Disp: 60 tablet, Rfl: 4    Allergies:   Allergies   Allergen Reactions    Doxepin Rash    Januvia [Sitagliptin] Other (See Comments)     Insomnia       Objective     Vital Signs  /76 (BP Location: Right arm, Patient Position: Sitting, Cuff Size: Adult)   Pulse 86   Temp 98.6 °F (37 °C) (Infrared)   Ht 167.6 cm (65.98\")   Wt 99.5 kg (219 lb 6.4 oz)   SpO2 98%   BMI 35.43 kg/m²   Estimated body mass index is 35.43 kg/m² as calculated from the following:    Height as of this encounter: 167.6 cm (65.98\").    Weight as of this encounter: 99.5 kg (219 lb 6.4 oz).    Class 2 Severe Obesity (BMI >=35 and <=39.9). Obesity-related health conditions include the following: diabetes mellitus and dyslipidemias. Obesity is unchanged. BMI is is above average; BMI management plan is completed. We discussed portion control and increasing exercise.       Physical Exam  Constitutional:       Appearance: Normal appearance.   HENT:      Head: Normocephalic and atraumatic.      Nose: Nose normal.   Eyes:      Conjunctiva/sclera: Conjunctivae normal.      Pupils: Pupils are equal, round, and reactive to light.   Cardiovascular:      Rate and Rhythm: Normal rate and regular rhythm.      Pulses: Normal " pulses.   Pulmonary:      Effort: Pulmonary effort is normal.      Breath sounds: Normal breath sounds.   Abdominal:      General: Abdomen is flat.   Neurological:      General: No focal deficit present.      Mental Status: She is alert and oriented to person, place, and time.   Psychiatric:         Mood and Affect: Mood normal.         Behavior: Behavior normal.         Thought Content: Thought content normal.          Procedures     Results:  Recent Results (from the past 24 hour(s))   POCT Glucose    Collection Time: 09/13/24 11:26 AM    Specimen: Blood   Result Value Ref Range    Glucose 122 70 - 130 mg/dL        Assessment and Plan     Assessment/Plan:  Diagnoses and all orders for this visit:    1. Encounter to establish care (Primary)  Assessment & Plan:  Patient presenting to establish care. We reviewed past medical history, social history, family history and addressed chief complaints. Reviewed medications.    We reviewed health-maintenance. She is UTD on vaccines. Flu received today. She has recently done a mammogram and Cologuard. She is due for a pap smear which we will do at our next visit.       2. Hypothyroidism, unspecified type  Overview:        Assessment & Plan:  Chronic, uncontrolled. Most recent TSH 20. We discussed how to properly take medication. She states she sometimes takes it with food and sometimes takes it on an empty stomach. We discussed the importance of taking this medication correctly. She is not taking any biotin supplements. Will repeat TSH today. She is not endorsing symptoms of hypothyroidism. Will review labs but at this time, continue Synthroid 225mcg.    Orders:  -     TSH Rfx On Abnormal To Free T4; Future    3. Diabetes mellitus treated with insulin  Assessment & Plan:  Diabetes is stable. Continues to follow with endocrinology. Her glucose sensor broke about 3 days ago. I have sent in a refill for a new sensor. We discussed blood glucose goal and reviewed most recent A1c.  She has been having 1-2 episodes of hypoglycemia a week, usually associated with dinner. Sugars drop 40-50. She is sometimes waiting 15+ minutes to eat after taking 70/30. Even when eating soon after taking, she is experiencing lows. We will decrease evening inuslin from 30 units to 26 units. Continue 20 units 70/30 in the morning. A1c should be repeated in November. She is UTD on microalbumin. She thinks she has gotten her eye exam this year and she is going to provide us records. She needs an updated foot exam which we will do at her next visit.     She is not taking a statin. This will need to be addressed at future visit.   Diabetes will be reassessed in 3 months    Orders:  -     POCT Glucose    4. Drug-induced parkinsonism  Assessment & Plan:  Managed by psychiatry. Thought to be secondary to risperdal use. Continue benztropine.       5. Mild persistent asthma, unspecified whether complicated  Assessment & Plan:  Follows with pulm. No acute concerns today. Continue Trelegy.       6. Anxiety and depression  Assessment & Plan:  Follows with psychiatry. Well controlled on Celexa and Risperdal.       7. Type 2 diabetes mellitus without complication, with long-term current use of insulin  Assessment & Plan:  Diabetes is stable. Continues to follow with endocrinology. Her glucose sensor broke about 3 days ago. I have sent in a refill for a new sensor. We discussed blood glucose goal and reviewed most recent A1c. She has been having 1-2 episodes of hypoglycemia a week, usually associated with dinner. Sugars drop 40-50. She is sometimes waiting 15+ minutes to eat after taking 70/30. Even when eating soon after taking, she is experiencing lows. We will decrease evening inuslin from 30 units to 26 units. Continue 20 units 70/30 in the morning. A1c should be repeated in November. She is UTD on microalbumin. She thinks she has gotten her eye exam this year and she is going to provide us records. She needs an updated foot  exam which we will do at her next visit.     She is not taking a statin. This will need to be addressed at future visit.   Diabetes will be reassessed in 3 months      8. Obesity (BMI 35.0-39.9 without comorbidity)  Assessment & Plan:  Patient's (Body mass index is 35.43 kg/m².) indicates that they are obese (BMI >30) with health conditions that include diabetes mellitus and dyslipidemias . Weight is unchanged. BMI  is above average; BMI management plan is completed. We discussed portion control and increasing exercise. She is going to work on exercising more at her group home and I encouraged her to participate in group exercise classes and continue to watch carbohydrate intake.       Other orders  -     Blood Glucose Monitoring Suppl w/Device kit; Use daily to test blood sugars  Dispense: 1 each; Refill: 0  -     Fluzone >6mos ([id number])        Follow Up  Return for Medicare Wellness.    Vanessa Kaur MD   [facility]

## 2024-09-13 NOTE — ASSESSMENT & PLAN NOTE
Diabetes is stable. Continues to follow with endocrinology. Reviewed previous notes and albs from most recent visit with lex. Her glucose sensor broke about 3 days ago. I have sent in a refill for a new sensor. We discussed blood glucose goal and reviewed most recent A1c. She has been having 1-2 episodes of hypoglycemia a week, usually associated with dinner. Sugars drop 40-50. She is sometimes waiting 15+ minutes to eat after taking 70/30. Even when eating soon after taking, she is experiencing lows. We will decrease evening inuslin from 30 units to 26 units. Continue 20 units 70/30 in the morning. A1c should be repeated in November. She is UTD on microalbumin. She thinks she has gotten her eye exam this year and she is going to provide us records. She needs an updated foot exam which we will do at her next visit.     She is not taking a statin. This will need to be addressed at future visit.   Diabetes will be reassessed in 3 months

## 2024-09-13 NOTE — ASSESSMENT & PLAN NOTE
Patient's (Body mass index is 35.43 kg/m².) indicates that they are obese (BMI >30) with health conditions that include diabetes mellitus and dyslipidemias . Weight is unchanged. BMI  is above average; BMI management plan is completed. We discussed portion control and increasing exercise. She is going to work on exercising more at her group home and I encouraged her to participate in group exercise classes and continue to watch carbohydrate intake.

## 2024-09-13 NOTE — ASSESSMENT & PLAN NOTE
Chronic, uncontrolled. Most recent TSH 20. We discussed how to properly take medication. She states she sometimes takes it with food and sometimes takes it on an empty stomach. We discussed the importance of taking this medication correctly. She is not taking any biotin supplements. Will repeat TSH today. She is not endorsing symptoms of hypothyroidism. Will review labs but at this time, continue Synthroid 225mcg.

## 2024-09-14 LAB
T4 FREE SERPL-MCNC: 2.01 NG/DL (ref 0.92–1.68)
TSH SERPL DL<=0.05 MIU/L-ACNC: 0.14 UIU/ML (ref 0.27–4.2)

## 2024-09-16 ENCOUNTER — TELEPHONE (OUTPATIENT)
Dept: INTERNAL MEDICINE | Facility: CLINIC | Age: 54
End: 2024-09-16
Payer: MEDICARE

## 2024-09-18 ENCOUNTER — TELEPHONE (OUTPATIENT)
Dept: ENDOCRINOLOGY | Facility: CLINIC | Age: 54
End: 2024-09-18
Payer: MEDICARE

## 2024-09-19 ENCOUNTER — PATIENT ROUNDING (BHMG ONLY) (OUTPATIENT)
Dept: INTERNAL MEDICINE | Facility: CLINIC | Age: 54
End: 2024-09-19
Payer: MEDICARE

## 2024-09-30 RX ORDER — BLOOD-GLUCOSE METER
KIT MISCELLANEOUS DAILY
Qty: 1 KIT | Refills: 0 | OUTPATIENT
Start: 2024-09-30

## 2024-10-01 ENCOUNTER — TELEPHONE (OUTPATIENT)
Dept: ENDOCRINOLOGY | Facility: CLINIC | Age: 54
End: 2024-10-01
Payer: MEDICARE

## 2024-10-01 NOTE — TELEPHONE ENCOUNTER
Her sugar has been dropping in the mornings for the last week. Feels week and sweaty when it happens. Has not tested as they are out of test strips.  Would like to speak to someone in clinical      Please advise

## 2024-10-01 NOTE — TELEPHONE ENCOUNTER
I called the pt and she stated she has never wore a sensor    She is feeling weak and shaky after she takes her morning injection.

## 2024-10-01 NOTE — TELEPHONE ENCOUNTER
Called the pt and she is not able to give any BS readings she had no strips. She has strip starting today.    She is currently taking Metformin 500 BID and Novolog Mix 34 units in the am and 14 in the pm.    Please advise.

## 2024-10-01 NOTE — TELEPHONE ENCOUNTER
Ok to reduce morning novolog 70/30 to 30 u   Please have her let us know if she continues to have low sugars  Thanks,   Andre Lopez MD

## 2024-10-01 NOTE — TELEPHONE ENCOUNTER
When I last saw her in 12/23 she was using a sensor  Can we go back to using a sensor ?  Also can you find out when in relation to her morning shot she is feeling weak and shaky?  Is it before the shot when she first wakes up or is it late morning 1-2 hours after she has had the shot?  Thanks,   Andre Lopez MD

## 2024-10-01 NOTE — TELEPHONE ENCOUNTER
PATIENT CALLED. READ THEM THE NOTE AND THEY WILL REDUCE HER NOVOLOG AND WILL CALL IF IT CONTINUES TO BE LOW.

## 2024-10-08 ENCOUNTER — HOSPITAL ENCOUNTER (EMERGENCY)
Facility: HOSPITAL | Age: 54
Discharge: HOME | End: 2024-10-08
Attending: EMERGENCY MEDICINE
Payer: MEDICARE

## 2024-10-08 VITALS
WEIGHT: 232 LBS | BODY MASS INDEX: 37.28 KG/M2 | DIASTOLIC BLOOD PRESSURE: 85 MMHG | TEMPERATURE: 98.2 F | RESPIRATION RATE: 18 BRPM | HEART RATE: 94 BPM | HEIGHT: 66 IN | SYSTOLIC BLOOD PRESSURE: 136 MMHG | OXYGEN SATURATION: 97 %

## 2024-10-08 DIAGNOSIS — N30.00 ACUTE CYSTITIS WITHOUT HEMATURIA: Primary | ICD-10-CM

## 2024-10-08 LAB
BACTERIA UR QL AUTO: ABNORMAL /HPF
BILIRUB UR QL STRIP: NEGATIVE
CLARITY UR: CLEAR
COLOR UR: YELLOW
GLUCOSE UR STRIP-MCNC: NEGATIVE MG/DL
HGB UR QL STRIP.AUTO: NEGATIVE
HYALINE CASTS UR QL AUTO: ABNORMAL /LPF
KETONES UR QL STRIP: NEGATIVE
LEUKOCYTE ESTERASE UR QL STRIP.AUTO: ABNORMAL
NITRITE UR QL STRIP: NEGATIVE
PH UR STRIP.AUTO: 6.5 [PH] (ref 5–8)
PROT UR QL STRIP: NEGATIVE
RBC # UR STRIP: ABNORMAL /HPF
REF LAB TEST METHOD: ABNORMAL
SP GR UR STRIP: <=1.005 (ref 1–1.03)
SQUAMOUS #/AREA URNS HPF: ABNORMAL /HPF
UROBILINOGEN UR QL STRIP: ABNORMAL
WBC # UR STRIP: ABNORMAL /HPF

## 2024-10-08 PROCEDURE — 81001 URINALYSIS AUTO W/SCOPE: CPT | Performed by: EMERGENCY MEDICINE

## 2024-10-08 PROCEDURE — 99283 EMERGENCY DEPT VISIT LOW MDM: CPT

## 2024-10-08 PROCEDURE — 87086 URINE CULTURE/COLONY COUNT: CPT | Performed by: EMERGENCY MEDICINE

## 2024-10-08 RX ORDER — CEPHALEXIN 500 MG/1
500 CAPSULE ORAL 2 TIMES DAILY
Qty: 14 CAPSULE | Refills: 0 | Status: SHIPPED | OUTPATIENT
Start: 2024-10-08 | End: 2024-10-15

## 2024-10-08 RX ADMIN — CEPHALEXIN 500 MG: 250 CAPSULE ORAL at 23:32

## 2024-10-09 NOTE — ED PROVIDER NOTES
Subjective   History of Present Illness  Is a 53-year-old female with a history of diabetes and anxiety presented to the emergency department for some dysuria.  The patient states that she has had some burning with urination increased frequency over the last few days.  Patient has a history of urinary tract infections in the past.  Patient denies any abdominal pain.  No fevers or chills.  No headache or change in vision.  No focal weakness.  No chest pain or shortness of breath    History provided by:  Patient and EMS personnel   used: No        Review of Systems   Constitutional:  Negative for chills and fever.   HENT:  Negative for congestion, ear pain and sore throat.    Eyes:  Negative for visual disturbance.   Respiratory:  Negative for shortness of breath.    Cardiovascular:  Negative for chest pain.   Gastrointestinal:  Negative for abdominal pain.   Genitourinary:  Positive for dysuria. Negative for difficulty urinating.   Musculoskeletal:  Negative for arthralgias.   Skin:  Negative for rash.   Neurological:  Negative for dizziness, weakness and numbness.   Psychiatric/Behavioral:  Negative for agitation.        Past Medical History:   Diagnosis Date    Anxiety     Chicken pox     Depression     Diabetes mellitus     Disease of thyroid gland     Measles     Type 2 diabetes mellitus        Allergies   Allergen Reactions    Doxepin Rash    Januvia [Sitagliptin] Other (See Comments)     Insomnia       Past Surgical History:   Procedure Laterality Date    EYE SURGERY      TONSILLECTOMY         Family History   Problem Relation Age of Onset    Hypertension Other     Thyroid disease Other     Diabetes Other     Obesity Other     Diabetes Mother     Hypertension Mother     Heart disease Mother     Cancer Father     Diabetes Sister     Diabetes Brother     No Known Problems Brother        Social History     Socioeconomic History    Marital status: Single   Tobacco Use    Smoking status: Never     Smokeless tobacco: Never   Vaping Use    Vaping status: Never Used   Substance and Sexual Activity    Alcohol use: No    Drug use: No    Sexual activity: Not Currently           Objective   Physical Exam  Vitals and nursing note reviewed.   Constitutional:       General: She is not in acute distress.     Appearance: She is not ill-appearing or toxic-appearing.   Eyes:      Conjunctiva/sclera: Conjunctivae normal.      Pupils: Pupils are equal, round, and reactive to light.   Cardiovascular:      Rate and Rhythm: Normal rate and regular rhythm.   Pulmonary:      Effort: Pulmonary effort is normal. No respiratory distress.   Abdominal:      General: Abdomen is flat. There is no distension.      Palpations: There is no mass.      Tenderness: There is no abdominal tenderness. There is no guarding or rebound.   Musculoskeletal:         General: No deformity. Normal range of motion.   Skin:     General: Skin is warm.      Findings: No rash.   Neurological:      General: No focal deficit present.      Mental Status: She is alert and oriented to person, place, and time.      Motor: No weakness.         Procedures           ED Course  ED Course as of 10/08/24 2323   Tue Oct 08, 2024   2321 BP: 137/89 [JK]   2321 Temp: 98.2 °F (36.8 °C) [JK]   2321 Heart Rate: 113 [JK]   2321 Resp: 20 [JK]   2321 SpO2: 96 % [JK]   2321 Device (Oxygen Therapy): room air  Interpretation:  Patient's repeat vitals, telemetry tracing, and pulse oximetry tracing were directly viewed and interpreted by myself.   O2 sat 96% on room air, interpreted as normal  Telemetry rhythm strip revealed a rate of 113 bpm, interpreted as sinus tachycardia [JK]   2321 Urinalysis With Culture If Indicated - Urine, Clean Catch(!) [JK]   2321 Urinalysis, Microscopic Only - Urine, Clean Catch(!)  Interpretation:  Laboratory studies were reviewed and interpreted directly by myself.  Urinalysis did show some trace bacteria as well as white cells [JK]   2321 Given the  patient's symptoms and findings on urinalysis, symptoms are consistent with acute cystitis.  Repeat exam shows no evidence of acute abdomen.  Patient be placed on antibiotic therapy.  He has follow-up with her PCP in 48 hours.  Given strict return precautions.  Verbalized understanding. [JK]   4311 I had a discussion with the patient/family regarding diagnosis, diagnostic results, treatment plan, and medications. The patient/family indicated understanding of these instructions. I spent adequate time at the bedside prior to discharge necessary to discuss the aftercare instructions, giving patient education, providing explanations of the results of our evaluations/findings, and my decision making to assure that the patient/family understand the plan of care. Time was allotted to answer questions at that time and throughout the ED course. Patient is required to maintain timely follow up, as discussed. I also discussed the potential for the development of an acute emergent condition requiring further evaluation, return to the ER, admission, or even surgical intervention.  I encouraged the patient to return to the emergency department immediately for any concerns, worsening symptoms, new complaints, or if symptoms persist and they are unable to seek follow-up in a timely fashion. The patient/family expressed understanding and agreement with this plan    Shared decision making:   After full review of the patient's clinical presentation, review of any work-up including but not limited to laboratory studies and radiology obtained, I had a discussion with the patient.  Treatment options were discussed as well as the risks, benefits and consequences.  I discussed all findings with the patient and family members if available.  During the discussion, treatment goals were understood by all as well as any misconceptions which were addressed with the patient.  Ample time was given for any questions they may have had.  They are in  agreement with the treatment plan as well as final disposition. [JK]      ED Course User Index  [JK] Neto Fisher MD                                             Medical Decision Making  This is a 53-year-old female presented to the emergency department with some dysuria.  The patient symptoms are extremely consistent for acute cystitis.  She also has diabetes and some glucosuria may be contributing to her symptoms.  Overall the patient is nontoxic.  Hemodynamically stable.  Afebrile.  No evidence of acute abdomen on exam.  Workup initiated.      Differential diagnosis: UTI, cystitis, glucosuria, bladder spasm    Amount and/or Complexity of Data Reviewed  Independent Historian: EMS  External Data Reviewed: labs, radiology and notes.     Details: External laboratories, imaging as well as notes were reviewed personally by myself.  All relevant studies were used to guide decision making.     Date of previous record: 9/25/2024    Source of note: Neurology    Summary: Patient was seen for Parkinson type symptoms.  I did review basic laboratory studies on file as well as a previous CT of the head.  Records viewed    Labs: ordered. Decision-making details documented in ED Course.        Final diagnoses:   Acute cystitis without hematuria       ED Disposition  ED Disposition       ED Disposition   Discharge    Condition   Stable    Comment   --               Vanessa Kaur MD  [address]  [address]  [place] [address]  [phone]    Call in 1 day           Medication List        New Prescriptions      cephalexin 500 MG capsule  Commonly known as: KEFLEX  Take 1 capsule by mouth 2 (Two) Times a Day for 7 days.               Where to Get Your Medications        These medications were sent to [facility] - [place], [place] - [address] - [phone]  - [phone]   [address] [address]      Phone: [phone]   cephalexin 500 MG capsule             Neto Fisher MD  10/08/24 7568

## 2024-10-10 LAB — BACTERIA SPEC AEROBE CULT: NORMAL

## 2024-11-04 ENCOUNTER — OFFICE VISIT (OUTPATIENT)
Dept: ENDOCRINOLOGY | Facility: CLINIC | Age: 54
End: 2024-11-04
Payer: MEDICARE

## 2024-11-04 VITALS
SYSTOLIC BLOOD PRESSURE: 132 MMHG | WEIGHT: 229.2 LBS | DIASTOLIC BLOOD PRESSURE: 80 MMHG | HEART RATE: 79 BPM | OXYGEN SATURATION: 99 % | HEIGHT: 66 IN | BODY MASS INDEX: 36.83 KG/M2

## 2024-11-04 DIAGNOSIS — E11.9 TYPE 2 DIABETES MELLITUS WITHOUT COMPLICATION, WITH LONG-TERM CURRENT USE OF INSULIN: Primary | ICD-10-CM

## 2024-11-04 DIAGNOSIS — E78.2 MIXED HYPERLIPIDEMIA: ICD-10-CM

## 2024-11-04 DIAGNOSIS — Z79.4 TYPE 2 DIABETES MELLITUS WITHOUT COMPLICATION, WITH LONG-TERM CURRENT USE OF INSULIN: Primary | ICD-10-CM

## 2024-11-04 DIAGNOSIS — E03.9 ACQUIRED HYPOTHYROIDISM: ICD-10-CM

## 2024-11-04 LAB
EXPIRATION DATE: ABNORMAL
EXPIRATION DATE: ABNORMAL
GLUCOSE BLDC GLUCOMTR-MCNC: 50 MG/DL (ref 70–130)
HBA1C MFR BLD: 6.5 % (ref 4.5–5.7)
Lab: ABNORMAL
Lab: ABNORMAL

## 2024-11-04 PROCEDURE — 1159F MED LIST DOCD IN RCRD: CPT | Performed by: INTERNAL MEDICINE

## 2024-11-04 PROCEDURE — 99214 OFFICE O/P EST MOD 30 MIN: CPT | Performed by: INTERNAL MEDICINE

## 2024-11-04 PROCEDURE — 3044F HG A1C LEVEL LT 7.0%: CPT | Performed by: INTERNAL MEDICINE

## 2024-11-04 PROCEDURE — 83036 HEMOGLOBIN GLYCOSYLATED A1C: CPT | Performed by: INTERNAL MEDICINE

## 2024-11-04 PROCEDURE — 1160F RVW MEDS BY RX/DR IN RCRD: CPT | Performed by: INTERNAL MEDICINE

## 2024-11-04 PROCEDURE — 82947 ASSAY GLUCOSE BLOOD QUANT: CPT | Performed by: INTERNAL MEDICINE

## 2024-11-04 NOTE — ASSESSMENT & PLAN NOTE
Blood sugar and 90 day average sugar reviewed  Results for orders placed or performed in visit on 11/04/24   POC Glucose, Blood    Collection Time: 11/04/24 10:40 AM    Specimen: Blood   Result Value Ref Range    Glucose 50 (A) 70 - 130 mg/dL    Lot Number [id number]     Expiration Date 6/2/2025    POC Glycosylated Hemoglobin (Hb A1C)    Collection Time: 11/04/24 10:41 AM    Specimen: Blood   Result Value Ref Range    Hemoglobin A1C 6.5 (A) 4.5 - 5.7 %    Lot Number [id number]     Expiration Date 7/11/2026      Discussed lowering morning insulin dose   She cannot afford sensor  Ur alb neg recent check   Bilateral heel callus but no ulcer  Utd with eye exam- cataracts  F/u 4 months

## 2024-11-04 NOTE — ASSESSMENT & PLAN NOTE
Is taking synthroid 200 mcg daily now but was off for about 2 weeks  Importance of taking this regularly discussed

## 2024-11-04 NOTE — PROGRESS NOTES
Vilma Ayala 53 y.o.  CC: follow up II Diabetes, Hyperlipidemia and hypothyroidism    [place]: follow up II Diabetes, Hyperlipidemia and hypothyroidism    Has been off thyroid hormone for 2 weeks  Blood sugar and 90 day average sugar reviewed  Results for orders placed or performed in visit on 11/04/24   POC Glucose, Blood    Collection Time: 11/04/24 10:40 AM    Specimen: Blood   Result Value Ref Range    Glucose 50 (A) 70 - 130 mg/dL    Lot Number [id number]     Expiration Date 6/2/2025    POC Glycosylated Hemoglobin (Hb A1C)    Collection Time: 11/04/24 10:41 AM    Specimen: Blood   Result Value Ref Range    Hemoglobin A1C 6.5 (A) 4.5 - 5.7 %    Lot Number [id number]     Expiration Date 7/11/2026      Low sugar treated   Having low morning sugars - discussed reducing insulin   She is sleeping in and at times she does not eat after she takes insulin   BP is good  Cannot get sensor- does sense low sugars and asks for sodas  She cannot afford sensor   [place] lost her mother- passed away     Allergies   Allergen Reactions    Doxepin Rash    Januvia [Sitagliptin] Other (See Comments)     Insomnia       Current Outpatient Medications:     ammonium lactate (Lac-Hydrin) 12 % lotion, Apply BID to feet, Disp: 225 g, Rfl: 3    B-D UF III MINI PEN NEEDLES 31G X 5 MM misc, USE TO INJECT INSULIN TWICE A DAY BEFORE MEALS, Disp: , Rfl:     benztropine (COGENTIN) 0.5 MG tablet, Take 1 tablet by mouth As Needed., Disp: , Rfl:     Blood Glucose Monitoring Suppl w/Device kit, Use daily to test blood sugars uses accu chek, Disp: 1 each, Rfl: 0    busPIRone (BUSPAR) 15 MG tablet, Take 1 tablet by mouth Every 12 (Twelve) Hours., Disp: , Rfl:     Cholecalciferol (VITAMIN D3 PO), Take 1 tablet by mouth Daily., Disp: , Rfl:     citalopram (CeleXA) 40 MG tablet, Take 1 tablet by mouth Daily., Disp: 90 tablet, Rfl: 1    Fluticasone-Umeclidin-Vilant (Trelegy Ellipta) 100-62.5-25 MCG/ACT inhaler, Inhale 1 puff Daily., Disp: 60 each, Rfl:  "5    glucose blood test strip, Test blood sugars QID, Disp: 150 each, Rfl: 1    hydrOXYzine pamoate (VISTARIL) 50 MG capsule, , Disp: , Rfl:     ibuprofen (ADVIL,MOTRIN) 800 MG tablet, Take 1 tablet by mouth Every 8 (Eight) Hours As Needed for Mild Pain or Moderate Pain for up to 30 doses., Disp: 30 tablet, Rfl: 0    Insulin Syringe-Needle U-100 31G X 15/64\" 0.5 ML misc, Use 2 per day to administer insulin, Disp: 200 each, Rfl: 1    Lancets misc, Test blood sugars QID, Disp: 200 each, Rfl: 3    levothyroxine (SYNTHROID, LEVOTHROID) 200 MCG tablet, TAKE 1 TABLET BY MOUTH ONCE DAILY ALONG  WITH  25MCG  DOSE, Disp: 90 tablet, Rfl: 1    Livalo 1 MG tablet tablet, , Disp: , Rfl:     LORazepam (ATIVAN) 0.5 MG tablet, Take 1 tablet by mouth 2 (Two) Times a Day As Needed. for anxiety, Disp: , Rfl:     metFORMIN (GLUCOPHAGE) 500 MG tablet, TAKE 2 TABLETS BY MOUTH ONCE DAILY WITH FOOD, Disp: 60 tablet, Rfl: 5    NovoLOG Mix 70/30 (70-30) 100 UNIT/ML injection, INJECT 34 UNITS SUBCUTANEOUSLY BEFORE BREAKFAST AND 12 BEFORE SUPPER, Disp: 40 mL, Rfl: 0    risperiDONE (risperDAL) 3 MG tablet, Take 1 tablet by mouth 2 (Two) Times a Day., Disp: , Rfl:     Ventolin  (90 Base) MCG/ACT inhaler, Inhale 2 puffs 4 (Four) Times a Day., Disp: 18 g, Rfl: 0    Patient Active Problem List    Diagnosis     Obesity (BMI 35.0-39.9 without comorbidity) [E66.9]     Drug-induced parkinsonism [G21.19]     Encounter to establish care [Z76.89]     Mild persistent asthma [J45.30]     Decreased GFR [R94.4]     Microscopic hematuria [R31.29]     Nocturia [R35.1]     Nocturnal enuresis [N39.44]     Stress incontinence [N39.3]     Urge incontinence [N39.41]     Urinary incontinence [R32]     Type 2 diabetes mellitus without complication, with long-term current use of insulin [E11.9, Z79.4]     Dyspnea on exertion [R06.09]     Mixed hyperlipidemia [E78.2]     Anxiety and depression [F41.9, F32.A]     Uncontrolled type 2 diabetes mellitus [ICF7966]     " "Hypothyroid [E03.9]     Obesity [E66.9]     Vitamin D deficiency, unspecified [E55.9]     Diabetic neuropathy, type II diabetes mellitus [E11.40]      Review of Systems   Constitutional:  Negative for activity change, appetite change and unexpected weight change.   HENT:  Negative for congestion and rhinorrhea.    Eyes:  Negative for visual disturbance.   Respiratory:  Negative for cough and shortness of breath.    Cardiovascular:  Negative for palpitations and leg swelling.   Gastrointestinal:  Negative for constipation, diarrhea and nausea.   Genitourinary:  Negative for hematuria.   Musculoskeletal:  Negative for arthralgias, back pain, gait problem, joint swelling and myalgias.   Skin:  Negative for color change, rash and wound.   Allergic/Immunologic: Negative for environmental allergies, food allergies and immunocompromised state.   Neurological:  Negative for dizziness, weakness and light-headedness.   Psychiatric/Behavioral:  Negative for confusion, decreased concentration, dysphoric mood and sleep disturbance. The patient is not nervous/anxious.      Social History     Socioeconomic History    Marital status: Single   Tobacco Use    Smoking status: Never    Smokeless tobacco: Never   Vaping Use    Vaping status: Never Used   Substance and Sexual Activity    Alcohol use: No    Drug use: No    Sexual activity: Not Currently     Family History   Problem Relation Age of Onset    Hypertension Other     Thyroid disease Other     Diabetes Other     Obesity Other     Diabetes Mother     Hypertension Mother     Heart disease Mother     Cancer Father     Diabetes Sister     Diabetes Brother     No Known Problems Brother      /80   Pulse 79   Ht 167.6 cm (65.98\")   Wt 104 kg (229 lb 3.2 oz)   LMP  (LMP Unknown)   SpO2 99%   BMI 37.01 kg/m²   Physical Exam  Vitals and nursing note reviewed.   Constitutional:       Appearance: Normal appearance. She is well-developed.   HENT:      Head: Normocephalic and " atraumatic.   Eyes:      General: Lids are normal.      Extraocular Movements: Extraocular movements intact.      Conjunctiva/sclera: Conjunctivae normal.      Pupils: Pupils are equal, round, and reactive to light.   Neck:      Thyroid: No thyroid mass or thyromegaly.      Vascular: No carotid bruit.      Trachea: Trachea normal. No tracheal deviation.   Cardiovascular:      Rate and Rhythm: Normal rate and regular rhythm.      Heart sounds: Normal heart sounds. No murmur heard.     No friction rub. No gallop.   Pulmonary:      Effort: Pulmonary effort is normal. No respiratory distress.      Breath sounds: Normal breath sounds. No wheezing.   Musculoskeletal:         General: No deformity. Normal range of motion.      Cervical back: Normal range of motion and neck supple.   Lymphadenopathy:      Cervical: No cervical adenopathy.   Skin:     General: Skin is warm and dry.      Findings: No erythema or rash.      Nails: There is no clubbing.   Neurological:      Mental Status: She is alert and oriented to person, place, and time.      Cranial Nerves: No cranial nerve deficit.      Deep Tendon Reflexes: Reflexes are normal and symmetric. Reflexes normal.   Psychiatric:         Speech: Speech normal.         Behavior: Behavior normal.         Thought Content: Thought content normal.         Judgment: Judgment normal.       Results for orders placed or performed in visit on 11/04/24   POC Glucose, Blood    Collection Time: 11/04/24 10:40 AM    Specimen: Blood   Result Value Ref Range    Glucose 50 (A) 70 - 130 mg/dL    Lot Number [id number]     Expiration Date 6/2/2025    POC Glycosylated Hemoglobin (Hb A1C)    Collection Time: 11/04/24 10:41 AM    Specimen: Blood   Result Value Ref Range    Hemoglobin A1C 6.5 (A) 4.5 - 5.7 %    Lot Number [id number]     Expiration Date 7/11/2026      Diagnoses and all orders for this visit:    1. Type 2 diabetes mellitus without complication, with long-term current use of insulin  (Primary)  -     POC Glucose, Blood  -     POC Glycosylated Hemoglobin (Hb A1C)    2. Mixed hyperlipidemia  Assessment & Plan:  Is eating low fat diet - taking livalo   Recent lab work reviewed       3. Acquired hypothyroidism  Assessment & Plan:  Is taking synthroid 200 mcg daily now but was off for about 2 weeks  Importance of taking this regularly discussed       Other orders  -     metFORMIN (GLUCOPHAGE) 500 MG tablet; TAKE 2 TABLETS BY MOUTH ONCE DAILY WITH FOOD  Dispense: 60 tablet; Refill: 5    Recheck sugar 75  She was given juice, crackers to take with her on wheels transport in case of recurrent low sugar  Risk reviewed   Return in about 4 months (around 3/4/2025) for Recheck.    Electronically signed by: Selina Lopez MD

## 2024-11-08 ENCOUNTER — TELEPHONE (OUTPATIENT)
Dept: ENDOCRINOLOGY | Facility: CLINIC | Age: 54
End: 2024-11-08
Payer: MEDICARE

## 2024-11-08 DIAGNOSIS — N39.42 URINARY INCONTINENCE WITHOUT SENSORY AWARENESS: ICD-10-CM

## 2024-11-08 DIAGNOSIS — E78.2 MIXED HYPERLIPIDEMIA: ICD-10-CM

## 2024-11-08 DIAGNOSIS — E03.9 ACQUIRED HYPOTHYROIDISM: Primary | ICD-10-CM

## 2024-11-08 NOTE — TELEPHONE ENCOUNTER
Caller: Vilma Ayala    Relationship to patient: Self    Best call back number: [phone]     Patient is needing: PT CALLED TO CHECK AND SEE IF YOU WANTED HER TO DO LABS AGAIN TO CHECK HER KIDNEYS I DID NOT SEE ANYTHING IN HER CHART AS FAR AS ORDERS.

## 2024-11-11 ENCOUNTER — TELEPHONE (OUTPATIENT)
Dept: ENDOCRINOLOGY | Facility: CLINIC | Age: 54
End: 2024-11-11
Payer: MEDICARE

## 2024-11-11 NOTE — TELEPHONE ENCOUNTER
Hub staff attempted to follow warm transfer process and was unsuccessful     Caller: Vilma Ayala    Relationship to patient: Self    Best call back number: [phone]    Patient is needing: PATIENT RETURNING CALL TO [facility]- PLEASE CALL PATIENT

## 2024-11-11 NOTE — TELEPHONE ENCOUNTER
PT CALLED STATING SOMEONE TOLD HER SHE MUST SEE A DIFFERENT ENDO PROVIDER. I TOLD THAT AS LONG AS WE TAKE HER INSURANCE (WE DO) SHE SHOULD BE ABLE TO CONTINUE SEEING US. SHE REQUESTED A CALL BACK FROM CLINICAL STAFF TO CONSULT.

## 2024-11-11 NOTE — TELEPHONE ENCOUNTER
Order for labs and urine testing created  Ok to go to any [facility] lab to have these done  Thanks, vickie

## 2024-11-11 NOTE — TELEPHONE ENCOUNTER
Called the pt and she stated she needs to see nephrology. I told her to contact her PCP for referral.    She verbalized understanding.

## 2024-11-29 NOTE — DISCHARGE INSTRUCTIONS
Alternate Tylenol and ibuprofen.       Take muscle relaxer as ordered.   maternal aunt; Cancer in his paternal grandfather and other family members; Diabetes in his paternal grandfather, paternal grandmother, and other family members; Heart Disease in some other family members; High Cholesterol in his maternal grandfather and paternal grandfather; Hypertension in his maternal grandfather, paternal grandfather, and other family members.    SOCIAL HISTORY      reports that he has never smoked. He has been exposed to tobacco smoke. He has never used smokeless tobacco. He reports that he does not drink alcohol and does not use drugs.    PHYSICAL EXAM       ED Triage Vitals [11/29/24 1700]   BP Systolic BP Percentile Diastolic BP Percentile Temp Temp src Pulse Resp SpO2   134/83 -- -- 98.1 °F (36.7 °C) -- 86 16 98 %      Height Weight         1.651 m (5' 5\") 67.1 kg (148 lb)             Constitutional: Alert, oriented x3, nontoxic, answering questions appropriately, acting properly for age, in no acute distress   HEENT: Extraocular muscles intact, mucus membranes moist, no photophobia pupils equal, round, reactive to light,   Neck: Trachea midline no meningismus  Cardiovascular: Regular rhythm and rate no appreciable murmur  Respiratory: Clear to auscultation bilaterally no wheezes, rhonchi, rales, no respiratory distress no tachypnea no retractions no hypoxia  Gastrointestinal: Soft, nontender, nondistended, positive bowel sounds.  No rebound, rigidity, or guarding.   Musculoskeletal: No extremity pain or swelling   Neurologic: Moving all 4 extremities without difficulty there are no gross focal neurologic deficits   Skin: Warm and dry       DIFFERENTIAL DIAGNOSIS/ MDM:     No gross focal neurologic deficits.  Low suspicion for cardiac etiology.  Low suspicion for electrolyte abnormality.  Signs and symptoms consistent with an anxiety/panic attack.  Will give a dose of Ativan and reassess.     DIAGNOSTIC RESULTS     EKG: All EKG's are interpreted by the Emergency Department Physician who

## 2024-12-05 RX ORDER — INSULIN ASPART 100 [IU]/ML
INJECTION, SUSPENSION SUBCUTANEOUS
Qty: 40 ML | Refills: 0 | Status: SHIPPED | OUTPATIENT
Start: 2024-12-05

## 2024-12-05 NOTE — TELEPHONE ENCOUNTER
Rx Refill Note    Requested Prescriptions     Pending Prescriptions Disp Refills    NovoLOG Mix 70/30 (70-30) 100 UNIT/ML injection [Pharmacy Med Name: NovoLOG Mix 70/30 (70-30) 100 UNIT/ML Subcutaneous Suspension] 40 mL 0     Sig: INJECT 34 UNITS SUBCUTANEOUSLY BEFORE BREAKFAST AND  12  UNITS  BEFORE  SUPPER        Last office visit with prescribing clinician: 11/4/2024     Next office visit with prescribing clinician: 3/11/2025   {

## 2024-12-19 RX ORDER — LANCETS
EACH MISCELLANEOUS
Qty: 400 EACH | Refills: 3 | Status: SHIPPED | OUTPATIENT
Start: 2024-12-19

## 2024-12-19 NOTE — TELEPHONE ENCOUNTER
Rx Refill Note  Requested Prescriptions     Pending Prescriptions Disp Refills    Accu-Chek Softclix Lancets lancets [Pharmacy Med Name: SOFTCLIX LANCETS    MIS] 100 each 0     Sig: USE 1  TO CHECK GLUCOSE ONCE DAILY        Last office visit with prescribing clinician: 11/4/2024      Next office visit with prescribing clinician: 3/11/2025       Lory Kenney (Jodi)  12/19/24, 08:44 EST

## 2024-12-20 ENCOUNTER — TELEPHONE (OUTPATIENT)
Dept: ENDOCRINOLOGY | Facility: CLINIC | Age: 54
End: 2024-12-20
Payer: MEDICARE

## 2024-12-20 NOTE — TELEPHONE ENCOUNTER
Last office visit with prescribing clinician: 11/4/2024       Next office visit with prescribing clinician: 3/11/2025     {

## 2024-12-20 NOTE — TELEPHONE ENCOUNTER
PT CALLED REQUESTING INSULIN SYRINGES RX TO BE SENT IN TO [facility] ON [address]

## 2024-12-30 RX ORDER — FLUTICASONE FUROATE, UMECLIDINIUM BROMIDE AND VILANTEROL TRIFENATATE 100; 62.5; 25 UG/1; UG/1; UG/1
1 POWDER RESPIRATORY (INHALATION) DAILY
Qty: 180 EACH | Refills: 0 | Status: SHIPPED | OUTPATIENT
Start: 2024-12-30

## 2025-01-02 RX ORDER — BLOOD SUGAR DIAGNOSTIC
1 STRIP MISCELLANEOUS DAILY
Qty: 100 EACH | Refills: 0 | Status: SHIPPED | OUTPATIENT
Start: 2025-01-02

## 2025-01-02 NOTE — TELEPHONE ENCOUNTER
Rx Refill Note  Requested Prescriptions     Pending Prescriptions Disp Refills    Accu-Chek Guide Test test strip [Pharmacy Med Name: Accu-Chek Guide In Vitro Strip] 100 each 0     Sig: USE 1 STRIP TO CHECK GLUCOSE ONCE DAILY      Last office visit with prescribing clinician: 11/4/2024     Next office visit with prescribing clinician: 3/11/2025       Candace Alvarez  01/02/25, 08:36 EST

## 2025-01-03 NOTE — TELEPHONE ENCOUNTER
PATIENT WAS NOT ABLE TO AFFORD THIS WHEN IT WAS PRESCRIBED TO HER. SHE IS NOW ABLE TO AFFORD IT BUT THE PRESCRIPTION IS NO LONGER AVAILABLE. SHE NEEDS US TO SEND A NEW PRESCRIPTION FOR FREESTYLE SAMMI KIT AND SUPPLIES TO HER PHARMACY.

## 2025-01-07 ENCOUNTER — TELEPHONE (OUTPATIENT)
Dept: ENDOCRINOLOGY | Facility: CLINIC | Age: 55
End: 2025-01-07
Payer: MEDICARE

## 2025-01-07 ENCOUNTER — TELEPHONE (OUTPATIENT)
Dept: INTERNAL MEDICINE | Facility: CLINIC | Age: 55
End: 2025-01-07
Payer: MEDICARE

## 2025-01-07 RX ORDER — LEVOTHYROXINE SODIUM 200 UG/1
200 TABLET ORAL DAILY
Qty: 90 TABLET | Refills: 0 | Status: SHIPPED | OUTPATIENT
Start: 2025-01-07

## 2025-01-07 NOTE — TELEPHONE ENCOUNTER
Rx Refill Note  Requested Prescriptions     Refused Prescriptions Disp Refills    levothyroxine (SYNTHROID, LEVOTHROID) 200 MCG tablet 90 tablet 1          Last office visit with prescribing clinician: 11/4/2024     Next office visit with prescribing clinician: 3/11/2025   }    Luther Contreras MA  01/07/25, 13:31 EST

## 2025-01-07 NOTE — TELEPHONE ENCOUNTER
Rx Refill Note  Requested Prescriptions     Refused Prescriptions Disp Refills    levothyroxine (SYNTHROID, LEVOTHROID) 200 MCG tablet 90 tablet 1          Last office visit with prescribing clinician: 11/4/2024     Next office visit with prescribing clinician: 3/11/2025   }    Luther Contreras MA  01/07/25, 13:32 EST

## 2025-01-07 NOTE — TELEPHONE ENCOUNTER
THE PATIENT WOULD LIKE TO KNOW IF THE DOCTOR CAN ORDER HER A FREESTYLE SAMMI GLUCOSE MONITORING DECEIVE WITH SENSORS     CALL [phone]

## 2025-01-07 NOTE — TELEPHONE ENCOUNTER
"PT CALLED REQUESTING LEVOTHYROXINE AND \"A PATCH\" Rxs TO BE SENT IN TO [facility] ON [address]  "

## 2025-01-07 NOTE — TELEPHONE ENCOUNTER
PT CALLED STATING SHE SEES ANOTHER ENDOCRINOLOGIST AT . PT STATED SHE IS INTERESTED IN GETTING A CGM.

## 2025-01-08 ENCOUNTER — TELEPHONE (OUTPATIENT)
Dept: INTERNAL MEDICINE | Facility: CLINIC | Age: 55
End: 2025-01-08
Payer: MEDICARE

## 2025-01-08 RX ORDER — HYDROCHLOROTHIAZIDE 12.5 MG/1
CAPSULE ORAL
Qty: 2 EACH | Refills: 3 | Status: SHIPPED | OUTPATIENT
Start: 2025-01-08

## 2025-01-08 RX ORDER — KETOROLAC TROMETHAMINE 30 MG/ML
1 INJECTION, SOLUTION INTRAMUSCULAR; INTRAVENOUS 4 TIMES DAILY
Qty: 1 EACH | Refills: 3 | Status: SHIPPED | OUTPATIENT
Start: 2025-01-08

## 2025-01-08 NOTE — TELEPHONE ENCOUNTER
Attempted to contact patient, vm box has not been set up.     RELAY:     Please let her know that Dr. Kaur has sent in the prescription.

## 2025-01-08 NOTE — TELEPHONE ENCOUNTER
Caller: Vilma Ayala    Relationship: Self    Best call back number:   Telephone Information:   Mobile [phone]     What is the best time to reach you: ANYTIME    Who are you requesting to speak with (clinical staff, provider,  specific staff member): DR GEE    Do you know the name of the person who called:     What was the call regarding: PATIENT CALLED IN STATED SHE IS HAVING TROUBLE SLEEPING AND SHE WOULD LIKE TO HAVE A SLEEP STUDY SET UP    Is it okay if the provider responds through MyChart: PREFERS CALL

## 2025-01-08 NOTE — TELEPHONE ENCOUNTER
This was not discussed at previous visit, patient does have a follow-up appointment with you scheduled for next week.

## 2025-01-08 NOTE — TELEPHONE ENCOUNTER
Attempted to contact patient to let her know to keep f/u appt that is booked for next week.     No vm box has been set up.

## 2025-01-15 RX ORDER — INSULIN ASPART 100 [IU]/ML
INJECTION, SUSPENSION SUBCUTANEOUS
Qty: 40 ML | Refills: 0 | Status: SHIPPED | OUTPATIENT
Start: 2025-01-15

## 2025-01-15 NOTE — TELEPHONE ENCOUNTER
Rx Refill Note  Requested Prescriptions     Pending Prescriptions Disp Refills    NovoLOG Mix 70/30 (70-30) 100 UNIT/ML injection [Pharmacy Med Name: NovoLOG Mix 70/30 (70-30) 100 UNIT/ML Subcutaneous Suspension] 40 mL 0     Sig: INJECT 34 UNITS SUBCUTANEOUSLY BEFORE BREAKFAST AND  12  UNITS  BEFORE  SUPPER          Last office visit with prescribing clinician: 11/4/2024     Next office visit with prescribing clinician: 3/11/2025         Maritza Chowdary MA  01/15/25, 15:56 EST

## 2025-01-17 RX ORDER — LEVOTHYROXINE SODIUM 200 UG/1
200 TABLET ORAL DAILY
Qty: 90 TABLET | Refills: 0 | OUTPATIENT
Start: 2025-01-17

## 2025-01-17 RX ORDER — LEVOTHYROXINE SODIUM 25 UG/1
25 TABLET ORAL
Qty: 90 TABLET | Refills: 0 | OUTPATIENT
Start: 2025-01-17

## 2025-01-17 NOTE — TELEPHONE ENCOUNTER
PT CALLED REQUESTING LEVOTHYROXINE 200 AND 25 DOSES Rxs TO BE SENT IN TO [facility] ON [address].

## 2025-01-21 ENCOUNTER — OFFICE VISIT (OUTPATIENT)
Dept: PULMONOLOGY | Facility: CLINIC | Age: 55
End: 2025-01-21
Payer: MEDICARE

## 2025-01-21 ENCOUNTER — OFFICE VISIT (OUTPATIENT)
Dept: INTERNAL MEDICINE | Facility: CLINIC | Age: 55
End: 2025-01-21
Payer: MEDICARE

## 2025-01-21 ENCOUNTER — LAB (OUTPATIENT)
Dept: INTERNAL MEDICINE | Facility: CLINIC | Age: 55
End: 2025-01-21
Payer: MEDICARE

## 2025-01-21 VITALS
HEART RATE: 78 BPM | DIASTOLIC BLOOD PRESSURE: 80 MMHG | WEIGHT: 229 LBS | TEMPERATURE: 98 F | BODY MASS INDEX: 36.8 KG/M2 | SYSTOLIC BLOOD PRESSURE: 122 MMHG | OXYGEN SATURATION: 99 % | HEIGHT: 66 IN

## 2025-01-21 VITALS
HEIGHT: 66 IN | HEART RATE: 84 BPM | SYSTOLIC BLOOD PRESSURE: 126 MMHG | TEMPERATURE: 98.4 F | WEIGHT: 233 LBS | DIASTOLIC BLOOD PRESSURE: 78 MMHG | OXYGEN SATURATION: 100 % | BODY MASS INDEX: 37.45 KG/M2

## 2025-01-21 DIAGNOSIS — E03.9 ACQUIRED HYPOTHYROIDISM: ICD-10-CM

## 2025-01-21 DIAGNOSIS — Z00.00 MEDICARE ANNUAL WELLNESS VISIT, SUBSEQUENT: ICD-10-CM

## 2025-01-21 DIAGNOSIS — G47.33 OSA (OBSTRUCTIVE SLEEP APNEA): Primary | ICD-10-CM

## 2025-01-21 DIAGNOSIS — E55.9 VITAMIN D DEFICIENCY: ICD-10-CM

## 2025-01-21 DIAGNOSIS — E55.9 VITAMIN D DEFICIENCY: Primary | ICD-10-CM

## 2025-01-21 DIAGNOSIS — E66.9 OBESITY (BMI 35.0-39.9 WITHOUT COMORBIDITY): ICD-10-CM

## 2025-01-21 DIAGNOSIS — J45.30 MILD PERSISTENT ASTHMA, UNSPECIFIED WHETHER COMPLICATED: ICD-10-CM

## 2025-01-21 DIAGNOSIS — R06.09 DYSPNEA ON EXERTION: ICD-10-CM

## 2025-01-21 PROCEDURE — G0439 PPPS, SUBSEQ VISIT: HCPCS | Performed by: STUDENT IN AN ORGANIZED HEALTH CARE EDUCATION/TRAINING PROGRAM

## 2025-01-21 PROCEDURE — 84443 ASSAY THYROID STIM HORMONE: CPT | Performed by: STUDENT IN AN ORGANIZED HEALTH CARE EDUCATION/TRAINING PROGRAM

## 2025-01-21 PROCEDURE — 1126F AMNT PAIN NOTED NONE PRSNT: CPT | Performed by: STUDENT IN AN ORGANIZED HEALTH CARE EDUCATION/TRAINING PROGRAM

## 2025-01-21 PROCEDURE — 36415 COLL VENOUS BLD VENIPUNCTURE: CPT | Performed by: STUDENT IN AN ORGANIZED HEALTH CARE EDUCATION/TRAINING PROGRAM

## 2025-01-21 PROCEDURE — 99214 OFFICE O/P EST MOD 30 MIN: CPT | Performed by: STUDENT IN AN ORGANIZED HEALTH CARE EDUCATION/TRAINING PROGRAM

## 2025-01-21 PROCEDURE — 84439 ASSAY OF FREE THYROXINE: CPT | Performed by: STUDENT IN AN ORGANIZED HEALTH CARE EDUCATION/TRAINING PROGRAM

## 2025-01-21 PROCEDURE — 82306 VITAMIN D 25 HYDROXY: CPT | Performed by: STUDENT IN AN ORGANIZED HEALTH CARE EDUCATION/TRAINING PROGRAM

## 2025-01-21 PROCEDURE — 96160 PT-FOCUSED HLTH RISK ASSMT: CPT | Performed by: STUDENT IN AN ORGANIZED HEALTH CARE EDUCATION/TRAINING PROGRAM

## 2025-01-21 PROCEDURE — 1170F FXNL STATUS ASSESSED: CPT | Performed by: STUDENT IN AN ORGANIZED HEALTH CARE EDUCATION/TRAINING PROGRAM

## 2025-01-21 RX ORDER — FAMOTIDINE 40 MG/1
40 TABLET, FILM COATED ORAL
COMMUNITY
Start: 2024-11-14

## 2025-01-21 RX ORDER — ATORVASTATIN CALCIUM 40 MG/1
1 TABLET, FILM COATED ORAL DAILY
COMMUNITY
Start: 2024-11-08

## 2025-01-21 NOTE — PROGRESS NOTES
Office Note     Name: Vilma Ayala    : 1970     MRN: [id number]     Chief Complaint  Medicare Wellness-subsequent    Subjective     History of Present Illness:  History of Present Illness  The patient presents for a Medicare wellness visit.    She continues to reside at [facility], where she receives assistance with medication management and meal preparation. She has not experienced any falls within the past year but had an incident the previous year. She has consulted with a nephrologist since her last visit and had an appointment with a sleep medicine specialist today, primarily for respiratory issues rather than sleep disturbances. She is scheduled to see her endocrinologist, Dr. Chau, in 2025. She reports no significant changes in her health status. She has not engaged in advanced care planning, living will, or power of  arrangements. She does not have a medical power of .    She has been experiencing difficulties with her medications due to having two endocrinologists but has now consolidated her care under one. She continues to see a psychiatrist. She has been using FreeStyle Sukhi to monitor her blood glucose levels, which have been frequently low, including an episode yesterday. She was advised by a staff member to consume a soda, a beverage she has abstained from for approximately 3 weeks. Her average blood glucose levels range from 115 to 130, with hypoglycemic episodes occurring once or twice daily, sometimes more frequently. She has not yet discussed these episodes with Dr. Chau. She takes NovoLog 70/30 insulin 15 minutes prior to breakfast and dinner, with dosages of 34 units in the morning and 14 units at night. She typically experiences hypoglycemia around lunchtime. She consumes a sugar-free pudding snack around 7:00 PM. She administers her morning insulin at 7:15 AM and her evening dose at 3:45 PM, with dinner following at 4:00 PM. She occasionally  consumes half a turkey sandwich on wheat bread with cheese and mustard if she feels hungry. She takes NovoLog at 10:15 AM for breakfast. On weekends, she takes her morning insulin closer to 7:30 AM and her evening dose around 5:00 PM. She refilled her insulin prescription last week without any issues. She reports an improvement in her physical health since starting insulin therapy and making dietary modifications.    She reports feeling constantly fatigued and requests a prescription for caffeine pills. She does not consume coffee and has reduced her intake of regular soda.    MEDICATIONS  Risperdal, lorazepam, NovoLog 70/30    Review of Systems:   Review of Systems    Past Medical History:   Past Medical History:   Diagnosis Date    Anxiety     Chicken pox     Depression     Diabetes mellitus     Disease of thyroid gland     Measles     Type 2 diabetes mellitus        Past Surgical History:   Past Surgical History:   Procedure Laterality Date    EYE SURGERY      TONSILLECTOMY         Family History:   Family History   Problem Relation Age of Onset    Hypertension Other     Thyroid disease Other     Diabetes Other     Obesity Other     Diabetes Mother     Hypertension Mother     Heart disease Mother     Cancer Father     Diabetes Sister     Diabetes Brother     No Known Problems Brother        Social History:   Social History     Socioeconomic History    Marital status: Single   Tobacco Use    Smoking status: Never    Smokeless tobacco: Never   Vaping Use    Vaping status: Never Used   Substance and Sexual Activity    Alcohol use: No    Drug use: No    Sexual activity: Not Currently       Immunizations:   Immunization History   Administered Date(s) Administered    COVID-19 (MODERNA) 12YRS+ (SPIKEVAX) 10/16/2023    COVID-19 (MODERNA) 1st,2nd,3rd Dose Monovalent 05/06/2021, 06/05/2021, 01/04/2022    COVID-19 (MODERNA) BIVALENT 12+YRS 10/26/2022    Flu Vaccine Quad PF >36MO 10/04/2017, 12/29/2021    Fluzone  >6mos  09/13/2024    Fluzone (or Fluarix & Flulaval for VFC) >6mos 10/04/2017, 12/29/2021, 10/26/2022, 11/14/2023    Fluzone Quad >6mos (Multi-dose) 10/04/2015    Hep A / Hep B 02/16/2024    Hepatitis A 05/09/2018, 05/09/2018, 10/04/2018, 01/10/2020, 01/10/2020    Influenza, Unspecified 10/04/2018, 10/26/2022    Pneumococcal Conjugate 20-Valent (PCV20) 09/11/2023    Pneumococcal Polysaccharide (PPSV23) 05/28/2016, 05/28/2016    Shingrix 12/29/2021, 09/11/2023, 02/16/2024    Tdap 05/24/2024    flucelvax quad pfs =>4 YRS 10/04/2018        Medications:     Current Outpatient Medications:     Accu-Chek Guide Test test strip, USE 1 STRIP TO CHECK GLUCOSE ONCE DAILY, Disp: 100 each, Rfl: 0    Accu-Chek Softclix Lancets lancets, USE 1  TO CHECK GLUCOSE 4 times a day DAILY dx e11.65 on insulin, Disp: 400 each, Rfl: 3    ammonium lactate (Lac-Hydrin) 12 % lotion, Apply BID to feet, Disp: 225 g, Rfl: 3    atorvastatin (LIPITOR) 40 MG tablet, Take 1 tablet by mouth Daily., Disp: , Rfl:     B-D UF III MINI PEN NEEDLES 31G X 5 MM misc, USE TO INJECT INSULIN TWICE A DAY BEFORE MEALS, Disp: , Rfl:     benztropine (COGENTIN) 0.5 MG tablet, Take 1 tablet by mouth As Needed., Disp: , Rfl:     Blood Glucose Monitoring Suppl w/Device kit, Use daily to test blood sugars uses accu chek, Disp: 1 each, Rfl: 0    busPIRone (BUSPAR) 15 MG tablet, Take 1 tablet by mouth Every 12 (Twelve) Hours., Disp: , Rfl:     Cholecalciferol (VITAMIN D3 PO), Take 1 tablet by mouth Daily., Disp: , Rfl:     citalopram (CeleXA) 40 MG tablet, Take 1 tablet by mouth Daily., Disp: 90 tablet, Rfl: 1    Continuous Glucose  (FreeStyle Sukhi 3 [place]) device, Use 1 Device 4 (Four) Times a Day., Disp: 1 each, Rfl: 3    Continuous Glucose Sensor (FreeStyle Sukhi 3 Plus Sensor), Use Every 14 (Fourteen) Days., Disp: 2 each, Rfl: 3    famotidine (PEPCID) 40 MG tablet, Take 1 tablet by mouth every night at bedtime., Disp: , Rfl:     hydrOXYzine pamoate (VISTARIL) 50 MG  "capsule, , Disp: , Rfl:     ibuprofen (ADVIL,MOTRIN) 800 MG tablet, Take 1 tablet by mouth Every 8 (Eight) Hours As Needed for Mild Pain or Moderate Pain for up to 30 doses., Disp: 30 tablet, Rfl: 0    Insulin Syringe-Needle U-100 31G X 15/64\" 0.5 ML misc, Use 2 per day to administer insulin, Disp: 200 each, Rfl: 1    levothyroxine (SYNTHROID, LEVOTHROID) 200 MCG tablet, Take 1 tablet by mouth Daily., Disp: 90 tablet, Rfl: 0    Livalo 1 MG tablet tablet, , Disp: , Rfl:     LORazepam (ATIVAN) 0.5 MG tablet, Take 1 tablet by mouth 2 (Two) Times a Day As Needed. for anxiety, Disp: , Rfl:     metFORMIN (GLUCOPHAGE) 500 MG tablet, TAKE 2 TABLETS BY MOUTH ONCE DAILY WITH FOOD, Disp: 60 tablet, Rfl: 5    NovoLOG Mix 70/30 (70-30) 100 UNIT/ML injection, INJECT 34 UNITS SUBCUTANEOUSLY BEFORE BREAKFAST AND  12  UNITS  BEFORE  SUPPER, Disp: 40 mL, Rfl: 0    risperiDONE (risperDAL) 3 MG tablet, Take 1 tablet by mouth 2 (Two) Times a Day., Disp: , Rfl:     Trelegy Ellipta 100-62.5-25 MCG/ACT inhaler, INHALE 1 PUFF ONCE DAILY, Disp: 180 each, Rfl: 0    Ventolin  (90 Base) MCG/ACT inhaler, Inhale 2 puffs 4 (Four) Times a Day., Disp: 18 g, Rfl: 0    glucose (DEX4) 4 GM chewable tablet, Chew 4 tablets As Needed for Low Blood Sugar., Disp: 20 tablet, Rfl: 12    Allergies:   Allergies   Allergen Reactions    Doxepin Rash    Januvia [Sitagliptin] Other (See Comments)     Insomnia       Objective     Vital Signs  /78   Pulse 84   Temp 98.4 °F (36.9 °C) (Infrared)   Ht 167.6 cm (65.98\")   Wt 106 kg (233 lb)   SpO2 100%   BMI 37.63 kg/m²   Estimated body mass index is 37.63 kg/m² as calculated from the following:    Height as of this encounter: 167.6 cm (65.98\").    Weight as of this encounter: 106 kg (233 lb).            Physical Exam   Physical Exam      Procedures     Results:  No results found for this or any previous visit (from the past 24 hours).   Results  Laboratory Studies  Average blood sugar levels range " from 115 to 130. Last A1c was 6.5%.        Assessment and Plan     Assessment/Plan:  Diagnoses and all orders for this visit:    1. Vitamin D deficiency (Primary)  -     Vitamin D 25 hydroxy; Future    2. Medicare annual wellness visit, subsequent    3. Acquired hypothyroidism  Overview:        Orders:  -     TSH Rfx On Abnormal To Free T4; Future    Other orders  -     glucose (DEX4) 4 GM chewable tablet; Chew 4 tablets As Needed for Low Blood Sugar.  Dispense: 20 tablet; Refill: 12      Assessment & Plan  1. Medicare wellness visit   - Routine check-up with no major changes reported  - Blood work will be ordered to assess thyroid function  - Advised to consume a snack around 6:00 or 7:00 PM containing both fat and protein  - Morning and evening insulin dosage will be reduced  - Instructed to keep glucose tablets readily available for hypoglycemia  - Encouraged to consume diet soda, green tea with caffeine, or coffee with minimal sweeteners    2. Hypoglycemia  Type 2 Diabetes, Insulin Dependent    Frequent low blood sugar events, occurring once or twice a day on some todays of the week. Lows usually around noon at 7-12 PM. She takes 34 U Novolog in the morning with breakfast, 14U in the evening before dinner. Her A1c is 6.5%. we will decrease AM Novolog 70/30 to 25 U and evening Novolog to 10 U and encouraged a snack before bedtime. I have also prescribed glucose tablet and discussed how to use them .  - Insulin dosage will be reduced to prevent hypoglycemic episodes  - Advised to have a snack with fat and protein around 6:00 or 7:00 PM  - Glucose tablets will be prescribed and should be kept by her bedside for immediate use    3. Fatigue   - Excessive sleepiness possibly related to current medications  - Blood work will be ordered to check thyroid function and vitamin D Bee   - Advised that caffeine pills are not recommended  - Can consume diet soda, green tea with caffeine, or coffee with minimal sweeteners  -  Very low vitamin D in the past; not taking supplements. Repeat level today. Will likely need 50,000 U x8 weeks.     Follow-up  - The patient will follow up in 1 month    Follow Up  No follow-ups on file.    Patient or patient representative verbalized consent for the use of Ambient Listening during the visit with  Vanessa Kaur MD for chart documentation. 1/21/2025  16:59 EST    Vanessa Kaur MD   [facility]

## 2025-01-21 NOTE — PATIENT INSTRUCTIONS
Decrease Novolog to 25 U in the morning before breakfast.   Decrease Novolog to 10 U in the evening before dinner.     Continue to track blood sugar with CGM.   We will follow-up in 1 month.     Labs today: TSH, vit D

## 2025-01-21 NOTE — PROGRESS NOTES
Subjective   The ABCs of the Annual Wellness Visit  Medicare Wellness Visit      Vilma Ayala is a 54 y.o. patient who presents for a Medicare Wellness Visit.    The following portions of the patient's history were reviewed and   updated as appropriate: allergies, current medications, past family history, past medical history, past social history, past surgical history, and problem list.    Compared to one year ago, the patient's physical   health is better.  Compared to one year ago, the patient's mental   health is worse. Getting frustrated, not thinking as fast, and tired all the time.     Recent Hospitalizations:  No hospitalizations.    Current Medical Providers:  Patient Care Team:  Vanessa Kaur MD as PCP - General (Family Medicine)  Selina Lopez MD as Consulting Physician (Endocrinology)  Quintin Fall DO as Consulting Physician (Pulmonary Disease)    Outpatient Medications Prior to Visit   Medication Sig Dispense Refill    Accu-Chek Guide Test test strip USE 1 STRIP TO CHECK GLUCOSE ONCE DAILY 100 each 0    Accu-Chek Softclix Lancets lancets USE 1  TO CHECK GLUCOSE 4 times a day DAILY dx e11.65 on insulin 400 each 3    ammonium lactate (Lac-Hydrin) 12 % lotion Apply BID to feet 225 g 3    atorvastatin (LIPITOR) 40 MG tablet Take 1 tablet by mouth Daily.      B-D UF III MINI PEN NEEDLES 31G X 5 MM misc USE TO INJECT INSULIN TWICE A DAY BEFORE MEALS      benztropine (COGENTIN) 0.5 MG tablet Take 1 tablet by mouth As Needed.      Blood Glucose Monitoring Suppl w/Device kit Use daily to test blood sugars uses accu chek 1 each 0    busPIRone (BUSPAR) 15 MG tablet Take 1 tablet by mouth Every 12 (Twelve) Hours.      Cholecalciferol (VITAMIN D3 PO) Take 1 tablet by mouth Daily.      citalopram (CeleXA) 40 MG tablet Take 1 tablet by mouth Daily. 90 tablet 1    Continuous Glucose  (FreeStyle Sukhi 3 [place]) device Use 1 Device 4 (Four) Times a Day. 1 each 3    Continuous  "Glucose Sensor (FreeStyle Sukhi 3 Plus Sensor) Use Every 14 (Fourteen) Days. 2 each 3    famotidine (PEPCID) 40 MG tablet Take 1 tablet by mouth every night at bedtime.      hydrOXYzine pamoate (VISTARIL) 50 MG capsule       ibuprofen (ADVIL,MOTRIN) 800 MG tablet Take 1 tablet by mouth Every 8 (Eight) Hours As Needed for Mild Pain or Moderate Pain for up to 30 doses. 30 tablet 0    Insulin Syringe-Needle U-100 31G X 15/64\" 0.5 ML misc Use 2 per day to administer insulin 200 each 1    levothyroxine (SYNTHROID, LEVOTHROID) 200 MCG tablet Take 1 tablet by mouth Daily. 90 tablet 0    Livalo 1 MG tablet tablet       LORazepam (ATIVAN) 0.5 MG tablet Take 1 tablet by mouth 2 (Two) Times a Day As Needed. for anxiety      metFORMIN (GLUCOPHAGE) 500 MG tablet TAKE 2 TABLETS BY MOUTH ONCE DAILY WITH FOOD 60 tablet 5    NovoLOG Mix 70/30 (70-30) 100 UNIT/ML injection INJECT 34 UNITS SUBCUTANEOUSLY BEFORE BREAKFAST AND  12  UNITS  BEFORE  SUPPER 40 mL 0    risperiDONE (risperDAL) 3 MG tablet Take 1 tablet by mouth 2 (Two) Times a Day.      Trelegy Ellipta 100-62.5-25 MCG/ACT inhaler INHALE 1 PUFF ONCE DAILY 180 each 0    Ventolin  (90 Base) MCG/ACT inhaler Inhale 2 puffs 4 (Four) Times a Day. 18 g 0     No facility-administered medications prior to visit.     No opioid medication identified on active medication list. I have reviewed chart for other potential  high risk medication/s and harmful drug interactions in the elderly.      Aspirin is not on active medication list.  Aspirin use is not indicated based on review of current medical condition/s. Risk of harm outweighs potential benefits.  .    Patient Active Problem List   Diagnosis    Anxiety and depression    Uncontrolled type 2 diabetes mellitus    Hypothyroid    Obesity    Vitamin D deficiency, unspecified    Diabetic neuropathy, type II diabetes mellitus    Mixed hyperlipidemia    Dyspnea on exertion    Type 2 diabetes mellitus without complication, with " "long-term current use of insulin    Urinary incontinence    Microscopic hematuria    Nocturia    Nocturnal enuresis    Stress incontinence    Urge incontinence    Decreased GFR    Mild persistent asthma    Obesity (BMI 35.0-39.9 without comorbidity)    Drug-induced parkinsonism    Encounter to establish care    NATASHA (obstructive sleep apnea)     Advance Care Planning {Advance Care Planning Hyperlink:23}Advance Directive is not on file.  ACP discussion was held with the patient during this visit. Patient does not have an advance directive, information provided.            Objective   Vitals:    25 1351   BP: 126/78   Pulse: 84   Temp: 98.4 °F (36.9 °C)   TempSrc: Infrared   SpO2: 100%   Weight: 106 kg (233 lb)   Height: 167.6 cm (65.98\")   PainSc: 0-No pain       Estimated body mass index is 37.63 kg/m² as calculated from the following:    Height as of this encounter: 167.6 cm (65.98\").    Weight as of this encounter: 106 kg (233 lb).              Does the patient have evidence of cognitive impairment? No  Lab Results   Component Value Date    HGBA1C 6.5 (A) 2024                                                                                                Health  Risk Assessment    Smoking Status:  Social History     Tobacco Use   Smoking Status Never   Smokeless Tobacco Never     Alcohol Consumption:  Social History     Substance and Sexual Activity   Alcohol Use No       Fall Risk Screen{Jump to Steadi Fall Risk Flowsheet:23}  STEADI Fall Risk Assessment was completed, and patient is at LOW risk for falls.Assessment completed on:2025    Depression Screening{Jump to PHQ SmartForm:23}   Little interest or pleasure in doing things? Not at all   Feeling down, depressed, or hopeless? Not at all   PHQ-2 Total Score 0      Health Habits and Functional and Cognitive Screenin/21/2025     2:01 PM   Functional & Cognitive Status   Do you have difficulty preparing food and eating? No   Do you have " difficulty bathing yourself, getting dressed or grooming yourself? No   Do you have difficulty using the toilet? No   Do you have difficulty moving around from place to place? Yes   Do you have trouble with steps or getting out of a bed or a chair? Yes   Current Diet Unhealthy Diet   Dental Exam Up to date   Eye Exam Not up to date   Exercise (times per week) 5 times per week   Current Exercises Include Walking   Do you need help using the phone?  No   Are you deaf or do you have serious difficulty hearing?  No   Do you need help to go to places out of walking distance? No   Do you need help shopping? No   Do you need help preparing meals?  No   Do you need help with housework?  Yes   Do you need help with laundry? No   Do you need help taking your medications? Yes   Do you need help managing money? Yes   Do you ever drive or ride in a car without wearing a seat belt? No   Have you felt unusual stress, anger or loneliness in the last month? Yes   Who do you live with? Alone   If you need help, do you have trouble finding someone available to you? No   Have you been bothered in the last four weeks by sexual problems? No   Do you have difficulty concentrating, remembering or making decisions? Yes           Age-appropriate Screening Schedule:  Refer to the list below for future screening recommendations based on patient's age, sex and/or medical conditions. Orders for these recommended tests are listed in the plan section. The patient has been provided with a written plan.    Health Maintenance List  Health Maintenance   Topic Date Due    ANNUAL WELLNESS VISIT  Never done    PAP SMEAR  Never done    DIABETIC EYE EXAM  03/18/2020    Hepatitis B (2 of 3 - Hep B Twinrix 3-dose series) 03/15/2024    COVID-19 Vaccine (6 - 2024-25 season) 09/01/2024    LIPID PANEL  12/14/2024    HEMOGLOBIN A1C  05/04/2025    BMI FOLLOWUP  09/13/2025    URINE MICROALBUMIN  11/18/2025    MAMMOGRAM  05/23/2026    COLORECTAL CANCER SCREENING   07/26/2026    TDAP/TD VACCINES (2 - Td or Tdap) 05/24/2034    HEPATITIS C SCREENING  Completed    Pneumococcal Vaccine 0-64  Completed    INFLUENZA VACCINE  Completed    ZOSTER VACCINE  Completed                                                                                                                                                CMS Preventative Services Quick Reference  Risk Factors Identified During Encounter  Depression/Dysphoria:  continue to follow with psych.  Fall Risk-High or Moderate:  Not addressed  Polypharmacy: Medication List reviewed    The above risks/problems have been discussed with the patient.  Pertinent information has been shared with the patient in the After Visit Summary.  An After Visit Summary and PPPS were made available to the patient.    Follow Up:{Wrapup  Review (Popup)  Advance Care Planning  Labs  CC  Problem List  Visit Diagnosis  Medications  Result Review  Imaging  [facility] Maintenance  Quality  BestPractice  SmartSets  SnapShot  Encounters  Notes  Media  Procedures :23}   Next Medicare Wellness visit to be scheduled in 1 year.         Additional E&M Note during same encounter follows:  Patient has additional, significant, and separately identifiable condition(s)/problem(s) that require work above and beyond the Medicare Wellness Visit     Chief Complaint  Medicare Wellness-subsequent    Subjective {Problem List  Visit Diagnosis   Encounters  Notes  Medications  Labs  Result Review Imaging  Media :23}{Help Text;  If performing a Preventative Medicine Visit (e.g. 73070) in addition to AWV, choose the 1st SmartList option below and document any ROS/PE performed.  If performing a separately identifiable E/M service (e.g. 25056), choose 2nd SmartLink option below. This information in red will not appear in the final note after note is signed:81258}  GREG Esparza is also being seen today for an acute concern.     Fatigue- sleeping a lot more than usual  "  Having a lot of low sugars.                 Objective   Vital Signs:  /78   Pulse 84   Temp 98.4 °F (36.9 °C) (Infrared)   Ht 167.6 cm (65.98\")   Wt 106 kg (233 lb)   SpO2 100%   BMI 37.63 kg/m²   Physical Exam    {The following data was reviewed by (Optional):56679}  {Data reviewed (Optional):91220:::1}  {Ambulatory Labs (Optional):75368}          Assessment and Plan {CC Problem List  Visit Diagnosis   ROS  Review (Popup)  Health Maintenance  Quality  BestPractice  Medications  SmartSets  SnapShot Encounters  Media :23}{Help Text; When performing an adult Preventative Medicine Visit (e.g. 45958) using the optional SmartList below can help when documenting any age-appropriate advice given.  When using the SmartList review and update the information based on the unique discussion or advice given to the patient.  As a reminder, diagnosis code Z00.00 (nl exam) or Z00.01 (abnl exam), should be added when this service is performed.  Text will disappear once the note is signed:04803}{Preventative Physical Additional Health Advice List (Optional):[id number]}                   {Time Spent (Optional):76240}  Follow Up {Instructions Charge Capture  Follow-up Communications :23}  No follow-ups on file.  Patient was given instructions and counseling regarding her condition or for health maintenance advice. Please see specific information pulled into the AVS if appropriate.    "

## 2025-01-21 NOTE — PROGRESS NOTES
[facility]    CHIEF COMPLAINT    Mild persistent asthma    HISTORY OF PRESENT ILLNESS    HPI:  Vilma Ayala is a 54 y.o.female followed by pulmonary regarding her mild persistent asthma.     Initial PFTs unrevealing for obstruction but were suggestive of a restrictive defect.  Repeat PFTs in 2024 again unrevealing for obstruction but suggestive of a mild restrictive defect with a TLC of 80% predicted likely in the setting of her morbid obesity and physical deconditioning.  Does carry a history of mild intermittent asthma therefore was trialed on Trelegy 100 and did note benefit therefore this was continued.    She feels like her weight has been relatively stable, however is very sedentary at baseline.     Has a history of T2DM with ongoing hyperglycemia followed by endocrinology.    Ongoing issues with allergic rhinitis on an OTC antihistamine and nasal sprays.    Denies issues with gastric reflux.    She resides in a group home.    Interval history:  Patient was last seen in the office by me in June 2024 been trialed on Trelegy 100 with improvement in her symptoms, therefore this was continued.    She did very well on the Trelegy over the fall, however has not taken it over multiple months as she does not feel she needs it.  Continues to do well from a respiratory standpoint.    She has become more active in her group home setting.  Feels that her exertional shortness of breath has improved.    Does have issues with insomnia and does continue to feel very fatigued in the morning.  She frequently sleeps in a recliner and will keep the TV on all night.    She is accompanied today by her sister.    Denies recent ED visits, hospitalizations, or exacerbations.     Denies fever, chills, night sweats, or hemoptysis. No recent sick contacts. No chest pain or palpitations. Denies lower extremity swelling or calf tenderness.     Patient Active Problem List   Diagnosis    Anxiety and depression     Uncontrolled type 2 diabetes mellitus    Hypothyroid    Obesity    Vitamin D deficiency, unspecified    Diabetic neuropathy, type II diabetes mellitus    Mixed hyperlipidemia    Dyspnea on exertion    Type 2 diabetes mellitus without complication, with long-term current use of insulin    Urinary incontinence    Microscopic hematuria    Nocturia    Nocturnal enuresis    Stress incontinence    Urge incontinence    Decreased GFR    Mild persistent asthma    Obesity (BMI 35.0-39.9 without comorbidity)    Drug-induced parkinsonism    Encounter to establish care    NATASHA (obstructive sleep apnea)       Allergies   Allergen Reactions    Doxepin Rash    Januvia [Sitagliptin] Other (See Comments)     Insomnia       Current Outpatient Medications:     Accu-Chek Guide Test test strip, USE 1 STRIP TO CHECK GLUCOSE ONCE DAILY, Disp: 100 each, Rfl: 0    Accu-Chek Softclix Lancets lancets, USE 1  TO CHECK GLUCOSE 4 times a day DAILY dx e11.65 on insulin, Disp: 400 each, Rfl: 3    ammonium lactate (Lac-Hydrin) 12 % lotion, Apply BID to feet, Disp: 225 g, Rfl: 3    B-D UF III MINI PEN NEEDLES 31G X 5 MM misc, USE TO INJECT INSULIN TWICE A DAY BEFORE MEALS, Disp: , Rfl:     benztropine (COGENTIN) 0.5 MG tablet, Take 1 tablet by mouth As Needed., Disp: , Rfl:     Blood Glucose Monitoring Suppl w/Device kit, Use daily to test blood sugars uses accu chek, Disp: 1 each, Rfl: 0    busPIRone (BUSPAR) 15 MG tablet, Take 1 tablet by mouth Every 12 (Twelve) Hours., Disp: , Rfl:     Cholecalciferol (VITAMIN D3 PO), Take 1 tablet by mouth Daily., Disp: , Rfl:     citalopram (CeleXA) 40 MG tablet, Take 1 tablet by mouth Daily., Disp: 90 tablet, Rfl: 1    Continuous Glucose  (FreeStyle Sukhi 3 [place]) device, Use 1 Device 4 (Four) Times a Day., Disp: 1 each, Rfl: 3    Continuous Glucose Sensor (FreeStyle Sukhi 3 Plus Sensor), Use Every 14 (Fourteen) Days., Disp: 2 each, Rfl: 3    hydrOXYzine pamoate (VISTARIL) 50 MG capsule, , Disp: , Rfl:  "    ibuprofen (ADVIL,MOTRIN) 800 MG tablet, Take 1 tablet by mouth Every 8 (Eight) Hours As Needed for Mild Pain or Moderate Pain for up to 30 doses., Disp: 30 tablet, Rfl: 0    Insulin Syringe-Needle U-100 31G X 15/64\" 0.5 ML misc, Use 2 per day to administer insulin, Disp: 200 each, Rfl: 1    levothyroxine (SYNTHROID, LEVOTHROID) 200 MCG tablet, Take 1 tablet by mouth Daily., Disp: 90 tablet, Rfl: 0    Livalo 1 MG tablet tablet, , Disp: , Rfl:     LORazepam (ATIVAN) 0.5 MG tablet, Take 1 tablet by mouth 2 (Two) Times a Day As Needed. for anxiety, Disp: , Rfl:     metFORMIN (GLUCOPHAGE) 500 MG tablet, TAKE 2 TABLETS BY MOUTH ONCE DAILY WITH FOOD, Disp: 60 tablet, Rfl: 5    NovoLOG Mix 70/30 (70-30) 100 UNIT/ML injection, INJECT 34 UNITS SUBCUTANEOUSLY BEFORE BREAKFAST AND  12  UNITS  BEFORE  SUPPER, Disp: 40 mL, Rfl: 0    risperiDONE (risperDAL) 3 MG tablet, Take 1 tablet by mouth 2 (Two) Times a Day., Disp: , Rfl:     Trelegy Ellipta 100-62.5-25 MCG/ACT inhaler, INHALE 1 PUFF ONCE DAILY, Disp: 180 each, Rfl: 0    Ventolin  (90 Base) MCG/ACT inhaler, Inhale 2 puffs 4 (Four) Times a Day., Disp: 18 g, Rfl: 0  MEDICATION LIST AND ALLERGIES REVIEWED.    Social History     Tobacco Use    Smoking status: Never    Smokeless tobacco: Never   Vaping Use    Vaping status: Never Used   Substance Use Topics    Alcohol use: No    Drug use: No       FAMILY AND SOCIAL HISTORY REVIEWED.    Review of Systems   Constitutional:  Negative for chills, fatigue and fever.   Respiratory:  Positive for shortness of breath. Negative for cough, chest tightness and wheezing.    Cardiovascular:  Negative for chest pain, palpitations and leg swelling.   Skin:  Negative for color change.   Psychiatric/Behavioral:  Positive for sleep disturbance.    All other systems reviewed and are negative.  .    /80   Pulse 78   Temp 98 °F (36.7 °C)   Ht 167.6 cm (65.98\")   Wt 104 kg (229 lb)   LMP  (LMP Unknown)   SpO2 99% Comment: resting " room air  BMI 36.98 kg/m²     Immunization History   Administered Date(s) Administered    COVID-19 (MODERNA) 12YRS+ (SPIKEVAX) 10/16/2023    COVID-19 (MODERNA) 1st,2nd,3rd Dose Monovalent 05/06/2021, 06/05/2021, 01/04/2022    COVID-19 (MODERNA) BIVALENT 12+YRS 10/26/2022    Flu Vaccine Quad PF >36MO 10/04/2017, 12/29/2021    Fluzone  >6mos 09/13/2024    Fluzone (or Fluarix & Flulaval for VFC) >6mos 10/04/2017, 12/29/2021, 10/26/2022, 11/14/2023    Fluzone Quad >6mos (Multi-dose) 10/04/2015    Hep A / Hep B 02/16/2024    Hepatitis A 05/09/2018, 05/09/2018, 10/04/2018, 01/10/2020, 01/10/2020    Influenza, Unspecified 10/04/2018, 10/26/2022    Pneumococcal Conjugate 20-Valent (PCV20) 09/11/2023    Pneumococcal Polysaccharide (PPSV23) 05/28/2016, 05/28/2016    Shingrix 12/29/2021, 09/11/2023, 02/16/2024    Tdap 05/24/2024    flucelvax quad pfs =>4 YRS 10/04/2018       Physical Exam  Vitals reviewed.   Constitutional:       General: She is not in acute distress.     Appearance: Normal appearance.   Cardiovascular:      Rate and Rhythm: Normal rate and regular rhythm.      Pulses: Normal pulses.      Heart sounds: Normal heart sounds.   Pulmonary:      Effort: Pulmonary effort is normal. No respiratory distress.      Breath sounds: No wheezing, rhonchi or rales.   Skin:     General: Skin is warm and dry.   Neurological:      General: No focal deficit present.      Mental Status: She is alert and oriented to person, place, and time.      Comments: Tremors at rest throughout exam       RESULTS    PFTs:  6/20/2024: No obstruction by ATS guidelines.  Nonspecific deficit with reduction of FVC and FEV1.  Mild restriction with TLC 80% predicted and FVC 69% predicted.  Reduced DLCO that corrects for alveolar volume.    5/19/2021: No obstruction by ATS guidelines.  Nonspecific deficit with reduction of FVC and FEV1.  Reduced DLCO that corrects for alveolar volume    6 MWT 1/10/2019:  Unrevealing for exertional hypoxemia with  lowest SpO2 98%.  Has dyspnea score 3 at the 5-minute richa.  She was able to ambulate 1200 feet.    PROBLEM LIST    Problem List Items Addressed This Visit       Dyspnea on exertion    Mild persistent asthma    Obesity (BMI 35.0-39.9 without comorbidity)    NATASHA (obstructive sleep apnea) - Primary    Relevant Orders    Home Sleep Study       DISCUSSION    Ms. Ayala was seen today for pulmonary follow-up and is stable from a respiratory standpoint.    Initially did much better on the Trelegy, however has been off this for many months as she did not feel she no longer needed it with unchanged respiratory status.    Does have sleep-related issues concerning for NATASHA.    Exertional dyspnea has somewhat improved.  She has become more active in her group home.    Dyspnea on exertion  Mild persistent asthma  Seasonal allergies  Obesity  Symptoms are likely multifactorial in the setting of her mild persistent asthma, seasonal allergies, obesity, and physical deconditioning  Prior PFTs are unrevealing for obstruction and do show mild restriction with reduction in both FVC and TLC.  Repeat in the summer.  Currently off the Trelegy and doing well.  Allergies seem to be a big trigger for her asthma.  In late March I want her to go ahead and resume her Trelegy 100 daily until I see her next.  Denies need for refills.  Continue albuterol rescue inhaler every 4-6 hours as needed for shortness of breath or wheezing  Continue allergy regimen with Claritin and Flonase.  Currently does not have any symptoms of reflux.  Does sleep upright in a recliner.  She is very sedentary at baseline however has increased her activity since I saw her last.  BMI however has trended upwards now at 36.98 compared to 33.36 at her last visit. I explained that obesity and physical deconditioning may largely be playing a role.  I did encourage her to to get 30 minutes of intentional exercise or a day.  Initially I would like her to walk for 10 minutes 2-3  times a day and increase her stamina.  They do offer her Jazzercise classes at her group home and in the future I would like her to be more involved in this.    NATASHA  The patient does have symptoms concerning for NATASHA including daytime somnolence, non-restorative sleep, fatigue, and witnessed episodes of snoring.   At this time we will proceed with a home sleep study. I will call the patient with those results.   Informs me that she had a history of sleep apnea in the past on PAP therapy, however has been off this since her mother passed away as her mother previously managed the machine.  If the study confirms sleep apnea, initiate PAP therapy with follow-up in 31-90 days for compliance check.   We also discussed alternate treatment strategies for sleep apnea including an oral mandibular adjustment appliance, surgical repair, and/or device insertion.  Body mass index is 36.98 kg/m². I did encourage weight loss as this can contribute towards NATASHA.   Explained that untreated sleep apnea can contribute to additional health problems including cardiac and metabolic issues.  She is currently a poorly controlled diabetic.  We also discussed good sleep regimen such as laying in the lateral position, avoiding alcohol or sedatives prior to bedtime, regular exercise, weight loss, avoiding caffeine in the evenings, and going to bed and getting up at the same time every day.  She usually sleeps upright in a recliner but will leave the TV on all throughout the night.    Return to clinic in 3 months with PAP compliance (if applicable).  Or sooner if needed.    I personally spent a total of 33 minutes on patient visit today including chart review, face to face with the patient obtaining the history and physical exam, review of pertinent images and tests, counseling and discussion and/or coordination of care as described above, and documentation.  Total time excludes time spent on other separate services such as performing procedures  or test interpretation, if applicable.    Electronically signed by ZIA Walker, 01/21/25, 1:53 PM EST.    Please note that portions of this note were completed with a voice recognition program.        CC: Vanessa Kaur MD

## 2025-01-21 NOTE — ASSESSMENT & PLAN NOTE
Chronic, uncontrolled. Not taking supplement. Update level today, will need supplemented.   Orders:    Vitamin D 25 hydroxy; Future

## 2025-01-21 NOTE — ASSESSMENT & PLAN NOTE
Chronic, uncontrolled. Low TSH at last check.  Repeat today.   Orders:    TSH Rfx On Abnormal To Free T4; Future

## 2025-01-21 NOTE — PROGRESS NOTES
[facility] Follow [facility]    CHIEF COMPLAINT    Mild persistent asthma    HISTORY OF PRESENT ILLNESS    HPI:  Vilma Ayala is a 54 y.o.female followed by pulmonary regarding her mild persistent asthma.     Initial PFTs unrevealing for obstruction but were suggestive of a restrictive defect.  Repeat PFTs in 2024 again unrevealing for obstruction but suggestive of a mild restrictive defect with a TLC of 80% predicted likely in the setting of her morbid obesity and physical deconditioning.  Does carry a history of mild intermittent asthma therefore was trialed on Trelegy 100 and did note benefit therefore this was continued.    She feels like her weight has been relatively stable, however is very sedentary at baseline.     Has a history of T2DM with ongoing hyperglycemia followed by endocrinology.    Ongoing issues with allergic rhinitis on an OTC antihistamine and nasal sprays.    Denies issues with gastric reflux.    She resides in a group home.    Interval history:  Patient was last seen in the office by me in June 2024 been trialed on Trelegy 100 with improvement in her symptoms, therefore this was continued.    She was last seen in the office by me on 5/10/2024 for ongoing shortness of breath both at rest and exertion.  She was trialed on Trelegy 100 with improvement in her symptoms.  She remains on an albuterol rescue inhaler.    Denies recent ED visits, hospitalizations, or exacerbations.     Denies fever, chills, night sweats, or hemoptysis. No recent sick contacts. No chest pain or palpitations. Denies lower extremity swelling or calf tenderness.     Patient Active Problem List   Diagnosis   • Anxiety and depression   • Uncontrolled type 2 diabetes mellitus   • Hypothyroid   • Obesity   • Vitamin D deficiency, unspecified   • Diabetic neuropathy, type II diabetes mellitus   • Mixed hyperlipidemia   • Dyspnea on exertion   • Type 2 diabetes mellitus without complication, with long-term current use of  "insulin   • Urinary incontinence   • Microscopic hematuria   • Nocturia   • Nocturnal enuresis   • Stress incontinence   • Urge incontinence   • Decreased GFR   • Mild persistent asthma   • Obesity (BMI 35.0-39.9 without comorbidity)   • Drug-induced parkinsonism   • Encounter to establish care       Allergies   Allergen Reactions   • Doxepin Rash   • Januvia [Sitagliptin] Other (See Comments)     Insomnia       Current Outpatient Medications:   •  Accu-Chek Guide Test test strip, USE 1 STRIP TO CHECK GLUCOSE ONCE DAILY, Disp: 100 each, Rfl: 0  •  Accu-Chek Softclix Lancets lancets, USE 1  TO CHECK GLUCOSE 4 times a day DAILY dx e11.65 on insulin, Disp: 400 each, Rfl: 3  •  ammonium lactate (Lac-Hydrin) 12 % lotion, Apply BID to feet, Disp: 225 g, Rfl: 3  •  B-D UF III MINI PEN NEEDLES 31G X 5 MM misc, USE TO INJECT INSULIN TWICE A DAY BEFORE MEALS, Disp: , Rfl:   •  benztropine (COGENTIN) 0.5 MG tablet, Take 1 tablet by mouth As Needed., Disp: , Rfl:   •  Blood Glucose Monitoring Suppl w/Device kit, Use daily to test blood sugars uses accu chek, Disp: 1 each, Rfl: 0  •  busPIRone (BUSPAR) 15 MG tablet, Take 1 tablet by mouth Every 12 (Twelve) Hours., Disp: , Rfl:   •  Cholecalciferol (VITAMIN D3 PO), Take 1 tablet by mouth Daily., Disp: , Rfl:   •  citalopram (CeleXA) 40 MG tablet, Take 1 tablet by mouth Daily., Disp: 90 tablet, Rfl: 1  •  Continuous Glucose  (FreeStyle Sukhi 3 [place]) device, Use 1 Device 4 (Four) Times a Day., Disp: 1 each, Rfl: 3  •  Continuous Glucose Sensor (FreeStyle Sukhi 3 Plus Sensor), Use Every 14 (Fourteen) Days., Disp: 2 each, Rfl: 3  •  hydrOXYzine pamoate (VISTARIL) 50 MG capsule, , Disp: , Rfl:   •  ibuprofen (ADVIL,MOTRIN) 800 MG tablet, Take 1 tablet by mouth Every 8 (Eight) Hours As Needed for Mild Pain or Moderate Pain for up to 30 doses., Disp: 30 tablet, Rfl: 0  •  Insulin Syringe-Needle U-100 31G X 15/64\" 0.5 ML misc, Use 2 per day to administer insulin, Disp: 200 " "each, Rfl: 1  •  levothyroxine (SYNTHROID, LEVOTHROID) 200 MCG tablet, Take 1 tablet by mouth Daily., Disp: 90 tablet, Rfl: 0  •  Livalo 1 MG tablet tablet, , Disp: , Rfl:   •  LORazepam (ATIVAN) 0.5 MG tablet, Take 1 tablet by mouth 2 (Two) Times a Day As Needed. for anxiety, Disp: , Rfl:   •  metFORMIN (GLUCOPHAGE) 500 MG tablet, TAKE 2 TABLETS BY MOUTH ONCE DAILY WITH FOOD, Disp: 60 tablet, Rfl: 5  •  NovoLOG Mix 70/30 (70-30) 100 UNIT/ML injection, INJECT 34 UNITS SUBCUTANEOUSLY BEFORE BREAKFAST AND  12  UNITS  BEFORE  SUPPER, Disp: 40 mL, Rfl: 0  •  risperiDONE (risperDAL) 3 MG tablet, Take 1 tablet by mouth 2 (Two) Times a Day., Disp: , Rfl:   •  Trelegy Ellipta 100-62.5-25 MCG/ACT inhaler, INHALE 1 PUFF ONCE DAILY, Disp: 180 each, Rfl: 0  •  Ventolin  (90 Base) MCG/ACT inhaler, Inhale 2 puffs 4 (Four) Times a Day., Disp: 18 g, Rfl: 0  MEDICATION LIST AND ALLERGIES REVIEWED.    Social History     Tobacco Use   • Smoking status: Never   • Smokeless tobacco: Never   Vaping Use   • Vaping status: Never Used   Substance Use Topics   • Alcohol use: No   • Drug use: No       FAMILY AND SOCIAL HISTORY REVIEWED.    Review of Systems   Constitutional:  Negative for chills, fatigue and fever.   Respiratory:  Positive for shortness of breath. Negative for cough, chest tightness and wheezing.    Cardiovascular:  Negative for chest pain, palpitations and leg swelling.   Skin:  Negative for color change.   Psychiatric/Behavioral:  Negative for sleep disturbance.    All other systems reviewed and are negative.  .    /80   Pulse 78   Temp 98 °F (36.7 °C)   Ht 167.6 cm (65.98\")   Wt 104 kg (229 lb)   LMP  (LMP Unknown)   SpO2 99% Comment: resting room air  BMI 36.98 kg/m²     Immunization History   Administered Date(s) Administered   • COVID-19 (MODERNA) 12YRS+ (SPIKEVAX) 10/16/2023   • COVID-19 (MODERNA) 1st,2nd,3rd Dose Monovalent 05/06/2021, 06/05/2021, 01/04/2022   • COVID-19 (MODERNA) BIVALENT 12+YRS " 10/26/2022   • Flu Vaccine Quad PF >36MO 10/04/2017, 12/29/2021   • Fluzone  >6mos 09/13/2024   • Fluzone (or Fluarix & Flulaval for VFC) >6mos 10/04/2017, 12/29/2021, 10/26/2022, 11/14/2023   • Fluzone Quad >6mos (Multi-dose) 10/04/2015   • Hep A / Hep B 02/16/2024   • Hepatitis A 05/09/2018, 05/09/2018, 10/04/2018, 01/10/2020, 01/10/2020   • Influenza, Unspecified 10/04/2018, 10/26/2022   • Pneumococcal Conjugate 20-Valent (PCV20) 09/11/2023   • Pneumococcal Polysaccharide (PPSV23) 05/28/2016, 05/28/2016   • Shingrix 12/29/2021, 09/11/2023, 02/16/2024   • Tdap 05/24/2024   • flucelvax quad pfs =>4 YRS 10/04/2018       Physical Exam  Vitals reviewed.   Constitutional:       General: She is not in acute distress.     Appearance: Normal appearance.   Cardiovascular:      Rate and Rhythm: Normal rate and regular rhythm.      Pulses: Normal pulses.      Heart sounds: Normal heart sounds.   Pulmonary:      Effort: Pulmonary effort is normal. No respiratory distress.      Breath sounds: No wheezing, rhonchi or rales.   Skin:     General: Skin is warm and dry.   Neurological:      Mental Status: She is alert.      Comments: Tremors at rest throughout exam     RESULTS    PFTs:  6/20/2024: No obstruction by ATS guidelines.  Nonspecific deficit with reduction of FVC and FEV1.  Mild restriction with TLC 80% predicted and FVC 69% predicted.  Reduced DLCO that corrects for alveolar volume.    5/19/2021: No obstruction by ATS guidelines.  Nonspecific deficit with reduction of FVC and FEV1.  Reduced DLCO that corrects for alveolar volume    6 MWT 1/10/2019:  Unrevealing for exertional hypoxemia with lowest SpO2 98%.  Has dyspnea score 3 at the 5-minute richa.  She was able to ambulate 1200 feet.    PROBLEM LIST    Problem List Items Addressed This Visit    None      DISCUSSION    Ms. Ayala was seen today for pulmonary follow-up and is stable from a respiratory standpoint.    Dyspnea on exertion  Mild persistent asthma  Seasonal  allergies  Obesity  Symptoms are likely multifactorial in the setting of her mild persistent asthma, seasonal allergies, obesity, and physical deconditioning  PFTs are unrevealing for obstruction and do show mild restriction with reduction in both FVC and TLC.  Continue Trelegy 100.  Denies need for refills.  Continue albuterol rescue inhaler every 4-6 hours as needed for shortness of breath or wheezing  Continue allergy regimen with Claritin and Flonase.  Currently does not have any symptoms of reflux however this may need to be explored at future visits.  She is very sedentary at baseline with a BMI of 33.36, which is reduced from 34 at her last visit.  I explained that obesity and physical deconditioning may largely be playing a role.  I did encourage her to to get 30 minutes of intentional exercise or a day.  Initially I would like her to walk for 10 minutes 2-3 times a day and increase her stamina.  They do offer her Jazzercise classes at her group home and in the future I would like her to be more involved in this.    Return to clinic in 6 months or sooner if needed.    I personally spent a total of 33 minutes on patient visit today including chart review, face to face with the patient obtaining the history and physical exam, review of pertinent images and tests, counseling and discussion and/or coordination of care as described above, and documentation.  Total time excludes time spent on other separate services such as performing procedures or test interpretation, if applicable.    Electronically signed by ZIA Walker, 06/24/24, 9:49 AM EDT.    Please note that portions of this note were completed with a voice recognition program.        CC: Vanessa Kaur MD

## 2025-01-21 NOTE — PROGRESS NOTES
Subjective   The ABCs of the Annual Wellness Visit  Medicare Wellness Visit      Vilma Ayala is a 54 y.o. patient who presents for a Medicare Wellness Visit.    The following portions of the patient's history were reviewed and   updated as appropriate: allergies, current medications, past family history, past medical history, past social history, past surgical history, and problem list.    Compared to one year ago, the patient's physical   health is better.  Compared to one year ago, the patient's mental   health is worse. Getting frustrated, not thinking as fast, and tired all the time.     Recent Hospitalizations:  No hospitalizations.    Current Medical Providers:  Patient Care Team:  Vanessa Kaur MD as PCP - General (Family Medicine)  Selina Lopez MD as Consulting Physician (Endocrinology)  Quintin Fall DO as Consulting Physician (Pulmonary Disease)    Outpatient Medications Prior to Visit   Medication Sig Dispense Refill    Accu-Chek Guide Test test strip USE 1 STRIP TO CHECK GLUCOSE ONCE DAILY 100 each 0    Accu-Chek Softclix Lancets lancets USE 1  TO CHECK GLUCOSE 4 times a day DAILY dx e11.65 on insulin 400 each 3    ammonium lactate (Lac-Hydrin) 12 % lotion Apply BID to feet 225 g 3    atorvastatin (LIPITOR) 40 MG tablet Take 1 tablet by mouth Daily.      B-D UF III MINI PEN NEEDLES 31G X 5 MM misc USE TO INJECT INSULIN TWICE A DAY BEFORE MEALS      benztropine (COGENTIN) 0.5 MG tablet Take 1 tablet by mouth As Needed.      Blood Glucose Monitoring Suppl w/Device kit Use daily to test blood sugars uses accu chek 1 each 0    busPIRone (BUSPAR) 15 MG tablet Take 1 tablet by mouth Every 12 (Twelve) Hours.      Cholecalciferol (VITAMIN D3 PO) Take 1 tablet by mouth Daily.      citalopram (CeleXA) 40 MG tablet Take 1 tablet by mouth Daily. 90 tablet 1    Continuous Glucose  (FreeStyle Sukhi 3 [place]) device Use 1 Device 4 (Four) Times a Day. 1 each 3    Continuous  "Glucose Sensor (FreeStyle Sukhi 3 Plus Sensor) Use Every 14 (Fourteen) Days. 2 each 3    famotidine (PEPCID) 40 MG tablet Take 1 tablet by mouth every night at bedtime.      hydrOXYzine pamoate (VISTARIL) 50 MG capsule       ibuprofen (ADVIL,MOTRIN) 800 MG tablet Take 1 tablet by mouth Every 8 (Eight) Hours As Needed for Mild Pain or Moderate Pain for up to 30 doses. 30 tablet 0    Insulin Syringe-Needle U-100 31G X 15/64\" 0.5 ML misc Use 2 per day to administer insulin 200 each 1    levothyroxine (SYNTHROID, LEVOTHROID) 200 MCG tablet Take 1 tablet by mouth Daily. 90 tablet 0    Livalo 1 MG tablet tablet       LORazepam (ATIVAN) 0.5 MG tablet Take 1 tablet by mouth 2 (Two) Times a Day As Needed. for anxiety      metFORMIN (GLUCOPHAGE) 500 MG tablet TAKE 2 TABLETS BY MOUTH ONCE DAILY WITH FOOD 60 tablet 5    NovoLOG Mix 70/30 (70-30) 100 UNIT/ML injection INJECT 34 UNITS SUBCUTANEOUSLY BEFORE BREAKFAST AND  12  UNITS  BEFORE  SUPPER 40 mL 0    risperiDONE (risperDAL) 3 MG tablet Take 1 tablet by mouth 2 (Two) Times a Day.      Trelegy Ellipta 100-62.5-25 MCG/ACT inhaler INHALE 1 PUFF ONCE DAILY 180 each 0    Ventolin  (90 Base) MCG/ACT inhaler Inhale 2 puffs 4 (Four) Times a Day. 18 g 0     No facility-administered medications prior to visit.     No opioid medication identified on active medication list. I have reviewed chart for other potential  high risk medication/s and harmful drug interactions in the elderly.      Aspirin is not on active medication list.  Aspirin use is not indicated based on review of current medical condition/s. Risk of harm outweighs potential benefits.  .    Patient Active Problem List   Diagnosis    Anxiety and depression    Uncontrolled type 2 diabetes mellitus    Hypothyroid    Obesity    Vitamin D deficiency, unspecified    Diabetic neuropathy, type II diabetes mellitus    Mixed hyperlipidemia    Dyspnea on exertion    Type 2 diabetes mellitus without complication, with " "long-term current use of insulin    Urinary incontinence    Microscopic hematuria    Nocturia    Nocturnal enuresis    Stress incontinence    Urge incontinence    Decreased GFR    Mild persistent asthma    Obesity (BMI 35.0-39.9 without comorbidity)    Drug-induced parkinsonism    Encounter to establish care    NATASHA (obstructive sleep apnea)    Medicare annual wellness visit, subsequent    Vitamin D deficiency     Advance Care Planning Advance Directive is not on file.  ACP discussion was held with the patient during this visit. Patient does not have an advance directive, information provided.            Objective   Vitals:    25 1351   BP: 126/78   Pulse: 84   Temp: 98.4 °F (36.9 °C)   TempSrc: Infrared   SpO2: 100%   Weight: 106 kg (233 lb)   Height: 167.6 cm (65.98\")   PainSc: 0-No pain       Estimated body mass index is 37.63 kg/m² as calculated from the following:    Height as of this encounter: 167.6 cm (65.98\").    Weight as of this encounter: 106 kg (233 lb).              Does the patient have evidence of cognitive impairment? No  Lab Results   Component Value Date    HGBA1C 6.5 (A) 2024                                                                                                Health  Risk Assessment    Smoking Status:  Social History     Tobacco Use   Smoking Status Never   Smokeless Tobacco Never     Alcohol Consumption:  Social History     Substance and Sexual Activity   Alcohol Use No       Fall Risk Screen  STEADI Fall Risk Assessment was completed, and patient is at LOW risk for falls.Assessment completed on:2025    Depression Screening   Little interest or pleasure in doing things? Not at all   Feeling down, depressed, or hopeless? Not at all   PHQ-2 Total Score 0      Health Habits and Functional and Cognitive Screenin/21/2025     2:01 PM   Functional & Cognitive Status   Do you have difficulty preparing food and eating? No   Do you have difficulty bathing yourself, " getting dressed or grooming yourself? No   Do you have difficulty using the toilet? No   Do you have difficulty moving around from place to place? Yes   Do you have trouble with steps or getting out of a bed or a chair? Yes   Current Diet Unhealthy Diet   Dental Exam Up to date   Eye Exam Not up to date   Exercise (times per week) 5 times per week   Current Exercises Include Walking   Do you need help using the phone?  No   Are you deaf or do you have serious difficulty hearing?  No   Do you need help to go to places out of walking distance? No   Do you need help shopping? No   Do you need help preparing meals?  No   Do you need help with housework?  Yes   Do you need help with laundry? No   Do you need help taking your medications? Yes   Do you need help managing money? Yes   Do you ever drive or ride in a car without wearing a seat belt? No   Have you felt unusual stress, anger or loneliness in the last month? Yes   Who do you live with? Alone   If you need help, do you have trouble finding someone available to you? No   Have you been bothered in the last four weeks by sexual problems? No   Do you have difficulty concentrating, remembering or making decisions? Yes           Age-appropriate Screening Schedule:  Refer to the list below for future screening recommendations based on patient's age, sex and/or medical conditions. Orders for these recommended tests are listed in the plan section. The patient has been provided with a written plan.    Health Maintenance List  Health Maintenance   Topic Date Due    ANNUAL WELLNESS VISIT  Never done    PAP SMEAR  Never done    DIABETIC EYE EXAM  03/18/2020    Hepatitis B (2 of 3 - Hep B Twinrix 3-dose series) 03/15/2024    COVID-19 Vaccine (6 - 2024-25 season) 09/01/2024    LIPID PANEL  12/14/2024    HEMOGLOBIN A1C  05/04/2025    BMI FOLLOWUP  09/13/2025    URINE MICROALBUMIN  11/18/2025    MAMMOGRAM  05/23/2026    COLORECTAL CANCER SCREENING  07/26/2026    TDAP/TD VACCINES  "(2 - Td or Tdap) 05/24/2034    HEPATITIS C SCREENING  Completed    Pneumococcal Vaccine 0-64  Completed    INFLUENZA VACCINE  Completed    ZOSTER VACCINE  Completed                                                                                                                                                CMS Preventative Services Quick Reference  Risk Factors Identified During Encounter  Depression/Dysphoria:  continue to follow with psych.  Fall Risk-High or Moderate:  Not addressed  Polypharmacy: Medication List reviewed    The above risks/problems have been discussed with the patient.  Pertinent information has been shared with the patient in the After Visit Summary.  An After Visit Summary and PPPS were made available to the patient.    Follow Up:   Next Medicare Wellness visit to be scheduled in 1 year.         Additional E&M Note during same encounter follows:  Patient has additional, significant, and separately identifiable condition(s)/problem(s) that require work above and beyond the Medicare Wellness Visit     Chief Complaint  Medicare Wellness-subsequent    Subjective             Objective   Vital Signs:  /78   Pulse 84   Temp 98.4 °F (36.9 °C) (Infrared)   Ht 167.6 cm (65.98\")   Wt 106 kg (233 lb)   SpO2 100%   BMI 37.63 kg/m²   Physical Exam  Constitutional:       Appearance: Normal appearance.   HENT:      Head: Normocephalic and atraumatic.      Nose: Nose normal.   Eyes:      Conjunctiva/sclera: Conjunctivae normal.      Pupils: Pupils are equal, round, and reactive to light.   Cardiovascular:      Rate and Rhythm: Normal rate and regular rhythm.   Pulmonary:      Effort: Pulmonary effort is normal.   Abdominal:      General: Abdomen is flat.   Neurological:      General: No focal deficit present.      Mental Status: She is alert and oriented to person, place, and time.   Psychiatric:         Mood and Affect: Mood normal.         Behavior: Behavior normal.         Thought Content: " Thought content normal.               Assessment and Plan          Medicare annual wellness visit, subsequent  Doing well. No major health changes. Diabetes need greater attention as she keeps having hypoglycemic episodes. We will see her back in about 1 month after changes to insulin were made today.        Vitamin D deficiency  Chronic, uncontrolled. Not taking supplement. Update level today, will need supplemented.   Orders:    Vitamin D 25 hydroxy; Future    Acquired hypothyroidism  Chronic, uncontrolled. Low TSH at last check.  Repeat today.   Orders:    TSH Rfx On Abnormal To Free T4; Future            Follow Up   No follow-ups on file.  Patient was given instructions and counseling regarding her condition or for health maintenance advice. Please see specific information pulled into the AVS if appropriate.

## 2025-01-21 NOTE — ASSESSMENT & PLAN NOTE
Doing well. No major health changes. Diabetes need greater attention as she keeps having hypoglycemic episodes. We will see her back in about 1 month after changes to insulin were made today.

## 2025-01-22 LAB
25(OH)D3 SERPL-MCNC: 23.3 NG/ML (ref 30–100)
T4 FREE SERPL-MCNC: 1.15 NG/DL (ref 0.92–1.68)
TSH SERPL DL<=0.05 MIU/L-ACNC: 10.2 UIU/ML (ref 0.27–4.2)

## 2025-01-23 RX ORDER — LEVOTHYROXINE SODIUM 200 UG/1
200 TABLET ORAL DAILY
Qty: 90 TABLET | Refills: 0 | Status: SHIPPED | OUTPATIENT
Start: 2025-01-23 | End: 2025-01-24 | Stop reason: SDUPTHER

## 2025-01-23 NOTE — TELEPHONE ENCOUNTER
Rx Refill Note  Requested Prescriptions     Pending Prescriptions Disp Refills    levothyroxine (SYNTHROID, LEVOTHROID) 200 MCG tablet 90 tablet 0     Sig: Take 1 tablet by mouth Daily.      Last office visit with prescribing clinician: 11/4/2024      Next office visit with prescribing clinician: 3/11/2025       Kina Figueredo MA  01/23/25, 13:01 EST

## 2025-01-23 NOTE — TELEPHONE ENCOUNTER
Patient called office, she is needing a refill on her levothyroxiene. She would like this sent to [facility] on [address] [facility].

## 2025-01-24 ENCOUNTER — TELEPHONE (OUTPATIENT)
Dept: INTERNAL MEDICINE | Facility: CLINIC | Age: 55
End: 2025-01-24
Payer: MEDICARE

## 2025-01-24 RX ORDER — LEVOTHYROXINE SODIUM 25 UG/1
25 TABLET ORAL
Qty: 90 TABLET | Refills: 0 | Status: SHIPPED | OUTPATIENT
Start: 2025-01-24

## 2025-01-24 RX ORDER — LEVOTHYROXINE SODIUM 200 UG/1
200 TABLET ORAL DAILY
Qty: 90 TABLET | Refills: 0 | Status: SHIPPED | OUTPATIENT
Start: 2025-01-24

## 2025-01-24 NOTE — TELEPHONE ENCOUNTER
HUB to relay:  Can we verify what dose of Synthroid Vilma is taking? Her TSH is elevated so we likely need to increase the dose. Let me know and also please verify if she is taking on empty stmoach without any other meds, food/drink for at least 1 hour.     Thanks,     Vanessa

## 2025-01-24 NOTE — TELEPHONE ENCOUNTER
Name: Vilma Ayala      Relationship: Self      HUB PROVIDED THE RELAY MESSAGE FROM THE OFFICE      PATIENT: HAS FURTHER QUESTIONS AND WOULD LIKE A CALL BACK AT THE FOLLOWING PHONE [phone]    ADDITIONAL INFORMATION:

## 2025-01-24 NOTE — TELEPHONE ENCOUNTER
Patient states she is on 25 mg and 200 mg.  She takes first thing in the morning on an empty stomach.

## 2025-01-24 NOTE — TELEPHONE ENCOUNTER
Rx Refill Note    Requested Prescriptions     Pending Prescriptions Disp Refills    levothyroxine (SYNTHROID, LEVOTHROID) 200 MCG tablet 90 tablet 0     Sig: Take 1 tablet by mouth Daily.    levothyroxine (SYNTHROID, LEVOTHROID) 25 MCG tablet 90 tablet 0     Sig: Take 1 tablet by mouth Every Morning.        Last office visit with prescribing clinician: 11/4/2024       Next office visit with prescribing clinician: 3/11/2025     {    Catherine Ruelas MA  01/24/25, 14:40 EST

## 2025-01-24 NOTE — TELEPHONE ENCOUNTER
----- Message from Vanessa Kaur sent at 1/24/2025 12:07 PM EST -----  Can we verify what dose of Synthroid Vilma is taking? Her TSH is elevated so we likely need to increase the dose. Let me know and also please verify if she is taking on empty stmoach without any other meds, food/drink for at least 1 hour.     Thanks,     Vanessa

## 2025-01-24 NOTE — TELEPHONE ENCOUNTER
Pt called requesting levothyroxine 200 and 25 doses Rxs to be sent in to [facility] order [facility].

## 2025-02-03 RX ORDER — HYDROXYZINE PAMOATE 50 MG/1
50 CAPSULE ORAL 3 TIMES DAILY PRN
Qty: 90 CAPSULE | Refills: 0 | Status: SHIPPED | OUTPATIENT
Start: 2025-02-03

## 2025-02-03 NOTE — TELEPHONE ENCOUNTER
Rx Refill Note  Requested Prescriptions     Pending Prescriptions Disp Refills    hydrOXYzine pamoate (VISTARIL) 50 MG capsule        Last office visit with prescribing clinician: 11/4/2024  Next office visit with prescribing clinician: 3/11/2025  Daina Webb MA  02/03/25, 10:18 EST

## 2025-02-18 ENCOUNTER — OFFICE VISIT (OUTPATIENT)
Dept: INTERNAL MEDICINE | Facility: CLINIC | Age: 55
End: 2025-02-18
Payer: MEDICARE

## 2025-02-18 VITALS
OXYGEN SATURATION: 97 % | TEMPERATURE: 98.7 F | DIASTOLIC BLOOD PRESSURE: 72 MMHG | SYSTOLIC BLOOD PRESSURE: 126 MMHG | WEIGHT: 224 LBS | HEART RATE: 84 BPM | BODY MASS INDEX: 36 KG/M2 | HEIGHT: 66 IN

## 2025-02-18 DIAGNOSIS — Z79.4 TYPE 2 DIABETES MELLITUS WITH DIABETIC AUTONOMIC NEUROPATHY, WITH LONG-TERM CURRENT USE OF INSULIN: Primary | ICD-10-CM

## 2025-02-18 DIAGNOSIS — E03.9 ACQUIRED HYPOTHYROIDISM: ICD-10-CM

## 2025-02-18 DIAGNOSIS — E11.9 TYPE 2 DIABETES MELLITUS WITHOUT COMPLICATION, WITH LONG-TERM CURRENT USE OF INSULIN: ICD-10-CM

## 2025-02-18 DIAGNOSIS — Z79.4 TYPE 2 DIABETES MELLITUS WITHOUT COMPLICATION, WITH LONG-TERM CURRENT USE OF INSULIN: ICD-10-CM

## 2025-02-18 DIAGNOSIS — E11.43 TYPE 2 DIABETES MELLITUS WITH DIABETIC AUTONOMIC NEUROPATHY, WITH LONG-TERM CURRENT USE OF INSULIN: Primary | ICD-10-CM

## 2025-02-18 LAB
EXPIRATION DATE: ABNORMAL
HBA1C MFR BLD: 6.9 % (ref 4.5–5.7)
Lab: ABNORMAL

## 2025-02-18 PROCEDURE — 3044F HG A1C LEVEL LT 7.0%: CPT | Performed by: STUDENT IN AN ORGANIZED HEALTH CARE EDUCATION/TRAINING PROGRAM

## 2025-02-18 PROCEDURE — 99214 OFFICE O/P EST MOD 30 MIN: CPT | Performed by: STUDENT IN AN ORGANIZED HEALTH CARE EDUCATION/TRAINING PROGRAM

## 2025-02-18 PROCEDURE — 1126F AMNT PAIN NOTED NONE PRSNT: CPT | Performed by: STUDENT IN AN ORGANIZED HEALTH CARE EDUCATION/TRAINING PROGRAM

## 2025-02-18 PROCEDURE — 83036 HEMOGLOBIN GLYCOSYLATED A1C: CPT | Performed by: STUDENT IN AN ORGANIZED HEALTH CARE EDUCATION/TRAINING PROGRAM

## 2025-02-18 RX ORDER — HUMAN INSULIN 100 [USP'U]/ML
INJECTION, SUSPENSION SUBCUTANEOUS
COMMUNITY
Start: 2025-02-10

## 2025-02-18 RX ORDER — HYDROXYZINE HYDROCHLORIDE 50 MG/1
TABLET, FILM COATED ORAL
COMMUNITY
Start: 2025-01-23

## 2025-02-18 NOTE — PATIENT INSTRUCTIONS
30 U insulin in the morning. Eat shortly after taking.   10 U insulin in the evening. Continue well-balanced snack before bed.   Keep glucose tab or sugary snack/candy near bedside.     Take Synthroid on completely empty stomach without other food, beverage or meds for at least 30-60 minutes.    Yes

## 2025-02-18 NOTE — PROGRESS NOTES
Office Note     Name: Vilma Ayala    : 1970     MRN: [id number]     Chief Complaint  follow up and Diabetes (Wanting insulin higher dose)    Subjective     History of Present Illness:  Vilma Ayala is a 54 y.o. female who presents today for diabetes and hypothyroidism.     She takes 34 U Novolog in the morning with breakfast, 14U in the evening before dinner. Her A1c is 6.5%. we will decrease AM Novolog 70/30 to 25 U and evening Novolog to 10 U and encouraged a snack before bedtime. I have also prescribed glucose tablet and discussed how to use them.     She had a low sugar 3x last night. She ate chocolate.     Review of Systems:   Review of Systems    Past Medical History:   Past Medical History:   Diagnosis Date    Anxiety     Chicken pox     Depression     Diabetes mellitus     Disease of thyroid gland     Measles     Type 2 diabetes mellitus        Past Surgical History:   Past Surgical History:   Procedure Laterality Date    EYE SURGERY      TONSILLECTOMY         Family History:   Family History   Problem Relation Age of Onset    Hypertension Other     Thyroid disease Other     Diabetes Other     Obesity Other     Diabetes Mother     Hypertension Mother     Heart disease Mother     Cancer Father     Diabetes Sister     Diabetes Brother     No Known Problems Brother        Social History:   Social History     Socioeconomic History    Marital status: Single   Tobacco Use    Smoking status: Never    Smokeless tobacco: Never   Vaping Use    Vaping status: Never Used   Substance and Sexual Activity    Alcohol use: No    Drug use: No    Sexual activity: Not Currently       Immunizations:   Immunization History   Administered Date(s) Administered    COVID-19 (MODERNA) 12YRS+ (SPIKEVAX) 10/16/2023, 2025    COVID-19 (MODERNA) 1st,2nd,3rd Dose Monovalent 2021, 2021, 2022    COVID-19 (MODERNA) BIVALENT 12+YRS 10/26/2022    Flu Vaccine Quad PF >36MO 10/04/2017, 2021     Fluzone  >6mos 09/13/2024    Fluzone (or Fluarix & Flulaval for VFC) >6mos 10/04/2017, 12/29/2021, 10/26/2022, 11/14/2023    Fluzone Quad >6mos (Multi-dose) 10/04/2015    Hep A / Hep B 02/16/2024, 01/21/2025    Hepatitis A 05/09/2018, 05/09/2018, 10/04/2018, 01/10/2020, 01/10/2020    Influenza recombinant 01/21/2025    Influenza, Unspecified 10/04/2018, 10/26/2022    Pneumococcal Conjugate 20-Valent (PCV20) 09/11/2023    Pneumococcal Polysaccharide (PPSV23) 05/28/2016, 05/28/2016    Shingrix 12/29/2021, 09/11/2023, 02/16/2024    Tdap 05/24/2024    flucelvax quad pfs =>4 YRS 10/04/2018        Medications:     Current Outpatient Medications:     Accu-Chek Guide Test test strip, USE 1 STRIP TO CHECK GLUCOSE ONCE DAILY, Disp: 100 each, Rfl: 0    Accu-Chek Softclix Lancets lancets, USE 1  TO CHECK GLUCOSE 4 times a day DAILY dx e11.65 on insulin, Disp: 400 each, Rfl: 3    ammonium lactate (Lac-Hydrin) 12 % lotion, Apply BID to feet, Disp: 225 g, Rfl: 3    atorvastatin (LIPITOR) 40 MG tablet, Take 1 tablet by mouth Daily., Disp: , Rfl:     B-D UF III MINI PEN NEEDLES 31G X 5 MM misc, USE TO INJECT INSULIN TWICE A DAY BEFORE MEALS, Disp: , Rfl:     benztropine (COGENTIN) 0.5 MG tablet, Take 1 tablet by mouth As Needed., Disp: , Rfl:     Blood Glucose Monitoring Suppl w/Device kit, Use daily to test blood sugars uses accu chek, Disp: 1 each, Rfl: 0    busPIRone (BUSPAR) 15 MG tablet, Take 1 tablet by mouth Every 12 (Twelve) Hours., Disp: , Rfl:     Cholecalciferol (VITAMIN D3 PO), Take 1 tablet by mouth Daily., Disp: , Rfl:     citalopram (CeleXA) 40 MG tablet, Take 1 tablet by mouth Daily., Disp: 90 tablet, Rfl: 1    Continuous Glucose  (FreeStyle Sukhi 3 [place]) device, Use 1 Device 4 (Four) Times a Day., Disp: 1 each, Rfl: 3    Continuous Glucose Sensor (FreeStyle Sukhi 3 Plus Sensor), Use Every 14 (Fourteen) Days., Disp: 2 each, Rfl: 3    famotidine (PEPCID) 40 MG tablet, Take 1 tablet by mouth every night at  "bedtime., Disp: , Rfl:     glucose (DEX4) 4 GM chewable tablet, Chew 4 tablets As Needed for Low Blood Sugar., Disp: 20 tablet, Rfl: 12    hydrOXYzine (ATARAX) 50 MG tablet, , Disp: , Rfl:     hydrOXYzine pamoate (VISTARIL) 50 MG capsule, Take 1 capsule by mouth 3 (Three) Times a Day As Needed for Itching., Disp: 90 capsule, Rfl: 0    ibuprofen (ADVIL,MOTRIN) 800 MG tablet, Take 1 tablet by mouth Every 8 (Eight) Hours As Needed for Mild Pain or Moderate Pain for up to 30 doses., Disp: 30 tablet, Rfl: 0    Insulin Syringe-Needle U-100 31G X 15/64\" 0.5 ML misc, Use 2 per day to administer insulin, Disp: 200 each, Rfl: 1    levothyroxine (SYNTHROID, LEVOTHROID) 200 MCG tablet, Take 1 tablet by mouth Daily., Disp: 90 tablet, Rfl: 0    levothyroxine (SYNTHROID, LEVOTHROID) 25 MCG tablet, Take 1 tablet by mouth Every Morning., Disp: 90 tablet, Rfl: 0    Livalo 1 MG tablet tablet, , Disp: , Rfl:     LORazepam (ATIVAN) 0.5 MG tablet, Take 1 tablet by mouth 2 (Two) Times a Day As Needed. for anxiety, Disp: , Rfl:     metFORMIN (GLUCOPHAGE) 500 MG tablet, TAKE 2 TABLETS BY MOUTH ONCE DAILY WITH FOOD, Disp: 60 tablet, Rfl: 5    NovoLIN 70/30 (70-30) 100 UNIT/ML injection, , Disp: , Rfl:     NovoLOG Mix 70/30 (70-30) 100 UNIT/ML injection, INJECT 34 UNITS SUBCUTANEOUSLY BEFORE BREAKFAST AND  12  UNITS  BEFORE  SUPPER, Disp: 40 mL, Rfl: 0    risperiDONE (risperDAL) 3 MG tablet, Take 1 tablet by mouth 2 (Two) Times a Day., Disp: , Rfl:     Trelegy Ellipta 100-62.5-25 MCG/ACT inhaler, INHALE 1 PUFF ONCE DAILY, Disp: 180 each, Rfl: 0    Ventolin  (90 Base) MCG/ACT inhaler, Inhale 2 puffs 4 (Four) Times a Day., Disp: 18 g, Rfl: 0    Allergies:   Allergies   Allergen Reactions    Doxepin Rash    Januvia [Sitagliptin] Other (See Comments)     Insomnia       Objective     Vital Signs  /72   Pulse 84   Temp 98.7 °F (37.1 °C) (Infrared)   Ht 167.6 cm (65.98\")   Wt 102 kg (224 lb)   SpO2 97%   BMI 36.17 kg/m² " "  Estimated body mass index is 36.17 kg/m² as calculated from the following:    Height as of this encounter: 167.6 cm (65.98\").    Weight as of this encounter: 102 kg (224 lb).           Physical Exam  Constitutional:       Appearance: Normal appearance.   HENT:      Head: Normocephalic and atraumatic.      Nose: Nose normal.   Eyes:      Conjunctiva/sclera: Conjunctivae normal.      Pupils: Pupils are equal, round, and reactive to light.   Cardiovascular:      Rate and Rhythm: Normal rate and regular rhythm.   Pulmonary:      Effort: Pulmonary effort is normal.   Abdominal:      General: Abdomen is flat.   Neurological:      General: No focal deficit present.      Mental Status: She is alert and oriented to person, place, and time.   Psychiatric:         Mood and Affect: Mood normal.         Behavior: Behavior normal.         Thought Content: Thought content normal.          Procedures     Results:  Recent Results (from the past 24 hours)   POC Glycosylated Hemoglobin (Hb A1C)    Collection Time: 02/18/25 11:28 AM    Specimen: Blood   Result Value Ref Range    Hemoglobin A1C 6.9 (A) 4.5 - 5.7 %    Lot Number [id number]     Expiration Date 11/12/2026         Assessment and Plan     Assessment/Plan:  Diagnoses and all orders for this visit:    1. Type 2 diabetes mellitus with diabetic autonomic neuropathy, with long-term current use of insulin (Primary)  -     POC Glycosylated Hemoglobin (Hb A1C)    2. Type 2 diabetes mellitus without complication, with long-term current use of insulin  Assessment & Plan:  Chronic, A1c increased to 6.9%. Review of GCM - sugars fluctuate wildly throughout the day. AM sugars spike around 7 AM. She generally eats shortly before that. Sugars are moderately well controlled throughout the day and spike again around 6 PM. Sugars appear to be staying stable over the night. GCM displayed one episode of AM hypoglycemia but she occasionally takes extra insulin in the evening if her sugars run " high. Advised against this. Increase AM Novolog to 30 U, continue 10U in the evening. Follow-up with endo in 1 month.       3. Acquired hypothyroidism  Overview:        Assessment & Plan:  Uncontrolled. TSH >10. Taking Synthroid in the evening about an hour after eating. Discussed importance of spacing out interval between meals and thyroid meds to at least  minutes, with no other snacks or meds. Repeat TSH in 6 weeks after appropriately taking. Continue current dose of 225mcg.          Follow Up  No follow-ups on file.    Vanessa Kaur MD   [facility]

## 2025-02-18 NOTE — ASSESSMENT & PLAN NOTE
Uncontrolled. TSH >10. Taking Synthroid in the evening about an hour after eating. Discussed importance of spacing out interval between meals and thyroid meds to at least  minutes, with no other snacks or meds. Repeat TSH in 6 weeks after appropriately taking. Continue current dose of 225mcg.

## 2025-02-18 NOTE — ASSESSMENT & PLAN NOTE
Chronic, A1c increased to 6.9%. Review of GCM - sugars fluctuate wildly throughout the day. AM sugars spike around 7 AM. She generally eats shortly before that. Sugars are moderately well controlled throughout the day and spike again around 6 PM. Sugars appear to be staying stable over the night. GCM displayed one episode of AM hypoglycemia but she occasionally takes extra insulin in the evening if her sugars run high. Advised against this. Increase AM Novolog to 30 U, continue 10U in the evening. Follow-up with lex in 1 month.

## 2025-02-21 ENCOUNTER — TELEPHONE (OUTPATIENT)
Dept: INTERNAL MEDICINE | Facility: CLINIC | Age: 55
End: 2025-02-21
Payer: MEDICARE

## 2025-02-21 RX ORDER — KETOROLAC TROMETHAMINE 30 MG/ML
1 INJECTION, SOLUTION INTRAMUSCULAR; INTRAVENOUS 4 TIMES DAILY
Qty: 1 EACH | Refills: 3 | Status: CANCELLED | OUTPATIENT
Start: 2025-02-21

## 2025-02-21 NOTE — TELEPHONE ENCOUNTER
Caller: Vilma Ayala    Relationship: Self    Best call back number: [phone]     Requested Prescriptions:   Requested Prescriptions     Pending Prescriptions Disp Refills    Continuous Glucose  (FreeStyle Sukhi 3 [place]) device 1 each 3     Sig: Use 1 Device 4 (Four) Times a Day.        Pharmacy where request should be sent: [id number] [address] [phone] [facility] [phone] FX     Last office visit with prescribing clinician: 2/18/2025   Last telemedicine visit with prescribing clinician: Visit date not found   Next office visit with prescribing clinician: 9/19/2025     Additional details provided by patient: BROKE HER MACHINE AND NEEDS A NEW ONE ASAP     Does the patient have less than a 3 day supply:  [x] Yes  [] No    Would you like a call back once the refill request has been completed: [] Yes [x] No    If the office needs to give you a call back, can they leave a voicemail: [] Yes [x] No    Elvia Silveira Rep   02/21/25 08:21 EST

## 2025-02-21 NOTE — TELEPHONE ENCOUNTER
HUB can relay:  Please find out if the patient is needing a new glucometer for test strips which was sent on 1/3/25 to the pharmacy by Dr. Lopez.  Or if she needs the Free style lisa device which was sent by Dr. Kaur.

## 2025-02-24 DIAGNOSIS — E11.9 TYPE 2 DIABETES MELLITUS WITHOUT COMPLICATION, WITH LONG-TERM CURRENT USE OF INSULIN: Primary | ICD-10-CM

## 2025-02-24 DIAGNOSIS — Z79.4 TYPE 2 DIABETES MELLITUS WITHOUT COMPLICATION, WITH LONG-TERM CURRENT USE OF INSULIN: Primary | ICD-10-CM

## 2025-02-24 RX ORDER — BLOOD-GLUCOSE METER
KIT MISCELLANEOUS
Qty: 1 EACH | Refills: 0 | Status: SHIPPED | OUTPATIENT
Start: 2025-02-24

## 2025-02-24 RX ORDER — SYRING-NEEDL,DISP,INSUL,0.3 ML 30 GX5/16"
SYRINGE, EMPTY DISPOSABLE MISCELLANEOUS
Qty: 400 EACH | Refills: 1 | Status: SHIPPED | OUTPATIENT
Start: 2025-02-24

## 2025-02-24 RX ORDER — GLUCOSAMINE HCL/CHONDROITIN SU 500-400 MG
CAPSULE ORAL
Qty: 400 EACH | Refills: 1 | Status: SHIPPED | OUTPATIENT
Start: 2025-02-24

## 2025-02-24 NOTE — TELEPHONE ENCOUNTER
Caller: Vilma Ayala    Relationship: Self    Best call back number: [phone]     Requested Prescriptions:   Requested Prescriptions     Pending Prescriptions Disp Refills    Blood Glucose Monitoring Suppl w/Device kit 1 each 0     Sig: Use daily to test blood sugars uses accu chek        Pharmacy where request should be sent: [id number] - [address] [phone]  - [phone]     Last office visit with prescribing clinician: 2/18/2025   Last telemedicine visit with prescribing clinician: Visit date not found   Next office visit with prescribing clinician: 9/19/2025     Additional details provided by patient:     Does the patient have less than a 3 day supply:  [] Yes  [x] No    Would you like a call back once the refill request has been completed: [] Yes [x] No    If the office needs to give you a call back, can they leave a voicemail: [] Yes [x] No    Elvia Parham Rep   02/24/25 11:36 EST

## 2025-02-25 ENCOUNTER — TELEPHONE (OUTPATIENT)
Dept: ENDOCRINOLOGY | Facility: CLINIC | Age: 55
End: 2025-02-25
Payer: MEDICARE

## 2025-02-25 NOTE — TELEPHONE ENCOUNTER
Vilma Ayala (Mora: [id number])  PA Case ID #: [id number]  Rx #: [id number]  Need Help? Call us at [phone]  Outcome  Approved today by [employer] 2017  PA Case: [id number], Status: Approved, Coverage Starts on: 1/1/2025 12:00:00 AM, Coverage Ends on: 2/25/2026 12:00:00 AM.  Effective Date: 12/31/2024  Authorization Expiration Date: 2/24/2026  Drug  NovoLOG Mix 70/30 (70-30) 100UNIT/ML suspension  ePA cloud logo  Form  [employer] Medicare Electronic PA Form (2017 NCPDP)  Original Claim Info  70,MR,564

## 2025-02-26 NOTE — TELEPHONE ENCOUNTER
PATIENT HAS CALLED REQUESTING A CALL BACK WITH STATUS OF SAMMI 3 REQUEST.    CALL BACK NUMBER [phone]

## 2025-02-27 ENCOUNTER — HOSPITAL ENCOUNTER (OUTPATIENT)
Dept: SLEEP MEDICINE | Facility: HOSPITAL | Age: 55
Discharge: HOME | End: 2025-02-27
Admitting: NURSE PRACTITIONER
Payer: MEDICARE

## 2025-02-27 VITALS — BODY MASS INDEX: 36 KG/M2 | HEIGHT: 66 IN | WEIGHT: 224 LBS

## 2025-02-27 DIAGNOSIS — G47.33 OSA (OBSTRUCTIVE SLEEP APNEA): ICD-10-CM

## 2025-02-27 PROCEDURE — 95800 SLP STDY UNATTENDED: CPT

## 2025-02-27 RX ORDER — KETOROLAC TROMETHAMINE 30 MG/ML
1 INJECTION, SOLUTION INTRAMUSCULAR; INTRAVENOUS 4 TIMES DAILY
Qty: 1 EACH | Refills: 3 | Status: SHIPPED | OUTPATIENT
Start: 2025-02-27

## 2025-02-27 NOTE — TELEPHONE ENCOUNTER
Attempted to contact patient, no option to leave voicemail.      RELAY:     Please let her know that the Freestyle Sukhi reader has been sent to the pharmacy.

## 2025-03-04 DIAGNOSIS — G47.33 OSA (OBSTRUCTIVE SLEEP APNEA): Primary | ICD-10-CM

## 2025-03-04 NOTE — TELEPHONE ENCOUNTER
Patient called office stating she is needing a refill sent to [facility]. Not sure what she is needing a refill on. Tried calling patient back to get more information but lisandra did not answer.

## 2025-03-04 NOTE — TELEPHONE ENCOUNTER
"Rx Refill Note  Requested Prescriptions     Pending Prescriptions Disp Refills    Insulin Syringe-Needle U-100 31G X 15/64\" 0.5 ML misc 200 each 1     Sig: Use 2 per day to administer insulin      Last office visit with prescribing clinician: 11/4/2024     Next office visit with prescribing clinician: 3/11/2025     Daina Webb MA  03/04/25, 09:06 EST   "

## 2025-03-05 NOTE — TELEPHONE ENCOUNTER
"Rx Refill Note  Requested Prescriptions     Pending Prescriptions Disp Refills    Insulin Syringe-Needle U-100 31G X 15/64\" 0.5 ML misc 200 each 0     Sig: Use 2 per day to administer insulin      Last office visit with prescribing clinician: 11/4/2024     Next office visit with prescribing clinician: 3/11/2025     Daina Webb MA  03/05/25, 10:59 EST   "

## 2025-03-08 ENCOUNTER — HOSPITAL ENCOUNTER (EMERGENCY)
Facility: HOSPITAL | Age: 55
Discharge: HOME | End: 2025-03-08
Attending: EMERGENCY MEDICINE
Payer: MEDICARE

## 2025-03-08 VITALS
RESPIRATION RATE: 16 BRPM | TEMPERATURE: 97.8 F | HEART RATE: 81 BPM | WEIGHT: 232 LBS | HEIGHT: 66 IN | SYSTOLIC BLOOD PRESSURE: 123 MMHG | OXYGEN SATURATION: 100 % | BODY MASS INDEX: 37.28 KG/M2 | DIASTOLIC BLOOD PRESSURE: 91 MMHG

## 2025-03-08 DIAGNOSIS — R73.9 HYPERGLYCEMIA: Primary | ICD-10-CM

## 2025-03-08 LAB
ALBUMIN SERPL-MCNC: 4.1 G/DL (ref 3.5–5.2)
ALBUMIN/GLOB SERPL: 1.4 G/DL
ALP SERPL-CCNC: 180 U/L (ref 39–117)
ALT SERPL W P-5'-P-CCNC: 29 U/L (ref 1–33)
ANION GAP SERPL CALCULATED.3IONS-SCNC: 12.7 MMOL/L (ref 5–15)
AST SERPL-CCNC: 19 U/L (ref 1–32)
B-OH-BUTYR SERPL-SCNC: 0.05 MMOL/L (ref 0.02–0.27)
BASOPHILS # BLD AUTO: 0.01 10*3/MM3 (ref 0–0.2)
BASOPHILS NFR BLD AUTO: 0.1 % (ref 0–1.5)
BILIRUB SERPL-MCNC: 0.3 MG/DL (ref 0–1.2)
BUN SERPL-MCNC: 18 MG/DL (ref 6–20)
BUN/CREAT SERPL: 12.9 (ref 7–25)
CALCIUM SPEC-SCNC: 9.5 MG/DL (ref 8.6–10.5)
CHLORIDE SERPL-SCNC: 99 MMOL/L (ref 98–107)
CO2 SERPL-SCNC: 25.3 MMOL/L (ref 22–29)
CREAT SERPL-MCNC: 1.4 MG/DL (ref 0.57–1)
D-LACTATE SERPL-SCNC: 0.9 MMOL/L (ref 0.5–2)
DEPRECATED RDW RBC AUTO: 43.3 FL (ref 37–54)
EGFRCR SERPLBLD CKD-EPI 2021: 44.8 ML/MIN/1.73
EOSINOPHIL # BLD AUTO: 0.36 10*3/MM3 (ref 0–0.4)
EOSINOPHIL NFR BLD AUTO: 2.6 % (ref 0.3–6.2)
ERYTHROCYTE [DISTWIDTH] IN BLOOD BY AUTOMATED COUNT: 13.3 % (ref 12.3–15.4)
GLOBULIN UR ELPH-MCNC: 2.9 GM/DL
GLUCOSE BLDC GLUCOMTR-MCNC: 302 MG/DL (ref 70–130)
GLUCOSE SERPL-MCNC: 278 MG/DL (ref 65–99)
HCT VFR BLD AUTO: 37 % (ref 34–46.6)
HGB BLD-MCNC: 12.4 G/DL (ref 12–15.9)
IMM GRANULOCYTES # BLD AUTO: 0.02 10*3/MM3 (ref 0–0.05)
IMM GRANULOCYTES NFR BLD AUTO: 0.1 % (ref 0–0.5)
LIPASE SERPL-CCNC: 33 U/L (ref 13–60)
LYMPHOCYTES # BLD AUTO: 4.39 10*3/MM3 (ref 0.7–3.1)
LYMPHOCYTES NFR BLD AUTO: 31.8 % (ref 19.6–45.3)
MCH RBC QN AUTO: 29.2 PG (ref 26.6–33)
MCHC RBC AUTO-ENTMCNC: 33.5 G/DL (ref 31.5–35.7)
MCV RBC AUTO: 87.3 FL (ref 79–97)
MONOCYTES # BLD AUTO: 0.76 10*3/MM3 (ref 0.1–0.9)
MONOCYTES NFR BLD AUTO: 5.5 % (ref 5–12)
NEUTROPHILS NFR BLD AUTO: 59.9 % (ref 42.7–76)
NEUTROPHILS NFR BLD AUTO: 8.26 10*3/MM3 (ref 1.7–7)
PLATELET # BLD AUTO: 309 10*3/MM3 (ref 140–450)
PMV BLD AUTO: 9.9 FL (ref 6–12)
POTASSIUM SERPL-SCNC: 3.8 MMOL/L (ref 3.5–5.2)
PROT SERPL-MCNC: 7 G/DL (ref 6–8.5)
RBC # BLD AUTO: 4.24 10*6/MM3 (ref 3.77–5.28)
SODIUM SERPL-SCNC: 137 MMOL/L (ref 136–145)
WBC NRBC COR # BLD AUTO: 13.8 10*3/MM3 (ref 3.4–10.8)

## 2025-03-08 PROCEDURE — 82010 KETONE BODYS QUAN: CPT | Performed by: EMERGENCY MEDICINE

## 2025-03-08 PROCEDURE — 82948 REAGENT STRIP/BLOOD GLUCOSE: CPT

## 2025-03-08 PROCEDURE — 83690 ASSAY OF LIPASE: CPT | Performed by: EMERGENCY MEDICINE

## 2025-03-08 PROCEDURE — 83605 ASSAY OF LACTIC ACID: CPT | Performed by: EMERGENCY MEDICINE

## 2025-03-08 PROCEDURE — 85025 COMPLETE CBC W/AUTO DIFF WBC: CPT | Performed by: EMERGENCY MEDICINE

## 2025-03-08 PROCEDURE — 80053 COMPREHEN METABOLIC PANEL: CPT | Performed by: EMERGENCY MEDICINE

## 2025-03-08 PROCEDURE — 99283 EMERGENCY DEPT VISIT LOW MDM: CPT

## 2025-03-09 NOTE — FSED PROVIDER NOTE
Subjective   History of Present Illness  Patient presents to the emergency department for hyperglycemia.  She has diabetes.  Says she ate some candy and noticed that her sugar was in the 300s.  Gave herself some insulin and it is coming down but wanted to be checked out.  She denies any abdominal pain altered mental status or any other symptoms.    History provided by:  Patient   used: No        Review of Systems   Constitutional:         Hyperglycemia   All other systems reviewed and are negative.      Past Medical History:   Diagnosis Date    Anxiety     Chicken pox     Depression     Diabetes mellitus     Disease of thyroid gland     Measles     Type 2 diabetes mellitus        Allergies   Allergen Reactions    Doxepin Rash    Januvia [Sitagliptin] Other (See Comments)     Insomnia       Past Surgical History:   Procedure Laterality Date    EYE SURGERY      TONSILLECTOMY         Family History   Problem Relation Age of Onset    Hypertension Other     Thyroid disease Other     Diabetes Other     Obesity Other     Diabetes Mother     Hypertension Mother     Heart disease Mother     Cancer Father     Diabetes Sister     Diabetes Brother     No Known Problems Brother        Social History     Socioeconomic History    Marital status: Single   Tobacco Use    Smoking status: Never    Smokeless tobacco: Never   Vaping Use    Vaping status: Never Used   Substance and Sexual Activity    Alcohol use: No    Drug use: No    Sexual activity: Not Currently           Objective   Physical Exam  Vitals reviewed.   Constitutional:       Appearance: Normal appearance.   HENT:      Head: Normocephalic and atraumatic.      Nose: Nose normal.      Mouth/Throat:      Mouth: Mucous membranes are moist.      Pharynx: Oropharynx is clear. No oropharyngeal exudate.   Eyes:      Extraocular Movements: Extraocular movements intact.      Conjunctiva/sclera: Conjunctivae normal.      Pupils: Pupils are equal, round, and  reactive to light.   Cardiovascular:      Rate and Rhythm: Normal rate and regular rhythm.      Pulses: Normal pulses.      Heart sounds: Normal heart sounds.   Pulmonary:      Effort: Pulmonary effort is normal. No respiratory distress.      Breath sounds: Normal breath sounds. No wheezing or rales.   Abdominal:      General: There is no distension.      Palpations: Abdomen is soft.      Tenderness: There is no abdominal tenderness. There is no guarding or rebound.   Musculoskeletal:         General: No swelling, tenderness, deformity or signs of injury. Normal range of motion.      Cervical back: Normal range of motion and neck supple.   Skin:     General: Skin is warm and dry.      Capillary Refill: Capillary refill takes less than 2 seconds.   Neurological:      General: No focal deficit present.      Mental Status: She is alert and oriented to person, place, and time.      Cranial Nerves: No cranial nerve deficit.   Psychiatric:         Mood and Affect: Mood normal.         Behavior: Behavior normal.         Procedures           ED Course                                           Medical Decision Making  Hemodynamically stable and afebrile.  Patient has no evidence of DKA has mild elevation of sugar.  Otherwise well-appearing sugars are trending down appropriately.  Discharge    Problems Addressed:  Hyperglycemia: complicated acute illness or injury    Amount and/or Complexity of Data Reviewed  Labs: ordered. Decision-making details documented in ED Course.        Final diagnoses:   Hyperglycemia       ED Disposition  ED Disposition       ED Disposition   Discharge    Condition   Stable    Comment   --               Vanessa Kaur MD  [address]  [address]  [facility] [place] [address]  [phone]    Schedule an appointment as soon as possible for a visit   As needed    [facility] EMERGENCY DEPARTMENT [place]  [address]  [facility] [address] [phone]  Go to    As needed         Medication List      No changes were made to your prescriptions during this visit.

## 2025-03-10 ENCOUNTER — TELEPHONE (OUTPATIENT)
Dept: INTERNAL MEDICINE | Facility: CLINIC | Age: 55
End: 2025-03-10
Payer: MEDICARE

## 2025-03-10 RX ORDER — HYDROCHLOROTHIAZIDE 12.5 MG/1
CAPSULE ORAL
Qty: 2 EACH | Refills: 5 | Status: SHIPPED | OUTPATIENT
Start: 2025-03-10

## 2025-03-10 NOTE — TELEPHONE ENCOUNTER
Caller: Vilma Ayala    Relationship: Self    Best call back number:[phone]    What medication are you requesting: FREESTYLE SAMMI 3 NEEDLE PATCH    If a prescription is needed, what is your preferred pharmacy and phone number:    [facility]  Additional notes:  PATIENT NEEDS THE NEEDLE PATCH FOR HER FREESTYLE SAMMI 3. PLEASE ADVISE

## 2025-03-11 ENCOUNTER — OFFICE VISIT (OUTPATIENT)
Dept: ENDOCRINOLOGY | Facility: CLINIC | Age: 55
End: 2025-03-11
Payer: MEDICARE

## 2025-03-11 ENCOUNTER — TELEPHONE (OUTPATIENT)
Dept: ENDOCRINOLOGY | Facility: CLINIC | Age: 55
End: 2025-03-11

## 2025-03-11 VITALS
HEIGHT: 66 IN | BODY MASS INDEX: 36.32 KG/M2 | DIASTOLIC BLOOD PRESSURE: 74 MMHG | HEART RATE: 91 BPM | SYSTOLIC BLOOD PRESSURE: 128 MMHG | WEIGHT: 226 LBS

## 2025-03-11 DIAGNOSIS — E03.9 ACQUIRED HYPOTHYROIDISM: ICD-10-CM

## 2025-03-11 DIAGNOSIS — Z79.4 TYPE 2 DIABETES MELLITUS WITHOUT COMPLICATION, WITH LONG-TERM CURRENT USE OF INSULIN: Primary | ICD-10-CM

## 2025-03-11 DIAGNOSIS — E11.9 TYPE 2 DIABETES MELLITUS WITHOUT COMPLICATION, WITH LONG-TERM CURRENT USE OF INSULIN: Primary | ICD-10-CM

## 2025-03-11 LAB
EXPIRATION DATE: ABNORMAL
EXPIRATION DATE: ABNORMAL
GLUCOSE BLDC GLUCOMTR-MCNC: 255 MG/DL (ref 70–130)
HBA1C MFR BLD: 7.7 % (ref 4.5–5.7)
Lab: ABNORMAL
Lab: ABNORMAL

## 2025-03-11 RX ORDER — TIRZEPATIDE 5 MG/.5ML
5 INJECTION, SOLUTION SUBCUTANEOUS WEEKLY
Qty: 2 ML | Refills: 2 | Status: SHIPPED | OUTPATIENT
Start: 2025-03-11

## 2025-03-11 RX ORDER — FLURBIPROFEN SODIUM 0.3 MG/ML
1 SOLUTION/ DROPS OPHTHALMIC DAILY
Qty: 100 EACH | Refills: 1 | Status: SHIPPED | OUTPATIENT
Start: 2025-03-11

## 2025-03-11 RX ORDER — BLOOD SUGAR DIAGNOSTIC
STRIP MISCELLANEOUS
Qty: 100 EACH | Refills: 0 | Status: SHIPPED | OUTPATIENT
Start: 2025-03-11

## 2025-03-11 RX ORDER — INSULIN GLARGINE 100 [IU]/ML
30 INJECTION, SOLUTION SUBCUTANEOUS NIGHTLY
Qty: 15 ML | Refills: 3 | Status: SHIPPED | OUTPATIENT
Start: 2025-03-11

## 2025-03-11 NOTE — LETTER
March 11, 2025     Vanessa Kaur MD  [address]  [address]  [facility] [address]    Patient: Vilma Ayala   YOB: 1970   Date of Visit: 3/11/2025     Dear Vanessa Kaur MD:       Thank you for referring Vilma Ayala to me for evaluation. Below are the relevant portions of my assessment and plan of care.    If you have questions, please do not hesitate to call me. I look forward to following Vilma along with you.         Sincerely,        Selina Lopez MD        CC: No Recipients    Selina Lopez MD  03/11/25 1247  Sign when Signing Visit  Vilma Ayala 54 y.o.  CC: follow up II Diabetes, Hypertension, hyperlipidemia     [place]: follow up II Diabetes, Hypertension, Hyperlipidemia   PCP changed insulin dose 34 u am and 10 u pm   Average sugar reviewed    Results for orders placed or performed in visit on 03/11/25   POC Glucose, Blood    Collection Time: 03/11/25 10:24 AM    Specimen: Blood   Result Value Ref Range    Glucose 255 (A) 70 - 130 mg/dL    Lot Number [id number]     Expiration Date 05/30/2025    POC Glycosylated Hemoglobin (Hb A1C)    Collection Time: 03/11/25 10:24 AM    Specimen: Blood   Result Value Ref Range    Hemoglobin A1C 7.7 (A) 4.5 - 5.7 %    Lot Number [id number]     Expiration Date 06/20/2025      Last night blood sugar 384 and she took 20 u extra  She also went to the hospital this week due to high sugar  Has a sensor but is not using it - broke sensor and reader  Discussed testing twice daily  Trial mounjaro weekly + lantus   Lantus 30 u rx sent + mounjaro weekly     Allergies   Allergen Reactions   • Doxepin Rash   • Januvia [Sitagliptin] Other (See Comments)     Insomnia       Current Outpatient Medications:   •  Accu-Chek Guide Test test strip, USE 1 STRIP ONCE DAILY, Disp: 100 each, Rfl: 0  •  Accu-Chek Softclix Lancets lancets, USE 1  TO CHECK GLUCOSE 4 times a day DAILY dx e11.65 on insulin, Disp: 400 each, Rfl: 3  •  ammonium  "lactate (Lac-Hydrin) 12 % lotion, Apply BID to feet, Disp: 225 g, Rfl: 3  •  atorvastatin (LIPITOR) 40 MG tablet, Take 1 tablet by mouth Daily., Disp: , Rfl:   •  B-D UF III MINI PEN NEEDLES 31G X 5 MM misc, Inject 1 Units under the skin into the appropriate area as directed Daily., Disp: 100 each, Rfl: 1  •  benztropine (COGENTIN) 0.5 MG tablet, Take 1 tablet by mouth As Needed., Disp: , Rfl:   •  Blood Glucose Monitoring Suppl w/Device kit, Use daily to test blood sugars uses accu chek, Disp: 1 each, Rfl: 0  •  busPIRone (BUSPAR) 15 MG tablet, Take 1 tablet by mouth Every 12 (Twelve) Hours., Disp: , Rfl:   •  Cholecalciferol (VITAMIN D3 PO), Take 1 tablet by mouth Daily., Disp: , Rfl:   •  citalopram (CeleXA) 40 MG tablet, Take 1 tablet by mouth Daily., Disp: 90 tablet, Rfl: 1  •  Continuous Glucose  (FreeStyle Sukhi 3 [place]) device, Use 1 Device 4 (Four) Times a Day., Disp: 1 each, Rfl: 3  •  Continuous Glucose Sensor (FreeStyle Sukhi 3 Plus Sensor), Use Every 14 (Fourteen) Days., Disp: 2 each, Rfl: 5  •  famotidine (PEPCID) 40 MG tablet, Take 1 tablet by mouth every night at bedtime., Disp: , Rfl:   •  glucose (DEX4) 4 GM chewable tablet, Chew 4 tablets As Needed for Low Blood Sugar., Disp: 20 tablet, Rfl: 12  •  Glucose Blood (Blood Glucose Test) strip, Use with glucometer up to QID as directed, E11.9, Z79.4, Disp: 400 each, Rfl: 1  •  glucose monitor monitoring kit, Use as directed, E11.9, Disp: 1 each, Rfl: 0  •  hydrOXYzine pamoate (VISTARIL) 50 MG capsule, Take 1 capsule by mouth 3 (Three) Times a Day As Needed for Itching., Disp: 90 capsule, Rfl: 0  •  ibuprofen (ADVIL,MOTRIN) 800 MG tablet, Take 1 tablet by mouth Every 8 (Eight) Hours As Needed for Mild Pain or Moderate Pain for up to 30 doses., Disp: 30 tablet, Rfl: 0  •  Insulin Syringe-Needle U-100 31G X 15/64\" 0.5 ML misc, Use 2 per day to administer insulin, Disp: 200 each, Rfl: 0  •  Lancet Device misc, Use with glucometer up to QID as " directed, E11.9, Z79.4, Disp: 400 each, Rfl: 1  •  levothyroxine (SYNTHROID, LEVOTHROID) 200 MCG tablet, Take 1 tablet by mouth Daily., Disp: 90 tablet, Rfl: 0  •  levothyroxine (SYNTHROID, LEVOTHROID) 25 MCG tablet, Take 1 tablet by mouth Every Morning., Disp: 90 tablet, Rfl: 0  •  Livalo 1 MG tablet tablet, , Disp: , Rfl:   •  LORazepam (ATIVAN) 0.5 MG tablet, Take 1 tablet by mouth 2 (Two) Times a Day As Needed. for anxiety, Disp: , Rfl:   •  metFORMIN (GLUCOPHAGE) 500 MG tablet, TAKE 2 TABLETS BY MOUTH ONCE DAILY WITH FOOD, Disp: 60 tablet, Rfl: 5  •  risperiDONE (risperDAL) 3 MG tablet, Take 1 tablet by mouth 2 (Two) Times a Day., Disp: , Rfl:   •  Trelegy Ellipta 100-62.5-25 MCG/ACT inhaler, INHALE 1 PUFF ONCE DAILY, Disp: 180 each, Rfl: 0  •  Ventolin  (90 Base) MCG/ACT inhaler, Inhale 2 puffs 4 (Four) Times a Day., Disp: 18 g, Rfl: 0  •  hydrOXYzine (ATARAX) 50 MG tablet, , Disp: , Rfl:   •  Insulin Glargine (Lantus SoloStar) 100 UNIT/ML injection pen, Inject 30 Units under the skin into the appropriate area as directed Every Night., Disp: 15 mL, Rfl: 3  •  Tirzepatide (Mounjaro) 5 MG/0.5ML solution auto-injector, Inject 5 mg under the skin into the appropriate area as directed 1 (One) Time Per Week., Disp: 2 mL, Rfl: 2    Patient Active Problem List    Diagnosis    • NATASHA (obstructive sleep apnea) [G47.33]    • Medicare annual wellness visit, subsequent [Z00.00]    • Vitamin D deficiency [E55.9]    • Obesity (BMI 35.0-39.9 without comorbidity) [E66.9]    • Drug-induced parkinsonism [G21.19]    • Encounter to establish care [Z76.89]    • Mild persistent asthma [J45.30]    • Decreased GFR [R94.4]    • Microscopic hematuria [R31.29]    • Nocturia [R35.1]    • Nocturnal enuresis [N39.44]    • Stress incontinence [N39.3]    • Urge incontinence [N39.41]    • Urinary incontinence [R32]    • Type 2 diabetes mellitus without complication, with long-term current use of insulin [E11.9, Z79.4]    • Dyspnea on  "exertion [R06.09]    • Mixed hyperlipidemia [E78.2]    • Anxiety and depression [F41.9, F32.A]    • Uncontrolled type 2 diabetes mellitus [BZL8575]    • Hypothyroid [E03.9]    • Obesity [E66.9]    • Vitamin D deficiency, unspecified [E55.9]    • Diabetic neuropathy, type II diabetes mellitus [E11.40]      Review of Systems   Constitutional:  Negative for activity change, appetite change and unexpected weight change.   HENT:  Negative for congestion and rhinorrhea.    Eyes:  Negative for visual disturbance.   Respiratory:  Negative for cough and shortness of breath.    Cardiovascular:  Negative for palpitations and leg swelling.   Gastrointestinal:  Negative for constipation, diarrhea and nausea.   Genitourinary:  Negative for hematuria.   Musculoskeletal:  Negative for arthralgias, back pain, gait problem, joint swelling and myalgias.   Skin:  Negative for color change, rash and wound.   Allergic/Immunologic: Negative for environmental allergies, food allergies and immunocompromised state.   Neurological:  Negative for dizziness, weakness and light-headedness.   Psychiatric/Behavioral:  Negative for confusion, decreased concentration, dysphoric mood and sleep disturbance. The patient is not nervous/anxious.      Social History     Socioeconomic History   • Marital status: Single   Tobacco Use   • Smoking status: Never   • Smokeless tobacco: Never   Vaping Use   • Vaping status: Never Used   Substance and Sexual Activity   • Alcohol use: No   • Drug use: No   • Sexual activity: Not Currently     Family History   Problem Relation Age of Onset   • Hypertension Other    • Thyroid disease Other    • Diabetes Other    • Obesity Other    • Diabetes Mother    • Hypertension Mother    • Heart disease Mother    • Cancer Father    • Diabetes Sister    • Diabetes Brother    • No Known Problems Brother      /74   Pulse 91   Ht 167.6 cm (65.98\")   Wt 103 kg (226 lb)   LMP  (LMP Unknown)   BMI 36.50 kg/m²   Physical " Exam  Vitals and nursing note reviewed.   Constitutional:       Appearance: Normal appearance. She is well-developed.   HENT:      Head: Normocephalic and atraumatic.   Eyes:      General: Lids are normal.      Extraocular Movements: Extraocular movements intact.      Conjunctiva/sclera: Conjunctivae normal.      Pupils: Pupils are equal, round, and reactive to light.   Neck:      Thyroid: No thyroid mass or thyromegaly.      Vascular: No carotid bruit.      Trachea: Trachea normal. No tracheal deviation.   Cardiovascular:      Rate and Rhythm: Normal rate and regular rhythm.      Pulses: Normal pulses.      Heart sounds: Normal heart sounds. No murmur heard.     No friction rub. No gallop.   Pulmonary:      Effort: Pulmonary effort is normal. No respiratory distress.      Breath sounds: Normal breath sounds. No wheezing.   Musculoskeletal:         General: No deformity. Normal range of motion.      Cervical back: Normal range of motion and neck supple.   Lymphadenopathy:      Cervical: No cervical adenopathy.   Skin:     General: Skin is warm and dry.      Findings: No erythema or rash.      Nails: There is no clubbing.   Neurological:      Mental Status: She is alert and oriented to person, place, and time.      Cranial Nerves: No cranial nerve deficit.      Deep Tendon Reflexes: Reflexes are normal and symmetric. Reflexes normal.   Psychiatric:         Speech: Speech normal.         Behavior: Behavior normal.         Thought Content: Thought content normal.         Judgment: Judgment normal.       Results for orders placed or performed in visit on 03/11/25   POC Glucose, Blood    Collection Time: 03/11/25 10:24 AM    Specimen: Blood   Result Value Ref Range    Glucose 255 (A) 70 - 130 mg/dL    Lot Number [id number]     Expiration Date 05/30/2025    POC Glycosylated Hemoglobin (Hb A1C)    Collection Time: 03/11/25 10:24 AM    Specimen: Blood   Result Value Ref Range    Hemoglobin A1C 7.7 (A) 4.5 - 5.7 %    Lot  Number [id number]     Expiration Date 06/20/2025      Diagnoses and all orders for this visit:    1. Type 2 diabetes mellitus without complication, with long-term current use of insulin (Primary)  Assessment & Plan:  Higher average sugar and trip to ER for high sugar but lower sugars with 70/30  Will try basal + GLP-1   Encouraged her to use sensor and call if sugars are high - she may need sliding scale correction   As is, she is taking 70/30 at night and adding 10-20 u at bedtime if high but then having overnight low sugars  She did well at care facility for awhile and was limiting sodas, etc and I reminded her to watch her diet and avoid sweets, sweet drinks   Recommended update eye exam  No foot lesion but poor foot care today   Ur alb done neg 11/24     Orders:  -     POC Glucose, Blood  -     POC Glycosylated Hemoglobin (Hb A1C)  -     Tirzepatide (Mounjaro) 5 MG/0.5ML solution auto-injector; Inject 5 mg under the skin into the appropriate area as directed 1 (One) Time Per Week.  Dispense: 2 mL; Refill: 2  -     Insulin Glargine (Lantus SoloStar) 100 UNIT/ML injection pen; Inject 30 Units under the skin into the appropriate area as directed Every Night.  Dispense: 15 mL; Refill: 3  -     B-D UF III MINI PEN NEEDLES 31G X 5 MM misc; Inject 1 Units under the skin into the appropriate area as directed Daily.  Dispense: 100 each; Refill: 1    2. Acquired hypothyroidism  Assessment & Plan:  Last TSH 10 - taking 225 mcg daily levothyroxine  Has scheduled update in 6-8  weeks        Return in about 8 weeks (around 5/6/2025) for Recheck.    Electronically signed by: Selina Lopez MD

## 2025-03-11 NOTE — TELEPHONE ENCOUNTER
Caller: FRANCIE LOYOLA    Relationship to patient: Emergency Contact    Best call back number: [phone]     Patient is needing: PT RETURNING CALL FROM OFFICE. NO MESSAGE LEFT, NO TE CREATED. PLEASE REVIEW AND ADVISE.

## 2025-03-11 NOTE — ASSESSMENT & PLAN NOTE
Higher average sugar and trip to ER for high sugar but lower sugars with 70/30  Will try basal + GLP-1   Encouraged her to use sensor and call if sugars are high - she may need sliding scale correction   As is, she is taking 70/30 at night and adding 10-20 u at bedtime if high but then having overnight low sugars  She did well at care facility for awhile and was limiting sodas, etc and I reminded her to watch her diet and avoid sweets, sweet drinks   Recommended update eye exam  No foot lesion but poor foot care today   Ur alb done neg 11/24

## 2025-03-11 NOTE — TELEPHONE ENCOUNTER
--------------- APPROVED REPORT --------------





Date of service: 09/25/2018



EKG Measurement

Heart [id number]

TN [id number]

[id number]

[id number]

[id number]



<Conclusion>

Normal sinus rhythm

Normal ECG Left message to return call.

## 2025-03-11 NOTE — PROGRESS NOTES
Vilma Ayala 54 y.o.  [facility]: follow up II Diabetes, Hypertension, hyperlipidemia     [place]: follow up II Diabetes, Hypertension, Hyperlipidemia   PCP changed insulin dose 34 u am and 10 u pm   Average sugar reviewed    Results for orders placed or performed in visit on 03/11/25   POC Glucose, Blood    Collection Time: 03/11/25 10:24 AM    Specimen: Blood   Result Value Ref Range    Glucose 255 (A) 70 - 130 mg/dL    Lot Number [id number]     Expiration Date 05/30/2025    POC Glycosylated Hemoglobin (Hb A1C)    Collection Time: 03/11/25 10:24 AM    Specimen: Blood   Result Value Ref Range    Hemoglobin A1C 7.7 (A) 4.5 - 5.7 %    Lot Number [id number]     Expiration Date 06/20/2025      Last night blood sugar 384 and she took 20 u extra  She also went to the hospital this week due to high sugar  Has a sensor but is not using it - broke sensor and reader  Discussed testing twice daily  Trial mounjaro weekly + lantus   Lantus 30 u rx sent + mounjaro weekly     Allergies   Allergen Reactions    Doxepin Rash    Januvia [Sitagliptin] Other (See Comments)     Insomnia       Current Outpatient Medications:     Accu-Chek Guide Test test strip, USE 1 STRIP ONCE DAILY, Disp: 100 each, Rfl: 0    Accu-Chek Softclix Lancets lancets, USE 1  TO CHECK GLUCOSE 4 times a day DAILY dx e11.65 on insulin, Disp: 400 each, Rfl: 3    ammonium lactate (Lac-Hydrin) 12 % lotion, Apply BID to feet, Disp: 225 g, Rfl: 3    atorvastatin (LIPITOR) 40 MG tablet, Take 1 tablet by mouth Daily., Disp: , Rfl:     B-D UF III MINI PEN NEEDLES 31G X 5 MM misc, Inject 1 Units under the skin into the appropriate area as directed Daily., Disp: 100 each, Rfl: 1    benztropine (COGENTIN) 0.5 MG tablet, Take 1 tablet by mouth As Needed., Disp: , Rfl:     Blood Glucose Monitoring Suppl w/Device kit, Use daily to test blood sugars uses accu chek, Disp: 1 each, Rfl: 0    busPIRone (BUSPAR) 15 MG tablet, Take 1 tablet by mouth Every 12 (Twelve) Hours., Disp: , Rfl:  "    Cholecalciferol (VITAMIN D3 PO), Take 1 tablet by mouth Daily., Disp: , Rfl:     citalopram (CeleXA) 40 MG tablet, Take 1 tablet by mouth Daily., Disp: 90 tablet, Rfl: 1    Continuous Glucose  (FreeStyle Sukhi 3 [place]) device, Use 1 Device 4 (Four) Times a Day., Disp: 1 each, Rfl: 3    Continuous Glucose Sensor (FreeStyle Sukhi 3 Plus Sensor), Use Every 14 (Fourteen) Days., Disp: 2 each, Rfl: 5    famotidine (PEPCID) 40 MG tablet, Take 1 tablet by mouth every night at bedtime., Disp: , Rfl:     glucose (DEX4) 4 GM chewable tablet, Chew 4 tablets As Needed for Low Blood Sugar., Disp: 20 tablet, Rfl: 12    Glucose Blood (Blood Glucose Test) strip, Use with glucometer up to QID as directed, E11.9, Z79.4, Disp: 400 each, Rfl: 1    glucose monitor monitoring kit, Use as directed, E11.9, Disp: 1 each, Rfl: 0    hydrOXYzine pamoate (VISTARIL) 50 MG capsule, Take 1 capsule by mouth 3 (Three) Times a Day As Needed for Itching., Disp: 90 capsule, Rfl: 0    ibuprofen (ADVIL,MOTRIN) 800 MG tablet, Take 1 tablet by mouth Every 8 (Eight) Hours As Needed for Mild Pain or Moderate Pain for up to 30 doses., Disp: 30 tablet, Rfl: 0    Insulin Syringe-Needle U-100 31G X 15/64\" 0.5 ML misc, Use 2 per day to administer insulin, Disp: 200 each, Rfl: 0    Lancet Device misc, Use with glucometer up to QID as directed, E11.9, Z79.4, Disp: 400 each, Rfl: 1    levothyroxine (SYNTHROID, LEVOTHROID) 200 MCG tablet, Take 1 tablet by mouth Daily., Disp: 90 tablet, Rfl: 0    levothyroxine (SYNTHROID, LEVOTHROID) 25 MCG tablet, Take 1 tablet by mouth Every Morning., Disp: 90 tablet, Rfl: 0    Livalo 1 MG tablet tablet, , Disp: , Rfl:     LORazepam (ATIVAN) 0.5 MG tablet, Take 1 tablet by mouth 2 (Two) Times a Day As Needed. for anxiety, Disp: , Rfl:     metFORMIN (GLUCOPHAGE) 500 MG tablet, TAKE 2 TABLETS BY MOUTH ONCE DAILY WITH FOOD, Disp: 60 tablet, Rfl: 5    risperiDONE (risperDAL) 3 MG tablet, Take 1 tablet by mouth 2 (Two) Times " a Day., Disp: , Rfl:     Trelegy Ellipta 100-62.5-25 MCG/ACT inhaler, INHALE 1 PUFF ONCE DAILY, Disp: 180 each, Rfl: 0    Ventolin  (90 Base) MCG/ACT inhaler, Inhale 2 puffs 4 (Four) Times a Day., Disp: 18 g, Rfl: 0    hydrOXYzine (ATARAX) 50 MG tablet, , Disp: , Rfl:     Insulin Glargine (Lantus SoloStar) 100 UNIT/ML injection pen, Inject 30 Units under the skin into the appropriate area as directed Every Night., Disp: 15 mL, Rfl: 3    Tirzepatide (Mounjaro) 5 MG/0.5ML solution auto-injector, Inject 5 mg under the skin into the appropriate area as directed 1 (One) Time Per Week., Disp: 2 mL, Rfl: 2    Patient Active Problem List    Diagnosis     NATASHA (obstructive sleep apnea) [G47.33]     Medicare annual wellness visit, subsequent [Z00.00]     Vitamin D deficiency [E55.9]     Obesity (BMI 35.0-39.9 without comorbidity) [E66.9]     Drug-induced parkinsonism [G21.19]     Encounter to establish care [Z76.89]     Mild persistent asthma [J45.30]     Decreased GFR [R94.4]     Microscopic hematuria [R31.29]     Nocturia [R35.1]     Nocturnal enuresis [N39.44]     Stress incontinence [N39.3]     Urge incontinence [N39.41]     Urinary incontinence [R32]     Type 2 diabetes mellitus without complication, with long-term current use of insulin [E11.9, Z79.4]     Dyspnea on exertion [R06.09]     Mixed hyperlipidemia [E78.2]     Anxiety and depression [F41.9, F32.A]     Uncontrolled type 2 diabetes mellitus [[id number]]     Hypothyroid [E03.9]     Obesity [E66.9]     Vitamin D deficiency, unspecified [E55.9]     Diabetic neuropathy, type II diabetes mellitus [E11.40]      Review of Systems   Constitutional:  Negative for activity change, appetite change and unexpected weight change.   HENT:  Negative for congestion and rhinorrhea.    Eyes:  Negative for visual disturbance.   Respiratory:  Negative for cough and shortness of breath.    Cardiovascular:  Negative for palpitations and leg swelling.   Gastrointestinal:  Negative  "for constipation, diarrhea and nausea.   Genitourinary:  Negative for hematuria.   Musculoskeletal:  Negative for arthralgias, back pain, gait problem, joint swelling and myalgias.   Skin:  Negative for color change, rash and wound.   Allergic/Immunologic: Negative for environmental allergies, food allergies and immunocompromised state.   Neurological:  Negative for dizziness, weakness and light-headedness.   Psychiatric/Behavioral:  Negative for confusion, decreased concentration, dysphoric mood and sleep disturbance. The patient is not nervous/anxious.      Social History     Socioeconomic History    Marital status: Single   Tobacco Use    Smoking status: Never    Smokeless tobacco: Never   Vaping Use    Vaping status: Never Used   Substance and Sexual Activity    Alcohol use: No    Drug use: No    Sexual activity: Not Currently     Family History   Problem Relation Age of Onset    Hypertension Other     Thyroid disease Other     Diabetes Other     Obesity Other     Diabetes Mother     Hypertension Mother     Heart disease Mother     Cancer Father     Diabetes Sister     Diabetes Brother     No Known Problems Brother      /74   Pulse 91   Ht 167.6 cm (65.98\")   Wt 103 kg (226 lb)   LMP  (LMP Unknown)   BMI 36.50 kg/m²   Physical Exam  Vitals and nursing note reviewed.   Constitutional:       Appearance: Normal appearance. She is well-developed.   HENT:      Head: Normocephalic and atraumatic.   Eyes:      General: Lids are normal.      Extraocular Movements: Extraocular movements intact.      Conjunctiva/sclera: Conjunctivae normal.      Pupils: Pupils are equal, round, and reactive to light.   Neck:      Thyroid: No thyroid mass or thyromegaly.      Vascular: No carotid bruit.      Trachea: Trachea normal. No tracheal deviation.   Cardiovascular:      Rate and Rhythm: Normal rate and regular rhythm.      Pulses: Normal pulses.      Heart sounds: Normal heart sounds. No murmur heard.     No friction " rub. No gallop.   Pulmonary:      Effort: Pulmonary effort is normal. No respiratory distress.      Breath sounds: Normal breath sounds. No wheezing.   Musculoskeletal:         General: No deformity. Normal range of motion.      Cervical back: Normal range of motion and neck supple.   Lymphadenopathy:      Cervical: No cervical adenopathy.   Skin:     General: Skin is warm and dry.      Findings: No erythema or rash.      Nails: There is no clubbing.   Neurological:      Mental Status: She is alert and oriented to person, place, and time.      Cranial Nerves: No cranial nerve deficit.      Deep Tendon Reflexes: Reflexes are normal and symmetric. Reflexes normal.   Psychiatric:         Speech: Speech normal.         Behavior: Behavior normal.         Thought Content: Thought content normal.         Judgment: Judgment normal.       Results for orders placed or performed in visit on 03/11/25   POC Glucose, Blood    Collection Time: 03/11/25 10:24 AM    Specimen: Blood   Result Value Ref Range    Glucose 255 (A) 70 - 130 mg/dL    Lot Number [id number]     Expiration Date 05/30/2025    POC Glycosylated Hemoglobin (Hb A1C)    Collection Time: 03/11/25 10:24 AM    Specimen: Blood   Result Value Ref Range    Hemoglobin A1C 7.7 (A) 4.5 - 5.7 %    Lot Number [id number]     Expiration Date 06/20/2025      Diagnoses and all orders for this visit:    1. Type 2 diabetes mellitus without complication, with long-term current use of insulin (Primary)  Assessment & Plan:  Higher average sugar and trip to ER for high sugar but lower sugars with 70/30  Will try basal + GLP-1   Encouraged her to use sensor and call if sugars are high - she may need sliding scale correction   As is, she is taking 70/30 at night and adding 10-20 u at bedtime if high but then having overnight low sugars  She did well at care facility for awhile and was limiting sodas, etc and I reminded her to watch her diet and avoid sweets, sweet drinks   Recommended  update eye exam  No foot lesion but poor foot care today   Ur alb done neg 11/24     Orders:  -     POC Glucose, Blood  -     POC Glycosylated Hemoglobin (Hb A1C)  -     Tirzepatide (Mounjaro) 5 MG/0.5ML solution auto-injector; Inject 5 mg under the skin into the appropriate area as directed 1 (One) Time Per Week.  Dispense: 2 mL; Refill: 2  -     Insulin Glargine (Lantus SoloStar) 100 UNIT/ML injection pen; Inject 30 Units under the skin into the appropriate area as directed Every Night.  Dispense: 15 mL; Refill: 3  -     B-D UF III MINI PEN NEEDLES 31G X 5 MM misc; Inject 1 Units under the skin into the appropriate area as directed Daily.  Dispense: 100 each; Refill: 1    2. Acquired hypothyroidism  Assessment & Plan:  Last TSH 10 - taking 225 mcg daily levothyroxine  Has scheduled update in 6-8  weeks        Return in about 8 weeks (around 5/6/2025) for Recheck.    Electronically signed by: Selina Lopez MD

## 2025-03-12 NOTE — TELEPHONE ENCOUNTER
Called the pt and she stated she spoke with THEODORE julio am.   Impression: Ascan - OD: .;  OS: . Plan:

## 2025-03-18 ENCOUNTER — APPOINTMENT (OUTPATIENT)
Facility: HOSPITAL | Age: 55
End: 2025-03-18
Payer: MEDICARE

## 2025-03-18 ENCOUNTER — HOSPITAL ENCOUNTER (OUTPATIENT)
Facility: HOSPITAL | Age: 55
Discharge: HOME | End: 2025-03-19
Attending: STUDENT IN AN ORGANIZED HEALTH CARE EDUCATION/TRAINING PROGRAM | Admitting: STUDENT IN AN ORGANIZED HEALTH CARE EDUCATION/TRAINING PROGRAM
Payer: MEDICARE

## 2025-03-18 ENCOUNTER — TELEPHONE (OUTPATIENT)
Dept: ENDOCRINOLOGY | Facility: CLINIC | Age: 55
End: 2025-03-18

## 2025-03-18 ENCOUNTER — OFFICE VISIT (OUTPATIENT)
Dept: ENDOCRINOLOGY | Facility: CLINIC | Age: 55
End: 2025-03-18
Payer: MEDICARE

## 2025-03-18 VITALS
WEIGHT: 226 LBS | HEIGHT: 66 IN | OXYGEN SATURATION: 99 % | HEART RATE: 94 BPM | SYSTOLIC BLOOD PRESSURE: 122 MMHG | DIASTOLIC BLOOD PRESSURE: 78 MMHG | BODY MASS INDEX: 36.32 KG/M2

## 2025-03-18 DIAGNOSIS — N17.9 ACUTE KIDNEY INJURY SUPERIMPOSED ON CHRONIC KIDNEY DISEASE: ICD-10-CM

## 2025-03-18 DIAGNOSIS — R31.9 URINARY TRACT INFECTION WITH HEMATURIA, SITE UNSPECIFIED: Primary | ICD-10-CM

## 2025-03-18 DIAGNOSIS — N39.0 URINARY TRACT INFECTION WITH HEMATURIA, SITE UNSPECIFIED: Primary | ICD-10-CM

## 2025-03-18 DIAGNOSIS — Z79.4 TYPE 2 DIABETES MELLITUS WITHOUT COMPLICATION, WITH LONG-TERM CURRENT USE OF INSULIN: Primary | ICD-10-CM

## 2025-03-18 DIAGNOSIS — N18.9 ACUTE KIDNEY INJURY SUPERIMPOSED ON CHRONIC KIDNEY DISEASE: ICD-10-CM

## 2025-03-18 DIAGNOSIS — R79.89 ELEVATED TROPONIN: ICD-10-CM

## 2025-03-18 DIAGNOSIS — E11.9 TYPE 2 DIABETES MELLITUS WITHOUT COMPLICATION, WITH LONG-TERM CURRENT USE OF INSULIN: Primary | ICD-10-CM

## 2025-03-18 LAB
ALBUMIN SERPL-MCNC: 4.9 G/DL (ref 3.5–5.2)
ALBUMIN/GLOB SERPL: 1.4 G/DL
ALP SERPL-CCNC: 177 U/L (ref 39–117)
ALT SERPL W P-5'-P-CCNC: 19 U/L (ref 1–33)
ANION GAP SERPL CALCULATED.3IONS-SCNC: 14.7 MMOL/L (ref 5–15)
AST SERPL-CCNC: 24 U/L (ref 1–32)
BACTERIA UR QL AUTO: ABNORMAL /HPF
BASOPHILS # BLD AUTO: 0.02 10*3/MM3 (ref 0–0.2)
BASOPHILS NFR BLD AUTO: 0.1 % (ref 0–1.5)
BILIRUB SERPL-MCNC: 0.6 MG/DL (ref 0–1.2)
BILIRUB UR QL STRIP: NEGATIVE
BUN SERPL-MCNC: 18 MG/DL (ref 6–20)
BUN/CREAT SERPL: 10.9 (ref 7–25)
CALCIUM SPEC-SCNC: 9.9 MG/DL (ref 8.6–10.5)
CHLORIDE SERPL-SCNC: 95 MMOL/L (ref 98–107)
CLARITY UR: CLEAR
CO2 SERPL-SCNC: 25.3 MMOL/L (ref 22–29)
COLOR UR: YELLOW
CREAT SERPL-MCNC: 1.65 MG/DL (ref 0.57–1)
D-LACTATE SERPL-SCNC: 1.3 MMOL/L (ref 0.5–2)
D-LACTATE SERPL-SCNC: 2.1 MMOL/L (ref 0.5–2)
DEPRECATED RDW RBC AUTO: 42.4 FL (ref 37–54)
EGFRCR SERPLBLD CKD-EPI 2021: 36.8 ML/MIN/1.73
EOSINOPHIL # BLD AUTO: 0.17 10*3/MM3 (ref 0–0.4)
EOSINOPHIL NFR BLD AUTO: 1 % (ref 0.3–6.2)
ERYTHROCYTE [DISTWIDTH] IN BLOOD BY AUTOMATED COUNT: 13.1 % (ref 12.3–15.4)
EXPIRATION DATE: ABNORMAL
GEN 5 1HR TROPONIN T REFLEX: 16 NG/L
GLOBULIN UR ELPH-MCNC: 3.6 GM/DL
GLUCOSE BLDC GLUCOMTR-MCNC: 138 MG/DL (ref 70–130)
GLUCOSE BLDC GLUCOMTR-MCNC: 254 MG/DL (ref 70–130)
GLUCOSE SERPL-MCNC: 236 MG/DL (ref 65–99)
GLUCOSE UR STRIP-MCNC: NEGATIVE MG/DL
HBA1C MFR BLD: 7.5 % (ref 4.8–5.6)
HCT VFR BLD AUTO: 43.5 % (ref 34–46.6)
HGB BLD-MCNC: 14.8 G/DL (ref 12–15.9)
HGB UR QL STRIP.AUTO: ABNORMAL
HOLD SPECIMEN: NORMAL
HYALINE CASTS UR QL AUTO: ABNORMAL /LPF
IMM GRANULOCYTES # BLD AUTO: 0.02 10*3/MM3 (ref 0–0.05)
IMM GRANULOCYTES NFR BLD AUTO: 0.1 % (ref 0–0.5)
KETONES UR QL STRIP: NEGATIVE
LEUKOCYTE ESTERASE UR QL STRIP.AUTO: ABNORMAL
LIPASE SERPL-CCNC: 22 U/L (ref 13–60)
LYMPHOCYTES # BLD AUTO: 3.44 10*3/MM3 (ref 0.7–3.1)
LYMPHOCYTES NFR BLD AUTO: 19.4 % (ref 19.6–45.3)
Lab: ABNORMAL
MAGNESIUM SERPL-MCNC: 1.7 MG/DL (ref 1.6–2.6)
MCH RBC QN AUTO: 29.5 PG (ref 26.6–33)
MCHC RBC AUTO-ENTMCNC: 34 G/DL (ref 31.5–35.7)
MCV RBC AUTO: 86.7 FL (ref 79–97)
MONOCYTES # BLD AUTO: 0.86 10*3/MM3 (ref 0.1–0.9)
MONOCYTES NFR BLD AUTO: 4.9 % (ref 5–12)
NEUTROPHILS NFR BLD AUTO: 13.21 10*3/MM3 (ref 1.7–7)
NEUTROPHILS NFR BLD AUTO: 74.5 % (ref 42.7–76)
NITRITE UR QL STRIP: POSITIVE
PH UR STRIP.AUTO: 6 [PH] (ref 5–8)
PLATELET # BLD AUTO: 375 10*3/MM3 (ref 140–450)
PMV BLD AUTO: 10.2 FL (ref 6–12)
POTASSIUM SERPL-SCNC: 3.8 MMOL/L (ref 3.5–5.2)
PROCALCITONIN SERPL-MCNC: 0.05 NG/ML (ref 0–0.25)
PROT SERPL-MCNC: 8.5 G/DL (ref 6–8.5)
PROT UR QL STRIP: NEGATIVE
RBC # BLD AUTO: 5.02 10*6/MM3 (ref 3.77–5.28)
RBC # UR STRIP: ABNORMAL /HPF
REF LAB TEST METHOD: ABNORMAL
SODIUM SERPL-SCNC: 135 MMOL/L (ref 136–145)
SP GR UR STRIP: 1.02 (ref 1–1.03)
SQUAMOUS #/AREA URNS HPF: ABNORMAL /HPF
TROPONIN T % DELTA: -11
TROPONIN T NUMERIC DELTA: -2 NG/L
TROPONIN T SERPL HS-MCNC: 18 NG/L
UROBILINOGEN UR QL STRIP: ABNORMAL
WBC # UR STRIP: ABNORMAL /HPF
WBC NRBC COR # BLD AUTO: 17.72 10*3/MM3 (ref 3.4–10.8)
WHOLE BLOOD HOLD COAG: NORMAL
WHOLE BLOOD HOLD SPECIMEN: NORMAL

## 2025-03-18 PROCEDURE — 25510000001 IOPAMIDOL 61 % SOLUTION: Performed by: STUDENT IN AN ORGANIZED HEALTH CARE EDUCATION/TRAINING PROGRAM

## 2025-03-18 PROCEDURE — 93005 ELECTROCARDIOGRAM TRACING: CPT | Performed by: STUDENT IN AN ORGANIZED HEALTH CARE EDUCATION/TRAINING PROGRAM

## 2025-03-18 PROCEDURE — 1160F RVW MEDS BY RX/DR IN RCRD: CPT | Performed by: INTERNAL MEDICINE

## 2025-03-18 PROCEDURE — 84484 ASSAY OF TROPONIN QUANT: CPT | Performed by: STUDENT IN AN ORGANIZED HEALTH CARE EDUCATION/TRAINING PROGRAM

## 2025-03-18 PROCEDURE — G0378 HOSPITAL OBSERVATION PER HR: HCPCS

## 2025-03-18 PROCEDURE — 81001 URINALYSIS AUTO W/SCOPE: CPT | Performed by: STUDENT IN AN ORGANIZED HEALTH CARE EDUCATION/TRAINING PROGRAM

## 2025-03-18 PROCEDURE — 25010000002 ONDANSETRON PER 1 MG

## 2025-03-18 PROCEDURE — 99213 OFFICE O/P EST LOW 20 MIN: CPT | Performed by: INTERNAL MEDICINE

## 2025-03-18 PROCEDURE — 82947 ASSAY GLUCOSE BLOOD QUANT: CPT | Performed by: INTERNAL MEDICINE

## 2025-03-18 PROCEDURE — 80053 COMPREHEN METABOLIC PANEL: CPT | Performed by: STUDENT IN AN ORGANIZED HEALTH CARE EDUCATION/TRAINING PROGRAM

## 2025-03-18 PROCEDURE — 83735 ASSAY OF MAGNESIUM: CPT

## 2025-03-18 PROCEDURE — 36415 COLL VENOUS BLD VENIPUNCTURE: CPT

## 2025-03-18 PROCEDURE — 82948 REAGENT STRIP/BLOOD GLUCOSE: CPT

## 2025-03-18 PROCEDURE — 1159F MED LIST DOCD IN RCRD: CPT | Performed by: INTERNAL MEDICINE

## 2025-03-18 PROCEDURE — 83036 HEMOGLOBIN GLYCOSYLATED A1C: CPT | Performed by: PHYSICIAN ASSISTANT

## 2025-03-18 PROCEDURE — 84145 PROCALCITONIN (PCT): CPT | Performed by: PHYSICIAN ASSISTANT

## 2025-03-18 PROCEDURE — 99285 EMERGENCY DEPT VISIT HI MDM: CPT

## 2025-03-18 PROCEDURE — 85025 COMPLETE CBC W/AUTO DIFF WBC: CPT | Performed by: STUDENT IN AN ORGANIZED HEALTH CARE EDUCATION/TRAINING PROGRAM

## 2025-03-18 PROCEDURE — 25010000002 CEFTRIAXONE PER 250 MG: Performed by: PHYSICIAN ASSISTANT

## 2025-03-18 PROCEDURE — 74177 CT ABD & PELVIS W/CONTRAST: CPT

## 2025-03-18 PROCEDURE — 83690 ASSAY OF LIPASE: CPT | Performed by: STUDENT IN AN ORGANIZED HEALTH CARE EDUCATION/TRAINING PROGRAM

## 2025-03-18 PROCEDURE — 25810000003 SODIUM CHLORIDE 0.9 % SOLUTION: Performed by: PHYSICIAN ASSISTANT

## 2025-03-18 PROCEDURE — 87040 BLOOD CULTURE FOR BACTERIA: CPT | Performed by: PHYSICIAN ASSISTANT

## 2025-03-18 PROCEDURE — 96374 THER/PROPH/DIAG INJ IV PUSH: CPT

## 2025-03-18 PROCEDURE — 83605 ASSAY OF LACTIC ACID: CPT | Performed by: PHYSICIAN ASSISTANT

## 2025-03-18 PROCEDURE — 3051F HG A1C>EQUAL 7.0%<8.0%: CPT | Performed by: INTERNAL MEDICINE

## 2025-03-18 PROCEDURE — 25810000003 SODIUM CHLORIDE 0.9 % SOLUTION

## 2025-03-18 RX ORDER — IOPAMIDOL 612 MG/ML
100 INJECTION, SOLUTION INTRAVASCULAR
Status: COMPLETED | OUTPATIENT
Start: 2025-03-18 | End: 2025-03-18

## 2025-03-18 RX ORDER — SODIUM CHLORIDE 9 MG/ML
150 INJECTION, SOLUTION INTRAVENOUS CONTINUOUS
Status: DISCONTINUED | OUTPATIENT
Start: 2025-03-18 | End: 2025-03-19 | Stop reason: HOSPADM

## 2025-03-18 RX ORDER — HYDROXYZINE HYDROCHLORIDE 25 MG/1
50 TABLET, FILM COATED ORAL NIGHTLY PRN
Status: DISCONTINUED | OUTPATIENT
Start: 2025-03-18 | End: 2025-03-19 | Stop reason: HOSPADM

## 2025-03-18 RX ORDER — GRANULES FOR ORAL 3 G/1
3 POWDER ORAL ONCE
Status: DISCONTINUED | OUTPATIENT
Start: 2025-03-19 | End: 2025-03-19

## 2025-03-18 RX ORDER — INSULIN GLARGINE 100 [IU]/ML
30 INJECTION, SOLUTION SUBCUTANEOUS NIGHTLY
Qty: 15 ML | Refills: 3 | Status: SHIPPED | OUTPATIENT
Start: 2025-03-18

## 2025-03-18 RX ORDER — SODIUM CHLORIDE 0.9 % (FLUSH) 0.9 %
10 SYRINGE (ML) INJECTION AS NEEDED
Status: DISCONTINUED | OUTPATIENT
Start: 2025-03-18 | End: 2025-03-19 | Stop reason: HOSPADM

## 2025-03-18 RX ORDER — ATORVASTATIN CALCIUM 40 MG/1
40 TABLET, FILM COATED ORAL DAILY
Status: DISCONTINUED | OUTPATIENT
Start: 2025-03-19 | End: 2025-03-19 | Stop reason: HOSPADM

## 2025-03-18 RX ORDER — ONDANSETRON 2 MG/ML
4 INJECTION INTRAMUSCULAR; INTRAVENOUS EVERY 6 HOURS PRN
Status: DISCONTINUED | OUTPATIENT
Start: 2025-03-18 | End: 2025-03-19 | Stop reason: HOSPADM

## 2025-03-18 RX ORDER — ACETAMINOPHEN 325 MG/1
650 TABLET ORAL EVERY 4 HOURS PRN
Status: DISCONTINUED | OUTPATIENT
Start: 2025-03-18 | End: 2025-03-19 | Stop reason: HOSPADM

## 2025-03-18 RX ORDER — FLURBIPROFEN SODIUM 0.3 MG/ML
1 SOLUTION/ DROPS OPHTHALMIC DAILY
Qty: 100 EACH | Refills: 1 | Status: SHIPPED | OUTPATIENT
Start: 2025-03-18

## 2025-03-18 RX ORDER — LANCETS 33 GAUGE
EACH MISCELLANEOUS
Qty: 200 EACH | Refills: 1 | Status: SHIPPED | OUTPATIENT
Start: 2025-03-18

## 2025-03-18 RX ORDER — ONDANSETRON 4 MG/1
4 TABLET, FILM COATED ORAL DAILY PRN
Qty: 30 TABLET | Refills: 1 | Status: SHIPPED | OUTPATIENT
Start: 2025-03-18 | End: 2026-03-18

## 2025-03-18 RX ORDER — PANTOPRAZOLE SODIUM 40 MG/1
40 TABLET, DELAYED RELEASE ORAL DAILY
Qty: 30 TABLET | Refills: 4 | Status: SHIPPED | OUTPATIENT
Start: 2025-03-18 | End: 2026-03-18

## 2025-03-18 RX ORDER — FAMOTIDINE 20 MG/1
20 TABLET, FILM COATED ORAL DAILY
Status: DISCONTINUED | OUTPATIENT
Start: 2025-03-19 | End: 2025-03-19 | Stop reason: HOSPADM

## 2025-03-18 RX ORDER — SODIUM CHLORIDE 9 MG/ML
40 INJECTION, SOLUTION INTRAVENOUS AS NEEDED
Status: DISCONTINUED | OUTPATIENT
Start: 2025-03-18 | End: 2025-03-19 | Stop reason: HOSPADM

## 2025-03-18 RX ORDER — BLOOD SUGAR DIAGNOSTIC
STRIP MISCELLANEOUS
Qty: 200 EACH | Refills: 1 | Status: SHIPPED | OUTPATIENT
Start: 2025-03-18

## 2025-03-18 RX ORDER — SODIUM CHLORIDE 0.9 % (FLUSH) 0.9 %
10 SYRINGE (ML) INJECTION EVERY 12 HOURS SCHEDULED
Status: DISCONTINUED | OUTPATIENT
Start: 2025-03-18 | End: 2025-03-19 | Stop reason: HOSPADM

## 2025-03-18 RX ADMIN — SODIUM CHLORIDE 1000 ML: 900 INJECTION, SOLUTION INTRAVENOUS at 20:28

## 2025-03-18 RX ADMIN — IOPAMIDOL 85 ML: 612 INJECTION, SOLUTION INTRAVENOUS at 20:18

## 2025-03-18 RX ADMIN — SODIUM CHLORIDE 150 ML/HR: 900 INJECTION, SOLUTION INTRAVENOUS at 22:10

## 2025-03-18 RX ADMIN — ONDANSETRON 4 MG: 2 INJECTION INTRAMUSCULAR; INTRAVENOUS at 22:12

## 2025-03-18 RX ADMIN — Medication 10 ML: at 22:10

## 2025-03-18 RX ADMIN — CEFTRIAXONE 2000 MG: 2 INJECTION, POWDER, FOR SOLUTION INTRAMUSCULAR; INTRAVENOUS at 20:28

## 2025-03-18 NOTE — TELEPHONE ENCOUNTER
Needs 8 week f/u appt- please call her with that   She got out without follow up last visit  Thanks, vickie

## 2025-03-18 NOTE — PROGRESS NOTES
Vilma Ayala 54 y.o.  [facility]: interim visit - c/o Nausea with mounjaro     [place]: interim visit- c/o nausea with mounjaro     We changed from 70/30 mix last visit (last week) due to low sugar and then high sugar with reduced dose  She had gone to the er several times for high sugar - over 300 (reassured and released)  She took 1 dose of mounjaro - no vomiting but profound nausea   Due for next dose today  She has not been testing sugars- does not have sensor and glucose meter broken  She has been borrowing a meter but had to return it   Called last night with symptoms of polyuria and finally found someone who could check her sugar (lives in a group home) - was 189  I asked her to come in today for reassessment   She is feeling better today - has eaten some   Would like prn medication called in for nausea  Some symptoms of GERD   Is accompanied by a    Blood sugar after meal today was over 200   Results for orders placed or performed in visit on 03/18/25   POC Glucose, Blood    Collection Time: 03/18/25 12:16 PM    Specimen: Blood   Result Value Ref Range    Glucose 254 (A) 70 - 130 mg/dL    Lot Number [id number]     Expiration Date 349,026      She has never received lantus to start from her pharmacy   Denies fever or diarrhea     Allergies   Allergen Reactions    Doxepin Rash    Januvia [Sitagliptin] Other (See Comments)     Insomnia       Current Outpatient Medications:     Accu-Chek Softclix Lancets lancets, USE 1  TO CHECK GLUCOSE 4 times a day DAILY dx e11.65 on insulin, Disp: 400 each, Rfl: 3    ammonium lactate (Lac-Hydrin) 12 % lotion, Apply BID to feet, Disp: 225 g, Rfl: 3    atorvastatin (LIPITOR) 40 MG tablet, Take 1 tablet by mouth Daily., Disp: , Rfl:     B-D UF III MINI PEN NEEDLES 31G X 5 MM misc, Inject 1 Units under the skin into the appropriate area as directed Daily., Disp: 100 each, Rfl: 1    benztropine (COGENTIN) 0.5 MG tablet, Take 1 tablet by mouth As Needed., Disp: , Rfl:     Blood  "Glucose Monitoring Suppl w/Device kit, Use daily to test blood sugars uses accu chek, Disp: 1 each, Rfl: 0    busPIRone (BUSPAR) 15 MG tablet, Take 1 tablet by mouth Every 12 (Twelve) Hours., Disp: , Rfl:     Cholecalciferol (VITAMIN D3 PO), Take 1 tablet by mouth Daily., Disp: , Rfl:     citalopram (CeleXA) 40 MG tablet, Take 1 tablet by mouth Daily., Disp: 90 tablet, Rfl: 1    Continuous Glucose  (FreeStyle Sukhi 3 [place]) device, Use 1 Device 4 (Four) Times a Day., Disp: 1 each, Rfl: 3    Continuous Glucose Sensor (FreeStyle Sukhi 3 Plus Sensor), Use Every 14 (Fourteen) Days., Disp: 2 each, Rfl: 5    famotidine (PEPCID) 40 MG tablet, Take 1 tablet by mouth every night at bedtime., Disp: , Rfl:     glucose (DEX4) 4 GM chewable tablet, Chew 4 tablets As Needed for Low Blood Sugar., Disp: 20 tablet, Rfl: 12    glucose monitor monitoring kit, Use as directed, E11.9, Disp: 1 each, Rfl: 0    hydrOXYzine (ATARAX) 50 MG tablet, , Disp: , Rfl:     hydrOXYzine pamoate (VISTARIL) 50 MG capsule, Take 1 capsule by mouth 3 (Three) Times a Day As Needed for Itching., Disp: 90 capsule, Rfl: 0    ibuprofen (ADVIL,MOTRIN) 800 MG tablet, Take 1 tablet by mouth Every 8 (Eight) Hours As Needed for Mild Pain or Moderate Pain for up to 30 doses., Disp: 30 tablet, Rfl: 0    Insulin Glargine (Lantus SoloStar) 100 UNIT/ML injection pen, Inject 30 Units under the skin into the appropriate area as directed Every Night., Disp: 15 mL, Rfl: 3    Insulin Syringe-Needle U-100 31G X 15/64\" 0.5 ML misc, Use 2 per day to administer insulin, Disp: 200 each, Rfl: 0    Lancet Device misc, Use with glucometer up to QID as directed, E11.9, Z79.4, Disp: 400 each, Rfl: 1    levothyroxine (SYNTHROID, LEVOTHROID) 200 MCG tablet, Take 1 tablet by mouth Daily., Disp: 90 tablet, Rfl: 0    levothyroxine (SYNTHROID, LEVOTHROID) 25 MCG tablet, Take 1 tablet by mouth Every Morning., Disp: 90 tablet, Rfl: 0    Livalo 1 MG tablet tablet, , Disp: , Rfl:     " LORazepam (ATIVAN) 0.5 MG tablet, Take 1 tablet by mouth 2 (Two) Times a Day As Needed. for anxiety, Disp: , Rfl:     metFORMIN (GLUCOPHAGE) 500 MG tablet, TAKE 2 TABLETS BY MOUTH ONCE DAILY WITH FOOD, Disp: 60 tablet, Rfl: 5    risperiDONE (risperDAL) 3 MG tablet, Take 1 tablet by mouth 2 (Two) Times a Day., Disp: , Rfl:     Trelegy Ellipta 100-62.5-25 MCG/ACT inhaler, INHALE 1 PUFF ONCE DAILY, Disp: 180 each, Rfl: 0    Ventolin  (90 Base) MCG/ACT inhaler, Inhale 2 puffs 4 (Four) Times a Day., Disp: 18 g, Rfl: 0    ondansetron (Zofran) 4 MG tablet, Take 1 tablet by mouth Daily As Needed for Nausea or Vomiting., Disp: 30 tablet, Rfl: 1    OneTouch Delica Lancets 33G misc, Use to test blood sugar twice daily and as needed for symptoms of low blood sugar, Disp: 200 each, Rfl: 1    OneTouch Verio test strip, Use to test blood sugar twice daily and as needed for symptoms of low blood sugar, Disp: 200 each, Rfl: 1    pantoprazole (Protonix) 40 MG EC tablet, Take 1 tablet by mouth Daily., Disp: 30 tablet, Rfl: 4    Patient Active Problem List    Diagnosis     NATASHA (obstructive sleep apnea) [G47.33]     Medicare annual wellness visit, subsequent [Z00.00]     Vitamin D deficiency [E55.9]     Obesity (BMI 35.0-39.9 without comorbidity) [E66.9]     Drug-induced parkinsonism [G21.19]     Encounter to establish care [Z76.89]     Mild persistent asthma [J45.30]     Decreased GFR [R94.4]     Microscopic hematuria [R31.29]     Nocturia [R35.1]     Nocturnal enuresis [N39.44]     Stress incontinence [N39.3]     Urge incontinence [N39.41]     Urinary incontinence [R32]     Type 2 diabetes mellitus without complication, with long-term current use of insulin [E11.9, Z79.4]     Dyspnea on exertion [R06.09]     Mixed hyperlipidemia [E78.2]     Anxiety and depression [F41.9, F32.A]     Uncontrolled type 2 diabetes mellitus [[id number]]     Hypothyroid [E03.9]     Obesity [E66.9]     Vitamin D deficiency, unspecified [E55.9]      "Diabetic neuropathy, type II diabetes mellitus [E11.40]      Review of Systems   Constitutional:  Negative for activity change, appetite change and unexpected weight change.   HENT:  Negative for congestion and rhinorrhea.    Eyes:  Negative for visual disturbance.   Respiratory:  Negative for cough and shortness of breath.    Cardiovascular:  Negative for palpitations and leg swelling.   Gastrointestinal:  Positive for nausea. Negative for constipation and diarrhea.   Genitourinary:  Negative for hematuria.   Musculoskeletal:  Negative for arthralgias, back pain, gait problem, joint swelling and myalgias.   Skin:  Negative for color change, rash and wound.   Allergic/Immunologic: Negative for environmental allergies, food allergies and immunocompromised state.   Neurological:  Negative for dizziness, weakness and light-headedness.   Psychiatric/Behavioral:  Positive for decreased concentration. Negative for confusion, dysphoric mood and sleep disturbance. The patient is not nervous/anxious.      Social History     Socioeconomic History    Marital status: Single   Tobacco Use    Smoking status: Never    Smokeless tobacco: Never   Vaping Use    Vaping status: Never Used   Substance and Sexual Activity    Alcohol use: No    Drug use: No    Sexual activity: Not Currently     Family History   Problem Relation Age of Onset    Hypertension Other     Thyroid disease Other     Diabetes Other     Obesity Other     Diabetes Mother     Hypertension Mother     Heart disease Mother     Cancer Father     Diabetes Sister     Diabetes Brother     No Known Problems Brother      /78   Pulse 94   Ht 167.6 cm (65.98\")   Wt 103 kg (226 lb)   LMP  (LMP Unknown)   SpO2 99%   BMI 36.50 kg/m²   Physical Exam  Constitutional:       Appearance: Normal appearance. She is obese.   Eyes:      Pupils: Pupils are equal, round, and reactive to light.   Pulmonary:      Effort: Pulmonary effort is normal. No respiratory distress. "   Musculoskeletal:      Right lower leg: No edema.      Left lower leg: No edema.   Skin:     General: Skin is warm and dry.   Neurological:      General: No focal deficit present.      Mental Status: She is alert.   Psychiatric:         Mood and Affect: Mood normal.       Results for orders placed or performed in visit on 03/18/25   POC Glucose, Blood    Collection Time: 03/18/25 12:16 PM    Specimen: Blood   Result Value Ref Range    Glucose 254 (A) 70 - 130 mg/dL    Lot Number [id number]     Expiration Date 189,557      Diagnoses and all orders for this visit:    1. Type 2 diabetes mellitus without complication, with long-term current use of insulin (Primary)  Assessment & Plan:  Intol glp-1 at low dose   Did not start basal - will start basal insulin today (was taking 70/30- 50 u daily) told to take lantus 30 u once daily and can add oral agent if needed  Lantus samples provided and lantus resent to pharmacy and she will inquire about why this wasn't filled  Also gave her a glucosemeter for testing - rx for strips and lancets provided  She has my phone number and will call if sugar over 250   Rx sent for zofran and protonix   Asked her to cut back on diet pepsi- also eating a lot of processed carbs- health diet reviewed   F/u 8 weeks as scheduled     Orders:  -     POC Glucose, Blood  -     OneTouch Verio test strip; Use to test blood sugar twice daily and as needed for symptoms of low blood sugar  Dispense: 200 each; Refill: 1  -     OneTouch Delica Lancets 33G misc; Use to test blood sugar twice daily and as needed for symptoms of low blood sugar  Dispense: 200 each; Refill: 1  -     B-D UF III MINI PEN NEEDLES 31G X 5 MM misc; Inject 1 Units under the skin into the appropriate area as directed Daily.  Dispense: 100 each; Refill: 1  -     Insulin Glargine (Lantus SoloStar) 100 UNIT/ML injection pen; Inject 30 Units under the skin into the appropriate area as directed Every Night.  Dispense: 15 mL; Refill:  3    Other orders  -     ondansetron (Zofran) 4 MG tablet; Take 1 tablet by mouth Daily As Needed for Nausea or Vomiting.  Dispense: 30 tablet; Refill: 1  -     pantoprazole (Protonix) 40 MG EC tablet; Take 1 tablet by mouth Daily.  Dispense: 30 tablet; Refill: 4    No follow-ups on file.    Electronically signed by: Selina Lopez MD

## 2025-03-18 NOTE — FSED PROVIDER NOTE
[place]    EMERGENCY DEPARTMENT ENCOUNTER      Pt Name: Vilma Ayala  MRN: [id number]  YOB: 1970  Date of evaluation: 3/18/2025  Provider: Cristina Strauss PA-C    CHIEF COMPLAINT       Chief Complaint   Patient presents with    Nausea     HISTORY OF PRESENT ILLNESS  (Location/Symptom, Timing/Onset, Context/Setting, Quality, Duration, Modifying Factors, Severity.)   Vilma Ayala is a 54 y.o. female who presents to the emergency department with nausea, abdominal pain, and dysuria for the past 2 days.  Patient did have Zofran en route via EMS with improvement in symptoms.  Patient does mention she started Mounjaro last week.  Patient has no other complaints.    Nursing notes were reviewed.  REVIEW OF SYSTEMS    (2-9 systems for level 4, 10 or more for level 5)   Review of Systems   Constitutional:  Negative for chills and fever.   HENT:  Negative for ear pain and sore throat.    Respiratory:  Negative for cough and shortness of breath.    Cardiovascular:  Negative for chest pain.   Gastrointestinal:  Positive for abdominal pain and nausea. Negative for diarrhea and vomiting.   Genitourinary:  Positive for dysuria. Negative for frequency.   Musculoskeletal:  Negative for back pain and neck pain.   Neurological:  Negative for headaches.      All systems reviewed and negative except for those discussed in HPI.   PAST MEDICAL HISTORY     Past Medical History:   Diagnosis Date    Anxiety     Chicken pox     Depression     Diabetes mellitus     Disease of thyroid gland     Measles     Type 2 diabetes mellitus      SURGICAL HISTORY       Past Surgical History:   Procedure Laterality Date    EYE SURGERY      TONSILLECTOMY       CURRENT MEDICATIONS       Current Facility-Administered Medications:     acetaminophen (TYLENOL) tablet 650 mg, 650 mg, Oral, Q4H PRN, Polo Sal PA-C    [START ON 3/19/2025] famotidine (PEPCID) tablet 20 mg, 20 mg, Oral, Daily, Polo Sal PA-C    ondansetron  (ZOFRAN) injection 4 mg, 4 mg, Intravenous, Q6H PRN, Polo Sal, PA-C, 4 mg at 03/18/25 2212    Potassium Replacement - Follow Nurse / BPA Driven Protocol, , Not Applicable, PRN, Polo Sal, PA-C    sodium chloride 0.9 % flush 10 mL, 10 mL, Intravenous, PRN, Yossi Zepeda MD    sodium chloride 0.9 % flush 10 mL, 10 mL, Intravenous, Q12H, Polo Sal, PA-C, 10 mL at 03/18/25 2210    sodium chloride 0.9 % flush 10 mL, 10 mL, Intravenous, PRN, Polo Sal, PA-C    sodium chloride 0.9 % infusion 40 mL, 40 mL, Intravenous, PRN, Livia Salry, PA-C    sodium chloride 0.9 % infusion, 150 mL/hr, Intravenous, Continuous, Polo Sal, PA-C, Last Rate: 150 mL/hr at 03/18/25 2210, 150 mL/hr at 03/18/25 2210    ALLERGIES     Doxepin and Januvia [sitagliptin]    FAMILY HISTORY       Family History   Problem Relation Age of Onset    Hypertension Other     Thyroid disease Other     Diabetes Other     Obesity Other     Diabetes Mother     Hypertension Mother     Heart disease Mother     Cancer Father     Diabetes Sister     Diabetes Brother     No Known Problems Brother      SOCIAL HISTORY       Social History     Socioeconomic History    Marital status: Single   Tobacco Use    Smoking status: Never    Smokeless tobacco: Never   Vaping Use    Vaping status: Never Used   Substance and Sexual Activity    Alcohol use: No    Drug use: No    Sexual activity: Not Currently     PHYSICAL EXAM    (up to 7 for level 4, 8 or more for level 5)   Physical Exam  Vitals and nursing note reviewed. Exam conducted with a chaperone present.   HENT:      Head: Normocephalic and atraumatic.   Eyes:      Extraocular Movements: Extraocular movements intact.      Pupils: Pupils are equal, round, and reactive to light.   Cardiovascular:      Rate and Rhythm: Normal rate and regular rhythm.      Pulses: Normal pulses.   Pulmonary:      Effort: Pulmonary effort is normal.      Breath sounds: Normal breath sounds.    Abdominal:      General: Abdomen is flat. Bowel sounds are normal.      Palpations: Abdomen is soft.   Musculoskeletal:         General: Normal range of motion.      Cervical back: Normal range of motion.   Skin:     General: Skin is warm and dry.   Neurological:      General: No focal deficit present.      Mental Status: She is alert and oriented to person, place, and time.   Psychiatric:         Mood and Affect: Mood normal.         Behavior: Behavior normal.       DIAGNOSTIC RESULTS     EKG: All EKGs are interpreted by the Emergency Department Physician who either signs or Co-signs this chart in the absence of a cardiologist.    ECG 12 Lead Other; epigastric pain    (Results Pending)     RADIOLOGY:   Non-plain film images such as CT, Ultrasound and MRI are read by the radiologist. Plain radiographic images are visualized and preliminarily interpreted by the emergency physician with the below findings:    [x] Radiologist's Report Reviewed:  CT Abdomen Pelvis With Contrast   Final Result   Impression:   No acute abdominal or pelvic abnormality is identified.            Electronically Signed: Baylee Tabor     3/18/2025 8:50 PM EDT     Workstation ID: [id number]        ED BEDSIDE ULTRASOUND:   Performed by ED Physician - none    LABS:    I have reviewed and interpreted all of the currently available lab results from this visit (if applicable):  Results for orders placed or performed during the hospital encounter of 03/18/25   Comprehensive Metabolic Panel    Collection Time: 03/18/25  6:20 PM    Specimen: Blood   Result Value Ref Range    Glucose 236 (H) 65 - 99 mg/dL    BUN 18 6 - 20 mg/dL    Creatinine 1.65 (H) 0.57 - 1.00 mg/dL    Sodium 135 (L) 136 - 145 mmol/L    Potassium 3.8 3.5 - 5.2 mmol/L    Chloride 95 (L) 98 - 107 mmol/L    CO2 25.3 22.0 - 29.0 mmol/L    Calcium 9.9 8.6 - 10.5 mg/dL    Total Protein 8.5 6.0 - 8.5 g/dL    Albumin 4.9 3.5 - 5.2 g/dL    ALT (SGPT) 19 1 - 33 U/L    AST (SGOT) 24 1 - 32 U/L     Alkaline Phosphatase 177 (H) 39 - 117 U/L    Total Bilirubin 0.6 0.0 - 1.2 mg/dL    Globulin 3.6 gm/dL    A/G Ratio 1.4 g/dL    BUN/Creatinine Ratio 10.9 7.0 - 25.0    Anion Gap 14.7 5.0 - 15.0 mmol/L    eGFR 36.8 (L) >60.0 mL/min/1.73   Lipase    Collection Time: 03/18/25  6:20 PM    Specimen: Blood   Result Value Ref Range    Lipase 22 13 - 60 U/L   High Sensitivity Troponin T    Collection Time: 03/18/25  6:20 PM    Specimen: Blood   Result Value Ref Range    HS Troponin T 18 (H) <14 ng/L   CBC Auto Differential    Collection Time: 03/18/25  6:20 PM    Specimen: Blood   Result Value Ref Range    WBC 17.72 (H) 3.40 - 10.80 10*3/mm3    RBC 5.02 3.77 - 5.28 10*6/mm3    Hemoglobin 14.8 12.0 - 15.9 g/dL    Hematocrit 43.5 34.0 - 46.6 %    MCV 86.7 79.0 - 97.0 fL    MCH 29.5 26.6 - 33.0 pg    MCHC 34.0 31.5 - 35.7 g/dL    RDW 13.1 12.3 - 15.4 %    RDW-SD 42.4 37.0 - 54.0 fl    MPV 10.2 6.0 - 12.0 fL    Platelets 375 140 - 450 10*3/mm3    Neutrophil % 74.5 42.7 - 76.0 %    Lymphocyte % 19.4 (L) 19.6 - 45.3 %    Monocyte % 4.9 (L) 5.0 - 12.0 %    Eosinophil % 1.0 0.3 - 6.2 %    Basophil % 0.1 0.0 - 1.5 %    Immature Grans % 0.1 0.0 - 0.5 %    Neutrophils, Absolute 13.21 (H) 1.70 - 7.00 10*3/mm3    Lymphocytes, Absolute 3.44 (H) 0.70 - 3.10 10*3/mm3    Monocytes, Absolute 0.86 0.10 - 0.90 10*3/mm3    Eosinophils, Absolute 0.17 0.00 - 0.40 10*3/mm3    Basophils, Absolute 0.02 0.00 - 0.20 10*3/mm3    Immature Grans, Absolute 0.02 0.00 - 0.05 10*3/mm3   Hemoglobin A1c    Collection Time: 03/18/25  6:20 PM    Specimen: Blood   Result Value Ref Range    Hemoglobin A1C 7.50 (H) 4.80 - 5.60 %   Lactic Acid, Plasma    Collection Time: 03/18/25  6:20 PM    Specimen: Blood   Result Value Ref Range    Lactate 2.1 (C) 0.5 - 2.0 mmol/L   Green Top (Gel)    Collection Time: 03/18/25  6:20 PM   Result Value Ref Range    Extra Tube Hold for add-ons.    Lavender Top    Collection Time: 03/18/25  6:20 PM   Result Value Ref Range     Extra Tube hold for add-on    Gold Top - SST    Collection Time: 03/18/25  6:20 PM   Result Value Ref Range    Extra Tube Hold for add-ons.    Gray Top    Collection Time: 03/18/25  6:20 PM   Result Value Ref Range    Extra Tube Hold for add-ons.    Light Blue Top    Collection Time: 03/18/25  6:20 PM   Result Value Ref Range    Extra Tube Hold for add-ons.    Urinalysis With Microscopic If Indicated (No Culture) - Urine, Clean Catch    Collection Time: 03/18/25  7:07 PM    Specimen: Urine, Clean Catch   Result Value Ref Range    Color, UA Yellow Yellow, Straw    Appearance, UA Clear Clear    pH, UA 6.0 5.0 - 8.0    Specific Gravity, UA 1.025 1.005 - 1.030    Glucose, UA Negative Negative    Ketones, UA Negative Negative    Bilirubin, UA Negative Negative    Blood, UA Trace (A) Negative    Protein, UA Negative Negative    Leuk Esterase, UA Small (1+) (A) Negative    Nitrite, UA Positive (A) Negative    Urobilinogen, UA 0.2 E.U./dL 0.2 - 1.0 E.U./dL   Urinalysis, Microscopic Only - Urine, Clean Catch    Collection Time: 03/18/25  7:07 PM    Specimen: Urine, Clean Catch   Result Value Ref Range    RBC, UA 3-5 (A) None Seen, 0-2 /HPF    WBC, UA Too Numerous to Count (A) None Seen, 0-2 /HPF    Bacteria, UA 4+ (A) None Seen /HPF    Squamous Epithelial Cells, UA 7-12 (A) None Seen, 0-2 /HPF    Hyaline Casts, UA 0-2 None Seen /LPF    Methodology Manual Light Microscopy    Procalcitonin    Collection Time: 03/18/25  7:23 PM    Specimen: Blood   Result Value Ref Range    Procalcitonin 0.05 0.00 - 0.25 ng/mL   High Sensitivity Troponin T 1Hr    Collection Time: 03/18/25  8:28 PM    Specimen: Blood   Result Value Ref Range    HS Troponin T 16 (H) <14 ng/L    Troponin T Numeric Delta -2 ng/L    Troponin T % Delta -11 Abnormal if >/= 20%   Magnesium    Collection Time: 03/18/25  8:28 PM    Specimen: Blood   Result Value Ref Range    Magnesium 1.7 1.6 - 2.6 mg/dL      All other labs were within normal range or not returned as of  this dictation.    EMERGENCY DEPARTMENT COURSE and DIFFERENTIAL DIAGNOSIS/MDM:   Vitals:    Vitals:    03/18/25 2030 03/18/25 2115 03/18/25 2130 03/18/25 2149   BP:    (!) 128/108   Pulse: 77 86 80 77   Resp:       Temp:       TempSrc:       SpO2: 100% 99% 99% 98%   Weight:       Height:           ED Course as of 03/18/25 2213   Tue Mar 18, 2025   1929 WBC, UA(!): Too Numerous to Count [WA]   1929 Bacteria, UA(!): 4+ [WA]   1929 Nitrite, UA(!): Positive [WA]   1929 Leukocytes, UA(!): Small (1+) [WA]   1930 Creatinine(!): 1.65 [WA]   1930 Glucose(!): 236 [WA]   1930 Sodium(!): 135 [WA]   1930 HS Troponin T(!): 18 [WA]   1930 WBC(!): 17.72 [WA]      ED Course User Index  [WA] Cristina Strauss PA-C     MDM     Amount and/or Complexity of Data Reviewed  Discuss the patient with other providers: yes (I spoke with Srinivas Sal PA-C he accepted the patient for admission to the CDU.)    Ddx: UTI, n/v, viral syndrome    Patient was evaluated, labs and imaging were obtained.  Patient was found to have a urinary tract infection.  Blood cultures were obtained and antibiotics were initiated.  Patient will be admitted to our clinical decision unit overnight for further observation.  Patient stable at the time of admission.    MEDICATIONS ADMINISTERED IN ED:  Medications   sodium chloride 0.9 % flush 10 mL (has no administration in time range)   sodium chloride 0.9 % flush 10 mL (10 mL Intravenous Given 3/18/25 2210)   sodium chloride 0.9 % flush 10 mL (has no administration in time range)   sodium chloride 0.9 % infusion 40 mL (has no administration in time range)   Potassium Replacement - Follow Nurse / BPA Driven Protocol (has no administration in time range)   sodium chloride 0.9 % infusion (150 mL/hr Intravenous New Bag 3/18/25 2210)   acetaminophen (TYLENOL) tablet 650 mg (has no administration in time range)   ondansetron (ZOFRAN) injection 4 mg (4 mg Intravenous Given 3/18/25 2212)   famotidine (PEPCID) tablet 20 mg  (has no administration in time range)   cefTRIAXone (ROCEPHIN) 2,000 mg in sodium chloride 0.9 % 100 mL MBP (2,000 mg Intravenous New Bag 3/18/25 2028)   sodium chloride 0.9 % bolus 1,000 mL (1,000 mL Intravenous New Bag 3/18/25 2028)   iopamidol (ISOVUE-300) 61 % injection 100 mL (85 mL Intravenous Given 3/18/25 2018)     PROCEDURES:  Procedures:none      CRITICAL CARE TIME    Total Critical Care time was 0 minutes, excluding separately reportable procedures.   There was a high probability of clinically significant/life threatening deterioration in the patient's condition which required my urgent intervention.    FINAL IMPRESSION      1. Urinary tract infection with hematuria, site unspecified    2. Acute kidney injury superimposed on chronic kidney disease    3. Elevated troponin        DISPOSITION/PLAN     ED Disposition       ED Disposition   Decision to Admit    Condition   --    Comment   --             PATIENT REFERRED TO:  No follow-up provider specified.    DISCHARGE MEDICATIONS:     Medication List        CONTINUE taking these medications      * Accu-Chek Softclix Lancets lancets  USE 1  TO CHECK GLUCOSE 4 times a day DAILY dx e11.65 on insulin     * OneTouch Delica Lancets 33G misc  Use to test blood sugar twice daily and as needed for symptoms of low blood sugar     ammonium lactate 12 % lotion  Commonly known as: Lac-Hydrin  Apply BID to feet     atorvastatin 40 MG tablet  Commonly known as: LIPITOR     B-D UF III MINI PEN NEEDLES 31G X 5 MM misc  Generic drug: Insulin Pen Needle  Inject 1 Units under the skin into the appropriate area as directed Daily.     benztropine 0.5 MG tablet  Commonly known as: COGENTIN     Blood Glucose Monitoring Suppl w/Device kit  Use daily to test blood sugars uses accu chek     busPIRone 15 MG tablet  Commonly known as: BUSPAR     citalopram 40 MG tablet  Commonly known as: CeleXA  Take 1 tablet by mouth Daily.     famotidine 40 MG tablet  Commonly known as: PEPCID    "  FreeStyle Sukhi 3 Plus Sensor  Use Every 14 (Fourteen) Days.     FreeStyle Sukhi 3 Lincoln device  Use 1 Device 4 (Four) Times a Day.     glucose 4 GM chewable tablet  Commonly known as: DEX4  Chew 4 tablets As Needed for Low Blood Sugar.     glucose monitor monitoring kit  Use as directed, E11.9     hydrOXYzine 50 MG tablet  Commonly known as: ATARAX     hydrOXYzine pamoate 50 MG capsule  Commonly known as: VISTARIL  Take 1 capsule by mouth 3 (Three) Times a Day As Needed for Itching.     ibuprofen 800 MG tablet  Commonly known as: ADVIL,MOTRIN  Take 1 tablet by mouth Every 8 (Eight) Hours As Needed for Mild Pain or Moderate Pain for up to 30 doses.     Insulin Syringe-Needle U-100 31G X 15/64\" 0.5 ML misc  Use 2 per day to administer insulin     Lancet Device misc  Use with glucometer up to QID as directed, E11.9, Z79.4     Lantus SoloStar 100 UNIT/ML injection pen  Generic drug: Insulin Glargine  Inject 30 Units under the skin into the appropriate area as directed Every Night.     * levothyroxine 200 MCG tablet  Commonly known as: SYNTHROID, LEVOTHROID  Take 1 tablet by mouth Daily.     * levothyroxine 25 MCG tablet  Commonly known as: SYNTHROID, LEVOTHROID  Take 1 tablet by mouth Every Morning.     Livalo 1 MG tablet tablet  Generic drug: pitavastatin calcium     LORazepam 0.5 MG tablet  Commonly known as: ATIVAN     metFORMIN 500 MG tablet  Commonly known as: GLUCOPHAGE  TAKE 2 TABLETS BY MOUTH ONCE DAILY WITH FOOD     ondansetron 4 MG tablet  Commonly known as: Zofran  Take 1 tablet by mouth Daily As Needed for Nausea or Vomiting.     OneTouch Verio test strip  Generic drug: glucose blood  Use to test blood sugar twice daily and as needed for symptoms of low blood sugar     pantoprazole 40 MG EC tablet  Commonly known as: Protonix  Take 1 tablet by mouth Daily.     risperiDONE 3 MG tablet  Commonly known as: risperDAL     Trelegy Ellipta 100-62.5-25 MCG/ACT inhaler  Generic drug: " Fluticasone-Umeclidin-Vilant  INHALE 1 PUFF ONCE DAILY     Ventolin  (90 Base) MCG/ACT inhaler  Generic drug: albuterol sulfate HFA  Inhale 2 puffs 4 (Four) Times a Day.     VITAMIN D3 PO           * This list has 4 medication(s) that are the same as other medications prescribed for you. Read the directions carefully, and ask your doctor or other care provider to review them with you.                Comment: Please note this report has been produced using speech recognition software.    Cristina Strauss PA-C

## 2025-03-18 NOTE — ASSESSMENT & PLAN NOTE
Intol glp-1 at low dose   Did not start basal - will start basal insulin today (was taking 70/30- 50 u daily) told to take lantus 30 u once daily and can add oral agent if needed  Lantus samples provided and lantus resent to pharmacy and she will inquire about why this wasn't filled  Also gave her a glucosemeter for testing - rx for strips and lancets provided  She has my phone number and will call if sugar over 250   Rx sent for zofran and protonix   Asked her to cut back on diet pepsi- also eating a lot of processed carbs- health diet reviewed   F/u 8 weeks as scheduled

## 2025-03-19 ENCOUNTER — READMISSION MANAGEMENT (OUTPATIENT)
Dept: CALL CENTER | Facility: HOSPITAL | Age: 55
End: 2025-03-19
Payer: MEDICARE

## 2025-03-19 VITALS
HEART RATE: 80 BPM | RESPIRATION RATE: 16 BRPM | BODY MASS INDEX: 36.49 KG/M2 | WEIGHT: 227.07 LBS | SYSTOLIC BLOOD PRESSURE: 107 MMHG | TEMPERATURE: 98.2 F | HEIGHT: 66 IN | DIASTOLIC BLOOD PRESSURE: 93 MMHG | OXYGEN SATURATION: 95 %

## 2025-03-19 LAB
ANION GAP SERPL CALCULATED.3IONS-SCNC: 12 MMOL/L (ref 5–15)
BASOPHILS # BLD AUTO: 0.01 10*3/MM3 (ref 0–0.2)
BASOPHILS NFR BLD AUTO: 0.1 % (ref 0–1.5)
BUN SERPL-MCNC: 13 MG/DL (ref 6–20)
BUN/CREAT SERPL: 9.7 (ref 7–25)
CALCIUM SPEC-SCNC: 9.2 MG/DL (ref 8.6–10.5)
CHLORIDE SERPL-SCNC: 101 MMOL/L (ref 98–107)
CO2 SERPL-SCNC: 25 MMOL/L (ref 22–29)
CREAT SERPL-MCNC: 1.34 MG/DL (ref 0.57–1)
DEPRECATED RDW RBC AUTO: 41 FL (ref 37–54)
EGFRCR SERPLBLD CKD-EPI 2021: 47.2 ML/MIN/1.73
EOSINOPHIL # BLD AUTO: 0.21 10*3/MM3 (ref 0–0.4)
EOSINOPHIL NFR BLD AUTO: 1.4 % (ref 0.3–6.2)
ERYTHROCYTE [DISTWIDTH] IN BLOOD BY AUTOMATED COUNT: 13.1 % (ref 12.3–15.4)
GLUCOSE SERPL-MCNC: 153 MG/DL (ref 65–99)
HCT VFR BLD AUTO: 36.6 % (ref 34–46.6)
HGB BLD-MCNC: 12.7 G/DL (ref 12–15.9)
IMM GRANULOCYTES # BLD AUTO: 0.02 10*3/MM3 (ref 0–0.05)
IMM GRANULOCYTES NFR BLD AUTO: 0.1 % (ref 0–0.5)
LYMPHOCYTES # BLD AUTO: 3.41 10*3/MM3 (ref 0.7–3.1)
LYMPHOCYTES NFR BLD AUTO: 22 % (ref 19.6–45.3)
MCH RBC QN AUTO: 29.7 PG (ref 26.6–33)
MCHC RBC AUTO-ENTMCNC: 34.7 G/DL (ref 31.5–35.7)
MCV RBC AUTO: 85.7 FL (ref 79–97)
MONOCYTES # BLD AUTO: 0.83 10*3/MM3 (ref 0.1–0.9)
MONOCYTES NFR BLD AUTO: 5.4 % (ref 5–12)
NEUTROPHILS NFR BLD AUTO: 11.02 10*3/MM3 (ref 1.7–7)
NEUTROPHILS NFR BLD AUTO: 71 % (ref 42.7–76)
PLATELET # BLD AUTO: 328 10*3/MM3 (ref 140–450)
PMV BLD AUTO: 10.2 FL (ref 6–12)
POTASSIUM SERPL-SCNC: 3.6 MMOL/L (ref 3.5–5.2)
RBC # BLD AUTO: 4.27 10*6/MM3 (ref 3.77–5.28)
SODIUM SERPL-SCNC: 138 MMOL/L (ref 136–145)
WBC NRBC COR # BLD AUTO: 15.5 10*3/MM3 (ref 3.4–10.8)

## 2025-03-19 PROCEDURE — G0378 HOSPITAL OBSERVATION PER HR: HCPCS

## 2025-03-19 PROCEDURE — 25010000002 ONDANSETRON PER 1 MG

## 2025-03-19 PROCEDURE — 80048 BASIC METABOLIC PNL TOTAL CA: CPT

## 2025-03-19 PROCEDURE — 85025 COMPLETE CBC W/AUTO DIFF WBC: CPT

## 2025-03-19 PROCEDURE — 96376 TX/PRO/DX INJ SAME DRUG ADON: CPT

## 2025-03-19 RX ORDER — ONDANSETRON 4 MG/1
4 TABLET, FILM COATED ORAL EVERY 8 HOURS PRN
Qty: 12 TABLET | Refills: 0 | Status: SHIPPED | OUTPATIENT
Start: 2025-03-19 | End: 2025-03-23

## 2025-03-19 RX ORDER — GRANULES FOR ORAL 3 G/1
3 POWDER ORAL ONCE
Status: DISCONTINUED | OUTPATIENT
Start: 2025-03-19 | End: 2025-03-19 | Stop reason: HOSPADM

## 2025-03-19 RX ADMIN — ONDANSETRON 4 MG: 2 INJECTION INTRAMUSCULAR; INTRAVENOUS at 05:56

## 2025-03-19 NOTE — DISCHARGE INSTRUCTIONS
Follow-up with your primary care provider regarding today's ER visit to have your urine rechecked.  You may take Zofran to help with nausea.  Make sure you are drinking plenty of fluids and staying well-hydrated.  Return to the ER if you begin having worsening symptoms or ongoing pain with urination or other signs of UTI.

## 2025-03-19 NOTE — PLAN OF CARE
Goal Outcome Evaluation:   Pt rested okay. Pt did not like having an IV all night and wants to go home. Have discussed waiting for labs and will decide with PA once the results are back. Pt is ok with this.     Problem: Adult Inpatient Plan of Care  Goal: Plan of Care Review  Outcome: Progressing  Goal: Patient-Specific Goal (Individualized)  Outcome: Progressing  Goal: Absence of Hospital-Acquired Illness or Injury  Outcome: Progressing  Intervention: Identify and Manage Fall Risk  Recent Flowsheet Documentation  Taken 3/19/2025 0400 by Karlee Easton RN  Safety Promotion/Fall Prevention:   activity supervised   assistive device/personal items within reach   clutter free environment maintained   fall prevention program maintained   lighting adjusted   nonskid shoes/slippers when out of bed   room organization consistent   safety round/check completed   toileting scheduled  Taken 3/19/2025 0200 by Karlee Easton RN  Safety Promotion/Fall Prevention:   activity supervised   assistive device/personal items within reach   clutter free environment maintained   fall prevention program maintained   lighting adjusted   nonskid shoes/slippers when out of bed   room organization consistent   safety round/check completed   toileting scheduled  Taken 3/19/2025 0000 by Karlee Easton RN  Safety Promotion/Fall Prevention:   activity supervised   assistive device/personal items within reach   clutter free environment maintained   fall prevention program maintained   lighting adjusted   nonskid shoes/slippers when out of bed   room organization consistent   safety round/check completed   toileting scheduled  Taken 3/18/2025 2200 by Karlee Easton, RN  Safety Promotion/Fall Prevention:   activity supervised   assistive device/personal items within reach   clutter free environment maintained   fall prevention program maintained   lighting adjusted   nonskid shoes/slippers when out of bed   room organization consistent    safety round/check completed   toileting scheduled  Intervention: Prevent Skin Injury  Recent Flowsheet Documentation  Taken 3/19/2025 0400 by Karlee Easton RN  Body Position: position changed independently  Taken 3/19/2025 0200 by Karlee Easton RN  Body Position: position changed independently  Taken 3/19/2025 0000 by Karlee Easton RN  Body Position: position changed independently  Taken 3/18/2025 2200 by Karlee Easton RN  Body Position: position changed independently  Intervention: Prevent Infection  Recent Flowsheet Documentation  Taken 3/19/2025 0400 by Karlee Easton RN  Infection Prevention:   environmental surveillance performed   equipment surfaces disinfected   hand hygiene promoted   rest/sleep promoted   single patient room provided   visitors restricted/screened  Taken 3/19/2025 0200 by Karlee Easton RN  Infection Prevention:   environmental surveillance performed   equipment surfaces disinfected   hand hygiene promoted   rest/sleep promoted   single patient room provided   visitors restricted/screened  Taken 3/19/2025 0000 by Karlee Easton RN  Infection Prevention:   environmental surveillance performed   equipment surfaces disinfected   hand hygiene promoted   rest/sleep promoted   single patient room provided   visitors restricted/screened  Taken 3/18/2025 2200 by Karlee Easton RN  Infection Prevention:   environmental surveillance performed   equipment surfaces disinfected   hand hygiene promoted   rest/sleep promoted   single patient room provided   visitors restricted/screened  Goal: Optimal Comfort and Wellbeing  Outcome: Progressing  Intervention: Monitor Pain and Promote Comfort  Recent Flowsheet Documentation  Taken 3/18/2025 2200 by Karlee Easton RN  Pain Management Interventions: quiet environment facilitated  Intervention: Provide Person-Centered Care  Recent Flowsheet Documentation  Taken 3/18/2025 2200 by Karlee Easton RN  Trust  Relationship/Rapport:   care explained   choices provided   emotional support provided   empathic listening provided   questions answered   questions encouraged   reassurance provided   thoughts/feelings acknowledged  Goal: Readiness for Transition of Care  Outcome: Progressing  Intervention: Mutually Develop Transition Plan  Recent Flowsheet Documentation  Taken 3/18/2025 2157 by Karlee Easton, RN  Equipment Currently Used at Home: none  Transportation Anticipated: health plan transportation  Transportation Concerns: none  Patient/Family Anticipated Services at Transition: none  Patient/Family Anticipates Transition to: home

## 2025-03-19 NOTE — DISCHARGE SUMMARY
[facility] CDU  DISCHARGE SUMMARY      Patient Name: Vilma Ayala  : 1970  MRN: [id number]    Date of Admission: 3/18/2025  Date of Discharge:  25   Primary Care Physician: Vanessa Kaur MD      Presenting Problem:   UTI (urinary tract infection) [N39.0]    Active and Resolved Hospital Problems:  Active Hospital Problems    Diagnosis POA    **UTI (urinary tract infection) [N39.0] Yes      Resolved Hospital Problems   No resolved problems to display.         Hospital Course:   Vilma Ayala is a 54 y.o. female who presents to the emergency department with nausea, abdominal pain, and dysuria for the past 2 days.  Patient did have Zofran en route via EMS with improvement in symptoms.  Patient does mention she started Mounjaro last week.  Patient has no other complaints. Patient was evaluated, labs and imaging were obtained. Patient was found to have a urinary tract infection. Blood cultures were obtained and antibiotics were initiated. Patient will be admitted to our clinical decision unit overnight for further observation. Patient stable at the time of admission.  Patient was given continuous IV fluids which shows return to baseline for patient's creatinine and GFR based on previous labs.  Patient's nausea was well-managed while in the CDU and Zofran.  Patient reports that she feels significantly better and requested to be discharged early this morning.  Patient was given an additional dose of fosfomycin for further management as there is concern for outpatient compliance.  Patient given strict return precautions if symptoms persist or if she feels she needs further care and evaluation by ER provider.  Patient discharged prescription for Zofran per patient request that she states she has recurrent nausea outside of today's ER visit.    Total CDU time:8 hours  Nurses Notes reviewed and agree, including vitals, allergies, social history and prior medical history.     REVIEW OF  "SYSTEMS: All systems reviewed and not pertinent unless noted.  Review of Systems   Gastrointestinal:  Positive for nausea.   All other systems reviewed and are negative.      Past Medical History:   Diagnosis Date    Anxiety     Chicken pox     Depression     Diabetes mellitus     Disease of thyroid gland     Measles     Type 2 diabetes mellitus        Allergies:    Doxepin and Januvia [sitagliptin]      Past Surgical History:   Procedure Laterality Date    EYE SURGERY      TONSILLECTOMY           Social History     Socioeconomic History    Marital status: Single   Tobacco Use    Smoking status: Never    Smokeless tobacco: Never   Vaping Use    Vaping status: Never Used   Substance and Sexual Activity    Alcohol use: No    Drug use: No    Sexual activity: Not Currently         Family History   Problem Relation Age of Onset    Hypertension Other     Thyroid disease Other     Diabetes Other     Obesity Other     Diabetes Mother     Hypertension Mother     Heart disease Mother     Cancer Father     Diabetes Sister     Diabetes Brother     No Known Problems Brother        Objective  Physical Exam:  /93 (BP Location: Right arm, Patient Position: Lying)   Pulse 80   Temp 98.2 °F (36.8 °C) (Oral)   Resp 16   Ht 167.6 cm (66\")   Wt 103 kg (227 lb 1.2 oz)   LMP  (LMP Unknown)   SpO2 95%   BMI 36.65 kg/m²      Physical Exam  Vitals and nursing note reviewed.   Constitutional:       General: She is not in acute distress.     Appearance: Normal appearance. She is obese. She is not ill-appearing, toxic-appearing or diaphoretic.   HENT:      Head: Normocephalic and atraumatic.   Eyes:      Extraocular Movements: Extraocular movements intact.      Pupils: Pupils are equal, round, and reactive to light.   Cardiovascular:      Rate and Rhythm: Normal rate and regular rhythm.   Pulmonary:      Effort: Pulmonary effort is normal.      Breath sounds: Normal breath sounds.   Abdominal:      General: Abdomen is flat.      " "Palpations: Abdomen is soft.   Musculoskeletal:         General: No deformity. Normal range of motion.      Right lower leg: No edema.      Left lower leg: No edema.   Skin:     General: Skin is warm and dry.      Capillary Refill: Capillary refill takes less than 2 seconds.   Neurological:      General: No focal deficit present.      Mental Status: She is alert and oriented to person, place, and time.   Psychiatric:         Mood and Affect: Mood normal.         Behavior: Behavior normal.         Procedures    CT Abdomen Pelvis With Contrast  Result Date: 3/18/2025  CT ABDOMEN PELVIS W CONTRAST Date of Exam: 3/18/2025 8:05 PM EDT Indication: abdominal pain Comparison: None available. Technique: Axial CT images were obtained of the abdomen and pelvis following the uneventful intravenous administration of 85 mL Isovue-300. Reconstructed coronal and sagittal images were also obtained. Automated exposure control and iterative construction methods were used. Findings: Included lung bases are clear. No pleural effusion. Normal heart size. Liver, gallbladder, pancreas, spleen, kidneys appear unremarkable. There is mild symmetric bilateral adrenal gland thickening. No abnormal bowel distention is seen. Retrocecal appendix is normal. No urinary bladder wall thickening is seen. No uterine or adnexal abnormality is visible on CT. There is no significant free fluid or adenopathy. Atherosclerotic vascular calcifications are noted. No acute osseous abnormality is identified. There is degenerative change in the lower lumbar spine.     Impression: Impression: No acute abdominal or pelvic abnormality is identified. Electronically Signed: Baylee Tabor  3/18/2025 8:50 PM EDT  Workstation ID: [id number]           Lab 03/18/25  2207 03/18/25  1820   LACTATE 1.3 2.1*       No results found for: \"SITE\", \"ALLENTEST\", \"PHART\", \"EUC8TNW\", \"PO2ART\", \"JRF1QTA\", \"BASEEXCESS\", \"O2IDNPCA\", \"HGBBG\", \"HCTABG\", \"OXYHEMOGLOBI\", \"METHHGBN\", " "\"CARBOXYHGB\", \"CO2CT\", \"BAROMETRIC\", \"MODALITY\", \"FIO2\"    Results from last 7 days   Lab Units 03/18/25 2028 03/18/25  1820   HSTROP T ng/L 16* 18*       Results from last 7 days   Lab Units 03/19/25  0520 03/18/25  1820   WBC 10*3/mm3 15.50* 17.72*   HEMOGLOBIN g/dL 12.7 14.8   HEMATOCRIT % 36.6 43.5   PLATELETS 10*3/mm3 328 375       Results from last 7 days   Lab Units 03/19/25  0520 03/18/25  1820   SODIUM mmol/L 138 135*   POTASSIUM mmol/L 3.6 3.8   CHLORIDE mmol/L 101 95*   CO2 mmol/L 25.0 25.3   BUN mg/dL 13 18   CREATININE mg/dL 1.34* 1.65*   CALCIUM mg/dL 9.2 9.9   BILIRUBIN mg/dL  --  0.6   ALK PHOS U/L  --  177*   ALT (SGPT) U/L  --  19   AST (SGOT) U/L  --  24   GLUCOSE mg/dL 153* 236*       Lab Results   Component Value Date    CHOL 190 07/28/2022    CHLPL 199 01/09/2015    TRIG 91 07/28/2022    HDL 41 07/28/2022     (H) 07/28/2022               No results found for: \"URINECX\"    @[email or web address];urinedrugscreen@    ED Disposition       ED Disposition   Decision to Admit    Condition   --    Comment   --                    Discharge Medication List:      Your medication list        CONTINUE taking these medications        Instructions Last Dose Given Next Dose Due   Accu-Chek Softclix Lancets lancets      USE 1  TO CHECK GLUCOSE 4 times a day DAILY dx e11.65 on insulin       OneTouch Delica Lancets 33G misc      Use to test blood sugar twice daily and as needed for symptoms of low blood sugar       ammonium lactate 12 % lotion  Commonly known as: Lac-Hydrin      Apply BID to feet       atorvastatin 40 MG tablet  Commonly known as: LIPITOR      Take 1 tablet by mouth Daily.       B-D UF III MINI PEN NEEDLES 31G X 5 MM misc  Generic drug: Insulin Pen Needle      Inject 1 Units under the skin into the appropriate area as directed Daily.       benztropine 0.5 MG tablet  Commonly known as: COGENTIN      Take 1 tablet by mouth As Needed.       Blood Glucose Monitoring Suppl w/Device kit      Use daily " "to test blood sugars uses accu chek       busPIRone 15 MG tablet  Commonly known as: BUSPAR      Take 1 tablet by mouth Every 12 (Twelve) Hours.       citalopram 40 MG tablet  Commonly known as: CeleXA      Take 1 tablet by mouth Daily.       famotidine 40 MG tablet  Commonly known as: PEPCID      Take 1 tablet by mouth every night at bedtime.       FreeStyle Sukhi 3 Plus Sensor      Use Every 14 (Fourteen) Days.       FreeStyle Sukhi 3 Gypsy device      Use 1 Device 4 (Four) Times a Day.       glucose 4 GM chewable tablet  Commonly known as: DEX4      Chew 4 tablets As Needed for Low Blood Sugar.       glucose monitor monitoring kit      Use as directed, E11.9       hydrOXYzine 50 MG tablet  Commonly known as: ATARAX           hydrOXYzine pamoate 50 MG capsule  Commonly known as: VISTARIL      Take 1 capsule by mouth 3 (Three) Times a Day As Needed for Itching.       ibuprofen 800 MG tablet  Commonly known as: ADVIL,MOTRIN      Take 1 tablet by mouth Every 8 (Eight) Hours As Needed for Mild Pain or Moderate Pain for up to 30 doses.       Insulin Syringe-Needle U-100 31G X 15/64\" 0.5 ML misc      Use 2 per day to administer insulin       Lancet Device misc      Use with glucometer up to QID as directed, E11.9, Z79.4       Lantus SoloStar 100 UNIT/ML injection pen  Generic drug: Insulin Glargine      Inject 30 Units under the skin into the appropriate area as directed Every Night.       levothyroxine 200 MCG tablet  Commonly known as: SYNTHROID, LEVOTHROID      Take 1 tablet by mouth Daily.       levothyroxine 25 MCG tablet  Commonly known as: SYNTHROID, LEVOTHROID      Take 1 tablet by mouth Every Morning.       Livalo 1 MG tablet tablet  Generic drug: pitavastatin calcium           LORazepam 0.5 MG tablet  Commonly known as: ATIVAN      Take 1 tablet by mouth 2 (Two) Times a Day As Needed. for anxiety       metFORMIN 500 MG tablet  Commonly known as: GLUCOPHAGE      TAKE 2 TABLETS BY MOUTH ONCE DAILY WITH FOOD     "   ondansetron 4 MG tablet  Commonly known as: Zofran      Take 1 tablet by mouth Daily As Needed for Nausea or Vomiting.       OneTouch Verio test strip  Generic drug: glucose blood      Use to test blood sugar twice daily and as needed for symptoms of low blood sugar       pantoprazole 40 MG EC tablet  Commonly known as: Protonix      Take 1 tablet by mouth Daily.       risperiDONE 3 MG tablet  Commonly known as: risperDAL      Take 1 tablet by mouth 2 (Two) Times a Day.       Trelegy Ellipta 100-62.5-25 MCG/ACT inhaler  Generic drug: Fluticasone-Umeclidin-Vilant      INHALE 1 PUFF ONCE DAILY       Ventolin  (90 Base) MCG/ACT inhaler  Generic drug: albuterol sulfate HFA      Inhale 2 puffs 4 (Four) Times a Day.       VITAMIN D3 PO      Take 1 tablet by mouth Daily.                 Polo Sal PA-C   03/19/25   06:09 EDT       Time Spent on Discharge:  I spent 30 minutes on this discharge activity which included: face-to-face encounter with the patient, reviewing the data in the system, coordination of the care with the nursing staff as well as consultants, documentation, and entering orders.

## 2025-03-19 NOTE — ED NOTES
Vilma Ayala    Nursing Report ED to Floor:  Mental status: A&O x 4, GCS 15.   Ambulatory status: walks well  Oxygen Therapy:  RA  Cardiac Rhythm: SR  Admitted from: home, ED [place]  Safety Concerns:  Falls, has altered gait at baseline but is stable on feet  Precautions: NA  Social Issues: Will need transport home at [place]  ED Room #:  17    ED Nurse Phone Extension - [phone] or may call [phone].      HPI:   Chief Complaint   Patient presents with    Nausea       Past Medical History:  Past Medical History:   Diagnosis Date    Anxiety     Chicken pox     Depression     Diabetes mellitus     Disease of thyroid gland     Measles     Type 2 diabetes mellitus         Past Surgical History:  Past Surgical History:   Procedure Laterality Date    EYE SURGERY      TONSILLECTOMY          Admitting Doctor:   Yossi Zepeda MD    Consulting Provider(s):  Consults       No orders found from 2/17/2025 to 3/19/2025.             Admitting Diagnosis:   The primary encounter diagnosis was Urinary tract infection with hematuria, site unspecified. Diagnoses of Acute kidney injury superimposed on chronic kidney disease and Elevated troponin were also pertinent to this visit.    Most Recent Vitals:   Vitals:    03/18/25 2000 03/18/25 2030 03/18/25 2115 03/18/25 2130   BP:       Pulse: 77 77 86 80   Resp:       Temp:       TempSrc:       SpO2: 96% 100% 99% 99%   Weight:       Height:           Active LDAs/IV Access:   Lines, Drains & Airways       Active LDAs       Name Placement date Placement time Site Days    Peripheral IV 03/18/25 1821 Left Antecubital 03/18/25  1821  Antecubital  less than 1                    Labs (abnormal labs have a star):   Labs Reviewed   COMPREHENSIVE METABOLIC PANEL - Abnormal; Notable for the following components:       Result Value    Glucose 236 (*)     Creatinine 1.65 (*)     Sodium 135 (*)     Chloride 95 (*)     Alkaline Phosphatase 177 (*)     eGFR 36.8 (*)     All other components within normal  limits    Narrative:     GFR Categories in Chronic Kidney Disease (CKD)      GFR Category          GFR (mL/min/1.73)    Interpretation  G1                     90 or greater         Normal or high (1)  G2                      60-89                Mild decrease (1)  G3a                   45-59                Mild to moderate decrease  G3b                   30-44                Moderate to severe decrease  G4                    15-29                Severe decrease  G5                    14 or less           Kidney failure          (1)In the absence of evidence of kidney disease, neither GFR category G1 or G2 fulfill the criteria for CKD.    eGFR calculation 2021 CKD-EPI creatinine equation, which does not include race as a factor   TROPONIN - Abnormal; Notable for the following components:    HS Troponin T 18 (*)     All other components within normal limits   URINALYSIS W/ MICROSCOPIC IF INDICATED (NO CULTURE) - Abnormal; Notable for the following components:    Blood, UA Trace (*)     Leuk Esterase, UA Small (1+) (*)     Nitrite, UA Positive (*)     All other components within normal limits   CBC WITH AUTO DIFFERENTIAL - Abnormal; Notable for the following components:    WBC 17.72 (*)     Lymphocyte % 19.4 (*)     Monocyte % 4.9 (*)     Neutrophils, Absolute 13.21 (*)     Lymphocytes, Absolute 3.44 (*)     All other components within normal limits   HIGH SENSITIVITIY TROPONIN T 1HR - Abnormal; Notable for the following components:    HS Troponin T 16 (*)     All other components within normal limits    Narrative:     High Sensitive Troponin T Reference Range:  <14.0 ng/L- Negative Female for AMI  <22.0 ng/L- Negative Male for AMI  >=14 - Abnormal Female indicating possible myocardial injury.  >=22 - Abnormal Male indicating possible myocardial injury.   Clinicians would have to utilize clinical acumen, EKG, Troponin, and serial changes to determine if it is an Acute Myocardial Infarction or myocardial injury due to  an underlying chronic condition.        URINALYSIS, MICROSCOPIC ONLY - Abnormal; Notable for the following components:    RBC, UA 3-5 (*)     WBC, UA Too Numerous to Count (*)     Bacteria, UA 4+ (*)     Squamous Epithelial Cells, UA 7-12 (*)     All other components within normal limits   HEMOGLOBIN A1C - Abnormal; Notable for the following components:    Hemoglobin A1C 7.50 (*)     All other components within normal limits    Narrative:     Hemoglobin A1C Ranges:    Increased Risk for Diabetes  5.7% to 6.4%  Diabetes                     >= 6.5%  Diabetic Goal                < 7.0%   LACTIC ACID, PLASMA - Abnormal; Notable for the following components:    Lactate 2.1 (*)     All other components within normal limits   LIPASE - Normal   PROCALCITONIN - Normal   BLOOD CULTURE   BLOOD CULTURE   RAINBOW DRAW    Narrative:     The following orders were created for panel order [place] Draw.  Procedure                               Abnormality         Status                     ---------                               -----------         ------                     Green Top (Gel)[[id number]]                                  Final result               Lavender Top[[id number]]                                     Final result               Gold Top - SST[[id number]]                                   Final result               Martin Top[[id number]]                                         Final result               Light Blue Top[[id number]]                                   Final result                 Please view results for these tests on the individual orders.   LACTIC ACID, REFLEX   MAGNESIUM   CBC AND DIFFERENTIAL    Narrative:     The following orders were created for panel order CBC & Differential.  Procedure                               Abnormality         Status                     ---------                               -----------         ------                     CBC Auto Differential[[id number]]        Abnormal             Final result                 Please view results for these tests on the individual orders.   GREEN TOP   LAVENDER TOP   GOLD TOP - SST   GRAY TOP   LIGHT BLUE TOP       Meds Given in ED:   Medications   sodium chloride 0.9 % flush 10 mL (has no administration in time range)   sodium chloride 0.9 % flush 10 mL (has no administration in time range)   sodium chloride 0.9 % flush 10 mL (has no administration in time range)   sodium chloride 0.9 % infusion 40 mL (has no administration in time range)   Potassium Replacement - Follow Nurse / BPA Driven Protocol (has no administration in time range)   sodium chloride 0.9 % infusion (has no administration in time range)   acetaminophen (TYLENOL) tablet 650 mg (has no administration in time range)   ondansetron (ZOFRAN) injection 4 mg (has no administration in time range)   famotidine (PEPCID) tablet 40 mg (has no administration in time range)   cefTRIAXone (ROCEPHIN) 2,000 mg in sodium chloride 0.9 % 100 mL MBP (2,000 mg Intravenous New Bag 3/18/25 2028)   sodium chloride 0.9 % bolus 1,000 mL (1,000 mL Intravenous New Bag 3/18/25 2028)   iopamidol (ISOVUE-300) 61 % injection 100 mL (85 mL Intravenous Given 3/18/25 2018)     sodium chloride, 150 mL/hr         Last NIH score:                                                          Dysphagia screening results:        [place] Coma Scale:  No data recorded     CIWA:        Restraint Type:            Isolation Status:  No active isolations

## 2025-03-19 NOTE — OUTREACH NOTE
Prep Survey      Flowsheet Row Responses   [facility] patient discharged from? [facility]   Is LACE score < 7 ? No   Eligibility [facility]   Date of Admission 03/18/25   Date of Discharge 03/19/25   Discharge Disposition Home or Self Care   Discharge diagnosis UTI (urinary tract infection)   Does the patient have one of the following disease processes/diagnoses(primary or secondary)? Other   Does the patient have Home health ordered? No   Is there a DME ordered? No   Prep survey completed? Yes            Odette CASTANEDA - Registered Nurse

## 2025-03-19 NOTE — H&P
[facility]   HISTORY AND PHYSICAL    Patient Name: Vilma Ayala  : 1970  MRN: [id number]  Primary Care Physician:  Vanessa Kaur MD  Date of admission: 3/18/2025    Subjective   Subjective     Chief Complaint:     Nausea  Symptoms include nausea.        Review of Systems   Gastrointestinal:  Positive for nausea.        Personal History     Past Medical History:   Diagnosis Date    Anxiety     Chicken pox     Depression     Diabetes mellitus     Disease of thyroid gland     Measles     Type 2 diabetes mellitus        Past Surgical History:   Procedure Laterality Date    EYE SURGERY      TONSILLECTOMY         Family History: family history includes Cancer in her father; Diabetes in her brother, mother, sister, and another family member; Heart disease in her mother; Hypertension in her mother and another family member; No Known Problems in her brother; Obesity in an other family member; Thyroid disease in an other family member. Otherwise pertinent FHx was reviewed and not pertinent to current issue.    Social History:  reports that she has never smoked. She has never used smokeless tobacco. She reports that she does not drink alcohol and does not use drugs.    Home Medications:  Accu-Chek Softclix Lancets, Blood Glucose Monitoring Suppl, Cholecalciferol, Fluticasone-Umeclidin-Vilant, FreeStyle Sukhi 3 Plus Sensor, FreeStyle Sukhi 3 [place]inckley, Insulin Glargine, Insulin Pen Needle, Insulin Syringe-Needle U-100, LORazepam, Lancet Device, OneTouch Delica Lancets 33G, albuterol sulfate HFA, ammonium lactate, atorvastatin, benztropine, busPIRone, citalopram, famotidine, glucose, glucose blood, glucose monitor, hydrOXYzine, hydrOXYzine pamoate, ibuprofen, levothyroxine, metFORMIN, ondansetron, pantoprazole, pitavastatin calcium, and risperiDONE    Allergies:  Allergies   Allergen Reactions    Doxepin Rash    Januvia [Sitagliptin] Other (See Comments)     Insomnia       Objective    Objective     Vitals:    Temp:  [97.8 °F (36.6 °C)] 97.8 °F (36.6 °C)  Heart Rate:  [72-94] 72  Resp:  [15-16] 15  BP: (114-140)/() 126/90    Physical Exam  Vitals and nursing note reviewed.   Constitutional:       Appearance: Normal appearance.   HENT:      Head: Normocephalic and atraumatic.   Eyes:      Extraocular Movements: Extraocular movements intact.      Pupils: Pupils are equal, round, and reactive to light.   Cardiovascular:      Rate and Rhythm: Normal rate and regular rhythm.      Pulses: Normal pulses.   Pulmonary:      Effort: Pulmonary effort is normal.   Abdominal:      General: Abdomen is flat.      Palpations: Abdomen is soft.      Tenderness: There is no abdominal tenderness.   Musculoskeletal:         General: Normal range of motion.      Right lower leg: No edema.      Left lower leg: No edema.   Skin:     General: Skin is warm and dry.      Capillary Refill: Capillary refill takes less than 2 seconds.   Neurological:      General: No focal deficit present.      Mental Status: She is alert and oriented to person, place, and time.   Psychiatric:         Mood and Affect: Mood normal.         Behavior: Behavior normal.         Result Review    Result Review:  I have personally reviewed the results from the time of this admission to 3/18/2025 22:41 EDT and agree with these findings:  [x]  Laboratory list / accordion  []  Microbiology  [x]  Radiology  []  EKG/Telemetry   []  Cardiology/Vascular   []  Pathology  []  Old records  []  Other:  Most notable findings include: Urinalysis: Nitrite positive, too numerous to count white blood cells with 4+ bacteria and 1+ leuk esterase.  Lactic acid of 2.1 white blood cell count of 17.7 to      Assessment & Plan   Assessment / Plan     Brief Patient Summary:  Vilma Ayala is a 54 y.o. female who presents to the emergency department with nausea, abdominal pain, and dysuria for the past 2 days.  Patient did have Zofran en route via EMS with improvement in symptoms.   Patient does mention she started Mounjaro last week.  Patient has no other complaints. Patient was evaluated, labs and imaging were obtained. Patient was found to have a urinary tract infection. Blood cultures were obtained and antibiotics were initiated. Patient will be admitted to our clinical decision unit overnight for further observation. Patient stable at the time of admission.     Active Hospital Problems:  Active Hospital Problems    Diagnosis     **UTI (urinary tract infection)      Plan:   UTI:  - Patient received IV Rocephin while in the ER.  - Labs will be rechecked in the morning to monitor for decreased white blood cell count and decreased lactic as an indicator responsiveness to antibiotics.  - Patient will be given a dose of fosfomycin prior to discharge.    CB:  - Patient will be given IV fluid infusion throughout the night.  - Repeat BMP will be checked in the morning to monitor for improvement in kidney function.    VTE Prophylaxis:  Mechanical VTE prophylaxis orders are present.        CODE STATUS:    Code Status (Patient has no pulse and is not breathing): CPR (Attempt to Resuscitate)  Medical Interventions (Patient has pulse or is breathing): Full Support  Level Of Support Discussed With: Patient    Admission Status:  I believe this patient meets observation status.    Polo Sal PA-C

## 2025-03-20 ENCOUNTER — TRANSITIONAL CARE MANAGEMENT TELEPHONE ENCOUNTER (OUTPATIENT)
Dept: CALL CENTER | Facility: HOSPITAL | Age: 55
End: 2025-03-20
Payer: MEDICARE

## 2025-03-20 NOTE — OUTREACH NOTE
Call Center TCM Note      Flowsheet Row Responses   [facility] patient discharged from? [facility]   Does the patient have one of the following disease processes/diagnoses(primary or secondary)? Other   TCM attempt successful? Yes   Call start time 1306   Call end time 1308   Discharge diagnosis UTI (urinary tract infection)   Meds reviewed with patient/caregiver? Yes   Is the patient having any side effects they believe may be caused by any medication additions or changes? No   Does the patient have all medications ordered at discharge? Yes   Is the patient taking all medications as directed (includes completed medication regime)? Yes   Comments Pt states her guardian may call and schedule the hosp dc fu apt   Does the patient have an appointment with their PCP within 7-14 days of discharge? Other   Nursing Interventions Routed TCM call to PCP office   Has home health visited the patient within 72 hours of discharge? N/A   Psychosocial issues? No   Did the patient receive a copy of their discharge instructions? Yes   Nursing interventions Reviewed instructions with patient   What is the patient's perception of their health status since discharge? Improving   Is the patient/caregiver able to teach back signs and symptoms related to disease process for when to call PCP? Yes   Is the patient/caregiver able to teach back signs and symptoms related to disease process for when to call 911? Yes   Is the patient/caregiver able to teach back the hierarchy of who to call/visit for symptoms/problems? PCP, Specialist, Home health nurse, Urgent Care, ED, 911 Yes   If the patient is a current smoker, are they able to teach back resources for cessation? Not a smoker   TCM call completed? Yes   Call end time 1308            Leonarda Delgado RN    3/20/2025, 13:09 EDT

## 2025-03-23 ENCOUNTER — HOSPITAL ENCOUNTER (EMERGENCY)
Facility: HOSPITAL | Age: 55
Discharge: HOME | End: 2025-03-23
Attending: EMERGENCY MEDICINE | Admitting: EMERGENCY MEDICINE
Payer: MEDICARE

## 2025-03-23 ENCOUNTER — APPOINTMENT (OUTPATIENT)
Dept: CT IMAGING | Facility: HOSPITAL | Age: 55
End: 2025-03-23
Payer: MEDICARE

## 2025-03-23 VITALS
TEMPERATURE: 98 F | HEIGHT: 64 IN | HEART RATE: 102 BPM | OXYGEN SATURATION: 97 % | DIASTOLIC BLOOD PRESSURE: 86 MMHG | SYSTOLIC BLOOD PRESSURE: 122 MMHG | RESPIRATION RATE: 18 BRPM | BODY MASS INDEX: 39.61 KG/M2 | WEIGHT: 232 LBS

## 2025-03-23 DIAGNOSIS — B37.31 YEAST VAGINITIS: ICD-10-CM

## 2025-03-23 DIAGNOSIS — R11.0 NAUSEA: ICD-10-CM

## 2025-03-23 DIAGNOSIS — N18.9 CHRONIC KIDNEY DISEASE, UNSPECIFIED CKD STAGE: ICD-10-CM

## 2025-03-23 DIAGNOSIS — R10.84 GENERALIZED ABDOMINAL PAIN: Primary | ICD-10-CM

## 2025-03-23 LAB
ALBUMIN SERPL-MCNC: 4.7 G/DL (ref 3.5–5.2)
ALBUMIN/GLOB SERPL: 1.5 G/DL
ALP SERPL-CCNC: 137 U/L (ref 39–117)
ALT SERPL W P-5'-P-CCNC: 20 U/L (ref 1–33)
ANION GAP SERPL CALCULATED.3IONS-SCNC: 14 MMOL/L (ref 5–15)
AST SERPL-CCNC: 19 U/L (ref 1–32)
BACTERIA SPEC AEROBE CULT: NORMAL
BACTERIA SPEC AEROBE CULT: NORMAL
BACTERIA UR QL AUTO: ABNORMAL /HPF
BASOPHILS # BLD AUTO: 0.05 10*3/MM3 (ref 0–0.2)
BASOPHILS NFR BLD AUTO: 0.4 % (ref 0–1.5)
BILIRUB SERPL-MCNC: 0.8 MG/DL (ref 0–1.2)
BILIRUB UR QL STRIP: NEGATIVE
BUN SERPL-MCNC: 9 MG/DL (ref 6–20)
BUN/CREAT SERPL: 6.6 (ref 7–25)
CALCIUM SPEC-SCNC: 10.2 MG/DL (ref 8.6–10.5)
CHLORIDE SERPL-SCNC: 101 MMOL/L (ref 98–107)
CLARITY UR: CLEAR
CO2 SERPL-SCNC: 26 MMOL/L (ref 22–29)
COLOR UR: YELLOW
CREAT SERPL-MCNC: 1.36 MG/DL (ref 0.57–1)
DEPRECATED RDW RBC AUTO: 42.9 FL (ref 37–54)
EGFRCR SERPLBLD CKD-EPI 2021: 46.4 ML/MIN/1.73
EOSINOPHIL # BLD AUTO: 0.18 10*3/MM3 (ref 0–0.4)
EOSINOPHIL NFR BLD AUTO: 1.5 % (ref 0.3–6.2)
ERYTHROCYTE [DISTWIDTH] IN BLOOD BY AUTOMATED COUNT: 13.5 % (ref 12.3–15.4)
GLOBULIN UR ELPH-MCNC: 3.2 GM/DL
GLUCOSE SERPL-MCNC: 169 MG/DL (ref 65–99)
GLUCOSE UR STRIP-MCNC: NEGATIVE MG/DL
HCT VFR BLD AUTO: 45.2 % (ref 34–46.6)
HGB BLD-MCNC: 15.2 G/DL (ref 12–15.9)
HGB UR QL STRIP.AUTO: NEGATIVE
HOLD SPECIMEN: NORMAL
HYALINE CASTS UR QL AUTO: ABNORMAL /LPF
IMM GRANULOCYTES # BLD AUTO: 0.05 10*3/MM3 (ref 0–0.05)
IMM GRANULOCYTES NFR BLD AUTO: 0.4 % (ref 0–0.5)
KETONES UR QL STRIP: ABNORMAL
LEUKOCYTE ESTERASE UR QL STRIP.AUTO: ABNORMAL
LIPASE SERPL-CCNC: 21 U/L (ref 13–60)
LYMPHOCYTES # BLD AUTO: 1.47 10*3/MM3 (ref 0.7–3.1)
LYMPHOCYTES NFR BLD AUTO: 12.2 % (ref 19.6–45.3)
MCH RBC QN AUTO: 29.2 PG (ref 26.6–33)
MCHC RBC AUTO-ENTMCNC: 33.6 G/DL (ref 31.5–35.7)
MCV RBC AUTO: 86.9 FL (ref 79–97)
MONOCYTES # BLD AUTO: 0.49 10*3/MM3 (ref 0.1–0.9)
MONOCYTES NFR BLD AUTO: 4.1 % (ref 5–12)
MUCOUS THREADS URNS QL MICRO: ABNORMAL /HPF
NEUTROPHILS NFR BLD AUTO: 81.4 % (ref 42.7–76)
NEUTROPHILS NFR BLD AUTO: 9.78 10*3/MM3 (ref 1.7–7)
NITRITE UR QL STRIP: NEGATIVE
NRBC BLD AUTO-RTO: 0 /100 WBC (ref 0–0.2)
PH UR STRIP.AUTO: 5.5 [PH] (ref 5–8)
PLATELET # BLD AUTO: 346 10*3/MM3 (ref 140–450)
PMV BLD AUTO: 10.6 FL (ref 6–12)
POTASSIUM SERPL-SCNC: 4.1 MMOL/L (ref 3.5–5.2)
PROT SERPL-MCNC: 7.9 G/DL (ref 6–8.5)
PROT UR QL STRIP: ABNORMAL
RBC # BLD AUTO: 5.2 10*6/MM3 (ref 3.77–5.28)
RBC # UR STRIP: ABNORMAL /HPF
REF LAB TEST METHOD: ABNORMAL
SODIUM SERPL-SCNC: 141 MMOL/L (ref 136–145)
SP GR UR STRIP: 1.03 (ref 1–1.03)
SQUAMOUS #/AREA URNS HPF: ABNORMAL /HPF
UROBILINOGEN UR QL STRIP: ABNORMAL
WBC # UR STRIP: ABNORMAL /HPF
WBC NRBC COR # BLD AUTO: 12.02 10*3/MM3 (ref 3.4–10.8)
WHOLE BLOOD HOLD COAG: NORMAL
WHOLE BLOOD HOLD SPECIMEN: NORMAL
YEAST URNS QL MICRO: ABNORMAL /HPF

## 2025-03-23 PROCEDURE — 25810000003 SODIUM CHLORIDE 0.9 % SOLUTION: Performed by: PHYSICIAN ASSISTANT

## 2025-03-23 PROCEDURE — 83690 ASSAY OF LIPASE: CPT | Performed by: PHYSICIAN ASSISTANT

## 2025-03-23 PROCEDURE — 81001 URINALYSIS AUTO W/SCOPE: CPT | Performed by: PHYSICIAN ASSISTANT

## 2025-03-23 PROCEDURE — 99285 EMERGENCY DEPT VISIT HI MDM: CPT

## 2025-03-23 PROCEDURE — 96375 TX/PRO/DX INJ NEW DRUG ADDON: CPT

## 2025-03-23 PROCEDURE — 85025 COMPLETE CBC W/AUTO DIFF WBC: CPT | Performed by: PHYSICIAN ASSISTANT

## 2025-03-23 PROCEDURE — 96374 THER/PROPH/DIAG INJ IV PUSH: CPT

## 2025-03-23 PROCEDURE — 80053 COMPREHEN METABOLIC PANEL: CPT | Performed by: PHYSICIAN ASSISTANT

## 2025-03-23 PROCEDURE — 74177 CT ABD & PELVIS W/CONTRAST: CPT

## 2025-03-23 PROCEDURE — 25010000002 ONDANSETRON PER 1 MG: Performed by: PHYSICIAN ASSISTANT

## 2025-03-23 PROCEDURE — 25510000001 IOPAMIDOL 61 % SOLUTION: Performed by: EMERGENCY MEDICINE

## 2025-03-23 RX ORDER — FLUCONAZOLE 150 MG/1
150 TABLET ORAL ONCE
Status: COMPLETED | OUTPATIENT
Start: 2025-03-23 | End: 2025-03-23

## 2025-03-23 RX ORDER — ONDANSETRON 2 MG/ML
4 INJECTION INTRAMUSCULAR; INTRAVENOUS ONCE
Status: COMPLETED | OUTPATIENT
Start: 2025-03-23 | End: 2025-03-23

## 2025-03-23 RX ORDER — IOPAMIDOL 612 MG/ML
100 INJECTION, SOLUTION INTRAVASCULAR
Status: COMPLETED | OUTPATIENT
Start: 2025-03-23 | End: 2025-03-23

## 2025-03-23 RX ORDER — SODIUM CHLORIDE 0.9 % (FLUSH) 0.9 %
10 SYRINGE (ML) INJECTION AS NEEDED
Status: DISCONTINUED | OUTPATIENT
Start: 2025-03-23 | End: 2025-03-23 | Stop reason: HOSPADM

## 2025-03-23 RX ORDER — FAMOTIDINE 10 MG/ML
20 INJECTION, SOLUTION INTRAVENOUS ONCE
Status: COMPLETED | OUTPATIENT
Start: 2025-03-23 | End: 2025-03-23

## 2025-03-23 RX ADMIN — FLUCONAZOLE 150 MG: 150 TABLET ORAL at 14:30

## 2025-03-23 RX ADMIN — ONDANSETRON 4 MG: 2 INJECTION INTRAMUSCULAR; INTRAVENOUS at 12:38

## 2025-03-23 RX ADMIN — FAMOTIDINE 20 MG: 10 INJECTION, SOLUTION INTRAVENOUS at 12:37

## 2025-03-23 RX ADMIN — SODIUM CHLORIDE 1000 ML: 9 INJECTION, SOLUTION INTRAVENOUS at 12:35

## 2025-03-23 RX ADMIN — IOPAMIDOL 80 ML: 612 INJECTION, SOLUTION INTRAVENOUS at 13:29

## 2025-03-23 NOTE — ED PROVIDER NOTES
Subjective   History of Present Illness  Pt is a 55 yo female presenting to ED by EMS with complaints of abdominal pain. PMHx significant for DM, Hypothyroidism, Anxiety and Depression. Pt complains of abdominal pain and vomiting for 2 weeks. Pt admitted to [facility] 3[facility]19 for UTI / vomiting and given Fosfomycin. Pt has used Zofran at home with some relief. She denies diarrhea, UTI, fever, CP, SOB or cough. Pt denies prior abdominal surgical. Denies tobacco or ETOH use.     History provided by:  Patient, medical records and EMS personnel      Review of Systems   Constitutional:  Negative for chills and fever.   HENT:  Negative for congestion and trouble swallowing.    Eyes:  Negative for visual disturbance.   Respiratory:  Negative for cough and shortness of breath.    Cardiovascular:  Negative for chest pain and leg swelling.   Gastrointestinal:  Positive for abdominal pain, nausea and vomiting. Negative for constipation and diarrhea.   Genitourinary:  Negative for difficulty urinating, dysuria, flank pain and hematuria.   Musculoskeletal:  Negative for arthralgias and back pain.   Skin:  Negative for rash and wound.   Neurological:  Negative for dizziness, syncope, weakness, numbness and headaches.   Psychiatric/Behavioral:  Negative for confusion.    All other systems reviewed and are negative.      Past Medical History:   Diagnosis Date    Anxiety     Chicken pox     Depression     Diabetes mellitus     Disease of thyroid gland     Measles     Type 2 diabetes mellitus        Allergies   Allergen Reactions    Doxepin Rash    Januvia [Sitagliptin] Other (See Comments)     Insomnia       Past Surgical History:   Procedure Laterality Date    EYE SURGERY      TONSILLECTOMY         Family History   Problem Relation Age of Onset    Hypertension Other     Thyroid disease Other     Diabetes Other     Obesity Other     Diabetes Mother     Hypertension Mother     Heart disease Mother     Cancer Father     Diabetes Sister      Diabetes Brother     No Known Problems Brother        Social History     Socioeconomic History    Marital status: Single   Tobacco Use    Smoking status: Never    Smokeless tobacco: Never   Vaping Use    Vaping status: Never Used   Substance and Sexual Activity    Alcohol use: No    Drug use: No    Sexual activity: Not Currently           Objective   Physical Exam  Vitals and nursing note reviewed.   Constitutional:       General: She is not in acute distress.     Appearance: She is well-developed. She is obese.   HENT:      Head: Atraumatic.      Nose: Nose normal.   Eyes:      General: Lids are normal.      Conjunctiva/sclera: Conjunctivae normal.      Pupils: Pupils are equal, round, and reactive to light.   Cardiovascular:      Rate and Rhythm: Normal rate and regular rhythm.      Heart sounds: Normal heart sounds.   Pulmonary:      Effort: Pulmonary effort is normal.      Breath sounds: Normal breath sounds. No wheezing.   Abdominal:      General: There is no distension.      Palpations: Abdomen is soft.      Tenderness: There is generalized abdominal tenderness. There is no guarding or rebound.   Musculoskeletal:         General: No tenderness. Normal range of motion.      Cervical back: Normal range of motion and neck supple.   Skin:     General: Skin is warm and dry.      Findings: No erythema or rash.   Neurological:      Mental Status: She is alert and oriented to person, place, and time.      Sensory: No sensory deficit.   Psychiatric:         Speech: Speech normal.         Behavior: Behavior normal.         Procedures           ED Course  ED Course as of 03/23/25 1911   Sun Mar 23, 2025   1140  I personally reviewed and interpreted labs results and went over with patient. [RT]   1234 Creatinine(!): 1.36  Hx CKD [RT]   1234 WBC(!): 12.02 [RT]   1234 Glucose(!): 169 [RT]   1414 I personally and independently reviewed CT abd/pelvis and agree with the radiology interpretation specifically no acute  findings.  [RT]   1415 Leukocytes, UA(!): Trace [RT]   1415 WBC, UA(!): 6-10 [RT]   1415 Squamous Epithelial Cells, UA(!): 13-20 [RT]   1415 Yeast(!): Small/1+ Budding Yeast  Ordered Diflucan [RT]   1420 Discussed results and tx plan. Pt has Zofran at home.  [RT]      ED Course User Index  [RT] Jazmin Austin, PA        Recent Results (from the past 24 hours)   Comprehensive Metabolic Panel    Collection Time: 03/23/25 11:54 AM    Specimen: Blood   Result Value Ref Range    Glucose 169 (H) 65 - 99 mg/dL    BUN 9 6 - 20 mg/dL    Creatinine 1.36 (H) 0.57 - 1.00 mg/dL    Sodium 141 136 - 145 mmol/L    Potassium 4.1 3.5 - 5.2 mmol/L    Chloride 101 98 - 107 mmol/L    CO2 26.0 22.0 - 29.0 mmol/L    Calcium 10.2 8.6 - 10.5 mg/dL    Total Protein 7.9 6.0 - 8.5 g/dL    Albumin 4.7 3.5 - 5.2 g/dL    ALT (SGPT) 20 1 - 33 U/L    AST (SGOT) 19 1 - 32 U/L    Alkaline Phosphatase 137 (H) 39 - 117 U/L    Total Bilirubin 0.8 0.0 - 1.2 mg/dL    Globulin 3.2 gm/dL    A/G Ratio 1.5 g/dL    BUN/Creatinine Ratio 6.6 (L) 7.0 - 25.0    Anion Gap 14.0 5.0 - 15.0 mmol/L    eGFR 46.4 (L) >60.0 mL/min/1.73   Lipase    Collection Time: 03/23/25 11:54 AM    Specimen: Blood   Result Value Ref Range    Lipase 21 13 - 60 U/L   CBC Auto Differential    Collection Time: 03/23/25 11:54 AM    Specimen: Blood   Result Value Ref Range    WBC 12.02 (H) 3.40 - 10.80 10*3/mm3    RBC 5.20 3.77 - 5.28 10*6/mm3    Hemoglobin 15.2 12.0 - 15.9 g/dL    Hematocrit 45.2 34.0 - 46.6 %    MCV 86.9 79.0 - 97.0 fL    MCH 29.2 26.6 - 33.0 pg    MCHC 33.6 31.5 - 35.7 g/dL    RDW 13.5 12.3 - 15.4 %    RDW-SD 42.9 37.0 - 54.0 fl    MPV 10.6 6.0 - 12.0 fL    Platelets 346 140 - 450 10*3/mm3    Neutrophil % 81.4 (H) 42.7 - 76.0 %    Lymphocyte % 12.2 (L) 19.6 - 45.3 %    Monocyte % 4.1 (L) 5.0 - 12.0 %    Eosinophil % 1.5 0.3 - 6.2 %    Basophil % 0.4 0.0 - 1.5 %    Immature Grans % 0.4 0.0 - 0.5 %    Neutrophils, Absolute 9.78 (H) 1.70 - 7.00 10*3/mm3    Lymphocytes,  Absolute 1.47 0.70 - 3.10 10*3/mm3    Monocytes, Absolute 0.49 0.10 - 0.90 10*3/mm3    Eosinophils, Absolute 0.18 0.00 - 0.40 10*3/mm3    Basophils, Absolute 0.05 0.00 - 0.20 10*3/mm3    Immature Grans, Absolute 0.05 0.00 - 0.05 10*3/mm3    nRBC 0.0 0.0 - 0.2 /100 WBC   Green Top (Gel)    Collection Time: 03/23/25 11:54 AM   Result Value Ref Range    Extra Tube Hold for add-ons.    Lavender Top    Collection Time: 03/23/25 11:54 AM   Result Value Ref Range    Extra Tube hold for add-on    Gold Top - SST    Collection Time: 03/23/25 11:54 AM   Result Value Ref Range    Extra Tube Hold for add-ons.    Gray Top    Collection Time: 03/23/25 11:54 AM   Result Value Ref Range    Extra Tube Hold for add-ons.    Light Blue Top    Collection Time: 03/23/25 11:54 AM   Result Value Ref Range    Extra Tube Hold for add-ons.    Urinalysis With Microscopic If Indicated (No Culture) - Urine, Clean Catch    Collection Time: 03/23/25  1:39 PM    Specimen: Urine, Clean Catch   Result Value Ref Range    Color, UA Yellow Yellow, Straw    Appearance, UA Clear Clear    pH, UA 5.5 5.0 - 8.0    Specific Gravity, UA 1.027 1.001 - 1.030    Glucose, UA Negative Negative    Ketones, UA Trace (A) Negative    Bilirubin, UA Negative Negative    Blood, UA Negative Negative    Protein, UA Trace (A) Negative    Leuk Esterase, UA Trace (A) Negative    Nitrite, UA Negative Negative    Urobilinogen, UA 0.2 E.U./dL 0.2 - 1.0 E.U./dL   Urinalysis, Microscopic Only - Urine, Clean Catch    Collection Time: 03/23/25  1:39 PM    Specimen: Urine, Clean Catch   Result Value Ref Range    RBC, UA 0-2 None Seen, 0-2 /HPF    WBC, UA 6-10 (A) None Seen, 0-2 /HPF    Bacteria, UA Trace None Seen, Trace /HPF    Squamous Epithelial Cells, UA 13-20 (A) None Seen, 0-2 /HPF    Yeast, UA Small/1+ Budding Yeast (A) None Seen /HPF    Hyaline Casts, UA 0-6 0 - 6 /LPF    Mucus, UA Small/1+ (A) None Seen, Trace /HPF    Methodology Manual Light Microscopy      Note: In addition  "to lab results from this visit, the labs listed above may include labs taken at another facility or during a different encounter within the last 24 hours. Please correlate lab times with ED admission and discharge times for further clarification of the services performed during this visit.    CT Abdomen Pelvis With Contrast   Final Result   Impression:   1.No acute abnormality identified within the abdomen or pelvis.   2.Colonic diverticulosis.   3.Additional findings as detailed above.         Electronically Signed: Kilo Hernández MD     3/23/2025 1:35 PM EDT     Workstation ID: [id number]        Vitals:    03/23/25 1124   BP: 122/86   BP Location: Left arm   Patient Position: Sitting   Pulse: 102   Resp: 18   Temp: 98 °F (36.7 °C)   TempSrc: Oral   SpO2: 97%   Weight: 105 kg (232 lb)   Height: 162.6 cm (64\")     Medications   sodium chloride 0.9 % bolus 1,000 mL (0 mL Intravenous Stopped 3/23/25 1422)   ondansetron (ZOFRAN) injection 4 mg (4 mg Intravenous Given 3/23/25 1238)   famotidine (PEPCID) injection 20 mg (20 mg Intravenous Given 3/23/25 1237)   iopamidol (ISOVUE-300) 61 % injection 100 mL (80 mL Intravenous Given 3/23/25 1329)   fluconazole (DIFLUCAN) tablet 150 mg (150 mg Oral Given 3/23/25 1430)     ECG/EMG Results (last 24 hours)       ** No results found for the last 24 hours. **          No orders to display                                                      Medical Decision Making  Pt is a 53 yo female presenting to ED with complaints of abdominal pain and vomiting.  I had a discussion with the patient / family regarding ED course, diagnosis, diagnostic results and treatment plan including medications and admission / discharge. Discussed if new or worse symptoms / concerns to return to ED for further evaluation. Discussed need for close follow up with PCP / specialists.     DDx  Pancreatitis, Cholecystitis, UTI, Kidney stone, Sepsis, PUD, Diverticulitis     Problems Addressed:  Chronic kidney " disease, unspecified CKD stage: complicated acute illness or injury  Generalized abdominal pain: complicated acute illness or injury  Nausea: complicated acute illness or injury  Yeast vaginitis: complicated acute illness or injury    Amount and/or Complexity of Data Reviewed  External Data Reviewed: notes.     Details: Reviewed previous non ED visits including prior labs, imaging, available notes, medications, allergies and surgical hx.   Admission 3-19-25 UTI / vomiting   Labs: ordered. Decision-making details documented in ED Course.  Radiology: ordered and independent interpretation performed. Decision-making details documented in ED Course.    Risk  Prescription drug management.        Final diagnoses:   Generalized abdominal pain   Nausea   Yeast vaginitis   Chronic kidney disease, unspecified CKD stage       ED Disposition  ED Disposition       ED Disposition   Discharge    Condition   Stable    Comment   --               Vanessa Kaur MD  [address] [facility]  [address]  [place] [address]  [phone]    Schedule an appointment as soon as possible for a visit       [facility] EMERGENCY DEPARTMENT  [address] [place] [id number] [phone]    If symptoms worsen         Medication List      No changes were made to your prescriptions during this visit.            Jazmin Austin PA  03/23/25 8927

## 2025-03-25 LAB
QT INTERVAL: 386 MS
QTC INTERVAL: 448 MS

## 2025-03-26 RX ORDER — FLUTICASONE FUROATE, UMECLIDINIUM BROMIDE AND VILANTEROL TRIFENATATE 100; 62.5; 25 UG/1; UG/1; UG/1
1 POWDER RESPIRATORY (INHALATION) DAILY
Qty: 180 EACH | Refills: 0 | Status: SHIPPED | OUTPATIENT
Start: 2025-03-26

## 2025-03-28 DIAGNOSIS — Z79.4 TYPE 2 DIABETES MELLITUS WITHOUT COMPLICATION, WITH LONG-TERM CURRENT USE OF INSULIN: ICD-10-CM

## 2025-03-28 DIAGNOSIS — E11.9 TYPE 2 DIABETES MELLITUS WITHOUT COMPLICATION, WITH LONG-TERM CURRENT USE OF INSULIN: ICD-10-CM

## 2025-03-28 RX ORDER — INSULIN GLARGINE 100 [IU]/ML
30 INJECTION, SOLUTION SUBCUTANEOUS NIGHTLY
Qty: 15 ML | Refills: 3 | Status: SHIPPED | OUTPATIENT
Start: 2025-03-28

## 2025-03-28 NOTE — TELEPHONE ENCOUNTER
Caller: Vilma Ayala    Relationship: Self    Best call back number:  [phone] (Mobile)     Requested Prescriptions:   Requested Prescriptions     Pending Prescriptions Disp Refills    Insulin Glargine (Lantus SoloStar) 100 UNIT/ML injection pen 15 mL 3     Sig: Inject 30 Units under the skin into the appropriate area as directed Every Night.      Pharmacy where request should be sent: [address] [phone] [facility] [phone]      Last office visit with prescribing clinician: 2/18/2025   Last telemedicine visit with prescribing clinician: Visit date not found   Next office visit with prescribing clinician: 4/1/2025     Additional details provided by patient:  PATIENT IS OUT OF THE MEDICATION     Does the patient have less than a 3 day supply:  [x] Yes  [] No    Would you like a call back once the refill request has been completed: [] Yes [x] No    If the office needs to give you a call back, can they leave a voicemail: [] Yes [x] No

## 2025-03-28 NOTE — TELEPHONE ENCOUNTER
Rx Refill Note  Requested Prescriptions     Pending Prescriptions Disp Refills    Insulin Glargine (Lantus SoloStar) 100 UNIT/ML injection pen 15 mL 3     Sig: Inject 30 Units under the skin into the appropriate area as directed Every Night.      Last office visit with prescribing clinician: 2/18/2025   Last telemedicine visit with prescribing clinician: Visit date not found   Next office visit with prescribing clinician: 4/1/2025     Marnie Portillo MA  03/28/25, 16:45 EDT

## 2025-04-01 ENCOUNTER — OFFICE VISIT (OUTPATIENT)
Dept: INTERNAL MEDICINE | Facility: CLINIC | Age: 55
End: 2025-04-01
Payer: MEDICARE

## 2025-04-01 ENCOUNTER — READMISSION MANAGEMENT (OUTPATIENT)
Dept: CALL CENTER | Facility: HOSPITAL | Age: 55
End: 2025-04-01
Payer: MEDICARE

## 2025-04-01 VITALS
SYSTOLIC BLOOD PRESSURE: 102 MMHG | BODY MASS INDEX: 36.57 KG/M2 | OXYGEN SATURATION: 98 % | HEART RATE: 86 BPM | HEIGHT: 64 IN | DIASTOLIC BLOOD PRESSURE: 76 MMHG | TEMPERATURE: 98.7 F | WEIGHT: 214.2 LBS

## 2025-04-01 DIAGNOSIS — R11.0 NAUSEA: ICD-10-CM

## 2025-04-01 DIAGNOSIS — R30.9 PAINFUL URINATION: Primary | ICD-10-CM

## 2025-04-01 LAB
BILIRUB BLD-MCNC: NEGATIVE MG/DL
BILIRUB UR QL STRIP: NEGATIVE
CLARITY UR: ABNORMAL
CLARITY, POC: CLEAR
COLOR UR: YELLOW
COLOR UR: YELLOW
EXPIRATION DATE: ABNORMAL
GLUCOSE UR STRIP-MCNC: NEGATIVE MG/DL
GLUCOSE UR STRIP-MCNC: NEGATIVE MG/DL
HGB UR QL STRIP.AUTO: NEGATIVE
KETONES UR QL STRIP: ABNORMAL
KETONES UR QL: NEGATIVE
LEUKOCYTE EST, POC: NEGATIVE
LEUKOCYTE ESTERASE UR QL STRIP.AUTO: ABNORMAL
Lab: ABNORMAL
NITRITE UR QL STRIP: NEGATIVE
NITRITE UR-MCNC: NEGATIVE MG/ML
PH UR STRIP.AUTO: 5.5 [PH] (ref 5–8)
PH UR: 6 [PH] (ref 5–8)
PROT UR QL STRIP: ABNORMAL
PROT UR STRIP-MCNC: ABNORMAL MG/DL
RBC # UR STRIP: NEGATIVE /UL
SP GR UR STRIP: 1.02 (ref 1–1.03)
SP GR UR: 1.02 (ref 1–1.03)
UROBILINOGEN UR QL STRIP: ABNORMAL
UROBILINOGEN UR QL: NORMAL

## 2025-04-01 PROCEDURE — 81001 URINALYSIS AUTO W/SCOPE: CPT | Performed by: STUDENT IN AN ORGANIZED HEALTH CARE EDUCATION/TRAINING PROGRAM

## 2025-04-01 PROCEDURE — 87086 URINE CULTURE/COLONY COUNT: CPT | Performed by: STUDENT IN AN ORGANIZED HEALTH CARE EDUCATION/TRAINING PROGRAM

## 2025-04-01 NOTE — ASSESSMENT & PLAN NOTE
Ms Jamie continues to endorse burning with urinary but denies other urinary symptoms. She was recently treated with fosfomycin x2 and Macrobid but does not think she took the course of Macrobid. I have reviewed her last to emergency room visits which include review of labs, notes and imaging. She had blood cultures that returned negative. She had a CT Of her belly with contract that did not show abscess, hydronephrosis, stones, etc. Her urine dip is positive for trace protein today. I will send urine for culture before additional antibiotics are prescribed, if indicated.

## 2025-04-01 NOTE — PROGRESS NOTES
Office Note     Name: Vilma Ayala    : 1970     MRN: [id number]     Chief Complaint  Nausea (Felt like she was going to throw up a few weeks ago but didn't. Was told she had a bladder infection ) and Difficulty Urinating    Subjective     History of Present Illness:  Vilma Ayala is a 54 y.o. female who presents today for     Had an episode of nausea this morning but did not vomit.   Still having diarrhea. 2-3 episodes a day. It started several weeks ago.   Burping makes her feel better.   Burning with urination. Feels like it did not improve with Macrobid. Was really fatigued.  Nausea and belching started after initiation of Mounjaro but she hasn't taken it in 2-3 weeks  No vaginal itching or discharge     Review of Systems:   Review of Systems    Past Medical History:   Past Medical History:   Diagnosis Date    Anxiety     Chicken pox     Depression     Diabetes mellitus     Disease of thyroid gland     Measles     Type 2 diabetes mellitus        Past Surgical History:   Past Surgical History:   Procedure Laterality Date    EYE SURGERY      TONSILLECTOMY         Family History:   Family History   Problem Relation Age of Onset    Hypertension Other     Thyroid disease Other     Diabetes Other     Obesity Other     Diabetes Mother     Hypertension Mother     Heart disease Mother     Cancer Father     Diabetes Sister     Diabetes Brother     No Known Problems Brother        Social History:   Social History     Socioeconomic History    Marital status: Single   Tobacco Use    Smoking status: Never    Smokeless tobacco: Never   Vaping Use    Vaping status: Never Used   Substance and Sexual Activity    Alcohol use: No    Drug use: No    Sexual activity: Not Currently       Immunizations:   Immunization History   Administered Date(s) Administered    COVID-19 (MODERNA) 12YRS+ (SPIKEVAX) 10/16/2023, 2025    COVID-19 (MODERNA) 1st,2nd,3rd Dose Monovalent 2021, 2021, 2022     COVID-19 (MODERNA) BIVALENT 12+YRS 10/26/2022    Flu Vaccine Quad PF >36MO 10/04/2017, 12/29/2021    Fluzone  >6mos 09/13/2024    Fluzone (or Fluarix & Flulaval for VFC) >6mos 10/04/2017, 12/29/2021, 10/26/2022, 11/14/2023    Fluzone Quad >6mos (Multi-dose) 10/04/2015    Hep A / Hep B 02/16/2024, 01/21/2025    Hepatitis A 05/09/2018, 05/09/2018, 10/04/2018, 01/10/2020, 01/10/2020    Influenza recombinant 01/21/2025    Influenza, Unspecified 10/04/2018, 10/26/2022    Pneumococcal Conjugate 20-Valent (PCV20) 09/11/2023    Pneumococcal Polysaccharide (PPSV23) 05/28/2016, 05/28/2016    Shingrix 12/29/2021, 09/11/2023, 02/16/2024    Tdap 05/24/2024    flucelvax quad pfs =>4 YRS 10/04/2018        Medications:     Current Outpatient Medications:     Accu-Chek Softclix Lancets lancets, USE 1  TO CHECK GLUCOSE 4 times a day DAILY dx e11.65 on insulin, Disp: 400 each, Rfl: 3    ammonium lactate (Lac-Hydrin) 12 % lotion, Apply BID to feet, Disp: 225 g, Rfl: 3    atorvastatin (LIPITOR) 40 MG tablet, Take 1 tablet by mouth Daily., Disp: , Rfl:     B-D UF III MINI PEN NEEDLES 31G X 5 MM misc, Inject 1 Units under the skin into the appropriate area as directed Daily., Disp: 100 each, Rfl: 1    benztropine (COGENTIN) 0.5 MG tablet, Take 1 tablet by mouth As Needed., Disp: , Rfl:     Blood Glucose Monitoring Suppl w/Device kit, Use daily to test blood sugars uses accu chek, Disp: 1 each, Rfl: 0    busPIRone (BUSPAR) 15 MG tablet, Take 1 tablet by mouth Every 12 (Twelve) Hours., Disp: , Rfl:     Cholecalciferol (VITAMIN D3 PO), Take 1 tablet by mouth Daily., Disp: , Rfl:     citalopram (CeleXA) 40 MG tablet, Take 1 tablet by mouth Daily., Disp: 90 tablet, Rfl: 1    Continuous Glucose  (FreeStyle Sukhi 3 Wilmot) device, Use 1 Device 4 (Four) Times a Day., Disp: 1 each, Rfl: 3    Continuous Glucose Sensor (FreeStyle Sukhi 3 Plus Sensor), Use Every 14 (Fourteen) Days., Disp: 2 each, Rfl: 5    famotidine (PEPCID) 40 MG tablet,  "Take 1 tablet by mouth every night at bedtime., Disp: , Rfl:     glucose (DEX4) 4 GM chewable tablet, Chew 4 tablets As Needed for Low Blood Sugar., Disp: 20 tablet, Rfl: 12    glucose monitor monitoring kit, Use as directed, E11.9, Disp: 1 each, Rfl: 0    hydrOXYzine (ATARAX) 50 MG tablet, , Disp: , Rfl:     hydrOXYzine pamoate (VISTARIL) 50 MG capsule, Take 1 capsule by mouth 3 (Three) Times a Day As Needed for Itching., Disp: 90 capsule, Rfl: 0    ibuprofen (ADVIL,MOTRIN) 800 MG tablet, Take 1 tablet by mouth Every 8 (Eight) Hours As Needed for Mild Pain or Moderate Pain for up to 30 doses., Disp: 30 tablet, Rfl: 0    Insulin Glargine (Lantus SoloStar) 100 UNIT/ML injection pen, Inject 30 Units under the skin into the appropriate area as directed Every Night., Disp: 15 mL, Rfl: 3    Insulin Syringe-Needle U-100 31G X 15/64\" 0.5 ML misc, Use 2 per day to administer insulin, Disp: 200 each, Rfl: 0    Lancet Device misc, Use with glucometer up to QID as directed, E11.9, Z79.4, Disp: 400 each, Rfl: 1    levothyroxine (SYNTHROID, LEVOTHROID) 200 MCG tablet, Take 1 tablet by mouth Daily., Disp: 90 tablet, Rfl: 0    levothyroxine (SYNTHROID, LEVOTHROID) 25 MCG tablet, Take 1 tablet by mouth Every Morning., Disp: 90 tablet, Rfl: 0    Livalo 1 MG tablet tablet, , Disp: , Rfl:     LORazepam (ATIVAN) 0.5 MG tablet, Take 1 tablet by mouth 2 (Two) Times a Day As Needed. for anxiety, Disp: , Rfl:     metFORMIN (GLUCOPHAGE) 500 MG tablet, TAKE 2 TABLETS BY MOUTH ONCE DAILY WITH FOOD, Disp: 60 tablet, Rfl: 5    ondansetron (Zofran) 4 MG tablet, Take 1 tablet by mouth Daily As Needed for Nausea or Vomiting., Disp: 30 tablet, Rfl: 1    OneTouch Delica Lancets 33G misc, Use to test blood sugar twice daily and as needed for symptoms of low blood sugar, Disp: 200 each, Rfl: 1    OneTouch Verio test strip, Use to test blood sugar twice daily and as needed for symptoms of low blood sugar, Disp: 200 each, Rfl: 1    pantoprazole " "(Protonix) 40 MG EC tablet, Take 1 tablet by mouth Daily., Disp: 30 tablet, Rfl: 4    risperiDONE (risperDAL) 3 MG tablet, Take 1 tablet by mouth 2 (Two) Times a Day., Disp: , Rfl:     Trelegy Ellipta 100-62.5-25 MCG/ACT inhaler, INHALE 1 PUFF ONCE DAILY, Disp: 180 each, Rfl: 0    Ventolin  (90 Base) MCG/ACT inhaler, Inhale 2 puffs 4 (Four) Times a Day., Disp: 18 g, Rfl: 0    Allergies:   Allergies   Allergen Reactions    Doxepin Rash    Januvia [Sitagliptin] Other (See Comments)     Insomnia       Objective     Vital Signs  /76   Pulse 86   Temp 98.7 °F (37.1 °C) (Infrared)   Ht 162.6 cm (64.02\")   Wt 97.2 kg (214 lb 3.2 oz)   SpO2 98%   BMI 36.75 kg/m²   Estimated body mass index is 36.75 kg/m² as calculated from the following:    Height as of this encounter: 162.6 cm (64.02\").    Weight as of this encounter: 97.2 kg (214 lb 3.2 oz).           Physical Exam  Constitutional:       Appearance: Normal appearance.   HENT:      Head: Normocephalic and atraumatic.      Nose: Nose normal.   Eyes:      Conjunctiva/sclera: Conjunctivae normal.      Pupils: Pupils are equal, round, and reactive to light.   Cardiovascular:      Rate and Rhythm: Normal rate and regular rhythm.   Pulmonary:      Effort: Pulmonary effort is normal.   Abdominal:      General: Abdomen is flat.   Neurological:      General: No focal deficit present.      Mental Status: She is alert and oriented to person, place, and time.   Psychiatric:         Mood and Affect: Mood normal.         Behavior: Behavior normal.         Thought Content: Thought content normal.          Procedures     Results:  No results found for this or any previous visit (from the past 24 hours).     Assessment and Plan     Assessment/Plan:      Assessment & Plan  Painful urination  Ms Ayala continues to endorse burning with urinary but denies other urinary symptoms. She was recently treated with fosfomycin x2 and Macrobid but does not think she took the course of " Macrobid. I have reviewed her last to emergency room visits which include review of labs, notes and imaging. She had blood cultures that returned negative. She had a CT Of her belly with contract that did not show abscess, hydronephrosis, stones, etc. Her urine dip is positive for trace protein today. I will send urine for culture before additional antibiotics are prescribed, if indicated.   Nausea  Nausea without vomiting that started after initiation of GLP-1. She hasn't taken it in about 3 weeks though and nausea has persisted. She is also have belching and intermittent symptoms of reflux. Her symptoms are very nonspecific. She also endorses diarrhea that started around the same time as the nausea. We will treat with Pepto Bismol and Zofran. R/o urinary infection today. Follow-up in 2 weeks for symptomatic improvement.         Follow Up  No follow-ups on file.    Vanessa Kaur MD   [facility]

## 2025-04-01 NOTE — ASSESSMENT & PLAN NOTE
Nausea without vomiting that started after initiation of GLP-1. She hasn't taken it in about 3 weeks though and nausea has persisted. She is also have belching and intermittent symptoms of reflux. Her symptoms are very nonspecific. She also endorses diarrhea that started around the same time as the nausea. We will treat with Pepto Bismol and Zofran. R/o urinary infection today. Follow-up in 2 weeks for symptomatic improvement.

## 2025-04-01 NOTE — TELEPHONE ENCOUNTER
Caller: Vilma Ayala    Relationship: Self    Best call back number: [phone]     Requested Prescriptions:   Requested Prescriptions     Pending Prescriptions Disp Refills    ondansetron (Zofran) 4 MG tablet 30 tablet 1     Sig: Take 1 tablet by mouth Daily As Needed for Nausea or Vomiting.        Pharmacy where request should be sent: [address] [phone] [facility] [phone] [facility]     Last office visit with prescribing clinician: 4/1/2025   Last telemedicine visit with prescribing clinician: Visit date not found   Next office visit with prescribing clinician: 4/15/2025     Additional details provided by patient: PATIENT IS COMPLETELY OUT OF THIS MEDICATION.    Does the patient have less than a 3 day supply:  [x] Yes  [] No    Would you like a call back once the refill request has been completed: [] Yes [x] No    If the office needs to give you a call back, can they leave a voicemail: [] Yes [x] No    Elvia Gilliland Rep   04/01/25 12:44 EDT

## 2025-04-01 NOTE — OUTREACH NOTE
Medical Week 2 Survey      Flowsheet Row Responses   [facility] patient discharged from? [facility]   Does the patient have one of the following disease processes/diagnoses(primary or secondary)? Other   Week 2 attempt successful? Yes   Call start time 1555   Discharge diagnosis UTI (urinary tract infection)   Call end time 1558   Is the patient taking all medications as directed (includes completed medication regime)? Yes   Has the patient kept scheduled appointments due by today? Yes   Comments Pt reports that she is feeling poorly again and had to go to MD for evaluation today. Pt reports nausea and abdominal pain with dysuria, MD added medication to assist pt per her report.   What is the patient's perception of their health status since discharge? Worsening   Is the patient/caregiver able to teach back signs and symptoms related to disease process for when to call PCP? Yes   Is the patient/caregiver able to teach back signs and symptoms related to disease process for when to call 911? Yes   Week 2 Call Completed? Yes   Call end time 1558            Roopa LESTER - Registered Nurse

## 2025-04-01 NOTE — TELEPHONE ENCOUNTER
Rx Refill Note  Requested Prescriptions     Pending Prescriptions Disp Refills    ondansetron (Zofran) 4 MG tablet 30 tablet 1     Sig: Take 1 tablet by mouth Daily As Needed for Nausea or Vomiting.      Last office visit with prescribing clinician: 4/1/2025     Next office visit with prescribing clinician: 4/15/2025       Brenda Bautista  04/01/25, 13:36 EDT

## 2025-04-02 ENCOUNTER — RESULTS FOLLOW-UP (OUTPATIENT)
Dept: INTERNAL MEDICINE | Facility: CLINIC | Age: 55
End: 2025-04-02
Payer: MEDICARE

## 2025-04-02 LAB
BACTERIA UR QL AUTO: ABNORMAL /HPF
HYALINE CASTS UR QL AUTO: ABNORMAL /LPF
RBC # UR STRIP: ABNORMAL /HPF
REF LAB TEST METHOD: ABNORMAL
SQUAMOUS #/AREA URNS HPF: ABNORMAL /HPF
WBC # UR STRIP: ABNORMAL /HPF

## 2025-04-02 RX ORDER — SULFAMETHOXAZOLE AND TRIMETHOPRIM 800; 160 MG/1; MG/1
1 TABLET ORAL 2 TIMES DAILY
Qty: 6 TABLET | Refills: 0 | Status: SHIPPED | OUTPATIENT
Start: 2025-04-02 | End: 2025-04-05

## 2025-04-02 RX ORDER — ONDANSETRON 4 MG/1
4 TABLET, FILM COATED ORAL DAILY PRN
Qty: 30 TABLET | Refills: 1 | Status: SHIPPED | OUTPATIENT
Start: 2025-04-02 | End: 2026-04-02

## 2025-04-02 NOTE — TELEPHONE ENCOUNTER
Patient called again in regards the medication that she requested yesterday.  She would like for someone to please give her a call.  [phone]  Thank you!

## 2025-04-03 LAB — BACTERIA SPEC AEROBE CULT: NORMAL

## 2025-04-04 ENCOUNTER — TELEPHONE (OUTPATIENT)
Dept: INTERNAL MEDICINE | Facility: CLINIC | Age: 55
End: 2025-04-04
Payer: MEDICARE

## 2025-04-04 ENCOUNTER — TELEPHONE (OUTPATIENT)
Dept: INTERNAL MEDICINE | Facility: CLINIC | Age: 55
End: 2025-04-04

## 2025-04-04 NOTE — TELEPHONE ENCOUNTER
Caller: Vilma Ayala    Relationship: Self    Best call back number: [phone]     What medication are you requesting: BACTRIM    What are your current symptoms: E.COLI    How long have you been experiencing symptoms: COUPLE OF DAYS    Have you had these symptoms before:    [x] Yes  [] No    Have you been treated for these symptoms before:   [x] Yes  [] No    If a prescription is needed, what is your preferred pharmacy and phone number: [address] [phone] [facility] [phone]      Additional notes: PATIENT STATES THAT DR GEE WAS SUPPOSED TO HAVE SENT THIS IN.

## 2025-04-04 NOTE — TELEPHONE ENCOUNTER
Caller: Vilma Ayala    Relationship: Self    Best call back number: [phone]     Which medication are you concerned about: sulfamethoxazole-trimethoprim (Bactrim DS) 800-160 MG per tablet     What are your concerns: PATIENT STATES THIS MEDICATION IS NOT WORKING FOR HER AND SHE WOULD LIKE TO KNOW IF THERE IS SOMETHING ELSE SHE CAN BE PRESCRIBED INSTEAD.

## 2025-04-04 NOTE — TELEPHONE ENCOUNTER
Patient states she does not have any UTI symptoms.  She has nausea and I advise she has nausea medication.  I advised to eat a light diet and try to stay hydrated.  If she gets worse I advised her to be seen over the weekend by [facility].  Patient verbalized understanding.

## 2025-04-08 NOTE — TELEPHONE ENCOUNTER
Left message with Jorydeepika Alcaraz, sister asking if she can move up appointment for Vilma to this week because she states she is not getting better.

## 2025-04-09 ENCOUNTER — LAB (OUTPATIENT)
Dept: INTERNAL MEDICINE | Facility: CLINIC | Age: 55
End: 2025-04-09
Payer: MEDICARE

## 2025-04-09 ENCOUNTER — OFFICE VISIT (OUTPATIENT)
Dept: INTERNAL MEDICINE | Facility: CLINIC | Age: 55
End: 2025-04-09
Payer: MEDICARE

## 2025-04-09 VITALS
TEMPERATURE: 98.4 F | HEART RATE: 116 BPM | SYSTOLIC BLOOD PRESSURE: 118 MMHG | HEIGHT: 64 IN | OXYGEN SATURATION: 100 % | BODY MASS INDEX: 35.13 KG/M2 | DIASTOLIC BLOOD PRESSURE: 74 MMHG | WEIGHT: 205.8 LBS

## 2025-04-09 DIAGNOSIS — D72.829 LEUKOCYTOSIS, UNSPECIFIED TYPE: Primary | ICD-10-CM

## 2025-04-09 DIAGNOSIS — R11.0 NAUSEA: Primary | ICD-10-CM

## 2025-04-09 DIAGNOSIS — D72.829 LEUKOCYTOSIS, UNSPECIFIED TYPE: ICD-10-CM

## 2025-04-09 DIAGNOSIS — E03.9 ACQUIRED HYPOTHYROIDISM: ICD-10-CM

## 2025-04-09 DIAGNOSIS — N30.00 ACUTE CYSTITIS WITHOUT HEMATURIA: ICD-10-CM

## 2025-04-09 DIAGNOSIS — R11.0 NAUSEA: ICD-10-CM

## 2025-04-09 DIAGNOSIS — E11.9 TYPE 2 DIABETES MELLITUS WITHOUT COMPLICATION, WITH LONG-TERM CURRENT USE OF INSULIN: ICD-10-CM

## 2025-04-09 DIAGNOSIS — Z79.4 TYPE 2 DIABETES MELLITUS WITHOUT COMPLICATION, WITH LONG-TERM CURRENT USE OF INSULIN: ICD-10-CM

## 2025-04-09 DIAGNOSIS — E78.2 MIXED HYPERLIPIDEMIA: ICD-10-CM

## 2025-04-09 LAB
ALBUMIN SERPL-MCNC: 4.4 G/DL (ref 3.5–5.2)
ALBUMIN/GLOB SERPL: 1.3 G/DL
ALP SERPL-CCNC: 148 U/L (ref 39–117)
ALT SERPL W P-5'-P-CCNC: 37 U/L (ref 1–33)
ANION GAP SERPL CALCULATED.3IONS-SCNC: 12.8 MMOL/L (ref 5–15)
AST SERPL-CCNC: 32 U/L (ref 1–32)
BASOPHILS # BLD MANUAL: 0 10*3/MM3 (ref 0–0.2)
BASOPHILS NFR BLD MANUAL: 0 % (ref 0–1.5)
BILIRUB SERPL-MCNC: 0.9 MG/DL (ref 0–1.2)
BUN SERPL-MCNC: 9 MG/DL (ref 6–20)
BUN/CREAT SERPL: 4.8 (ref 7–25)
CALCIUM SPEC-SCNC: 10.2 MG/DL (ref 8.6–10.5)
CHLORIDE SERPL-SCNC: 99 MMOL/L (ref 98–107)
CHOLEST SERPL-MCNC: 113 MG/DL (ref 0–200)
CO2 SERPL-SCNC: 24.2 MMOL/L (ref 22–29)
CREAT SERPL-MCNC: 1.88 MG/DL (ref 0.57–1)
DEPRECATED RDW RBC AUTO: 41.6 FL (ref 37–54)
EGFRCR SERPLBLD CKD-EPI 2021: 31.5 ML/MIN/1.73
EOSINOPHIL # BLD MANUAL: 0.36 10*3/MM3 (ref 0–0.4)
EOSINOPHIL NFR BLD MANUAL: 2 % (ref 0.3–6.2)
ERYTHROCYTE [DISTWIDTH] IN BLOOD BY AUTOMATED COUNT: 13.5 % (ref 12.3–15.4)
GLOBULIN UR ELPH-MCNC: 3.3 GM/DL
GLUCOSE SERPL-MCNC: 116 MG/DL (ref 65–99)
HCT VFR BLD AUTO: 44.4 % (ref 34–46.6)
HDLC SERPL-MCNC: 37 MG/DL (ref 40–60)
HGB BLD-MCNC: 15.6 G/DL (ref 12–15.9)
LDLC SERPL CALC-MCNC: 55 MG/DL (ref 0–100)
LDLC/HDLC SERPL: 1.45 {RATIO}
LIPASE SERPL-CCNC: 21 U/L (ref 13–60)
LYMPHOCYTES # BLD MANUAL: 3.45 10*3/MM3 (ref 0.7–3.1)
LYMPHOCYTES NFR BLD MANUAL: 3 % (ref 5–12)
MCH RBC QN AUTO: 30.2 PG (ref 26.6–33)
MCHC RBC AUTO-ENTMCNC: 35.1 G/DL (ref 31.5–35.7)
MCV RBC AUTO: 86 FL (ref 79–97)
MONOCYTES # BLD: 0.54 10*3/MM3 (ref 0.1–0.9)
NEUTROPHILS # BLD AUTO: 13.79 10*3/MM3 (ref 1.7–7)
NEUTROPHILS NFR BLD MANUAL: 76 % (ref 42.7–76)
PLAT MORPH BLD: NORMAL
PLATELET # BLD AUTO: 373 10*3/MM3 (ref 140–450)
PMV BLD AUTO: 11.2 FL (ref 6–12)
POTASSIUM SERPL-SCNC: 4 MMOL/L (ref 3.5–5.2)
PROT SERPL-MCNC: 7.7 G/DL (ref 6–8.5)
RBC # BLD AUTO: 5.16 10*6/MM3 (ref 3.77–5.28)
RBC MORPH BLD: NORMAL
SODIUM SERPL-SCNC: 136 MMOL/L (ref 136–145)
T4 FREE SERPL-MCNC: 1.42 NG/DL (ref 0.92–1.68)
TRIGL SERPL-MCNC: 112 MG/DL (ref 0–150)
TSH SERPL DL<=0.05 MIU/L-ACNC: 1.69 UIU/ML (ref 0.27–4.2)
VARIANT LYMPHS NFR BLD MANUAL: 1 % (ref 0–5)
VARIANT LYMPHS NFR BLD MANUAL: 18 % (ref 19.6–45.3)
VLDLC SERPL-MCNC: 21 MG/DL (ref 5–40)
WBC MORPH BLD: NORMAL
WBC NRBC COR # BLD AUTO: 18.15 10*3/MM3 (ref 3.4–10.8)

## 2025-04-09 PROCEDURE — 85027 COMPLETE CBC AUTOMATED: CPT | Performed by: STUDENT IN AN ORGANIZED HEALTH CARE EDUCATION/TRAINING PROGRAM

## 2025-04-09 PROCEDURE — 84443 ASSAY THYROID STIM HORMONE: CPT | Performed by: INTERNAL MEDICINE

## 2025-04-09 PROCEDURE — 80053 COMPREHEN METABOLIC PANEL: CPT | Performed by: INTERNAL MEDICINE

## 2025-04-09 PROCEDURE — 80061 LIPID PANEL: CPT | Performed by: INTERNAL MEDICINE

## 2025-04-09 PROCEDURE — 83690 ASSAY OF LIPASE: CPT | Performed by: INTERNAL MEDICINE

## 2025-04-09 PROCEDURE — 85007 BL SMEAR W/DIFF WBC COUNT: CPT | Performed by: STUDENT IN AN ORGANIZED HEALTH CARE EDUCATION/TRAINING PROGRAM

## 2025-04-09 PROCEDURE — 84439 ASSAY OF FREE THYROXINE: CPT | Performed by: INTERNAL MEDICINE

## 2025-04-09 RX ORDER — PROMETHAZINE HYDROCHLORIDE 12.5 MG/1
12.5 TABLET ORAL EVERY 6 HOURS PRN
Qty: 30 TABLET | Refills: 2 | Status: ON HOLD | OUTPATIENT
Start: 2025-04-09

## 2025-04-09 NOTE — ASSESSMENT & PLAN NOTE
Present for 3-4 weeks. Worse at night and with laying down. Has not vomited. Denies shaking chills; sometimes sweats but thinks its from her blanket. She denies cough, shortness of breath, diarrhea, abdominal pain or constipation. BM are regular. She is not taking Mounjaro. Zofran not helping symptoms. Not clear what is causing nausea. She has been treated for a UTI x3 in the past month(zandra). Most recent urine culture initially growing E coli but culture changed to mixed amarilis after treatment was initiated (reportedly completed course of Bactrim). No longer having urinary symptoms. Promethazine for symptoms. Repeat labs today and obtain CT A/P. Return precautions discussed. I reviewed all recent ED labs including negative blood cultures, lipase, CMP and CBC.

## 2025-04-09 NOTE — PATIENT INSTRUCTIONS
Labs today.   CT of your abdomen has been ordered. Please expect a phone call to get this set up.   Take Synthroid first thing in the morning on an empty stomach without any other medications of food for at least 1 hour.   Start Promethazine every 6 hours for nausea.

## 2025-04-09 NOTE — ASSESSMENT & PLAN NOTE
Uncontrolled. Previously discussed appropriate use of levothyroxine but still not clear if she is taking it correctly. Re-discussed appropriate timing of meds today. Repeat TSH; may be contributing to symptoms.

## 2025-04-09 NOTE — PROGRESS NOTES
Office Note     Name: Vilma Ayala    : 1970     MRN: [id number]     Chief Complaint  Nausea (Has not thrown up)    Subjective     History of Present Illness:  History of Present Illness  The patient presents for evaluation of nausea, abdominal pain, and tremors.    She reports persistent nausea, predominantly at night, which improves upon sitting up and worsens when lying down. The nausea has been present for approximately 2 to 3 weeks. She has not experienced any episodes of vomiting. She had a brief period of diarrhea but currently has no issues with diarrhea or constipation. Her last bowel movement was yesterday, and she typically has one bowel movement per day. She had been taking Pepto-Bismol, which resulted in constipation, leading her to discontinue its use. Since then, her bowel movements have returned to normal, although occasionally difficult.    She also reports abdominal pain that has been present for some time. She does not experience any urinary symptoms such as dysuria or back pain but notes an increased frequency of urination. She is not experiencing headaches or fevers. She has experienced mild sweating, which she attributes to her room environment and not when she is outside of her room.    She has a history of tremors, which have recently worsened. She continues to see a psychiatrist.    For her diabetes, she used Lantus this morning. She previously tried Mounjaro, but it was ineffective, so she switched to Lantus.    MEDICATIONS  Current: Lantus  Discontinued: Mounjaro    Review of Systems:   Review of Systems    Past Medical History:   Past Medical History:   Diagnosis Date    Anxiety     Chicken pox     Depression     Diabetes mellitus     Disease of thyroid gland     Measles     Type 2 diabetes mellitus        Past Surgical History:   Past Surgical History:   Procedure Laterality Date    EYE SURGERY      TONSILLECTOMY         Family History:   Family History   Problem  Relation Age of Onset    Hypertension Other     Thyroid disease Other     Diabetes Other     Obesity Other     Diabetes Mother     Hypertension Mother     Heart disease Mother     Cancer Father     Diabetes Sister     Diabetes Brother     No Known Problems Brother        Social History:   Social History     Socioeconomic History    Marital status: Single   Tobacco Use    Smoking status: Never    Smokeless tobacco: Never   Vaping Use    Vaping status: Never Used   Substance and Sexual Activity    Alcohol use: No    Drug use: No    Sexual activity: Not Currently       Immunizations:   Immunization History   Administered Date(s) Administered    COVID-19 (MODERNA) 12YRS+ (SPIKEVAX) 10/16/2023, 01/21/2025    COVID-19 (MODERNA) 1st,2nd,3rd Dose Monovalent 05/06/2021, 06/05/2021, 01/04/2022    COVID-19 (MODERNA) BIVALENT 12+YRS 10/26/2022    Flu Vaccine Quad PF >36MO 10/04/2017, 12/29/2021    Fluzone  >6mos 09/13/2024    Fluzone (or Fluarix & Flulaval for VFC) >6mos 10/04/2017, 12/29/2021, 10/26/2022, 11/14/2023    Fluzone Quad >6mos (Multi-dose) 10/04/2015    Hep A / Hep B 02/16/2024, 01/21/2025    Hepatitis A 05/09/2018, 05/09/2018, 10/04/2018, 01/10/2020, 01/10/2020    Influenza recombinant 01/21/2025    Influenza, Unspecified 10/04/2018, 10/26/2022    Pneumococcal Conjugate 20-Valent (PCV20) 09/11/2023    Pneumococcal Polysaccharide (PPSV23) 05/28/2016, 05/28/2016    Shingrix 12/29/2021, 09/11/2023, 02/16/2024    Tdap 05/24/2024    flucelvax quad pfs =>4 YRS 10/04/2018        Medications:     Current Outpatient Medications:     Accu-Chek Softclix Lancets lancets, USE 1  TO CHECK GLUCOSE 4 times a day DAILY dx e11.65 on insulin, Disp: 400 each, Rfl: 3    ammonium lactate (Lac-Hydrin) 12 % lotion, Apply BID to feet, Disp: 225 g, Rfl: 3    atorvastatin (LIPITOR) 40 MG tablet, Take 1 tablet by mouth Daily., Disp: , Rfl:     B-D UF III MINI PEN NEEDLES 31G X 5 MM misc, Inject 1 Units under the skin into the appropriate area  "as directed Daily., Disp: 100 each, Rfl: 1    benztropine (COGENTIN) 0.5 MG tablet, Take 1 tablet by mouth As Needed., Disp: , Rfl:     Blood Glucose Monitoring Suppl w/Device kit, Use daily to test blood sugars uses accu chek, Disp: 1 each, Rfl: 0    busPIRone (BUSPAR) 15 MG tablet, Take 1 tablet by mouth Every 12 (Twelve) Hours., Disp: , Rfl:     Cholecalciferol (VITAMIN D3 PO), Take 1 tablet by mouth Daily., Disp: , Rfl:     citalopram (CeleXA) 40 MG tablet, Take 1 tablet by mouth Daily., Disp: 90 tablet, Rfl: 1    Continuous Glucose  (FreeStyle Sukhi 3 Ponca) device, Use 1 Device 4 (Four) Times a Day., Disp: 1 each, Rfl: 3    Continuous Glucose Sensor (FreeStyle Sukhi 3 Plus Sensor), Use Every 14 (Fourteen) Days., Disp: 2 each, Rfl: 5    famotidine (PEPCID) 40 MG tablet, Take 1 tablet by mouth every night at bedtime., Disp: , Rfl:     glucose (DEX4) 4 GM chewable tablet, Chew 4 tablets As Needed for Low Blood Sugar., Disp: 20 tablet, Rfl: 12    glucose monitor monitoring kit, Use as directed, E11.9, Disp: 1 each, Rfl: 0    hydrOXYzine (ATARAX) 50 MG tablet, , Disp: , Rfl:     hydrOXYzine pamoate (VISTARIL) 50 MG capsule, Take 1 capsule by mouth 3 (Three) Times a Day As Needed for Itching., Disp: 90 capsule, Rfl: 0    ibuprofen (ADVIL,MOTRIN) 800 MG tablet, Take 1 tablet by mouth Every 8 (Eight) Hours As Needed for Mild Pain or Moderate Pain for up to 30 doses., Disp: 30 tablet, Rfl: 0    Insulin Glargine (Lantus SoloStar) 100 UNIT/ML injection pen, Inject 30 Units under the skin into the appropriate area as directed Every Night., Disp: 15 mL, Rfl: 3    Insulin Syringe-Needle U-100 31G X 15/64\" 0.5 ML misc, Use 2 per day to administer insulin, Disp: 200 each, Rfl: 0    Lancet Device misc, Use with glucometer up to QID as directed, E11.9, Z79.4, Disp: 400 each, Rfl: 1    levothyroxine (SYNTHROID, LEVOTHROID) 200 MCG tablet, Take 1 tablet by mouth Daily., Disp: 90 tablet, Rfl: 0    levothyroxine " "(SYNTHROID, LEVOTHROID) 25 MCG tablet, Take 1 tablet by mouth Every Morning., Disp: 90 tablet, Rfl: 0    Livalo 1 MG tablet tablet, , Disp: , Rfl:     LORazepam (ATIVAN) 0.5 MG tablet, Take 1 tablet by mouth 2 (Two) Times a Day As Needed. for anxiety, Disp: , Rfl:     metFORMIN (GLUCOPHAGE) 500 MG tablet, TAKE 2 TABLETS BY MOUTH ONCE DAILY WITH FOOD, Disp: 60 tablet, Rfl: 5    ondansetron (Zofran) 4 MG tablet, Take 1 tablet by mouth Daily As Needed for Nausea or Vomiting., Disp: 30 tablet, Rfl: 1    OneTouch Delica Lancets 33G misc, Use to test blood sugar twice daily and as needed for symptoms of low blood sugar, Disp: 200 each, Rfl: 1    OneTouch Verio test strip, Use to test blood sugar twice daily and as needed for symptoms of low blood sugar, Disp: 200 each, Rfl: 1    pantoprazole (Protonix) 40 MG EC tablet, Take 1 tablet by mouth Daily., Disp: 30 tablet, Rfl: 4    risperiDONE (risperDAL) 3 MG tablet, Take 1 tablet by mouth 2 (Two) Times a Day., Disp: , Rfl:     Trelegy Ellipta 100-62.5-25 MCG/ACT inhaler, INHALE 1 PUFF ONCE DAILY, Disp: 180 each, Rfl: 0    Ventolin  (90 Base) MCG/ACT inhaler, Inhale 2 puffs 4 (Four) Times a Day., Disp: 18 g, Rfl: 0    promethazine (PHENERGAN) 12.5 MG tablet, Take 1 tablet by mouth Every 6 (Six) Hours As Needed for Nausea or Vomiting., Disp: 30 tablet, Rfl: 2    Allergies:   Allergies   Allergen Reactions    Doxepin Rash    Januvia [Sitagliptin] Other (See Comments)     Insomnia       Objective     Vital Signs  /74   Pulse 116   Temp 98.4 °F (36.9 °C)   Ht 162.6 cm (64.02\")   Wt 93.4 kg (205 lb 12.8 oz)   SpO2 100%   BMI 35.31 kg/m²   Estimated body mass index is 35.31 kg/m² as calculated from the following:    Height as of this encounter: 162.6 cm (64.02\").    Weight as of this encounter: 93.4 kg (205 lb 12.8 oz).           Physical Exam  Constitutional:       Appearance: Normal appearance.   HENT:      Head: Normocephalic and atraumatic.      Nose: Nose " normal.   Eyes:      Conjunctiva/sclera: Conjunctivae normal.      Pupils: Pupils are equal, round, and reactive to light.   Cardiovascular:      Rate and Rhythm: Normal rate and regular rhythm.      Pulses: Normal pulses.      Heart sounds: Normal heart sounds.   Pulmonary:      Effort: Pulmonary effort is normal.   Abdominal:      General: Abdomen is flat. Bowel sounds are normal. There is no distension.      Palpations: Abdomen is soft. There is no mass.      Tenderness: There is no abdominal tenderness. There is no right CVA tenderness, left CVA tenderness, guarding or rebound.      Hernia: No hernia is present.   Neurological:      General: No focal deficit present.      Mental Status: She is alert and oriented to person, place, and time.   Psychiatric:         Mood and Affect: Mood normal.         Behavior: Behavior normal.         Thought Content: Thought content normal.        Physical Exam  Abdominal exam was performed.    Procedures     Results:  No results found for this or any previous visit (from the past 24 hours).   Results          Assessment and Plan     Assessment/Plan:  Diagnoses and all orders for this visit:    1. Nausea (Primary)  -     Comprehensive metabolic panel; Future  -     Lipase; Future  -     CT Abdomen Pelvis Without Contrast; Future  -     CBC No Differential; Future    2. Acquired hypothyroidism  Overview:        Orders:  -     TSH; Future  -     T4, free; Future    3. Type 2 diabetes mellitus without complication, with long-term current use of insulin    4. Acute cystitis without hematuria    Other orders  -     promethazine (PHENERGAN) 12.5 MG tablet; Take 1 tablet by mouth Every 6 (Six) Hours As Needed for Nausea or Vomiting.  Dispense: 30 tablet; Refill: 2      Assessment & Plan  1. Nausea: Acute.  - Blood work will be ordered.  - Abdominal scan to rule out obstructions, excessive stool accumulation, gallbladder or pancreas issues.  - Differential diagnosis includes viral  infection and gastrointestinal bug.    2. Abdominal pain: Chronic.  - Abdominal scan to investigate further.    3. Tremors: Chronic.  - Continues to see psychiatry.    4. Diabetes mellitus: Stable.  - Currently on Lantus for diabetes management after discontinuing Mounjaro.  - Administered Lantus injection this morning.    Follow-up  - Blood work  - Abdominal scan    Assessment & Plan  Nausea  Present for 3-4 weeks. Worse at night and with laying down. Has not vomited. Denies shaking chills; sometimes sweats but thinks its from her blanket. She denies cough, shortness of breath, diarrhea, abdominal pain or constipation. BM are regular. She is not taking Mounjaro. Zofran not helping symptoms. Not clear what is causing nausea. She has been treated for a UTI x3 in the past month(zandra). Most recent urine culture initially growing E coli but culture changed to mixed amarilis after treatment was initiated (reportedly completed course of Bactrim). No longer having urinary symptoms. Promethazine for symptoms. Repeat labs today and obtain CT A/P. Return precautions discussed. I reviewed all recent ED labs including negative blood cultures, lipase, CMP and CBC.   Acquired hypothyroidism  Uncontrolled. Previously discussed appropriate use of levothyroxine but still not clear if she is taking it correctly. Re-discussed appropriate timing of meds today. Repeat TSH; may be contributing to symptoms.  Type 2 diabetes mellitus without complication, with long-term current use of insulin  Follows with endo. Tried Mounjaro but hasn't taken in a month; resumed Lantus.   Acute cystitis without hematuria  Not the cause of symptoms currently. No urinary symptoms. Completed course of Bactrum with mixed amarilis culture.         Follow Up  Return in about 2 weeks (around 4/23/2025).    Patient or patient representative verbalized consent for the use of Ambient Listening during the visit with  Vanessa Kaur MD for chart documentation. 4/9/2025   11:09 EDT    Vanessa Kaur MD   [facility]

## 2025-04-10 ENCOUNTER — READMISSION MANAGEMENT (OUTPATIENT)
Dept: CALL CENTER | Facility: HOSPITAL | Age: 55
End: 2025-04-10
Payer: MEDICARE

## 2025-04-10 ENCOUNTER — HOSPITAL ENCOUNTER (INPATIENT)
Facility: HOSPITAL | Age: 55
LOS: 12 days | Discharge: NURSING, REHAB OR LONG-TERM CARE | DRG: 683 | End: 2025-04-25
Attending: EMERGENCY MEDICINE | Admitting: HOSPITALIST
Payer: MEDICARE

## 2025-04-10 ENCOUNTER — APPOINTMENT (OUTPATIENT)
Dept: CT IMAGING | Facility: HOSPITAL | Age: 55
DRG: 683 | End: 2025-04-10
Payer: MEDICARE

## 2025-04-10 DIAGNOSIS — E11.9 TYPE 2 DIABETES MELLITUS WITHOUT COMPLICATION, WITH LONG-TERM CURRENT USE OF INSULIN: ICD-10-CM

## 2025-04-10 DIAGNOSIS — R53.1 WEAKNESS: ICD-10-CM

## 2025-04-10 DIAGNOSIS — N17.9 AKI (ACUTE KIDNEY INJURY): Primary | ICD-10-CM

## 2025-04-10 DIAGNOSIS — Z79.4 TYPE 2 DIABETES MELLITUS WITHOUT COMPLICATION, WITH LONG-TERM CURRENT USE OF INSULIN: ICD-10-CM

## 2025-04-10 DIAGNOSIS — R11.2 NAUSEA AND VOMITING, UNSPECIFIED VOMITING TYPE: ICD-10-CM

## 2025-04-10 DIAGNOSIS — R13.12 OROPHARYNGEAL DYSPHAGIA: ICD-10-CM

## 2025-04-10 PROBLEM — R19.7 DIARRHEA: Status: ACTIVE | Noted: 2025-04-10

## 2025-04-10 PROBLEM — G25.0 ESSENTIAL TREMOR: Status: ACTIVE | Noted: 2025-04-10

## 2025-04-10 PROBLEM — E86.0 DEHYDRATION: Status: ACTIVE | Noted: 2025-04-10

## 2025-04-10 PROBLEM — N18.9 ACUTE KIDNEY INJURY SUPERIMPOSED ON CHRONIC KIDNEY DISEASE: Status: ACTIVE | Noted: 2025-04-10

## 2025-04-10 LAB
ALBUMIN SERPL-MCNC: 4.3 G/DL (ref 3.5–5.2)
ALBUMIN/GLOB SERPL: 1.3 G/DL
ALP SERPL-CCNC: 137 U/L (ref 39–117)
ALT SERPL W P-5'-P-CCNC: 33 U/L (ref 1–33)
ANION GAP SERPL CALCULATED.3IONS-SCNC: 13 MMOL/L (ref 5–15)
AST SERPL-CCNC: 28 U/L (ref 1–32)
B PARAPERT DNA SPEC QL NAA+PROBE: NOT DETECTED
B PERT DNA SPEC QL NAA+PROBE: NOT DETECTED
BACTERIA UR QL AUTO: ABNORMAL /HPF
BASOPHILS # BLD AUTO: 0.05 10*3/MM3 (ref 0–0.2)
BASOPHILS NFR BLD AUTO: 0.3 % (ref 0–1.5)
BILIRUB SERPL-MCNC: 0.8 MG/DL (ref 0–1.2)
BILIRUB UR QL STRIP: NEGATIVE
BUN SERPL-MCNC: 11 MG/DL (ref 6–20)
BUN/CREAT SERPL: 5.3 (ref 7–25)
C PNEUM DNA NPH QL NAA+NON-PROBE: NOT DETECTED
CALCIUM SPEC-SCNC: 9.8 MG/DL (ref 8.6–10.5)
CHLORIDE SERPL-SCNC: 101 MMOL/L (ref 98–107)
CLARITY UR: CLEAR
CO2 SERPL-SCNC: 24 MMOL/L (ref 22–29)
COLOR UR: YELLOW
CREAT SERPL-MCNC: 2.07 MG/DL (ref 0.57–1)
D-LACTATE SERPL-SCNC: 1.6 MMOL/L (ref 0.5–2)
DEPRECATED RDW RBC AUTO: 41.6 FL (ref 37–54)
EGFRCR SERPLBLD CKD-EPI 2021: 28 ML/MIN/1.73
EOSINOPHIL # BLD AUTO: 0.12 10*3/MM3 (ref 0–0.4)
EOSINOPHIL NFR BLD AUTO: 0.7 % (ref 0.3–6.2)
ERYTHROCYTE [DISTWIDTH] IN BLOOD BY AUTOMATED COUNT: 13.5 % (ref 12.3–15.4)
FLUAV SUBTYP SPEC NAA+PROBE: NOT DETECTED
FLUBV RNA ISLT QL NAA+PROBE: NOT DETECTED
GLOBULIN UR ELPH-MCNC: 3.3 GM/DL
GLUCOSE BLDC GLUCOMTR-MCNC: 91 MG/DL (ref 70–130)
GLUCOSE SERPL-MCNC: 112 MG/DL (ref 65–99)
GLUCOSE UR STRIP-MCNC: NEGATIVE MG/DL
HADV DNA SPEC NAA+PROBE: NOT DETECTED
HBA1C MFR BLD: 6.8 % (ref 4.8–5.6)
HCOV 229E RNA SPEC QL NAA+PROBE: NOT DETECTED
HCOV HKU1 RNA SPEC QL NAA+PROBE: NOT DETECTED
HCOV NL63 RNA SPEC QL NAA+PROBE: NOT DETECTED
HCOV OC43 RNA SPEC QL NAA+PROBE: NOT DETECTED
HCT VFR BLD AUTO: 43.3 % (ref 34–46.6)
HGB BLD-MCNC: 14.6 G/DL (ref 12–15.9)
HGB UR QL STRIP.AUTO: NEGATIVE
HMPV RNA NPH QL NAA+NON-PROBE: NOT DETECTED
HOLD SPECIMEN: NORMAL
HPIV1 RNA ISLT QL NAA+PROBE: NOT DETECTED
HPIV2 RNA SPEC QL NAA+PROBE: NOT DETECTED
HPIV3 RNA NPH QL NAA+PROBE: NOT DETECTED
HPIV4 P GENE NPH QL NAA+PROBE: NOT DETECTED
HYALINE CASTS UR QL AUTO: ABNORMAL /LPF
IMM GRANULOCYTES # BLD AUTO: 0.08 10*3/MM3 (ref 0–0.05)
IMM GRANULOCYTES NFR BLD AUTO: 0.4 % (ref 0–0.5)
KETONES UR QL STRIP: NEGATIVE
LEUKOCYTE ESTERASE UR QL STRIP.AUTO: ABNORMAL
LIPASE SERPL-CCNC: 21 U/L (ref 13–60)
LYMPHOCYTES # BLD AUTO: 2.78 10*3/MM3 (ref 0.7–3.1)
LYMPHOCYTES NFR BLD AUTO: 15.4 % (ref 19.6–45.3)
M PNEUMO IGG SER IA-ACNC: NOT DETECTED
MCH RBC QN AUTO: 29.1 PG (ref 26.6–33)
MCHC RBC AUTO-ENTMCNC: 33.7 G/DL (ref 31.5–35.7)
MCV RBC AUTO: 86.4 FL (ref 79–97)
MONOCYTES # BLD AUTO: 0.62 10*3/MM3 (ref 0.1–0.9)
MONOCYTES NFR BLD AUTO: 3.4 % (ref 5–12)
NEUTROPHILS NFR BLD AUTO: 14.41 10*3/MM3 (ref 1.7–7)
NEUTROPHILS NFR BLD AUTO: 79.8 % (ref 42.7–76)
NITRITE UR QL STRIP: NEGATIVE
NRBC BLD AUTO-RTO: 0 /100 WBC (ref 0–0.2)
PH UR STRIP.AUTO: 5.5 [PH] (ref 5–8)
PLATELET # BLD AUTO: 338 10*3/MM3 (ref 140–450)
PMV BLD AUTO: 10.9 FL (ref 6–12)
POTASSIUM SERPL-SCNC: 3.9 MMOL/L (ref 3.5–5.2)
PROT SERPL-MCNC: 7.6 G/DL (ref 6–8.5)
PROT UR QL STRIP: NEGATIVE
RBC # BLD AUTO: 5.01 10*6/MM3 (ref 3.77–5.28)
RBC # UR STRIP: ABNORMAL /HPF
REF LAB TEST METHOD: ABNORMAL
RHINOVIRUS RNA SPEC NAA+PROBE: NOT DETECTED
RSV RNA NPH QL NAA+NON-PROBE: NOT DETECTED
SARS-COV-2 RNA NPH QL NAA+NON-PROBE: NOT DETECTED
SODIUM SERPL-SCNC: 138 MMOL/L (ref 136–145)
SP GR UR STRIP: 1.01 (ref 1–1.03)
SQUAMOUS #/AREA URNS HPF: ABNORMAL /HPF
UROBILINOGEN UR QL STRIP: ABNORMAL
WBC # UR STRIP: ABNORMAL /HPF
WBC NRBC COR # BLD AUTO: 18.06 10*3/MM3 (ref 3.4–10.8)
WHOLE BLOOD HOLD COAG: NORMAL
WHOLE BLOOD HOLD SPECIMEN: NORMAL

## 2025-04-10 PROCEDURE — 83605 ASSAY OF LACTIC ACID: CPT | Performed by: EMERGENCY MEDICINE

## 2025-04-10 PROCEDURE — G0378 HOSPITAL OBSERVATION PER HR: HCPCS

## 2025-04-10 PROCEDURE — 80053 COMPREHEN METABOLIC PANEL: CPT | Performed by: EMERGENCY MEDICINE

## 2025-04-10 PROCEDURE — 99222 1ST HOSP IP/OBS MODERATE 55: CPT | Performed by: PHYSICIAN ASSISTANT

## 2025-04-10 PROCEDURE — 25010000002 ONDANSETRON PER 1 MG

## 2025-04-10 PROCEDURE — 25010000002 ONDANSETRON PER 1 MG: Performed by: PHYSICIAN ASSISTANT

## 2025-04-10 PROCEDURE — 83690 ASSAY OF LIPASE: CPT | Performed by: EMERGENCY MEDICINE

## 2025-04-10 PROCEDURE — 74176 CT ABD & PELVIS W/O CONTRAST: CPT

## 2025-04-10 PROCEDURE — 99285 EMERGENCY DEPT VISIT HI MDM: CPT

## 2025-04-10 PROCEDURE — 25810000003 SODIUM CHLORIDE 0.9 % SOLUTION

## 2025-04-10 PROCEDURE — 81001 URINALYSIS AUTO W/SCOPE: CPT | Performed by: EMERGENCY MEDICINE

## 2025-04-10 PROCEDURE — 25810000003 SODIUM CHLORIDE 0.9 % SOLUTION: Performed by: PHYSICIAN ASSISTANT

## 2025-04-10 PROCEDURE — 83036 HEMOGLOBIN GLYCOSYLATED A1C: CPT | Performed by: PHYSICIAN ASSISTANT

## 2025-04-10 PROCEDURE — 85025 COMPLETE CBC W/AUTO DIFF WBC: CPT | Performed by: EMERGENCY MEDICINE

## 2025-04-10 PROCEDURE — 36415 COLL VENOUS BLD VENIPUNCTURE: CPT

## 2025-04-10 PROCEDURE — 82948 REAGENT STRIP/BLOOD GLUCOSE: CPT

## 2025-04-10 PROCEDURE — 93005 ELECTROCARDIOGRAM TRACING: CPT

## 2025-04-10 PROCEDURE — 0202U NFCT DS 22 TRGT SARS-COV-2: CPT | Performed by: PHYSICIAN ASSISTANT

## 2025-04-10 RX ORDER — SODIUM CHLORIDE 0.9 % (FLUSH) 0.9 %
10 SYRINGE (ML) INJECTION AS NEEDED
Status: DISCONTINUED | OUTPATIENT
Start: 2025-04-10 | End: 2025-04-18

## 2025-04-10 RX ORDER — SODIUM CHLORIDE 9 MG/ML
40 INJECTION, SOLUTION INTRAVENOUS AS NEEDED
Status: DISCONTINUED | OUTPATIENT
Start: 2025-04-10 | End: 2025-04-18

## 2025-04-10 RX ORDER — ACETAMINOPHEN 650 MG/1
650 SUPPOSITORY RECTAL EVERY 4 HOURS PRN
Status: DISCONTINUED | OUTPATIENT
Start: 2025-04-10 | End: 2025-04-25 | Stop reason: HOSPADM

## 2025-04-10 RX ORDER — NITROGLYCERIN 0.4 MG/1
0.4 TABLET SUBLINGUAL
Status: DISCONTINUED | OUTPATIENT
Start: 2025-04-10 | End: 2025-04-18

## 2025-04-10 RX ORDER — RISPERIDONE 1 MG/1
3 TABLET ORAL 2 TIMES DAILY
Status: DISCONTINUED | OUTPATIENT
Start: 2025-04-10 | End: 2025-04-25 | Stop reason: HOSPADM

## 2025-04-10 RX ORDER — LEVOTHYROXINE SODIUM 100 UG/1
200 TABLET ORAL
Status: DISCONTINUED | OUTPATIENT
Start: 2025-04-11 | End: 2025-04-13

## 2025-04-10 RX ORDER — ACETAMINOPHEN 160 MG/5ML
650 SOLUTION ORAL EVERY 4 HOURS PRN
Status: DISCONTINUED | OUTPATIENT
Start: 2025-04-10 | End: 2025-04-25 | Stop reason: HOSPADM

## 2025-04-10 RX ORDER — INSULIN LISPRO 100 [IU]/ML
2-9 INJECTION, SOLUTION INTRAVENOUS; SUBCUTANEOUS
Status: DISCONTINUED | OUTPATIENT
Start: 2025-04-11 | End: 2025-04-13

## 2025-04-10 RX ORDER — DEXTROSE MONOHYDRATE 25 G/50ML
25 INJECTION, SOLUTION INTRAVENOUS
Status: DISCONTINUED | OUTPATIENT
Start: 2025-04-10 | End: 2025-04-25 | Stop reason: HOSPADM

## 2025-04-10 RX ORDER — LEVOTHYROXINE SODIUM 25 UG/1
25 TABLET ORAL
Status: DISCONTINUED | OUTPATIENT
Start: 2025-04-11 | End: 2025-04-13

## 2025-04-10 RX ORDER — NICOTINE POLACRILEX 4 MG
15 LOZENGE BUCCAL
Status: DISCONTINUED | OUTPATIENT
Start: 2025-04-10 | End: 2025-04-25 | Stop reason: HOSPADM

## 2025-04-10 RX ORDER — SODIUM CHLORIDE 9 MG/ML
10 INJECTION, SOLUTION INTRAMUSCULAR; INTRAVENOUS; SUBCUTANEOUS AS NEEDED
Status: DISCONTINUED | OUTPATIENT
Start: 2025-04-10 | End: 2025-04-25 | Stop reason: HOSPADM

## 2025-04-10 RX ORDER — IBUPROFEN 600 MG/1
1 TABLET ORAL
Status: DISCONTINUED | OUTPATIENT
Start: 2025-04-10 | End: 2025-04-25 | Stop reason: HOSPADM

## 2025-04-10 RX ORDER — SODIUM CHLORIDE 9 MG/ML
125 INJECTION, SOLUTION INTRAVENOUS CONTINUOUS
Status: ACTIVE | OUTPATIENT
Start: 2025-04-10 | End: 2025-04-11

## 2025-04-10 RX ORDER — ONDANSETRON 4 MG/1
4 TABLET, ORALLY DISINTEGRATING ORAL EVERY 6 HOURS PRN
Status: DISCONTINUED | OUTPATIENT
Start: 2025-04-10 | End: 2025-04-16

## 2025-04-10 RX ORDER — ACETAMINOPHEN 325 MG/1
650 TABLET ORAL EVERY 4 HOURS PRN
Status: DISCONTINUED | OUTPATIENT
Start: 2025-04-10 | End: 2025-04-25 | Stop reason: HOSPADM

## 2025-04-10 RX ORDER — SODIUM CHLORIDE 9 MG/ML
100 INJECTION, SOLUTION INTRAVENOUS CONTINUOUS
Status: DISCONTINUED | OUTPATIENT
Start: 2025-04-10 | End: 2025-04-12

## 2025-04-10 RX ORDER — SODIUM CHLORIDE 0.9 % (FLUSH) 0.9 %
10 SYRINGE (ML) INJECTION EVERY 12 HOURS SCHEDULED
Status: DISCONTINUED | OUTPATIENT
Start: 2025-04-10 | End: 2025-04-18

## 2025-04-10 RX ORDER — FAMOTIDINE 20 MG/1
40 TABLET, FILM COATED ORAL NIGHTLY
Status: DISCONTINUED | OUTPATIENT
Start: 2025-04-10 | End: 2025-04-11

## 2025-04-10 RX ORDER — ATORVASTATIN CALCIUM 40 MG/1
40 TABLET, FILM COATED ORAL DAILY
Status: DISCONTINUED | OUTPATIENT
Start: 2025-04-11 | End: 2025-04-25 | Stop reason: HOSPADM

## 2025-04-10 RX ORDER — ONDANSETRON 2 MG/ML
4 INJECTION INTRAMUSCULAR; INTRAVENOUS ONCE
Status: COMPLETED | OUTPATIENT
Start: 2025-04-10 | End: 2025-04-10

## 2025-04-10 RX ORDER — ONDANSETRON 2 MG/ML
4 INJECTION INTRAMUSCULAR; INTRAVENOUS EVERY 6 HOURS PRN
Status: DISCONTINUED | OUTPATIENT
Start: 2025-04-10 | End: 2025-04-16

## 2025-04-10 RX ADMIN — ONDANSETRON 4 MG: 2 INJECTION INTRAMUSCULAR; INTRAVENOUS at 14:24

## 2025-04-10 RX ADMIN — RISPERIDONE 3 MG: 1 TABLET, FILM COATED ORAL at 21:41

## 2025-04-10 RX ADMIN — Medication 10 ML: at 20:36

## 2025-04-10 RX ADMIN — FAMOTIDINE 40 MG: 20 TABLET, FILM COATED ORAL at 20:36

## 2025-04-10 RX ADMIN — SODIUM CHLORIDE 100 ML/HR: 9 INJECTION, SOLUTION INTRAVENOUS at 20:35

## 2025-04-10 RX ADMIN — SODIUM CHLORIDE 125 ML/HR: 9 INJECTION, SOLUTION INTRAVENOUS at 19:10

## 2025-04-10 RX ADMIN — ONDANSETRON 4 MG: 2 INJECTION INTRAMUSCULAR; INTRAVENOUS at 20:32

## 2025-04-10 RX ADMIN — ACETAMINOPHEN 650 MG: 325 TABLET, FILM COATED ORAL at 20:37

## 2025-04-10 NOTE — ED PROVIDER NOTES
Subjective   History of Present Illness patient is a 54-year-old female who presents via EMS from the assisted living [facility] ([place]) where she has resided for 3-4 years.  She presents complaining of nausea, dry heaving, feeling sick, and weakness.  She attributes the symptoms to beginning when she had a urinary tract infection that was treated recently with Bactrim course, including evaluation through the emergency department late last month.  Patient reports she has been taking oral promethazine tablets however without improvement.  She also complains of left flank pain, inability to tolerate oral nutrition other than applesauce and drinking Sprite.    Review of Systems   Constitutional:  Positive for appetite change.   HENT: Negative.     Respiratory: Negative.     Cardiovascular: Negative.    Gastrointestinal:  Positive for nausea and vomiting.   Genitourinary:  Positive for flank pain.   Skin: Negative.    Neurological: Negative.        Past Medical History:   Diagnosis Date    Anxiety     Chicken pox     Depression     Diabetes mellitus     Disease of thyroid gland     Measles     Type 2 diabetes mellitus        Allergies   Allergen Reactions    Doxepin Rash    Januvia [Sitagliptin] Other (See Comments)     Insomnia       Past Surgical History:   Procedure Laterality Date    EYE SURGERY      TONSILLECTOMY         Family History   Problem Relation Age of Onset    Hypertension Other     Thyroid disease Other     Diabetes Other     Obesity Other     Diabetes Mother     Hypertension Mother     Heart disease Mother     Cancer Father     Diabetes Sister     Diabetes Brother     No Known Problems Brother        Social History     Socioeconomic History    Marital status: Single   Tobacco Use    Smoking status: Never    Smokeless tobacco: Never   Vaping Use    Vaping status: Never Used   Substance and Sexual Activity    Alcohol use: No    Drug use: No    Sexual activity: Not Currently           Objective   Physical  Exam  Constitutional:       General: She is not in acute distress.     Appearance: Normal appearance. She is obese. She is not toxic-appearing.   HENT:      Head: Normocephalic and atraumatic.   Eyes:      Extraocular Movements: Extraocular movements intact.      Conjunctiva/sclera: Conjunctivae normal.      Pupils: Pupils are equal, round, and reactive to light.   Cardiovascular:      Rate and Rhythm: Tachycardia present.   Abdominal:      General: Abdomen is flat. Bowel sounds are normal.      Palpations: Abdomen is soft.      Tenderness: There is abdominal tenderness in the left upper quadrant. There is left CVA tenderness.   Musculoskeletal:         General: Normal range of motion.   Skin:     General: Skin is warm and dry.      Capillary Refill: Capillary refill takes less than 2 seconds.   Neurological:      General: No focal deficit present.      Mental Status: She is alert and oriented to person, place, and time.         Procedures           ED Course  ED Course as of 04/10/25 1956   u Apr 10, 2025   1403 Patient initially evaluated in the ED pit area, traveling via wheelchair. [JH]   1514 CT technician called to inquire regarding patient's renal function, decreased over prior comparison and below 30 GFR, we have amended the imaging study to noncontrast.  Patient has leukocytosis at 18 with a left shift.  As discussed, her creatinine is elevated at 2, lipase and lactic negative. [JH]   1548 CT imaging of the abdomen pelvis without acute abnormality.  Awaiting urinalysis. [JH]   1746 Urinalysis resulted and nonactionable.  Will reevaluate the patient for appropriateness to discharge. [JH]   1846 Hospitalist agreed to admit. [JH]      ED Course User Index  [JH] Ede Yang, APRN                                                       Medical Decision Making  Given the patient's complaints, her recent urinary tract infection and treatment, and my exam today, differential cannot exclude ongoing UTI, renal  insufficiency, nausea and vomiting, dehydration, electrolyte derangement, diverticulitis, renal calculi, pyelonephritis.  Patient will have serum screening labs, urinalysis, twelve-lead ECG, CT imaging of the pelvis with IV contrast, IV antiemetic medication administered.  We will reevaluate for improvement, communicate workup results and plan her disposition.  Patient is agreeable with this plan.    Amount and/or Complexity of Data Reviewed  Labs: ordered.  Radiology: ordered.  ECG/medicine tests: ordered.    Risk  Prescription drug management.        Final diagnoses:   CB (acute kidney injury)   Nausea and vomiting, unspecified vomiting type   Weakness       ED Disposition  ED Disposition       ED Disposition   Decision to Admit    Condition   --    Comment   Level of Care: Med/Surg [1]   Diagnosis: Acute kidney injury superimposed on chronic kidney disease [[id number]]   Admitting Physician: KERLEY, BRIAN JOSEPH [[id number]]   Attending Physician: KERLEY, BRIAN JOSEPH [[id number]]   Is patient appropriate for Inpatient Observation Unit?: Yes [1]                 No follow-up provider specified.       Medication List      No changes were made to your prescriptions during this visit.            Ede Yang, APRN  04/10/25 1957

## 2025-04-10 NOTE — H&P
[facility] Medicine Services  HISTORY AND PHYSICAL    Patient Name: Vilma Ayala  : 1970  MRN: [id number]  Primary Care Physician: Vanessa Kaur MD  Date of admission: 4/10/2025    Subjective   Subjective     Chief Complaint:  Nausea, diarrhea      HPI:  Vilma Ayala is a 54 y.o. female with a history of Essential Tremor, T2DM, Hypothyroidism, anxiety/depression, and GERD who presents to [facility] ED for complaint of nausea and diarrhea. She reports that she has felt nauseous for the last few weeks now. However, she denies any instances of vomiting. She states she was recently diagnosed with a UTI on  and was started on Bactrim, and took 3 days of this. Her last dose of Bactrim was a week ago today. She reports that she continues to have nausea, as well as diarrhea which she feels like is a little worse today, which prompted the ED visit. She also endorses some mild epigastric pain, fatigue and poor PO intake over the last week or so as well. She denies fever, chills, chest pain, SOB, cough, or vomiting.         Review of Systems   Constitutional:  Positive for appetite change and fatigue. Negative for chills, fever and unexpected weight change.   Eyes:  Negative for photophobia and visual disturbance.   Respiratory:  Negative for cough, shortness of breath and wheezing.    Cardiovascular:  Negative for chest pain and palpitations.   Gastrointestinal:  Positive for abdominal pain (epigastric), diarrhea and nausea. Negative for vomiting.   Genitourinary:  Negative for dysuria and hematuria.   Musculoskeletal:  Positive for arthralgias and back pain.   Skin: Negative.    Neurological:  Positive for tremors (chronic). Negative for weakness.   Psychiatric/Behavioral:  Negative for confusion. The patient is nervous/anxious.               Personal History     Past Medical History:   Diagnosis Date    Anxiety     Chicken pox     Depression     Diabetes mellitus      Disease of thyroid gland     Measles     Type 2 diabetes mellitus              Past Surgical History:   Procedure Laterality Date    EYE SURGERY      TONSILLECTOMY         Family History:  family history includes Cancer in her father; Diabetes in her brother, mother, sister, and another family member; Heart disease in her mother; Hypertension in her mother and another family member; No Known Problems in her brother; Obesity in an other family member; Thyroid disease in an other family member.     Social History:  reports that she has never smoked. She has never used smokeless tobacco. She reports that she does not drink alcohol and does not use drugs.  Social History     Social History Narrative    Not on file       Medications:  Accu-Chek Softclix Lancets, Blood Glucose Monitoring Suppl, Cholecalciferol, Fluticasone-Umeclidin-Vilant, FreeStyle Sukhi 3 Plus Sensor, FreeStyle Sukhi 3 [place], Insulin Glargine, Insulin Pen Needle, Insulin Syringe-Needle U-100, LORazepam, Lancet Device, OneTouch Delica Lancets 33G, albuterol sulfate HFA, ammonium lactate, atorvastatin, benztropine, busPIRone, citalopram, famotidine, glucose, glucose blood, glucose monitor, hydrOXYzine, hydrOXYzine pamoate, ibuprofen, levothyroxine, metFORMIN, ondansetron, pantoprazole, pitavastatin calcium, promethazine, and risperiDONE    Allergies   Allergen Reactions    Doxepin Rash    Januvia [Sitagliptin] Other (See Comments)     Insomnia       Objective   Objective     Vital Signs:   Temp:  [98.7 °F (37.1 °C)] 98.7 °F (37.1 °C)  Heart Rate:  [] 105  Resp:  [16] 16  BP: (101-201)/() 120/94    Physical Exam  Constitutional:       General: She is not in acute distress.     Appearance: She is obese.   HENT:      Head: Atraumatic.      Right Ear: External ear normal.      Left Ear: External ear normal.      Nose: Nose normal.   Eyes:      Extraocular Movements: Extraocular movements intact.      Conjunctiva/sclera: Conjunctivae normal.       Pupils: Pupils are equal, round, and reactive to light.   Cardiovascular:      Rate and Rhythm: Normal rate and regular rhythm.      Pulses: Normal pulses.      Heart sounds: Normal heart sounds. No murmur heard.  Pulmonary:      Effort: Pulmonary effort is normal. No respiratory distress.      Breath sounds: Normal breath sounds. No wheezing, rhonchi or rales.   Abdominal:      General: Bowel sounds are normal. There is no distension.      Tenderness: There is abdominal tenderness (mild epigastric tenderness). There is no guarding or rebound.   Musculoskeletal:         General: Normal range of motion.      Cervical back: No rigidity.   Skin:     General: Skin is warm and dry.      Coloration: Skin is not jaundiced.      Findings: No lesion or rash.   Neurological:      Mental Status: She is alert and oriented to person, place, and time.      Comments: Chronic essential tremor noted during exam.    Psychiatric:         Attention and Perception: Attention normal.         Mood and Affect: Mood normal.         Behavior: Behavior normal.         Thought Content: Thought content normal.          Result Review:  I have personally reviewed the results from the time of this admission to 4/10/2025 20:14 EDT and agree with these findings:  [x]  Laboratory list / accordion  []  Microbiology  [x]  Radiology  []  EKG/Telemetry   []  Cardiology/Vascular   []  Pathology  [x]  Old records  []  Other:      LAB RESULTS:      Lab 04/10/25  1344 04/09/25  1029   WBC 18.06* 18.15*   HEMOGLOBIN 14.6 15.6   HEMATOCRIT 43.3 44.4   PLATELETS 338 373   NEUTROS ABS 14.41* 13.79*   IMMATURE GRANS (ABS) 0.08*  --    LYMPHS ABS 2.78  --    MONOS ABS 0.62  --    EOS ABS 0.12 0.36   MCV 86.4 86.0   LACTATE 1.6  --          Lab 04/10/25  1344 04/09/25  1029   SODIUM 138 136   POTASSIUM 3.9 4.0   CHLORIDE 101 99   CO2 24.0 24.2   ANION GAP 13.0 12.8   BUN 11 9   CREATININE 2.07* 1.88*   EGFR 28.0* 31.5*   GLUCOSE 112* 116*   CALCIUM 9.8 10.2   TSH   --  1.690         Lab 04/10/25  1344 04/09/25  1029   TOTAL PROTEIN 7.6 7.7   ALBUMIN 4.3 4.4   GLOBULIN 3.3 3.3   ALT (SGPT) 33 37*   AST (SGOT) 28 32   BILIRUBIN 0.8 0.9   ALK PHOS 137* 148*   LIPASE 21 21             Lab 04/09/25  1029   CHOLESTEROL 113   LDL CHOL 55   HDL CHOL 37*   TRIGLYCERIDES 112             Brief Urine Lab Results  (Last result in the past 365 days)        Color   Clarity   Blood   Leuk Est   Nitrite   Protein   CREAT   Urine HCG        04/10/25 1734 Yellow   Clear   Negative   Trace   Negative   Negative                 Microbiology Results (last 10 days)       Procedure Component Value - Date/Time    Urine Culture - Urine, Urine, Clean Catch [[id number]] Collected: 04/01/25 1118    Lab Status: Final result Specimen: Urine, Clean Catch Updated: 04/03/25 0952     Urine Culture 50,000 CFU/mL Mixed Clemencia Isolated    Narrative:      Colonization of the urinary tract without infection is common. Treatment is discouraged unless the patient is symptomatic, pregnant, or undergoing an invasive urologic procedure.  Specimen contains mixed organisms of questionable pathogenicity suggestive of contamination. If symptoms persist, suggest recollection.            CT Abdomen Pelvis Without Contrast  Result Date: 4/10/2025  CT ABDOMEN PELVIS WO CONTRAST Date of Exam: 4/10/2025 3:21 PM EDT Indication: N/V, left flank pain. Comparison: Chest CT 3/23/2025 Technique: Axial CT images were obtained of the abdomen and pelvis without the administration of contrast. Reconstructed coronal and sagittal images were also obtained. Automated exposure control and iterative construction methods were used. Findings: Included Chest: Bibasal atelectasis Liver: No significant abnormality.  Gallbladder and biliary tree: No significant abnormality.  Spleen: No significant abnormality.  Pancreas: No significant abnormality.  Adrenal glands: Nonspecific and unchanged mild bilateral adrenal gland thickening.  Kidneys and ureters:  No significant abnormality. No hydroureteronephrosis. No radiopaque calculi seen.  Stomach and duodenum:No significant abnormality. Small and large bowel: Colonic diverticulosis. No evidence of bowel obstruction. Appendicoliths within an otherwise normal-appearing appendix. No significant pericolonic inflammatory changes seen. Peritoneal cavity: No free fluid or free air. Bladder: No significant abnormality.  Pelvic organs: No significant abnormality.  Vasculature: Atherosclerotic calcifications.  Lymph nodes: No pathologic appearing lymph nodes by imaging criteria.  Bones and soft tissues: Degenerative changes of the imaged spine. No acute osseous abnormality.     Impression: Impression: No acute findings in the abdomen or pelvis. Electronically Signed: Gregg Colindres  4/10/2025 3:32 PM EDT  Workstation ID: [id number]      Results for orders placed during the hospital encounter of 09/05/19    Adult Transthoracic Echo Complete W/ Cont if Necessary Per Protocol    Interpretation Summary  · Left ventricular systolic function is normal. Estimated EF = 60%.  · No significant valve dysfunction.      Assessment & Plan   Assessment & Plan       Acute kidney injury superimposed on chronic kidney disease    Diarrhea    Dehydration    Mixed hyperlipidemia    Type 2 diabetes mellitus without complication, with long-term current use of insulin    Essential tremor    Anxiety and depression    Other specified hypothyroidism      Vilma Ayala is a 54 y.o. female with a history of Essential Tremor, T2DM, Hypothyroidism, anxiety/depression, and GERD who presents to [facility] ED for complaint of nausea and diarrhea.       CB on CKD   Dehydration  Nausea/Diarrhea  -Cr 2.07, elevated from recent results.  eGFR 28.0  -UA tonight unremarkable  -CT abd/pelvis negative for acute findings  -CB possibly 2ry to recent bactrim use, as well as dehydration from poor po intake.  -GI panel PCR pending.   -C diff toxin pending given  leukocytosis and diarrhea from recent abx use.   -Resp panel PCR pending  -IV fluids  -Nephrology consult for the am  -Avoid anti-diarrheal meds for now pending results of C diff toxin.   -Zofran for nausea    HLD  -Continue statin    T2DM  -Blood glucose 112  -Check A1C  -Fingerstick achs. SSI  -LA insulin nightly    Essential Tremor  -Continue Respiradone    Anxiety/depression  -Continue home meds          DVT prophylaxis:  SCDS    CODE STATUS:  Full Code  Code Status (Patient has no pulse and is not breathing): CPR (Attempt to Resuscitate)  Medical Interventions (Patient has pulse or is breathing): Full Support      Expected DischargeTBD       This note has been completed as part of a split-shared workflow.     Signature: Electronically signed by Hank Garcia PA-C, 04/10/25, 8:05 PM EDT

## 2025-04-10 NOTE — ED NOTES
Vilma Ayala    Nursing Report ED to Floor:  Mental status: aox4  Ambulatory status: independent  Oxygen Therapy:  room air   Cardiac Rhythm: sinus tach  Admitted from: home  Safety Concerns:  fall risk  Precautions: none  Social Issues: none  ED Room #:  18    ED Nurse Phone Extension - [phone]   or may call [phone].      HPI:   Chief Complaint   Patient presents with    Nausea       Past Medical History:  Past Medical History:   Diagnosis Date    Anxiety     Chicken pox     Depression     Diabetes mellitus     Disease of thyroid gland     Measles     Type 2 diabetes mellitus         Past Surgical History:  Past Surgical History:   Procedure Laterality Date    EYE SURGERY      TONSILLECTOMY          Admitting Doctor:   Brian Joseph Kerley, DO    Consulting Provider(s):  Consults       No orders found from 3/12/2025 to 4/11/2025.             Admitting Diagnosis:   The primary encounter diagnosis was CB (acute kidney injury). Diagnoses of Nausea and vomiting, unspecified vomiting type and Weakness were also pertinent to this visit.    Most Recent Vitals:   Vitals:    04/10/25 1730 04/10/25 1800 04/10/25 1830 04/10/25 1900   BP:       Pulse:  109 110 108   Resp:       Temp:       TempSrc:       SpO2: 99% 99% 98% 96%   Weight:       Height:           Active LDAs/IV Access:   Lines, Drains & Airways       Active LDAs       Name Placement date Placement time Site Days    Peripheral IV 04/10/25 1422 Anterior;Proximal;Right Forearm 04/10/25  1422  Forearm  less than 1                    Labs (abnormal labs have a star):   Labs Reviewed   COMPREHENSIVE METABOLIC PANEL - Abnormal; Notable for the following components:       Result Value    Glucose 112 (*)     Creatinine 2.07 (*)     Alkaline Phosphatase 137 (*)     BUN/Creatinine Ratio 5.3 (*)     eGFR 28.0 (*)     All other components within normal limits    Narrative:     GFR Categories in Chronic Kidney Disease (CKD)      GFR Category          GFR (mL/min/1.73)     Interpretation  G1                     90 or greater         Normal or high (1)  G2                      60-89                Mild decrease (1)  G3a                   45-59                Mild to moderate decrease  G3b                   30-44                Moderate to severe decrease  G4                    15-29                Severe decrease  G5                    14 or less           Kidney failure          (1)In the absence of evidence of kidney disease, neither GFR category G1 or G2 fulfill the criteria for CKD.    eGFR calculation 2021 CKD-EPI creatinine equation, which does not include race as a factor   URINALYSIS W/ MICROSCOPIC IF INDICATED (NO CULTURE) - Abnormal; Notable for the following components:    Leuk Esterase, UA Trace (*)     All other components within normal limits   CBC WITH AUTO DIFFERENTIAL - Abnormal; Notable for the following components:    WBC 18.06 (*)     Neutrophil % 79.8 (*)     Lymphocyte % 15.4 (*)     Monocyte % 3.4 (*)     Neutrophils, Absolute 14.41 (*)     Immature Grans, Absolute 0.08 (*)     All other components within normal limits   URINALYSIS, MICROSCOPIC ONLY - Abnormal; Notable for the following components:    WBC, UA 6-10 (*)     Squamous Epithelial Cells, UA 7-12 (*)     All other components within normal limits   LIPASE - Normal   LACTIC ACID, PLASMA - Normal   RAINBOW DRAW    Narrative:     The following orders were created for panel order [place] Draw.  Procedure                               Abnormality         Status                     ---------                               -----------         ------                     Green Top (Gel)[[id number]]                                  Final result               Lavender Top[[id number]]                                     Final result               Gold Top - SST[[id number]]                                   Final result               Gray Top[[id number]]                                         Final result                Light Blue Top[[id number]]                                   Final result                 Please view results for these tests on the individual orders.   CBC AND DIFFERENTIAL    Narrative:     The following orders were created for panel order CBC & Differential.  Procedure                               Abnormality         Status                     ---------                               -----------         ------                     CBC Auto Differential[[id number]]        Abnormal            Final result                 Please view results for these tests on the individual orders.   GREEN TOP   LAVENDER TOP   GOLD TOP - SST   GRAY TOP   LIGHT BLUE TOP       Meds Given in ED:   Medications   Sodium Chloride (PF) 0.9 % 10 mL (has no administration in time range)   sodium chloride 0.9 % infusion (has no administration in time range)   ondansetron (ZOFRAN) injection 4 mg (4 mg Intravenous Given 4/10/25 1424)     sodium chloride, 125 mL/hr         Last NIH score:                                                          Dysphagia screening results:  Patient Factors Component (Dysphagia:Stroke or Rule-out)  Best Eye Response: 4-->(E4) spontaneous (04/10/25 1545)  Best Motor Response: 6-->(M6) obeys commands (04/10/25 1545)  Best Verbal Response: 5-->(V5) oriented (04/10/25 1545)  Matthews Coma Scale Score: 15 (04/10/25 1545)     Jose Alfredo Coma Scale:  No data recorded     CIWA:        Restraint Type:            Isolation Status:  No active isolations

## 2025-04-11 LAB
ALBUMIN SERPL-MCNC: 3.8 G/DL (ref 3.5–5.2)
ALBUMIN/GLOB SERPL: 1.8 G/DL
ALP SERPL-CCNC: 113 U/L (ref 39–117)
ALT SERPL W P-5'-P-CCNC: 27 U/L (ref 1–33)
ANION GAP SERPL CALCULATED.3IONS-SCNC: 13 MMOL/L (ref 5–15)
AST SERPL-CCNC: 24 U/L (ref 1–32)
BASOPHILS # BLD AUTO: 0.03 10*3/MM3 (ref 0–0.2)
BASOPHILS NFR BLD AUTO: 0.3 % (ref 0–1.5)
BILIRUB SERPL-MCNC: 0.8 MG/DL (ref 0–1.2)
BUN SERPL-MCNC: 11 MG/DL (ref 6–20)
BUN/CREAT SERPL: 5.9 (ref 7–25)
CALCIUM SPEC-SCNC: 9.2 MG/DL (ref 8.6–10.5)
CHLORIDE SERPL-SCNC: 106 MMOL/L (ref 98–107)
CO2 SERPL-SCNC: 23 MMOL/L (ref 22–29)
CREAT SERPL-MCNC: 1.87 MG/DL (ref 0.57–1)
DEPRECATED RDW RBC AUTO: 43.8 FL (ref 37–54)
EGFRCR SERPLBLD CKD-EPI 2021: 31.7 ML/MIN/1.73
EOSINOPHIL # BLD AUTO: 0.32 10*3/MM3 (ref 0–0.4)
EOSINOPHIL NFR BLD AUTO: 3.4 % (ref 0.3–6.2)
ERYTHROCYTE [DISTWIDTH] IN BLOOD BY AUTOMATED COUNT: 13.8 % (ref 12.3–15.4)
GLOBULIN UR ELPH-MCNC: 2.1 GM/DL
GLUCOSE BLDC GLUCOMTR-MCNC: 181 MG/DL (ref 70–130)
GLUCOSE BLDC GLUCOMTR-MCNC: 196 MG/DL (ref 70–130)
GLUCOSE BLDC GLUCOMTR-MCNC: 261 MG/DL (ref 70–130)
GLUCOSE BLDC GLUCOMTR-MCNC: 71 MG/DL (ref 70–130)
GLUCOSE SERPL-MCNC: 148 MG/DL (ref 65–99)
HCT VFR BLD AUTO: 37.9 % (ref 34–46.6)
HGB BLD-MCNC: 13.1 G/DL (ref 12–15.9)
IMM GRANULOCYTES # BLD AUTO: 0.03 10*3/MM3 (ref 0–0.05)
IMM GRANULOCYTES NFR BLD AUTO: 0.3 % (ref 0–0.5)
LYMPHOCYTES # BLD AUTO: 2.68 10*3/MM3 (ref 0.7–3.1)
LYMPHOCYTES NFR BLD AUTO: 28.7 % (ref 19.6–45.3)
MCH RBC QN AUTO: 30 PG (ref 26.6–33)
MCHC RBC AUTO-ENTMCNC: 34.6 G/DL (ref 31.5–35.7)
MCV RBC AUTO: 86.9 FL (ref 79–97)
MONOCYTES # BLD AUTO: 0.48 10*3/MM3 (ref 0.1–0.9)
MONOCYTES NFR BLD AUTO: 5.1 % (ref 5–12)
NEUTROPHILS NFR BLD AUTO: 5.8 10*3/MM3 (ref 1.7–7)
NEUTROPHILS NFR BLD AUTO: 62.2 % (ref 42.7–76)
NRBC BLD AUTO-RTO: 0 /100 WBC (ref 0–0.2)
PLATELET # BLD AUTO: 232 10*3/MM3 (ref 140–450)
PMV BLD AUTO: 10.8 FL (ref 6–12)
POTASSIUM SERPL-SCNC: 3.9 MMOL/L (ref 3.5–5.2)
PROT SERPL-MCNC: 5.9 G/DL (ref 6–8.5)
RBC # BLD AUTO: 4.36 10*6/MM3 (ref 3.77–5.28)
SODIUM SERPL-SCNC: 142 MMOL/L (ref 136–145)
WBC NRBC COR # BLD AUTO: 9.34 10*3/MM3 (ref 3.4–10.8)

## 2025-04-11 PROCEDURE — 63710000001 INSULIN LISPRO (HUMAN) PER 5 UNITS: Performed by: PHYSICIAN ASSISTANT

## 2025-04-11 PROCEDURE — 99232 SBSQ HOSP IP/OBS MODERATE 35: CPT | Performed by: FAMILY MEDICINE

## 2025-04-11 PROCEDURE — 82948 REAGENT STRIP/BLOOD GLUCOSE: CPT

## 2025-04-11 PROCEDURE — 80053 COMPREHEN METABOLIC PANEL: CPT | Performed by: FAMILY MEDICINE

## 2025-04-11 PROCEDURE — G0378 HOSPITAL OBSERVATION PER HR: HCPCS

## 2025-04-11 PROCEDURE — 25810000003 SODIUM CHLORIDE 0.9 % SOLUTION: Performed by: PHYSICIAN ASSISTANT

## 2025-04-11 PROCEDURE — 25810000003 SODIUM CHLORIDE 0.9 % SOLUTION: Performed by: FAMILY MEDICINE

## 2025-04-11 PROCEDURE — 85025 COMPLETE CBC W/AUTO DIFF WBC: CPT | Performed by: PHYSICIAN ASSISTANT

## 2025-04-11 PROCEDURE — 63710000001 INSULIN GLARGINE PER 5 UNITS: Performed by: PHYSICIAN ASSISTANT

## 2025-04-11 RX ORDER — NYSTATIN 100000 [USP'U]/G
POWDER TOPICAL EVERY 12 HOURS SCHEDULED
Status: COMPLETED | OUTPATIENT
Start: 2025-04-11 | End: 2025-04-15

## 2025-04-11 RX ORDER — FAMOTIDINE 20 MG/1
20 TABLET, FILM COATED ORAL NIGHTLY
Status: DISCONTINUED | OUTPATIENT
Start: 2025-04-11 | End: 2025-04-25 | Stop reason: HOSPADM

## 2025-04-11 RX ORDER — SACCHAROMYCES BOULARDII 250 MG
250 CAPSULE ORAL 2 TIMES DAILY
Status: DISCONTINUED | OUTPATIENT
Start: 2025-04-11 | End: 2025-04-25 | Stop reason: HOSPADM

## 2025-04-11 RX ORDER — CHOLESTYRAMINE LIGHT 4 G/5.7G
1 POWDER, FOR SUSPENSION ORAL EVERY 12 HOURS SCHEDULED
Status: DISCONTINUED | OUTPATIENT
Start: 2025-04-11 | End: 2025-04-22

## 2025-04-11 RX ORDER — SODIUM CHLORIDE 9 MG/ML
100 INJECTION, SOLUTION INTRAVENOUS CONTINUOUS
Status: DISCONTINUED | OUTPATIENT
Start: 2025-04-11 | End: 2025-04-11

## 2025-04-11 RX ADMIN — NYSTATIN: 100000 POWDER TOPICAL at 13:27

## 2025-04-11 RX ADMIN — FAMOTIDINE 20 MG: 20 TABLET, FILM COATED ORAL at 20:00

## 2025-04-11 RX ADMIN — LEVOTHYROXINE SODIUM 25 MCG: 0.03 TABLET ORAL at 05:30

## 2025-04-11 RX ADMIN — RISPERIDONE 3 MG: 1 TABLET, FILM COATED ORAL at 08:31

## 2025-04-11 RX ADMIN — SODIUM CHLORIDE 100 ML/HR: 9 INJECTION, SOLUTION INTRAVENOUS at 15:59

## 2025-04-11 RX ADMIN — INSULIN LISPRO 2 UNITS: 100 INJECTION, SOLUTION INTRAVENOUS; SUBCUTANEOUS at 11:40

## 2025-04-11 RX ADMIN — LEVOTHYROXINE SODIUM 200 MCG: 0.1 TABLET ORAL at 05:30

## 2025-04-11 RX ADMIN — CHOLEYSTYRAMINE LIGHT 4 G: 4 POWDER, FOR SUSPENSION ORAL at 20:00

## 2025-04-11 RX ADMIN — SODIUM CHLORIDE 100 ML/HR: 9 INJECTION, SOLUTION INTRAVENOUS at 05:31

## 2025-04-11 RX ADMIN — INSULIN LISPRO 2 UNITS: 100 INJECTION, SOLUTION INTRAVENOUS; SUBCUTANEOUS at 16:52

## 2025-04-11 RX ADMIN — Medication 250 MG: at 10:30

## 2025-04-11 RX ADMIN — Medication 10 ML: at 08:31

## 2025-04-11 RX ADMIN — Medication 250 MG: at 20:00

## 2025-04-11 RX ADMIN — Medication 10 ML: at 20:04

## 2025-04-11 RX ADMIN — ATORVASTATIN CALCIUM 40 MG: 40 TABLET, FILM COATED ORAL at 08:30

## 2025-04-11 RX ADMIN — SODIUM CHLORIDE 100 ML/HR: 9 INJECTION, SOLUTION INTRAVENOUS at 13:27

## 2025-04-11 RX ADMIN — SODIUM CHLORIDE 100 ML/HR: 9 INJECTION, SOLUTION INTRAVENOUS at 10:30

## 2025-04-11 RX ADMIN — RISPERIDONE 3 MG: 1 TABLET, FILM COATED ORAL at 20:00

## 2025-04-11 RX ADMIN — CHOLEYSTYRAMINE LIGHT 4 G: 4 POWDER, FOR SUSPENSION ORAL at 11:39

## 2025-04-11 RX ADMIN — NYSTATIN: 100000 POWDER TOPICAL at 20:04

## 2025-04-11 RX ADMIN — INSULIN GLARGINE 20 UNITS: 100 INJECTION, SOLUTION SUBCUTANEOUS at 20:00

## 2025-04-11 RX ADMIN — Medication 5 MG: at 20:00

## 2025-04-11 NOTE — OUTREACH NOTE
Medical Week 3 Survey      Flowsheet Row Responses   [facility] patient discharged from? [facility]   Does the patient have one of the following disease processes/diagnoses(primary or secondary)? Other   Week 3 attempt successful? No   Unsuccessful attempts Attempt 1   Revoke Readmitted            TERRANCE MARION - Registered Nurse

## 2025-04-11 NOTE — PROGRESS NOTES
[facility] Medicine Services  PROGRESS NOTE    Patient Name: Vilma Ayala  : 1970  MRN: [id number]    Date of Admission: 4/10/2025  Primary Care Physician: Vanessa Kaur MD    Subjective   Subjective     CC:  Nausea, diarrhea    HPI:  Patient is a 54-year-old female seen and examined by me this a.m., she is resting comfortably in bed and reports feeling some better today but still having increased fatigue.  She denies any recent diarrhea but stool studies are ordered and pending.  She was recently treated for UTI with Bactrim and she is continuing treatment for CB at this time.      Objective   Objective     Vital Signs:   Temp:  [97.4 °F (36.3 °C)-98.4 °F (36.9 °C)] 97.4 °F (36.3 °C)  Heart Rate:  [] 86  Resp:  [16-18] 18  BP: (101-201)/() 121/77     Physical Exam:  Constitutional: No acute distress, awake, alert, currently on RA, frail appearing  HENT: NCAT, nares patent, mucous membranes moist  Respiratory: Clear to auscultation bilaterally, no rhonchi or wheezing, respiratory effort normal   Cardiovascular: RRR  Gastrointestinal: soft, nontender, nondistended  Musculoskeletal: No bilateral ankle edema, no clubbing or cyanosis   Psychiatric: Appropriate affect, cooperative  Neurologic: Oriented x 3, strength symmetric in all extremities, Cranial Nerves grossly intact to confrontation, speech clear  Skin: No rashes      Results Reviewed:  LAB RESULTS:      Lab 25  1026 04/10/25  1344 25  1029   WBC 9.34 18.06* 18.15*   HEMOGLOBIN 13.1 14.6 15.6   HEMATOCRIT 37.9 43.3 44.4   PLATELETS 232 338 373   NEUTROS ABS 5.80 14.41* 13.79*   IMMATURE GRANS (ABS) 0.03 0.08*  --    LYMPHS ABS 2.68 2.78  --    MONOS ABS 0.48 0.62  --    EOS ABS 0.32 0.12 0.36   MCV 86.9 86.4 86.0   LACTATE  --  1.6  --          Lab 25  1026 04/10/25  1344 25  1029   SODIUM 142 138 136   POTASSIUM 3.9 3.9 4.0   CHLORIDE 106 101 99   CO2 23.0 24.0 24.2   ANION GAP  13.0 13.0 12.8   BUN 11 11 9   CREATININE 1.87* 2.07* 1.88*   EGFR 31.7* 28.0* 31.5*   GLUCOSE 148* 112* 116*   CALCIUM 9.2 9.8 10.2   HEMOGLOBIN A1C  --  6.80*  --    TSH  --   --  1.690         Lab 04/11/25  1026 04/10/25  1344 04/09/25  1029   TOTAL PROTEIN 5.9* 7.6 7.7   ALBUMIN 3.8 4.3 4.4   GLOBULIN 2.1 3.3 3.3   ALT (SGPT) 27 33 37*   AST (SGOT) 24 28 32   BILIRUBIN 0.8 0.8 0.9   ALK PHOS 113 137* 148*   LIPASE  --  21 21             Lab 04/09/25  1029   CHOLESTEROL 113   LDL CHOL 55   HDL CHOL 37*   TRIGLYCERIDES 112             Brief Urine Lab Results  (Last result in the past 365 days)        Color   Clarity   Blood   Leuk Est   Nitrite   Protein   CREAT   Urine HCG        04/10/25 1734 Yellow   Clear   Negative   Trace   Negative   Negative                   Microbiology Results Abnormal       None            CT Abdomen Pelvis Without Contrast  Result Date: 4/10/2025  CT ABDOMEN PELVIS WO CONTRAST Date of Exam: 4/10/2025 3:21 PM EDT Indication: N/V, left flank pain. Comparison: Chest CT 3/23/2025 Technique: Axial CT images were obtained of the abdomen and pelvis without the administration of contrast. Reconstructed coronal and sagittal images were also obtained. Automated exposure control and iterative construction methods were used. Findings: Included Chest: Bibasal atelectasis Liver: No significant abnormality.  Gallbladder and biliary tree: No significant abnormality.  Spleen: No significant abnormality.  Pancreas: No significant abnormality.  Adrenal glands: Nonspecific and unchanged mild bilateral adrenal gland thickening.  Kidneys and ureters: No significant abnormality. No hydroureteronephrosis. No radiopaque calculi seen.  Stomach and duodenum:No significant abnormality. Small and large bowel: Colonic diverticulosis. No evidence of bowel obstruction. Appendicoliths within an otherwise normal-appearing appendix. No significant pericolonic inflammatory changes seen. Peritoneal cavity: No free fluid  or free air. Bladder: No significant abnormality.  Pelvic organs: No significant abnormality.  Vasculature: Atherosclerotic calcifications.  Lymph nodes: No pathologic appearing lymph nodes by imaging criteria.  Bones and soft tissues: Degenerative changes of the imaged spine. No acute osseous abnormality.     Impression: Impression: No acute findings in the abdomen or pelvis. Electronically Signed: Gregg Colindres  4/10/2025 3:32 PM EDT  Workstation ID: [id number]      Results for orders placed during the hospital encounter of 09/05/19    Adult Transthoracic Echo Complete W/ Cont if Necessary Per Protocol    Interpretation Summary  · Left ventricular systolic function is normal. Estimated EF = 60%.  · No significant valve dysfunction.      Current medications:  Scheduled Meds:atorvastatin, 40 mg, Oral, Daily  cholestyramine light, 1 packet, Oral, Q12H  famotidine, 20 mg, Oral, Nightly  insulin glargine, 20 Units, Subcutaneous, Nightly  insulin lispro, 2-9 Units, Subcutaneous, TID With Meals  levothyroxine, 200 mcg, Oral, Q AM  levothyroxine, 25 mcg, Oral, Q AM  nystatin, , Topical, Q12H  risperiDONE, 3 mg, Oral, BID  saccharomyces boulardii, 250 mg, Oral, BID  sodium chloride, 10 mL, Intravenous, Q12H      Continuous Infusions:sodium chloride, 100 mL/hr, Last Rate: 100 mL/hr (04/11/25 0531)      PRN Meds:.  acetaminophen **OR** acetaminophen **OR** acetaminophen    dextrose    dextrose    glucagon (human recombinant)    melatonin    nitroglycerin    ondansetron ODT **OR** ondansetron    Sodium Chloride (PF)    sodium chloride    sodium chloride    Assessment & Plan   Assessment & Plan     Active Hospital Problems    Diagnosis  POA    **Acute kidney injury superimposed on chronic kidney disease [N17.9, N18.9]  Yes    Diarrhea [R19.7]  Yes    Dehydration [E86.0]  Yes    Essential tremor [G25.0]  Yes    Type 2 diabetes mellitus without complication, with long-term current use of insulin [E11.9, Z79.4]  Not Applicable     Mixed hyperlipidemia [E78.2]  Yes    Other specified hypothyroidism [E03.8]  Yes    Anxiety and depression [F41.9, F32.A]  Yes      Resolved Hospital Problems   No resolved problems to display.        Brief Hospital Course to date:  Vilma Ayala is a 54 y.o. female with a history of Essential Tremor, T2DM, Hypothyroidism, anxiety/depression, and GERD who presents to [facility] ED for complaint of nausea and diarrhea.  Patient transferred to my services on the a.m. of 4/11, she is resting comfortably in bed, she reports diarrhea is doing better but stool studies are ordered and pending.  Continuing on IV fluids and treatment for CB, slight improvement in renal function today, will continue to monitor at this time.        CB on CKD   Dehydration  Nausea/Diarrhea  -Cr 2.07 on admission, down to 1.87 on 4/11, continued IVF   -UA tonight unremarkable  -CT abd/pelvis negative for acute findings  -CB possibly 2ry to recent bactrim use, as well as dehydration from poor po intake.  -GI panel PCR pending. Collect stool studies   -C diff toxin pending given leukocytosis and diarrhea from recent abx use.   -Resp panel negative   -IV fluids  -Likely hold off on nephro consult for now   -Avoid anti-diarrheal meds for now pending results of C diff toxin.   -Zofran for nausea     HLD  -Continue statin     T2DM  -Blood glucose 112  -A1C 6.8   -Fingerstick achs. SSI  -LA insulin nightly     Essential Tremor  -Continue Respiradone     Anxiety/depression  -Continue home meds          Expected Discharge Location and Transportation: Likely return independent facility, [facility]   Expected Discharge   Expected discharge date/ time has not been documented.     VTE Prophylaxis:  Mechanical VTE prophylaxis orders are present.         AM-PAC 6 Clicks Score (PT): 18 (04/11/25 0800)    CODE STATUS:   Code Status and Medical Interventions: CPR (Attempt to Resuscitate); Full Support   Ordered at: 04/10/25 2013     Code Status  (Patient has no pulse and is not breathing):    CPR (Attempt to Resuscitate)     Medical Interventions (Patient has pulse or is breathing):    Full Support       RENE Cruz,   04/11/25       VITAL SIGNS: I have reviewed nursing notes and confirm.  CONSTITUTIONAL: Well-appearing, non-toxic, in NAD  SKIN: Warm dry, normal skin turgor  HEAD: NCAT  EYES: No conjunctival injection, scleral anicteric  ENT: MMM  NECK: Supple; FROM.   CARD: RRR  RESP: CTAB  ABD: Soft, NDNT  EXT: No pedal edema, no calf tenderness  NEURO: Grossly normal examination, CN grossly intact  PSYCH: Cooperative, appropriate

## 2025-04-11 NOTE — PLAN OF CARE
Problem: Adult Inpatient Plan of Care  Goal: Plan of Care Review  Outcome: Progressing  Flowsheets (Taken 4/11/2025 1823)  Progress: no change  Outcome Evaluation: Labs trending well  Goal: Patient-Specific Goal (Individualized)  Outcome: Progressing  Goal: Absence of Hospital-Acquired Illness or Injury  Outcome: Progressing  Intervention: Identify and Manage Fall Risk  Recent Flowsheet Documentation  Taken 4/11/2025 1800 by Mulu Ann RN  Safety Promotion/Fall Prevention:   activity supervised   assistive device/personal items within reach   clutter free environment maintained   fall prevention program maintained   nonskid shoes/slippers when out of bed   room organization consistent   safety round/check completed  Taken 4/11/2025 1600 by Mulu Ann RN  Safety Promotion/Fall Prevention:   activity supervised   assistive device/personal items within reach   clutter free environment maintained   fall prevention program maintained   nonskid shoes/slippers when out of bed   room organization consistent   safety round/check completed  Intervention: Prevent Skin Injury  Recent Flowsheet Documentation  Taken 4/11/2025 1800 by Mulu Ann RN  Body Position: position changed independently  Taken 4/11/2025 1600 by Mulu Ann RN  Body Position: position changed independently  Skin Protection:   incontinence pads utilized   pouching devices used   protective footwear used  Intervention: Prevent Infection  Recent Flowsheet Documentation  Taken 4/11/2025 1800 by Mulu Ann RN  Infection Prevention:   hand hygiene promoted   personal protective equipment utilized  Taken 4/11/2025 1600 by Mulu Ann RN  Infection Prevention:   hand hygiene promoted   personal protective equipment utilized  Goal: Optimal Comfort and Wellbeing  Outcome: Progressing  Goal: Readiness for Transition of Care  Outcome: Progressing     Problem: Sepsis/Septic Shock  Goal: Optimal Coping  Outcome: Progressing  Goal:  Absence of Bleeding  Outcome: Progressing  Goal: Blood Glucose Level Within Target Range  Outcome: Progressing  Goal: Absence of Infection Signs and Symptoms  Outcome: Progressing  Intervention: Initiate Sepsis Management  Recent Flowsheet Documentation  Taken 4/11/2025 1800 by Mulu Ann RN  Infection Prevention:   hand hygiene promoted   personal protective equipment utilized  Taken 4/11/2025 1600 by Mulu Ann RN  Infection Prevention:   hand hygiene promoted   personal protective equipment utilized  Intervention: Promote Recovery  Recent Flowsheet Documentation  Taken 4/11/2025 1800 by Mulu Ann RN  Activity Management: activity encouraged  Taken 4/11/2025 1600 by Mulu Ann RN  Activity Management: activity encouraged  Goal: Optimal Nutrition Delivery  Outcome: Progressing     Problem: Fall Injury Risk  Goal: Absence of Fall and Fall-Related Injury  Outcome: Progressing  Intervention: Promote Injury-Free Environment  Recent Flowsheet Documentation  Taken 4/11/2025 1800 by Mulu Ann RN  Safety Promotion/Fall Prevention:   activity supervised   assistive device/personal items within reach   clutter free environment maintained   fall prevention program maintained   nonskid shoes/slippers when out of bed   room organization consistent   safety round/check completed  Taken 4/11/2025 1600 by Mulu Ann RN  Safety Promotion/Fall Prevention:   activity supervised   assistive device/personal items within reach   clutter free environment maintained   fall prevention program maintained   nonskid shoes/slippers when out of bed   room organization consistent   safety round/check completed   Goal Outcome Evaluation:           Progress: no change  Outcome Evaluation: Labs trending well

## 2025-04-11 NOTE — CASE MANAGEMENT/SOCIAL WORK
Discharge Planning Assessment  [facility]     Patient Name: Vilma Ayala  MRN: [id number]  Today's Date: 4/11/2025    Admit Date: 4/10/2025    Plan: Home   Discharge Needs Assessment       Row Name 04/11/25 1010       Living Environment    People in Home alone    Current Living Arrangements independent living facility    Duration at Residence Pt has lived at the [facility] for 3 years.    Potentially Unsafe Housing Conditions none    In the past 12 months has the electric, gas, oil, or water company threatened to shut off services in your home? No    Primary Care Provided by self    Provides Primary Care For no one    Family Caregiver if Needed none    Quality of Family Relationships unable to assess    Able to Return to Prior Arrangements yes       Resource/Environmental Concerns    Transportation Concerns none       Transition Planning    Transportation Anticipated other (see comments)  Will need Lyft       Discharge Needs Assessment    Readmission Within the Last 30 Days no previous admission in last 30 days    Equipment Currently Used at Home shower chair    Concerns to be Addressed denies needs/concerns at this time    Equipment Needed After Discharge none    Discharge Coordination/Progress Pt lives in [place] at the [facility], which is considered independent living.  Pt stated she manages her ADLs independently and is also independent with her mobility.  Pt said her only DME is a shower chair.  She isn't active with any home health services.  Pt doesn't have transportation.                   Discharge Plan       Row Name 04/11/25 1013       Plan    Plan Home    Patient/Family in Agreement with Plan yes    Plan Comments SW met with pt at bedside.  Pt plans to return to the [facility] at discharge.  Pt doesn't have transportation available to her.  A Lyft will be arranged at her time of discharge.    Final Discharge Disposition Code 01 - home or self-care                        Demographic Summary       Row Name 04/11/25 1009       General Information    Admission Type observation    Arrived From home    Referral Source nursing    Reason for Consult transportation    Preferred Language English    General Information Comments PCP is Vanessa Leija at [facility].                   Functional Status       Row Name 04/11/25 1009       Employment/    Employment/ Comments Pt has inurance and prescription coverage through [facility] [place] Medicare Replacement and [place] Medicaid.                   Psychosocial    No documentation.                  Abuse/Neglect    No documentation.                  Legal    No documentation.                  Substance Abuse    No documentation.                  Patient Forms    No documentation.                     VALARIE Croft

## 2025-04-12 ENCOUNTER — APPOINTMENT (OUTPATIENT)
Dept: GENERAL RADIOLOGY | Facility: HOSPITAL | Age: 55
DRG: 683 | End: 2025-04-12
Payer: MEDICARE

## 2025-04-12 LAB
ALBUMIN SERPL-MCNC: 3.6 G/DL (ref 3.5–5.2)
ALBUMIN/GLOB SERPL: 1.5 G/DL
ALP SERPL-CCNC: 106 U/L (ref 39–117)
ALT SERPL W P-5'-P-CCNC: 21 U/L (ref 1–33)
ANION GAP SERPL CALCULATED.3IONS-SCNC: 10 MMOL/L (ref 5–15)
AST SERPL-CCNC: 18 U/L (ref 1–32)
BASOPHILS # BLD AUTO: 0.06 10*3/MM3 (ref 0–0.2)
BASOPHILS NFR BLD AUTO: 0.6 % (ref 0–1.5)
BILIRUB SERPL-MCNC: 0.6 MG/DL (ref 0–1.2)
BUN SERPL-MCNC: 10 MG/DL (ref 6–20)
BUN/CREAT SERPL: 6.6 (ref 7–25)
CALCIUM SPEC-SCNC: 9.1 MG/DL (ref 8.6–10.5)
CHLORIDE SERPL-SCNC: 107 MMOL/L (ref 98–107)
CO2 SERPL-SCNC: 23 MMOL/L (ref 22–29)
CREAT SERPL-MCNC: 1.52 MG/DL (ref 0.57–1)
DEPRECATED RDW RBC AUTO: 46.2 FL (ref 37–54)
EGFRCR SERPLBLD CKD-EPI 2021: 40.6 ML/MIN/1.73
EOSINOPHIL # BLD AUTO: 0.56 10*3/MM3 (ref 0–0.4)
EOSINOPHIL NFR BLD AUTO: 5.3 % (ref 0.3–6.2)
ERYTHROCYTE [DISTWIDTH] IN BLOOD BY AUTOMATED COUNT: 14.2 % (ref 12.3–15.4)
GLOBULIN UR ELPH-MCNC: 2.4 GM/DL
GLUCOSE BLDC GLUCOMTR-MCNC: 228 MG/DL (ref 70–130)
GLUCOSE BLDC GLUCOMTR-MCNC: 279 MG/DL (ref 70–130)
GLUCOSE BLDC GLUCOMTR-MCNC: 319 MG/DL (ref 70–130)
GLUCOSE BLDC GLUCOMTR-MCNC: 366 MG/DL (ref 70–130)
GLUCOSE SERPL-MCNC: 141 MG/DL (ref 65–99)
HCT VFR BLD AUTO: 40.8 % (ref 34–46.6)
HGB BLD-MCNC: 13.3 G/DL (ref 12–15.9)
IMM GRANULOCYTES # BLD AUTO: 0.03 10*3/MM3 (ref 0–0.05)
IMM GRANULOCYTES NFR BLD AUTO: 0.3 % (ref 0–0.5)
LYMPHOCYTES # BLD AUTO: 3.49 10*3/MM3 (ref 0.7–3.1)
LYMPHOCYTES NFR BLD AUTO: 33.1 % (ref 19.6–45.3)
MAGNESIUM SERPL-MCNC: 1.7 MG/DL (ref 1.6–2.6)
MCH RBC QN AUTO: 29.3 PG (ref 26.6–33)
MCHC RBC AUTO-ENTMCNC: 32.6 G/DL (ref 31.5–35.7)
MCV RBC AUTO: 89.9 FL (ref 79–97)
MONOCYTES # BLD AUTO: 0.56 10*3/MM3 (ref 0.1–0.9)
MONOCYTES NFR BLD AUTO: 5.3 % (ref 5–12)
NEUTROPHILS NFR BLD AUTO: 5.85 10*3/MM3 (ref 1.7–7)
NEUTROPHILS NFR BLD AUTO: 55.4 % (ref 42.7–76)
NRBC BLD AUTO-RTO: 0 /100 WBC (ref 0–0.2)
PLATELET # BLD AUTO: 230 10*3/MM3 (ref 140–450)
PMV BLD AUTO: 11.4 FL (ref 6–12)
POTASSIUM SERPL-SCNC: 3.9 MMOL/L (ref 3.5–5.2)
PROCALCITONIN SERPL-MCNC: 0.08 NG/ML (ref 0–0.25)
PROT SERPL-MCNC: 6 G/DL (ref 6–8.5)
RBC # BLD AUTO: 4.54 10*6/MM3 (ref 3.77–5.28)
SODIUM SERPL-SCNC: 140 MMOL/L (ref 136–145)
WBC NRBC COR # BLD AUTO: 10.55 10*3/MM3 (ref 3.4–10.8)

## 2025-04-12 PROCEDURE — 85025 COMPLETE CBC W/AUTO DIFF WBC: CPT | Performed by: FAMILY MEDICINE

## 2025-04-12 PROCEDURE — 94799 UNLISTED PULMONARY SVC/PX: CPT

## 2025-04-12 PROCEDURE — 82948 REAGENT STRIP/BLOOD GLUCOSE: CPT

## 2025-04-12 PROCEDURE — G0378 HOSPITAL OBSERVATION PER HR: HCPCS

## 2025-04-12 PROCEDURE — 97161 PT EVAL LOW COMPLEX 20 MIN: CPT

## 2025-04-12 PROCEDURE — 97530 THERAPEUTIC ACTIVITIES: CPT

## 2025-04-12 PROCEDURE — 25010000002 METHYLPREDNISOLONE PER 40 MG: Performed by: FAMILY MEDICINE

## 2025-04-12 PROCEDURE — 94640 AIRWAY INHALATION TREATMENT: CPT

## 2025-04-12 PROCEDURE — 25010000002 BUMETANIDE PER 0.5 MG: Performed by: FAMILY MEDICINE

## 2025-04-12 PROCEDURE — 71045 X-RAY EXAM CHEST 1 VIEW: CPT

## 2025-04-12 PROCEDURE — 25810000003 SODIUM CHLORIDE 0.9 % SOLUTION: Performed by: PHYSICIAN ASSISTANT

## 2025-04-12 PROCEDURE — 99232 SBSQ HOSP IP/OBS MODERATE 35: CPT | Performed by: FAMILY MEDICINE

## 2025-04-12 PROCEDURE — 94761 N-INVAS EAR/PLS OXIMETRY MLT: CPT

## 2025-04-12 PROCEDURE — 80053 COMPREHEN METABOLIC PANEL: CPT | Performed by: FAMILY MEDICINE

## 2025-04-12 PROCEDURE — 63710000001 INSULIN GLARGINE PER 5 UNITS: Performed by: FAMILY MEDICINE

## 2025-04-12 PROCEDURE — 83735 ASSAY OF MAGNESIUM: CPT | Performed by: FAMILY MEDICINE

## 2025-04-12 PROCEDURE — 94664 DEMO&/EVAL PT USE INHALER: CPT

## 2025-04-12 PROCEDURE — 84145 PROCALCITONIN (PCT): CPT | Performed by: FAMILY MEDICINE

## 2025-04-12 PROCEDURE — 63710000001 INSULIN LISPRO (HUMAN) PER 5 UNITS: Performed by: PHYSICIAN ASSISTANT

## 2025-04-12 RX ORDER — IPRATROPIUM BROMIDE AND ALBUTEROL SULFATE 2.5; .5 MG/3ML; MG/3ML
3 SOLUTION RESPIRATORY (INHALATION)
Status: DISCONTINUED | OUTPATIENT
Start: 2025-04-12 | End: 2025-04-24

## 2025-04-12 RX ORDER — BUMETANIDE 0.25 MG/ML
1 INJECTION, SOLUTION INTRAMUSCULAR; INTRAVENOUS ONCE
Status: COMPLETED | OUTPATIENT
Start: 2025-04-12 | End: 2025-04-12

## 2025-04-12 RX ORDER — METHYLPREDNISOLONE SODIUM SUCCINATE 40 MG/ML
40 INJECTION, POWDER, LYOPHILIZED, FOR SOLUTION INTRAMUSCULAR; INTRAVENOUS DAILY
Status: DISCONTINUED | OUTPATIENT
Start: 2025-04-12 | End: 2025-04-13

## 2025-04-12 RX ADMIN — INSULIN GLARGINE 25 UNITS: 100 INJECTION, SOLUTION SUBCUTANEOUS at 21:41

## 2025-04-12 RX ADMIN — Medication 10 ML: at 08:18

## 2025-04-12 RX ADMIN — FAMOTIDINE 20 MG: 20 TABLET, FILM COATED ORAL at 21:41

## 2025-04-12 RX ADMIN — INSULIN LISPRO 7 UNITS: 100 INJECTION, SOLUTION INTRAVENOUS; SUBCUTANEOUS at 08:41

## 2025-04-12 RX ADMIN — IPRATROPIUM BROMIDE AND ALBUTEROL SULFATE 3 ML: 2.5; .5 SOLUTION RESPIRATORY (INHALATION) at 16:27

## 2025-04-12 RX ADMIN — Medication 10 ML: at 21:42

## 2025-04-12 RX ADMIN — SODIUM CHLORIDE 100 ML/HR: 9 INJECTION, SOLUTION INTRAVENOUS at 02:04

## 2025-04-12 RX ADMIN — METHYLPREDNISOLONE SODIUM SUCCINATE 40 MG: 40 INJECTION, POWDER, FOR SOLUTION INTRAMUSCULAR; INTRAVENOUS at 17:17

## 2025-04-12 RX ADMIN — LEVOTHYROXINE SODIUM 200 MCG: 0.1 TABLET ORAL at 05:31

## 2025-04-12 RX ADMIN — LEVOTHYROXINE SODIUM 25 MCG: 0.03 TABLET ORAL at 05:31

## 2025-04-12 RX ADMIN — INSULIN LISPRO 6 UNITS: 100 INJECTION, SOLUTION INTRAVENOUS; SUBCUTANEOUS at 17:17

## 2025-04-12 RX ADMIN — RISPERIDONE 3 MG: 1 TABLET, FILM COATED ORAL at 21:41

## 2025-04-12 RX ADMIN — CHOLEYSTYRAMINE LIGHT 4 G: 4 POWDER, FOR SUSPENSION ORAL at 23:13

## 2025-04-12 RX ADMIN — NYSTATIN: 100000 POWDER TOPICAL at 21:42

## 2025-04-12 RX ADMIN — RISPERIDONE 3 MG: 1 TABLET, FILM COATED ORAL at 08:18

## 2025-04-12 RX ADMIN — CHOLEYSTYRAMINE LIGHT 4 G: 4 POWDER, FOR SUSPENSION ORAL at 09:53

## 2025-04-12 RX ADMIN — BUMETANIDE 1 MG: 0.25 INJECTION INTRAMUSCULAR; INTRAVENOUS at 16:55

## 2025-04-12 RX ADMIN — NYSTATIN: 100000 POWDER TOPICAL at 08:21

## 2025-04-12 RX ADMIN — Medication 250 MG: at 21:41

## 2025-04-12 RX ADMIN — ATORVASTATIN CALCIUM 40 MG: 40 TABLET, FILM COATED ORAL at 08:18

## 2025-04-12 RX ADMIN — Medication 250 MG: at 08:18

## 2025-04-12 RX ADMIN — INSULIN LISPRO 4 UNITS: 100 INJECTION, SOLUTION INTRAVENOUS; SUBCUTANEOUS at 12:05

## 2025-04-12 NOTE — THERAPY EVALUATION
Patient Name: Vilma Ayala  : 1970    MRN: [id number]                              Today's Date: 2025       Admit Date: 4/10/2025    Visit Dx:     ICD-10-CM ICD-9-CM   1. CB (acute kidney injury)  N17.9 584.9   2. Nausea and vomiting, unspecified vomiting type  R11.2 787.01   3. Weakness  R53.1 780.79     Patient Active Problem List   Diagnosis    Anxiety and depression    Uncontrolled type 2 diabetes mellitus    Other specified hypothyroidism    Obesity    Vitamin D deficiency, unspecified    Diabetic neuropathy, type II diabetes mellitus    Mixed hyperlipidemia    Dyspnea on exertion    Type 2 diabetes mellitus without complication, with long-term current use of insulin    Urinary incontinence    Microscopic hematuria    Nocturia    Nocturnal enuresis    Stress incontinence    Urge incontinence    Decreased GFR    Mild persistent asthma    Obesity (BMI 35.0-39.9 without comorbidity)    Drug-induced parkinsonism    Encounter to establish care    NATASHA (obstructive sleep apnea)    Medicare annual wellness visit, subsequent    Vitamin D deficiency    UTI (urinary tract infection)    Painful urination    Nausea    Acute kidney injury superimposed on chronic kidney disease    Diarrhea    Dehydration    Essential tremor     Past Medical History:   Diagnosis Date    Anxiety     Chicken pox     Depression     Diabetes mellitus     Disease of thyroid gland     Measles     Type 2 diabetes mellitus      Past Surgical History:   Procedure Laterality Date    EYE SURGERY      TONSILLECTOMY        General Information       Row Name 25 0938          Physical Therapy Time and Intention    Document Type evaluation  -LS     Mode of Treatment physical therapy  -LS       Row Name 25 5372          General Information    Prior Level of Function independent:;gait;transfer  sleeps in a lift chair. pt said she walks laps at the [facility] and is indep without and AD at baseline.  -LS     Existing  Precautions/Restrictions fall   C-Diff rule out  -LS       Row Name 04/12/25 0950          Living Environment    Current Living Arrangements independent living facility  -     People in Home alone  -LS       Row Name 04/12/25 0950          Home Main Entrance    Number of Stairs, Main Entrance none  -LS       Row Name 04/12/25 0950          Stairs Within Home, Primary    Number of Stairs, Within Home, Primary none  -LS       Row Name 04/12/25 0950          Safety Issues/Impairments Affecting Functional Mobility    Safety Issues Affecting Function (Mobility) judgment;insight into deficits/self-awareness;safety precaution awareness  -LS     Impairments Affecting Function (Mobility) balance;endurance/activity tolerance;strength  -LS               User Key  (r) = Recorded By, (t) = Taken By, (c) = Cosigned By      Initials Name Provider Type    LS Jody Yeung, PT Physical Therapist                   Mobility       Row Name 04/12/25 0950          Bed Mobility    Bed Mobility supine-sit  -LS     Supine-Sit [place] (Bed Mobility) modified independence  -LS     Assistive Device (Bed Mobility) head of bed elevated  -LS     Comment, (Bed Mobility) with extended time and increased effort.  -       Row Name 04/12/25 0950          Sit-Stand Transfer    Sit-Stand [place] (Transfers) verbal cues;contact guard  -LS     Assistive Device (Sit-Stand Transfers) walker, front-wheeled  -LS     Comment, (Sit-Stand Transfer) CGA sit to stand with and without walkers. with sit to and from stand at a walker, she needed vcs for hand placement  -LS       Row Name 04/12/25 0950          Gait/Stairs (Locomotion)    [place] Level (Gait) contact guard  -LS     Assistive Device (Gait) walker, front-wheeled  -LS     Patient was able to Ambulate yes  -LS     Distance in Feet (Gait) 15  15' + 30' X 2  -LS     Deviations/Abnormal Patterns (Gait) gait speed decreased;stride length decreased  -LS     Comment, (Gait/Stairs) Pt  amb 15' min A without AD and was reaching for external surfaces. Assessed with walkers. Amb with 2 wheel RW 30' CGA. Amb 30' with rollator CGA. Pt prefers rollator. Needed vcs to stay close to either walker / avoid pushing it too far ahead.  -               User Key  (r) = Recorded By, (t) = Taken By, (c) = Cosigned By      Initials Name Provider Type    Jody Lincoln PT Physical Therapist                   Obj/Interventions       Row Name 04/12/25 0950          Range of Motion Comprehensive    General Range of Motion bilateral lower extremity ROM WFL  -       Row Name 04/12/25 0950          Strength Comprehensive (MMT)    Comment, General Manual Muscle Testing (MMT) Assessment B hip flexors 3+. B knee extensors 4+. B ankle dorsiflexors 4/5.  -       Row Name 04/12/25 0981          Balance    Balance Assessment standing static balance;standing dynamic balance  -LS     Static Standing Balance contact guard  -LS     Dynamic Standing Balance contact guard  -LS     Position/Device Used, Standing Balance supported;walker, 4-wheeled;walker, front-wheeled  -     Balance Interventions standing;sit to stand;supported;weight shifting activity  -               User Key  (r) = Recorded By, (t) = Taken By, (c) = Cosigned By      Initials Name Provider Type    Jody Lincoln PT Physical Therapist                   Goals/Plan       Row Name 04/12/25 1734          Transfer Goal 1 (PT)    Activity/Assistive Device (Transfer Goal 1, PT) sit-to-stand/stand-to-sit;walker, rolling  -LS     [place] Level/Cues Needed (Transfer Goal 1, PT) modified independence  -LS     Time Frame (Transfer Goal 1, PT) short term goal (STG);5 days  -     Progress/Outcome (Transfer Goal 1, PT) goal ongoing  -       Row Name 04/12/25 0918          Gait Training Goal 1 (PT)    Activity/Assistive Device (Gait Training Goal 1, PT) gait (walking locomotion);assistive device use;walker, rolling  -LS     [place] Level  (Gait Training Goal 1, PT) modified independence  -LS     Distance (Gait Training Goal 1, PT) 100  -LS     Time Frame (Gait Training Goal 1, PT) long term goal (LTG);10 days  -LS     Progress/Outcome (Gait Training Goal 1, PT) goal ongoing  -       Row Name 04/12/25 0950          Therapy Assessment/Plan (PT)    Planned Therapy Interventions (PT) balance training;gait training;patient/family education;strengthening;transfer training  -LS               User Key  (r) = Recorded By, (t) = Taken By, (c) = Cosigned By      Initials Name Provider Type    Jody Lincoln, PT Physical Therapist                   Clinical Impression       Row Name 04/12/25 0950          Pain    Pretreatment Pain Rating 0/10 - no pain  -LS     Posttreatment Pain Rating 0/10 - no pain  -LS       Row Name 04/12/25 0950          Plan of Care Review    Plan of Care Reviewed With patient  -LS     Outcome Evaluation Pt presents with deficits in mobility and is below her baseline. She is unsteady and at high risk for falls when walking without a walker. Pt was assessed with a two-wheel walker and a rollator; she needed CGA with either but prefers rollator. A rollator and HHPT is recommended at discharge.  -       Row Name 04/12/25 0950          Therapy Assessment/Plan (PT)    Patient/Family Therapy Goals Statement (PT) Pt did not state.  -LS     Rehab Potential (PT) good  -LS     Criteria for Skilled Interventions Met (PT) yes;meets criteria  -LS     Therapy Frequency (PT) daily  -LS     Predicted Duration of Therapy Intervention (PT) 10 days  -       Row Name 04/12/25 0942          Positioning and Restraints    Pre-Treatment Position in bed  -LS     Post Treatment Position chair  -LS     In Chair notified nsg;sitting;call light within reach;encouraged to call for assist;exit alarm on;legs elevated  -LS               User Key  (r) = Recorded By, (t) = Taken By, (c) = Cosigned By      Initials Name Provider Type    Jody Lincoln  Gonzalez, LUKASZ Physical Therapist                   Outcome Measures       Row Name 04/12/25 0950          How much help from another person do you currently need...    Turning from your back to your side while in flat bed without using bedrails? 3  -LS     Moving from lying on back to sitting on the side of a flat bed without bedrails? 3  -LS     Moving to and from a bed to a chair (including a wheelchair)? 3  -LS     Standing up from a chair using your arms (e.g., wheelchair, bedside chair)? 3  -LS     Climbing 3-5 steps with a railing? 3  -LS     To walk in hospital room? 3  -LS     AM-PAC 6 Clicks Score (PT) 18  -LS     Highest Level of Mobility Goal 6 --> Walk 10 steps or more  -       Row Name 04/12/25 0950          Functional Assessment    Outcome Measure Options AM-PAC 6 Clicks Basic Mobility (PT)  -               User Key  (r) = Recorded By, (t) = Taken By, (c) = Cosigned By      Initials Name Provider Type     Jody Yeung, LUKASZ Physical Therapist                                 Physical Therapy Education       Title: PT OT SLP Therapies (In Progress)       Topic: Physical Therapy (In Progress)       Point: Mobility training (Done)       Learning Progress Summary            Patient Acceptance, E, VU,NR by  at 4/12/2025 0950                      Point: Home exercise program (Not Started)       Learner Progress:  Not documented in this visit.              Point: Body mechanics (Not Started)       Learner Progress:  Not documented in this visit.              Point: Precautions (Not Started)       Learner Progress:  Not documented in this visit.                              User Key       Initials Effective Dates Name Provider Type Discipline     07/11/23 -  Jody Yeung, LUKASZ Physical Therapist PT                  PT Recommendation and Plan  Planned Therapy Interventions (PT): balance training, gait training, patient/family education, strengthening, transfer training  Outcome Evaluation: Pt  presents with deficits in mobility and is below her baseline. She is unsteady and at high risk for falls when walking without a walker. Pt was assessed with a two-wheel walker and a rollator; she needed CGA with either but prefers rollator. A rollator and HHPT is recommended at discharge.     Time Calculation:   PT Evaluation Complexity  History, PT Evaluation Complexity: 3 or more personal factors and/or comorbidities  Examination of Body Systems (PT Eval Complexity): total of 4 or more elements  Clinical Presentation (PT Evaluation Complexity): stable  Clinical Decision Making (PT Evaluation Complexity): low complexity  Overall Complexity (PT Evaluation Complexity): low complexity     PT Charges       Row Name 04/12/25 0950             Time Calculation    Start Time 0950  -LS      PT Received On 04/12/25  -LS      PT Goal Re-Cert Due Date 04/22/25  -LS         Timed Charges    60577 - Gait Training Minutes  5  -LS      95454 - PT Therapeutic Activity Minutes 10  -LS         Untimed Charges    PT Eval/Re-eval Minutes 60  -LS         Total Minutes    Timed Charges Total Minutes 15  -LS      Untimed Charges Total Minutes 60  -LS       Total Minutes 75  -LS                User Key  (r) = Recorded By, (t) = Taken By, (c) = Cosigned By      Initials Name Provider Type    LS Jody Yeung, PT Physical Therapist                  Therapy Charges for Today       Code Description Service Date Service Provider Modifiers Qty    91102186047 HC PT THERAPEUTIC ACT EA 15 MIN 4/12/2025 Jody Yeung, PT GP 1    82609540389 HC PT EVAL LOW COMPLEXITY 4 4/12/2025 Jody Yeung, PT GP 1            PT G-Codes  Outcome Measure Options: AM-PAC 6 Clicks Basic Mobility (PT)  AM-PAC 6 Clicks Score (PT): 18  PT Discharge Summary  Anticipated Discharge Disposition (PT): home with home health    Jody Yeung, PT  4/12/2025

## 2025-04-12 NOTE — PROGRESS NOTES
[facility]  PROGRESS NOTE    Patient Name: Vilma Ayala  : 1970  MRN: [id number]    Date of Admission: 4/10/2025  Primary Care Physician: Vanessa Kaur MD    Subjective   Subjective     CC:  Nausea, diarrhea    HPI:  Patient is a 53 yo F seen and examined by me this AM, she is resting comfortably in bed, she reports feeling better today, CB continuing to trend down at this time. Continued IVF at this time. No new acute complaints.       Objective   Objective     Vital Signs:   Temp:  [97.4 °F (36.3 °C)-98.2 °F (36.8 °C)] 97.6 °F (36.4 °C)  Heart Rate:  [] 82  Resp:  [16-18] 18  BP: (112-133)/(77-95) 112/82     Physical Exam:  Constitutional: No acute distress, awake, alert, currently on RA, frail appearing  HENT: NCAT, nares patent, mucous membranes moist  Respiratory: Clear to auscultation bilaterally, no rhonchi or wheezing, respiratory effort normal   Cardiovascular: RRR  Gastrointestinal: soft, nontender, nondistended  Musculoskeletal: No bilateral ankle edema, no clubbing or cyanosis   Psychiatric: Appropriate affect, cooperative  Neurologic: Oriented x 3, strength symmetric in all extremities, Cranial Nerves grossly intact to confrontation, speech clear  Skin: No rashes      Results Reviewed:  LAB RESULTS:      Lab 25  0503 25  1026 04/10/25  1344 25  1029   WBC 10.55 9.34 18.06* 18.15*   HEMOGLOBIN 13.3 13.1 14.6 15.6   HEMATOCRIT 40.8 37.9 43.3 44.4   PLATELETS 230 232 338 373   NEUTROS ABS 5.85 5.80 14.41* 13.79*   IMMATURE GRANS (ABS) 0.03 0.03 0.08*  --    LYMPHS ABS 3.49* 2.68 2.78  --    MONOS ABS 0.56 0.48 0.62  --    EOS ABS 0.56* 0.32 0.12 0.36   MCV 89.9 86.9 86.4 86.0   LACTATE  --   --  1.6  --          Lab 25  0503 25  1026 04/10/25  1344 25  1029   SODIUM 140 142 138 136   POTASSIUM 3.9 3.9 3.9 4.0   CHLORIDE 107 106 101 99   CO2 23.0 23.0 24.0 24.2   ANION GAP 10.0 13.0 13.0 12.8   BUN 10  11 11 9   CREATININE 1.52* 1.87* 2.07* 1.88*   EGFR 40.6* 31.7* 28.0* 31.5*   GLUCOSE 141* 148* 112* 116*   CALCIUM 9.1 9.2 9.8 10.2   MAGNESIUM 1.7  --   --   --    HEMOGLOBIN A1C  --   --  6.80*  --    TSH  --   --   --  1.690         Lab 04/12/25  0503 04/11/25  1026 04/10/25  1344 04/09/25  1029   TOTAL PROTEIN 6.0 5.9* 7.6 7.7   ALBUMIN 3.6 3.8 4.3 4.4   GLOBULIN 2.4 2.1 3.3 3.3   ALT (SGPT) 21 27 33 37*   AST (SGOT) 18 24 28 32   BILIRUBIN 0.6 0.8 0.8 0.9   ALK PHOS 106 113 137* 148*   LIPASE  --   --  21 21             Lab 04/09/25  1029   CHOLESTEROL 113   LDL CHOL 55   HDL CHOL 37*   TRIGLYCERIDES 112             Brief Urine Lab Results  (Last result in the past 365 days)        Color   Clarity   Blood   Leuk Est   Nitrite   Protein   CREAT   Urine HCG        04/10/25 1734 Yellow   Clear   Negative   Trace   Negative   Negative                   Microbiology Results Abnormal       None            CT Abdomen Pelvis Without Contrast  Result Date: 4/10/2025  CT ABDOMEN PELVIS WO CONTRAST Date of Exam: 4/10/2025 3:21 PM EDT Indication: N/V, left flank pain. Comparison: Chest CT 3/23/2025 Technique: Axial CT images were obtained of the abdomen and pelvis without the administration of contrast. Reconstructed coronal and sagittal images were also obtained. Automated exposure control and iterative construction methods were used. Findings: Included Chest: Bibasal atelectasis Liver: No significant abnormality.  Gallbladder and biliary tree: No significant abnormality.  Spleen: No significant abnormality.  Pancreas: No significant abnormality.  Adrenal glands: Nonspecific and unchanged mild bilateral adrenal gland thickening.  Kidneys and ureters: No significant abnormality. No hydroureteronephrosis. No radiopaque calculi seen.  Stomach and duodenum:No significant abnormality. Small and large bowel: Colonic diverticulosis. No evidence of bowel obstruction. Appendicoliths within an otherwise normal-appearing appendix.  No significant pericolonic inflammatory changes seen. Peritoneal cavity: No free fluid or free air. Bladder: No significant abnormality.  Pelvic organs: No significant abnormality.  Vasculature: Atherosclerotic calcifications.  Lymph nodes: No pathologic appearing lymph nodes by imaging criteria.  Bones and soft tissues: Degenerative changes of the imaged spine. No acute osseous abnormality.     Impression: Impression: No acute findings in the abdomen or pelvis. Electronically Signed: Gregg Colindres  4/10/2025 3:32 PM EDT  Workstation ID: [id number]      Results for orders placed during the hospital encounter of 09/05/19    Adult Transthoracic Echo Complete W/ Cont if Necessary Per Protocol    Interpretation Summary  · Left ventricular systolic function is normal. Estimated EF = 60%.  · No significant valve dysfunction.      Current medications:  Scheduled Meds:atorvastatin, 40 mg, Oral, Daily  cholestyramine light, 1 packet, Oral, Q12H  famotidine, 20 mg, Oral, Nightly  insulin glargine, 20 Units, Subcutaneous, Nightly  insulin lispro, 2-9 Units, Subcutaneous, TID With Meals  levothyroxine, 200 mcg, Oral, Q AM  levothyroxine, 25 mcg, Oral, Q AM  nystatin, , Topical, Q12H  risperiDONE, 3 mg, Oral, BID  saccharomyces boulardii, 250 mg, Oral, BID  sodium chloride, 10 mL, Intravenous, Q12H      Continuous Infusions:sodium chloride, 100 mL/hr, Last Rate: 100 mL/hr (04/12/25 0842)      PRN Meds:.  acetaminophen **OR** acetaminophen **OR** acetaminophen    dextrose    dextrose    glucagon (human recombinant)    melatonin    nitroglycerin    ondansetron ODT **OR** ondansetron    Sodium Chloride (PF)    sodium chloride    sodium chloride    Assessment & Plan   Assessment & Plan     Active Hospital Problems    Diagnosis  POA    **Acute kidney injury superimposed on chronic kidney disease [N17.9, N18.9]  Yes    Diarrhea [R19.7]  Yes    Dehydration [E86.0]  Yes    Essential tremor [G25.0]  Yes    Type 2 diabetes mellitus  without complication, with long-term current use of insulin [E11.9, Z79.4]  Not Applicable    Mixed hyperlipidemia [E78.2]  Yes    Other specified hypothyroidism [E03.8]  Yes    Anxiety and depression [F41.9, F32.A]  Yes      Resolved Hospital Problems   No resolved problems to display.        Brief Hospital Course to date:  Vilma Ayala is a 54 y.o. female with a history of Essential Tremor, T2DM, Hypothyroidism, anxiety/depression, and GERD who presents to [facility] ED for complaint of nausea and diarrhea.  Patient transferred to my services on the a.m. of 4/11, she is resting comfortably in bed, she reports diarrhea is doing better but stool studies are ordered and pending.  Continuing on IV fluids and treatment for CB, slight improvement in renal function today, will continue to monitor at this time. Seen again on 4/12, continuing to have improvement of CB, continued fluid support, no recurrent diarrhea at this time.         CB on CKD   Dehydration  Nausea/Diarrhea  -Cr 2.07 on admission, down to 1.87 on 4/11, continued IVF   -UA tonight unremarkable  -CT abd/pelvis negative for acute findings  -CB possibly 2ry to recent bactrim use, as well as dehydration from poor po intake.  -GI panel PCR pending. Collect stool studies, no recurrent diarrhea since admission   -Resp panel negative   -IV fluids  -Likely hold off on nephro consult for now   -Zofran for nausea     HLD  -Continue statin     T2DM  -Blood glucose 112  -A1C 6.8   -Fingerstick achs. SSI  -LA insulin nightly     Essential Tremor  -Continue Respiradone     Anxiety/depression  -Continue home meds          Expected Discharge Location and Transportation: Likely return independent facility, [facility]   Expected Discharge   Expected Discharge Date: 4/13/2025; Expected Discharge Time:      VTE Prophylaxis:  Mechanical VTE prophylaxis orders are present.         AM-PAC 6 Clicks Score (PT): 18 (04/12/25 6599)    CODE STATUS:   Code Status and  Medical Interventions: CPR (Attempt to Resuscitate); Full Support   Ordered at: 04/10/25 2013     Code Status (Patient has no pulse and is not breathing):    CPR (Attempt to Resuscitate)     Medical Interventions (Patient has pulse or is breathing):    Full Support       RENE Cruz, DO  04/12/25

## 2025-04-12 NOTE — PROGRESS NOTES
[facility] Medicine Services  PROGRESS NOTE    Patient Name: Vilma Ayala  : 1970  MRN: [id number]    Date of Admission: 4/10/2025  Primary Care Physician: Vanessa Karu MD    Subjective   Subjective     CC:  Nausea, diarrhea    HPI:  Patient is a 54-year-old female seen and examined by me this a.m., she is resting comfortably in bed and reports feeling some better today but still having increased fatigue.  She denies any recent diarrhea but stool studies are ordered and pending.  She was recently treated for UTI with Bactrim and she is continuing treatment for CB at this time.      Objective   Objective     Vital Signs:   Temp:  [97.4 °F (36.3 °C)-98.2 °F (36.8 °C)] 97.6 °F (36.4 °C)  Heart Rate:  [] 82  Resp:  [16-18] 18  BP: (112-133)/(77-95) 112/82     Physical Exam:  Constitutional: No acute distress, awake, alert, currently on RA, frail appearing  HENT: NCAT, nares patent, mucous membranes moist  Respiratory: Clear to auscultation bilaterally, no rhonchi or wheezing, respiratory effort normal   Cardiovascular: RRR  Gastrointestinal: soft, nontender, nondistended  Musculoskeletal: No bilateral ankle edema, no clubbing or cyanosis   Psychiatric: Appropriate affect, cooperative  Neurologic: Oriented x 3, strength symmetric in all extremities, Cranial Nerves grossly intact to confrontation, speech clear  Skin: No rashes      Results Reviewed:  LAB RESULTS:      Lab 25  0503 25  1026 04/10/25  1344 25  1029   WBC 10.55 9.34 18.06* 18.15*   HEMOGLOBIN 13.3 13.1 14.6 15.6   HEMATOCRIT 40.8 37.9 43.3 44.4   PLATELETS 230 232 338 373   NEUTROS ABS 5.85 5.80 14.41* 13.79*   IMMATURE GRANS (ABS) 0.03 0.03 0.08*  --    LYMPHS ABS 3.49* 2.68 2.78  --    MONOS ABS 0.56 0.48 0.62  --    EOS ABS 0.56* 0.32 0.12 0.36   MCV 89.9 86.9 86.4 86.0   LACTATE  --   --  1.6  --          Lab 25  0503 25  1026 04/10/25  1344 25  1029   SODIUM 140 142  138 136   POTASSIUM 3.9 3.9 3.9 4.0   CHLORIDE 107 106 101 99   CO2 23.0 23.0 24.0 24.2   ANION GAP 10.0 13.0 13.0 12.8   BUN 10 11 11 9   CREATININE 1.52* 1.87* 2.07* 1.88*   EGFR 40.6* 31.7* 28.0* 31.5*   GLUCOSE 141* 148* 112* 116*   CALCIUM 9.1 9.2 9.8 10.2   MAGNESIUM 1.7  --   --   --    HEMOGLOBIN A1C  --   --  6.80*  --    TSH  --   --   --  1.690         Lab 04/12/25  0503 04/11/25  1026 04/10/25  1344 04/09/25  1029   TOTAL PROTEIN 6.0 5.9* 7.6 7.7   ALBUMIN 3.6 3.8 4.3 4.4   GLOBULIN 2.4 2.1 3.3 3.3   ALT (SGPT) 21 27 33 37*   AST (SGOT) 18 24 28 32   BILIRUBIN 0.6 0.8 0.8 0.9   ALK PHOS 106 113 137* 148*   LIPASE  --   --  21 21             Lab 04/09/25  1029   CHOLESTEROL 113   LDL CHOL 55   HDL CHOL 37*   TRIGLYCERIDES 112             Brief Urine Lab Results  (Last result in the past 365 days)        Color   Clarity   Blood   Leuk Est   Nitrite   Protein   CREAT   Urine HCG        04/10/25 1734 Yellow   Clear   Negative   Trace   Negative   Negative                   Microbiology Results Abnormal       None            CT Abdomen Pelvis Without Contrast  Result Date: 4/10/2025  CT ABDOMEN PELVIS WO CONTRAST Date of Exam: 4/10/2025 3:21 PM EDT Indication: N/V, left flank pain. Comparison: Chest CT 3/23/2025 Technique: Axial CT images were obtained of the abdomen and pelvis without the administration of contrast. Reconstructed coronal and sagittal images were also obtained. Automated exposure control and iterative construction methods were used. Findings: Included Chest: Bibasal atelectasis Liver: No significant abnormality.  Gallbladder and biliary tree: No significant abnormality.  Spleen: No significant abnormality.  Pancreas: No significant abnormality.  Adrenal glands: Nonspecific and unchanged mild bilateral adrenal gland thickening.  Kidneys and ureters: No significant abnormality. No hydroureteronephrosis. No radiopaque calculi seen.  Stomach and duodenum:No significant abnormality. Small and  large bowel: Colonic diverticulosis. No evidence of bowel obstruction. Appendicoliths within an otherwise normal-appearing appendix. No significant pericolonic inflammatory changes seen. Peritoneal cavity: No free fluid or free air. Bladder: No significant abnormality.  Pelvic organs: No significant abnormality.  Vasculature: Atherosclerotic calcifications.  Lymph nodes: No pathologic appearing lymph nodes by imaging criteria.  Bones and soft tissues: Degenerative changes of the imaged spine. No acute osseous abnormality.     Impression: Impression: No acute findings in the abdomen or pelvis. Electronically Signed: Gregg Colindres  4/10/2025 3:32 PM EDT  Workstation ID: [id number]      Results for orders placed during the hospital encounter of 09/05/19    Adult Transthoracic Echo Complete W/ Cont if Necessary Per Protocol    Interpretation Summary  · Left ventricular systolic function is normal. Estimated EF = 60%.  · No significant valve dysfunction.      Current medications:  Scheduled Meds:atorvastatin, 40 mg, Oral, Daily  cholestyramine light, 1 packet, Oral, Q12H  famotidine, 20 mg, Oral, Nightly  insulin glargine, 20 Units, Subcutaneous, Nightly  insulin lispro, 2-9 Units, Subcutaneous, TID With Meals  levothyroxine, 200 mcg, Oral, Q AM  levothyroxine, 25 mcg, Oral, Q AM  nystatin, , Topical, Q12H  risperiDONE, 3 mg, Oral, BID  saccharomyces boulardii, 250 mg, Oral, BID  sodium chloride, 10 mL, Intravenous, Q12H      Continuous Infusions:sodium chloride, 100 mL/hr, Last Rate: 100 mL/hr (04/12/25 0842)      PRN Meds:.  acetaminophen **OR** acetaminophen **OR** acetaminophen    dextrose    dextrose    glucagon (human recombinant)    melatonin    nitroglycerin    ondansetron ODT **OR** ondansetron    Sodium Chloride (PF)    sodium chloride    sodium chloride    Assessment & Plan   Assessment & Plan     Active Hospital Problems    Diagnosis  POA    **Acute kidney injury superimposed on chronic kidney disease [N17.9,  N18.9]  Yes    Diarrhea [R19.7]  Yes    Dehydration [E86.0]  Yes    Essential tremor [G25.0]  Yes    Type 2 diabetes mellitus without complication, with long-term current use of insulin [E11.9, Z79.4]  Not Applicable    Mixed hyperlipidemia [E78.2]  Yes    Other specified hypothyroidism [E03.8]  Yes    Anxiety and depression [F41.9, F32.A]  Yes      Resolved Hospital Problems   No resolved problems to display.        Brief Hospital Course to date:  Vilma Ayala is a 54 y.o. female with a history of Essential Tremor, T2DM, Hypothyroidism, anxiety/depression, and GERD who presents to [facility] ED for complaint of nausea and diarrhea.  Patient transferred to my services on the a.m. of 4/11, she is resting comfortably in bed, she reports diarrhea is doing better but stool studies are ordered and pending.  Continuing on IV fluids and treatment for CB, slight improvement in renal function today, will continue to monitor at this time.        CB on CKD   Dehydration  Nausea/Diarrhea  -Cr 2.07 on admission, down to 1.87 on 4/11, continued IVF   -UA tonight unremarkable  -CT abd/pelvis negative for acute findings  -CB possibly 2ry to recent bactrim use, as well as dehydration from poor po intake.  -GI panel PCR pending. Collect stool studies   -C diff toxin pending given leukocytosis and diarrhea from recent abx use.   -Resp panel negative   -IV fluids  -Likely hold off on nephro consult for now   -Avoid anti-diarrheal meds for now pending results of C diff toxin.   -Zofran for nausea     Dyspnea  - possibly related to recent fluids   - Ordered for duonebs, cxr, will add IS/FD as well     HLD  -Continue statin     T2DM  -Blood glucose 112  -A1C 6.8   -Fingerstick achs. SSI  -LA insulin nightly     Essential Tremor  -Continue Respiradone     Anxiety/depression  -Continue home meds          Expected Discharge Location and Transportation: Likely return independent facility, [facility]   Expected Discharge    Expected Discharge Date: 4/13/2025; Expected Discharge Time:      VTE Prophylaxis:  Mechanical VTE prophylaxis orders are present.         AM-PAC 6 Clicks Score (PT): 18 (04/12/25 3878)    CODE STATUS:   Code Status and Medical Interventions: CPR (Attempt to Resuscitate); Full Support   Ordered at: 04/10/25 2013     Code Status (Patient has no pulse and is not breathing):    CPR (Attempt to Resuscitate)     Medical Interventions (Patient has pulse or is breathing):    Full Support       RENE Cruz,   04/12/25

## 2025-04-12 NOTE — PLAN OF CARE
Problem: Adult Inpatient Plan of Care  Goal: Plan of Care Review  Outcome: Progressing  Goal: Patient-Specific Goal (Individualized)  Outcome: Progressing  Goal: Absence of Hospital-Acquired Illness or Injury  Outcome: Progressing  Intervention: Identify and Manage Fall Risk  Recent Flowsheet Documentation  Taken 4/12/2025 1800 by Lory Campuzano RN  Safety Promotion/Fall Prevention:   assistive device/personal items within reach   clutter free environment maintained   fall prevention program maintained   nonskid shoes/slippers when out of bed   room organization consistent   safety round/check completed   toileting scheduled  Taken 4/12/2025 1600 by Lory Campuzano RN  Safety Promotion/Fall Prevention:   activity supervised   room organization consistent   safety round/check completed  Taken 4/12/2025 1430 by Lory Campuzano RN  Safety Promotion/Fall Prevention:   activity supervised   room organization consistent   safety round/check completed  Taken 4/12/2025 1200 by Lory Campuzano RN  Safety Promotion/Fall Prevention:   activity supervised   room organization consistent   safety round/check completed  Taken 4/12/2025 1000 by Lory Campuzano RN  Safety Promotion/Fall Prevention:   activity supervised   room organization consistent   safety round/check completed  Taken 4/12/2025 0800 by Lory Campuzano RN  Safety Promotion/Fall Prevention:   activity supervised   room organization consistent   safety round/check completed  Intervention: Prevent Skin Injury  Recent Flowsheet Documentation  Taken 4/12/2025 1800 by Lory Campuzano RN  Body Position: position changed independently  Taken 4/12/2025 1600 by Lory Campuzano RN  Body Position: position changed independently  Skin Protection: incontinence pads utilized  Taken 4/12/2025 1430 by Lory Campuzano RN  Body Position: position changed independently  Skin Protection: incontinence pads utilized  Taken 4/12/2025 1200 by Lory Campuzano RN  Body  Position: position changed independently  Skin Protection: incontinence pads utilized  Taken 4/12/2025 1000 by Lory Campuzano RN  Body Position: position changed independently  Skin Protection: incontinence pads utilized  Taken 4/12/2025 0800 by Lory Campuzano RN  Body Position: position changed independently  Skin Protection: incontinence pads utilized  Intervention: Prevent Infection  Recent Flowsheet Documentation  Taken 4/12/2025 1800 by Lory Campuzano RN  Infection Prevention:   cohorting utilized   environmental surveillance performed   rest/sleep promoted   single patient room provided  Goal: Optimal Comfort and Wellbeing  Outcome: Progressing  Goal: Readiness for Transition of Care  Outcome: Progressing     Problem: Sepsis/Septic Shock  Goal: Optimal Coping  Outcome: Progressing  Goal: Absence of Bleeding  Outcome: Progressing  Goal: Blood Glucose Level Within Target Range  Outcome: Progressing  Goal: Absence of Infection Signs and Symptoms  Outcome: Progressing  Intervention: Initiate Sepsis Management  Recent Flowsheet Documentation  Taken 4/12/2025 1800 by Lory Campuzano RN  Infection Prevention:   cohorting utilized   environmental surveillance performed   rest/sleep promoted   single patient room provided  Intervention: Promote Recovery  Recent Flowsheet Documentation  Taken 4/12/2025 1800 by Lory Campuzano RN  Activity Management: activity encouraged  Sleep/Rest Enhancement: regular sleep/rest pattern promoted  Taken 4/12/2025 1600 by Lory Campuzano RN  Activity Management: activity encouraged  Taken 4/12/2025 1430 by Lory Campuzano RN  Activity Management: activity encouraged  Taken 4/12/2025 1200 by Lory Campuzano RN  Activity Management: activity encouraged  Taken 4/12/2025 1000 by Lory Campuzano RN  Activity Management: activity encouraged  Taken 4/12/2025 0800 by Lory Campuzano RN  Activity Management: activity encouraged  Goal: Optimal Nutrition Delivery  Outcome:  Progressing     Problem: Fall Injury Risk  Goal: Absence of Fall and Fall-Related Injury  Outcome: Progressing  Intervention: Identify and Manage Contributors  Recent Flowsheet Documentation  Taken 4/12/2025 1800 by Lory Campuzano RN  Medication Review/Management: medications reviewed  Self-Care Promotion: independence encouraged  Intervention: Promote Injury-Free Environment  Recent Flowsheet Documentation  Taken 4/12/2025 1800 by Lory Campuzano RN  Safety Promotion/Fall Prevention:   assistive device/personal items within reach   clutter free environment maintained   fall prevention program maintained   nonskid shoes/slippers when out of bed   room organization consistent   safety round/check completed   toileting scheduled  Taken 4/12/2025 1600 by Lory Campuzano RN  Safety Promotion/Fall Prevention:   activity supervised   room organization consistent   safety round/check completed  Taken 4/12/2025 1430 by Lory Campuzano RN  Safety Promotion/Fall Prevention:   activity supervised   room organization consistent   safety round/check completed  Taken 4/12/2025 1200 by Lory Campuzano RN  Safety Promotion/Fall Prevention:   activity supervised   room organization consistent   safety round/check completed  Taken 4/12/2025 1000 by Lory Campuzano RN  Safety Promotion/Fall Prevention:   activity supervised   room organization consistent   safety round/check completed  Taken 4/12/2025 0800 by Lory Campuzano RN  Safety Promotion/Fall Prevention:   activity supervised   room organization consistent   safety round/check completed   Goal Outcome Evaluation:      Pt resting in bed, 2L nasal cannula, pt wheezing earlier so fluids were stop breathing treatments order. Pt doing much better. Safety precautions utilized and maintained.

## 2025-04-12 NOTE — PLAN OF CARE
Goal Outcome Evaluation:  Plan of Care Reviewed With: patient           Outcome Evaluation: Pt presents with deficits in mobility and is below her baseline. She is unsteady and at high risk for falls when walking without a walker. Pt was assessed with a two-wheel walker and a rollator; she needed CGA with either but prefers rollator. A rollator and HHPT is recommended at discharge.    Anticipated Discharge Disposition (PT): home with home health

## 2025-04-13 ENCOUNTER — APPOINTMENT (OUTPATIENT)
Dept: CT IMAGING | Facility: HOSPITAL | Age: 55
DRG: 683 | End: 2025-04-13
Payer: MEDICARE

## 2025-04-13 PROBLEM — N17.9 ACUTE KIDNEY INJURY SUPERIMPOSED ON CKD: Status: ACTIVE | Noted: 2025-04-13

## 2025-04-13 PROBLEM — N18.9 ACUTE KIDNEY INJURY SUPERIMPOSED ON CKD: Status: ACTIVE | Noted: 2025-04-13

## 2025-04-13 LAB
ALBUMIN SERPL-MCNC: 4 G/DL (ref 3.5–5.2)
ALBUMIN/GLOB SERPL: 1.4 G/DL
ALP SERPL-CCNC: 122 U/L (ref 39–117)
ALT SERPL W P-5'-P-CCNC: 21 U/L (ref 1–33)
ANION GAP SERPL CALCULATED.3IONS-SCNC: 14 MMOL/L (ref 5–15)
AST SERPL-CCNC: 14 U/L (ref 1–32)
BASOPHILS # BLD AUTO: 0.01 10*3/MM3 (ref 0–0.2)
BASOPHILS NFR BLD AUTO: 0.1 % (ref 0–1.5)
BILIRUB SERPL-MCNC: 0.4 MG/DL (ref 0–1.2)
BUN SERPL-MCNC: 16 MG/DL (ref 6–20)
BUN/CREAT SERPL: 10 (ref 7–25)
CALCIUM SPEC-SCNC: 9.4 MG/DL (ref 8.6–10.5)
CHLORIDE SERPL-SCNC: 101 MMOL/L (ref 98–107)
CO2 SERPL-SCNC: 21 MMOL/L (ref 22–29)
CREAT SERPL-MCNC: 1.6 MG/DL (ref 0.57–1)
DEPRECATED RDW RBC AUTO: 44.6 FL (ref 37–54)
EGFRCR SERPLBLD CKD-EPI 2021: 38.2 ML/MIN/1.73
EOSINOPHIL # BLD AUTO: 0 10*3/MM3 (ref 0–0.4)
EOSINOPHIL NFR BLD AUTO: 0 % (ref 0.3–6.2)
ERYTHROCYTE [DISTWIDTH] IN BLOOD BY AUTOMATED COUNT: 13.9 % (ref 12.3–15.4)
GLOBULIN UR ELPH-MCNC: 2.8 GM/DL
GLUCOSE BLDC GLUCOMTR-MCNC: 384 MG/DL (ref 70–130)
GLUCOSE BLDC GLUCOMTR-MCNC: 387 MG/DL (ref 70–130)
GLUCOSE BLDC GLUCOMTR-MCNC: 394 MG/DL (ref 70–130)
GLUCOSE BLDC GLUCOMTR-MCNC: 407 MG/DL (ref 70–130)
GLUCOSE BLDC GLUCOMTR-MCNC: 413 MG/DL (ref 70–130)
GLUCOSE BLDC GLUCOMTR-MCNC: 434 MG/DL (ref 70–130)
GLUCOSE BLDC GLUCOMTR-MCNC: 442 MG/DL (ref 70–130)
GLUCOSE BLDC GLUCOMTR-MCNC: 448 MG/DL (ref 70–130)
GLUCOSE BLDC GLUCOMTR-MCNC: 460 MG/DL (ref 70–130)
GLUCOSE BLDC GLUCOMTR-MCNC: 463 MG/DL (ref 70–130)
GLUCOSE SERPL-MCNC: 396 MG/DL (ref 65–99)
HCT VFR BLD AUTO: 36.9 % (ref 34–46.6)
HGB BLD-MCNC: 12.4 G/DL (ref 12–15.9)
IMM GRANULOCYTES # BLD AUTO: 0.05 10*3/MM3 (ref 0–0.05)
IMM GRANULOCYTES NFR BLD AUTO: 0.5 % (ref 0–0.5)
LYMPHOCYTES # BLD AUTO: 0.67 10*3/MM3 (ref 0.7–3.1)
LYMPHOCYTES NFR BLD AUTO: 6.9 % (ref 19.6–45.3)
MAGNESIUM SERPL-MCNC: 1.9 MG/DL (ref 1.6–2.6)
MCH RBC QN AUTO: 29.3 PG (ref 26.6–33)
MCHC RBC AUTO-ENTMCNC: 33.6 G/DL (ref 31.5–35.7)
MCV RBC AUTO: 87.2 FL (ref 79–97)
MONOCYTES # BLD AUTO: 0.06 10*3/MM3 (ref 0.1–0.9)
MONOCYTES NFR BLD AUTO: 0.6 % (ref 5–12)
NEUTROPHILS NFR BLD AUTO: 8.88 10*3/MM3 (ref 1.7–7)
NEUTROPHILS NFR BLD AUTO: 91.9 % (ref 42.7–76)
NRBC BLD AUTO-RTO: 0 /100 WBC (ref 0–0.2)
PLATELET # BLD AUTO: 272 10*3/MM3 (ref 140–450)
PMV BLD AUTO: 10.9 FL (ref 6–12)
POTASSIUM SERPL-SCNC: 4.4 MMOL/L (ref 3.5–5.2)
PROT SERPL-MCNC: 6.8 G/DL (ref 6–8.5)
RBC # BLD AUTO: 4.23 10*6/MM3 (ref 3.77–5.28)
SODIUM SERPL-SCNC: 136 MMOL/L (ref 136–145)
WBC NRBC COR # BLD AUTO: 9.67 10*3/MM3 (ref 3.4–10.8)

## 2025-04-13 PROCEDURE — 94761 N-INVAS EAR/PLS OXIMETRY MLT: CPT

## 2025-04-13 PROCEDURE — 63710000001 INSULIN LISPRO (HUMAN) PER 5 UNITS: Performed by: FAMILY MEDICINE

## 2025-04-13 PROCEDURE — 63710000001 INSULIN GLARGINE PER 5 UNITS: Performed by: FAMILY MEDICINE

## 2025-04-13 PROCEDURE — 94799 UNLISTED PULMONARY SVC/PX: CPT

## 2025-04-13 PROCEDURE — 80053 COMPREHEN METABOLIC PANEL: CPT | Performed by: FAMILY MEDICINE

## 2025-04-13 PROCEDURE — 63710000001 INSULIN LISPRO (HUMAN) PER 5 UNITS: Performed by: PHYSICIAN ASSISTANT

## 2025-04-13 PROCEDURE — 99232 SBSQ HOSP IP/OBS MODERATE 35: CPT | Performed by: FAMILY MEDICINE

## 2025-04-13 PROCEDURE — 71250 CT THORAX DX C-: CPT

## 2025-04-13 PROCEDURE — 25010000002 HEPARIN (PORCINE) PER 1000 UNITS: Performed by: FAMILY MEDICINE

## 2025-04-13 PROCEDURE — 82948 REAGENT STRIP/BLOOD GLUCOSE: CPT

## 2025-04-13 PROCEDURE — 83735 ASSAY OF MAGNESIUM: CPT | Performed by: FAMILY MEDICINE

## 2025-04-13 PROCEDURE — 85025 COMPLETE CBC W/AUTO DIFF WBC: CPT | Performed by: FAMILY MEDICINE

## 2025-04-13 PROCEDURE — 94664 DEMO&/EVAL PT USE INHALER: CPT

## 2025-04-13 RX ORDER — HEPARIN SODIUM 5000 [USP'U]/ML
5000 INJECTION, SOLUTION INTRAVENOUS; SUBCUTANEOUS EVERY 8 HOURS SCHEDULED
Status: DISCONTINUED | OUTPATIENT
Start: 2025-04-13 | End: 2025-04-25 | Stop reason: HOSPADM

## 2025-04-13 RX ORDER — INSULIN LISPRO 100 [IU]/ML
3-14 INJECTION, SOLUTION INTRAVENOUS; SUBCUTANEOUS
Status: DISCONTINUED | OUTPATIENT
Start: 2025-04-13 | End: 2025-04-25

## 2025-04-13 RX ADMIN — IPRATROPIUM BROMIDE AND ALBUTEROL SULFATE 3 ML: 2.5; .5 SOLUTION RESPIRATORY (INHALATION) at 17:06

## 2025-04-13 RX ADMIN — INSULIN GLARGINE 25 UNITS: 100 INJECTION, SOLUTION SUBCUTANEOUS at 21:40

## 2025-04-13 RX ADMIN — FAMOTIDINE 20 MG: 20 TABLET, FILM COATED ORAL at 21:36

## 2025-04-13 RX ADMIN — INSULIN LISPRO 14 UNITS: 100 INJECTION, SOLUTION INTRAVENOUS; SUBCUTANEOUS at 21:40

## 2025-04-13 RX ADMIN — RISPERIDONE 3 MG: 1 TABLET, FILM COATED ORAL at 08:05

## 2025-04-13 RX ADMIN — IPRATROPIUM BROMIDE AND ALBUTEROL SULFATE 3 ML: 2.5; .5 SOLUTION RESPIRATORY (INHALATION) at 12:48

## 2025-04-13 RX ADMIN — HEPARIN SODIUM 5000 UNITS: 5000 INJECTION INTRAVENOUS; SUBCUTANEOUS at 21:39

## 2025-04-13 RX ADMIN — Medication 10 ML: at 21:38

## 2025-04-13 RX ADMIN — INSULIN LISPRO 8 UNITS: 100 INJECTION, SOLUTION INTRAVENOUS; SUBCUTANEOUS at 08:04

## 2025-04-13 RX ADMIN — CHOLEYSTYRAMINE LIGHT 4 G: 4 POWDER, FOR SUSPENSION ORAL at 09:55

## 2025-04-13 RX ADMIN — Medication 250 MG: at 21:36

## 2025-04-13 RX ADMIN — Medication 10 ML: at 08:05

## 2025-04-13 RX ADMIN — NYSTATIN: 100000 POWDER TOPICAL at 08:05

## 2025-04-13 RX ADMIN — IPRATROPIUM BROMIDE AND ALBUTEROL SULFATE 3 ML: 2.5; .5 SOLUTION RESPIRATORY (INHALATION) at 19:40

## 2025-04-13 RX ADMIN — ATORVASTATIN CALCIUM 40 MG: 40 TABLET, FILM COATED ORAL at 08:05

## 2025-04-13 RX ADMIN — LEVOTHYROXINE SODIUM 225 MCG: 0.05 TABLET ORAL at 05:58

## 2025-04-13 RX ADMIN — INSULIN LISPRO 14 UNITS: 100 INJECTION, SOLUTION INTRAVENOUS; SUBCUTANEOUS at 16:51

## 2025-04-13 RX ADMIN — RISPERIDONE 3 MG: 1 TABLET, FILM COATED ORAL at 21:36

## 2025-04-13 RX ADMIN — INSULIN LISPRO 8 UNITS: 100 INJECTION, SOLUTION INTRAVENOUS; SUBCUTANEOUS at 13:16

## 2025-04-13 RX ADMIN — Medication 250 MG: at 08:05

## 2025-04-13 RX ADMIN — CHOLEYSTYRAMINE LIGHT 4 G: 4 POWDER, FOR SUSPENSION ORAL at 21:40

## 2025-04-13 RX ADMIN — NYSTATIN: 100000 POWDER TOPICAL at 21:38

## 2025-04-13 RX ADMIN — HEPARIN SODIUM 5000 UNITS: 5000 INJECTION INTRAVENOUS; SUBCUTANEOUS at 13:16

## 2025-04-13 RX ADMIN — IPRATROPIUM BROMIDE AND ALBUTEROL SULFATE 3 ML: 2.5; .5 SOLUTION RESPIRATORY (INHALATION) at 08:21

## 2025-04-13 RX ADMIN — INSULIN GLARGINE 10 UNITS: 100 INJECTION, SOLUTION SUBCUTANEOUS at 08:04

## 2025-04-13 NOTE — PLAN OF CARE
Problem: Adult Inpatient Plan of Care  Goal: Plan of Care Review  Outcome: Progressing  Goal: Patient-Specific Goal (Individualized)  Outcome: Progressing  Goal: Absence of Hospital-Acquired Illness or Injury  Outcome: Progressing  Intervention: Identify and Manage Fall Risk  Recent Flowsheet Documentation  Taken 4/13/2025 1618 by Lory Campuzano RN  Safety Promotion/Fall Prevention:   activity supervised   room organization consistent   safety round/check completed  Taken 4/13/2025 1428 by Lory Campuzano RN  Safety Promotion/Fall Prevention:   assistive device/personal items within reach   clutter free environment maintained   fall prevention program maintained   nonskid shoes/slippers when out of bed   room organization consistent   safety round/check completed   toileting scheduled  Taken 4/13/2025 1255 by Lory Campuzano RN  Safety Promotion/Fall Prevention:   activity supervised   room organization consistent   safety round/check completed  Taken 4/13/2025 1000 by Lory Campuzano RN  Safety Promotion/Fall Prevention:   activity supervised   room organization consistent   safety round/check completed  Taken 4/13/2025 0800 by Lory Campuzano RN  Safety Promotion/Fall Prevention:   activity supervised   room organization consistent   safety round/check completed  Intervention: Prevent Skin Injury  Recent Flowsheet Documentation  Taken 4/13/2025 1618 by Lory Campuzano RN  Body Position: position changed independently  Skin Protection: incontinence pads utilized  Taken 4/13/2025 1428 by Lory Campuzano RN  Body Position: position changed independently  Taken 4/13/2025 1255 by Lory Campuzano RN  Body Position: position changed independently  Skin Protection: incontinence pads utilized  Taken 4/13/2025 1000 by Lory Campuzano RN  Body Position: position changed independently  Skin Protection: incontinence pads utilized  Taken 4/13/2025 0800 by Lory Campuzano RN  Body Position: position changed  independently  Skin Protection: transparent dressing maintained  Intervention: Prevent Infection  Recent Flowsheet Documentation  Taken 4/13/2025 1428 by Lory Campuzano RN  Infection Prevention:   cohorting utilized   environmental surveillance performed   rest/sleep promoted   single patient room provided  Goal: Optimal Comfort and Wellbeing  Outcome: Progressing  Goal: Readiness for Transition of Care  Outcome: Progressing     Problem: Sepsis/Septic Shock  Goal: Optimal Coping  Outcome: Progressing  Goal: Absence of Bleeding  Outcome: Progressing  Goal: Blood Glucose Level Within Target Range  Outcome: Progressing  Goal: Absence of Infection Signs and Symptoms  Outcome: Progressing  Intervention: Initiate Sepsis Management  Recent Flowsheet Documentation  Taken 4/13/2025 1428 by Lory Campuzano RN  Infection Prevention:   cohorting utilized   environmental surveillance performed   rest/sleep promoted   single patient room provided  Intervention: Promote Recovery  Recent Flowsheet Documentation  Taken 4/13/2025 1618 by Lory Campuzano RN  Activity Management: activity encouraged  Taken 4/13/2025 1428 by Lory Campuzano RN  Activity Management: activity encouraged  Sleep/Rest Enhancement: regular sleep/rest pattern promoted  Taken 4/13/2025 1255 by Lory Campuzano RN  Activity Management: activity encouraged  Taken 4/13/2025 1000 by Lory Campuzano RN  Activity Management: activity encouraged  Taken 4/13/2025 0800 by Lory Campuzano RN  Activity Management: up in chair  Goal: Optimal Nutrition Delivery  Outcome: Progressing     Problem: Fall Injury Risk  Goal: Absence of Fall and Fall-Related Injury  Outcome: Progressing  Intervention: Identify and Manage Contributors  Recent Flowsheet Documentation  Taken 4/13/2025 1428 by Lory Campuzano RN  Medication Review/Management: medications reviewed  Self-Care Promotion: independence encouraged  Intervention: Promote Injury-Free Environment  Recent Flowsheet  Documentation  Taken 4/13/2025 1618 by Lory Campuzano RN  Safety Promotion/Fall Prevention:   activity supervised   room organization consistent   safety round/check completed  Taken 4/13/2025 1428 by Lory Campuzano RN  Safety Promotion/Fall Prevention:   assistive device/personal items within reach   clutter free environment maintained   fall prevention program maintained   nonskid shoes/slippers when out of bed   room organization consistent   safety round/check completed   toileting scheduled  Taken 4/13/2025 1255 by Lory Campuzano RN  Safety Promotion/Fall Prevention:   activity supervised   room organization consistent   safety round/check completed  Taken 4/13/2025 1000 by Lory Campuzano RN  Safety Promotion/Fall Prevention:   activity supervised   room organization consistent   safety round/check completed  Taken 4/13/2025 0800 by Lory Campuzano RN  Safety Promotion/Fall Prevention:   activity supervised   room organization consistent   safety round/check completed   Goal Outcome Evaluation:      Pt resting in bed, RA, Sinus tach on monitor, 1x assist to chair. Safety precautions utilized and maintained.

## 2025-04-13 NOTE — PAYOR COMM NOTE
"Vilma Wooten (54 y.o. Female)       Date of Birth   1970    Social Security Number       Address   [address] [facility] [address]    Home Phone   [phone]    MRN   [id number]       Mosque   Gnosticist    Marital Status   Single                            Admission Date   4/10/2025    Admission Type   Emergency    Admitting Provider   JACI Cruz DO    Attending Provider   JACI Cruz DO    Department, Room/Bed   [address], S523/1       Discharge Date       Discharge Disposition       Discharge Destination                                 Attending Provider: JACI Cruz DO    Allergies: Doxepin, Januvia [Sitagliptin]    Isolation: Spore   Infection: None   Code Status: CPR    Ht: 167.6 cm (66\")   Wt: 96.3 kg (212 lb 6.4 oz)    Admission Cmt: None   Principal Problem: Acute kidney injury superimposed on chronic kidney disease [N17.9,N18.9]                   Active Insurance as of 4/10/2025       Primary Coverage       Payor Plan Insurance Group Employer/Plan Group    [facility] MEDICARE REPLACEMENT [employer] MEDICARE ADVANTAGE PPO        Payor Plan Address Payor Plan Phone Number Payor Plan Fax Number Effective Dates    [address] [address]   3/1/2025 - None Entered    [facility] [id number]         Subscriber Name Subscriber Birth Date Member ID       VILMA WOOTEN 1970 [id number]               Secondary Coverage       Payor Plan Insurance Group Employer/Plan Group    [place]        Payor Plan Address Payor Plan Phone Number Payor Plan Fax Number Effective Dates    [address] [phone]  2/27/2025 - None Entered    [facility] [id number]         Subscriber Name Subscriber Birth Date Member ID       VILMA WOOTEN 1970 [id number]                     Emergency Contacts        (Rel.) Home Phone Work Phone Mobile Phone    FRANCIE LOYOLA (Sister) [phone] -- [phone]             [facility] " [place] [address]  [address]  [facility] [address]  Phone:  [phone]  Fax:  [phone]        Patient:     Vilma Ayala MRN:  [id number]   [address]  [address]  [facility] [address] :  1970  SSN:    Phone: [phone] Sex:  F      INSURANCE PAYOR PLAN GROUP # SUBSCRIBER ID   Primary:  Secondary:    [facility] MEDICARE REPLACEMENT  [place] [id number]  [id number]      [id number]  [id number]   Admitting Diagnosis: Acute kidney injury superimposed on chronic kidney disease [N17.9, N18.9]  Order Date:  2025        Inpatient Admission       (Order ID: [id number])     Diagnosis:         Priority:  Routine Expected Date:   Expiration Date:        Interval:   Count:    Level of Care: Telemetry [5]  Diagnosis: Acute kidney injury superimposed on CKD [[id number]]  Certification: I Certify That Inpatient Hospital Services Are Medically Necessary For Greater Than 2 Midnights     Specimen Type:   Specimen Source:   Specimen Taken Date:   Specimen Taken Time:                  Verbal Order Mode: Verbal with readback   Authorizing Provider: JACI Cruz DO  Authorizing Provider's NPI: [id number]     Order Entered By: Luis Gallego RN 2025 11:03 AM     Electronically signed by:            History & Physical        Hank Garcia PA-C at 04/10/25 1947       Attestation signed by Kerley, Brian Joseph, DO at 04/10/25 2147      I have reviewed this documentation and agree.                          [facility] Medicine Services  HISTORY AND PHYSICAL    Patient Name: Vilma Ayala  : 1970  MRN: [id number]  Primary Care Physician: Vanessa Kaur MD  Date of admission: 4/10/2025    Subjective  Subjective     Chief Complaint:  Nausea, diarrhea      HPI:  Vilma Ayala is a 54 y.o. female with a history of Essential Tremor, T2DM, Hypothyroidism, anxiety/depression, and GERD who presents to [facility] ED for complaint  of nausea and diarrhea. She reports that she has felt nauseous for the last few weeks now. However, she denies any instances of vomiting. She states she was recently diagnosed with a UTI on 4/2 and was started on Bactrim, and took 3 days of this. Her last dose of Bactrim was a week ago today. She reports that she continues to have nausea, as well as diarrhea which she feels like is a little worse today, which prompted the ED visit. She also endorses some mild epigastric pain, fatigue and poor PO intake over the last week or so as well. She denies fever, chills, chest pain, SOB, cough, or vomiting.         Review of Systems   Constitutional:  Positive for appetite change and fatigue. Negative for chills, fever and unexpected weight change.   Eyes:  Negative for photophobia and visual disturbance.   Respiratory:  Negative for cough, shortness of breath and wheezing.    Cardiovascular:  Negative for chest pain and palpitations.   Gastrointestinal:  Positive for abdominal pain (epigastric), diarrhea and nausea. Negative for vomiting.   Genitourinary:  Negative for dysuria and hematuria.   Musculoskeletal:  Positive for arthralgias and back pain.   Skin: Negative.    Neurological:  Positive for tremors (chronic). Negative for weakness.   Psychiatric/Behavioral:  Negative for confusion. The patient is nervous/anxious.               Personal History     Past Medical History:   Diagnosis Date    Anxiety     Chicken pox     Depression     Diabetes mellitus     Disease of thyroid gland     Measles     Type 2 diabetes mellitus              Past Surgical History:   Procedure Laterality Date    EYE SURGERY      TONSILLECTOMY         Family History:  family history includes Cancer in her father; Diabetes in her brother, mother, sister, and another family member; Heart disease in her mother; Hypertension in her mother and another family member; No Known Problems in her brother; Obesity in an other family member; Thyroid disease in  an other family member.     Social History:  reports that she has never smoked. She has never used smokeless tobacco. She reports that she does not drink alcohol and does not use drugs.  Social History     Social History Narrative    Not on file       Medications:  Accu-Chek Softclix Lancets, Blood Glucose Monitoring Suppl, Cholecalciferol, Fluticasone-Umeclidin-Vilant, FreeStyle Sukhi 3 Plus Sensor, FreeStyle Sukhi 3 [place], Insulin Glargine, Insulin Pen Needle, Insulin Syringe-Needle U-100, LORazepam, Lancet Device, OneTouch Delica Lancets 33G, albuterol sulfate HFA, ammonium lactate, atorvastatin, benztropine, busPIRone, citalopram, famotidine, glucose, glucose blood, glucose monitor, hydrOXYzine, hydrOXYzine pamoate, ibuprofen, levothyroxine, metFORMIN, ondansetron, pantoprazole, pitavastatin calcium, promethazine, and risperiDONE    Allergies   Allergen Reactions    Doxepin Rash    Januvia [Sitagliptin] Other (See Comments)     Insomnia       Objective  Objective     Vital Signs:   Temp:  [98.7 °F (37.1 °C)] 98.7 °F (37.1 °C)  Heart Rate:  [] 105  Resp:  [16] 16  BP: (101-201)/() 120/94    Physical Exam  Constitutional:       General: She is not in acute distress.     Appearance: She is obese.   HENT:      Head: Atraumatic.      Right Ear: External ear normal.      Left Ear: External ear normal.      Nose: Nose normal.   Eyes:      Extraocular Movements: Extraocular movements intact.      Conjunctiva/sclera: Conjunctivae normal.      Pupils: Pupils are equal, round, and reactive to light.   Cardiovascular:      Rate and Rhythm: Normal rate and regular rhythm.      Pulses: Normal pulses.      Heart sounds: Normal heart sounds. No murmur heard.  Pulmonary:      Effort: Pulmonary effort is normal. No respiratory distress.      Breath sounds: Normal breath sounds. No wheezing, rhonchi or rales.   Abdominal:      General: Bowel sounds are normal. There is no distension.      Tenderness: There is  abdominal tenderness (mild epigastric tenderness). There is no guarding or rebound.   Musculoskeletal:         General: Normal range of motion.      Cervical back: No rigidity.   Skin:     General: Skin is warm and dry.      Coloration: Skin is not jaundiced.      Findings: No lesion or rash.   Neurological:      Mental Status: She is alert and oriented to person, place, and time.      Comments: Chronic essential tremor noted during exam.    Psychiatric:         Attention and Perception: Attention normal.         Mood and Affect: Mood normal.         Behavior: Behavior normal.         Thought Content: Thought content normal.          Result Review:  I have personally reviewed the results from the time of this admission to 4/10/2025 20:14 EDT and agree with these findings:  [x]  Laboratory list / accordion  []  Microbiology  [x]  Radiology  []  EKG/Telemetry   []  Cardiology/Vascular   []  Pathology  [x]  Old records  []  Other:      LAB RESULTS:      Lab 04/10/25  1344 04/09/25  1029   WBC 18.06* 18.15*   HEMOGLOBIN 14.6 15.6   HEMATOCRIT 43.3 44.4   PLATELETS 338 373   NEUTROS ABS 14.41* 13.79*   IMMATURE GRANS (ABS) 0.08*  --    LYMPHS ABS 2.78  --    MONOS ABS 0.62  --    EOS ABS 0.12 0.36   MCV 86.4 86.0   LACTATE 1.6  --          Lab 04/10/25  1344 04/09/25  1029   SODIUM 138 136   POTASSIUM 3.9 4.0   CHLORIDE 101 99   CO2 24.0 24.2   ANION GAP 13.0 12.8   BUN 11 9   CREATININE 2.07* 1.88*   EGFR 28.0* 31.5*   GLUCOSE 112* 116*   CALCIUM 9.8 10.2   TSH  --  1.690         Lab 04/10/25  1344 04/09/25  1029   TOTAL PROTEIN 7.6 7.7   ALBUMIN 4.3 4.4   GLOBULIN 3.3 3.3   ALT (SGPT) 33 37*   AST (SGOT) 28 32   BILIRUBIN 0.8 0.9   ALK PHOS 137* 148*   LIPASE 21 21             Lab 04/09/25  1029   CHOLESTEROL 113   LDL CHOL 55   HDL CHOL 37*   TRIGLYCERIDES 112             Brief Urine Lab Results  (Last result in the past 365 days)        Color   Clarity   Blood   Leuk Est   Nitrite   Protein   CREAT   Urine HCG         04/10/25 1734 Yellow   Clear   Negative   Trace   Negative   Negative                 Microbiology Results (last 10 days)       Procedure Component Value - Date/Time    Urine Culture - Urine, Urine, Clean Catch [[id number]] Collected: 04/01/25 1118    Lab Status: Final result Specimen: Urine, Clean Catch Updated: 04/03/25 0952     Urine Culture 50,000 CFU/mL Mixed Clemencia Isolated    Narrative:      Colonization of the urinary tract without infection is common. Treatment is discouraged unless the patient is symptomatic, pregnant, or undergoing an invasive urologic procedure.  Specimen contains mixed organisms of questionable pathogenicity suggestive of contamination. If symptoms persist, suggest recollection.            CT Abdomen Pelvis Without Contrast  Result Date: 4/10/2025  CT ABDOMEN PELVIS WO CONTRAST Date of Exam: 4/10/2025 3:21 PM EDT Indication: N/V, left flank pain. Comparison: Chest CT 3/23/2025 Technique: Axial CT images were obtained of the abdomen and pelvis without the administration of contrast. Reconstructed coronal and sagittal images were also obtained. Automated exposure control and iterative construction methods were used. Findings: Included Chest: Bibasal atelectasis Liver: No significant abnormality.  Gallbladder and biliary tree: No significant abnormality.  Spleen: No significant abnormality.  Pancreas: No significant abnormality.  Adrenal glands: Nonspecific and unchanged mild bilateral adrenal gland thickening.  Kidneys and ureters: No significant abnormality. No hydroureteronephrosis. No radiopaque calculi seen.  Stomach and duodenum:No significant abnormality. Small and large bowel: Colonic diverticulosis. No evidence of bowel obstruction. Appendicoliths within an otherwise normal-appearing appendix. No significant pericolonic inflammatory changes seen. Peritoneal cavity: No free fluid or free air. Bladder: No significant abnormality.  Pelvic organs: No significant abnormality.   Vasculature: Atherosclerotic calcifications.  Lymph nodes: No pathologic appearing lymph nodes by imaging criteria.  Bones and soft tissues: Degenerative changes of the imaged spine. No acute osseous abnormality.     Impression: Impression: No acute findings in the abdomen or pelvis. Electronically Signed: Gregg Colindres  4/10/2025 3:32 PM EDT  Workstation ID: [id number]      Results for orders placed during the hospital encounter of 09/05/19    Adult Transthoracic Echo Complete W/ Cont if Necessary Per Protocol    Interpretation Summary  · Left ventricular systolic function is normal. Estimated EF = 60%.  · No significant valve dysfunction.      Assessment & Plan  Assessment & Plan       Acute kidney injury superimposed on chronic kidney disease    Diarrhea    Dehydration    Mixed hyperlipidemia    Type 2 diabetes mellitus without complication, with long-term current use of insulin    Essential tremor    Anxiety and depression    Other specified hypothyroidism      Vilma Ayala is a 54 y.o. female with a history of Essential Tremor, T2DM, Hypothyroidism, anxiety/depression, and GERD who presents to [facility] ED for complaint of nausea and diarrhea.       CB on CKD   Dehydration  Nausea/Diarrhea  -Cr 2.07, elevated from recent results.  eGFR 28.0  -UA tonight unremarkable  -CT abd/pelvis negative for acute findings  -CB possibly 2ry to recent bactrim use, as well as dehydration from poor po intake.  -GI panel PCR pending.   -C diff toxin pending given leukocytosis and diarrhea from recent abx use.   -Resp panel PCR pending  -IV fluids  -Nephrology consult for the am  -Avoid anti-diarrheal meds for now pending results of C diff toxin.   -Zofran for nausea    HLD  -Continue statin    T2DM  -Blood glucose 112  -Check A1C  -Fingerstick achs. SSI  -LA insulin nightly    Essential Tremor  -Continue Respiradone    Anxiety/depression  -Continue home meds          DVT prophylaxis:  SCDS    CODE STATUS:  Full  Code  Code Status (Patient has no pulse and is not breathing): CPR (Attempt to Resuscitate)  Medical Interventions (Patient has pulse or is breathing): Full Support      Expected DischargeTBD       This note has been completed as part of a split-shared workflow.     Signature: Electronically signed by Hank Garcia PA-C, 04/10/25, 8:05 PM EDT                Electronically signed by Kerley, Brian Joseph, DO at 04/10/25 7642          Physician Progress Notes (all)        JACI Cruz DO at 25 1308              [facility] Medicine Services  PROGRESS NOTE    Patient Name: Vilma Ayala  : 1970  MRN: [id number]    Date of Admission: 4/10/2025  Primary Care Physician: Vanessa Kaur MD    Subjective   Subjective     CC:  Nausea, diarrhea    HPI:  Patient is a 54-year-old female seen and examined by me this a.m., she is resting comfortably in bed and reports feeling some better today but still having increased fatigue.  She denies any recent diarrhea but stool studies are ordered and pending.  She was recently treated for UTI with Bactrim and she is continuing treatment for CB at this time.      Objective   Objective     Vital Signs:   Temp:  [97.4 °F (36.3 °C)-98.2 °F (36.8 °C)] 97.6 °F (36.4 °C)  Heart Rate:  [] 82  Resp:  [16-18] 18  BP: (112-133)/(77-95) 112/82     Physical Exam:  Constitutional: No acute distress, awake, alert, currently on RA, frail appearing  HENT: NCAT, nares patent, mucous membranes moist  Respiratory: Clear to auscultation bilaterally, no rhonchi or wheezing, respiratory effort normal   Cardiovascular: RRR  Gastrointestinal: soft, nontender, nondistended  Musculoskeletal: No bilateral ankle edema, no clubbing or cyanosis   Psychiatric: Appropriate affect, cooperative  Neurologic: Oriented x 3, strength symmetric in all extremities, Cranial Nerves grossly intact to confrontation, speech clear  Skin: No rashes      Results  Reviewed:  LAB RESULTS:      Lab 04/12/25  0503 04/11/25  1026 04/10/25  1344 04/09/25  1029   WBC 10.55 9.34 18.06* 18.15*   HEMOGLOBIN 13.3 13.1 14.6 15.6   HEMATOCRIT 40.8 37.9 43.3 44.4   PLATELETS 230 232 338 373   NEUTROS ABS 5.85 5.80 14.41* 13.79*   IMMATURE GRANS (ABS) 0.03 0.03 0.08*  --    LYMPHS ABS 3.49* 2.68 2.78  --    MONOS ABS 0.56 0.48 0.62  --    EOS ABS 0.56* 0.32 0.12 0.36   MCV 89.9 86.9 86.4 86.0   LACTATE  --   --  1.6  --          Lab 04/12/25  0503 04/11/25  1026 04/10/25  1344 04/09/25  1029   SODIUM 140 142 138 136   POTASSIUM 3.9 3.9 3.9 4.0   CHLORIDE 107 106 101 99   CO2 23.0 23.0 24.0 24.2   ANION GAP 10.0 13.0 13.0 12.8   BUN 10 11 11 9   CREATININE 1.52* 1.87* 2.07* 1.88*   EGFR 40.6* 31.7* 28.0* 31.5*   GLUCOSE 141* 148* 112* 116*   CALCIUM 9.1 9.2 9.8 10.2   MAGNESIUM 1.7  --   --   --    HEMOGLOBIN A1C  --   --  6.80*  --    TSH  --   --   --  1.690         Lab 04/12/25  0503 04/11/25  1026 04/10/25  1344 04/09/25  1029   TOTAL PROTEIN 6.0 5.9* 7.6 7.7   ALBUMIN 3.6 3.8 4.3 4.4   GLOBULIN 2.4 2.1 3.3 3.3   ALT (SGPT) 21 27 33 37*   AST (SGOT) 18 24 28 32   BILIRUBIN 0.6 0.8 0.8 0.9   ALK PHOS 106 113 137* 148*   LIPASE  --   --  21 21             Lab 04/09/25  1029   CHOLESTEROL 113   LDL CHOL 55   HDL CHOL 37*   TRIGLYCERIDES 112             Brief Urine Lab Results  (Last result in the past 365 days)        Color   Clarity   Blood   Leuk Est   Nitrite   Protein   CREAT   Urine HCG        04/10/25 1734 Yellow   Clear   Negative   Trace   Negative   Negative                   Microbiology Results Abnormal       None            CT Abdomen Pelvis Without Contrast  Result Date: 4/10/2025  CT ABDOMEN PELVIS WO CONTRAST Date of Exam: 4/10/2025 3:21 PM EDT Indication: N/V, left flank pain. Comparison: Chest CT 3/23/2025 Technique: Axial CT images were obtained of the abdomen and pelvis without the administration of contrast. Reconstructed coronal and sagittal images were also obtained.  Automated exposure control and iterative construction methods were used. Findings: Included Chest: Bibasal atelectasis Liver: No significant abnormality.  Gallbladder and biliary tree: No significant abnormality.  Spleen: No significant abnormality.  Pancreas: No significant abnormality.  Adrenal glands: Nonspecific and unchanged mild bilateral adrenal gland thickening.  Kidneys and ureters: No significant abnormality. No hydroureteronephrosis. No radiopaque calculi seen.  Stomach and duodenum:No significant abnormality. Small and large bowel: Colonic diverticulosis. No evidence of bowel obstruction. Appendicoliths within an otherwise normal-appearing appendix. No significant pericolonic inflammatory changes seen. Peritoneal cavity: No free fluid or free air. Bladder: No significant abnormality.  Pelvic organs: No significant abnormality.  Vasculature: Atherosclerotic calcifications.  Lymph nodes: No pathologic appearing lymph nodes by imaging criteria.  Bones and soft tissues: Degenerative changes of the imaged spine. No acute osseous abnormality.     Impression: Impression: No acute findings in the abdomen or pelvis. Electronically Signed: Gregg Colindres  4/10/2025 3:32 PM EDT  Workstation ID: [id number]      Results for orders placed during the hospital encounter of 09/05/19    Adult Transthoracic Echo Complete W/ Cont if Necessary Per Protocol    Interpretation Summary  · Left ventricular systolic function is normal. Estimated EF = 60%.  · No significant valve dysfunction.      Current medications:  Scheduled Meds:atorvastatin, 40 mg, Oral, Daily  cholestyramine light, 1 packet, Oral, Q12H  famotidine, 20 mg, Oral, Nightly  insulin glargine, 20 Units, Subcutaneous, Nightly  insulin lispro, 2-9 Units, Subcutaneous, TID With Meals  levothyroxine, 200 mcg, Oral, Q AM  levothyroxine, 25 mcg, Oral, Q AM  nystatin, , Topical, Q12H  risperiDONE, 3 mg, Oral, BID  saccharomyces boulardii, 250 mg, Oral, BID  sodium  chloride, 10 mL, Intravenous, Q12H      Continuous Infusions:sodium chloride, 100 mL/hr, Last Rate: 100 mL/hr (04/12/25 0842)      PRN Meds:.  acetaminophen **OR** acetaminophen **OR** acetaminophen    dextrose    dextrose    glucagon (human recombinant)    melatonin    nitroglycerin    ondansetron ODT **OR** ondansetron    Sodium Chloride (PF)    sodium chloride    sodium chloride    Assessment & Plan   Assessment & Plan     Active Hospital Problems    Diagnosis  POA    **Acute kidney injury superimposed on chronic kidney disease [N17.9, N18.9]  Yes    Diarrhea [R19.7]  Yes    Dehydration [E86.0]  Yes    Essential tremor [G25.0]  Yes    Type 2 diabetes mellitus without complication, with long-term current use of insulin [E11.9, Z79.4]  Not Applicable    Mixed hyperlipidemia [E78.2]  Yes    Other specified hypothyroidism [E03.8]  Yes    Anxiety and depression [F41.9, F32.A]  Yes      Resolved Hospital Problems   No resolved problems to display.        Brief Hospital Course to date:  Vilma Ayala is a 54 y.o. female with a history of Essential Tremor, T2DM, Hypothyroidism, anxiety/depression, and GERD who presents to [facility] ED for complaint of nausea and diarrhea.  Patient transferred to my services on the a.m. of 4/11, she is resting comfortably in bed, she reports diarrhea is doing better but stool studies are ordered and pending.  Continuing on IV fluids and treatment for CB, slight improvement in renal function today, will continue to monitor at this time.        CB on CKD   Dehydration  Nausea/Diarrhea  -Cr 2.07 on admission, down to 1.87 on 4/11, continued IVF   -UA tonight unremarkable  -CT abd/pelvis negative for acute findings  -CB possibly 2ry to recent bactrim use, as well as dehydration from poor po intake.  -GI panel PCR pending. Collect stool studies   -C diff toxin pending given leukocytosis and diarrhea from recent abx use.   -Resp panel negative   -IV fluids  -Likely hold off on nephro  consult for now   -Avoid anti-diarrheal meds for now pending results of C diff toxin.   -Zofran for nausea     Dyspnea  - possibly related to recent fluids   - Ordered for duonebs, cxr, will add IS/FD as well     HLD  -Continue statin     T2DM  -Blood glucose 112  -A1C 6.8   -Fingerstick achs. SSI  -LA insulin nightly     Essential Tremor  -Continue Respiradone     Anxiety/depression  -Continue home meds          Expected Discharge Location and Transportation: Likely return independent facility, [facility]   Expected Discharge   Expected Discharge Date: 2025; Expected Discharge Time:      VTE Prophylaxis:  Mechanical VTE prophylaxis orders are present.         AM-PAC 6 Clicks Score (PT): 18 (25 0950)    CODE STATUS:   Code Status and Medical Interventions: CPR (Attempt to Resuscitate); Full Support   Ordered at: 04/10/25 2013     Code Status (Patient has no pulse and is not breathing):    CPR (Attempt to Resuscitate)     Medical Interventions (Patient has pulse or is breathing):    Full Support       RENE Cruz DO  25        Electronically signed by JACI Cruz DO at 25 1454       JACI Cruz DO at 25 0930              [facility] Medicine Services  PROGRESS NOTE    Patient Name: Vilma Ayala  : 1970  MRN: [id number]    Date of Admission: 4/10/2025  Primary Care Physician: Vanessa Kaur MD    Subjective   Subjective     CC:  Nausea, diarrhea    HPI:  Patient is a 53 yo F seen and examined by me this AM, she is resting comfortably in bed, she reports feeling better today, CB continuing to trend down at this time. Continued IVF at this time. No new acute complaints.       Objective   Objective     Vital Signs:   Temp:  [97.4 °F (36.3 °C)-98.2 °F (36.8 °C)] 97.6 °F (36.4 °C)  Heart Rate:  [] 82  Resp:  [16-18] 18  BP: (112-133)/(77-95) 112/82     Physical Exam:  Constitutional: No acute distress, awake, alert, currently on  RA, frail appearing  HENT: NCAT, nares patent, mucous membranes moist  Respiratory: Clear to auscultation bilaterally, no rhonchi or wheezing, respiratory effort normal   Cardiovascular: RRR  Gastrointestinal: soft, nontender, nondistended  Musculoskeletal: No bilateral ankle edema, no clubbing or cyanosis   Psychiatric: Appropriate affect, cooperative  Neurologic: Oriented x 3, strength symmetric in all extremities, Cranial Nerves grossly intact to confrontation, speech clear  Skin: No rashes      Results Reviewed:  LAB RESULTS:      Lab 04/12/25  0503 04/11/25  1026 04/10/25  1344 04/09/25  1029   WBC 10.55 9.34 18.06* 18.15*   HEMOGLOBIN 13.3 13.1 14.6 15.6   HEMATOCRIT 40.8 37.9 43.3 44.4   PLATELETS 230 232 338 373   NEUTROS ABS 5.85 5.80 14.41* 13.79*   IMMATURE GRANS (ABS) 0.03 0.03 0.08*  --    LYMPHS ABS 3.49* 2.68 2.78  --    MONOS ABS 0.56 0.48 0.62  --    EOS ABS 0.56* 0.32 0.12 0.36   MCV 89.9 86.9 86.4 86.0   LACTATE  --   --  1.6  --          Lab 04/12/25  0503 04/11/25  1026 04/10/25  1344 04/09/25  1029   SODIUM 140 142 138 136   POTASSIUM 3.9 3.9 3.9 4.0   CHLORIDE 107 106 101 99   CO2 23.0 23.0 24.0 24.2   ANION GAP 10.0 13.0 13.0 12.8   BUN 10 11 11 9   CREATININE 1.52* 1.87* 2.07* 1.88*   EGFR 40.6* 31.7* 28.0* 31.5*   GLUCOSE 141* 148* 112* 116*   CALCIUM 9.1 9.2 9.8 10.2   MAGNESIUM 1.7  --   --   --    HEMOGLOBIN A1C  --   --  6.80*  --    TSH  --   --   --  1.690         Lab 04/12/25  0503 04/11/25  1026 04/10/25  1344 04/09/25  1029   TOTAL PROTEIN 6.0 5.9* 7.6 7.7   ALBUMIN 3.6 3.8 4.3 4.4   GLOBULIN 2.4 2.1 3.3 3.3   ALT (SGPT) 21 27 33 37*   AST (SGOT) 18 24 28 32   BILIRUBIN 0.6 0.8 0.8 0.9   ALK PHOS 106 113 137* 148*   LIPASE  --   --  21 21             Lab 04/09/25  1029   CHOLESTEROL 113   LDL CHOL 55   HDL CHOL 37*   TRIGLYCERIDES 112             Brief Urine Lab Results  (Last result in the past 365 days)        Color   Clarity   Blood   Leuk Est   Nitrite   Protein   CREAT   Urine  HCG        04/10/25 1734 Yellow   Clear   Negative   Trace   Negative   Negative                   Microbiology Results Abnormal       None            CT Abdomen Pelvis Without Contrast  Result Date: 4/10/2025  CT ABDOMEN PELVIS WO CONTRAST Date of Exam: 4/10/2025 3:21 PM EDT Indication: N/V, left flank pain. Comparison: Chest CT 3/23/2025 Technique: Axial CT images were obtained of the abdomen and pelvis without the administration of contrast. Reconstructed coronal and sagittal images were also obtained. Automated exposure control and iterative construction methods were used. Findings: Included Chest: Bibasal atelectasis Liver: No significant abnormality.  Gallbladder and biliary tree: No significant abnormality.  Spleen: No significant abnormality.  Pancreas: No significant abnormality.  Adrenal glands: Nonspecific and unchanged mild bilateral adrenal gland thickening.  Kidneys and ureters: No significant abnormality. No hydroureteronephrosis. No radiopaque calculi seen.  Stomach and duodenum:No significant abnormality. Small and large bowel: Colonic diverticulosis. No evidence of bowel obstruction. Appendicoliths within an otherwise normal-appearing appendix. No significant pericolonic inflammatory changes seen. Peritoneal cavity: No free fluid or free air. Bladder: No significant abnormality.  Pelvic organs: No significant abnormality.  Vasculature: Atherosclerotic calcifications.  Lymph nodes: No pathologic appearing lymph nodes by imaging criteria.  Bones and soft tissues: Degenerative changes of the imaged spine. No acute osseous abnormality.     Impression: Impression: No acute findings in the abdomen or pelvis. Electronically Signed: Gregg Colindres  4/10/2025 3:32 PM EDT  Workstation ID: [id number]      Results for orders placed during the hospital encounter of 09/05/19    Adult Transthoracic Echo Complete W/ Cont if Necessary Per Protocol    Interpretation Summary  · Left ventricular systolic function is  normal. Estimated EF = 60%.  · No significant valve dysfunction.      Current medications:  Scheduled Meds:atorvastatin, 40 mg, Oral, Daily  cholestyramine light, 1 packet, Oral, Q12H  famotidine, 20 mg, Oral, Nightly  insulin glargine, 20 Units, Subcutaneous, Nightly  insulin lispro, 2-9 Units, Subcutaneous, TID With Meals  levothyroxine, 200 mcg, Oral, Q AM  levothyroxine, 25 mcg, Oral, Q AM  nystatin, , Topical, Q12H  risperiDONE, 3 mg, Oral, BID  saccharomyces boulardii, 250 mg, Oral, BID  sodium chloride, 10 mL, Intravenous, Q12H      Continuous Infusions:sodium chloride, 100 mL/hr, Last Rate: 100 mL/hr (04/12/25 0842)      PRN Meds:.  acetaminophen **OR** acetaminophen **OR** acetaminophen    dextrose    dextrose    glucagon (human recombinant)    melatonin    nitroglycerin    ondansetron ODT **OR** ondansetron    Sodium Chloride (PF)    sodium chloride    sodium chloride    Assessment & Plan   Assessment & Plan     Active Hospital Problems    Diagnosis  POA    **Acute kidney injury superimposed on chronic kidney disease [N17.9, N18.9]  Yes    Diarrhea [R19.7]  Yes    Dehydration [E86.0]  Yes    Essential tremor [G25.0]  Yes    Type 2 diabetes mellitus without complication, with long-term current use of insulin [E11.9, Z79.4]  Not Applicable    Mixed hyperlipidemia [E78.2]  Yes    Other specified hypothyroidism [E03.8]  Yes    Anxiety and depression [F41.9, F32.A]  Yes      Resolved Hospital Problems   No resolved problems to display.        Brief Hospital Course to date:  Vilma Ayala is a 54 y.o. female with a history of Essential Tremor, T2DM, Hypothyroidism, anxiety/depression, and GERD who presents to [facility] ED for complaint of nausea and diarrhea.  Patient transferred to my services on the a.m. of 4/11, she is resting comfortably in bed, she reports diarrhea is doing better but stool studies are ordered and pending.  Continuing on IV fluids and treatment for CB, slight improvement in renal  function today, will continue to monitor at this time. Seen again on , continuing to have improvement of CB, continued fluid support, no recurrent diarrhea at this time.         CB on CKD   Dehydration  Nausea/Diarrhea  -Cr 2.07 on admission, down to 1.87 on , continued IVF   -UA tonight unremarkable  -CT abd/pelvis negative for acute findings  -CB possibly 2ry to recent bactrim use, as well as dehydration from poor po intake.  -GI panel PCR pending. Collect stool studies, no recurrent diarrhea since admission   -Resp panel negative   -IV fluids  -Likely hold off on nephro consult for now   -Zofran for nausea     HLD  -Continue statin     T2DM  -Blood glucose 112  -A1C 6.8   -Fingerstick achs. SSI  -LA insulin nightly     Essential Tremor  -Continue Respiradone     Anxiety/depression  -Continue home meds          Expected Discharge Location and Transportation: Likely return independent facility, [facility]   Expected Discharge   Expected Discharge Date: 2025; Expected Discharge Time:      VTE Prophylaxis:  Mechanical VTE prophylaxis orders are present.         AM-PAC 6 Clicks Score (PT): 18 (25 [id number])    CODE STATUS:   Code Status and Medical Interventions: CPR (Attempt to Resuscitate); Full Support   Ordered at: 04/10/25 2013     Code Status (Patient has no pulse and is not breathing):    CPR (Attempt to Resuscitate)     Medical Interventions (Patient has pulse or is breathing):    Full Support       RENE Cruz DO  25        Electronically signed by JACI Cruz,  at 25 1353       JACI Cruz DO at 25 1231              [facility] Medicine Services  PROGRESS NOTE    Patient Name: Vilma Ayala  : 1970  MRN: [id number]    Date of Admission: 4/10/2025  Primary Care Physician: Vanessa Kaur MD    Subjective   Subjective     CC:  Nausea, diarrhea    HPI:  Patient is a 54-year-old female seen and examined by me this a.m.,  she is resting comfortably in bed and reports feeling some better today but still having increased fatigue.  She denies any recent diarrhea but stool studies are ordered and pending.  She was recently treated for UTI with Bactrim and she is continuing treatment for CB at this time.      Objective   Objective     Vital Signs:   Temp:  [97.4 °F (36.3 °C)-98.4 °F (36.9 °C)] 97.4 °F (36.3 °C)  Heart Rate:  [] 86  Resp:  [16-18] 18  BP: (101-201)/() 121/77     Physical Exam:  Constitutional: No acute distress, awake, alert, currently on RA, frail appearing  HENT: NCAT, nares patent, mucous membranes moist  Respiratory: Clear to auscultation bilaterally, no rhonchi or wheezing, respiratory effort normal   Cardiovascular: RRR  Gastrointestinal: soft, nontender, nondistended  Musculoskeletal: No bilateral ankle edema, no clubbing or cyanosis   Psychiatric: Appropriate affect, cooperative  Neurologic: Oriented x 3, strength symmetric in all extremities, Cranial Nerves grossly intact to confrontation, speech clear  Skin: No rashes      Results Reviewed:  LAB RESULTS:      Lab 04/11/25  1026 04/10/25  1344 04/09/25  1029   WBC 9.34 18.06* 18.15*   HEMOGLOBIN 13.1 14.6 15.6   HEMATOCRIT 37.9 43.3 44.4   PLATELETS 232 338 373   NEUTROS ABS 5.80 14.41* 13.79*   IMMATURE GRANS (ABS) 0.03 0.08*  --    LYMPHS ABS 2.68 2.78  --    MONOS ABS 0.48 0.62  --    EOS ABS 0.32 0.12 0.36   MCV 86.9 86.4 86.0   LACTATE  --  1.6  --          Lab 04/11/25  1026 04/10/25  1344 04/09/25  1029   SODIUM 142 138 136   POTASSIUM 3.9 3.9 4.0   CHLORIDE 106 101 99   CO2 23.0 24.0 24.2   ANION GAP 13.0 13.0 12.8   BUN 11 11 9   CREATININE 1.87* 2.07* 1.88*   EGFR 31.7* 28.0* 31.5*   GLUCOSE 148* 112* 116*   CALCIUM 9.2 9.8 10.2   HEMOGLOBIN A1C  --  6.80*  --    TSH  --   --  1.690         Lab 04/11/25  1026 04/10/25  1344 04/09/25  1029   TOTAL PROTEIN 5.9* 7.6 7.7   ALBUMIN 3.8 4.3 4.4   GLOBULIN 2.1 3.3 3.3   ALT (SGPT) 27 33 37*   AST  (SGOT) 24 28 32   BILIRUBIN 0.8 0.8 0.9   ALK PHOS 113 137* 148*   LIPASE  --  21 21             Lab 04/09/25  1029   CHOLESTEROL 113   LDL CHOL 55   HDL CHOL 37*   TRIGLYCERIDES 112             Brief Urine Lab Results  (Last result in the past 365 days)        Color   Clarity   Blood   Leuk Est   Nitrite   Protein   CREAT   Urine HCG        04/10/25 1734 Yellow   Clear   Negative   Trace   Negative   Negative                   Microbiology Results Abnormal       None            CT Abdomen Pelvis Without Contrast  Result Date: 4/10/2025  CT ABDOMEN PELVIS WO CONTRAST Date of Exam: 4/10/2025 3:21 PM EDT Indication: N/V, left flank pain. Comparison: Chest CT 3/23/2025 Technique: Axial CT images were obtained of the abdomen and pelvis without the administration of contrast. Reconstructed coronal and sagittal images were also obtained. Automated exposure control and iterative construction methods were used. Findings: Included Chest: Bibasal atelectasis Liver: No significant abnormality.  Gallbladder and biliary tree: No significant abnormality.  Spleen: No significant abnormality.  Pancreas: No significant abnormality.  Adrenal glands: Nonspecific and unchanged mild bilateral adrenal gland thickening.  Kidneys and ureters: No significant abnormality. No hydroureteronephrosis. No radiopaque calculi seen.  Stomach and duodenum:No significant abnormality. Small and large bowel: Colonic diverticulosis. No evidence of bowel obstruction. Appendicoliths within an otherwise normal-appearing appendix. No significant pericolonic inflammatory changes seen. Peritoneal cavity: No free fluid or free air. Bladder: No significant abnormality.  Pelvic organs: No significant abnormality.  Vasculature: Atherosclerotic calcifications.  Lymph nodes: No pathologic appearing lymph nodes by imaging criteria.  Bones and soft tissues: Degenerative changes of the imaged spine. No acute osseous abnormality.     Impression: Impression: No acute  findings in the abdomen or pelvis. Electronically Signed: Gregg Colindres  4/10/2025 3:32 PM EDT  Workstation ID: [id number]      Results for orders placed during the hospital encounter of 09/05/19    Adult Transthoracic Echo Complete W/ Cont if Necessary Per Protocol    Interpretation Summary  · Left ventricular systolic function is normal. Estimated EF = 60%.  · No significant valve dysfunction.      Current medications:  Scheduled Meds:atorvastatin, 40 mg, Oral, Daily  cholestyramine light, 1 packet, Oral, Q12H  famotidine, 20 mg, Oral, Nightly  insulin glargine, 20 Units, Subcutaneous, Nightly  insulin lispro, 2-9 Units, Subcutaneous, TID With Meals  levothyroxine, 200 mcg, Oral, Q AM  levothyroxine, 25 mcg, Oral, Q AM  nystatin, , Topical, Q12H  risperiDONE, 3 mg, Oral, BID  saccharomyces boulardii, 250 mg, Oral, BID  sodium chloride, 10 mL, Intravenous, Q12H      Continuous Infusions:sodium chloride, 100 mL/hr, Last Rate: 100 mL/hr (04/11/25 0531)      PRN Meds:.  acetaminophen **OR** acetaminophen **OR** acetaminophen    dextrose    dextrose    glucagon (human recombinant)    melatonin    nitroglycerin    ondansetron ODT **OR** ondansetron    Sodium Chloride (PF)    sodium chloride    sodium chloride    Assessment & Plan   Assessment & Plan     Active Hospital Problems    Diagnosis  POA    **Acute kidney injury superimposed on chronic kidney disease [N17.9, N18.9]  Yes    Diarrhea [R19.7]  Yes    Dehydration [E86.0]  Yes    Essential tremor [G25.0]  Yes    Type 2 diabetes mellitus without complication, with long-term current use of insulin [E11.9, Z79.4]  Not Applicable    Mixed hyperlipidemia [E78.2]  Yes    Other specified hypothyroidism [E03.8]  Yes    Anxiety and depression [F41.9, F32.A]  Yes      Resolved Hospital Problems   No resolved problems to display.        Brief Hospital Course to date:  Vilma Ayala is a 54 y.o. female with a history of Essential Tremor, T2DM, Hypothyroidism,  anxiety/depression, and GERD who presents to [facility] ED for complaint of nausea and diarrhea.  Patient transferred to my services on the a.m. of 4/11, she is resting comfortably in bed, she reports diarrhea is doing better but stool studies are ordered and pending.  Continuing on IV fluids and treatment for CB, slight improvement in renal function today, will continue to monitor at this time.        CB on CKD   Dehydration  Nausea/Diarrhea  -Cr 2.07 on admission, down to 1.87 on 4/11, continued IVF   -UA tonight unremarkable  -CT abd/pelvis negative for acute findings  -CB possibly 2ry to recent bactrim use, as well as dehydration from poor po intake.  -GI panel PCR pending. Collect stool studies   -C diff toxin pending given leukocytosis and diarrhea from recent abx use.   -Resp panel negative   -IV fluids  -Likely hold off on nephro consult for now   -Avoid anti-diarrheal meds for now pending results of C diff toxin.   -Zofran for nausea     HLD  -Continue statin     T2DM  -Blood glucose 112  -A1C 6.8   -Fingerstick achs. SSI  -LA insulin nightly     Essential Tremor  -Continue Respiradone     Anxiety/depression  -Continue home meds          Expected Discharge Location and Transportation: Likely return independent facility, [facility]   Expected Discharge   Expected discharge date/ time has not been documented.     VTE Prophylaxis:  Mechanical VTE prophylaxis orders are present.         AM-PAC 6 Clicks Score (PT): 18 (04/11/25 0800)    CODE STATUS:   Code Status and Medical Interventions: CPR (Attempt to Resuscitate); Full Support   Ordered at: 04/10/25 2013     Code Status (Patient has no pulse and is not breathing):    CPR (Attempt to Resuscitate)     Medical Interventions (Patient has pulse or is breathing):    Full Support       RENE Cruz DO  04/11/25        Electronically signed by JACI Cruz DO at 04/11/25 8401

## 2025-04-13 NOTE — PLAN OF CARE
Problem: Adult Inpatient Plan of Care  Goal: Plan of Care Review  Outcome: Progressing  Goal: Patient-Specific Goal (Individualized)  Outcome: Progressing  Goal: Absence of Hospital-Acquired Illness or Injury  Outcome: Progressing  Intervention: Identify and Manage Fall Risk  Recent Flowsheet Documentation  Taken 4/13/2025 0000 by Hadley Hdez RN  Safety Promotion/Fall Prevention:   room organization consistent   safety round/check completed   nonskid shoes/slippers when out of bed   lighting adjusted   fall prevention program maintained   assistive device/personal items within reach   activity supervised   clutter free environment maintained  Taken 4/12/2025 2200 by Hadley Hdez RN  Safety Promotion/Fall Prevention:   safety round/check completed   room organization consistent   nonskid shoes/slippers when out of bed   lighting adjusted   fall prevention program maintained   assistive device/personal items within reach   activity supervised   clutter free environment maintained  Taken 4/12/2025 2000 by Hadley Hdez RN  Safety Promotion/Fall Prevention:   safety round/check completed   room organization consistent   nonskid shoes/slippers when out of bed   lighting adjusted   fall prevention program maintained   clutter free environment maintained   activity supervised   assistive device/personal items within reach  Intervention: Prevent Skin Injury  Recent Flowsheet Documentation  Taken 4/13/2025 0000 by Hadley Hdez RN  Body Position: position changed independently  Skin Protection: transparent dressing maintained  Taken 4/12/2025 2200 by Hadley Hdez RN  Body Position: position changed independently  Skin Protection: transparent dressing maintained  Taken 4/12/2025 2000 by Hadley Hdez RN  Body Position: position changed independently  Skin Protection: transparent dressing maintained  Goal: Optimal Comfort and Wellbeing  Outcome: Progressing  Intervention: Provide Person-Centered  Care  Recent Flowsheet Documentation  Taken 4/13/2025 0000 by Hadley Hdez, RN  Trust Relationship/Rapport: care explained  Taken 4/12/2025 2200 by Hadley Hdez, RN  Trust Relationship/Rapport: care explained  Taken 4/12/2025 2000 by Hadley Hdez, RN  Trust Relationship/Rapport: care explained  Goal: Readiness for Transition of Care  Outcome: Progressing   Goal Outcome Evaluation:

## 2025-04-13 NOTE — PROGRESS NOTES
[facility] Medicine Services  PROGRESS NOTE    Patient Name: Vilma Ayala  : 1970  MRN: [id number]    Date of Admission: 4/10/2025  Primary Care Physician: Vanessa Kaur MD    Subjective   Subjective     CC:  Nausea, diarrhea    HPI:  Patient is a 54-year-old female seen and examined by me this a.m., she is resting comfortably in chair.  Yesterday afternoon she had some increased dyspnea, chest x-ray was obtained and felt that she may have some pulmonary edema/congestion, IV fluids were stopped and patient was given dose of Bumex.  Patient's breathing has improved today, still potentially some mild congestion, plans for follow-up CT of the chest      Objective   Objective     Vital Signs:   Temp:  [97.4 °F (36.3 °C)-98.1 °F (36.7 °C)] 97.6 °F (36.4 °C)  Heart Rate:  [] 107  Resp:  [18-22] 18  BP: (110-141)/(74-94) 110/74  Flow (L/min) (Oxygen Therapy):  [2] 2     Physical Exam:  Constitutional: No acute distress, awake, alert, on 2L NC, frail and chronically ill appearing.   HENT: NCAT, nares patent, mucous membranes moist  Respiratory: Decreased BS bilaterally, trace crackles LLL, no rhonchi or wheezing, respiratory effort normal   Cardiovascular: RRR  Gastrointestinal: soft, nontender, nondistended  Musculoskeletal: No bilateral ankle edema, no clubbing or cyanosis   Psychiatric: Appropriate affect, cooperative  Neurologic: Oriented x 3, strength symmetric in all extremities, Cranial Nerves grossly intact to confrontation, speech clear  Skin: No rashes      Results Reviewed:  LAB RESULTS:      Lab 25  0435 25  1811 25  0503 25  1026 04/10/25  1344 25  1029   WBC 9.67  --  10.55 9.34 18.06* 18.15*   HEMOGLOBIN 12.4  --  13.3 13.1 14.6 15.6   HEMATOCRIT 36.9  --  40.8 37.9 43.3 44.4   PLATELETS 272  --  230 232 338 373   NEUTROS ABS 8.88*  --  5.85 5.80 14.41* 13.79*   IMMATURE GRANS (ABS) 0.05  --  0.03 0.03 0.08*  --    LYMPHS ABS  0.67*  --  3.49* 2.68 2.78  --    MONOS ABS 0.06*  --  0.56 0.48 0.62  --    EOS ABS 0.00  --  0.56* 0.32 0.12 0.36   MCV 87.2  --  89.9 86.9 86.4 86.0   PROCALCITONIN  --  0.08  --   --   --   --    LACTATE  --   --   --   --  1.6  --          Lab 04/13/25 0435 04/12/25  0503 04/11/25  1026 04/10/25  1344 04/09/25  1029   SODIUM 136 140 142 138 136   POTASSIUM 4.4 3.9 3.9 3.9 4.0   CHLORIDE 101 107 106 101 99   CO2 21.0* 23.0 23.0 24.0 24.2   ANION GAP 14.0 10.0 13.0 13.0 12.8   BUN 16 10 11 11 9   CREATININE 1.60* 1.52* 1.87* 2.07* 1.88*   EGFR 38.2* 40.6* 31.7* 28.0* 31.5*   GLUCOSE 396* 141* 148* 112* 116*   CALCIUM 9.4 9.1 9.2 9.8 10.2   MAGNESIUM 1.9 1.7  --   --   --    HEMOGLOBIN A1C  --   --   --  6.80*  --    TSH  --   --   --   --  1.690         Lab 04/13/25 0435 04/12/25  0503 04/11/25  1026 04/10/25  1344 04/09/25  1029   TOTAL PROTEIN 6.8 6.0 5.9* 7.6 7.7   ALBUMIN 4.0 3.6 3.8 4.3 4.4   GLOBULIN 2.8 2.4 2.1 3.3 3.3   ALT (SGPT) 21 21 27 33 37*   AST (SGOT) 14 18 24 28 32   BILIRUBIN 0.4 0.6 0.8 0.8 0.9   ALK PHOS 122* 106 113 137* 148*   LIPASE  --   --   --  21 21             Lab 04/09/25  1029   CHOLESTEROL 113   LDL CHOL 55   HDL CHOL 37*   TRIGLYCERIDES 112             Brief Urine Lab Results  (Last result in the past 365 days)        Color   Clarity   Blood   Leuk Est   Nitrite   Protein   CREAT   Urine HCG        04/10/25 1734 Yellow   Clear   Negative   Trace   Negative   Negative                   Microbiology Results Abnormal       None            CT Chest Without Contrast Diagnostic  Result Date: 4/13/2025  CT CHEST WO CONTRAST DIAGNOSTIC Date of Exam: 4/13/2025 11:52 AM EDT Indication: Dyspnea, suspected pulm edema, r/o PNA. Comparison: Chest radiograph 4/12/2025. Technique: Axial CT images were obtained of the chest without contrast administration.  Reconstructed coronal and sagittal images were also obtained. Automated exposure control and iterative construction methods were used.  Findings: Thyroid and thoracic inlet: No significant abnormality. Lymph nodes: No pathologic appearing lymph nodes by imaging criteria. Cardiovascular: Normal appearing heart size. No pericardial effusion. Aorta and main pulmonary artery diameters are within normal range.No coronary artery calcifications. Esophagus: No significant abnormality. Lung parenchyma: Scattered atelectasis including in the lower lobes. No suspicious appearing opacities. No findings suggestive of pulmonary edema. Airways: Patent trachea and mainstem bronchi. Pleura: No pleural effusion or pneumothorax. Chest wall and osseous structures: Degenerative changes of the imaged spine. No acute osseous abnormality. Included abdomen: No significant abnormality.     Impression: Impression: No acute intrathoracic findings. Bilateral lower lobe atelectasis. Electronically Signed: Gregg Colindres  4/13/2025 12:26 PM EDT  Workstation ID: [id number]    XR Chest 1 View  Result Date: 4/12/2025  XR CHEST 1 VW Date of Exam: 4/12/2025 2:40 PM EDT Indication: Dyspnea Comparison: AP chest x-ray 3/20/2023, CT abdomen pelvis 4/10/2025 Findings: There are mild patchy left basilar airspace opacities. Right lung appears clear. No pneumothorax or large pleural effusion is seen. Cardiomediastinal contours appear stable.     Impression: Impression: Mild patchy left basilar airspace opacities, which may be due to atelectasis and/or pneumonia. Electronically Signed: Baylee Tabor  4/12/2025 4:19 PM EDT  Workstation ID: [id number]      Results for orders placed during the hospital encounter of 09/05/19    Adult Transthoracic Echo Complete W/ Cont if Necessary Per Protocol    Interpretation Summary  · Left ventricular systolic function is normal. Estimated EF = 60%.  · No significant valve dysfunction.      Current medications:  Scheduled Meds:atorvastatin, 40 mg, Oral, Daily  cholestyramine light, 1 packet, Oral, Q12H  famotidine, 20 mg, Oral, Nightly  heparin (porcine), 5,000  Units, Subcutaneous, Q8H  insulin glargine, 10 Units, Subcutaneous, Daily  insulin glargine, 25 Units, Subcutaneous, Nightly  insulin lispro, 2-9 Units, Subcutaneous, TID With Meals  ipratropium-albuterol, 3 mL, Nebulization, 4x Daily - RT  levothyroxine, 225 mcg, Oral, Q AM  nystatin, , Topical, Q12H  risperiDONE, 3 mg, Oral, BID  saccharomyces boulardii, 250 mg, Oral, BID  sodium chloride, 10 mL, Intravenous, Q12H      Continuous Infusions:     PRN Meds:.  acetaminophen **OR** acetaminophen **OR** acetaminophen    dextrose    dextrose    glucagon (human recombinant)    melatonin    nitroglycerin    ondansetron ODT **OR** ondansetron    Sodium Chloride (PF)    sodium chloride    sodium chloride    Assessment & Plan   Assessment & Plan     Active Hospital Problems    Diagnosis  POA    **Acute kidney injury superimposed on chronic kidney disease [N17.9, N18.9]  Yes    Acute kidney injury superimposed on CKD [N17.9, N18.9]  Yes    Diarrhea [R19.7]  Yes    Dehydration [E86.0]  Yes    Essential tremor [G25.0]  Yes    Type 2 diabetes mellitus without complication, with long-term current use of insulin [E11.9, Z79.4]  Not Applicable    Mixed hyperlipidemia [E78.2]  Yes    Other specified hypothyroidism [E03.8]  Yes    Anxiety and depression [F41.9, F32.A]  Yes      Resolved Hospital Problems   No resolved problems to display.        Brief Hospital Course to date:  Vilma Ayala is a 54 y.o. female with a history of Essential Tremor, T2DM, Hypothyroidism, anxiety/depression, and GERD who presents to [facility] ED for complaint of nausea and diarrhea.  Patient transferred to my services on the a.m. of 4/11, she is resting comfortably in bed, she reports diarrhea is doing better but stool studies are ordered and pending.  Continuing on IV fluids and treatment for CB, slight improvement in renal function today, will continue to monitor at this time.  Patient on the afternoon of 4/12 had some increased dyspnea and  congestion.  Chest x-ray was obtained and could not rule out possible pneumonia however Pro-Jim was negative, white blood cell count was normal, and felt more pulmonary congestion, IV fluids were stopped and patient was given IV Bumex.  Seen again on the a.m. of 4/13, noticed to have some congestion on exam, CT of the chest was obtained and noted to have bilateral lower lobe atelectasis.  Patient has since been weaned to room air this afternoon and patient will be using incentive spirometer and flutter valve.  Continue to monitor patient's renal function, hopeful for possible discharge home in the next 1 to 2 days with continued improvement        CB on CKD   Dehydration  Nausea/Diarrhea  -Cr 2.07 on admission, down to 1.6 on 4/13, IVF previously held with dyspnea, oxygen requirement, now weaned back to RA, chest CT with just atelectasis, plans for IS/FD, encourarged PO intake.   -UA negative  -CT abd/pelvis negative for acute findings  -CB possibly 2ry to recent bactrim use, as well as dehydration from poor po intake.  -GI panel PCR cancelled, no loose BM   -C diff toxin cancelled, no loose BM   -Resp panel negative   -IV fluids  -Likely hold off on nephro consult for now   -Avoid anti-diarrheal meds for now pending results of C diff toxin.   -Zofran for nausea     Dyspnea  - possibly related to recent fluids   - Ordered for duonebs, cxr, will add IS/FD as well   - CT Chest with just atelectasis, encouraged use of IS/FD    HLD  -Continue statin     T2DM  -Blood glucose 112  -A1C 6.8   -Fingerstick achs. SSI  -LA insulin nightly     Essential Tremor  -Continue Respiradone     Anxiety/depression  -Continue home meds          Expected Discharge Location and Transportation: Likely return independent facility, [facility]   Expected Discharge   Expected Discharge Date: 4/15/2025; Expected Discharge Time:      VTE Prophylaxis:  Pharmacologic & mechanical VTE prophylaxis orders are present.         AM-PAC 6 Clicks  Score (PT): 18 (04/13/25 0800)    CODE STATUS:   Code Status and Medical Interventions: CPR (Attempt to Resuscitate); Full Support   Ordered at: 04/10/25 2013     Code Status (Patient has no pulse and is not breathing):    CPR (Attempt to Resuscitate)     Medical Interventions (Patient has pulse or is breathing):    Full Support       RENE Cruz, DO  04/13/25

## 2025-04-14 ENCOUNTER — APPOINTMENT (OUTPATIENT)
Dept: CARDIOLOGY | Facility: HOSPITAL | Age: 55
DRG: 683 | End: 2025-04-14
Payer: MEDICARE

## 2025-04-14 LAB
ALBUMIN SERPL-MCNC: 4 G/DL (ref 3.5–5.2)
ALBUMIN/GLOB SERPL: 1.4 G/DL
ALP SERPL-CCNC: 128 U/L (ref 39–117)
ALT SERPL W P-5'-P-CCNC: 31 U/L (ref 1–33)
ANION GAP SERPL CALCULATED.3IONS-SCNC: 12 MMOL/L (ref 5–15)
AORTIC DIMENSIONLESS INDEX: 1.02 (DI)
ASCENDING AORTA: 3.3 CM
AST SERPL-CCNC: 25 U/L (ref 1–32)
AV MEAN PRESS GRAD SYS DOP V1V2: 4.7 MMHG
AV VMAX SYS DOP: 147.1 CM/SEC
BASOPHILS # BLD AUTO: 0.03 10*3/MM3 (ref 0–0.2)
BASOPHILS NFR BLD AUTO: 0.2 % (ref 0–1.5)
BH CV ECHO MEAS - AO MAX PG: 8.7 MMHG
BH CV ECHO MEAS - AO ROOT DIAM: 3 CM
BH CV ECHO MEAS - AO V2 VTI: 20.5 CM
BH CV ECHO MEAS - AVA(I,D): 3.2 CM2
BH CV ECHO MEAS - IVS/LVPW: 1 CM
BH CV ECHO MEAS - IVSD: 0.7 CM
BH CV ECHO MEAS - LA DIMENSION: 3 CM
BH CV ECHO MEAS - LV MAX PG: 7.4 MMHG
BH CV ECHO MEAS - LV MEAN PG: 3.5 MMHG
BH CV ECHO MEAS - LV V1 MAX: 134.7 CM/SEC
BH CV ECHO MEAS - LV V1 VTI: 20.8 CM
BH CV ECHO MEAS - LVIDD: 3 CM
BH CV ECHO MEAS - LVIDS: 1.8 CM
BH CV ECHO MEAS - LVOT AREA: 3.1 CM2
BH CV ECHO MEAS - LVOT DIAM: 2 CM
BH CV ECHO MEAS - LVPWD: 0.7 CM
BH CV ECHO MEAS - MV MAX PG: 10 MMHG
BH CV ECHO MEAS - MV MEAN PG: 5.4 MMHG
BH CV ECHO MEAS - MV V2 VTI: 19.7 CM
BH CV ECHO MEAS - MVA(VTI): 3.3 CM2
BH CV ECHO MEAS - PA ACC TIME: 0.09 SEC
BH CV ECHO MEAS - PA V2 MAX: 107.8 CM/SEC
BH CV ECHO MEAS - SV(LVOT): 65.4 ML
BH CV ECHO MEAS - SVI(LVOT): 31.9 ML/M2
BH CV ECHO MEAS - TAPSE (>1.6): 1.82 CM
BH CV VAS BP RIGHT ARM: NORMAL MMHG
BH CV XLRA - RV BASE: 2.6 CM
BH CV XLRA - RV LENGTH: 5.9 CM
BH CV XLRA - RV MID: 2.9 CM
BH CV XLRA - TDI S': 17 CM/SEC
BILIRUB SERPL-MCNC: 0.3 MG/DL (ref 0–1.2)
BUN SERPL-MCNC: 21 MG/DL (ref 6–20)
BUN/CREAT SERPL: 12.1 (ref 7–25)
CALCIUM SPEC-SCNC: 9.8 MG/DL (ref 8.6–10.5)
CHLORIDE SERPL-SCNC: 104 MMOL/L (ref 98–107)
CO2 SERPL-SCNC: 21 MMOL/L (ref 22–29)
CREAT SERPL-MCNC: 1.74 MG/DL (ref 0.57–1)
D-LACTATE SERPL-SCNC: 2.3 MMOL/L (ref 0.5–2)
D-LACTATE SERPL-SCNC: 2.5 MMOL/L (ref 0.5–2)
D-LACTATE SERPL-SCNC: 4.1 MMOL/L (ref 0.5–2)
DEPRECATED RDW RBC AUTO: 45.6 FL (ref 37–54)
EGFRCR SERPLBLD CKD-EPI 2021: 34.5 ML/MIN/1.73
EOSINOPHIL # BLD AUTO: 0.18 10*3/MM3 (ref 0–0.4)
EOSINOPHIL NFR BLD AUTO: 1.1 % (ref 0.3–6.2)
ERYTHROCYTE [DISTWIDTH] IN BLOOD BY AUTOMATED COUNT: 14.1 % (ref 12.3–15.4)
GEN 5 1HR TROPONIN T REFLEX: 17 NG/L
GLOBULIN UR ELPH-MCNC: 2.8 GM/DL
GLUCOSE BLDC GLUCOMTR-MCNC: 132 MG/DL (ref 70–130)
GLUCOSE BLDC GLUCOMTR-MCNC: 208 MG/DL (ref 70–130)
GLUCOSE BLDC GLUCOMTR-MCNC: 213 MG/DL (ref 70–130)
GLUCOSE BLDC GLUCOMTR-MCNC: 261 MG/DL (ref 70–130)
GLUCOSE BLDC GLUCOMTR-MCNC: 336 MG/DL (ref 70–130)
GLUCOSE SERPL-MCNC: 263 MG/DL (ref 65–99)
HCT VFR BLD AUTO: 38.2 % (ref 34–46.6)
HGB BLD-MCNC: 12.7 G/DL (ref 12–15.9)
IMM GRANULOCYTES # BLD AUTO: 0.08 10*3/MM3 (ref 0–0.05)
IMM GRANULOCYTES NFR BLD AUTO: 0.5 % (ref 0–0.5)
IVRT: 53 MS
LEFT ATRIUM VOLUME INDEX: 13.3 ML/M2
LV EF 2D ECHO EST: 60 %
LV EF BIPLANE MOD: 64.7 %
LYMPHOCYTES # BLD AUTO: 3.92 10*3/MM3 (ref 0.7–3.1)
LYMPHOCYTES NFR BLD AUTO: 24.3 % (ref 19.6–45.3)
MAGNESIUM SERPL-MCNC: 1.8 MG/DL (ref 1.6–2.6)
MCH RBC QN AUTO: 29.7 PG (ref 26.6–33)
MCHC RBC AUTO-ENTMCNC: 33.2 G/DL (ref 31.5–35.7)
MCV RBC AUTO: 89.3 FL (ref 79–97)
MONOCYTES # BLD AUTO: 0.6 10*3/MM3 (ref 0.1–0.9)
MONOCYTES NFR BLD AUTO: 3.7 % (ref 5–12)
NEUTROPHILS NFR BLD AUTO: 11.35 10*3/MM3 (ref 1.7–7)
NEUTROPHILS NFR BLD AUTO: 70.2 % (ref 42.7–76)
NRBC BLD AUTO-RTO: 0 /100 WBC (ref 0–0.2)
PLATELET # BLD AUTO: 258 10*3/MM3 (ref 140–450)
PMV BLD AUTO: 10.8 FL (ref 6–12)
POTASSIUM SERPL-SCNC: 4.1 MMOL/L (ref 3.5–5.2)
PROCALCITONIN SERPL-MCNC: 0.07 NG/ML (ref 0–0.25)
PROT SERPL-MCNC: 6.8 G/DL (ref 6–8.5)
QT INTERVAL: 330 MS
QTC INTERVAL: 442 MS
RBC # BLD AUTO: 4.28 10*6/MM3 (ref 3.77–5.28)
SODIUM SERPL-SCNC: 137 MMOL/L (ref 136–145)
TROPONIN T % DELTA: -35
TROPONIN T NUMERIC DELTA: -9 NG/L
TROPONIN T SERPL HS-MCNC: 26 NG/L
WBC NRBC COR # BLD AUTO: 16.16 10*3/MM3 (ref 3.4–10.8)

## 2025-04-14 PROCEDURE — 93306 TTE W/DOPPLER COMPLETE: CPT | Performed by: INTERNAL MEDICINE

## 2025-04-14 PROCEDURE — 25810000003 SODIUM CHLORIDE 0.9 % SOLUTION: Performed by: FAMILY MEDICINE

## 2025-04-14 PROCEDURE — 84484 ASSAY OF TROPONIN QUANT: CPT | Performed by: FAMILY MEDICINE

## 2025-04-14 PROCEDURE — 83735 ASSAY OF MAGNESIUM: CPT | Performed by: FAMILY MEDICINE

## 2025-04-14 PROCEDURE — 87040 BLOOD CULTURE FOR BACTERIA: CPT | Performed by: FAMILY MEDICINE

## 2025-04-14 PROCEDURE — 97110 THERAPEUTIC EXERCISES: CPT

## 2025-04-14 PROCEDURE — 94664 DEMO&/EVAL PT USE INHALER: CPT

## 2025-04-14 PROCEDURE — 92610 EVALUATE SWALLOWING FUNCTION: CPT

## 2025-04-14 PROCEDURE — 80053 COMPREHEN METABOLIC PANEL: CPT | Performed by: FAMILY MEDICINE

## 2025-04-14 PROCEDURE — 94799 UNLISTED PULMONARY SVC/PX: CPT

## 2025-04-14 PROCEDURE — 82948 REAGENT STRIP/BLOOD GLUCOSE: CPT

## 2025-04-14 PROCEDURE — 25010000002 HEPARIN (PORCINE) PER 1000 UNITS: Performed by: FAMILY MEDICINE

## 2025-04-14 PROCEDURE — 93005 ELECTROCARDIOGRAM TRACING: CPT | Performed by: FAMILY MEDICINE

## 2025-04-14 PROCEDURE — 84145 PROCALCITONIN (PCT): CPT | Performed by: FAMILY MEDICINE

## 2025-04-14 PROCEDURE — 85025 COMPLETE CBC W/AUTO DIFF WBC: CPT | Performed by: FAMILY MEDICINE

## 2025-04-14 PROCEDURE — 63710000001 INSULIN LISPRO (HUMAN) PER 5 UNITS: Performed by: FAMILY MEDICINE

## 2025-04-14 PROCEDURE — 99232 SBSQ HOSP IP/OBS MODERATE 35: CPT | Performed by: FAMILY MEDICINE

## 2025-04-14 PROCEDURE — 97116 GAIT TRAINING THERAPY: CPT

## 2025-04-14 PROCEDURE — 93010 ELECTROCARDIOGRAM REPORT: CPT | Performed by: STUDENT IN AN ORGANIZED HEALTH CARE EDUCATION/TRAINING PROGRAM

## 2025-04-14 PROCEDURE — 94761 N-INVAS EAR/PLS OXIMETRY MLT: CPT

## 2025-04-14 PROCEDURE — 93306 TTE W/DOPPLER COMPLETE: CPT

## 2025-04-14 PROCEDURE — 83605 ASSAY OF LACTIC ACID: CPT | Performed by: FAMILY MEDICINE

## 2025-04-14 PROCEDURE — 25010000002 METOCLOPRAMIDE PER 10 MG: Performed by: FAMILY MEDICINE

## 2025-04-14 PROCEDURE — 97530 THERAPEUTIC ACTIVITIES: CPT

## 2025-04-14 PROCEDURE — 63710000001 ONDANSETRON ODT 4 MG TABLET DISPERSIBLE: Performed by: PHYSICIAN ASSISTANT

## 2025-04-14 PROCEDURE — 63710000001 INSULIN GLARGINE PER 5 UNITS: Performed by: FAMILY MEDICINE

## 2025-04-14 RX ORDER — SODIUM CHLORIDE 9 MG/ML
75 INJECTION, SOLUTION INTRAVENOUS CONTINUOUS
Status: ACTIVE | OUTPATIENT
Start: 2025-04-14 | End: 2025-04-14

## 2025-04-14 RX ORDER — METOCLOPRAMIDE HYDROCHLORIDE 5 MG/ML
5 INJECTION INTRAMUSCULAR; INTRAVENOUS ONCE
Status: COMPLETED | OUTPATIENT
Start: 2025-04-14 | End: 2025-04-14

## 2025-04-14 RX ADMIN — CHOLEYSTYRAMINE LIGHT 4 G: 4 POWDER, FOR SUSPENSION ORAL at 21:01

## 2025-04-14 RX ADMIN — NYSTATIN: 100000 POWDER TOPICAL at 22:14

## 2025-04-14 RX ADMIN — RISPERIDONE 3 MG: 1 TABLET, FILM COATED ORAL at 21:01

## 2025-04-14 RX ADMIN — Medication 250 MG: at 21:01

## 2025-04-14 RX ADMIN — LEVOTHYROXINE SODIUM 225 MCG: 0.05 TABLET ORAL at 06:32

## 2025-04-14 RX ADMIN — HEPARIN SODIUM 5000 UNITS: 5000 INJECTION INTRAVENOUS; SUBCUTANEOUS at 22:14

## 2025-04-14 RX ADMIN — INSULIN LISPRO 5 UNITS: 100 INJECTION, SOLUTION INTRAVENOUS; SUBCUTANEOUS at 22:14

## 2025-04-14 RX ADMIN — INSULIN LISPRO 5 UNITS: 100 INJECTION, SOLUTION INTRAVENOUS; SUBCUTANEOUS at 12:35

## 2025-04-14 RX ADMIN — IPRATROPIUM BROMIDE AND ALBUTEROL SULFATE 3 ML: 2.5; .5 SOLUTION RESPIRATORY (INHALATION) at 19:34

## 2025-04-14 RX ADMIN — NYSTATIN: 100000 POWDER TOPICAL at 08:44

## 2025-04-14 RX ADMIN — Medication 250 MG: at 08:44

## 2025-04-14 RX ADMIN — SODIUM CHLORIDE 75 ML/HR: 9 INJECTION, SOLUTION INTRAVENOUS at 08:50

## 2025-04-14 RX ADMIN — HEPARIN SODIUM 5000 UNITS: 5000 INJECTION INTRAVENOUS; SUBCUTANEOUS at 06:32

## 2025-04-14 RX ADMIN — IPRATROPIUM BROMIDE AND ALBUTEROL SULFATE 3 ML: 2.5; .5 SOLUTION RESPIRATORY (INHALATION) at 15:34

## 2025-04-14 RX ADMIN — INSULIN LISPRO 8 UNITS: 100 INJECTION, SOLUTION INTRAVENOUS; SUBCUTANEOUS at 08:43

## 2025-04-14 RX ADMIN — IPRATROPIUM BROMIDE AND ALBUTEROL SULFATE 3 ML: 2.5; .5 SOLUTION RESPIRATORY (INHALATION) at 09:18

## 2025-04-14 RX ADMIN — ATORVASTATIN CALCIUM 40 MG: 40 TABLET, FILM COATED ORAL at 08:44

## 2025-04-14 RX ADMIN — ONDANSETRON 4 MG: 4 TABLET, ORALLY DISINTEGRATING ORAL at 08:44

## 2025-04-14 RX ADMIN — Medication 5 MG: at 21:01

## 2025-04-14 RX ADMIN — Medication 10 ML: at 08:50

## 2025-04-14 RX ADMIN — METOCLOPRAMIDE 5 MG: 5 INJECTION, SOLUTION INTRAMUSCULAR; INTRAVENOUS at 12:35

## 2025-04-14 RX ADMIN — RISPERIDONE 3 MG: 1 TABLET, FILM COATED ORAL at 16:35

## 2025-04-14 RX ADMIN — INSULIN GLARGINE 25 UNITS: 100 INJECTION, SOLUTION SUBCUTANEOUS at 21:05

## 2025-04-14 RX ADMIN — HEPARIN SODIUM 5000 UNITS: 5000 INJECTION INTRAVENOUS; SUBCUTANEOUS at 16:35

## 2025-04-14 RX ADMIN — FAMOTIDINE 20 MG: 20 TABLET, FILM COATED ORAL at 21:01

## 2025-04-14 RX ADMIN — CHOLEYSTYRAMINE LIGHT 4 G: 4 POWDER, FOR SUSPENSION ORAL at 08:43

## 2025-04-14 RX ADMIN — INSULIN GLARGINE 15 UNITS: 100 INJECTION, SOLUTION SUBCUTANEOUS at 08:43

## 2025-04-14 NOTE — THERAPY EVALUATION
Acute Care - Speech Language Pathology   Swallow Initial Evaluation [facility]  Clinical Swallow Evaluation         Patient Name: Vilma Ayala  : 1970  MRN: [id number]  Today's Date: 2025               Admit Date: 4/10/2025    Visit Dx:     ICD-10-CM ICD-9-CM   1. CB (acute kidney injury)  N17.9 584.9   2. Nausea and vomiting, unspecified vomiting type  R11.2 787.01   3. Weakness  R53.1 780.79   4. Dysphagia, unspecified type  R13.10 787.20     Patient Active Problem List   Diagnosis    Anxiety and depression    Uncontrolled type 2 diabetes mellitus    Other specified hypothyroidism    Obesity    Vitamin D deficiency, unspecified    Diabetic neuropathy, type II diabetes mellitus    Mixed hyperlipidemia    Dyspnea on exertion    Type 2 diabetes mellitus without complication, with long-term current use of insulin    Urinary incontinence    Microscopic hematuria    Nocturia    Nocturnal enuresis    Stress incontinence    Urge incontinence    Decreased GFR    Mild persistent asthma    Obesity (BMI 35.0-39.9 without comorbidity)    Drug-induced parkinsonism    Encounter to establish care    NATASHA (obstructive sleep apnea)    Medicare annual wellness visit, subsequent    Vitamin D deficiency    UTI (urinary tract infection)    Painful urination    Nausea    Acute kidney injury superimposed on chronic kidney disease    Diarrhea    Dehydration    Essential tremor    Acute kidney injury superimposed on CKD     Past Medical History:   Diagnosis Date    Anxiety     Chicken pox     Depression     Diabetes mellitus     Disease of thyroid gland     Measles     Type 2 diabetes mellitus      Past Surgical History:   Procedure Laterality Date    EYE SURGERY      TONSILLECTOMY         SLP Recommendation and Plan  SLP Swallowing Diagnosis: suspected pharyngeal dysphagia (25)  SLP Diet Recommendation: regular textures, nectar thick liquids, no mixed consistencies (25)  Recommended Precautions  and Strategies: general aspiration precautions, assist with feeding (04/14/25 1535)  SLP Rec. for Method of Medication Administration: meds whole, meds crushed, with puree, as tolerated (04/14/25 1535)     Monitor for Signs of Aspiration: yes, notify SLP if any concerns (04/14/25 1535)  Recommended Diagnostics: reassess via [facility]SS ([facility]), other (see comments) (4/15) (04/14/25 1535)  Swallow Criteria for Skilled Therapeutic Interventions Met: demonstrates skilled criteria (04/14/25 1535)  Anticipated Discharge Disposition (SLP): inpatient rehabilitation facility (04/14/25 1535)  Rehab Potential/Prognosis, Swallowing: good, to achieve stated therapy goals (04/14/25 1535)     Predicted Duration Therapy Intervention (Days): 1 week (04/14/25 1535)  Oral Care Recommendations: Oral Care BID/PRN, Suction toothbrush (04/14/25 1535)                                               SWALLOW EVALUATION (Last 72 Hours)       SLP Adult Swallow Evaluation       Row Name 04/14/25 1535                   Rehab Evaluation    Document Type evaluation  -        Subjective Information no complaints  -        Patient Observations alert;cooperative  -        Patient/Family/Caregiver Comments/Observations no family present  -        Patient Effort good  -        Symptoms Noted During/After Treatment none  -           General Information    Patient Profile Reviewed yes  -        Pertinent History Of Current Problem Adm with c/o nausea, treated for CB. Pt then developed dyspnea & congestion. Hx: essential tremor, T2DM, hypothyroidism, anxiety/depression, GERD. Chest CT w/ bilateral lower lobe atelectasis  -        Current Method of Nutrition regular textures;thin liquids  -        Precautions/Limitations, Vision WFL;for purposes of eval  -        Precautions/Limitations, Hearing WFL;for purposes of eval  -        Prior Level of Function-Communication unknown  -        Prior Level of Function-Swallowing no diet consistency  restrictions;other (see comments)  per pt report  -        Plans/Goals Discussed with patient;agreed upon  -        Barriers to Rehab medically complex  -        Patient's Goals for Discharge patient did not state  -           Pain    Additional Documentation Pain Scale: FACES Pre/Post-Treatment (Group)  -           Pain Scale: FACES Pre/Post-Treatment    Pain: FACES Scale, Pretreatment 0-->no hurt  -        Posttreatment Pain Rating 0-->no hurt  -           Oral Motor Structure and Function    Dentition Assessment natural, present and adequate  -        Secretion Management WNL/WFL  -        Mucosal Quality moist, healthy  -           Oral Musculature and Cranial Nerve Assessment    Oral Motor General Assessment [facility]           General Eating/Swallowing Observations    Respiratory Support Currently in Use room air  -        Eating/Swallowing Skills self-fed;fed by SLP;needed assist;other (see comments)  2' essential tremor  -        Positioning During Eating upright 90 degree;upright in chair  -        Utensils Used spoon;cup;straw  -        Consistencies Trialed ice chips;thin liquids;nectar/syrup-thick liquids;pureed;regular textures  -           Clinical Swallow Eval    Pharyngeal Phase suspected pharyngeal impairment  -        Clinical Swallow Evaluation Summary Pt w/ overt s/s of aspiration c/b intermittent cough w/ thin liquid trials regardless of presentation method. No overt s/s of aspiration w/ NTL, pureed or regular solid consistenices. Adequate oral manipulation/mastication of regular solid trial & no significant residue. Recommend regular diet w/ NTL, no mixed consistenices, assist w/ feeding. [place] [facility] [place]  -           Pharyngeal Phase Concerns    Pharyngeal Phase Concerns cough  -        Cough thin  -           SLP Evaluation Clinical Impression    SLP Swallowing Diagnosis suspected pharyngeal dysphagia  -        Functional Impact risk of  aspiration/pneumonia  -        Rehab Potential/Prognosis, Swallowing good, to achieve stated therapy goals  -        Swallow Criteria for Skilled Therapeutic Interventions Met demonstrates skilled criteria  -           Recommendations    Predicted Duration Therapy Intervention (Days) 1 week  -        SLP Diet Recommendation regular textures;nectar thick liquids;no mixed consistencies  -        Recommended Diagnostics reassess via [facility] ([facility]);other (see comments)  4/15  -        Recommended Precautions and Strategies general aspiration precautions;assist with feeding  -        Oral Care Recommendations Oral Care BID/PRN;Suction toothbrush  -        SLP Rec. for Method of Medication Administration meds whole;meds crushed;with puree;as tolerated  -        Monitor for Signs of Aspiration yes;notify SLP if any concerns  -        Anticipated Discharge Disposition (SLP) inpatient rehabilitation facility  -                  User Key  (r) = Recorded By, (t) = Taken By, (c) = Cosigned By      Initials Name Effective Dates     Lynn Crawford MS CCC-SLP 05/12/23 -                     EDUCATION  The patient has been educated in the following areas:   Dysphagia (Swallowing Impairment) Oral Care/Hydration Modified Diet Instruction.                Time Calculation:    Time Calculation- SLP       Row Name 04/14/25 1649             Time Calculation- [facility]    SLP Start Time 1535  -      SLP Received On 04/14/25  -         Untimed Charges    [id number] Eval Oral Pharyng Swallow Minutes 60  -         Total Minutes    Untimed Charges Total Minutes 60  -       Total Minutes 60  -                User Key  (r) = Recorded By, (t) = Taken By, (c) = Cosigned By      Initials Name Provider Type     Lynn Crawford MS CCC-SLP Speech and Language Pathologist                    Therapy Charges for Today       Code Description Service Date Service Provider Modifiers Qty    [id number]  ST EVAL ORAL PHARYNG  SWALLOW 4 4/14/2025 Lynn Crawford, MS ALLISON-SLP GN 1                 MS BOBBY AlvaradoSLP  4/14/2025

## 2025-04-14 NOTE — PROGRESS NOTES
[facility] Medicine Services  PROGRESS NOTE    Patient Name: Vilma Ayala  : 1970  MRN: [id number]    Date of Admission: 4/10/2025  Primary Care Physician: aVnessa Kaur MD    Subjective   Subjective     CC:  Nausea, diarrhea    HPI:  Patient is a 54-year-old seen and examined by me again, sister updated as well on the unit today as well. Patient has been weaned to RA. Patient reports having some nausea today, additional medicines have been ordered       Objective   Objective     Vital Signs:   Temp:  [97.6 °F (36.4 °C)-98.4 °F (36.9 °C)] 98.4 °F (36.9 °C)  Heart Rate:  [] 107  Resp:  [16-18] 18  BP: (115-136)/() 136/109  Flow (L/min) (Oxygen Therapy):  [1-2] 1     Physical Exam:  Constitutional: No acute distress, awake, alert, on 2L NC, frail and chronically ill appearing.   HENT: NCAT, nares patent, mucous membranes moist  Respiratory: Decreased BS bilaterally, no rhonchi, wheezing, or crackles, respiratory effort normal   Cardiovascular: sinus tachycardia  Gastrointestinal: soft, nontender, nondistended  Musculoskeletal: No bilateral ankle edema, no clubbing or cyanosis   Psychiatric: Appropriate affect, cooperative  Neurologic: Oriented x 3, strength symmetric in all extremities, Cranial Nerves grossly intact to confrontation, speech clear  Skin: No rashes      Results Reviewed:  LAB RESULTS:      Lab 25  1411 25  0528 25  0435 25  1811 25  0503 25  1026 04/10/25  1344   WBC  --  16.16* 9.67  --  10.55 9.34 18.06*   HEMOGLOBIN  --  12.7 12.4  --  13.3 13.1 14.6   HEMATOCRIT  --  38.2 36.9  --  40.8 37.9 43.3   PLATELETS  --  258 272  --  230 232 338   NEUTROS ABS  --  11.35* 8.88*  --  5.85 5.80 14.41*   IMMATURE GRANS (ABS)  --  0.08* 0.05  --  0.03 0.03 0.08*   LYMPHS ABS  --  3.92* 0.67*  --  3.49* 2.68 2.78   MONOS ABS  --  0.60 0.06*  --  0.56 0.48 0.62   EOS ABS  --  0.18 0.00  --  0.56* 0.32 0.12   MCV  --  89.3  87.2  --  89.9 86.9 86.4   PROCALCITONIN  --  0.07  --  0.08  --   --   --    LACTATE 2.3*  --   --   --   --   --  1.6   HSTROP T 26*  --   --   --   --   --   --          Lab 04/14/25  0528 04/13/25 0435 04/12/25  0503 04/11/25  1026 04/10/25  1344 04/09/25  1029   SODIUM 137 136 140 142 138 136   POTASSIUM 4.1 4.4 3.9 3.9 3.9 4.0   CHLORIDE 104 101 107 106 101 99   CO2 21.0* 21.0* 23.0 23.0 24.0 24.2   ANION GAP 12.0 14.0 10.0 13.0 13.0 12.8   BUN 21* 16 10 11 11 9   CREATININE 1.74* 1.60* 1.52* 1.87* 2.07* 1.88*   EGFR 34.5* 38.2* 40.6* 31.7* 28.0* 31.5*   GLUCOSE 263* 396* 141* 148* 112* 116*   CALCIUM 9.8 9.4 9.1 9.2 9.8 10.2   MAGNESIUM 1.8 1.9 1.7  --   --   --    HEMOGLOBIN A1C  --   --   --   --  6.80*  --    TSH  --   --   --   --   --  1.690         Lab 04/14/25  0528 04/13/25 0435 04/12/25  0503 04/11/25  1026 04/10/25  1344 04/09/25  1029   TOTAL PROTEIN 6.8 6.8 6.0 5.9* 7.6 7.7   ALBUMIN 4.0 4.0 3.6 3.8 4.3 4.4   GLOBULIN 2.8 2.8 2.4 2.1 3.3 3.3   ALT (SGPT) 31 21 21 27 33 37*   AST (SGOT) 25 14 18 24 28 32   BILIRUBIN 0.3 0.4 0.6 0.8 0.8 0.9   ALK PHOS 128* 122* 106 113 137* 148*   LIPASE  --   --   --   --  21 21         Lab 04/14/25  1411   HSTROP T 26*         Lab 04/09/25  1029   CHOLESTEROL 113   LDL CHOL 55   HDL CHOL 37*   TRIGLYCERIDES 112             Brief Urine Lab Results  (Last result in the past 365 days)        Color   Clarity   Blood   Leuk Est   Nitrite   Protein   CREAT   Urine HCG        04/10/25 1734 Yellow   Clear   Negative   Trace   Negative   Negative                   Microbiology Results Abnormal       None            CT Chest Without Contrast Diagnostic  Result Date: 4/13/2025  CT CHEST WO CONTRAST DIAGNOSTIC Date of Exam: 4/13/2025 11:52 AM EDT Indication: Dyspnea, suspected pulm edema, r/o PNA. Comparison: Chest radiograph 4/12/2025. Technique: Axial CT images were obtained of the chest without contrast administration.  Reconstructed coronal and sagittal images were also  obtained. Automated exposure control and iterative construction methods were used. Findings: Thyroid and thoracic inlet: No significant abnormality. Lymph nodes: No pathologic appearing lymph nodes by imaging criteria. Cardiovascular: Normal appearing heart size. No pericardial effusion. Aorta and main pulmonary artery diameters are within normal range.No coronary artery calcifications. Esophagus: No significant abnormality. Lung parenchyma: Scattered atelectasis including in the lower lobes. No suspicious appearing opacities. No findings suggestive of pulmonary edema. Airways: Patent trachea and mainstem bronchi. Pleura: No pleural effusion or pneumothorax. Chest wall and osseous structures: Degenerative changes of the imaged spine. No acute osseous abnormality. Included abdomen: No significant abnormality.     Impression: Impression: No acute intrathoracic findings. Bilateral lower lobe atelectasis. Electronically Signed: Gregg Colindres  4/13/2025 12:26 PM EDT  Workstation ID: [id number]      Results for orders placed during the hospital encounter of 09/05/19    Adult Transthoracic Echo Complete W/ Cont if Necessary Per Protocol    Interpretation Summary  · Left ventricular systolic function is normal. Estimated EF = 60%.  · No significant valve dysfunction.      Current medications:  Scheduled Meds:atorvastatin, 40 mg, Oral, Daily  cholestyramine light, 1 packet, Oral, Q12H  famotidine, 20 mg, Oral, Nightly  heparin (porcine), 5,000 Units, Subcutaneous, Q8H  insulin glargine, 15 Units, Subcutaneous, Daily  insulin glargine, 25 Units, Subcutaneous, Nightly  insulin lispro, 3-14 Units, Subcutaneous, 4x Daily AC & at Bedtime  ipratropium-albuterol, 3 mL, Nebulization, 4x Daily - RT  levothyroxine, 225 mcg, Oral, Q AM  nystatin, , Topical, Q12H  risperiDONE, 3 mg, Oral, BID  saccharomyces boulardii, 250 mg, Oral, BID  sodium chloride, 10 mL, Intravenous, Q12H      Continuous Infusions:sodium chloride, 75 mL/hr, Last  Rate: 75 mL/hr (04/14/25 0850)        PRN Meds:.  acetaminophen **OR** acetaminophen **OR** acetaminophen    dextrose    dextrose    glucagon (human recombinant)    melatonin    nitroglycerin    ondansetron ODT **OR** ondansetron    Sodium Chloride (PF)    sodium chloride    sodium chloride    Assessment & Plan   Assessment & Plan     Active Hospital Problems    Diagnosis  POA    **Acute kidney injury superimposed on chronic kidney disease [N17.9, N18.9]  Yes    Acute kidney injury superimposed on CKD [N17.9, N18.9]  Yes    Diarrhea [R19.7]  Yes    Dehydration [E86.0]  Yes    Essential tremor [G25.0]  Yes    Type 2 diabetes mellitus without complication, with long-term current use of insulin [E11.9, Z79.4]  Not Applicable    Mixed hyperlipidemia [E78.2]  Yes    Other specified hypothyroidism [E03.8]  Yes    Anxiety and depression [F41.9, F32.A]  Yes      Resolved Hospital Problems   No resolved problems to display.        Brief Hospital Course to date:  Vilma Ayala is a 54 y.o. female with a history of Essential Tremor, T2DM, Hypothyroidism, anxiety/depression, and GERD who presents to [facility] ED for complaint of nausea and diarrhea.  Patient transferred to my services on the a.m. of 4/11, she is resting comfortably in bed, she reports diarrhea is doing better but stool studies are ordered and pending.  Continuing on IV fluids and treatment for CB, slight improvement in renal function today, will continue to monitor at this time.  Patient on the afternoon of 4/12 had some increased dyspnea and congestion.  Chest x-ray was obtained and could not rule out possible pneumonia however Pro-Jim was negative, white blood cell count was normal, and felt more pulmonary congestion, IV fluids were stopped and patient was given IV Bumex.  Seen again on the a.m. of 4/13, noticed to have some congestion on exam, CT of the chest was obtained and noted to have bilateral lower lobe atelectasis.  Patient has since been  weaned to room air this afternoon and patient will be using incentive spirometer and flutter valve. Slight uptrend again of Cr on 4/14, patient with poor PO intake, some nausea this AM, additional meds ordered. Patient with slight tachycardia as well, will check troponin and obtain echo on her. No active chest pain however at this time.         CB on CKD   Dehydration  Nausea/Diarrhea  -Cr 2.07 on admission, down to 1.6 on 4/13, IVF previously held with dyspnea, oxygen requirement, now weaned back to RA, chest CT with just atelectasis, plans for IS/FD, encourarged PO intake.   -UA negative  -CT abd/pelvis negative for acute findings  -CB possibly 2ry to recent bactrim use, as well as dehydration from poor po intake.  -GI panel PCR cancelled, no loose BM   -C diff toxin cancelled, no loose BM   -Resp panel negative   -IV fluids  -Likely hold off on nephro consult for now   -Avoid anti-diarrheal meds for now pending results of C diff toxin.   -Zofran for nausea     Dyspnea  Nausea   - possibly related to recent fluids   - Ordered for duonebs, cxr, will add IS/FD as well   - CT Chest with just atelectasis, encouraged use of IS/FD  - Plans to obtain echo and troponin as well on 4/14, troponin 26, will follow but seems noncardiac     HLD  -Continue statin     T2DM  -Blood glucose 112  -A1C 6.8   -Fingerstick achs. SSI  -LA insulin nightly     Essential Tremor  -Continue Respiradone     Anxiety/depression  -Continue home meds          Expected Discharge Location and Transportation: Likely return independent facility, [facility]   Expected Discharge   Expected Discharge Date: 4/16/2025; Expected Discharge Time:      VTE Prophylaxis:  Pharmacologic & mechanical VTE prophylaxis orders are present.         AM-PAC 6 Clicks Score (PT): 17 (04/14/25 8125)    CODE STATUS:   Code Status and Medical Interventions: CPR (Attempt to Resuscitate); Full Support   Ordered at: 04/10/25 2013     Code Status (Patient has no pulse and  is not breathing):    CPR (Attempt to Resuscitate)     Medical Interventions (Patient has pulse or is breathing):    Full Support       RENE Cruz,   04/14/25

## 2025-04-14 NOTE — PLAN OF CARE
Problem: Adult Inpatient Plan of Care  Goal: Plan of Care Review  4/14/2025 1739 by Marnie Ruano RN  Outcome: Progressing  Flowsheets (Taken 4/14/2025 1739)  Progress: improving  Outcome Evaluation: Pt complained of nausea. PRN and scheduled medication administered. Pt stated that she threw up her risperdone and needed and additional dose to help with her tremors. Pharamcy was consulted and ok'd to give the additional dose after verifying that the patient did not seem sedated at that time. Additional dose was retrieved and given to patient. No further complaints of nausea or vomiting at this time.  4/14/2025 1739 by Marnie Ruano RN  Outcome: Not Progressing  Flowsheets (Taken 4/14/2025 1739)  Progress: improving  Outcome Evaluation: Pt complained of nausea. PRN and scheduled medication administered. Pt stated that she threw up her risperdone and needed and additional dose to help with her tremors. Pharamcy was consulted and ok'd to give the additional dose after verifying that the patient did not seem sedated at that time. Additional dose was retrieved and given to patient. No further complaints of nausea or vomiting at this time.  Goal: Patient-Specific Goal (Individualized)  4/14/2025 1739 by Marnie Ruano RN  Outcome: Progressing  4/14/2025 1739 by Marnie Ruano RN  Outcome: Not Progressing  Goal: Absence of Hospital-Acquired Illness or Injury  4/14/2025 1739 by Marnie Ruano RN  Outcome: Progressing  4/14/2025 1739 by Marnie Ruano RN  Outcome: Not Progressing  Intervention: Identify and Manage Fall Risk  Recent Flowsheet Documentation  Taken 4/14/2025 1600 by Marnie Ruano RN  Safety Promotion/Fall Prevention:   activity supervised   assistive device/personal items within reach   clutter free environment maintained   lighting adjusted   nonskid shoes/slippers when out of bed   room organization consistent   safety round/check completed  Taken 4/14/2025 1400 by Marnie Ruano RN  Safety Promotion/Fall  Prevention:   activity supervised   assistive device/personal items within reach   clutter free environment maintained   lighting adjusted   nonskid shoes/slippers when out of bed   room organization consistent   safety round/check completed  Taken 4/14/2025 1200 by Marnie Ruano RN  Safety Promotion/Fall Prevention:   activity supervised   assistive device/personal items within reach   clutter free environment maintained   lighting adjusted   nonskid shoes/slippers when out of bed   room organization consistent   safety round/check completed  Taken 4/14/2025 1000 by Marnie Ruano RN  Safety Promotion/Fall Prevention:   activity supervised   assistive device/personal items within reach   clutter free environment maintained   lighting adjusted   nonskid shoes/slippers when out of bed   room organization consistent   safety round/check completed  Taken 4/14/2025 0800 by Marnie Ruano RN  Safety Promotion/Fall Prevention:   assistive device/personal items within reach   clutter free environment maintained   lighting adjusted   nonskid shoes/slippers when out of bed   room organization consistent   safety round/check completed  Intervention: Prevent Skin Injury  Recent Flowsheet Documentation  Taken 4/14/2025 1600 by Marnie Ruano RN  Body Position: legs elevated  Skin Protection:   protective footwear used   transparent dressing maintained   incontinence pads utilized   silicone foam dressing in place  Taken 4/14/2025 1400 by Marnie Ruano RN  Body Position: legs elevated  Skin Protection:   protective footwear used   transparent dressing maintained   incontinence pads utilized   silicone foam dressing in place  Taken 4/14/2025 1200 by Marnie Ruano RN  Body Position:   side-lying   right  Skin Protection:   protective footwear used   transparent dressing maintained   incontinence pads utilized   silicone foam dressing in place  Taken 4/14/2025 1000 by Marnie Ruano RN  Body Position: position changed  independently  Skin Protection:   protective footwear used   transparent dressing maintained   incontinence pads utilized   silicone foam dressing in place  Taken 4/14/2025 0800 by Marnie Ruano RN  Body Position: sitting up in bed  Skin Protection:   incontinence pads utilized   protective footwear used   transparent dressing maintained   silicone foam dressing in place  Intervention: Prevent Infection  Recent Flowsheet Documentation  Taken 4/14/2025 1600 by Marnie Ruano RN  Infection Prevention:   environmental surveillance performed   equipment surfaces disinfected   hand hygiene promoted  Taken 4/14/2025 1400 by Marnie Ruano RN  Infection Prevention:   environmental surveillance performed   equipment surfaces disinfected   hand hygiene promoted  Taken 4/14/2025 1200 by Marnie Ruano RN  Infection Prevention:   environmental surveillance performed   equipment surfaces disinfected   hand hygiene promoted  Taken 4/14/2025 1000 by Marnie Ruano RN  Infection Prevention:   environmental surveillance performed   equipment surfaces disinfected   hand hygiene promoted  Taken 4/14/2025 0800 by Marnie Ruano RN  Infection Prevention:   environmental surveillance performed   equipment surfaces disinfected   hand hygiene promoted  Goal: Optimal Comfort and Wellbeing  4/14/2025 1739 by Marnie Ruano RN  Outcome: Progressing  4/14/2025 1739 by Marnie Ruano RN  Outcome: Not Progressing  Intervention: Provide Person-Centered Care  Recent Flowsheet Documentation  Taken 4/14/2025 1600 by Marnie Ruano RN  Trust Relationship/Rapport:   care explained   questions answered   questions encouraged   thoughts/feelings acknowledged  Taken 4/14/2025 1400 by Marnie Ruano RN  Trust Relationship/Rapport:   care explained   questions answered   questions encouraged   thoughts/feelings acknowledged  Taken 4/14/2025 1200 by Marnie Ruano RN  Trust Relationship/Rapport:   care explained   questions answered   questions  encouraged   thoughts/feelings acknowledged  Taken 4/14/2025 1000 by Marnie Ruano RN  Trust Relationship/Rapport:   care explained   questions answered   questions encouraged   thoughts/feelings acknowledged  Taken 4/14/2025 0800 by Marnie Ruano RN  Trust Relationship/Rapport:   care explained   emotional support provided   questions answered   questions encouraged   thoughts/feelings acknowledged  Goal: Readiness for Transition of Care  4/14/2025 1739 by Marnie Ruano RN  Outcome: Progressing  4/14/2025 1739 by Marnie Ruano RN  Outcome: Not Progressing     Problem: Sepsis/Septic Shock  Goal: Optimal Coping  4/14/2025 1739 by Marnie Ruano RN  Outcome: Progressing  4/14/2025 1739 by Marnie Ruano RN  Outcome: Not Progressing  Intervention: Support Patient and Family Response  Recent Flowsheet Documentation  Taken 4/14/2025 1600 by Marnie Ruano RN  Family/Support System Care:   self-care encouraged   support provided  Taken 4/14/2025 1400 by Marnie Ruano RN  Family/Support System Care:   self-care encouraged   support provided  Taken 4/14/2025 1200 by Marnie Ruano RN  Family/Support System Care:   self-care encouraged   support provided  Taken 4/14/2025 1000 by Marnie Ruano RN  Family/Support System Care:   self-care encouraged   support provided  Taken 4/14/2025 0800 by Marnie Ruano RN  Family/Support System Care:   self-care encouraged   support provided  Goal: Absence of Bleeding  4/14/2025 1739 by Marnie Ruano RN  Outcome: Progressing  4/14/2025 1739 by Marnie Ruano RN  Outcome: Not Progressing  Goal: Blood Glucose Level Within Target Range  4/14/2025 1739 by Marnie Ruano RN  Outcome: Progressing  4/14/2025 1739 by Marnie Ruano RN  Outcome: Not Progressing  Goal: Absence of Infection Signs and Symptoms  4/14/2025 1739 by Marnie Ruano RN  Outcome: Progressing  4/14/2025 1739 by Marnie Ruano RN  Outcome: Not Progressing  Intervention: Initiate Sepsis Management  Recent Flowsheet  Documentation  Taken 4/14/2025 1600 by Marnie Ruano RN  Infection Prevention:   environmental surveillance performed   equipment surfaces disinfected   hand hygiene promoted  Taken 4/14/2025 1400 by Marnie Ruano RN  Infection Prevention:   environmental surveillance performed   equipment surfaces disinfected   hand hygiene promoted  Taken 4/14/2025 1200 by Marnie Ruano RN  Infection Prevention:   environmental surveillance performed   equipment surfaces disinfected   hand hygiene promoted  Taken 4/14/2025 1000 by Marnie Ruano RN  Infection Prevention:   environmental surveillance performed   equipment surfaces disinfected   hand hygiene promoted  Taken 4/14/2025 0800 by Marnie Ruano RN  Infection Prevention:   environmental surveillance performed   equipment surfaces disinfected   hand hygiene promoted  Isolation Precautions: precautions discontinued  Intervention: Promote Recovery  Recent Flowsheet Documentation  Taken 4/14/2025 1600 by Marnie Ruano RN  Activity Management:   activity encouraged   up in chair  Taken 4/14/2025 1400 by Marnie Ruano RN  Activity Management: activity encouraged  Taken 4/14/2025 1200 by Marnie Ruano RN  Activity Management: activity encouraged  Taken 4/14/2025 0800 by Marnie Ruano RN  Activity Management: activity encouraged  Goal: Optimal Nutrition Delivery  4/14/2025 1739 by Marnie Ruano RN  Outcome: Progressing  4/14/2025 1739 by Marnie Ruano RN  Outcome: Not Progressing     Problem: Fall Injury Risk  Goal: Absence of Fall and Fall-Related Injury  4/14/2025 1739 by Marnie Ruano RN  Outcome: Progressing  4/14/2025 1739 by Marnie Ruano RN  Outcome: Not Progressing  Intervention: Identify and Manage Contributors  Recent Flowsheet Documentation  Taken 4/14/2025 0800 by Marnie Ruano RN  Medication Review/Management: medications reviewed  Intervention: Promote Injury-Free Environment  Recent Flowsheet Documentation  Taken 4/14/2025 1600 by Marnie Ruano  RN  Safety Promotion/Fall Prevention:   activity supervised   assistive device/personal items within reach   clutter free environment maintained   lighting adjusted   nonskid shoes/slippers when out of bed   room organization consistent   safety round/check completed  Taken 4/14/2025 1400 by Marnie Ruano RN  Safety Promotion/Fall Prevention:   activity supervised   assistive device/personal items within reach   clutter free environment maintained   lighting adjusted   nonskid shoes/slippers when out of bed   room organization consistent   safety round/check completed  Taken 4/14/2025 1200 by Marnie Ruano RN  Safety Promotion/Fall Prevention:   activity supervised   assistive device/personal items within reach   clutter free environment maintained   lighting adjusted   nonskid shoes/slippers when out of bed   room organization consistent   safety round/check completed  Taken 4/14/2025 1000 by Marnie Ruano RN  Safety Promotion/Fall Prevention:   activity supervised   assistive device/personal items within reach   clutter free environment maintained   lighting adjusted   nonskid shoes/slippers when out of bed   room organization consistent   safety round/check completed  Taken 4/14/2025 0800 by Marnie Ruano RN  Safety Promotion/Fall Prevention:   assistive device/personal items within reach   clutter free environment maintained   lighting adjusted   nonskid shoes/slippers when out of bed   room organization consistent   safety round/check completed     Problem: Skin Injury Risk Increased  Goal: Skin Health and Integrity  4/14/2025 1739 by Marnie Ruano RN  Outcome: Progressing  4/14/2025 1739 by Marnie Ruano RN  Outcome: Not Progressing  Intervention: Optimize Skin Protection  Recent Flowsheet Documentation  Taken 4/14/2025 1600 by Marnie Ruano RN  Activity Management:   activity encouraged   up in chair  Pressure Reduction Techniques: frequent weight shift encouraged  Head of Bed (HOB) Positioning: HOB  elevated  Pressure Reduction Devices: positioning supports utilized  Skin Protection:   protective footwear used   transparent dressing maintained   incontinence pads utilized   silicone foam dressing in place  Taken 4/14/2025 1400 by Marnie Ruano RN  Activity Management: activity encouraged  Pressure Reduction Techniques: frequent weight shift encouraged  Head of Bed (HOB) Positioning: [facility] elevated  Pressure Reduction Devices: positioning supports utilized  Skin Protection:   protective footwear used   transparent dressing maintained   incontinence pads utilized   silicone foam dressing in place  Taken 4/14/2025 1200 by Marnie Ruano RN  Activity Management: activity encouraged  Pressure Reduction Techniques: frequent weight shift encouraged  Head of Bed (HOB) Positioning: [facility] elevated  Pressure Reduction Devices: positioning supports utilized  Skin Protection:   protective footwear used   transparent dressing maintained   incontinence pads utilized   silicone foam dressing in place  Taken 4/14/2025 1000 by Marnie Ruano RN  Pressure Reduction Techniques: frequent weight shift encouraged  Head of Bed (HOB) Positioning: [facility] elevated  Pressure Reduction Devices: positioning supports utilized  Skin Protection:   protective footwear used   transparent dressing maintained   incontinence pads utilized   silicone foam dressing in place  Taken 4/14/2025 0800 by Marnie Ruano RN  Activity Management: activity encouraged  Pressure Reduction Techniques: frequent weight shift encouraged  Head of Bed (HOB) Positioning: [facility] elevated  Pressure Reduction Devices: positioning supports utilized  Skin Protection:   incontinence pads utilized   protective footwear used   transparent dressing maintained   silicone foam dressing in place   Goal Outcome Evaluation:           Progress: no change

## 2025-04-14 NOTE — PROGRESS NOTES
Continued Stay Note  [facility]     Patient Name: Vilma Ayala  MRN: [id number]  Today's Date: 4/14/2025    Admit Date: 4/10/2025    Plan: Home   Discharge Plan       Row Name 04/14/25 1541       Plan    Plan Comments Social work met with Mrs. Ayala and her mother and discussed that she was waiting to receive a CPAP machine. Social work called [facility] [phone] and left a message for them to call back because it is documented in Epic that orders were sent to that [facility] medical eqipment agency.                   Discharge Codes    No documentation.                 Expected Discharge Date and Time       Expected Discharge Date Expected Discharge Time    Apr 15, 2025               VALARIE Redding

## 2025-04-14 NOTE — THERAPY TREATMENT NOTE
Patient Name: Vilma Ayala  : 1970    MRN: [id number]                              Today's Date: 2025       Admit Date: 4/10/2025    Visit Dx:     ICD-10-CM ICD-9-CM   1. CB (acute kidney injury)  N17.9 584.9   2. Nausea and vomiting, unspecified vomiting type  R11.2 787.01   3. Weakness  R53.1 780.79     Patient Active Problem List   Diagnosis    Anxiety and depression    Uncontrolled type 2 diabetes mellitus    Other specified hypothyroidism    Obesity    Vitamin D deficiency, unspecified    Diabetic neuropathy, type II diabetes mellitus    Mixed hyperlipidemia    Dyspnea on exertion    Type 2 diabetes mellitus without complication, with long-term current use of insulin    Urinary incontinence    Microscopic hematuria    Nocturia    Nocturnal enuresis    Stress incontinence    Urge incontinence    Decreased GFR    Mild persistent asthma    Obesity (BMI 35.0-39.9 without comorbidity)    Drug-induced parkinsonism    Encounter to establish care    NATASHA (obstructive sleep apnea)    Medicare annual wellness visit, subsequent    Vitamin D deficiency    UTI (urinary tract infection)    Painful urination    Nausea    Acute kidney injury superimposed on chronic kidney disease    Diarrhea    Dehydration    Essential tremor    Acute kidney injury superimposed on CKD     Past Medical History:   Diagnosis Date    Anxiety     Chicken pox     Depression     Diabetes mellitus     Disease of thyroid gland     Measles     Type 2 diabetes mellitus      Past Surgical History:   Procedure Laterality Date    EYE SURGERY      TONSILLECTOMY        General Information       Row Name 25 1453          Physical Therapy Time and Intention    Document Type therapy note (daily note)  -KG     Mode of Treatment physical therapy  -KG       Row Name 25 145          General Information    Patient Profile Reviewed yes  -KG     Existing Precautions/Restrictions fall;other (see comments)  R knee buckling  -KG       Row  Name 04/14/25 1453          Living Environment    Current Living Arrangements independent living facility  -KG     People in Home alone  -KG       Row Name 04/14/25 1453          Home Main Entrance    Number of Stairs, Main Entrance none  -KG       Row Name 04/14/25 1453          Stairs Within Home, Primary    Number of Stairs, Within Home, Primary none  -KG       Row Name 04/14/25 1453          Cognition    Orientation Status (Cognition) oriented x 3  -KG       Row Name 04/14/25 1453          Safety Issues/Impairments Affecting Functional Mobility    Safety Issues Affecting Function (Mobility) insight into deficits/self-awareness;judgment;positioning of assistive device;problem-solving;safety precaution awareness;awareness of need for assistance  -KG     Impairments Affecting Function (Mobility) balance;endurance/activity tolerance;strength  -KG               User Key  (r) = Recorded By, (t) = Taken By, (c) = Cosigned By      Initials Name Provider Type    PAM Carmen Cano Physical Therapist                   Mobility       Row Name 04/14/25 1454          Bed Mobility    Comment, (Bed Mobility) UIC  -KG       Row Name 04/14/25 1454          Transfers    Comment, (Transfers) cues HP, CGA, FWW  -KG       Row Name 04/14/25 1454          Sit-Stand Transfer    Sit-Stand [place] (Transfers) verbal cues;contact guard  -KG     Assistive Device (Sit-Stand Transfers) walker, front-wheeled  -KG       Row Name 04/14/25 1454          Gait/Stairs (Locomotion)    [place] Level (Gait) contact guard;minimum assist (75% patient effort)  -KG     Assistive Device (Gait) walker, front-wheeled  -KG     Patient was able to Ambulate yes  -KG     Distance in Feet (Gait) 45  -KG     Deviations/Abnormal Patterns (Gait) gait speed decreased;stride length decreased  -KG     Left Sided Gait Deviations forward flexed posture  -KG     Right Sided Gait Deviations knee buckling, right side  -KG     Comment, (Gait/Stairs) Pt able to  ambulate 45', FWW with shuffling gait, initially ambulated with CGA but as pt fatigued pt had issues with R knee buckling, difficulty advancing RLE and required min-A for stability, then chair needed to be pulled up to sit  -KG               User Key  (r) = Recorded By, (t) = Taken By, (c) = Cosigned By      Initials Name Provider Type    Carmen Weinberg Physical Therapist                   Obj/Interventions       Row Name 04/14/25 1457          Strength Comprehensive (MMT)    General Manual Muscle Testing (MMT) Assessment lower extremity strength deficits identified  -KG       Row Name 04/14/25 1457          Motor Skills    Therapeutic Exercise other (see comments)  SLR, heel slides, LAQ  -KG       Row Name 04/14/25 1457          Balance    Dynamic Standing Balance minimal assist  -KG     Position/Device Used, Standing Balance supported;walker, front-wheeled  -KG     Balance Interventions standing;sit to stand  -KG               User Key  (r) = Recorded By, (t) = Taken By, (c) = Cosigned By      Initials Name Provider Type    Carmen Weinberg Physical Therapist                   Goals/Plan    No documentation.                  Clinical Impression       Row Name 04/14/25 1458          Pain    Pretreatment Pain Rating 0/10 - no pain  -KG     Posttreatment Pain Rating 0/10 - no pain  -KG       Row Name 04/14/25 1458          Plan of Care Review    Plan of Care Reviewed With patient;sibling  -KG     Progress no change  -KG     Outcome Evaluation Pt able to ambulate 45', FWW with shuffling gait, initially ambulated with CGA but as pt fatigued pt had issues with R knee buckling, difficulty advancing RLE and required min-A for stability, then chair needed to be pulled up to sit.  Now recommend IPR  -KG       Row Name 04/14/25 1458          Vital Signs    Post Systolic BP Rehab 145  -KG     Post Treatment Diastolic BP 90  -KG       Row Name 04/14/25 1455          Positioning and Restraints    Pre-Treatment  Position sitting in chair/recliner  -KG     Post Treatment Position chair  -KG     In Chair notified nsg;call light within reach;encouraged to call for assist;exit alarm on;waffle cushion;legs elevated  -KG               User Key  (r) = Recorded By, (t) = Taken By, (c) = Cosigned By      Initials Name Provider Type    PAM Carmen Cano Physical Therapist                   Outcome Measures       Row Name 04/14/25 1459 04/14/25 0800       How much help from another person do you currently need...    Turning from your back to your side while in flat bed without using bedrails? 3  -KG 3  -AW    Moving from lying on back to sitting on the side of a flat bed without bedrails? 3  -KG 3  -AW    Moving to and from a bed to a chair (including a wheelchair)? 3  -KG 3  -AW    Standing up from a chair using your arms (e.g., wheelchair, bedside chair)? 3  -KG 3  -AW    Climbing 3-5 steps with a railing? 2  -KG 2  -AW    To walk in hospital room? 3  -KG 2  -AW    AM-PAC 6 Clicks Score (PT) 17  -KG 16  -AW    Highest Level of Mobility Goal 5 --> Static standing  -KG 5 --> Static standing  -AW      Row Name 04/14/25 1459          Functional Assessment    Outcome Measure Options AM-PAC 6 Clicks Basic Mobility (PT)  -KG               User Key  (r) = Recorded By, (t) = Taken By, (c) = Cosigned By      Initials Name Provider Type    PAM Carmen Cano Physical Therapist    Marnie Joel, RN Registered Nurse                                 Physical Therapy Education       Title: PT OT SLP Therapies (In Progress)       Topic: Physical Therapy (In Progress)       Point: Mobility training (Done)       Learning Progress Summary            Patient Acceptance, E, VU,NR by [facility] at 4/12/2025 0950                      Point: Home exercise program (Not Started)       Learner Progress:  Not documented in this visit.              Point: Body mechanics (Not Started)       Learner Progress:  Not documented in this visit.              Point:  Precautions (Not Started)       Learner Progress:  Not documented in this visit.                              User Key       Initials Effective Dates Name Provider Type Discipline     07/11/23 -  Jody Yeung, PT Physical Therapist PT                  PT Recommendation and Plan     Progress: no change  Outcome Evaluation: Pt able to ambulate 45', FWW with shuffling gait, initially ambulated with CGA but as pt fatigued pt had issues with R knee buckling, difficulty advancing RLE and required min-A for stability, then chair needed to be pulled up to sit.  Now recommend IPR     Time Calculation:         PT Charges       Row Name 04/14/25 1459             Time Calculation    Start Time 1412  -KG      PT Received On 04/14/25  -KG         Timed Charges    23343 - PT Therapeutic Exercise Minutes 10  -KG      33819 - Gait Training Minutes  20  -KG      81520 - PT Therapeutic Activity Minutes 10  -KG         Total Minutes    Timed Charges Total Minutes 40  -KG       Total Minutes 40  -KG                User Key  (r) = Recorded By, (t) = Taken By, (c) = Cosigned By      Initials Name Provider Type    KG Carmen Cano Physical Therapist                  Therapy Charges for Today       Code Description Service Date Service Provider Modifiers Qty    12804119217 HC PT THER PROC EA 15 MIN 4/14/2025 Carmen Cano GP 1    88783169825 HC GAIT TRAINING EA 15 MIN 4/14/2025 Carmen Cano GP 1    74218748875 HC PT THERAPEUTIC ACT EA 15 MIN 4/14/2025 Carmen Cano GP 1            PT G-Codes  Outcome Measure Options: AM-PAC 6 Clicks Basic Mobility (PT)  AM-PAC 6 Clicks Score (PT): 17  PT Discharge Summary  Anticipated Discharge Disposition (PT): inpatient rehabilitation facility    Carmen Cano  4/14/2025

## 2025-04-14 NOTE — PLAN OF CARE
Goal Outcome Evaluation:  Plan of Care Reviewed With: patient, sibling        Progress: no change  Outcome Evaluation: Pt able to ambulate 45', FWW with shuffling gait, initially ambulated with CGA but as pt fatigued pt had issues with R knee buckling, difficulty advancing RLE and required min-A for stability, then chair needed to be pulled up to sit.  Now recommend IPR    Anticipated Discharge Disposition (PT): inpatient rehabilitation facility

## 2025-04-14 NOTE — PLAN OF CARE
Goal Outcome Evaluation:                   Anticipated Discharge Disposition (SLP): inpatient rehabilitation facility          SLP Swallowing Diagnosis: suspected pharyngeal dysphagia (04/14/25 8964)

## 2025-04-14 NOTE — PLAN OF CARE
Problem: Adult Inpatient Plan of Care  Goal: Plan of Care Review  Outcome: Progressing     Problem: Adult Inpatient Plan of Care  Goal: Absence of Hospital-Acquired Illness or Injury  Intervention: Identify and Manage Fall Risk  Recent Flowsheet Documentation  Taken 4/14/2025 0400 by Yoana Haney RN  Safety Promotion/Fall Prevention: safety round/check completed     Problem: Fall Injury Risk  Goal: Absence of Fall and Fall-Related Injury  Outcome: Progressing  Intervention: Identify and Manage Contributors  Recent Flowsheet Documentation  Taken 4/14/2025 0400 by Yoana Haney RN  Medication Review/Management: medications reviewed  Intervention: Promote Injury-Free Environment  Recent Flowsheet Documentation  Taken 4/14/2025 0400 by Yoana Haney RN  Safety Promotion/Fall Prevention: safety round/check completed   Goal Outcome Evaluation: bed alarm activated. Patient turn and repositioned every two hours to promote skin integrity.

## 2025-04-14 NOTE — PLAN OF CARE
Problem: Adult Inpatient Plan of Care  Goal: Plan of Care Review  Outcome: Progressing  Goal: Patient-Specific Goal (Individualized)  Outcome: Progressing  Goal: Absence of Hospital-Acquired Illness or Injury  Outcome: Progressing  Intervention: Identify and Manage Fall Risk  Recent Flowsheet Documentation  Taken 4/14/2025 0000 by Samara Krishnamurthy RN  Safety Promotion/Fall Prevention: safety round/check completed  Taken 4/13/2025 2200 by Samara Krishnamurthy RN  Safety Promotion/Fall Prevention: safety round/check completed  Taken 4/13/2025 2000 by Samara Krishnamurthy RN  Safety Promotion/Fall Prevention: safety round/check completed  Intervention: Prevent Skin Injury  Recent Flowsheet Documentation  Taken 4/14/2025 0000 by Samara Krishnamurthy RN  Body Position: position changed independently  Taken 4/13/2025 2200 by Samara Krishnamurthy RN  Body Position: position changed independently  Taken 4/13/2025 2000 by Samara Krishnamurthy RN  Body Position: position changed independently  Intervention: Prevent Infection  Recent Flowsheet Documentation  Taken 4/14/2025 0000 by Samara Krishnamurthy RN  Infection Prevention: environmental surveillance performed  Taken 4/13/2025 2200 by Samara Krishnamurthy RN  Infection Prevention: environmental surveillance performed  Taken 4/13/2025 2000 by Samara Krishnamurthy RN  Infection Prevention: environmental surveillance performed  Goal: Optimal Comfort and Wellbeing  Outcome: Progressing  Intervention: Provide Person-Centered Care  Recent Flowsheet Documentation  Taken 4/14/2025 0000 by Samara Krishnamurthy RN  Trust Relationship/Rapport:   care explained   choices provided   emotional support provided  Taken 4/13/2025 2000 by Samara Krishnamurthy RN  Trust Relationship/Rapport:   choices provided   emotional support provided   care explained  Goal: Readiness for Transition of Care  Outcome: Progressing      Problem: Sepsis/Septic Shock  Goal: Optimal Coping  Outcome: Progressing  Goal: Absence of Bleeding  Outcome: Progressing  Goal: Blood Glucose Level Within Target Range  Outcome: Progressing  Goal: Absence of Infection Signs and Symptoms  Outcome: Progressing  Intervention: Initiate Sepsis Management  Recent Flowsheet Documentation  Taken 4/14/2025 0000 by Samara Krishnamurthy RN  Infection Prevention: environmental surveillance performed  Taken 4/13/2025 2200 by Samara Krishnamurthy RN  Infection Prevention: environmental surveillance performed  Taken 4/13/2025 2000 by Samara Krishnamurthy RN  Infection Prevention: environmental surveillance performed  Goal: Optimal Nutrition Delivery  Outcome: Progressing     Problem: Fall Injury Risk  Goal: Absence of Fall and Fall-Related Injury  Outcome: Progressing  Intervention: Identify and Manage Contributors  Recent Flowsheet Documentation  Taken 4/14/2025 0000 by Samara Krishnamurthy RN  Medication Review/Management: medications reviewed  Taken 4/13/2025 2200 by Samara Krishnamurthy RN  Medication Review/Management: medications reviewed  Taken 4/13/2025 2000 by Samara Krishnamurthy RN  Medication Review/Management: medications reviewed  Intervention: Promote Injury-Free Environment  Recent Flowsheet Documentation  Taken 4/14/2025 0000 by Samara Krishnamurthy RN  Safety Promotion/Fall Prevention: safety round/check completed  Taken 4/13/2025 2200 by Samara Krishnamurthy RN  Safety Promotion/Fall Prevention: safety round/check completed  Taken 4/13/2025 2000 by Samara Krishnmaurthy RN  Safety Promotion/Fall Prevention: safety round/check completed   Goal Outcome Evaluation:      Pt is alert and oriented. No complaints of n/v/d. Blood sugar was 463 in the beginning of the shift and now is 384. Provider notified. Call light within reach.

## 2025-04-15 ENCOUNTER — APPOINTMENT (OUTPATIENT)
Dept: GENERAL RADIOLOGY | Facility: HOSPITAL | Age: 55
DRG: 683 | End: 2025-04-15
Payer: MEDICARE

## 2025-04-15 LAB
ALBUMIN SERPL-MCNC: 3.6 G/DL (ref 3.5–5.2)
ALBUMIN/GLOB SERPL: 1.7 G/DL
ALP SERPL-CCNC: 100 U/L (ref 39–117)
ALT SERPL W P-5'-P-CCNC: 26 U/L (ref 1–33)
ANION GAP SERPL CALCULATED.3IONS-SCNC: 14 MMOL/L (ref 5–15)
AST SERPL-CCNC: 18 U/L (ref 1–32)
BASOPHILS # BLD AUTO: 0.05 10*3/MM3 (ref 0–0.2)
BASOPHILS NFR BLD AUTO: 0.4 % (ref 0–1.5)
BILIRUB SERPL-MCNC: 0.5 MG/DL (ref 0–1.2)
BUN SERPL-MCNC: 17 MG/DL (ref 6–20)
BUN/CREAT SERPL: 12.8 (ref 7–25)
CALCIUM SPEC-SCNC: 9.1 MG/DL (ref 8.6–10.5)
CHLORIDE SERPL-SCNC: 105 MMOL/L (ref 98–107)
CO2 SERPL-SCNC: 22 MMOL/L (ref 22–29)
CREAT SERPL-MCNC: 1.33 MG/DL (ref 0.57–1)
D-LACTATE SERPL-SCNC: 1.8 MMOL/L (ref 0.5–2)
DEPRECATED RDW RBC AUTO: 47.3 FL (ref 37–54)
EGFRCR SERPLBLD CKD-EPI 2021: 47.6 ML/MIN/1.73
EOSINOPHIL # BLD AUTO: 0.59 10*3/MM3 (ref 0–0.4)
EOSINOPHIL NFR BLD AUTO: 5.1 % (ref 0.3–6.2)
ERYTHROCYTE [DISTWIDTH] IN BLOOD BY AUTOMATED COUNT: 14.6 % (ref 12.3–15.4)
GLOBULIN UR ELPH-MCNC: 2.1 GM/DL
GLUCOSE BLDC GLUCOMTR-MCNC: 164 MG/DL (ref 70–130)
GLUCOSE BLDC GLUCOMTR-MCNC: 169 MG/DL (ref 70–130)
GLUCOSE BLDC GLUCOMTR-MCNC: 217 MG/DL (ref 70–130)
GLUCOSE BLDC GLUCOMTR-MCNC: 263 MG/DL (ref 70–130)
GLUCOSE SERPL-MCNC: 142 MG/DL (ref 65–99)
HCT VFR BLD AUTO: 35.9 % (ref 34–46.6)
HGB BLD-MCNC: 11.9 G/DL (ref 12–15.9)
IMM GRANULOCYTES # BLD AUTO: 0.05 10*3/MM3 (ref 0–0.05)
IMM GRANULOCYTES NFR BLD AUTO: 0.4 % (ref 0–0.5)
LYMPHOCYTES # BLD AUTO: 3.15 10*3/MM3 (ref 0.7–3.1)
LYMPHOCYTES NFR BLD AUTO: 27 % (ref 19.6–45.3)
MCH RBC QN AUTO: 29.6 PG (ref 26.6–33)
MCHC RBC AUTO-ENTMCNC: 33.1 G/DL (ref 31.5–35.7)
MCV RBC AUTO: 89.3 FL (ref 79–97)
MONOCYTES # BLD AUTO: 0.58 10*3/MM3 (ref 0.1–0.9)
MONOCYTES NFR BLD AUTO: 5 % (ref 5–12)
NEUTROPHILS NFR BLD AUTO: 62.1 % (ref 42.7–76)
NEUTROPHILS NFR BLD AUTO: 7.25 10*3/MM3 (ref 1.7–7)
NRBC BLD AUTO-RTO: 0 /100 WBC (ref 0–0.2)
PLATELET # BLD AUTO: 248 10*3/MM3 (ref 140–450)
PMV BLD AUTO: 10.8 FL (ref 6–12)
POTASSIUM SERPL-SCNC: 4.6 MMOL/L (ref 3.5–5.2)
PROT SERPL-MCNC: 5.7 G/DL (ref 6–8.5)
RBC # BLD AUTO: 4.02 10*6/MM3 (ref 3.77–5.28)
SODIUM SERPL-SCNC: 141 MMOL/L (ref 136–145)
WBC NRBC COR # BLD AUTO: 11.67 10*3/MM3 (ref 3.4–10.8)

## 2025-04-15 PROCEDURE — 25010000002 PROCHLORPERAZINE 10 MG/2ML SOLUTION: Performed by: NURSE PRACTITIONER

## 2025-04-15 PROCEDURE — 94761 N-INVAS EAR/PLS OXIMETRY MLT: CPT

## 2025-04-15 PROCEDURE — 94799 UNLISTED PULMONARY SVC/PX: CPT

## 2025-04-15 PROCEDURE — 74230 X-RAY XM SWLNG FUNCJ C+: CPT

## 2025-04-15 PROCEDURE — 92611 MOTION FLUOROSCOPY/SWALLOW: CPT

## 2025-04-15 PROCEDURE — 63710000001 INSULIN GLARGINE PER 5 UNITS: Performed by: FAMILY MEDICINE

## 2025-04-15 PROCEDURE — 82948 REAGENT STRIP/BLOOD GLUCOSE: CPT

## 2025-04-15 PROCEDURE — 94664 DEMO&/EVAL PT USE INHALER: CPT

## 2025-04-15 PROCEDURE — 63710000001 INSULIN LISPRO (HUMAN) PER 5 UNITS: Performed by: FAMILY MEDICINE

## 2025-04-15 PROCEDURE — 25010000002 HEPARIN (PORCINE) PER 1000 UNITS: Performed by: FAMILY MEDICINE

## 2025-04-15 PROCEDURE — 99232 SBSQ HOSP IP/OBS MODERATE 35: CPT | Performed by: INTERNAL MEDICINE

## 2025-04-15 PROCEDURE — 25810000003 SODIUM CHLORIDE 0.9 % SOLUTION: Performed by: NURSE PRACTITIONER

## 2025-04-15 PROCEDURE — 85025 COMPLETE CBC W/AUTO DIFF WBC: CPT | Performed by: FAMILY MEDICINE

## 2025-04-15 PROCEDURE — 63710000001 ONDANSETRON ODT 4 MG TABLET DISPERSIBLE: Performed by: PHYSICIAN ASSISTANT

## 2025-04-15 PROCEDURE — 83605 ASSAY OF LACTIC ACID: CPT | Performed by: FAMILY MEDICINE

## 2025-04-15 PROCEDURE — 80053 COMPREHEN METABOLIC PANEL: CPT | Performed by: FAMILY MEDICINE

## 2025-04-15 RX ORDER — BISACODYL 10 MG
10 SUPPOSITORY, RECTAL RECTAL DAILY PRN
Status: DISCONTINUED | OUTPATIENT
Start: 2025-04-15 | End: 2025-04-25 | Stop reason: HOSPADM

## 2025-04-15 RX ORDER — POLYETHYLENE GLYCOL 3350 17 G/17G
17 POWDER, FOR SOLUTION ORAL DAILY PRN
Status: DISCONTINUED | OUTPATIENT
Start: 2025-04-15 | End: 2025-04-25 | Stop reason: HOSPADM

## 2025-04-15 RX ORDER — BISACODYL 5 MG/1
5 TABLET, DELAYED RELEASE ORAL DAILY PRN
Status: DISCONTINUED | OUTPATIENT
Start: 2025-04-15 | End: 2025-04-25 | Stop reason: HOSPADM

## 2025-04-15 RX ORDER — AMOXICILLIN 250 MG
2 CAPSULE ORAL 2 TIMES DAILY
Status: DISCONTINUED | OUTPATIENT
Start: 2025-04-15 | End: 2025-04-25 | Stop reason: HOSPADM

## 2025-04-15 RX ORDER — SODIUM CHLORIDE 9 MG/ML
75 INJECTION, SOLUTION INTRAVENOUS CONTINUOUS
Status: ACTIVE | OUTPATIENT
Start: 2025-04-15 | End: 2025-04-15

## 2025-04-15 RX ORDER — PROCHLORPERAZINE EDISYLATE 5 MG/ML
5 INJECTION INTRAMUSCULAR; INTRAVENOUS ONCE
Status: COMPLETED | OUTPATIENT
Start: 2025-04-15 | End: 2025-04-15

## 2025-04-15 RX ADMIN — Medication 250 MG: at 09:04

## 2025-04-15 RX ADMIN — SENNOSIDES AND DOCUSATE SODIUM 2 TABLET: 50; 8.6 TABLET ORAL at 13:07

## 2025-04-15 RX ADMIN — Medication 5 MG: at 22:19

## 2025-04-15 RX ADMIN — IPRATROPIUM BROMIDE AND ALBUTEROL SULFATE 3 ML: 2.5; .5 SOLUTION RESPIRATORY (INHALATION) at 11:52

## 2025-04-15 RX ADMIN — PROCHLORPERAZINE EDISYLATE 5 MG: 5 INJECTION INTRAMUSCULAR; INTRAVENOUS at 22:16

## 2025-04-15 RX ADMIN — IPRATROPIUM BROMIDE AND ALBUTEROL SULFATE 3 ML: 2.5; .5 SOLUTION RESPIRATORY (INHALATION) at 17:01

## 2025-04-15 RX ADMIN — INSULIN GLARGINE 25 UNITS: 100 INJECTION, SOLUTION SUBCUTANEOUS at 22:25

## 2025-04-15 RX ADMIN — HEPARIN SODIUM 5000 UNITS: 5000 INJECTION INTRAVENOUS; SUBCUTANEOUS at 13:07

## 2025-04-15 RX ADMIN — SENNOSIDES AND DOCUSATE SODIUM 2 TABLET: 50; 8.6 TABLET ORAL at 22:22

## 2025-04-15 RX ADMIN — FAMOTIDINE 20 MG: 20 TABLET, FILM COATED ORAL at 22:19

## 2025-04-15 RX ADMIN — INSULIN LISPRO 5 UNITS: 100 INJECTION, SOLUTION INTRAVENOUS; SUBCUTANEOUS at 17:13

## 2025-04-15 RX ADMIN — Medication 250 MG: at 22:19

## 2025-04-15 RX ADMIN — SODIUM CHLORIDE 500 ML: 9 INJECTION, SOLUTION INTRAVENOUS at 00:08

## 2025-04-15 RX ADMIN — HEPARIN SODIUM 5000 UNITS: 5000 INJECTION INTRAVENOUS; SUBCUTANEOUS at 22:27

## 2025-04-15 RX ADMIN — SODIUM CHLORIDE 75 ML/HR: 9 INJECTION, SOLUTION INTRAVENOUS at 02:00

## 2025-04-15 RX ADMIN — RISPERIDONE 3 MG: 1 TABLET, FILM COATED ORAL at 22:19

## 2025-04-15 RX ADMIN — CHOLEYSTYRAMINE LIGHT 4 G: 4 POWDER, FOR SUSPENSION ORAL at 09:05

## 2025-04-15 RX ADMIN — BARIUM SULFATE 50 ML: 400 SUSPENSION ORAL at 10:59

## 2025-04-15 RX ADMIN — HEPARIN SODIUM 5000 UNITS: 5000 INJECTION INTRAVENOUS; SUBCUTANEOUS at 05:39

## 2025-04-15 RX ADMIN — INSULIN LISPRO 3 UNITS: 100 INJECTION, SOLUTION INTRAVENOUS; SUBCUTANEOUS at 09:05

## 2025-04-15 RX ADMIN — INSULIN GLARGINE 15 UNITS: 100 INJECTION, SOLUTION SUBCUTANEOUS at 09:05

## 2025-04-15 RX ADMIN — INSULIN LISPRO 8 UNITS: 100 INJECTION, SOLUTION INTRAVENOUS; SUBCUTANEOUS at 22:31

## 2025-04-15 RX ADMIN — ATORVASTATIN CALCIUM 40 MG: 40 TABLET, FILM COATED ORAL at 09:04

## 2025-04-15 RX ADMIN — Medication 10 ML: at 09:08

## 2025-04-15 RX ADMIN — LEVOTHYROXINE SODIUM 225 MCG: 0.05 TABLET ORAL at 05:39

## 2025-04-15 RX ADMIN — IPRATROPIUM BROMIDE AND ALBUTEROL SULFATE 3 ML: 2.5; .5 SOLUTION RESPIRATORY (INHALATION) at 07:27

## 2025-04-15 RX ADMIN — BARIUM SULFATE 100 ML: 0.81 POWDER, FOR SUSPENSION ORAL at 10:59

## 2025-04-15 RX ADMIN — BARIUM SULFATE 20 ML: 400 PASTE ORAL at 10:59

## 2025-04-15 RX ADMIN — ONDANSETRON 4 MG: 4 TABLET, ORALLY DISINTEGRATING ORAL at 19:03

## 2025-04-15 RX ADMIN — NYSTATIN: 100000 POWDER TOPICAL at 09:08

## 2025-04-15 RX ADMIN — IPRATROPIUM BROMIDE AND ALBUTEROL SULFATE 3 ML: 2.5; .5 SOLUTION RESPIRATORY (INHALATION) at 23:29

## 2025-04-15 RX ADMIN — NYSTATIN: 100000 POWDER TOPICAL at 22:27

## 2025-04-15 RX ADMIN — RISPERIDONE 3 MG: 1 TABLET, FILM COATED ORAL at 09:04

## 2025-04-15 NOTE — PLAN OF CARE
Problem: Adult Inpatient Plan of Care  Goal: Plan of Care Review  Outcome: Progressing  Flowsheets (Taken 4/15/2025 1816)  Progress: improving  Outcome Evaluation: No complaints of pain or nausea during the shift. Pt complained of not feeling well and requested to sit in the chair. She later stated that sitting up helped her feel better.  Goal: Patient-Specific Goal (Individualized)  Outcome: Progressing  Goal: Absence of Hospital-Acquired Illness or Injury  Outcome: Progressing  Intervention: Identify and Manage Fall Risk  Recent Flowsheet Documentation  Taken 4/15/2025 1400 by Marnie Ruano RN  Safety Promotion/Fall Prevention:   activity supervised   assistive device/personal items within reach   clutter free environment maintained   lighting adjusted   nonskid shoes/slippers when out of bed   room organization consistent   safety round/check completed  Taken 4/15/2025 1230 by Marnie Ruano RN  Safety Promotion/Fall Prevention:   activity supervised   assistive device/personal items within reach   clutter free environment maintained   lighting adjusted   nonskid shoes/slippers when out of bed   room organization consistent   safety round/check completed  Taken 4/15/2025 1029 by Marnie Ruano RN  Safety Promotion/Fall Prevention: (Xray for modified barium swallow) patient off unit  Taken 4/15/2025 0830 by Marnie Ruano RN  Safety Promotion/Fall Prevention:   assistive device/personal items within reach   clutter free environment maintained   lighting adjusted   nonskid shoes/slippers when out of bed   room organization consistent   safety round/check completed  Intervention: Prevent Skin Injury  Recent Flowsheet Documentation  Taken 4/15/2025 1713 by aMrnie uRano RN  Body Position: position changed independently  Taken 4/15/2025 1400 by Marnie Ruano RN  Body Position: legs elevated  Skin Protection:   protective footwear used   transparent dressing maintained   incontinence pads utilized   silicone foam  dressing in place  Taken 4/15/2025 1230 by Marnie Ruano RN  Body Position: legs elevated  Skin Protection:   protective footwear used   transparent dressing maintained   incontinence pads utilized   silicone foam dressing in place  Taken 4/15/2025 0830 by Marnie Ruano RN  Body Position: position changed independently  Skin Protection:   incontinence pads utilized   protective footwear used   transparent dressing maintained  Intervention: Prevent Infection  Recent Flowsheet Documentation  Taken 4/15/2025 1400 by Marnie Ruano RN  Infection Prevention:   environmental surveillance performed   equipment surfaces disinfected   hand hygiene promoted  Taken 4/15/2025 1230 by Marnie Ruano RN  Infection Prevention:   environmental surveillance performed   equipment surfaces disinfected   hand hygiene promoted  Taken 4/15/2025 0830 by Marnie Ruano RN  Infection Prevention:   environmental surveillance performed   equipment surfaces disinfected   hand hygiene promoted  Goal: Optimal Comfort and Wellbeing  Outcome: Progressing  Intervention: Provide Person-Centered Care  Recent Flowsheet Documentation  Taken 4/15/2025 1400 by Marnie Ruano RN  Trust Relationship/Rapport:   care explained   questions answered   questions encouraged   thoughts/feelings acknowledged  Taken 4/15/2025 1230 by Marnie Ruano RN  Trust Relationship/Rapport:   care explained   questions answered   questions encouraged   thoughts/feelings acknowledged  Taken 4/15/2025 0830 by Marnie Ruano RN  Trust Relationship/Rapport:   care explained   emotional support provided   questions answered   questions encouraged   thoughts/feelings acknowledged  Goal: Readiness for Transition of Care  Outcome: Progressing     Problem: Sepsis/Septic Shock  Goal: Optimal Coping  Outcome: Progressing  Intervention: Support Patient and Family Response  Recent Flowsheet Documentation  Taken 4/15/2025 1400 by Marnie Ruano RN  Family/Support System Care:   self-care  encouraged   support provided  Taken 4/15/2025 1230 by Marnie Ruano RN  Family/Support System Care:   self-care encouraged   support provided  Taken 4/15/2025 0830 by Marnie Ruano RN  Family/Support System Care:   self-care encouraged   support provided  Goal: Absence of Bleeding  Outcome: Progressing  Goal: Blood Glucose Level Within Target Range  Outcome: Progressing  Goal: Absence of Infection Signs and Symptoms  Outcome: Progressing  Intervention: Initiate Sepsis Management  Recent Flowsheet Documentation  Taken 4/15/2025 1400 by Marnie Ruano RN  Infection Prevention:   environmental surveillance performed   equipment surfaces disinfected   hand hygiene promoted  Taken 4/15/2025 1230 by Marnie Ruano RN  Infection Prevention:   environmental surveillance performed   equipment surfaces disinfected   hand hygiene promoted  Taken 4/15/2025 0830 by Marnie Ruano RN  Infection Prevention:   environmental surveillance performed   equipment surfaces disinfected   hand hygiene promoted  Intervention: Promote Recovery  Recent Flowsheet Documentation  Taken 4/15/2025 1713 by Marnie Ruano RN  Activity Management: back to bed  Taken 4/15/2025 1400 by Marnie Ruano RN  Activity Management: up in chair  Taken 4/15/2025 0950 by Marnie Ruano RN  Activity Management:   up in chair   ambulated in room  Taken 4/15/2025 0830 by Marnie Ruano RN  Activity Management: activity encouraged  Goal: Optimal Nutrition Delivery  Outcome: Progressing     Problem: Fall Injury Risk  Goal: Absence of Fall and Fall-Related Injury  Outcome: Progressing  Intervention: Promote Injury-Free Environment  Recent Flowsheet Documentation  Taken 4/15/2025 1400 by Marnie Ruano RN  Safety Promotion/Fall Prevention:   activity supervised   assistive device/personal items within reach   clutter free environment maintained   lighting adjusted   nonskid shoes/slippers when out of bed   room organization consistent   safety round/check  completed  Taken 4/15/2025 1230 by Marnie Ruano RN  Safety Promotion/Fall Prevention:   activity supervised   assistive device/personal items within reach   clutter free environment maintained   lighting adjusted   nonskid shoes/slippers when out of bed   room organization consistent   safety round/check completed  Taken 4/15/2025 1029 by Marnie Ruano RN  Safety Promotion/Fall Prevention: (Xray for modified barium swallow) patient off unit  Taken 4/15/2025 0830 by Marnie Ruano RN  Safety Promotion/Fall Prevention:   assistive device/personal items within reach   clutter free environment maintained   lighting adjusted   nonskid shoes/slippers when out of bed   room organization consistent   safety round/check completed     Problem: Skin Injury Risk Increased  Goal: Skin Health and Integrity  Outcome: Progressing  Intervention: Optimize Skin Protection  Recent Flowsheet Documentation  Taken 4/15/2025 1713 by Manrie Ruano RN  Activity Management: back to bed  Head of Bed (HOB) Positioning: HOB elevated  Taken 4/15/2025 1400 by Marnie Ruano RN  Activity Management: up in chair  Pressure Reduction Techniques: frequent weight shift encouraged  Head of Bed (HOB) Positioning: HOB elevated  Pressure Reduction Devices: positioning supports utilized  Skin Protection:   protective footwear used   transparent dressing maintained   incontinence pads utilized   silicone foam dressing in place  Taken 4/15/2025 1230 by Marnie Ruano RN  Pressure Reduction Techniques: frequent weight shift encouraged  Head of Bed (HOB) Positioning: HOB elevated  Pressure Reduction Devices: positioning supports utilized  Skin Protection:   protective footwear used   transparent dressing maintained   incontinence pads utilized   silicone foam dressing in place  Taken 4/15/2025 0950 by Marnie Ruano RN  Activity Management:   up in chair   ambulated in room  Taken 4/15/2025 0830 by Marnie Ruano RN  Activity Management: activity  encouraged  Pressure Reduction Techniques: frequent weight shift encouraged  Head of Bed (HOB) Positioning: HOB elevated  Pressure Reduction Devices: positioning supports utilized  Skin Protection:   incontinence pads utilized   protective footwear used   transparent dressing maintained   Goal Outcome Evaluation:           Progress: improving  Outcome Evaluation: No complaints of pain or nausea during the shift. Pt complained of not feeling well and requested to sit in the chair. She later stated that sitting up helped her feel better.

## 2025-04-15 NOTE — MBS/VFSS/FEES
Acute Care - Speech Language Pathology   Swallow Initial Evaluation    Dion    Modified Barium Swallow Study (MBS)       Patient Name: Vilma Ayala  : 1970  MRN: [id number]  Today's Date: 2025               Admit Date: 4/10/2025    Visit Dx:     ICD-10-CM ICD-9-CM   1. CB (acute kidney injury)  N17.9 584.9   2. Nausea and vomiting, unspecified vomiting type  R11.2 787.01   3. Weakness  R53.1 780.79   4. Oropharyngeal dysphagia  R13.12 787.22     Patient Active Problem List   Diagnosis    Anxiety and depression    Uncontrolled type 2 diabetes mellitus    Other specified hypothyroidism    Obesity    Vitamin D deficiency, unspecified    Diabetic neuropathy, type II diabetes mellitus    Mixed hyperlipidemia    Dyspnea on exertion    Type 2 diabetes mellitus without complication, with long-term current use of insulin    Urinary incontinence    Microscopic hematuria    Nocturia    Nocturnal enuresis    Stress incontinence    Urge incontinence    Decreased GFR    Mild persistent asthma    Obesity (BMI 35.0-39.9 without comorbidity)    Drug-induced parkinsonism    Encounter to establish care    NATASHA (obstructive sleep apnea)    Medicare annual wellness visit, subsequent    Vitamin D deficiency    UTI (urinary tract infection)    Painful urination    Nausea    Acute kidney injury superimposed on chronic kidney disease    Diarrhea    Dehydration    Essential tremor    Acute kidney injury superimposed on CKD     Past Medical History:   Diagnosis Date    Anxiety     Chicken pox     Depression     Diabetes mellitus     Disease of thyroid gland     Measles     Type 2 diabetes mellitus      Past Surgical History:   Procedure Laterality Date    EYE SURGERY      TONSILLECTOMY         SLP Recommendation and Plan  SLP Swallowing Diagnosis: mild, oral dysphagia, moderate, pharyngeal dysphagia (25 1500)  SLP Diet Recommendation: mechanical ground textures, nectar thick liquids (25  1500)  Recommended Precautions and Strategies: general aspiration precautions, assist with feeding (04/21/25 1500)  SLP Rec. for Method of Medication Administration: meds crushed, with puree, as tolerated (04/21/25 1500)     Monitor for Signs of Aspiration: notify SLP if any concerns (04/21/25 1500)  Recommended Diagnostics: reassess via VFSS (MBS), other (see comments) (4/21) (04/21/25 1500)  Swallow Criteria for Skilled Therapeutic Interventions Met: demonstrates skilled criteria (04/21/25 1500)  Anticipated Discharge Disposition (SLP): skilled nursing facility (04/21/25 1500)  Rehab Potential/Prognosis, Swallowing: good, to achieve stated therapy goals (04/21/25 1500)  Therapy Frequency (Swallow): 5 days per week (04/21/25 1500)  Predicted Duration Therapy Intervention (Days): 1 week (04/21/25 1500)  Oral Care Recommendations: Oral Care BID/PRN, Suction toothbrush (04/21/25 1500)        Daily Summary of Progress (SLP): progress toward functional goals is good (04/21/25 1500)               Treatment Assessment (SLP): continued, toleration of diet, no clinical signs of, aspiration (04/21/25 1500)  Treatment Assessment Comments (SLP): No s/s of aspiration with trials of thin H2O via ice, spoon, cup and straw, nectar thick and soft solids. Patient completed dysphagia exercises with min cues. MBS ordered to reassess for possible upgrade (04/21/25 1500)  Plan for Continued Treatment (SLP): continue treatment per plan of care (04/21/25 1500)                SWALLOW EVALUATION (Last 72 Hours)       SLP Adult Swallow Evaluation       Row Name 04/21/25 1500                   Rehab Evaluation    Document Type therapy note (daily note)  -CH        Patient Observations alert;cooperative;agree to therapy  -CH        Patient/Family/Caregiver Comments/Observations none present  -CH        Patient Effort good  -CH        Symptoms Noted During/After Treatment none  -CH        Oral Care lip/mouth moisturizer applied  -CH            General Information    Patient Profile Reviewed yes  -           Pain    Pretreatment Pain Rating 0/10 - no pain  -        Posttreatment Pain Rating 0/10 - no pain  -           Pain Scale: FACES Pre/Post-Treatment    Pain: FACES Scale, Pretreatment 0-->no hurt  -        Posttreatment Pain Rating 0-->no hurt  -           SLP Evaluation Clinical Impression    SLP Swallowing Diagnosis mild;oral dysphagia;moderate;pharyngeal dysphagia  -        Functional Impact risk of aspiration/pneumonia  -        Rehab Potential/Prognosis, Swallowing good, to achieve stated therapy goals  -        Swallow Criteria for Skilled Therapeutic Interventions Met demonstrates skilled criteria  -           SLP Treatment Clinical Impressions    Treatment Assessment (SLP) continued;toleration of diet;no clinical signs of;aspiration  -        Treatment Assessment Comments (SLP) No s/s of aspiration with trials of thin H2O via ice, spoon, cup and straw, nectar thick and soft solids. Patient completed dysphagia exercises with min cues. MBS ordered to reassess for possible upgrade  -        Daily Summary of Progress (SLP) progress toward functional goals is good  -        Plan for Continued Treatment (SLP) continue treatment per plan of care  -        Care Plan Review evaluation/treatment results reviewed;care plan/treatment goals reviewed  -           Recommendations    Therapy Frequency (Swallow) 5 days per week  -        Predicted Duration Therapy Intervention (Days) 1 week  -        SLP Diet Recommendation mechanical ground textures;nectar thick liquids  -        Recommended Diagnostics reassess via VFSS (MBS);other (see comments)  4/21  -        Recommended Precautions and Strategies general aspiration precautions;assist with feeding  -        Oral Care Recommendations Oral Care BID/PRN;Suction toothbrush  -        SLP Rec. for Method of Medication Administration meds crushed;with puree;as tolerated   -CH        Monitor for Signs of Aspiration notify SLP if any concerns  -CH        Anticipated Discharge Disposition (SLP) skilled nursing facility  -CH                  User Key  (r) = Recorded By, (t) = Taken By, (c) = Cosigned By      Initials Name Effective Dates    CH Gauri Mims, MS CCC-SLP 01/20/25 -                     EDUCATION  The patient has been educated in the following areas:   Home Exercise Program (HEP) Dysphagia (Swallowing Impairment) Oral Care/Hydration Modified Diet Instruction.        SLP GOALS       Row Name 04/21/25 1500             (LTG) Patient will demonstrate functional swallow for    Diet Texture (Demonstrate functional swallow) soft to chew (whole) textures  -CH      Liquid viscosity (Demonstrate functional swallow) thin liquids  -CH      [place] (Demonstrate functional swallow) with minimal cues (75-90% accuracy)  -CH      Time Frame (Demonstrate functional swallow) 1 week  -CH      Progress/Outcomes (Demonstrate functional swallow) continuing progress toward goal  -CH      Comment (Demonstrate functional swallow) no s/s of aspiration with thin, nectar thick or soft solid trials. MBS ordered to assess for possible upgrade.  -CH         (STG) Patient will tolerate trials of    Consistencies Trialed (Tolerate trials) soft to chew (ground) textures;nectar/ mildly thick liquids  -CH      Desired Outcome (Tolerate trials) without signs/symptoms of aspiration;with adequate oral prep/transit/clearance  -CH      [place] (Tolerate trials) with minimal cues (75-90% accuracy)  -CH      Time Frame (Tolerate trials) 1 week  -CH      Progress/Outcomes (Tolerate trials) continuing progress toward goal  -CH      Comment (Tolerate trials) no s/s of aspiration with thin, nectar thick or soft solid trials  -CH         (STG) Labial Strengthening Goal 1 (SLP)    Activity (Labial Strengthening Goal 1, SLP) increase labial tone  -CH      Increase Labial Tone labial resistance exercises;swallow  trials  -CH      [place]/Accuracy (Labial Strengthening Goal 1, SLP) with minimal cues (75-90% accuracy)  -CH      Time Frame (Labial Strengthening Goal 1, SLP) 1 week  -CH      Progress/Outcomes (Labial Strengthening Goal 1, SLP) continuing progress toward goal  -CH      Comment (Labial Strengthening Goal 1, SLP) completed exercises with min cues  -CH         (STG) Lingual Strengthening Goal 1 (SLP)    Activity (Lingual Strengthening Goal 1, SLP) increase tongue back strength;increase lingual tone/sensation/control/coordination/movement  -CH      Increase Lingual Tone/Sensation/Control/Coordination/Movement lingual movement exercises;swallow trials  -CH      Increase Tongue Back Strength lingual movement exercises;swallow trials;lingual resistance exercises  -CH      [place]/Accuracy (Lingual Strengthening Goal 1, SLP) with minimal cues (75-90% accuracy)  -CH      Time Frame (Lingual Strengthening Goal 1, SLP) 1 week  -CH      Progress/Outcomes (Lingual Strengthening Goal 1, SLP) continuing progress toward goal  -CH      Comment (Lingual Strengthening Goal 1, SLP) completed exercises with min cues  -CH         (STG) Pharyngeal Strengthening Exercise Goal 1 (SLP)    Activity (Pharyngeal Strengthening Goal 1, SLP) increase timing;increase superior movement of the hyolaryngeal complex;increase anterior movement of the hyolaryngeal complex;increase closure at entrance to airway/closure of airway at glottis;increase squeeze/positive pressure generation  -CH      Increase Timing prepping - 3 second prep or suck swallow or 3-step swallow  -CH      Increase Superior Movement of the Hyolaryngeal Complex falsetto  -CH      Increase Anterior Movement of the Hyolaryngeal Complex chin tuck against resistance (CTAR)  -CH      Increase Closure at Entrance to Airway/Closure of Airway at Glottis super-supraglottic swallow;breath hold exercises  -CH      Increase Squeeze/Positive Pressure Generation hard effortful swallow   -CH      [place]/Accuracy (Pharyngeal Strengthening Goal 1, SLP) with minimal cues (75-90% accuracy)  -CH      Time Frame (Pharyngeal Strengthening Goal 1, SLP) 1 week  -CH      Progress/Outcomes (Pharyngeal Strengthening Goal 1, SLP) continuing progress toward goal  -CH      Comment (Pharyngeal Strengthening Goal 1, SLP) completed exercises with min cues  -CH                User Key  (r) = Recorded By, (t) = Taken By, (c) = Cosigned By      Initials Name Provider Type    Gauri Clancy MS CCC-SLP Speech and Language Pathologist                         Time Calculation:    Time Calculation- SLP       Row Name 04/21/25 1606             Time Calculation- SLP    SLP Start Time 1500  -CH      SLP Received On 04/21/25  -CH         Untimed Charges    99766-XD Treatment Swallow Minutes 53  -CH         Total Minutes    Untimed Charges Total Minutes 53  -CH       Total Minutes 53  -CH                User Key  (r) = Recorded By, (t) = Taken By, (c) = Cosigned By      Initials Name Provider Type    Gauri Clancy MS CCC-SLP Speech and Language Pathologist                    Therapy Charges for Today       Code Description Service Date Service Provider Modifiers Qty    [id number] HC ST TREATMENT SWALLOW 4 4/21/2025 Gauri Mims MS CCC-SLP GN 1                 Gauri Mims MS CCC-KAYLENE  4/21/2025

## 2025-04-15 NOTE — PROGRESS NOTES
Continued Stay Note  [facility]     Patient Name: Vilma Ayala  MRN: [id number]  Today's Date: 4/15/2025    Admit Date: 4/10/2025    Plan: Home   Discharge Plan       Row Name 04/15/25 1009       Plan    Plan Comments Mrs. Ayala can get a Cpap from [employer] [phone] and case management has contacted her family to call [facility] to get that Cpap arranged.                   Discharge Codes    No documentation.                 Expected Discharge Date and Time       Expected Discharge Date Expected Discharge Time    Apr 16, 2025               VALARIE Redding

## 2025-04-15 NOTE — PROGRESS NOTES
[facility] Medicine Services  PROGRESS NOTE    Patient Name: Vilma Ayala  : 1970  MRN: [id number]    Date of Admission: 4/10/2025  Primary Care Physician: Vanessa Kaur MD    Subjective   Subjective     CC: f/u CB      HPI: Up in bed with nursing in room. Rec'd IVF early this morning but overall doing well.       Objective   Objective     Vital Signs:   Temp:  [97.5 °F (36.4 °C)-99 °F (37.2 °C)] 97.9 °F (36.6 °C)  Heart Rate:  [103-116] 106  Resp:  [16-18] 18  BP: (100-136)/() 100/78  Flow (L/min) (Oxygen Therapy):  [2] 2     Physical Exam:  Constitutional: No acute distress, awake, alert  HENT: NCAT, mucous membranes moist  Respiratory: Clear to auscultation bilaterally, respiratory effort normal   Cardiovascular: RRR, no murmurs, rubs, or gallops  Gastrointestinal: Positive bowel sounds, soft, nontender, nondistended  Musculoskeletal: No bilateral ankle edema  Psychiatric: Appropriate affect, cooperative  Neurologic: Oriented x 3, strength symmetric in all extremities, Cranial Nerves grossly intact to confrontation, speech clear  Skin: No rashes     Results Reviewed:  LAB RESULTS:      Lab 04/15/25  0619 04/15/25  0120 25  2141 25  1925 25  1411 25  0528 25  0435 25  1811 25  0503 25  1026 04/10/25  1344   WBC 11.67*  --   --   --   --  16.16* 9.67  --  10.55 9.34 18.06*   HEMOGLOBIN 11.9*  --   --   --   --  12.7 12.4  --  13.3 13.1 14.6   HEMATOCRIT 35.9  --   --   --   --  38.2 36.9  --  40.8 37.9 43.3   PLATELETS 248  --   --   --   --  258 272  --  230 232 338   NEUTROS ABS 7.25*  --   --   --   --  11.35* 8.88*  --  5.85 5.80 14.41*   IMMATURE GRANS (ABS) 0.05  --   --   --   --  0.08* 0.05  --  0.03 0.03 0.08*   LYMPHS ABS 3.15*  --   --   --   --  3.92* 0.67*  --  3.49* 2.68 2.78   MONOS ABS 0.58  --   --   --   --  0.60 0.06*  --  0.56 0.48 0.62   EOS ABS 0.59*  --   --   --   --  0.18 0.00  --  0.56* 0.32  0.12   MCV 89.3  --   --   --   --  89.3 87.2  --  89.9 86.9 86.4   PROCALCITONIN  --   --   --   --   --  0.07  --  0.08  --   --   --    LACTATE  --  1.8 4.1* 2.5* 2.3*  --   --   --   --   --  1.6   HSTROP T  --   --   --  17* 26*  --   --   --   --   --   --          Lab 04/15/25  0619 04/14/25  0528 04/13/25  0435 04/12/25  0503 04/11/25  1026 04/10/25  1344 04/09/25  1029   SODIUM 141 137 136 140 142 138 136   POTASSIUM 4.6 4.1 4.4 3.9 3.9 3.9 4.0   CHLORIDE 105 104 101 107 106 101 99   CO2 22.0 21.0* 21.0* 23.0 23.0 24.0 24.2   ANION GAP 14.0 12.0 14.0 10.0 13.0 13.0 12.8   BUN 17 21* 16 10 11 11 9   CREATININE 1.33* 1.74* 1.60* 1.52* 1.87* 2.07* 1.88*   EGFR 47.6* 34.5* 38.2* 40.6* 31.7* 28.0* 31.5*   GLUCOSE 142* 263* 396* 141* 148* 112* 116*   CALCIUM 9.1 9.8 9.4 9.1 9.2 9.8 10.2   MAGNESIUM  --  1.8 1.9 1.7  --   --   --    HEMOGLOBIN A1C  --   --   --   --   --  6.80*  --    TSH  --   --   --   --   --   --  1.690         Lab 04/15/25  0619 04/14/25  0528 04/13/25  0435 04/12/25  0503 04/11/25  1026 04/10/25  1344 04/09/25  1029   TOTAL PROTEIN 5.7* 6.8 6.8 6.0 5.9* 7.6 7.7   ALBUMIN 3.6 4.0 4.0 3.6 3.8 4.3 4.4   GLOBULIN 2.1 2.8 2.8 2.4 2.1 3.3 3.3   ALT (SGPT) 26 31 21 21 27 33 37*   AST (SGOT) 18 25 14 18 24 28 32   BILIRUBIN 0.5 0.3 0.4 0.6 0.8 0.8 0.9   ALK PHOS 100 128* 122* 106 113 137* 148*   LIPASE  --   --   --   --   --  21 21         Lab 04/14/25  1925 04/14/25  1411   HSTROP T 17* 26*         Lab 04/09/25  1029   CHOLESTEROL 113   LDL CHOL 55   HDL CHOL 37*   TRIGLYCERIDES 112             Brief Urine Lab Results  (Last result in the past 365 days)        Color   Clarity   Blood   Leuk Est   Nitrite   Protein   CREAT   Urine HCG        04/10/25 1734 Yellow   Clear   Negative   Trace   Negative   Negative                   Microbiology Results Abnormal       None            CT Chest Without Contrast Diagnostic  Result Date: 4/13/2025  CT CHEST WO CONTRAST DIAGNOSTIC Date of Exam: 4/13/2025  11:52 AM EDT Indication: Dyspnea, suspected pulm edema, r/o PNA. Comparison: Chest radiograph 4/12/2025. Technique: Axial CT images were obtained of the chest without contrast administration.  Reconstructed coronal and sagittal images were also obtained. Automated exposure control and iterative construction methods were used. Findings: Thyroid and thoracic inlet: No significant abnormality. Lymph nodes: No pathologic appearing lymph nodes by imaging criteria. Cardiovascular: Normal appearing heart size. No pericardial effusion. Aorta and main pulmonary artery diameters are within normal range.No coronary artery calcifications. Esophagus: No significant abnormality. Lung parenchyma: Scattered atelectasis including in the lower lobes. No suspicious appearing opacities. No findings suggestive of pulmonary edema. Airways: Patent trachea and mainstem bronchi. Pleura: No pleural effusion or pneumothorax. Chest wall and osseous structures: Degenerative changes of the imaged spine. No acute osseous abnormality. Included abdomen: No significant abnormality.     Impression: Impression: No acute intrathoracic findings. Bilateral lower lobe atelectasis. Electronically Signed: Gregg Colindres  4/13/2025 12:26 PM EDT  Workstation ID: [id number]      Results for orders placed during the hospital encounter of 04/10/25    Adult Transthoracic Echo Complete W/ Cont if Necessary Per Protocol    Interpretation Summary    Left ventricular systolic function is normal. Estimated left ventricular EF = 60%    No significant valvular abnormalities.      Current medications:  Scheduled Meds:atorvastatin, 40 mg, Oral, Daily  cholestyramine light, 1 packet, Oral, Q12H  famotidine, 20 mg, Oral, Nightly  heparin (porcine), 5,000 Units, Subcutaneous, Q8H  insulin glargine, 15 Units, Subcutaneous, Daily  insulin glargine, 25 Units, Subcutaneous, Nightly  insulin lispro, 3-14 Units, Subcutaneous, 4x Daily AC & at Bedtime  ipratropium-albuterol, 3 mL,  Nebulization, 4x Daily - RT  levothyroxine, 225 mcg, Oral, Q AM  nystatin, , Topical, Q12H  risperiDONE, 3 mg, Oral, BID  saccharomyces boulardii, 250 mg, Oral, BID  sodium chloride, 10 mL, Intravenous, Q12H      Continuous Infusions:sodium chloride, 75 mL/hr, Last Rate: 75 mL/hr (04/15/25 0200)      PRN Meds:.  acetaminophen **OR** acetaminophen **OR** acetaminophen    dextrose    dextrose    glucagon (human recombinant)    melatonin    nitroglycerin    ondansetron ODT **OR** ondansetron    Sodium Chloride (PF)    sodium chloride    sodium chloride    Assessment & Plan   Assessment & Plan     Active Hospital Problems    Diagnosis  POA    **Acute kidney injury superimposed on chronic kidney disease [N17.9, N18.9]  Yes    Acute kidney injury superimposed on CKD [N17.9, N18.9]  Yes    Diarrhea [R19.7]  Yes    Dehydration [E86.0]  Yes    Essential tremor [G25.0]  Yes    Type 2 diabetes mellitus without complication, with long-term current use of insulin [E11.9, Z79.4]  Not Applicable    Mixed hyperlipidemia [E78.2]  Yes    Other specified hypothyroidism [E03.8]  Yes    Anxiety and depression [F41.9, F32.A]  Yes      Resolved Hospital Problems   No resolved problems to display.        Brief Hospital Course to date:  Vilma Ayala is a 54 y.o. female with a history of Essential Tremor, T2DM, Hypothyroidism, anxiety/depression, and GERD who presents to [facility] ED for complaint of nausea and diarrhea.  Patient transferred to my services on the a.m. of 4/11, she is resting comfortably in bed, she reports diarrhea is doing better but stool studies are ordered and pending.  Continuing on IV fluids and treatment for CB, slight improvement in renal function today, will continue to monitor at this time.  Patient on the afternoon of 4/12 had some increased dyspnea and congestion.  Chest x-ray was obtained and could not rule out possible pneumonia however Pro-Jim was negative, white blood cell count was normal, and felt  more pulmonary congestion, IV fluids were stopped and patient was given IV Bumex.  Seen again on the a.m. of 4/13, noticed to have some congestion on exam, CT of the chest was obtained and noted to have bilateral lower lobe atelectasis.  Patient has since been weaned to room air this afternoon and patient will be using incentive spirometer and flutter valve. Slight uptrend again of Cr on 4/14, patient with poor PO intake, some nausea this AM, additional meds ordered. Patient with slight tachycardia as well, will check troponin and obtain echo on her. No active chest pain however at this time.      CB on CKD   Dehydration  Nausea/Diarrhea  -Cr 2.07 on admission, down to 1.6 on 4/13, IVF previously held with dyspnea, oxygen requirement, now weaned back to RA, chest CT with just atelectasis, plans for IS/FD, encourarged PO intake.   -UA negative  -CT abd/pelvis negative for acute findings  -CB possibly 2ry to recent bactrim use, as well as dehydration from poor po intake. Improving.   -GI panel PCR cancelled, no loose BM   -C diff toxin cancelled, no loose BM   -Resp panel negative   -IV fluids  -Likely hold off on nephro consult for now   -Avoid anti-diarrheal meds for now pending results of C diff toxin.   -Zofran for nausea  -PT/OT following and rec'd IRF however patient accomplishing all transfers with SBA/CGA and ambulated 45 feet. Also no medical necessity for IRF. D/W her daughter this am that if interested in rehab can consider SNF however with her ambulating 45 feet, unclear if she would be approved.     Dyspnea  Nausea   - possibly related to recent fluids   - Ordered for duonebs, cxr, will add IS/FD as well   - CT Chest with just atelectasis, encouraged use of IS/FD  - Plans to obtain echo and troponin as well on 4/14, troponin 26, will follow but seems noncardiac      HLD  -Continue statin     T2DM  -Blood glucose 112  -A1C 6.8   -Fingerstick achs. SSI  -LA insulin nightly     Essential Tremor  -Continue  Respiradone     Anxiety/depression  -Continue home meds     Updated her sister via phone [phone] and discussed possibility d/c tomorrow.    Expected Discharge Location and Transportation:   Expected Discharge   Expected Discharge Date: 4/16/2025; Expected Discharge Time:      VTE Prophylaxis:  Pharmacologic & mechanical VTE prophylaxis orders are present.         AM-PAC 6 Clicks Score (PT): 17 (04/14/25 2027)    CODE STATUS:   Code Status and Medical Interventions: CPR (Attempt to Resuscitate); Full Support   Ordered at: 04/10/25 2013     Code Status (Patient has no pulse and is not breathing):    CPR (Attempt to Resuscitate)     Medical Interventions (Patient has pulse or is breathing):    Full Support       Wen Whitehead II, DO  04/15/25

## 2025-04-15 NOTE — PLAN OF CARE
Goal Outcome Evaluation:    Calm, Cooperative and accepting of plan of care. Pt lethargic this morning, able to answer questions, lactic elevated through the night, return back to normal after some gentle fluids ordered by NP. Call light within reach, needs met

## 2025-04-15 NOTE — PLAN OF CARE
Goal Outcome Evaluation:  Plan of Care Reviewed With: patient                Anticipated Discharge Disposition (SLP): inpatient rehabilitation facility          SLP Swallowing Diagnosis: mild, oral dysphagia, moderate, pharyngeal dysphagia (04/15/25 8896)

## 2025-04-16 LAB
ALBUMIN SERPL-MCNC: 3.7 G/DL (ref 3.5–5.2)
ALBUMIN/GLOB SERPL: 1.9 G/DL
ALP SERPL-CCNC: 100 U/L (ref 39–117)
ALT SERPL W P-5'-P-CCNC: 22 U/L (ref 1–33)
ANION GAP SERPL CALCULATED.3IONS-SCNC: 13 MMOL/L (ref 5–15)
AST SERPL-CCNC: 14 U/L (ref 1–32)
BASOPHILS # BLD AUTO: 0.03 10*3/MM3 (ref 0–0.2)
BASOPHILS NFR BLD AUTO: 0.3 % (ref 0–1.5)
BILIRUB SERPL-MCNC: 0.5 MG/DL (ref 0–1.2)
BUN SERPL-MCNC: 18 MG/DL (ref 6–20)
BUN/CREAT SERPL: 13.5 (ref 7–25)
CALCIUM SPEC-SCNC: 9.3 MG/DL (ref 8.6–10.5)
CHLORIDE SERPL-SCNC: 104 MMOL/L (ref 98–107)
CO2 SERPL-SCNC: 21 MMOL/L (ref 22–29)
CREAT SERPL-MCNC: 1.33 MG/DL (ref 0.57–1)
DEPRECATED RDW RBC AUTO: 48 FL (ref 37–54)
EGFRCR SERPLBLD CKD-EPI 2021: 47.6 ML/MIN/1.73
EOSINOPHIL # BLD AUTO: 0.51 10*3/MM3 (ref 0–0.4)
EOSINOPHIL NFR BLD AUTO: 4.3 % (ref 0.3–6.2)
ERYTHROCYTE [DISTWIDTH] IN BLOOD BY AUTOMATED COUNT: 14.6 % (ref 12.3–15.4)
GLOBULIN UR ELPH-MCNC: 1.9 GM/DL
GLUCOSE BLDC GLUCOMTR-MCNC: 130 MG/DL (ref 70–130)
GLUCOSE BLDC GLUCOMTR-MCNC: 154 MG/DL (ref 70–130)
GLUCOSE BLDC GLUCOMTR-MCNC: 187 MG/DL (ref 70–130)
GLUCOSE BLDC GLUCOMTR-MCNC: 213 MG/DL (ref 70–130)
GLUCOSE SERPL-MCNC: 128 MG/DL (ref 65–99)
HCT VFR BLD AUTO: 35.7 % (ref 34–46.6)
HGB BLD-MCNC: 11.8 G/DL (ref 12–15.9)
IMM GRANULOCYTES # BLD AUTO: 0.04 10*3/MM3 (ref 0–0.05)
IMM GRANULOCYTES NFR BLD AUTO: 0.3 % (ref 0–0.5)
LYMPHOCYTES # BLD AUTO: 2.99 10*3/MM3 (ref 0.7–3.1)
LYMPHOCYTES NFR BLD AUTO: 25.2 % (ref 19.6–45.3)
MCH RBC QN AUTO: 29.8 PG (ref 26.6–33)
MCHC RBC AUTO-ENTMCNC: 33.1 G/DL (ref 31.5–35.7)
MCV RBC AUTO: 90.2 FL (ref 79–97)
MONOCYTES # BLD AUTO: 0.61 10*3/MM3 (ref 0.1–0.9)
MONOCYTES NFR BLD AUTO: 5.1 % (ref 5–12)
NEUTROPHILS NFR BLD AUTO: 64.8 % (ref 42.7–76)
NEUTROPHILS NFR BLD AUTO: 7.67 10*3/MM3 (ref 1.7–7)
NRBC BLD AUTO-RTO: 0 /100 WBC (ref 0–0.2)
PLATELET # BLD AUTO: 243 10*3/MM3 (ref 140–450)
PMV BLD AUTO: 10.8 FL (ref 6–12)
POTASSIUM SERPL-SCNC: 4.3 MMOL/L (ref 3.5–5.2)
PROT SERPL-MCNC: 5.6 G/DL (ref 6–8.5)
QT INTERVAL: 342 MS
QTC INTERVAL: 466 MS
RBC # BLD AUTO: 3.96 10*6/MM3 (ref 3.77–5.28)
SODIUM SERPL-SCNC: 138 MMOL/L (ref 136–145)
WBC NRBC COR # BLD AUTO: 11.85 10*3/MM3 (ref 3.4–10.8)

## 2025-04-16 PROCEDURE — 25010000002 HEPARIN (PORCINE) PER 1000 UNITS: Performed by: FAMILY MEDICINE

## 2025-04-16 PROCEDURE — 63710000001 INSULIN LISPRO (HUMAN) PER 5 UNITS: Performed by: FAMILY MEDICINE

## 2025-04-16 PROCEDURE — 94664 DEMO&/EVAL PT USE INHALER: CPT

## 2025-04-16 PROCEDURE — 94799 UNLISTED PULMONARY SVC/PX: CPT

## 2025-04-16 PROCEDURE — 97116 GAIT TRAINING THERAPY: CPT

## 2025-04-16 PROCEDURE — 85025 COMPLETE CBC W/AUTO DIFF WBC: CPT | Performed by: FAMILY MEDICINE

## 2025-04-16 PROCEDURE — 97110 THERAPEUTIC EXERCISES: CPT

## 2025-04-16 PROCEDURE — 63710000001 INSULIN GLARGINE PER 5 UNITS: Performed by: FAMILY MEDICINE

## 2025-04-16 PROCEDURE — 93010 ELECTROCARDIOGRAM REPORT: CPT | Performed by: INTERNAL MEDICINE

## 2025-04-16 PROCEDURE — 93005 ELECTROCARDIOGRAM TRACING: CPT | Performed by: NURSE PRACTITIONER

## 2025-04-16 PROCEDURE — 63710000001 PROMETHAZINE PER 12.5 MG: Performed by: NURSE PRACTITIONER

## 2025-04-16 PROCEDURE — 94761 N-INVAS EAR/PLS OXIMETRY MLT: CPT

## 2025-04-16 PROCEDURE — 80053 COMPREHEN METABOLIC PANEL: CPT | Performed by: FAMILY MEDICINE

## 2025-04-16 PROCEDURE — 99232 SBSQ HOSP IP/OBS MODERATE 35: CPT | Performed by: INTERNAL MEDICINE

## 2025-04-16 PROCEDURE — 25010000002 ONDANSETRON PER 1 MG: Performed by: PHYSICIAN ASSISTANT

## 2025-04-16 PROCEDURE — 82948 REAGENT STRIP/BLOOD GLUCOSE: CPT

## 2025-04-16 RX ORDER — METOCLOPRAMIDE 10 MG/1
5 TABLET ORAL
Status: DISCONTINUED | OUTPATIENT
Start: 2025-04-16 | End: 2025-04-16

## 2025-04-16 RX ORDER — PROMETHAZINE HYDROCHLORIDE 12.5 MG/1
12.5 TABLET ORAL ONCE
Status: COMPLETED | OUTPATIENT
Start: 2025-04-16 | End: 2025-04-16

## 2025-04-16 RX ORDER — METOCLOPRAMIDE 10 MG/1
5 TABLET ORAL ONCE
Status: COMPLETED | OUTPATIENT
Start: 2025-04-16 | End: 2025-04-16

## 2025-04-16 RX ADMIN — FAMOTIDINE 20 MG: 20 TABLET, FILM COATED ORAL at 22:25

## 2025-04-16 RX ADMIN — IPRATROPIUM BROMIDE AND ALBUTEROL SULFATE 3 ML: 2.5; .5 SOLUTION RESPIRATORY (INHALATION) at 15:22

## 2025-04-16 RX ADMIN — INSULIN GLARGINE 25 UNITS: 100 INJECTION, SOLUTION SUBCUTANEOUS at 22:25

## 2025-04-16 RX ADMIN — CHOLEYSTYRAMINE LIGHT 4 G: 4 POWDER, FOR SUSPENSION ORAL at 22:25

## 2025-04-16 RX ADMIN — RISPERIDONE 3 MG: 1 TABLET, FILM COATED ORAL at 22:25

## 2025-04-16 RX ADMIN — IPRATROPIUM BROMIDE AND ALBUTEROL SULFATE 3 ML: 2.5; .5 SOLUTION RESPIRATORY (INHALATION) at 07:25

## 2025-04-16 RX ADMIN — IPRATROPIUM BROMIDE AND ALBUTEROL SULFATE 3 ML: 2.5; .5 SOLUTION RESPIRATORY (INHALATION) at 20:01

## 2025-04-16 RX ADMIN — INSULIN GLARGINE 15 UNITS: 100 INJECTION, SOLUTION SUBCUTANEOUS at 10:13

## 2025-04-16 RX ADMIN — INSULIN LISPRO 5 UNITS: 100 INJECTION, SOLUTION INTRAVENOUS; SUBCUTANEOUS at 22:26

## 2025-04-16 RX ADMIN — ONDANSETRON 4 MG: 2 INJECTION INTRAMUSCULAR; INTRAVENOUS at 01:24

## 2025-04-16 RX ADMIN — METOCLOPRAMIDE 5 MG: 10 TABLET ORAL at 12:03

## 2025-04-16 RX ADMIN — ATORVASTATIN CALCIUM 40 MG: 40 TABLET, FILM COATED ORAL at 10:17

## 2025-04-16 RX ADMIN — Medication 10 ML: at 22:26

## 2025-04-16 RX ADMIN — Medication 10 ML: at 10:22

## 2025-04-16 RX ADMIN — LEVOTHYROXINE SODIUM 225 MCG: 0.05 TABLET ORAL at 05:48

## 2025-04-16 RX ADMIN — HEPARIN SODIUM 5000 UNITS: 5000 INJECTION INTRAVENOUS; SUBCUTANEOUS at 05:50

## 2025-04-16 RX ADMIN — INSULIN LISPRO 3 UNITS: 100 INJECTION, SOLUTION INTRAVENOUS; SUBCUTANEOUS at 18:16

## 2025-04-16 RX ADMIN — Medication 250 MG: at 22:25

## 2025-04-16 RX ADMIN — RISPERIDONE 3 MG: 1 TABLET, FILM COATED ORAL at 10:18

## 2025-04-16 RX ADMIN — Medication 250 MG: at 10:17

## 2025-04-16 RX ADMIN — IPRATROPIUM BROMIDE AND ALBUTEROL SULFATE 3 ML: 2.5; .5 SOLUTION RESPIRATORY (INHALATION) at 12:13

## 2025-04-16 RX ADMIN — PROMETHAZINE HYDROCHLORIDE 12.5 MG: 12.5 TABLET ORAL at 10:13

## 2025-04-16 RX ADMIN — INSULIN LISPRO 3 UNITS: 100 INJECTION, SOLUTION INTRAVENOUS; SUBCUTANEOUS at 12:03

## 2025-04-16 RX ADMIN — SENNOSIDES AND DOCUSATE SODIUM 2 TABLET: 50; 8.6 TABLET ORAL at 10:17

## 2025-04-16 RX ADMIN — HEPARIN SODIUM 5000 UNITS: 5000 INJECTION INTRAVENOUS; SUBCUTANEOUS at 22:26

## 2025-04-16 RX ADMIN — Medication 5 MG: at 22:25

## 2025-04-16 RX ADMIN — HEPARIN SODIUM 5000 UNITS: 5000 INJECTION INTRAVENOUS; SUBCUTANEOUS at 15:49

## 2025-04-16 RX ADMIN — SENNOSIDES AND DOCUSATE SODIUM 2 TABLET: 50; 8.6 TABLET ORAL at 22:25

## 2025-04-16 NOTE — PROGRESS NOTES
"    [facility]  PROGRESS NOTE    Patient Name: Vilma Ayala  : 1970  MRN: [id number]    Date of Admission: 4/10/2025  Primary Care Physician: Vanessa Kaur MD    Subjective   Subjective     CC: f/u nausea    HPI: Patient again nauseated overnight and req IV zofran. States she \"threw up a little\" but I can find no other support of this.      Objective   Objective     Vital Signs:   Temp:  [97.8 °F (36.6 °C)-98.3 °F (36.8 °C)] 98.3 °F (36.8 °C)  Heart Rate:  [109-147] 116  Resp:  [16-20] 16  BP: (119-137)/(65-94) 119/65  Flow (L/min) (Oxygen Therapy):  [2] 2     Physical Exam:  Constitutional: No acute distress, awake, alert, chronically ill appearing  HENT: NCAT, mucous membranes moist  Respiratory: Clear to auscultation bilaterally, respiratory effort normal   Cardiovascular: RRR, no murmurs, rubs, or gallops  Gastrointestinal: Positive bowel sounds, soft, nontender, nondistended  Musculoskeletal: No bilateral ankle edema  Psychiatric: Appropriate affect, cooperative  Neurologic: Oriented x 3, strength symmetric in all extremities, Cranial Nerves grossly intact to confrontation, speech clear  Skin: No rashes     Results Reviewed:  LAB RESULTS:      Lab 25  0618 04/15/25  0619 04/15/25  0120 25  2141 25  1925 25  1411 25  0528 25  0435 25  1811 25  0503 25  1026 04/10/25  1344   WBC 11.85* 11.67*  --   --   --   --  16.16* 9.67  --  10.55   < > 18.06*   HEMOGLOBIN 11.8* 11.9*  --   --   --   --  12.7 12.4  --  13.3   < > 14.6   HEMATOCRIT 35.7 35.9  --   --   --   --  38.2 36.9  --  40.8   < > 43.3   PLATELETS 243 248  --   --   --   --  258 272  --  230   < > 338   NEUTROS ABS 7.67* 7.25*  --   --   --   --  11.35* 8.88*  --  5.85   < > 14.41*   IMMATURE GRANS (ABS) 0.04 0.05  --   --   --   --  0.08* 0.05  --  0.03   < > 0.08*   LYMPHS ABS 2.99 3.15*  --   --   --   --  3.92* 0.67*  --  3.49*   < > 2.78 "   MONOS ABS 0.61 0.58  --   --   --   --  0.60 0.06*  --  0.56   < > 0.62   EOS ABS 0.51* 0.59*  --   --   --   --  0.18 0.00  --  0.56*   < > 0.12   MCV 90.2 89.3  --   --   --   --  89.3 87.2  --  89.9   < > 86.4   PROCALCITONIN  --   --   --   --   --   --  0.07  --  0.08  --   --   --    LACTATE  --   --  1.8 4.1* 2.5* 2.3*  --   --   --   --   --  1.6   HSTROP T  --   --   --   --  17* 26*  --   --   --   --   --   --     < > = values in this interval not displayed.         Lab 04/16/25  0618 04/15/25  0619 04/14/25  0528 04/13/25  0435 04/12/25  0503 04/11/25  1026 04/10/25  1344   SODIUM 138 141 137 136 140   < > 138   POTASSIUM 4.3 4.6 4.1 4.4 3.9   < > 3.9   CHLORIDE 104 105 104 101 107   < > 101   CO2 21.0* 22.0 21.0* 21.0* 23.0   < > 24.0   ANION GAP 13.0 14.0 12.0 14.0 10.0   < > 13.0   BUN 18 17 21* 16 10   < > 11   CREATININE 1.33* 1.33* 1.74* 1.60* 1.52*   < > 2.07*   EGFR 47.6* 47.6* 34.5* 38.2* 40.6*   < > 28.0*   GLUCOSE 128* 142* 263* 396* 141*   < > 112*   CALCIUM 9.3 9.1 9.8 9.4 9.1   < > 9.8   MAGNESIUM  --   --  1.8 1.9 1.7  --   --    HEMOGLOBIN A1C  --   --   --   --   --   --  6.80*    < > = values in this interval not displayed.         Lab 04/16/25  0618 04/15/25  0619 04/14/25  0528 04/13/25  0435 04/12/25  0503 04/11/25  1026 04/10/25  1344   TOTAL PROTEIN 5.6* 5.7* 6.8 6.8 6.0   < > 7.6   ALBUMIN 3.7 3.6 4.0 4.0 3.6   < > 4.3   GLOBULIN 1.9 2.1 2.8 2.8 2.4   < > 3.3   ALT (SGPT) 22 26 31 21 21   < > 33   AST (SGOT) 14 18 25 14 18   < > 28   BILIRUBIN 0.5 0.5 0.3 0.4 0.6   < > 0.8   ALK PHOS 100 100 128* 122* 106   < > 137*   LIPASE  --   --   --   --   --   --  21    < > = values in this interval not displayed.         Lab 04/14/25  1925 04/14/25  1411   HSTROP T 17* 26*                 Brief Urine Lab Results  (Last result in the past 365 days)        Color   Clarity   Blood   Leuk Est   Nitrite   Protein   CREAT   Urine HCG        04/10/25 1734 Yellow   Clear   Negative   Trace    Negative   Negative                   Microbiology Results Abnormal       None            FL Video Swallow With Speech Single Contrast  Result Date: 4/15/2025  FL VIDEO SWALLOW W SPEECH SINGLE-CONTRAST Date of Exam: 4/15/2025 10:35 AM EDT Indication: dysphagia.   Comparison: None available. Technique:   The speech pathologist administered food and/or liquid mixed with barium to the patient with cine/video imaging.  Imaging assistance was provided to the speech pathologist and an image was saved. Fluoroscopic Time: 1 minute and 54 seconds Number of Images: 14 associated fluoroscopic loops were saved Findings: Aspiration was seen during fluoroscopic guided modified barium swallowing series. Please see speech therapy report for full details and recommendations.     Impression: Impression: Fluoroscopy provided for a modified barium swallow. Aspiration was seen during swallowing evaluation. Please see speech therapy report for full details and recommendations. Report dictated by: Ermelinda Queen PA-c  I have personally reviewed this case and agree with the findings above: Electronically Signed: Mike Morales MD  4/15/2025 4:23 PM EDT  Workstation ID: [id number]      Results for orders placed during the hospital encounter of 04/10/25    Adult Transthoracic Echo Complete W/ Cont if Necessary Per Protocol    Interpretation Summary    Left ventricular systolic function is normal. Estimated left ventricular EF = 60%    No significant valvular abnormalities.      Current medications:  Scheduled Meds:atorvastatin, 40 mg, Oral, Daily  cholestyramine light, 1 packet, Oral, Q12H  famotidine, 20 mg, Oral, Nightly  heparin (porcine), 5,000 Units, Subcutaneous, Q8H  insulin glargine, 15 Units, Subcutaneous, Daily  insulin glargine, 25 Units, Subcutaneous, Nightly  insulin lispro, 3-14 Units, Subcutaneous, 4x Daily AC & at Bedtime  ipratropium-albuterol, 3 mL, Nebulization, 4x Daily - RT  levothyroxine, 225 mcg, Oral, Q AM  risperiDONE,  3 mg, Oral, BID  saccharomyces boulardii, 250 mg, Oral, BID  senna-docusate sodium, 2 tablet, Oral, BID  sodium chloride, 10 mL, Intravenous, Q12H      Continuous Infusions:   PRN Meds:.  acetaminophen **OR** acetaminophen **OR** acetaminophen    senna-docusate sodium **AND** polyethylene glycol **AND** bisacodyl **AND** bisacodyl    dextrose    dextrose    glucagon (human recombinant)    melatonin    nitroglycerin    Sodium Chloride (PF)    sodium chloride    sodium chloride    Assessment & Plan   Assessment & Plan     Active Hospital Problems    Diagnosis  POA    **Acute kidney injury superimposed on chronic kidney disease [N17.9, N18.9]  Yes    Acute kidney injury superimposed on CKD [N17.9, N18.9]  Yes    Diarrhea [R19.7]  Yes    Dehydration [E86.0]  Yes    Essential tremor [G25.0]  Yes    Type 2 diabetes mellitus without complication, with long-term current use of insulin [E11.9, Z79.4]  Not Applicable    Mixed hyperlipidemia [E78.2]  Yes    Other specified hypothyroidism [E03.8]  Yes    Anxiety and depression [F41.9, F32.A]  Yes      Resolved Hospital Problems   No resolved problems to display.        Brief Hospital Course to date:  Vilma Ayala is a 54 y.o. female with a history of Essential Tremor, T2DM, Hypothyroidism, anxiety/depression, and GERD who presents to [facility] ED for complaint of nausea and diarrhea.  Patient transferred to my services on the a.m. of 4/11, she is resting comfortably in bed, she reports diarrhea is doing better but stool studies are ordered and pending.  Continuing on IV fluids and treatment for CB, slight improvement in renal function today, will continue to monitor at this time.  Patient on the afternoon of 4/12 had some increased dyspnea and congestion.  Chest x-ray was obtained and could not rule out possible pneumonia however Pro-Jim was negative, white blood cell count was normal, and felt more pulmonary congestion, IV fluids were stopped and patient was given IV  Bumex.  Seen again on the a.m. of 4/13, noticed to have some congestion on exam, CT of the chest was obtained and noted to have bilateral lower lobe atelectasis.  Patient has since been weaned to room air this afternoon and patient will be using incentive spirometer and flutter valve. Slight uptrend again of Cr on 4/14, patient with poor PO intake, some nausea this AM, additional meds ordered. Patient with slight tachycardia as well, will check troponin and obtain echo on her. No active chest pain however at this time.      CB on CKD   Dehydration  Nausea/Vomiting  -Cr 2.07 on admission, down to 1.6 on 4/13, IVF previously held with dyspnea, oxygen requirement, now weaned back to RA, chest CT with just atelectasis, plans for IS/FD, encourarged PO intake.   -CT abd/pelvis negative for acute findings  -CB possibly 2ry to recent bactrim use, as well as dehydration from poor po intake. Improving.   -Zofran has not really helped. Plan to try reglan as long as no significant drug interactions - will d/w pharmacy.  -PT/OT following and rec'd IRF however patient accomplishing all transfers with SBA/CGA and ambulated 45 feet. Also no medical necessity for IRF. D/W her daughter this am that if interested in rehab can consider SNF however with her ambulating 45 feet, unclear if she would be approved.  -Hopeful d/c in 24-48 hours once nausea controlled.     Dyspnea  Nausea   - possibly related to recent fluids   - Ordered for duonebs, cxr, will add IS/FD as well   - CT Chest with just atelectasis, encouraged use of IS/FD  - Plans to obtain echo and troponin as well on 4/14, troponin 26, will follow but seems noncardiac      HLD  -Continue statin     T2DM  -Blood glucose 112  -A1C 6.8   -Fingerstick achs. SSI  -LA insulin nightly     Essential Tremor  -Continue Respiradone     Anxiety/depression  -Continue home meds    Expected Discharge Location and Transportation:   Expected Discharge   Expected Discharge Date: 4/16/2025;  Expected Discharge Time:      VTE Prophylaxis:  Pharmacologic & mechanical VTE prophylaxis orders are present.         AM-PAC 6 Clicks Score (PT): 17 (04/15/25 2030)    CODE STATUS:   Code Status and Medical Interventions: CPR (Attempt to Resuscitate); Full Support   Ordered at: 04/10/25 2013     Code Status (Patient has no pulse and is not breathing):    CPR (Attempt to Resuscitate)     Medical Interventions (Patient has pulse or is breathing):    Full Support       Wen Whitehead II, DO  04/16/25

## 2025-04-16 NOTE — PLAN OF CARE
Goal Outcome Evaluation:  Plan of Care Reviewed With: patient        Progress: no change  Outcome Evaluation: Patient ambulated 40 feet with RW, min assist, limited by fatigue and weakness. Patient is currently well below her functional baseline, which is ambulatory about her home without AD. Fatigued quickly with LE ther ex and required physical assist with most ther ex d/t current LE weakness. Patient currently below functional baseline, demonstrating decreased functional mobility status, impaired balance, decreased endurance, and decreased strength. Will address these deficits to promote return to PLOF. Recommend SNF at d/c.    Anticipated Discharge Disposition (PT): skilled nursing facility

## 2025-04-16 NOTE — THERAPY TREATMENT NOTE
Patient Name: Vilma Ayala  : 1970    MRN: [id number]                              Today's Date: 2025       Admit Date: 4/10/2025    Visit Dx:     ICD-10-CM ICD-9-CM   1. CB (acute kidney injury)  N17.9 584.9   2. Nausea and vomiting, unspecified vomiting type  R11.2 787.01   3. Weakness  R53.1 780.79   4. Oropharyngeal dysphagia  R13.12 787.22     Patient Active Problem List   Diagnosis    Anxiety and depression    Uncontrolled type 2 diabetes mellitus    Other specified hypothyroidism    Obesity    Vitamin D deficiency, unspecified    Diabetic neuropathy, type II diabetes mellitus    Mixed hyperlipidemia    Dyspnea on exertion    Type 2 diabetes mellitus without complication, with long-term current use of insulin    Urinary incontinence    Microscopic hematuria    Nocturia    Nocturnal enuresis    Stress incontinence    Urge incontinence    Decreased GFR    Mild persistent asthma    Obesity (BMI 35.0-39.9 without comorbidity)    Drug-induced parkinsonism    Encounter to establish care    NATASHA (obstructive sleep apnea)    Medicare annual wellness visit, subsequent    Vitamin D deficiency    UTI (urinary tract infection)    Painful urination    Nausea    Acute kidney injury superimposed on chronic kidney disease    Diarrhea    Dehydration    Essential tremor    Acute kidney injury superimposed on CKD     Past Medical History:   Diagnosis Date    Anxiety     Chicken pox     Depression     Diabetes mellitus     Disease of thyroid gland     Measles     Type 2 diabetes mellitus      Past Surgical History:   Procedure Laterality Date    EYE SURGERY      TONSILLECTOMY        General Information       Row Name 25 8295          Physical Therapy Time and Intention    Document Type therapy note (daily note)  -LR     Mode of Treatment physical therapy;individual therapy  -LR       Row Name 25 1341          General Information    Patient Profile Reviewed yes  -LR     Existing  Precautions/Restrictions fall  -LR     Barriers to Rehab none identified  -LR       Row Name 04/16/25 1349          Cognition    Orientation Status (Cognition) oriented x 3  -LR       Row Name 04/16/25 1349          Safety Issues/Impairments Affecting Functional Mobility    Safety Issues Affecting Function (Mobility) safety precautions follow-through/compliance;safety precaution awareness;positioning of assistive device;insight into deficits/self-awareness;awareness of need for assistance;judgment  -LR     Impairments Affecting Function (Mobility) balance;strength;endurance/activity tolerance  -LR               User Key  (r) = Recorded By, (t) = Taken By, (c) = Cosigned By      Initials Name Provider Type    LR Saba Mejía, PT Physical Therapist                   Mobility       Row Name 04/16/25 1349          Bed Mobility    Bed Mobility supine-sit  -LR     Supine-Sit [place] (Bed Mobility) verbal cues;minimum assist (75% patient effort)  -LR     Assistive Device (Bed Mobility) head of bed elevated;other (see comments)  -LR     Comment, (Bed Mobility) Verbal cues to move LEs towards EOB and to reach across for bedrail to raise trunk into sitting. Required min assist to raise trunk into sitting and increased time to scoot hips out to get feet on floor. Denied dizziness upon sitting up.  -LR       Row Name 04/16/25 1349          Transfers    Comment, (Transfers) Verbal cues to push from bed to scoot hips out to get feet on floor. Despite cues to push up from bed to stand and to reach back for chair to lower into sitting, patient held on to RW throughout t/f.  -LR       Row Name 04/16/25 1349          Bed-Chair Transfer    Bed-Chair [place] (Transfers) not tested  -LR       Row Name 04/16/25 1349          Sit-Stand Transfer    Sit-Stand [place] (Transfers) verbal cues;contact guard  -LR       Row Name 04/16/25 1349          Gait/Stairs (Locomotion)    [place] Level (Gait) verbal  cues;minimum assist (75% patient effort)  -LR     Assistive Device (Gait) walker, front-wheeled  -LR     Patient was able to Ambulate yes  -LR     Distance in Feet (Gait) 40  -LR     Deviations/Abnormal Patterns (Gait) bilateral deviations;malina decreased;gait speed decreased;stride length decreased  -LR     Bilateral Gait Deviations forward flexed posture;heel strike decreased  -LR     [place] Level (Stairs) not tested  -LR     Comment, (Gait/Stairs) Patient ambulated with step to gait pattern at slow pace with short steps and poor foot clearance. Cues to keep RW closer and to not advance it too far out in front. Verbal cues for increased foot clearance and step length, mild improvement with cues. Patient mildly unsteady throughout ambulation. Gait limited by fatigue and weakness and required chair be brought up behind her to return to sitting.  -LR               User Key  (r) = Recorded By, (t) = Taken By, (c) = Cosigned By      Initials Name Provider Type    LR Saba Mejía, PT Physical Therapist                   Obj/Interventions       Row Name 04/16/25 1344          Motor Skills    Therapeutic Exercise ankle;knee;hip;other (see comments)  cues for technique; min assist B heel slides/hip abd, max assist B SLR, fatigued quickly with seated marches  -LR       Row Name 04/16/25 1343          Hip (Therapeutic Exercise)    Hip (Therapeutic Exercise) AROM (active range of motion);strengthening exercise;isometric exercises  -LR     Hip AROM (Therapeutic Exercise) bilateral;external rotation;internal rotation;sitting;10 repetitions  -LR     Hip Isometrics (Therapeutic Exercise) bilateral;gluteal sets;sitting;10 repetitions;aDduction  -LR     Hip Strengthening (Therapeutic Exercise) bilateral;heel slides;aBduction;marching while seated;sitting;10 repetitions  -LR       Row Name 04/16/25 1341          Knee (Therapeutic Exercise)    Knee (Therapeutic Exercise) strengthening exercise;isometric exercises   -LR     Knee Isometrics (Therapeutic Exercise) bilateral;sitting;10 repetitions  -LR     Knee Strengthening (Therapeutic Exercise) bilateral;SLR (straight leg raise);SAQ (short arc quad);LAQ (long arc quad);sitting;10 repetitions  -LR       Row Name 04/16/25 1349          Ankle (Therapeutic Exercise)    Ankle (Therapeutic Exercise) AROM (active range of motion)  -LR     Ankle AROM (Therapeutic Exercise) bilateral;dorsiflexion;plantarflexion;sitting;10 repetitions  -LR       Row Name 04/16/25 1349          Balance    Balance Assessment sitting static balance;sitting dynamic balance;standing static balance;standing dynamic balance  -LR     Static Sitting Balance contact guard  -LR     Dynamic Sitting Balance contact guard  -LR     Position, Sitting Balance unsupported;sitting edge of bed  -LR     Static Standing Balance contact guard  -LR     Dynamic Standing Balance minimal assist  -LR     Position/Device Used, Standing Balance supported;walker, rolling  -LR               User Key  (r) = Recorded By, (t) = Taken By, (c) = Cosigned By      Initials Name Provider Type    LR Saba Mejía, PT Physical Therapist                   Goals/Plan       Row Name 04/16/25 1349          Bed Mobility Goal 1 (PT)    Activity/Assistive Device (Bed Mobility Goal 1, PT) sit to supine/supine to sit  -LR     [place] Level/Cues Needed (Bed Mobility Goal 1, PT) standby assist  -LR     Time Frame (Bed Mobility Goal 1, PT) short term goal (STG);3 days  -LR     Progress/Outcomes (Bed Mobility Goal 1, PT) new goal;goal ongoing  -LR       Row Name 04/16/25 1349          Transfer Goal 1 (PT)    Activity/Assistive Device (Transfer Goal 1, PT) sit-to-stand/stand-to-sit;walker, rolling  -LR     [place] Level/Cues Needed (Transfer Goal 1, PT) modified independence  -LR     Time Frame (Transfer Goal 1, PT) 5 days;long term goal (LTG)  -LR     Progress/Outcome (Transfer Goal 1, PT) goal ongoing;continuing progress toward goal  -LR        Row Name 04/16/25 1349          Gait Training Goal 1 (PT)    Activity/Assistive Device (Gait Training Goal 1, PT) gait (walking locomotion);assistive device use;walker, rolling  -LR     [place] Level (Gait Training Goal 1, PT) modified independence  -LR     Distance (Gait Training Goal 1, PT) 100 feet  -LR     Time Frame (Gait Training Goal 1, PT) long term goal (LTG);10 days  -LR     Progress/Outcome (Gait Training Goal 1, PT) progress slower than expected;goal ongoing  -LR               User Key  (r) = Recorded By, (t) = Taken By, (c) = Cosigned By      Initials Name Provider Type    LR Saba Mejía, PT Physical Therapist                   Clinical Impression       Row Name 04/16/25 1340          Pain    Pain Location abdomen  -LR     Pain Side/Orientation generalized  -LR     Pain Management Interventions exercise or physical activity utilized;movement retraining implemented  -LR     Response to Pain Interventions activity level improved;mobility function improved;functional ability improved;activity participation with tolerable pain  -LR       Row Name 04/16/25 0237          Pain Scale: FACES Pre/Post-Treatment    Pain: FACES Scale, Pretreatment 0-->no hurt  -LR     Posttreatment Pain Rating 2-->hurts little bit  -LR       Row Name 04/16/25 6537          Plan of Care Review    Plan of Care Reviewed With patient  -LR     Progress no change  -LR     Outcome Evaluation Patient ambulated 40 feet with RW, min assist, limited by fatigue and weakness. Patient is currently well below her functional baseline, which is ambulatory about her home without AD. Fatigued quickly with LE ther ex and required physical assist with most ther ex d/t current LE weakness. Patient currently below functional baseline, demonstrating decreased functional mobility status, impaired balance, decreased endurance, and decreased strength. Will address these deficits to promote return to PLOF. Recommend SNF at d/c.  -LR        Row Name 04/16/25 1349          Therapy Assessment/Plan (PT)    Rehab Potential (PT) good  -LR     Criteria for Skilled Interventions Met (PT) yes;meets criteria;skilled treatment is necessary  -LR     Therapy Frequency (PT) daily  -LR       Row Name 04/16/25 1349          Vital Signs    Pre Systolic BP Rehab 122  -LR     Pre Treatment Diastolic BP 82  -LR     Pretreatment Heart Rate (beats/min) 106  -LR     Pre Patient Position Supine  -LR       Row Name 04/16/25 1349          Positioning and Restraints    Pre-Treatment Position in bed  -LR     Post Treatment Position chair  -LR     In Chair notified nsg;reclined;sitting;call light within reach;encouraged to call for assist;exit alarm on;legs elevated;waffle cushion  -LR               User Key  (r) = Recorded By, (t) = Taken By, (c) = Cosigned By      Initials Name Provider Type    Saba Marcelo, LUKASZ Physical Therapist                   Outcome Measures       Row Name 04/16/25 1349          How much help from another person do you currently need...    Turning from your back to your side while in flat bed without using bedrails? 3  -LR     Moving from lying on back to sitting on the side of a flat bed without bedrails? 3  -LR     Moving to and from a bed to a chair (including a wheelchair)? 3  -LR     Standing up from a chair using your arms (e.g., wheelchair, bedside chair)? 3  -LR     Climbing 3-5 steps with a railing? 1  -LR     To walk in hospital room? 3  -LR     AM-PAC 6 Clicks Score (PT) 16  -LR     Highest Level of Mobility Goal 5 --> Static standing  -LR       Row Name 04/16/25 1349          Functional Assessment    Outcome Measure Options AM-PAC 6 Clicks Basic Mobility (PT)  -LR               User Key  (r) = Recorded By, (t) = Taken By, (c) = Cosigned By      Initials Name Provider Type    Saba Marcelo PT Physical Therapist                                 Physical Therapy Education       Title: PT OT SLP Therapies (Done)        Topic: Physical Therapy (Done)       Point: Mobility training (Done)       Learning Progress Summary            Patient Acceptance, E,D, VU,NR by LR at 4/16/2025 1349    Comment: Educated on benefits of mobility, safety with mobility, correct supine to sit t/f technique, correct sit<->stand t/f technique, correct gait mechanics, LE HEP, and progression of POC.    Acceptance, E, VU,NR by LS at 4/12/2025 0950                      Point: Home exercise program (Done)       Learning Progress Summary            Patient Acceptance, E,D, VU,NR by LR at 4/16/2025 1349    Comment: Educated on benefits of mobility, safety with mobility, correct supine to sit t/f technique, correct sit<->stand t/f technique, correct gait mechanics, LE HEP, and progression of POC.                      Point: Body mechanics (Done)       Learning Progress Summary            Patient Acceptance, E,D, VU,NR by LR at 4/16/2025 1349    Comment: Educated on benefits of mobility, safety with mobility, correct supine to sit t/f technique, correct sit<->stand t/f technique, correct gait mechanics, LE HEP, and progression of POC.                      Point: Precautions (Done)       Learning Progress Summary            Patient Acceptance, E,D, VU,NR by LR at 4/16/2025 1349    Comment: Educated on benefits of mobility, safety with mobility, correct supine to sit t/f technique, correct sit<->stand t/f technique, correct gait mechanics, LE HEP, and progression of POC.                                      User Key       Initials Effective Dates Name Provider Type Discipline     02/03/23 -  Saba Mejía, PT Physical Therapist PT     07/11/23 -  Jody Yeung, PT Physical Therapist PT                  PT Recommendation and Plan     Progress: no change  Outcome Evaluation: Patient ambulated 40 feet with RW, min assist, limited by fatigue and weakness. Patient is currently well below her functional baseline, which is ambulatory about her home  without AD. Fatigued quickly with LE ther ex and required physical assist with most ther ex d/t current LE weakness. Patient currently below functional baseline, demonstrating decreased functional mobility status, impaired balance, decreased endurance, and decreased strength. Will address these deficits to promote return to PLOF. Recommend SNF at d/c.     Time Calculation:         PT Charges       Row Name 04/16/25 1349             Time Calculation    Start Time 1349  -LR      PT Received On 04/16/25  -LR      PT Goal Re-Cert Due Date 04/22/25  -LR         Timed Charges    15648 - PT Therapeutic Exercise Minutes 10  -LR      07292 - Gait Training Minutes  10  -LR      23112 - PT Therapeutic Activity Minutes 5  -LR         Total Minutes    Timed Charges Total Minutes 25  -LR       Total Minutes 25  -LR                User Key  (r) = Recorded By, (t) = Taken By, (c) = Cosigned By      Initials Name Provider Type    LR Saba Mejía, PT Physical Therapist                  Therapy Charges for Today       Code Description Service Date Service Provider Modifiers Qty    93435791389 HC PT THER PROC EA 15 MIN 4/16/2025 Saba Mejía, PT GP 1    51928502935 HC GAIT TRAINING EA 15 MIN 4/16/2025 Saba Mejía, PT GP 1            PT G-Codes  Outcome Measure Options: AM-PAC 6 Clicks Basic Mobility (PT)  AM-PAC 6 Clicks Score (PT): 16  PT Discharge Summary  Anticipated Discharge Disposition (PT): skilled nursing facility    Saba Mejía, PT  4/16/2025

## 2025-04-16 NOTE — PLAN OF CARE
Goal Outcome Evaluation:   Pt progressing well. Calm, Cooperative and accepting of plan of care. Pt had nausea through the night. Provider ordered nausea meds with some relief. Call light within reach, needs met.

## 2025-04-16 NOTE — PLAN OF CARE
Goal Outcome Evaluation:        Problem: Adult Inpatient Plan of Care  Goal: Plan of Care Review  Outcome: Progressing  Goal: Patient-Specific Goal (Individualized)  Outcome: Progressing  Goal: Absence of Hospital-Acquired Illness or Injury  Outcome: Progressing  Goal: Optimal Comfort and Wellbeing  Outcome: Progressing  Goal: Readiness for Transition of Care  Outcome: Progressing     Problem: Sepsis/Septic Shock  Goal: Optimal Coping  Outcome: Progressing  Goal: Absence of Bleeding  Outcome: Progressing  Goal: Blood Glucose Level Within Target Range  Outcome: Progressing  Goal: Absence of Infection Signs and Symptoms  Outcome: Progressing  Goal: Optimal Nutrition Delivery  Outcome: Progressing     Problem: Fall Injury Risk  Goal: Absence of Fall and Fall-Related Injury  Outcome: Progressing     Problem: Skin Injury Risk Increased  Goal: Skin Health and Integrity  Outcome: Progressing     Patient remained free from falls, had no signs of skin breakdown, and did not develop any symptoms of any hospital-acquired illnesses today. Patient progressed towards their overall goal of returning to baseline level of health and being medically ready for discharge by not vomiting today.     The plan of care for patient is watch overnight and potential DC tomorrow

## 2025-04-17 LAB
GLUCOSE BLDC GLUCOMTR-MCNC: 153 MG/DL (ref 70–130)
GLUCOSE BLDC GLUCOMTR-MCNC: 178 MG/DL (ref 70–130)
GLUCOSE BLDC GLUCOMTR-MCNC: 251 MG/DL (ref 70–130)
GLUCOSE BLDC GLUCOMTR-MCNC: 92 MG/DL (ref 70–130)

## 2025-04-17 PROCEDURE — 94799 UNLISTED PULMONARY SVC/PX: CPT

## 2025-04-17 PROCEDURE — 63710000001 INSULIN LISPRO (HUMAN) PER 5 UNITS: Performed by: FAMILY MEDICINE

## 2025-04-17 PROCEDURE — 63710000001 INSULIN GLARGINE PER 5 UNITS: Performed by: FAMILY MEDICINE

## 2025-04-17 PROCEDURE — 99232 SBSQ HOSP IP/OBS MODERATE 35: CPT | Performed by: INTERNAL MEDICINE

## 2025-04-17 PROCEDURE — 94761 N-INVAS EAR/PLS OXIMETRY MLT: CPT

## 2025-04-17 PROCEDURE — 25010000002 HEPARIN (PORCINE) PER 1000 UNITS: Performed by: FAMILY MEDICINE

## 2025-04-17 PROCEDURE — 82948 REAGENT STRIP/BLOOD GLUCOSE: CPT

## 2025-04-17 PROCEDURE — 94664 DEMO&/EVAL PT USE INHALER: CPT

## 2025-04-17 PROCEDURE — 63710000001 PROMETHAZINE PER 12.5 MG: Performed by: INTERNAL MEDICINE

## 2025-04-17 PROCEDURE — 92526 ORAL FUNCTION THERAPY: CPT

## 2025-04-17 RX ORDER — METOCLOPRAMIDE 10 MG/1
5 TABLET ORAL ONCE
Status: COMPLETED | OUTPATIENT
Start: 2025-04-17 | End: 2025-04-17

## 2025-04-17 RX ORDER — PROMETHAZINE HYDROCHLORIDE 12.5 MG/1
12.5 TABLET ORAL EVERY 6 HOURS PRN
Status: DISCONTINUED | OUTPATIENT
Start: 2025-04-17 | End: 2025-04-22

## 2025-04-17 RX ADMIN — SENNOSIDES AND DOCUSATE SODIUM 2 TABLET: 50; 8.6 TABLET ORAL at 20:49

## 2025-04-17 RX ADMIN — FAMOTIDINE 20 MG: 20 TABLET, FILM COATED ORAL at 20:48

## 2025-04-17 RX ADMIN — POLYETHYLENE GLYCOL 3350 17 G: 17 POWDER, FOR SOLUTION ORAL at 20:50

## 2025-04-17 RX ADMIN — HEPARIN SODIUM 5000 UNITS: 5000 INJECTION INTRAVENOUS; SUBCUTANEOUS at 06:19

## 2025-04-17 RX ADMIN — INSULIN GLARGINE 25 UNITS: 100 INJECTION, SOLUTION SUBCUTANEOUS at 22:22

## 2025-04-17 RX ADMIN — Medication 250 MG: at 20:48

## 2025-04-17 RX ADMIN — LEVOTHYROXINE SODIUM 225 MCG: 0.05 TABLET ORAL at 06:19

## 2025-04-17 RX ADMIN — Medication 10 ML: at 10:53

## 2025-04-17 RX ADMIN — RISPERIDONE 3 MG: 1 TABLET, FILM COATED ORAL at 10:49

## 2025-04-17 RX ADMIN — CHOLEYSTYRAMINE LIGHT 4 G: 4 POWDER, FOR SUSPENSION ORAL at 10:50

## 2025-04-17 RX ADMIN — HEPARIN SODIUM 5000 UNITS: 5000 INJECTION INTRAVENOUS; SUBCUTANEOUS at 13:06

## 2025-04-17 RX ADMIN — CHOLEYSTYRAMINE LIGHT 4 G: 4 POWDER, FOR SUSPENSION ORAL at 20:50

## 2025-04-17 RX ADMIN — Medication 250 MG: at 10:50

## 2025-04-17 RX ADMIN — INSULIN LISPRO 3 UNITS: 100 INJECTION, SOLUTION INTRAVENOUS; SUBCUTANEOUS at 13:05

## 2025-04-17 RX ADMIN — IPRATROPIUM BROMIDE AND ALBUTEROL SULFATE 3 ML: 2.5; .5 SOLUTION RESPIRATORY (INHALATION) at 09:33

## 2025-04-17 RX ADMIN — RISPERIDONE 3 MG: 1 TABLET, FILM COATED ORAL at 20:49

## 2025-04-17 RX ADMIN — HEPARIN SODIUM 5000 UNITS: 5000 INJECTION INTRAVENOUS; SUBCUTANEOUS at 21:00

## 2025-04-17 RX ADMIN — Medication 10 ML: at 22:23

## 2025-04-17 RX ADMIN — IPRATROPIUM BROMIDE AND ALBUTEROL SULFATE 3 ML: 2.5; .5 SOLUTION RESPIRATORY (INHALATION) at 19:38

## 2025-04-17 RX ADMIN — ATORVASTATIN CALCIUM 40 MG: 40 TABLET, FILM COATED ORAL at 10:50

## 2025-04-17 RX ADMIN — IPRATROPIUM BROMIDE AND ALBUTEROL SULFATE 3 ML: 2.5; .5 SOLUTION RESPIRATORY (INHALATION) at 16:08

## 2025-04-17 RX ADMIN — SENNOSIDES AND DOCUSATE SODIUM 2 TABLET: 50; 8.6 TABLET ORAL at 10:49

## 2025-04-17 RX ADMIN — PROMETHAZINE HYDROCHLORIDE 12.5 MG: 12.5 TABLET ORAL at 13:22

## 2025-04-17 RX ADMIN — INSULIN LISPRO 8 UNITS: 100 INJECTION, SOLUTION INTRAVENOUS; SUBCUTANEOUS at 22:22

## 2025-04-17 RX ADMIN — INSULIN GLARGINE 15 UNITS: 100 INJECTION, SOLUTION SUBCUTANEOUS at 11:00

## 2025-04-17 RX ADMIN — METOCLOPRAMIDE 5 MG: 10 TABLET ORAL at 11:01

## 2025-04-17 RX ADMIN — IPRATROPIUM BROMIDE AND ALBUTEROL SULFATE 3 ML: 2.5; .5 SOLUTION RESPIRATORY (INHALATION) at 11:52

## 2025-04-17 NOTE — PROGRESS NOTES
[facility] Medicine Services  PROGRESS NOTE    Patient Name: Vilma Ayala  : 1970  MRN: [id number]    Date of Admission: 4/10/2025  Primary Care Physician: Vanessa Kaur MD    Subjective   Subjective     CC: f/u nausea    HPI: Up in bed. Again complaining of nausea. Has not had bm in 6 days.      Objective   Objective     Vital Signs:   Temp:  [97.3 °F (36.3 °C)-98.8 °F (37.1 °C)] 98.5 °F (36.9 °C)  Heart Rate:  [100-114] 101  Resp:  [16-18] 18  BP: (113-138)/(75-87) 138/75  Flow (L/min) (Oxygen Therapy):  [2] 2     Physical Exam:  Constitutional: No acute distress, awake, alert  HENT: NCAT, mucous membranes moist  Respiratory: Clear to auscultation bilaterally, respiratory effort normal   Cardiovascular: RRR, no murmurs, rubs, or gallops  Gastrointestinal: Positive bowel sounds, soft, nontender, nondistended  Musculoskeletal: No bilateral ankle edema  Psychiatric: Appropriate affect, cooperative  Neurologic: Oriented x 3, strength symmetric in all extremities, Cranial Nerves grossly intact to confrontation, speech clear  Skin: No rashes     Results Reviewed:  LAB RESULTS:      Lab 25  0618 04/15/25  0619 04/15/25  0120 25  2141 25  1925 25  1411 25  0528 25  0435 25  1811 25  0503 25  1026 04/10/25  1344   WBC 11.85* 11.67*  --   --   --   --  16.16* 9.67  --  10.55   < > 18.06*   HEMOGLOBIN 11.8* 11.9*  --   --   --   --  12.7 12.4  --  13.3   < > 14.6   HEMATOCRIT 35.7 35.9  --   --   --   --  38.2 36.9  --  40.8   < > 43.3   PLATELETS 243 248  --   --   --   --  258 272  --  230   < > 338   NEUTROS ABS 7.67* 7.25*  --   --   --   --  11.35* 8.88*  --  5.85   < > 14.41*   IMMATURE GRANS (ABS) 0.04 0.05  --   --   --   --  0.08* 0.05  --  0.03   < > 0.08*   LYMPHS ABS 2.99 3.15*  --   --   --   --  3.92* 0.67*  --  3.49*   < > 2.78   MONOS ABS 0.61 0.58  --   --   --   --  0.60 0.06*  --  0.56   < > 0.62   EOS ABS  0.51* 0.59*  --   --   --   --  0.18 0.00  --  0.56*   < > 0.12   MCV 90.2 89.3  --   --   --   --  89.3 87.2  --  89.9   < > 86.4   PROCALCITONIN  --   --   --   --   --   --  0.07  --  0.08  --   --   --    LACTATE  --   --  1.8 4.1* 2.5* 2.3*  --   --   --   --   --  1.6   HSTROP T  --   --   --   --  17* 26*  --   --   --   --   --   --     < > = values in this interval not displayed.         Lab 04/16/25  0618 04/15/25  0619 04/14/25  0528 04/13/25  0435 04/12/25  0503 04/11/25  1026 04/10/25  1344   SODIUM 138 141 137 136 140   < > 138   POTASSIUM 4.3 4.6 4.1 4.4 3.9   < > 3.9   CHLORIDE 104 105 104 101 107   < > 101   CO2 21.0* 22.0 21.0* 21.0* 23.0   < > 24.0   ANION GAP 13.0 14.0 12.0 14.0 10.0   < > 13.0   BUN 18 17 21* 16 10   < > 11   CREATININE 1.33* 1.33* 1.74* 1.60* 1.52*   < > 2.07*   EGFR 47.6* 47.6* 34.5* 38.2* 40.6*   < > 28.0*   GLUCOSE 128* 142* 263* 396* 141*   < > 112*   CALCIUM 9.3 9.1 9.8 9.4 9.1   < > 9.8   MAGNESIUM  --   --  1.8 1.9 1.7  --   --    HEMOGLOBIN A1C  --   --   --   --   --   --  6.80*    < > = values in this interval not displayed.         Lab 04/16/25  0618 04/15/25  0619 04/14/25  0528 04/13/25  0435 04/12/25  0503 04/11/25  1026 04/10/25  1344   TOTAL PROTEIN 5.6* 5.7* 6.8 6.8 6.0   < > 7.6   ALBUMIN 3.7 3.6 4.0 4.0 3.6   < > 4.3   GLOBULIN 1.9 2.1 2.8 2.8 2.4   < > 3.3   ALT (SGPT) 22 26 31 21 21   < > 33   AST (SGOT) 14 18 25 14 18   < > 28   BILIRUBIN 0.5 0.5 0.3 0.4 0.6   < > 0.8   ALK PHOS 100 100 128* 122* 106   < > 137*   LIPASE  --   --   --   --   --   --  21    < > = values in this interval not displayed.         Lab 04/14/25  1925 04/14/25  1411   HSTROP T 17* 26*                 Brief Urine Lab Results  (Last result in the past 365 days)        Color   Clarity   Blood   Leuk Est   Nitrite   Protein   CREAT   Urine HCG        04/10/25 1734 Yellow   Clear   Negative   Trace   Negative   Negative                   Microbiology Results Abnormal       None             FL Video Swallow With Speech Single Contrast  Result Date: 4/15/2025  FL VIDEO SWALLOW W SPEECH SINGLE-CONTRAST Date of Exam: 4/15/2025 10:35 AM EDT Indication: dysphagia.   Comparison: None available. Technique:   The speech pathologist administered food and/or liquid mixed with barium to the patient with cine/video imaging.  Imaging assistance was provided to the speech pathologist and an image was saved. Fluoroscopic Time: 1 minute and 54 seconds Number of Images: 14 associated fluoroscopic loops were saved Findings: Aspiration was seen during fluoroscopic guided modified barium swallowing series. Please see speech therapy report for full details and recommendations.     Impression: Impression: Fluoroscopy provided for a modified barium swallow. Aspiration was seen during swallowing evaluation. Please see speech therapy report for full details and recommendations. Report dictated by: Ermelinda Queen PA-c  I have personally reviewed this case and agree with the findings above: Electronically Signed: Mike Morales MD  4/15/2025 4:23 PM EDT  Workstation ID: [id number]      Results for orders placed during the hospital encounter of 04/10/25    Adult Transthoracic Echo Complete W/ Cont if Necessary Per Protocol    Interpretation Summary    Left ventricular systolic function is normal. Estimated left ventricular EF = 60%    No significant valvular abnormalities.      Current medications:  Scheduled Meds:atorvastatin, 40 mg, Oral, Daily  cholestyramine light, 1 packet, Oral, Q12H  famotidine, 20 mg, Oral, Nightly  heparin (porcine), 5,000 Units, Subcutaneous, Q8H  insulin glargine, 15 Units, Subcutaneous, Daily  insulin glargine, 25 Units, Subcutaneous, Nightly  insulin lispro, 3-14 Units, Subcutaneous, 4x Daily AC & at Bedtime  ipratropium-albuterol, 3 mL, Nebulization, 4x Daily - RT  levothyroxine, 225 mcg, Oral, Q AM  metoclopramide, 5 mg, Oral, Once  risperiDONE, 3 mg, Oral, BID  saccharomyces boulardii, 250 mg,  Oral, BID  senna-docusate sodium, 2 tablet, Oral, BID  sodium chloride, 10 mL, Intravenous, Q12H      Continuous Infusions:   PRN Meds:.  acetaminophen **OR** acetaminophen **OR** acetaminophen    senna-docusate sodium **AND** polyethylene glycol **AND** bisacodyl **AND** bisacodyl    dextrose    dextrose    glucagon (human recombinant)    melatonin    nitroglycerin    Sodium Chloride (PF)    sodium chloride    sodium chloride    Assessment & Plan   Assessment & Plan     Active Hospital Problems    Diagnosis  POA    **Acute kidney injury superimposed on chronic kidney disease [N17.9, N18.9]  Yes    Acute kidney injury superimposed on CKD [N17.9, N18.9]  Yes    Diarrhea [R19.7]  Yes    Dehydration [E86.0]  Yes    Essential tremor [G25.0]  Yes    Type 2 diabetes mellitus without complication, with long-term current use of insulin [E11.9, Z79.4]  Not Applicable    Mixed hyperlipidemia [E78.2]  Yes    Other specified hypothyroidism [E03.8]  Yes    Anxiety and depression [F41.9, F32.A]  Yes      Resolved Hospital Problems   No resolved problems to display.        Brief Hospital Course to date:  Vilma Ayala is a 54 y.o. female with a history of Essential Tremor, T2DM, Hypothyroidism, anxiety/depression, and GERD who presents to [facility] ED for complaint of nausea and diarrhea.  Patient transferred to my services on the a.m. of 4/11, she is resting comfortably in bed, she reports diarrhea is doing better but stool studies are ordered and pending.  Continuing on IV fluids and treatment for CB, slight improvement in renal function today, will continue to monitor at this time.  Patient on the afternoon of 4/12 had some increased dyspnea and congestion.  Chest x-ray was obtained and could not rule out possible pneumonia however Pro-Jim was negative, white blood cell count was normal, and felt more pulmonary congestion, IV fluids were stopped and patient was given IV Bumex.  Seen again on the a.m. of 4/13, noticed  to have some congestion on exam, CT of the chest was obtained and noted to have bilateral lower lobe atelectasis.  Patient has since been weaned to room air this afternoon and patient will be using incentive spirometer and flutter valve. Slight uptrend again of Cr on 4/14, patient with poor PO intake, some nausea this AM, additional meds ordered. Patient with slight tachycardia as well, will check troponin and obtain echo on her. No active chest pain however at this time.      CB on CKD   Dehydration  Nausea/Vomiting  -Cr 2.07 on admission, down to 1.6 on 4/13, IVF previously held with dyspnea, oxygen requirement, now weaned back to RA, chest CT with just atelectasis, plans for IS/FD, encourarged PO intake.   -CT abd/pelvis negative for acute findings  -CB possibly 2ry to recent bactrim use, as well as dehydration from poor po intake. Improving.   -Zofran has not really helped.   -PT/OT following and rec'd IRF however patient accomplishing all transfers with SBA/CGA and ambulated 45 feet. Also no medical necessity for IRF. D/W her daughter this am that if interested in rehab can consider SNF however with her ambulating 45 feet, unclear if she would be approved.  -Repeat reglan.  -Enema     Dyspnea  Nausea   - possibly related to recent fluids   - Ordered for duonebs, cxr, will add IS/FD as well   - CT Chest with just atelectasis, encouraged use of IS/FD  - Plans to obtain echo and troponin as well on 4/14, troponin 26, will follow but seems noncardiac      HLD  -Continue statin     T2DM  -Blood glucose 112  -A1C 6.8   -Fingerstick achs. SSI  -LA insulin nightly     Essential Tremor  -Continue Respiradone     Anxiety/depression  -Continue home meds    Expected Discharge Location and Transportation:   Expected Discharge   Expected Discharge Date: 4/16/2025; Expected Discharge Time:      VTE Prophylaxis:  Pharmacologic & mechanical VTE prophylaxis orders are present.         AM-PAC 6 Clicks Score (PT): 16 (04/16/25  2000)    CODE STATUS:   Code Status and Medical Interventions: CPR (Attempt to Resuscitate); Full Support   Ordered at: 04/10/25 2013     Code Status (Patient has no pulse and is not breathing):    CPR (Attempt to Resuscitate)     Medical Interventions (Patient has pulse or is breathing):    Full Support       Wen Whitehead II, DO  04/17/25

## 2025-04-17 NOTE — PROGRESS NOTES
Continued Stay Note   [facility]     Patient Name: Vilma Ayala  MRN: [id number]  Today's Date: 4/17/2025    Admit Date: 4/10/2025    Plan: Home   Discharge Plan       Row Name 04/17/25 8312       Plan    Plan Comments Called Mrs. Ayala's sister to let her know about [facility] not having a bed and that there are faciliites in network with [employer] insurance coverage and she was agreeable to referrals to [facility].      Row Name 04/17/25 7945       Plan    Plan Comments Skilled nursing facilities in network with [employer] based on the [employer] Medicare website are [facility], [facility], [facility], [facility], [facility],, [facility] and referrals will be made to those facilities to see if any of them have a bed avaliable and can accept her insurance because [facility] does not have a bed right now.                   Discharge Codes    No documentation.                 Expected Discharge Date and Time       Expected Discharge Date Expected Discharge Time    Apr 16, 2025               VALARIE Redding

## 2025-04-17 NOTE — THERAPY TREATMENT NOTE
Acute Care - Speech Language Pathology   Swallow Treatment Note [facility]     Patient Name: Vilma Ayala  : 1970  MRN: [id number]  Today's Date: 2025               Admit Date: 4/10/2025    Visit Dx:     ICD-10-CM ICD-9-CM   1. CB (acute kidney injury)  N17.9 584.9   2. Nausea and vomiting, unspecified vomiting type  R11.2 787.01   3. Weakness  R53.1 780.79   4. Oropharyngeal dysphagia  R13.12 787.22     Patient Active Problem List   Diagnosis    Anxiety and depression    Uncontrolled type 2 diabetes mellitus    Other specified hypothyroidism    Obesity    Vitamin D deficiency, unspecified    Diabetic neuropathy, type II diabetes mellitus    Mixed hyperlipidemia    Dyspnea on exertion    Type 2 diabetes mellitus without complication, with long-term current use of insulin    Urinary incontinence    Microscopic hematuria    Nocturia    Nocturnal enuresis    Stress incontinence    Urge incontinence    Decreased GFR    Mild persistent asthma    Obesity (BMI 35.0-39.9 without comorbidity)    Drug-induced parkinsonism    Encounter to establish care    NATASHA (obstructive sleep apnea)    Medicare annual wellness visit, subsequent    Vitamin D deficiency    UTI (urinary tract infection)    Painful urination    Nausea    Acute kidney injury superimposed on chronic kidney disease    Diarrhea    Dehydration    Essential tremor    Acute kidney injury superimposed on CKD     Past Medical History:   Diagnosis Date    Anxiety     Chicken pox     Depression     Diabetes mellitus     Disease of thyroid gland     Measles     Type 2 diabetes mellitus      Past Surgical History:   Procedure Laterality Date    EYE SURGERY      TONSILLECTOMY         SLP Recommendation and Plan     SLP Diet Recommendation: mechanical ground textures, thin liquids (25 1525)  Recommended Precautions and Strategies: general aspiration precautions, assist with feeding (25 1525)  SLP Rec. for Method of Medication Administration:  meds crushed, with puree, as tolerated (04/17/25 1525)     Monitor for Signs of Aspiration: notify SLP if any concerns (04/17/25 1525)        Anticipated Discharge Disposition (SLP): skilled nursing facility (04/17/25 1525)     Therapy Frequency (Swallow): 5 days per week (04/17/25 1525)  Predicted Duration Therapy Intervention (Days): 1 week (04/17/25 1525)  Oral Care Recommendations: Oral Care BID/PRN, Suction toothbrush (04/17/25 1525)        Daily Summary of Progress (SLP): progress toward functional goals as expected (04/17/25 1525)               Treatment Assessment (SLP): toleration of diet, suspected, continued, pharyngeal dysphagia (04/17/25 1525)  Treatment Assessment Comments (SLP): No overt s/s of aspiration w/ NTL trials. Intermittent, delayed throat clear w/ thin liquid trials. Pt declined further PO presentations 2' nausea. Reviewed/completed all exercises. SLP will cont to f/u for tx as appropriate (04/17/25 1525)  Plan for Continued Treatment (SLP): continue treatment per plan of care (04/17/25 1525)                SWALLOW EVALUATION (Last 72 Hours)       SLP Adult Swallow Evaluation       Row Name 04/17/25 1525 04/15/25 1045                Rehab Evaluation    Document Type therapy note (daily note)  - evaluation  -       Subjective Information nausea/vomiting  c/o nausea, RN aware  - no complaints  -       Patient Observations alert;cooperative  - alert;cooperative;agree to therapy  -       Patient/Family/Caregiver Comments/Observations No family present  - no family present  -       Patient Effort good  - good  -       Symptoms Noted During/After Treatment none  - none  -          General Information    Patient Profile Reviewed -- yes  -       Pertinent History Of Current Problem -- see initial evaluation  -       Current Method of Nutrition -- soft to chew textures;chopped;nectar/syrup-thick liquids  -          Pain    Pretreatment Pain Rating -- 0/10 - no pain  -        Posttreatment Pain Rating -- 0/10 - no pain  -       Additional Documentation Pain Scale: FACES Pre/Post-Treatment (Group)  - --          Pain Scale: FACES Pre/Post-Treatment    Pain: FACES Scale, Pretreatment 0-->no hurt  - --       Posttreatment Pain Rating 0-->no hurt  - --          MBS/VFSS    Utensils Used -- spoon;cup;straw  -       Consistencies Trialed -- thin liquids;nectar/syrup-thick liquids;pureed;regular textures  -          MBS/VFSS Interpretation    Oral Prep Phase -- impaired oral phase of swallowing  -       Oral Transit Phase -- impaired  -       Oral Residue -- WFL  -          Oral Preparatory Phase    Oral Preparatory Phase -- reduced lip closure around utensil;anterior loss;prolonged manipulation  -       Reduced Lip Closure Around Utensil -- thin liquids  -       Anterior Loss -- thin liquids  -       Prolonged Manipulation -- all consistencies tested;secondary to reduced labial seal;secondary to reduced lingual strength;secondary to reduced lingual range of motion;secondary to impaired cognitive status  -          Oral Transit Phase    Impaired Oral Transit Phase -- delayed initiation of bolus transit;premature spillage of liquids into pharynx  -       Delayed Initiation of bolus transit -- all consistencies tested;secondary to reduced lingual control;discoordination of lingual movement;secondary to impaired cognitive status  -       Premature Spillage of Liquids into Pharynx -- thin liquids;secondary to reduced lingual control;discoordination of lingual movement  -          Initiation of Pharyngeal Swallow    Initiation of Pharyngeal Swallow -- bolus in pyriform sinuses  -       Pharyngeal Phase -- impaired pharyngeal phase of swallowing  -       Penetration Before the Swallow -- thin liquids;secondary to reduced back of tongue control;secondary to delayed swallow initiation or mistiming  -       Aspiration During the Swallow -- thin  liquids;secondary to delayed swallow initiation or mistiming;secondary to reduced laryngeal elevation;secondary to reduced vestibular closure;secondary to reduced laryngeal (VF) closure  -       Aspiration After the Swallow -- thin liquids;secondary to residue;in laryngeal vestibule  -       Depth of Penetration -- deep  -CH       Response to Penetration -- No  -CH       No spontaneous response to penetration and -- non-effective laryngeal clearance with cue (see comments)  -CH       Response to Aspiration -- No  -CH       No spontaneous response to aspiration and -- non-effective subglottic clearance with cue (see comments)  -       Rosenbek's Scale -- thin:;8--->level 8;nectar:;pudding/puree:;regular textures:;1--->level 1  -CH       Pharyngeal Residue -- thin liquids;laryngeal vestibule;secondary to reduced base of tongue retraction;secondary to reduced posterior pharyngeal wall stripping;secondary to reduced laryngeal elevation;secondary to reduced hyolaryngeal excursion  -CH       Response to Residue -- unable to clear residue  -       Attempted Compensatory Maneuvers -- bolus size;bolus presentation style;throat clear after swallow  -CH       Response to Attempted Compensatory Maneuvers -- did not prevent penetration;did not prevent aspiration  -       Successful Compensatory Maneuver Competency -- patient able to;demonstrate compensations;with cues  -          Swallowing Quality of Life Assessment    Education and counseling provided -- Signs of aspiration;Risks of aspiration;Safest diet options;Oral care recommendations and rationale;Aspiration precautions  -          SLP Evaluation Clinical Impression    SLP Swallowing Diagnosis -- mild;oral dysphagia;moderate;pharyngeal dysphagia  -CH       Functional Impact -- risk of aspiration/pneumonia  -       Rehab Potential/Prognosis, Swallowing -- good, to achieve stated therapy goals  -       Swallow Criteria for Skilled Therapeutic Interventions  Met -- demonstrates skilled criteria  -          SLP Treatment Clinical Impressions    Treatment Assessment (SLP) toleration of diet;suspected;continued;pharyngeal dysphagia  - --       Treatment Assessment Comments (SLP) No overt s/s of aspiration w/ NTL trials. Intermittent, delayed throat clear w/ thin liquid trials. Pt declined further PO presentations 2' nausea. Reviewed/completed all exercises. SLP will cont to f/u for tx as appropriate  - --       Daily Summary of Progress (SLP) progress toward functional goals as expected  - --       Plan for Continued Treatment (SLP) continue treatment per plan of care  - --       Care Plan Review care plan/treatment goals reviewed  - --          Recommendations    Therapy Frequency (Swallow) 5 days per week  - 5 days per week  -       Predicted Duration Therapy Intervention (Days) 1 week  - 1 week  -       SLP Diet Recommendation mechanical ground textures;thin liquids  - mechanical ground textures;thin liquids  -       Recommended Diagnostics --  - --       Recommended Precautions and Strategies general aspiration precautions;assist with feeding  - general aspiration precautions;assist with feeding  -       Oral Care Recommendations Oral Care BID/PRN;Suction toothbrush  - Oral Care BID/PRN;Suction toothbrush  -       SLP Rec. for Method of Medication Administration meds crushed;with puree;as tolerated  - meds crushed;with puree;as tolerated  -       Monitor for Signs of Aspiration notify SLP if any concerns  - notify SLP if any concerns  -       Anticipated Discharge Disposition (SLP) skilled nursing facility  - inpatient rehabilitation facility  -                 User Key  (r) = Recorded By, (t) = Taken By, (c) = Cosigned By      Initials Name Effective Dates     Gauri Mims MS CCC-SLP 01/20/25 -      Lynn Crawford MS CCC-SLP 05/12/23 -                     EDUCATION  The patient has been educated in the  following areas:   Dysphagia (Swallowing Impairment) Oral Care/Hydration Modified Diet Instruction.        SLP GOALS       Row Name 04/17/25 1525 04/15/25 1045          (LTG) Patient will demonstrate functional swallow for    Diet Texture (Demonstrate functional swallow) soft to chew (whole) textures  - soft to chew (whole) textures  -     Liquid viscosity (Demonstrate functional swallow) thin liquids  - thin liquids  -     [place] (Demonstrate functional swallow) with minimal cues (75-90% accuracy)  - with minimal cues (75-90% accuracy)  -     Time Frame (Demonstrate functional swallow) 1 week  - 1 week  -CH     Progress/Outcomes (Demonstrate functional swallow) continuing progress toward goal  - --     Comment (Demonstrate functional swallow) Intermittent, delayed throat clear w/ thins  - --        (STG) Patient will tolerate trials of    Consistencies Trialed (Tolerate trials) soft to chew (ground) textures;nectar/ mildly thick liquids  - soft to chew (ground) textures;nectar/ mildly thick liquids  -     Desired Outcome (Tolerate trials) without signs/symptoms of aspiration;with adequate oral prep/transit/clearance  - without signs/symptoms of aspiration;with adequate oral prep/transit/clearance  -     [place] (Tolerate trials) with minimal cues (75-90% accuracy)  - with minimal cues (75-90% accuracy)  -     Time Frame (Tolerate trials) 1 week  - 1 week  -     Progress/Outcomes (Tolerate trials) continuing progress toward goal  - --     Comment (Tolerate trials) No overt s/s of aspiration w/ NTL trials. Pt declined solid trials 2' nausea  - --        (STG) Labial Strengthening Goal 1 (SLP)    Activity (Labial Strengthening Goal 1, SLP) increase labial tone  - increase labial tone  -     Increase Labial Tone labial resistance exercises;swallow trials  - labial resistance exercises;swallow trials  -     [place]/Accuracy (Labial Strengthening Goal 1, SLP)  with minimal cues (75-90% accuracy)  - with minimal cues (75-90% accuracy)  -     Time Frame (Labial Strengthening Goal 1, SLP) 1 week  - 1 week  -CH     Progress/Outcomes (Labial Strengthening Goal 1, SLP) continuing progress toward goal  - --        (STG) Lingual Strengthening Goal 1 (SLP)    Activity (Lingual Strengthening Goal 1, SLP) increase tongue back strength;increase lingual tone/sensation/control/coordination/movement  - increase tongue back strength;increase lingual tone/sensation/control/coordination/movement  -     Increase Lingual Tone/Sensation/Control/Coordination/Movement lingual movement exercises;swallow trials  - lingual movement exercises;swallow trials  -     Increase Tongue Back Strength lingual movement exercises;swallow trials;lingual resistance exercises  - lingual movement exercises;swallow trials;lingual resistance exercises  -     [place]/Accuracy (Lingual Strengthening Goal 1, SLP) with minimal cues (75-90% accuracy)  - with minimal cues (75-90% accuracy)  -     Time Frame (Lingual Strengthening Goal 1, SLP) 1 week  - 1 week  -CH     Progress/Outcomes (Lingual Strengthening Goal 1, SLP) continuing progress toward goal  - --        (STG) Pharyngeal Strengthening Exercise Goal 1 (SLP)    Activity (Pharyngeal Strengthening Goal 1, SLP) increase timing;increase superior movement of the hyolaryngeal complex;increase anterior movement of the hyolaryngeal complex;increase closure at entrance to airway/closure of airway at glottis;increase squeeze/positive pressure generation  - increase timing;increase superior movement of the hyolaryngeal complex;increase anterior movement of the hyolaryngeal complex;increase closure at entrance to airway/closure of airway at glottis;increase squeeze/positive pressure generation  -     Increase Timing prepping - 3 second prep or suck swallow or 3-step swallow  - prepping - 3 second prep or suck swallow or 3-step swallow   -CH     Increase Superior Movement of the Hyolaryngeal Complex falsetto  - falsetto  -CH     Increase Anterior Movement of the Hyolaryngeal Complex chin tuck against resistance (CTAR)  - chin tuck against resistance (CTAR)  -CH     Increase Closure at Entrance to Airway/Closure of Airway at Glottis super-supraglottic swallow;breath hold exercises  - super-supraglottic swallow;breath hold exercises  -CH     Increase Squeeze/Positive Pressure Generation hard effortful swallow  - hard effortful swallow  -CH     [place]/Accuracy (Pharyngeal Strengthening Goal 1, SLP) with minimal cues (75-90% accuracy)  -MH with minimal cues (75-90% accuracy)  -CH     Time Frame (Pharyngeal Strengthening Goal 1, SLP) 1 week  - 1 week  -CH     Progress/Outcomes (Pharyngeal Strengthening Goal 1, SLP) continuing progress toward goal  - --     Comment (Pharyngeal Strengthening Goal 1, SLP) Reviewed/completed all exercises  - --               User Key  (r) = Recorded By, (t) = Taken By, (c) = Cosigned By      Initials Name Provider Type    Gauri Clancy MS CCC-SLP Speech and Language Pathologist     Lynn Crawford MS CCC-SLP Speech and Language Pathologist                         Time Calculation:    Time Calculation- SLP       Row Name 04/17/25 1630             Time Calculation- SLP    SLP Start Time 1525  -      SLP Received On 04/17/25  -         Untimed Charges    45460-[id number] Treatment Swallow Minutes 45  -MH         Total Minutes    Untimed Charges Total Minutes 45  -       Total Minutes 45  -                User Key  (r) = Recorded By, (t) = Taken By, (c) = Cosigned By      Initials Name Provider Type     Lynn Crawford, MS CCC-SLP Speech and Language Pathologist                    Therapy Charges for Today       Code Description Service Date Service Provider Modifiers Qty    [id number] HC ST TREATMENT SWALLOW 3 4/17/2025 Lynn Crawford MS CCC-SLP GN 1                 Lynn Crawford  MS CCC-SLP  4/17/2025

## 2025-04-17 NOTE — PROGRESS NOTES
Continued Stay Note  [facility]     Patient Name: Vilma Ayala  MRN: [id number]  Today's Date: 4/17/2025    Admit Date: 4/10/2025    Plan: Home   Discharge Plan       Row Name 04/17/25 1328       Plan    Plan Comments Skilled nursing facilities in network with [employer] based on the [employer] Medicare website are [facility], [facility], [facility], [facility], [facility],, [facility] and referrals will be made to those facilities to see if any of them have a bed available and can accept her insurance because [facility] does not have a bed right now.                   Discharge Codes    No documentation.                 Expected Discharge Date and Time       Expected Discharge Date Expected Discharge Time    Apr 16, 2025               VALARIE [place]

## 2025-04-18 LAB
GLUCOSE BLDC GLUCOMTR-MCNC: 104 MG/DL (ref 70–130)
GLUCOSE BLDC GLUCOMTR-MCNC: 114 MG/DL (ref 70–130)
GLUCOSE BLDC GLUCOMTR-MCNC: 204 MG/DL (ref 70–130)
GLUCOSE BLDC GLUCOMTR-MCNC: 242 MG/DL (ref 70–130)

## 2025-04-18 PROCEDURE — 97530 THERAPEUTIC ACTIVITIES: CPT

## 2025-04-18 PROCEDURE — 82948 REAGENT STRIP/BLOOD GLUCOSE: CPT

## 2025-04-18 PROCEDURE — 94799 UNLISTED PULMONARY SVC/PX: CPT

## 2025-04-18 PROCEDURE — 25010000002 METOCLOPRAMIDE PER 10 MG: Performed by: NURSE PRACTITIONER

## 2025-04-18 PROCEDURE — 97110 THERAPEUTIC EXERCISES: CPT

## 2025-04-18 PROCEDURE — 25010000002 HEPARIN (PORCINE) PER 1000 UNITS: Performed by: FAMILY MEDICINE

## 2025-04-18 PROCEDURE — 63710000001 INSULIN LISPRO (HUMAN) PER 5 UNITS: Performed by: FAMILY MEDICINE

## 2025-04-18 PROCEDURE — 63710000001 INSULIN GLARGINE PER 5 UNITS: Performed by: FAMILY MEDICINE

## 2025-04-18 PROCEDURE — 99232 SBSQ HOSP IP/OBS MODERATE 35: CPT | Performed by: NURSE PRACTITIONER

## 2025-04-18 PROCEDURE — 63710000001 PROMETHAZINE PER 12.5 MG: Performed by: INTERNAL MEDICINE

## 2025-04-18 RX ORDER — METOCLOPRAMIDE HYDROCHLORIDE 5 MG/ML
5 INJECTION INTRAMUSCULAR; INTRAVENOUS ONCE
Status: COMPLETED | OUTPATIENT
Start: 2025-04-18 | End: 2025-04-18

## 2025-04-18 RX ORDER — LACTULOSE 10 G/15ML
20 SOLUTION ORAL ONCE
Status: COMPLETED | OUTPATIENT
Start: 2025-04-18 | End: 2025-04-18

## 2025-04-18 RX ADMIN — HEPARIN SODIUM 5000 UNITS: 5000 INJECTION INTRAVENOUS; SUBCUTANEOUS at 06:25

## 2025-04-18 RX ADMIN — SENNOSIDES AND DOCUSATE SODIUM 2 TABLET: 50; 8.6 TABLET ORAL at 21:01

## 2025-04-18 RX ADMIN — INSULIN LISPRO 5 UNITS: 100 INJECTION, SOLUTION INTRAVENOUS; SUBCUTANEOUS at 21:00

## 2025-04-18 RX ADMIN — LACTULOSE 20 G: 20 SOLUTION ORAL at 12:28

## 2025-04-18 RX ADMIN — PROMETHAZINE HYDROCHLORIDE 12.5 MG: 12.5 TABLET ORAL at 16:51

## 2025-04-18 RX ADMIN — RISPERIDONE 3 MG: 1 TABLET, FILM COATED ORAL at 08:55

## 2025-04-18 RX ADMIN — BISACODYL 10 MG: 10 SUPPOSITORY RECTAL at 12:03

## 2025-04-18 RX ADMIN — HEPARIN SODIUM 5000 UNITS: 5000 INJECTION INTRAVENOUS; SUBCUTANEOUS at 16:05

## 2025-04-18 RX ADMIN — LEVOTHYROXINE SODIUM 225 MCG: 0.05 TABLET ORAL at 06:25

## 2025-04-18 RX ADMIN — HEPARIN SODIUM 5000 UNITS: 5000 INJECTION INTRAVENOUS; SUBCUTANEOUS at 21:01

## 2025-04-18 RX ADMIN — Medication 250 MG: at 21:01

## 2025-04-18 RX ADMIN — IPRATROPIUM BROMIDE AND ALBUTEROL SULFATE 3 ML: 2.5; .5 SOLUTION RESPIRATORY (INHALATION) at 19:18

## 2025-04-18 RX ADMIN — INSULIN GLARGINE 25 UNITS: 100 INJECTION, SOLUTION SUBCUTANEOUS at 21:00

## 2025-04-18 RX ADMIN — RISPERIDONE 3 MG: 1 TABLET, FILM COATED ORAL at 21:00

## 2025-04-18 RX ADMIN — CHOLEYSTYRAMINE LIGHT 4 G: 4 POWDER, FOR SUSPENSION ORAL at 21:00

## 2025-04-18 RX ADMIN — IPRATROPIUM BROMIDE AND ALBUTEROL SULFATE 3 ML: 2.5; .5 SOLUTION RESPIRATORY (INHALATION) at 08:10

## 2025-04-18 RX ADMIN — Medication 5 MG: at 21:01

## 2025-04-18 RX ADMIN — INSULIN LISPRO 5 UNITS: 100 INJECTION, SOLUTION INTRAVENOUS; SUBCUTANEOUS at 17:54

## 2025-04-18 RX ADMIN — SENNOSIDES AND DOCUSATE SODIUM 2 TABLET: 50; 8.6 TABLET ORAL at 08:55

## 2025-04-18 RX ADMIN — Medication 10 ML: at 08:58

## 2025-04-18 RX ADMIN — METOCLOPRAMIDE 5 MG: 5 INJECTION, SOLUTION INTRAMUSCULAR; INTRAVENOUS at 16:23

## 2025-04-18 RX ADMIN — CHOLEYSTYRAMINE LIGHT 4 G: 4 POWDER, FOR SUSPENSION ORAL at 08:55

## 2025-04-18 RX ADMIN — Medication 5 MG: at 00:09

## 2025-04-18 RX ADMIN — ATORVASTATIN CALCIUM 40 MG: 40 TABLET, FILM COATED ORAL at 08:55

## 2025-04-18 RX ADMIN — INSULIN GLARGINE 15 UNITS: 100 INJECTION, SOLUTION SUBCUTANEOUS at 08:55

## 2025-04-18 RX ADMIN — IPRATROPIUM BROMIDE AND ALBUTEROL SULFATE 3 ML: 2.5; .5 SOLUTION RESPIRATORY (INHALATION) at 11:34

## 2025-04-18 RX ADMIN — FAMOTIDINE 20 MG: 20 TABLET, FILM COATED ORAL at 21:01

## 2025-04-18 RX ADMIN — Medication 250 MG: at 08:55

## 2025-04-18 NOTE — THERAPY TREATMENT NOTE
Patient Name: Vilma Ayala  : 1970    MRN: [id number]                              Today's Date: 2025       Admit Date: 4/10/2025    Visit Dx:     ICD-10-CM ICD-9-CM   1. CB (acute kidney injury)  N17.9 584.9   2. Nausea and vomiting, unspecified vomiting type  R11.2 787.01   3. Weakness  R53.1 780.79   4. Oropharyngeal dysphagia  R13.12 787.22     Patient Active Problem List   Diagnosis    Anxiety and depression    Uncontrolled type 2 diabetes mellitus    Other specified hypothyroidism    Obesity    Vitamin D deficiency, unspecified    Diabetic neuropathy, type II diabetes mellitus    Mixed hyperlipidemia    Dyspnea on exertion    Type 2 diabetes mellitus without complication, with long-term current use of insulin    Urinary incontinence    Microscopic hematuria    Nocturia    Nocturnal enuresis    Stress incontinence    Urge incontinence    Decreased GFR    Mild persistent asthma    Obesity (BMI 35.0-39.9 without comorbidity)    Drug-induced parkinsonism    Encounter to establish care    NATASHA (obstructive sleep apnea)    Medicare annual wellness visit, subsequent    Vitamin D deficiency    UTI (urinary tract infection)    Painful urination    Nausea    Acute kidney injury superimposed on chronic kidney disease    Diarrhea    Dehydration    Essential tremor    Acute kidney injury superimposed on CKD     Past Medical History:   Diagnosis Date    Anxiety     Chicken pox     Depression     Diabetes mellitus     Disease of thyroid gland     Measles     Type 2 diabetes mellitus      Past Surgical History:   Procedure Laterality Date    EYE SURGERY      TONSILLECTOMY        General Information       Row Name 25 1451          Physical Therapy Time and Intention    Document Type therapy note (daily note)  -ND     Mode of Treatment physical therapy  -ND       Row Name 25 1451          General Information    Patient Profile Reviewed yes  -ND     Existing Precautions/Restrictions fall;other  (see comments)  Tremulous  -ND     Barriers to Rehab none identified  -ND       Row Name 04/18/25 1451          Cognition    Orientation Status (Cognition) oriented x 3  -ND       Row Name 04/18/25 1451          Safety Issues/Impairments Affecting Functional Mobility    Safety Issues Affecting Function (Mobility) awareness of need for assistance;insight into deficits/self-awareness;safety precaution awareness;safety precautions follow-through/compliance;sequencing abilities  -ND     Impairments Affecting Function (Mobility) balance;strength;endurance/activity tolerance  -ND               User Key  (r) = Recorded By, (t) = Taken By, (c) = Cosigned By      Initials Name Provider Type    ND Helen Diallo, PT Physical Therapist                   Mobility       Row Name 04/18/25 1452          Bed Mobility    Bed Mobility supine-sit  -ND     Supine-Sit Grant (Bed Mobility) contact guard;verbal cues;nonverbal cues (demo/gesture)  -ND     Assistive Device (Bed Mobility) bed rails  -ND     Comment, (Bed Mobility) Increased time for task, cues for sequencing. Denies dizziness with position change.  -ND       Row Name 04/18/25 1452          Bed-Chair Transfer    Bed-Chair Grant (Transfers) contact guard;verbal cues;nonverbal cues (demo/gesture)  -ND     Assistive Device (Bed-Chair Transfers) walker, standard  -ND     Comment, (Bed-Chair Transfer) SPT from EOB>BSC  -ND       Row Name 04/18/25 1452          Sit-Stand Transfer    Sit-Stand Grant (Transfers) contact guard;verbal cues;nonverbal cues (demo/gesture)  -ND     Assistive Device (Sit-Stand Transfers) walker, standard  -ND     Comment, (Sit-Stand Transfer) x1 from EOB, x2 from BSC.  -ND       Row Name 04/18/25 1452          Gait/Stairs (Locomotion)    Grant Level (Gait) contact guard;verbal cues;nonverbal cues (demo/gesture)  -ND     Assistive Device (Gait) walker, front-wheeled  -ND     Distance in Feet (Gait) 3  -ND     Comment,  (Gait/Stairs) Pt ambulates short distance from BSC to chair with standard walker and CGA. Encouraged pt to ambulate further but she declines this date due to fatigue.  -ND               User Key  (r) = Recorded By, (t) = Taken By, (c) = Cosigned By      Initials Name Provider Type    Helen Valverde, LUKASZ Physical Therapist                   Obj/Interventions       Row Name 04/18/25 1454          Motor Skills    Therapeutic Exercise hip;knee;ankle  -ND       Row Name 04/18/25 1454          Hip (Therapeutic Exercise)    Hip (Therapeutic Exercise) strengthening exercise  -ND     Hip Strengthening (Therapeutic Exercise) bilateral;marching while seated;10 repetitions  -ND       Row Name 04/18/25 1454          Knee (Therapeutic Exercise)    Knee (Therapeutic Exercise) strengthening exercise  -ND     Knee Strengthening (Therapeutic Exercise) bilateral;LAQ (long arc quad);10 repetitions  -ND       Row Name 04/18/25 1454          Ankle (Therapeutic Exercise)    Ankle (Therapeutic Exercise) AROM (active range of motion)  -ND     Ankle AROM (Therapeutic Exercise) bilateral;dorsiflexion;plantarflexion;10 repetitions  -ND       Row Name 04/18/25 1454          Balance    Balance Assessment sitting static balance;sitting dynamic balance;standing static balance;standing dynamic balance  -ND     Static Sitting Balance standby assist  -ND     Dynamic Sitting Balance contact guard  -ND     Position, Sitting Balance unsupported;sitting edge of bed  -ND     Static Standing Balance contact guard  -ND     Dynamic Standing Balance contact guard  -ND     Position/Device Used, Standing Balance supported;walker, standard  -ND     Balance Interventions sitting;standing;sit to stand;static;supported;dynamic  -ND     Comment, Balance No knee buckling or LOB noted. Pt requires dependent haritha-hygiene following BSC transfer.  -ND               User Key  (r) = Recorded By, (t) = Taken By, (c) = Cosigned By      Initials Name Provider Type    ND  Helen Diallo, PT Physical Therapist                   Goals/Plan    No documentation.                  Clinical Impression       Row Name 04/18/25 1456          Pain    Pretreatment Pain Rating 0/10 - no pain  -ND     Posttreatment Pain Rating 0/10 - no pain  -ND       Row Name 04/18/25 1456          Plan of Care Review    Plan of Care Reviewed With patient  -ND     Progress no change  -ND     Outcome Evaluation Pt declines further ambulation this date due to fatigue and therefore only performs functional transfers with standard walker. Pt continues to benefit from IP PT services to progress mobility and facilitate return to baseline. Recommend SNF following d/c for best functional outcome.  -ND       Row Name 04/18/25 1456          Vital Signs    Pre Systolic BP Rehab 104  -ND     Pre Treatment Diastolic BP 62  -ND     Post Systolic BP Rehab 111  -ND     Post Treatment Diastolic BP 77  -ND     Pretreatment Heart Rate (beats/min) 96  -ND     Posttreatment Heart Rate (beats/min) 97  -ND     Pre SpO2 (%) 95  -ND     O2 Delivery Pre Treatment room air  -ND     O2 Delivery Intra Treatment room air  -ND     Post SpO2 (%) 92  -ND     O2 Delivery Post Treatment room air  -ND     Pre Patient Position Supine  -ND     Intra Patient Position Standing  -ND     Post Patient Position Sitting  -ND       Row Name 04/18/25 1456          Positioning and Restraints    Pre-Treatment Position in bed  -ND     Post Treatment Position chair  -ND     In Chair notified nsg;reclined;legs elevated;call light within reach;encouraged to call for assist;exit alarm on;waffle cushion  -ND               User Key  (r) = Recorded By, (t) = Taken By, (c) = Cosigned By      Initials Name Provider Type    ND Helen Diallo, LUKASZ Physical Therapist                   Outcome Measures       Row Name 04/18/25 1459 04/18/25 0810       How much help from another person do you currently need...    Turning from your back to your side while in flat bed  without using bedrails? 3  -ND 3  -TI    Moving from lying on back to sitting on the side of a flat bed without bedrails? 3  -ND 3  -TI    Moving to and from a bed to a chair (including a wheelchair)? 3  -ND 3  -TI    Standing up from a chair using your arms (e.g., wheelchair, bedside chair)? 3  -ND 3  -TI    Climbing 3-5 steps with a railing? 2  -ND 2  -TI    To walk in hospital room? 3  -ND 3  -TI    AM-PAC 6 Clicks Score (PT) 17  -ND 17  -TI    Highest Level of Mobility Goal 5 --> Static standing  -ND 5 --> Static standing  -TI      Row Name 04/18/25 1459          Functional Assessment    Outcome Measure Options AM-PAC 6 Clicks Basic Mobility (PT)  -ND               User Key  (r) = Recorded By, (t) = Taken By, (c) = Cosigned By      Initials Name Provider Type    TI Mulu Ann RN Registered Nurse    Helen Valverde, PT Physical Therapist                                 Physical Therapy Education       Title: PT OT SLP Therapies (Done)       Topic: Physical Therapy (Done)       Point: Mobility training (Done)       Learning Progress Summary            Patient Acceptance, E, VU by ND at 4/18/2025 1459    Acceptance, E,D, VU,NR by LR at 4/16/2025 1349    Comment: Educated on benefits of mobility, safety with mobility, correct supine to sit t/f technique, correct sit<->stand t/f technique, correct gait mechanics, LE HEP, and progression of POC.    Acceptance, E, VU,NR by NICCI at 4/12/2025 0950                      Point: Home exercise program (Done)       Learning Progress Summary            Patient Acceptance, E, VU by ND at 4/18/2025 1459    Acceptance, E,D, VU,NR by LR at 4/16/2025 1349    Comment: Educated on benefits of mobility, safety with mobility, correct supine to sit t/f technique, correct sit<->stand t/f technique, correct gait mechanics, LE HEP, and progression of POC.                      Point: Body mechanics (Done)       Learning Progress Summary            Patient Acceptance, E, VU by ND at  4/18/2025 1459    Acceptance, E,D, VU,NR by LR at 4/16/2025 1349    Comment: Educated on benefits of mobility, safety with mobility, correct supine to sit t/f technique, correct sit<->stand t/f technique, correct gait mechanics, LE HEP, and progression of POC.                      Point: Precautions (Done)       Learning Progress Summary            Patient Acceptance, E, VU by ND at 4/18/2025 1459    Acceptance, E,D, VU,NR by LR at 4/16/2025 1349    Comment: Educated on benefits of mobility, safety with mobility, correct supine to sit t/f technique, correct sit<->stand t/f technique, correct gait mechanics, LE HEP, and progression of POC.                                      User Key       Initials Effective Dates Name Provider Type Discipline    LR 02/03/23 -  Saba Mejía, PT Physical Therapist PT    LS 07/11/23 -  Jody Yeung, PT Physical Therapist PT    ND 11/16/23 -  Helen Diallo, LUKASZ Physical Therapist PT                  PT Recommendation and Plan     Progress: no change  Outcome Evaluation: Pt declines further ambulation this date due to fatigue and therefore only performs functional transfers with standard walker. Pt continues to benefit from IP PT services to progress mobility and facilitate return to baseline. Recommend SNF following d/c for best functional outcome.     Time Calculation:         PT Charges       Row Name 04/18/25 1459             Time Calculation    Start Time 1325  -ND      PT Received On 04/18/25  -ND         Timed Charges    77661 - PT Therapeutic Exercise Minutes 8  -ND      45163 - PT Therapeutic Activity Minutes 16  -ND         Total Minutes    Timed Charges Total Minutes 24  -ND       Total Minutes 24  -ND                User Key  (r) = Recorded By, (t) = Taken By, (c) = Cosigned By      Initials Name Provider Type    ND Helen Diallo, PT Physical Therapist                  Therapy Charges for Today       Code Description Service Date Service Provider  Modifiers Qty    86948175651  PT THER PROC EA 15 MIN 4/18/2025 Helen Diallo, PT GP 1    77761292454 HC PT THERAPEUTIC ACT EA 15 MIN 4/18/2025 Heeln Diallo, PT GP 1            PT G-Codes  Outcome Measure Options: AM-PAC 6 Clicks Basic Mobility (PT)  AM-PAC 6 Clicks Score (PT): 17  PT Discharge Summary  Anticipated Discharge Disposition (PT): skilled nursing facility    Helen Diallo, LUKASZ  4/18/2025

## 2025-04-18 NOTE — PROGRESS NOTES
[facility] Medicine Services  PROGRESS NOTE    Patient Name: Vilma Ayala  : 1970  MRN: [id number]    Date of Admission: 4/10/2025  Primary Care Physician: Vanessa Kaur MD    Subjective   Subjective     CC: f/u nausea    HPI:   She was seen resting up in bed in no acute distress.  Awake and alert.  Currently denies nausea.  Still having mild occasional nausea but no vomiting.  States she just feels sleepy today.  No BM per nursing staff.  Awaiting therapy recommendations.  CM following.  Possible short-term rehab versus return to group home.    Objective   Objective     Vital Signs:   Temp:  [98.1 °F (36.7 °C)-99.9 °F (37.7 °C)] 98.7 °F (37.1 °C)  Heart Rate:  [] 85  Resp:  [10-20] 18  BP: (102-122)/(54-76) 102/63  Flow (L/min) (Oxygen Therapy):  [2] 2     Physical Exam:  Constitutional: No acute distress, Resting back in bed.  Drowsy but awake.  Chronically debilitated appearing.  HENT: NCAT, mucous membranes moist  Respiratory: Clear to auscultation bilaterally, respiratory effort normal on 2 LNC.  Sats WNL.  Cardiovascular: RRR, no murmurs, rubs, or gallops  Gastrointestinal: Positive bowel sounds, soft, nontender, nondistended  Musculoskeletal: Trace BLE edema.  Johns spontaneously.  Psychiatric: Flat affect, cooperative  Neurologic: Oriented x 3 with slow responses, strength symmetric in all extremities but generally weak, Cranial Nerves grossly intact to confrontation, speech clear.  Follows commands.  Skin: No rashes     Results Reviewed:  LAB RESULTS:      Lab 25  0618 04/15/25  0619 04/15/25  0120 25  2141 25  1925 25  1411 25  0528 25  0435 25  1811 25  0503   WBC 11.85* 11.67*  --   --   --   --  16.16* 9.67  --  10.55   HEMOGLOBIN 11.8* 11.9*  --   --   --   --  12.7 12.4  --  13.3   HEMATOCRIT 35.7 35.9  --   --   --   --  38.2 36.9  --  40.8   PLATELETS 243 248  --   --   --   --  258 272  --  230    NEUTROS ABS 7.67* 7.25*  --   --   --   --  11.35* 8.88*  --  5.85   IMMATURE GRANS (ABS) 0.04 0.05  --   --   --   --  0.08* 0.05  --  0.03   LYMPHS ABS 2.99 3.15*  --   --   --   --  3.92* 0.67*  --  3.49*   MONOS ABS 0.61 0.58  --   --   --   --  0.60 0.06*  --  0.56   EOS ABS 0.51* 0.59*  --   --   --   --  0.18 0.00  --  0.56*   MCV 90.2 89.3  --   --   --   --  89.3 87.2  --  89.9   PROCALCITONIN  --   --   --   --   --   --  0.07  --  0.08  --    LACTATE  --   --  1.8 4.1* 2.5* 2.3*  --   --   --   --    HSTROP T  --   --   --   --  17* 26*  --   --   --   --          Lab 04/16/25  0618 04/15/25  0619 04/14/25  0528 04/13/25  0435 04/12/25  0503   SODIUM 138 141 137 136 140   POTASSIUM 4.3 4.6 4.1 4.4 3.9   CHLORIDE 104 105 104 101 107   CO2 21.0* 22.0 21.0* 21.0* 23.0   ANION GAP 13.0 14.0 12.0 14.0 10.0   BUN 18 17 21* 16 10   CREATININE 1.33* 1.33* 1.74* 1.60* 1.52*   EGFR 47.6* 47.6* 34.5* 38.2* 40.6*   GLUCOSE 128* 142* 263* 396* 141*   CALCIUM 9.3 9.1 9.8 9.4 9.1   MAGNESIUM  --   --  1.8 1.9 1.7         Lab 04/16/25  0618 04/15/25  0619 04/14/25  0528 04/13/25  0435 04/12/25  0503   TOTAL PROTEIN 5.6* 5.7* 6.8 6.8 6.0   ALBUMIN 3.7 3.6 4.0 4.0 3.6   GLOBULIN 1.9 2.1 2.8 2.8 2.4   ALT (SGPT) 22 26 31 21 21   AST (SGOT) 14 18 25 14 18   BILIRUBIN 0.5 0.5 0.3 0.4 0.6   ALK PHOS 100 100 128* 122* 106         Lab 04/14/25  1925 04/14/25  1411   HSTROP T 17* 26*                 Brief Urine Lab Results  (Last result in the past 365 days)        Color   Clarity   Blood   Leuk Est   Nitrite   Protein   CREAT   Urine HCG        04/10/25 1734 Yellow   Clear   Negative   Trace   Negative   Negative                   Microbiology Results Abnormal       None            No radiology results from the last 24 hrs      Results for orders placed during the hospital encounter of 04/10/25    Adult Transthoracic Echo Complete W/ Cont if Necessary Per Protocol    Interpretation Summary    Left ventricular systolic function  is normal. Estimated left ventricular EF = 60%    No significant valvular abnormalities.      Current medications:  Scheduled Meds:atorvastatin, 40 mg, Oral, Daily  cholestyramine light, 1 packet, Oral, Q12H  famotidine, 20 mg, Oral, Nightly  heparin (porcine), 5,000 Units, Subcutaneous, Q8H  insulin glargine, 15 Units, Subcutaneous, Daily  insulin glargine, 25 Units, Subcutaneous, Nightly  insulin lispro, 3-14 Units, Subcutaneous, 4x Daily AC & at Bedtime  ipratropium-albuterol, 3 mL, Nebulization, 4x Daily - RT  lactulose, 20 g, Oral, Once  levothyroxine, 225 mcg, Oral, Q AM  risperiDONE, 3 mg, Oral, BID  saccharomyces boulardii, 250 mg, Oral, BID  senna-docusate sodium, 2 tablet, Oral, BID  sodium chloride, 10 mL, Intravenous, Q12H      Continuous Infusions:   PRN Meds:.  acetaminophen **OR** acetaminophen **OR** acetaminophen    senna-docusate sodium **AND** polyethylene glycol **AND** bisacodyl **AND** bisacodyl    dextrose    dextrose    glucagon (human recombinant)    melatonin    nitroglycerin    promethazine    Sodium Chloride (PF)    sodium chloride    sodium chloride    Assessment & Plan   Assessment & Plan     Active Hospital Problems    Diagnosis  POA    **Acute kidney injury superimposed on chronic kidney disease [N17.9, N18.9]  Yes    Acute kidney injury superimposed on CKD [N17.9, N18.9]  Yes    Diarrhea [R19.7]  Yes    Dehydration [E86.0]  Yes    Essential tremor [G25.0]  Yes    Type 2 diabetes mellitus without complication, with long-term current use of insulin [E11.9, Z79.4]  Not Applicable    Mixed hyperlipidemia [E78.2]  Yes    Other specified hypothyroidism [E03.8]  Yes    Anxiety and depression [F41.9, F32.A]  Yes      Resolved Hospital Problems   No resolved problems to display.        Brief Hospital Course to date:  Vilma Ayala is a 54 y.o. female with a history of Essential Tremor, T2DM, Hypothyroidism, anxiety/depression, and GERD who presents to [facility] ED for complaint of  nausea and diarrhea.  Patient transferred to my services on the a.m. of 4/11, she is resting comfortably in bed, she reports diarrhea is doing better but stool studies are ordered and pending.  Continuing on IV fluids and treatment for CB, slight improvement in renal function today, will continue to monitor at this time.  Patient on the afternoon of 4/12 had some increased dyspnea and congestion.  Chest x-ray was obtained and could not rule out possible pneumonia however Pro-Jim was negative, white blood cell count was normal, and felt more pulmonary congestion, IV fluids were stopped and patient was given IV Bumex.  Seen again on the a.m. of 4/13, noticed to have some congestion on exam, CT of the chest was obtained and noted to have bilateral lower lobe atelectasis.  Patient has since been weaned to room air this afternoon and patient will be using incentive spirometer and flutter valve. Slight uptrend again of Cr on 4/14, patient with poor PO intake, some nausea this AM, additional meds ordered. Patient with slight tachycardia as well, will check troponin and obtain echo on her. No active chest pain however at this time.     This patient's problems and plans were partially entered by my partner and updated as appropriate by me 04/18/25.    Assessment/plan:  Pt is new to me today      CB on CKD   Dehydration  Nausea/Vomiting  -Cr 2.07 on admission, down to 1.6 on 4/13, IVF previously held with dyspnea, oxygen requirement, now weaned back to RA, chest CT with just atelectasis, plans for IS/FD, encourarged PO intake.   -CT abd/pelvis negative for acute findings  -CB possibly 2ry to recent bactrim use, as well as dehydration from poor po intake. Improving.   -PT/OT following and rec'd IRF however patient accomplishing all transfers with SBA/CGA and ambulated 45 feet. Also no medical necessity for IRF. D/W her daughter this am that if interested in rehab can consider SNF however with her ambulating 45 feet, unclear  if she would be approved.  -Repeated reglan yesterday and enema.  Still no BM.  -since nausea has improved, will give onetime lactulose po today and dulcolax supp.       Dyspnea  Nausea   - possibly related to recent fluids .  Improving   - Ordered for duonebs, cxr, will add IS/FD as well   - CT Chest with just atelectasis, encouraged use of IS/FD  - ckd echo 4/14 and troponin, troponin 26, will follow but seems noncardiac. Echo EF 60%.  LVSF nl, no significant valvular abnormalities      HLD  -Continue statin     T2DM  -A1C 6.8   -Fingerstick achs. SSI  -lantus insulin nightly  --glucose generally varied last 24 hrs.  Will continue current regimen for today.   Lab Results   Component Value Date    GLUCOSE 128 (H) 04/16/2025    GLUCOSE 142 (H) 04/15/2025    GLUCOSE 263 (H) 04/14/2025    GLUCOSE 396 (H) 04/13/2025    GLUCOSE 141 (H) 04/12/2025      Essential Tremor  -Continue Respiradone, stable      Anxiety/depression  -Continue home meds, stable     Expected Discharge Location and Transportation: back to group [place] vs [facility].   following   Expected Discharge   Expected Discharge Date: 4/19/2025; Expected Discharge Time:      VTE Prophylaxis:  Pharmacologic & mechanical VTE prophylaxis orders are present.         AM-PAC 6 Clicks Score (PT): 17 (04/18/25 0810)    CODE STATUS:   Code Status and Medical Interventions: CPR (Attempt to Resuscitate); Full Support   Ordered at: 04/10/25 2013     Code Status (Patient has no pulse and is not breathing):    CPR (Attempt to Resuscitate)     Medical Interventions (Patient has pulse or is breathing):    Full Support       Chanda Tucker, ZIA  04/18/25

## 2025-04-18 NOTE — PAYOR COMM NOTE
"Vilma Wooten (54 y.o. Female)       Date of Birth   1970    Social Security Number       Address   [address] [facility] [address]    Home Phone   [phone]    MRN   [id number]       Yazdanism   Mandaeism    Marital Status   Single                            Admission Date   4/10/2025    Admission Type   Emergency    Admitting Provider   Wen Whitehead II, DO    Attending Provider   Wen Whitehead II, DO    Department, Room/Bed   [address], S523/1       Discharge Date       Discharge Disposition       Discharge Destination                                 Attending Provider: Wen Whitehead II, DO    Allergies: Doxepin, Januvia [Sitagliptin]    Isolation: None   Infection: None   Code Status: CPR    Ht: 167.6 cm (65.98\")   Wt: 96.3 kg (212 lb 4.9 oz)    Admission Cmt: None   Principal Problem: Acute kidney injury superimposed on chronic kidney disease [N17.9,N18.9]                   Active Insurance as of 4/10/2025       Primary Coverage       Payor Plan Insurance Group Employer/Plan Group    [facility] MEDICARE REPLACEMENT [employer] MEDICARE ADVANTAGE PPO        Payor Plan Address Payor Plan Phone Number Payor Plan Fax Number Effective Dates    [address] [address]   3/1/2025 - None Entered    [facility] [id number]         Subscriber Name Subscriber Birth Date Member ID       VILMA WOOTEN 1970 [id number]               Secondary Coverage       Payor Plan Insurance Group Employer/Plan Group    [place]        Payor Plan Address Payor Plan Phone Number Payor Plan Fax Number Effective Dates    [address] [phone]  2/27/2025 - None Entered    [facility] [address]         Subscriber Name Subscriber Birth Date Member ID       VILMA WOOTEN 1970 [id number]                     Emergency Contacts        (Rel.) Home Phone Work Phone Mobile Phone    FRANCIE LOYOLA (Sister) [phone] -- [phone]      "            Physician Progress Notes (most recent note)        Chanda Tucker, APRN at 25 1110              [facility] Medicine Services  PROGRESS NOTE    Patient Name: Vilma Ayala  : 1970  MRN: [id number]    Date of Admission: 4/10/2025  Primary Care Physician: Vanessa Kaur MD    Subjective   Subjective     CC: f/u nausea    HPI:   She was seen resting up in bed in no acute distress.  Awake and alert.  Currently denies nausea.  Still having mild occasional nausea but no vomiting.  States she just feels sleepy today.  No BM per nursing staff.  Awaiting therapy recommendations.  CM following.  Possible short-term rehab versus return to group home.    Objective   Objective     Vital Signs:   Temp:  [98.1 °F (36.7 °C)-99.9 °F (37.7 °C)] 98.7 °F (37.1 °C)  Heart Rate:  [] 85  Resp:  [10-20] 18  BP: (102-122)/(54-76) 102/63  Flow (L/min) (Oxygen Therapy):  [2] 2     Physical Exam:  Constitutional: No acute distress, Resting back in bed.  Drowsy but awake.  Chronically debilitated appearing.  HENT: NCAT, mucous membranes moist  Respiratory: Clear to auscultation bilaterally, respiratory effort normal on 2 LNC.  Sats WNL.  Cardiovascular: RRR, no murmurs, rubs, or gallops  Gastrointestinal: Positive bowel sounds, soft, nontender, nondistended  Musculoskeletal: Trace BLE edema.  Johns spontaneously.  Psychiatric: Flat affect, cooperative  Neurologic: Oriented x 3 with slow responses, strength symmetric in all extremities but generally weak, Cranial Nerves grossly intact to confrontation, speech clear.  Follows commands.  Skin: No rashes     Results Reviewed:  LAB RESULTS:      Lab 25  0618 04/15/25  0619 04/15/25  0120 25  2141 25  1925 25  1411 25  0528 25  0435 25  1811 25  0503   WBC 11.85* 11.67*  --   --   --   --  16.16* 9.67  --  10.55   HEMOGLOBIN 11.8* 11.9*  --   --   --   --  12.7 12.4  --  13.3    HEMATOCRIT 35.7 35.9  --   --   --   --  38.2 36.9  --  40.8   PLATELETS 243 248  --   --   --   --  258 272  --  230   NEUTROS ABS 7.67* 7.25*  --   --   --   --  11.35* 8.88*  --  5.85   IMMATURE GRANS (ABS) 0.04 0.05  --   --   --   --  0.08* 0.05  --  0.03   LYMPHS ABS 2.99 3.15*  --   --   --   --  3.92* 0.67*  --  3.49*   MONOS ABS 0.61 0.58  --   --   --   --  0.60 0.06*  --  0.56   EOS ABS 0.51* 0.59*  --   --   --   --  0.18 0.00  --  0.56*   MCV 90.2 89.3  --   --   --   --  89.3 87.2  --  89.9   PROCALCITONIN  --   --   --   --   --   --  0.07  --  0.08  --    LACTATE  --   --  1.8 4.1* 2.5* 2.3*  --   --   --   --    HSTROP T  --   --   --   --  17* 26*  --   --   --   --          Lab 04/16/25  0618 04/15/25  0619 04/14/25  0528 04/13/25  0435 04/12/25  0503   SODIUM 138 141 137 136 140   POTASSIUM 4.3 4.6 4.1 4.4 3.9   CHLORIDE 104 105 104 101 107   CO2 21.0* 22.0 21.0* 21.0* 23.0   ANION GAP 13.0 14.0 12.0 14.0 10.0   BUN 18 17 21* 16 10   CREATININE 1.33* 1.33* 1.74* 1.60* 1.52*   EGFR 47.6* 47.6* 34.5* 38.2* 40.6*   GLUCOSE 128* 142* 263* 396* 141*   CALCIUM 9.3 9.1 9.8 9.4 9.1   MAGNESIUM  --   --  1.8 1.9 1.7         [facility] 04/16/25  0618 04/15/25  0619 04/14/25  0528 04/13/25  0435 04/12/25  0503   TOTAL PROTEIN 5.6* 5.7* 6.8 6.8 6.0   ALBUMIN 3.7 3.6 4.0 4.0 3.6   GLOBULIN 1.9 2.1 2.8 2.8 2.4   ALT (SGPT) 22 26 31 21 21   AST (SGOT) 14 18 25 14 18   BILIRUBIN 0.5 0.5 0.3 0.4 0.6   ALK PHOS 100 100 128* 122* 106         Lab 04/14/25  1925 04/14/25  1411   HSTROP T 17* 26*                 Brief Urine Lab Results  (Last result in the past 365 days)        Color   Clarity   Blood   Leuk Est   Nitrite   Protein   CREAT   Urine HCG        04/10/25 1734 Yellow   Clear   Negative   Trace   Negative   Negative                   Microbiology Results Abnormal       None            No radiology results from the last 24 hrs      Results for orders placed during the hospital encounter of 04/10/25    Adult  Transthoracic Echo Complete W/ Cont if Necessary Per Protocol    Interpretation Summary    Left ventricular systolic function is normal. Estimated left ventricular EF = 60%    No significant valvular abnormalities.      Current medications:  Scheduled Meds:atorvastatin, 40 mg, Oral, Daily  cholestyramine light, 1 packet, Oral, Q12H  famotidine, 20 mg, Oral, Nightly  heparin (porcine), 5,000 Units, Subcutaneous, Q8H  insulin glargine, 15 Units, Subcutaneous, Daily  insulin glargine, 25 Units, Subcutaneous, Nightly  insulin lispro, 3-14 Units, Subcutaneous, 4x Daily AC & at Bedtime  ipratropium-albuterol, 3 mL, Nebulization, 4x Daily - RT  lactulose, 20 g, Oral, Once  levothyroxine, 225 mcg, Oral, Q AM  risperiDONE, 3 mg, Oral, BID  saccharomyces boulardii, 250 mg, Oral, BID  senna-docusate sodium, 2 tablet, Oral, BID  sodium chloride, 10 mL, Intravenous, Q12H      Continuous Infusions:   PRN Meds:.  acetaminophen **OR** acetaminophen **OR** acetaminophen    senna-docusate sodium **AND** polyethylene glycol **AND** bisacodyl **AND** bisacodyl    dextrose    dextrose    glucagon (human recombinant)    melatonin    nitroglycerin    promethazine    Sodium Chloride (PF)    sodium chloride    sodium chloride    Assessment & Plan   Assessment & Plan     Active Hospital Problems    Diagnosis  POA    **Acute kidney injury superimposed on chronic kidney disease [N17.9, N18.9]  Yes    Acute kidney injury superimposed on CKD [N17.9, N18.9]  Yes    Diarrhea [R19.7]  Yes    Dehydration [E86.0]  Yes    Essential tremor [G25.0]  Yes    Type 2 diabetes mellitus without complication, with long-term current use of insulin [E11.9, Z79.4]  Not Applicable    Mixed hyperlipidemia [E78.2]  Yes    Other specified hypothyroidism [E03.8]  Yes    Anxiety and depression [F41.9, F32.A]  Yes      Resolved Hospital Problems   No resolved problems to display.        Brief Hospital Course to date:  Vilma Ayala is a 54 y.o. female with a  history of Essential Tremor, T2DM, Hypothyroidism, anxiety/depression, and GERD who presents to [facility] ED for complaint of nausea and diarrhea.  Patient transferred to my services on the a.m. of 4/11, she is resting comfortably in bed, she reports diarrhea is doing better but stool studies are ordered and pending.  Continuing on IV fluids and treatment for CB, slight improvement in renal function today, will continue to monitor at this time.  Patient on the afternoon of 4/12 had some increased dyspnea and congestion.  Chest x-ray was obtained and could not rule out possible pneumonia however Pro-Jim was negative, white blood cell count was normal, and felt more pulmonary congestion, IV fluids were stopped and patient was given IV Bumex.  Seen again on the a.m. of 4/13, noticed to have some congestion on exam, CT of the chest was obtained and noted to have bilateral lower lobe atelectasis.  Patient has since been weaned to room air this afternoon and patient will be using incentive spirometer and flutter valve. Slight uptrend again of Cr on 4/14, patient with poor PO intake, some nausea this AM, additional meds ordered. Patient with slight tachycardia as well, will check troponin and obtain echo on her. No active chest pain however at this time.     This patient's problems and plans were partially entered by my partner and updated as appropriate by me 04/18/25.    Assessment/plan:  Pt is new to me today      CB on CKD   Dehydration  Nausea/Vomiting  -Cr 2.07 on admission, down to 1.6 on 4/13, IVF previously held with dyspnea, oxygen requirement, now weaned back to RA, chest CT with just atelectasis, plans for IS/FD, encourarged PO intake.   -CT abd/pelvis negative for acute findings  -CB possibly 2ry to recent bactrim use, as well as dehydration from poor po intake. Improving.   -PT/OT following and rec'd IRF however patient accomplishing all transfers with SBA/CGA and ambulated 45 feet. Also no medical  necessity for IRF. D/W her daughter this am that if interested in rehab can consider SNF however with her ambulating 45 feet, unclear if she would be approved.  -Repeated reglan yesterday and enema.  Still no BM.  -since nausea has improved, will give onetime lactulose po today and dulcolax supp.       Dyspnea  Nausea   - possibly related to recent fluids .  Improving   - Ordered for duonebs, cxr, will add IS/FD as well   - CT Chest with just atelectasis, encouraged use of IS/FD  - ckd echo 4/14 and troponin, troponin 26, will follow but seems noncardiac. Echo EF 60%.  LVSF nl, no significant valvular abnormalities      HLD  -Continue statin     T2DM  -A1C 6.8   -Fingerstick achs. SSI  -lantus insulin nightly  --glucose generally varied last 24 hrs.  Will continue current regimen for today.   Lab Results   Component Value Date    GLUCOSE 128 (H) 04/16/2025    GLUCOSE 142 (H) 04/15/2025    GLUCOSE 263 (H) 04/14/2025    GLUCOSE 396 (H) 04/13/2025    GLUCOSE 141 (H) 04/12/2025      Essential Tremor  -Continue Respiradone, stable      Anxiety/depression  -Continue home meds, stable     Expected Discharge Location and Transportation: back to group home vs STR.   following   Expected Discharge   Expected Discharge Date: 4/19/2025; Expected Discharge Time:      VTE Prophylaxis:  Pharmacologic & mechanical VTE prophylaxis orders are present.         AM-PAC 6 Clicks Score (PT): 17 (04/18/25 0810)    CODE STATUS:   Code Status and Medical Interventions: CPR (Attempt to Resuscitate); Full Support   Ordered at: 04/10/25 2013     Code Status (Patient has no pulse and is not breathing):    CPR (Attempt to Resuscitate)     Medical Interventions (Patient has pulse or is breathing):    Full Support       ZIA Avery  04/18/25       Electronically signed by Chanda Tucker APRN at 04/18/25 8032

## 2025-04-18 NOTE — PLAN OF CARE
Problem: Adult Inpatient Plan of Care  Goal: Plan of Care Review  Outcome: Progressing  Flowsheets (Taken 4/18/2025 1842)  Progress: improving  Outcome Evaluation: pt had suppository, lactulose, and enema with a successul BM. waiting for rehab  Goal: Patient-Specific Goal (Individualized)  Outcome: Progressing  Goal: Absence of Hospital-Acquired Illness or Injury  Outcome: Progressing  Intervention: Identify and Manage Fall Risk  Recent Flowsheet Documentation  Taken 4/18/2025 1800 by Mulu Ann RN  Safety Promotion/Fall Prevention:   assistive device/personal items within reach   activity supervised   clutter free environment maintained   fall prevention program maintained   nonskid shoes/slippers when out of bed   room organization consistent   safety round/check completed  Taken 4/18/2025 1600 by Mulu Ann RN  Safety Promotion/Fall Prevention:   activity supervised   assistive device/personal items within reach   clutter free environment maintained   fall prevention program maintained   nonskid shoes/slippers when out of bed   room organization consistent   safety round/check completed  Taken 4/18/2025 1400 by Mulu Ann RN  Safety Promotion/Fall Prevention:   activity supervised   assistive device/personal items within reach   clutter free environment maintained   fall prevention program maintained   nonskid shoes/slippers when out of bed   room organization consistent   safety round/check completed  Taken 4/18/2025 1200 by Mulu Ann RN  Safety Promotion/Fall Prevention:   activity supervised   assistive device/personal items within reach   clutter free environment maintained   fall prevention program maintained   nonskid shoes/slippers when out of bed   safety round/check completed   room organization consistent  Taken 4/18/2025 1000 by Mulu Ann RN  Safety Promotion/Fall Prevention:   activity supervised   assistive device/personal items within reach   clutter free  environment maintained   fall prevention program maintained   nonskid shoes/slippers when out of bed   room organization consistent   safety round/check completed  Taken 4/18/2025 0810 by Mulu Ann RN  Safety Promotion/Fall Prevention:   activity supervised   assistive device/personal items within reach   clutter free environment maintained   fall prevention program maintained   nonskid shoes/slippers when out of bed   room organization consistent   safety round/check completed  Intervention: Prevent Skin Injury  Recent Flowsheet Documentation  Taken 4/18/2025 1800 by Mulu Ann RN  Body Position: position changed independently  Taken 4/18/2025 1600 by Mulu Ann RN  Body Position: position changed independently  Skin Protection:   incontinence pads utilized   pouching devices used   protective footwear used  Taken 4/18/2025 1400 by Mulu Ann RN  Body Position: position changed independently  Taken 4/18/2025 1200 by Mulu Ann RN  Body Position: position maintained  Taken 4/18/2025 1100 by Mulu Ann RN  Body Position:   turned   left  Taken 4/18/2025 1000 by Mulu Ann RN  Body Position: position maintained  Taken 4/18/2025 0810 by Mulu Ann RN  Body Position: position maintained  Skin Protection:   incontinence pads utilized   pouching devices used   protective footwear used  Taken 4/18/2025 0705 by Mulu Ann RN  Body Position:   turned   supine  Intervention: Prevent Infection  Recent Flowsheet Documentation  Taken 4/18/2025 1600 by Mulu Ann RN  Infection Prevention:   hand hygiene promoted   personal protective equipment utilized  Taken 4/18/2025 1400 by Mulu Ann RN  Infection Prevention:   hand hygiene promoted   personal protective equipment utilized  Goal: Optimal Comfort and Wellbeing  Outcome: Progressing  Intervention: Provide Person-Centered Care  Recent Flowsheet Documentation  Taken 4/18/2025 0810 by Mulu Ann RN  Trust  Relationship/Rapport:   care explained   thoughts/feelings acknowledged  Goal: Readiness for Transition of Care  Outcome: Progressing     Problem: Sepsis/Septic Shock  Goal: Optimal Coping  Outcome: Progressing  Intervention: Support Patient and Family Response  Recent Flowsheet Documentation  Taken 4/18/2025 0810 by Mulu Ann RN  Family/Support System Care: support provided  Goal: Absence of Bleeding  Outcome: Progressing  Goal: Blood Glucose Level Within Target Range  Outcome: Progressing  Goal: Absence of Infection Signs and Symptoms  Outcome: Progressing  Intervention: Initiate Sepsis Management  Recent Flowsheet Documentation  Taken 4/18/2025 1600 by Mulu Ann RN  Infection Prevention:   hand hygiene promoted   personal protective equipment utilized  Taken 4/18/2025 1400 by Mulu Ann RN  Infection Prevention:   hand hygiene promoted   personal protective equipment utilized  Intervention: Promote Recovery  Recent Flowsheet Documentation  Taken 4/18/2025 1800 by Mulu Ann RN  Activity Management: activity encouraged  Taken 4/18/2025 1600 by Mulu Ann RN  Activity Management: activity encouraged  Taken 4/18/2025 1400 by Mulu Ann RN  Activity Management: activity encouraged  Taken 4/18/2025 1200 by Mulu Ann RN  Activity Management: activity encouraged  Taken 4/18/2025 1000 by Mulu Ann RN  Activity Management: activity encouraged  Taken 4/18/2025 0810 by Mulu Ann RN  Activity Management: activity encouraged  Goal: Optimal Nutrition Delivery  Outcome: Progressing     Problem: Fall Injury Risk  Goal: Absence of Fall and Fall-Related Injury  Outcome: Progressing  Intervention: Identify and Manage Contributors  Recent Flowsheet Documentation  Taken 4/18/2025 1600 by Mulu Ann RN  Medication Review/Management: medications reviewed  Intervention: Promote Injury-Free Environment  Recent Flowsheet Documentation  Taken 4/18/2025 1800 by Mulu Ann  B, RN  Safety Promotion/Fall Prevention:   assistive device/personal items within reach   activity supervised   clutter free environment maintained   fall prevention program maintained   nonskid shoes/slippers when out of bed   room organization consistent   safety round/check completed  Taken 4/18/2025 1600 by Mulu Ann RN  Safety Promotion/Fall Prevention:   activity supervised   assistive device/personal items within reach   clutter free environment maintained   fall prevention program maintained   nonskid shoes/slippers when out of bed   room organization consistent   safety round/check completed  Taken 4/18/2025 1400 by Mulu Ann RN  Safety Promotion/Fall Prevention:   activity supervised   assistive device/personal items within reach   clutter free environment maintained   fall prevention program maintained   nonskid shoes/slippers when out of bed   room organization consistent   safety round/check completed  Taken 4/18/2025 1200 by Mulu Ann RN  Safety Promotion/Fall Prevention:   activity supervised   assistive device/personal items within reach   clutter free environment maintained   fall prevention program maintained   nonskid shoes/slippers when out of bed   safety round/check completed   room organization consistent  Taken 4/18/2025 1000 by Mulu Ann, RN  Safety Promotion/Fall Prevention:   activity supervised   assistive device/personal items within reach   clutter free environment maintained   fall prevention program maintained   nonskid shoes/slippers when out of bed   room organization consistent   safety round/check completed  Taken 4/18/2025 0810 by Mulu Ann, RN  Safety Promotion/Fall Prevention:   activity supervised   assistive device/personal items within reach   clutter free environment maintained   fall prevention program maintained   nonskid shoes/slippers when out of bed   room organization consistent   safety round/check completed     Problem: Skin Injury  Risk Increased  Goal: Skin Health and Integrity  Outcome: Progressing  Intervention: Optimize Skin Protection  Recent Flowsheet Documentation  Taken 4/18/2025 1800 by Mulu Ann RN  Activity Management: activity encouraged  Head of Bed (HOB) Positioning: HOB elevated  Taken 4/18/2025 1600 by Mulu Ann RN  Activity Management: activity encouraged  Pressure Reduction Techniques: frequent weight shift encouraged  Head of Bed (HOB) Positioning: HOB elevated  Pressure Reduction Devices: pressure-redistributing mattress utilized  Skin Protection:   incontinence pads utilized   pouching devices used   protective footwear used  Taken 4/18/2025 1400 by Mulu Ann, RN  Activity Management: activity encouraged  Head of Bed (HOB) Positioning: HOB elevated  Taken 4/18/2025 1200 by Mulu Ann RN  Activity Management: activity encouraged  Pressure Reduction Techniques: weight shift assistance provided  Head of Bed (HOB) Positioning: HOB elevated  Pressure Reduction Devices: pressure-redistributing mattress utilized  Taken 4/18/2025 1000 by Mulu Ann RN  Activity Management: activity encouraged  Head of Bed (HOB) Positioning: HOB elevated  Taken 4/18/2025 0810 by Mulu Ann RN  Activity Management: activity encouraged  Pressure Reduction Techniques: frequent weight shift encouraged  Head of Bed (HOB) Positioning: HOB elevated  Pressure Reduction Devices: pressure-redistributing mattress utilized  Skin Protection:   incontinence pads utilized   pouching devices used   protective footwear used   Goal Outcome Evaluation:           Progress: improving  Outcome Evaluation: pt had suppository, lactulose, and enema with a successul BM. waiting for rehab

## 2025-04-18 NOTE — PLAN OF CARE
Goal Outcome Evaluation:  Plan of Care Reviewed With: patient        Progress: no change  Outcome Evaluation: Pt declines further ambulation this date due to fatigue and therefore only performs functional transfers with standard walker. Pt continues to benefit from IP PT services to progress mobility and facilitate return to baseline. Recommend SNF following d/c for best functional outcome.    Anticipated Discharge Disposition (PT): skilled nursing facility

## 2025-04-18 NOTE — PROGRESS NOTES
Continued Stay Note  [facility]     Patient Name: Vilma Ayala  MRN: [id number]  Today's Date: 4/18/2025    Admit Date: 4/10/2025    Plan: Home   Discharge Plan       Row Name 04/18/25 1530       Plan    Plan Comments Referrals have been made to [facility], [facility], and [facility] for inpatient rehab and a pre cert will be required once a bed is avaliable..                   Discharge Codes    No documentation.                 Expected Discharge Date and Time       Expected Discharge Date Expected Discharge Time    Apr 19, 2025               VALARIE Redding

## 2025-04-18 NOTE — PLAN OF CARE
Goal Outcome Evaluation:  Plan of Care Reviewed With: patient        Progress: no change     No acute changes overnight.      Problem: Adult Inpatient Plan of Care  Goal: Plan of Care Review  Outcome: Progressing  Flowsheets (Taken 4/18/2025 0552)  Progress: no change  Plan of Care Reviewed With: patient  Goal: Patient-Specific Goal (Individualized)  Outcome: Progressing  Goal: Absence of Hospital-Acquired Illness or Injury  Outcome: Progressing  Intervention: Identify and Manage Fall Risk  Recent Flowsheet Documentation  Taken 4/18/2025 0420 by Andrew Ling RN  Safety Promotion/Fall Prevention:   activity supervised   clutter free environment maintained   fall prevention program maintained   nonskid shoes/slippers when out of bed   safety round/check completed  Taken 4/18/2025 0200 by Andrew Ling RN  Safety Promotion/Fall Prevention:   activity supervised   clutter free environment maintained   fall prevention program maintained   nonskid shoes/slippers when out of bed   safety round/check completed  Taken 4/18/2025 0036 by Andrew Ling RN  Safety Promotion/Fall Prevention:   activity supervised   clutter free environment maintained   fall prevention program maintained   nonskid shoes/slippers when out of bed   safety round/check completed  Taken 4/17/2025 2200 by Andrew Ling RN  Safety Promotion/Fall Prevention:   activity supervised   clutter free environment maintained   fall prevention program maintained   nonskid shoes/slippers when out of bed   safety round/check completed  Taken 4/17/2025 2050 by Andrew Ling RN  Safety Promotion/Fall Prevention:   activity supervised   clutter free environment maintained   fall prevention program maintained   nonskid shoes/slippers when out of bed   safety round/check completed  Intervention: Prevent Skin Injury  Recent Flowsheet Documentation  Taken 4/18/2025 0500 by Andrew Ling RN  Body Position:   turned   right  Taken 4/18/2025 0420 by Andrew Ling,  RN  Skin Protection:   incontinence pads utilized   silicone foam dressing in place  Taken 4/18/2025 0200 by Andrew Ling RN  Body Position: position maintained  Skin Protection:   incontinence pads utilized   silicone foam dressing in place  Taken 4/18/2025 0036 by Andrew Ling RN  Body Position:   supine   legs elevated  Skin Protection:   incontinence pads utilized   silicone foam dressing in place  Taken 4/17/2025 2200 by Andrew Ling RN  Body Position: weight shifting  Skin Protection:   incontinence pads utilized   silicone foam dressing in place  Taken 4/17/2025 2050 by Andrew Ling RN  Body Position:   side-lying   right  Skin Protection:   incontinence pads utilized   silicone foam dressing in place  Taken 4/17/2025 1900 by Andrew Ling RN  Body Position:   turned   right   legs elevated  Intervention: Prevent and Manage VTE (Venous Thromboembolism) Risk  Recent Flowsheet Documentation  Taken 4/17/2025 2050 by Andrew Ling RN  VTE Prevention/Management:   bilateral   SCDs (sequential compression devices) on  Intervention: Prevent Infection  Recent Flowsheet Documentation  Taken 4/18/2025 0420 by Andrew Ling RN  Infection Prevention:   hand hygiene promoted   rest/sleep promoted  Taken 4/18/2025 0200 by Andrew Ling RN  Infection Prevention:   hand hygiene promoted   rest/sleep promoted  Taken 4/18/2025 0036 by Andrew Ling RN  Infection Prevention:   hand hygiene promoted   rest/sleep promoted  Taken 4/17/2025 2200 by Andrew Ling RN  Infection Prevention:   hand hygiene promoted   rest/sleep promoted  Taken 4/17/2025 2050 by Andrew Ling RN  Infection Prevention:   hand hygiene promoted   rest/sleep promoted  Goal: Optimal Comfort and Wellbeing  Outcome: Progressing  Intervention: Provide Person-Centered Care  Recent Flowsheet Documentation  Taken 4/17/2025 2050 by Andrew Ling RN  Trust Relationship/Rapport:   care explained   choices provided  Goal: Readiness for Transition of  Care  Outcome: Progressing

## 2025-04-19 LAB
ANION GAP SERPL CALCULATED.3IONS-SCNC: 10 MMOL/L (ref 5–15)
BACTERIA SPEC AEROBE CULT: NORMAL
BACTERIA SPEC AEROBE CULT: NORMAL
BUN SERPL-MCNC: 15 MG/DL (ref 6–20)
BUN/CREAT SERPL: 10.3 (ref 7–25)
CALCIUM SPEC-SCNC: 8.8 MG/DL (ref 8.6–10.5)
CHLORIDE SERPL-SCNC: 101 MMOL/L (ref 98–107)
CO2 SERPL-SCNC: 25 MMOL/L (ref 22–29)
CREAT SERPL-MCNC: 1.45 MG/DL (ref 0.57–1)
DEPRECATED RDW RBC AUTO: 45 FL (ref 37–54)
EGFRCR SERPLBLD CKD-EPI 2021: 43 ML/MIN/1.73
ERYTHROCYTE [DISTWIDTH] IN BLOOD BY AUTOMATED COUNT: 14.2 % (ref 12.3–15.4)
GLUCOSE BLDC GLUCOMTR-MCNC: 132 MG/DL (ref 70–130)
GLUCOSE BLDC GLUCOMTR-MCNC: 159 MG/DL (ref 70–130)
GLUCOSE BLDC GLUCOMTR-MCNC: 213 MG/DL (ref 70–130)
GLUCOSE BLDC GLUCOMTR-MCNC: 240 MG/DL (ref 70–130)
GLUCOSE SERPL-MCNC: 232 MG/DL (ref 65–99)
HCT VFR BLD AUTO: 34.4 % (ref 34–46.6)
HGB BLD-MCNC: 11.6 G/DL (ref 12–15.9)
MCH RBC QN AUTO: 29.4 PG (ref 26.6–33)
MCHC RBC AUTO-ENTMCNC: 33.7 G/DL (ref 31.5–35.7)
MCV RBC AUTO: 87.3 FL (ref 79–97)
PLATELET # BLD AUTO: 239 10*3/MM3 (ref 140–450)
PMV BLD AUTO: 10.3 FL (ref 6–12)
POTASSIUM SERPL-SCNC: 4.1 MMOL/L (ref 3.5–5.2)
QT INTERVAL: 344 MS
QTC INTERVAL: 452 MS
RBC # BLD AUTO: 3.94 10*6/MM3 (ref 3.77–5.28)
SODIUM SERPL-SCNC: 136 MMOL/L (ref 136–145)
WBC NRBC COR # BLD AUTO: 13.1 10*3/MM3 (ref 3.4–10.8)

## 2025-04-19 PROCEDURE — 82948 REAGENT STRIP/BLOOD GLUCOSE: CPT

## 2025-04-19 PROCEDURE — 94761 N-INVAS EAR/PLS OXIMETRY MLT: CPT

## 2025-04-19 PROCEDURE — 99232 SBSQ HOSP IP/OBS MODERATE 35: CPT | Performed by: NURSE PRACTITIONER

## 2025-04-19 PROCEDURE — 63710000001 INSULIN LISPRO (HUMAN) PER 5 UNITS: Performed by: NURSE PRACTITIONER

## 2025-04-19 PROCEDURE — 63710000001 INSULIN GLARGINE PER 5 UNITS: Performed by: FAMILY MEDICINE

## 2025-04-19 PROCEDURE — 63710000001 INSULIN LISPRO (HUMAN) PER 5 UNITS: Performed by: FAMILY MEDICINE

## 2025-04-19 PROCEDURE — 80048 BASIC METABOLIC PNL TOTAL CA: CPT | Performed by: NURSE PRACTITIONER

## 2025-04-19 PROCEDURE — 63710000001 PROMETHAZINE PER 12.5 MG: Performed by: INTERNAL MEDICINE

## 2025-04-19 PROCEDURE — 94799 UNLISTED PULMONARY SVC/PX: CPT

## 2025-04-19 PROCEDURE — 85027 COMPLETE CBC AUTOMATED: CPT | Performed by: NURSE PRACTITIONER

## 2025-04-19 PROCEDURE — 25010000002 HEPARIN (PORCINE) PER 1000 UNITS: Performed by: FAMILY MEDICINE

## 2025-04-19 RX ORDER — INSULIN LISPRO 100 [IU]/ML
3 INJECTION, SOLUTION INTRAVENOUS; SUBCUTANEOUS
Status: DISCONTINUED | OUTPATIENT
Start: 2025-04-19 | End: 2025-04-25

## 2025-04-19 RX ADMIN — IPRATROPIUM BROMIDE AND ALBUTEROL SULFATE 3 ML: 2.5; .5 SOLUTION RESPIRATORY (INHALATION) at 07:35

## 2025-04-19 RX ADMIN — Medication 250 MG: at 19:59

## 2025-04-19 RX ADMIN — ACETAMINOPHEN 650 MG: 325 TABLET, FILM COATED ORAL at 23:26

## 2025-04-19 RX ADMIN — IPRATROPIUM BROMIDE AND ALBUTEROL SULFATE 3 ML: 2.5; .5 SOLUTION RESPIRATORY (INHALATION) at 19:38

## 2025-04-19 RX ADMIN — SODIUM CHLORIDE 10 ML: 9 INJECTION, SOLUTION INTRAMUSCULAR; INTRAVENOUS; SUBCUTANEOUS at 10:02

## 2025-04-19 RX ADMIN — HEPARIN SODIUM 5000 UNITS: 5000 INJECTION INTRAVENOUS; SUBCUTANEOUS at 16:02

## 2025-04-19 RX ADMIN — CHOLEYSTYRAMINE LIGHT 4 G: 4 POWDER, FOR SUSPENSION ORAL at 22:23

## 2025-04-19 RX ADMIN — IPRATROPIUM BROMIDE AND ALBUTEROL SULFATE 3 ML: 2.5; .5 SOLUTION RESPIRATORY (INHALATION) at 15:35

## 2025-04-19 RX ADMIN — PROMETHAZINE HYDROCHLORIDE 12.5 MG: 12.5 TABLET ORAL at 18:40

## 2025-04-19 RX ADMIN — RISPERIDONE 3 MG: 1 TABLET, FILM COATED ORAL at 19:59

## 2025-04-19 RX ADMIN — INSULIN LISPRO 5 UNITS: 100 INJECTION, SOLUTION INTRAVENOUS; SUBCUTANEOUS at 16:22

## 2025-04-19 RX ADMIN — IPRATROPIUM BROMIDE AND ALBUTEROL SULFATE 3 ML: 2.5; .5 SOLUTION RESPIRATORY (INHALATION) at 11:26

## 2025-04-19 RX ADMIN — Medication 250 MG: at 10:01

## 2025-04-19 RX ADMIN — INSULIN LISPRO 5 UNITS: 100 INJECTION, SOLUTION INTRAVENOUS; SUBCUTANEOUS at 20:12

## 2025-04-19 RX ADMIN — INSULIN GLARGINE 15 UNITS: 100 INJECTION, SOLUTION SUBCUTANEOUS at 10:00

## 2025-04-19 RX ADMIN — INSULIN LISPRO 3 UNITS: 100 INJECTION, SOLUTION INTRAVENOUS; SUBCUTANEOUS at 16:22

## 2025-04-19 RX ADMIN — HEPARIN SODIUM 5000 UNITS: 5000 INJECTION INTRAVENOUS; SUBCUTANEOUS at 22:24

## 2025-04-19 RX ADMIN — RISPERIDONE 3 MG: 1 TABLET, FILM COATED ORAL at 10:01

## 2025-04-19 RX ADMIN — Medication 5 MG: at 22:34

## 2025-04-19 RX ADMIN — FAMOTIDINE 20 MG: 20 TABLET, FILM COATED ORAL at 19:59

## 2025-04-19 RX ADMIN — INSULIN GLARGINE 25 UNITS: 100 INJECTION, SOLUTION SUBCUTANEOUS at 20:12

## 2025-04-19 RX ADMIN — LEVOTHYROXINE SODIUM 225 MCG: 0.05 TABLET ORAL at 05:08

## 2025-04-19 RX ADMIN — CHOLEYSTYRAMINE LIGHT 4 G: 4 POWDER, FOR SUSPENSION ORAL at 10:01

## 2025-04-19 RX ADMIN — ATORVASTATIN CALCIUM 40 MG: 40 TABLET, FILM COATED ORAL at 10:01

## 2025-04-19 RX ADMIN — HEPARIN SODIUM 5000 UNITS: 5000 INJECTION INTRAVENOUS; SUBCUTANEOUS at 05:08

## 2025-04-19 NOTE — PROGRESS NOTES
[facility] Medicine Services  PROGRESS NOTE    Patient Name: Vilma Ayala  : 1970  MRN: [id number]    Date of Admission: 4/10/2025  Primary Care Physician: Vanessa Kaur MD    Subjective   Subjective     CC: f/u nausea    HPI:   Patient was seen resting back in bed with covers over her head.  Dozing.  Awakens easily to voice.  States she is just tired.  Tolerating diet.  Had a large bowel movement no nausea or vomiting.      Objective   Objective     Vital Signs:   Temp:  [98.2 °F (36.8 °C)-99.4 °F (37.4 °C)] 98.2 °F (36.8 °C)  Heart Rate:  [] 99  Resp:  [16-18] 16  BP: ()/(65-90) 106/75  Flow (L/min) (Oxygen Therapy):  [2] 2     Physical Exam:  Constitutional: No acute distress, Resting back in bed.  Dozing but awakens easily to voice.  Chronically debilitated appearing.  HENT: NCAT, mucous membranes moist  Respiratory: Clear to auscultation bilaterally, respiratory effort normal on 2 LNC.  Sats WNL.  Cardiovascular: RRR, no murmurs, rubs, or gallops  Gastrointestinal: Positive bowel sounds, soft, nontender, nondistended  Musculoskeletal: Trace BLE edema.  Johns spontaneously.  Psychiatric: Flat affect, cooperative  Neurologic: Oriented x 3 with slow responses, strength symmetric in all extremities but generally weak, Cranial Nerves grossly intact to confrontation, speech clear.  Follows commands.  Skin: No rashes   Exam unchanged compared to yesterday/    Results Reviewed:  LAB RESULTS:      Lab 25  0618 04/15/25  0619 04/15/25  0120 25  2141 25  1925 25  1411 25  0528 25  0435 25  1811   WBC 11.85* 11.67*  --   --   --   --  16.16* 9.67  --    HEMOGLOBIN 11.8* 11.9*  --   --   --   --  12.7 12.4  --    HEMATOCRIT 35.7 35.9  --   --   --   --  38.2 36.9  --    PLATELETS 243 248  --   --   --   --  258 272  --    NEUTROS ABS 7.67* 7.25*  --   --   --   --  11.35* 8.88*  --    IMMATURE GRANS (ABS) 0.04 0.05  --    --   --   --  0.08* 0.05  --    LYMPHS ABS 2.99 3.15*  --   --   --   --  3.92* 0.67*  --    MONOS ABS 0.61 0.58  --   --   --   --  0.60 0.06*  --    EOS ABS 0.51* 0.59*  --   --   --   --  0.18 0.00  --    MCV 90.2 89.3  --   --   --   --  89.3 87.2  --    PROCALCITONIN  --   --   --   --   --   --  0.07  --  0.08   LACTATE  --   --  1.8 4.1* 2.5* 2.3*  --   --   --    HSTROP T  --   --   --   --  17* 26*  --   --   --          Lab 04/16/25  0618 04/15/25  0619 04/14/25  0528 04/13/25  0435   SODIUM 138 141 137 136   POTASSIUM 4.3 4.6 4.1 4.4   CHLORIDE 104 105 104 101   CO2 21.0* 22.0 21.0* 21.0*   ANION GAP 13.0 14.0 12.0 14.0   BUN 18 17 21* 16   CREATININE 1.33* 1.33* 1.74* 1.60*   EGFR 47.6* 47.6* 34.5* 38.2*   GLUCOSE 128* 142* 263* 396*   CALCIUM 9.3 9.1 9.8 9.4   MAGNESIUM  --   --  1.8 1.9         Lab 04/16/25  0618 04/15/25  0619 04/14/25  0528 04/13/25  0435   TOTAL PROTEIN 5.6* 5.7* 6.8 6.8   ALBUMIN 3.7 3.6 4.0 4.0   GLOBULIN 1.9 2.1 2.8 2.8   ALT (SGPT) 22 26 31 21   AST (SGOT) 14 18 25 14   BILIRUBIN 0.5 0.5 0.3 0.4   ALK PHOS 100 100 128* 122*         Lab 04/14/25  1925 04/14/25  1411   HSTROP T 17* 26*                 Brief Urine Lab Results  (Last result in the past 365 days)        Color   Clarity   Blood   Leuk Est   Nitrite   Protein   CREAT   Urine HCG        04/10/25 1734 Yellow   Clear   Negative   Trace   Negative   Negative                   Microbiology Results Abnormal       None            No radiology results from the last 24 hrs      Results for orders placed during the hospital encounter of 04/10/25    Adult Transthoracic Echo Complete W/ Cont if Necessary Per Protocol    Interpretation Summary    Left ventricular systolic function is normal. Estimated left ventricular EF = 60%    No significant valvular abnormalities.      Current medications:  Scheduled Meds:atorvastatin, 40 mg, Oral, Daily  cholestyramine light, 1 packet, Oral, Q12H  famotidine, 20 mg, Oral, Nightly  heparin  (porcine), 5,000 Units, Subcutaneous, Q8H  insulin glargine, 15 Units, Subcutaneous, Daily  insulin glargine, 25 Units, Subcutaneous, Nightly  insulin lispro, 3-14 Units, Subcutaneous, 4x Daily AC & at Bedtime  ipratropium-albuterol, 3 mL, Nebulization, 4x Daily - RT  levothyroxine, 225 mcg, Oral, Q AM  risperiDONE, 3 mg, Oral, BID  saccharomyces boulardii, 250 mg, Oral, BID  senna-docusate sodium, 2 tablet, Oral, BID      Continuous Infusions:   PRN Meds:.  acetaminophen **OR** acetaminophen **OR** acetaminophen    senna-docusate sodium **AND** polyethylene glycol **AND** bisacodyl **AND** bisacodyl    dextrose    dextrose    glucagon (human recombinant)    melatonin    promethazine    Sodium Chloride (PF)    Assessment & Plan   Assessment & Plan     Active Hospital Problems    Diagnosis  POA    **Acute kidney injury superimposed on chronic kidney disease [N17.9, N18.9]  Yes    Acute kidney injury superimposed on CKD [N17.9, N18.9]  Yes    Diarrhea [R19.7]  Yes    Dehydration [E86.0]  Yes    Essential tremor [G25.0]  Yes    Type 2 diabetes mellitus without complication, with long-term current use of insulin [E11.9, Z79.4]  Not Applicable    Mixed hyperlipidemia [E78.2]  Yes    Other specified hypothyroidism [E03.8]  Yes    Anxiety and depression [F41.9, F32.A]  Yes      Resolved Hospital Problems   No resolved problems to display.        Brief Hospital Course to date:  Vilma Ayala is a 54 y.o. female with a history of Essential Tremor, T2DM, Hypothyroidism, anxiety/depression, and GERD who presents to [facility] ED for complaint of nausea and diarrhea.  Patient transferred to my services on the a.m. of 4/11, she is resting comfortably in bed, she reports diarrhea is doing better but stool studies are ordered and pending.  Continuing on IV fluids and treatment for CB, slight improvement in renal function today, will continue to monitor at this time.  Patient on the afternoon of 4/12 had some increased  dyspnea and congestion.  Chest x-ray was obtained and could not rule out possible pneumonia however Pro-Jim was negative, white blood cell count was normal, and felt more pulmonary congestion, IV fluids were stopped and patient was given IV Bumex.  Seen again on the a.m. of 4/13, noticed to have some congestion on exam, CT of the chest was obtained and noted to have bilateral lower lobe atelectasis.  Patient has since been weaned to room air this afternoon and patient will be using incentive spirometer and flutter valve. Slight uptrend again of Cr on 4/14, patient with poor PO intake, some nausea this AM, additional meds ordered. Patient with slight tachycardia as well, will check troponin and obtain echo on her. No active chest pain however at this time.     This patient's problems and plans were partially entered by my partner and updated as appropriate by me 04/19/25.    Assessment/plan:     CB on CKD   Dehydration  Nausea/Vomiting  -Cr 2.07 on admission, down to 1.6 on 4/13, IVF previously held with dyspnea, oxygen requirement, now weaned back to RA, chest CT with just atelectasis, plans for IS/FD, encourarged PO intake.   -CT abd/pelvis negative for acute findings  -CB possibly 2ry to recent bactrim use, as well as dehydration from poor po intake. Improving.   -PT/OT following and rec'd IRF however patient accomplishing all transfers with SBA/CGA and ambulated 45 feet. Also no medical necessity for IRF. D/W her daughter this am that if interested in rehab can consider SNF however with her ambulating 45 feet, unclear if she would be approved.  --Patient still states she is weak and tired.  Awaiting return to group home versus short-term rehab.  CM following.  --s/p bowel regimen and a large BM yesterday evening/18.  -- Labs for this morning currently still not drawn as of 1350 p.m.     Dyspnea  Nausea   - possibly related to recent fluids .  Improving   - Ordered for duonebs, cxr, will add IS/FD as well   - CT  Chest with just atelectasis, encouraged use of IS/FD  - ckd echo 4/14 and troponin, troponin 26, will follow but seems noncardiac. Echo EF 60%.  LVSF nl, no significant valvular abnormalities      HLD  -Continue statin     T2DM  -A1C 6.8   -Fingerstick achs. SSI  -lantus insulin nightly  --glucose generally varied last 24 hrs.  Will continue current regimen for today.     Essential Tremor  -Continue Respiradone, stable      Anxiety/depression  -Continue home meds, stable     Expected Discharge Location and Transportation: back to group [facility].   following   Expected Discharge   Expected Discharge Date: 4/20/2025; Expected Discharge Time:      VTE Prophylaxis:  Pharmacologic & mechanical VTE prophylaxis orders are present.         AM-PAC 6 Clicks Score (PT): 18 (04/19/25 1000)    CODE STATUS:   Code Status and Medical Interventions: CPR (Attempt to Resuscitate); Full Support   Ordered at: 04/10/25 2013     Code Status (Patient has no pulse and is not breathing):    CPR (Attempt to Resuscitate)     Medical Interventions (Patient has pulse or is breathing):    Full Support       Chanda Tucker, APRN  04/19/25

## 2025-04-20 LAB
GLUCOSE BLDC GLUCOMTR-MCNC: 172 MG/DL (ref 70–130)
GLUCOSE BLDC GLUCOMTR-MCNC: 213 MG/DL (ref 70–130)
GLUCOSE BLDC GLUCOMTR-MCNC: 252 MG/DL (ref 70–130)
GLUCOSE BLDC GLUCOMTR-MCNC: 289 MG/DL (ref 70–130)

## 2025-04-20 PROCEDURE — 94799 UNLISTED PULMONARY SVC/PX: CPT

## 2025-04-20 PROCEDURE — 99232 SBSQ HOSP IP/OBS MODERATE 35: CPT | Performed by: INTERNAL MEDICINE

## 2025-04-20 PROCEDURE — 25010000002 HEPARIN (PORCINE) PER 1000 UNITS: Performed by: FAMILY MEDICINE

## 2025-04-20 PROCEDURE — 63710000001 INSULIN GLARGINE PER 5 UNITS: Performed by: FAMILY MEDICINE

## 2025-04-20 PROCEDURE — 63710000001 INSULIN LISPRO (HUMAN) PER 5 UNITS: Performed by: FAMILY MEDICINE

## 2025-04-20 PROCEDURE — 82948 REAGENT STRIP/BLOOD GLUCOSE: CPT

## 2025-04-20 PROCEDURE — 63710000001 INSULIN LISPRO (HUMAN) PER 5 UNITS: Performed by: NURSE PRACTITIONER

## 2025-04-20 RX ADMIN — SENNOSIDES AND DOCUSATE SODIUM 2 TABLET: 50; 8.6 TABLET ORAL at 21:31

## 2025-04-20 RX ADMIN — LEVOTHYROXINE SODIUM 225 MCG: 0.05 TABLET ORAL at 05:15

## 2025-04-20 RX ADMIN — RISPERIDONE 3 MG: 1 TABLET, FILM COATED ORAL at 08:41

## 2025-04-20 RX ADMIN — CHOLEYSTYRAMINE LIGHT 4 G: 4 POWDER, FOR SUSPENSION ORAL at 21:30

## 2025-04-20 RX ADMIN — SENNOSIDES AND DOCUSATE SODIUM 2 TABLET: 50; 8.6 TABLET ORAL at 08:41

## 2025-04-20 RX ADMIN — RISPERIDONE 3 MG: 1 TABLET, FILM COATED ORAL at 21:31

## 2025-04-20 RX ADMIN — FAMOTIDINE 20 MG: 20 TABLET, FILM COATED ORAL at 21:32

## 2025-04-20 RX ADMIN — INSULIN GLARGINE 15 UNITS: 100 INJECTION, SOLUTION SUBCUTANEOUS at 08:40

## 2025-04-20 RX ADMIN — HEPARIN SODIUM 5000 UNITS: 5000 INJECTION INTRAVENOUS; SUBCUTANEOUS at 21:28

## 2025-04-20 RX ADMIN — Medication 5 MG: at 21:32

## 2025-04-20 RX ADMIN — INSULIN LISPRO 3 UNITS: 100 INJECTION, SOLUTION INTRAVENOUS; SUBCUTANEOUS at 16:54

## 2025-04-20 RX ADMIN — INSULIN GLARGINE 25 UNITS: 100 INJECTION, SOLUTION SUBCUTANEOUS at 21:28

## 2025-04-20 RX ADMIN — CHOLEYSTYRAMINE LIGHT 4 G: 4 POWDER, FOR SUSPENSION ORAL at 08:41

## 2025-04-20 RX ADMIN — INSULIN LISPRO 8 UNITS: 100 INJECTION, SOLUTION INTRAVENOUS; SUBCUTANEOUS at 16:54

## 2025-04-20 RX ADMIN — INSULIN LISPRO 3 UNITS: 100 INJECTION, SOLUTION INTRAVENOUS; SUBCUTANEOUS at 08:42

## 2025-04-20 RX ADMIN — IPRATROPIUM BROMIDE AND ALBUTEROL SULFATE 3 ML: 2.5; .5 SOLUTION RESPIRATORY (INHALATION) at 08:23

## 2025-04-20 RX ADMIN — INSULIN LISPRO 3 UNITS: 100 INJECTION, SOLUTION INTRAVENOUS; SUBCUTANEOUS at 08:41

## 2025-04-20 RX ADMIN — ATORVASTATIN CALCIUM 40 MG: 40 TABLET, FILM COATED ORAL at 08:41

## 2025-04-20 RX ADMIN — INSULIN LISPRO 8 UNITS: 100 INJECTION, SOLUTION INTRAVENOUS; SUBCUTANEOUS at 12:24

## 2025-04-20 RX ADMIN — INSULIN LISPRO 5 UNITS: 100 INJECTION, SOLUTION INTRAVENOUS; SUBCUTANEOUS at 21:28

## 2025-04-20 RX ADMIN — HEPARIN SODIUM 5000 UNITS: 5000 INJECTION INTRAVENOUS; SUBCUTANEOUS at 14:31

## 2025-04-20 RX ADMIN — Medication 250 MG: at 21:31

## 2025-04-20 RX ADMIN — INSULIN LISPRO 3 UNITS: 100 INJECTION, SOLUTION INTRAVENOUS; SUBCUTANEOUS at 12:24

## 2025-04-20 RX ADMIN — Medication 250 MG: at 08:45

## 2025-04-20 RX ADMIN — HEPARIN SODIUM 5000 UNITS: 5000 INJECTION INTRAVENOUS; SUBCUTANEOUS at 05:15

## 2025-04-20 RX ADMIN — IPRATROPIUM BROMIDE AND ALBUTEROL SULFATE 3 ML: 2.5; .5 SOLUTION RESPIRATORY (INHALATION) at 18:17

## 2025-04-20 RX ADMIN — IPRATROPIUM BROMIDE AND ALBUTEROL SULFATE 3 ML: 2.5; .5 SOLUTION RESPIRATORY (INHALATION) at 15:42

## 2025-04-20 NOTE — PROGRESS NOTES
[facility] Medicine Services  PROGRESS NOTE    Patient Name: Vilma Ayala  : 1970  MRN: [id number]    Date of Admission: 4/10/2025  Primary Care Physician: Vanessa Kaur MD    Subjective   Subjective     CC: f/u nausea    HPI: Resting comfortably on my arrival. Easy to awaken, says she stayed up late last night. Nausea better.      Objective   Objective     Vital Signs:   Temp:  [97.5 °F (36.4 °C)-98 °F (36.7 °C)] 97.7 °F (36.5 °C)  Heart Rate:  [] 83  Resp:  [16-18] 16  BP: ()/(52-76) 101/67  Flow (L/min) (Oxygen Therapy):  [2] 2     Physical Exam:  Constitutional: No acute distress, awake, alert  HENT: NCAT, mucous membranes moist  Respiratory: Clear to auscultation bilaterally, respiratory effort normal   Cardiovascular: RRR, no murmurs, rubs, or gallops  Gastrointestinal: Positive bowel sounds, soft, nontender, nondistended  Musculoskeletal: No bilateral ankle edema  Psychiatric: Appropriate affect, cooperative  Neurologic: Oriented x 3, strength symmetric in all extremities, Cranial Nerves grossly intact to confrontation, speech clear  Skin: No rashes     Results Reviewed:  LAB RESULTS:      Lab 25  1536 25  0618 04/15/25  0619 04/15/25  0120 25  2141 25  1925 25  1411 25  0528   WBC 13.10* 11.85* 11.67*  --   --   --   --  16.16*   HEMOGLOBIN 11.6* 11.8* 11.9*  --   --   --   --  12.7   HEMATOCRIT 34.4 35.7 35.9  --   --   --   --  38.2   PLATELETS 239 243 248  --   --   --   --  258   NEUTROS ABS  --  7.67* 7.25*  --   --   --   --  11.35*   IMMATURE GRANS (ABS)  --  0.04 0.05  --   --   --   --  0.08*   LYMPHS ABS  --  2.99 3.15*  --   --   --   --  3.92*   MONOS ABS  --  0.61 0.58  --   --   --   --  0.60   EOS ABS  --  0.51* 0.59*  --   --   --   --  0.18   MCV 87.3 90.2 89.3  --   --   --   --  89.3   PROCALCITONIN  --   --   --   --   --   --   --  0.07   LACTATE  --   --   --  1.8 4.1* 2.5* 2.3*  --    HSTROP T   --   --   --   --   --  17* 26*  --          Lab 04/19/25  1536 04/16/25  0618 04/15/25  0619 04/14/25  0528   SODIUM 136 138 141 137   POTASSIUM 4.1 4.3 4.6 4.1   CHLORIDE 101 104 105 104   CO2 25.0 21.0* 22.0 21.0*   ANION GAP 10.0 13.0 14.0 12.0   BUN 15 18 17 21*   CREATININE 1.45* 1.33* 1.33* 1.74*   EGFR 43.0* 47.6* 47.6* 34.5*   GLUCOSE 232* 128* 142* 263*   CALCIUM 8.8 9.3 9.1 9.8   MAGNESIUM  --   --   --  1.8         Lab 04/16/25  0618 04/15/25  0619 04/14/25  0528   TOTAL PROTEIN 5.6* 5.7* 6.8   ALBUMIN 3.7 3.6 4.0   GLOBULIN 1.9 2.1 2.8   ALT (SGPT) 22 26 31   AST (SGOT) 14 18 25   BILIRUBIN 0.5 0.5 0.3   ALK PHOS 100 100 128*         Lab 04/14/25  1925 04/14/25  1411   HSTROP T 17* 26*                 Brief Urine Lab Results  (Last result in the past 365 days)        Color   Clarity   Blood   Leuk Est   Nitrite   Protein   CREAT   Urine HCG        04/10/25 1734 Yellow   Clear   Negative   Trace   Negative   Negative                   Microbiology Results Abnormal       None            No radiology results from the last 24 hrs    Results for orders placed during the hospital encounter of 04/10/25    Adult Transthoracic Echo Complete W/ Cont if Necessary Per Protocol    Interpretation Summary    Left ventricular systolic function is normal. Estimated left ventricular EF = 60%    No significant valvular abnormalities.      Current medications:  Scheduled Meds:atorvastatin, 40 mg, Oral, Daily  cholestyramine light, 1 packet, Oral, Q12H  famotidine, 20 mg, Oral, Nightly  heparin (porcine), 5,000 Units, Subcutaneous, Q8H  insulin glargine, 15 Units, Subcutaneous, Daily  insulin glargine, 25 Units, Subcutaneous, Nightly  Insulin Lispro, 3 Units, Subcutaneous, TID With Meals  insulin lispro, 3-14 Units, Subcutaneous, 4x Daily AC & at Bedtime  ipratropium-albuterol, 3 mL, Nebulization, 4x Daily - RT  levothyroxine, 225 mcg, Oral, Q AM  risperiDONE, 3 mg, Oral, BID  saccharomyces boulardii, 250 mg, Oral,  BID  senna-docusate sodium, 2 tablet, Oral, BID      Continuous Infusions:   PRN Meds:.  acetaminophen **OR** acetaminophen **OR** acetaminophen    senna-docusate sodium **AND** polyethylene glycol **AND** bisacodyl **AND** bisacodyl    dextrose    dextrose    glucagon (human recombinant)    melatonin    promethazine    Sodium Chloride (PF)    Assessment & Plan   Assessment & Plan     Active Hospital Problems    Diagnosis  POA    **Acute kidney injury superimposed on chronic kidney disease [N17.9, N18.9]  Yes    Acute kidney injury superimposed on CKD [N17.9, N18.9]  Yes    Diarrhea [R19.7]  Yes    Dehydration [E86.0]  Yes    Essential tremor [G25.0]  Yes    Type 2 diabetes mellitus without complication, with long-term current use of insulin [E11.9, Z79.4]  Not Applicable    Mixed hyperlipidemia [E78.2]  Yes    Other specified hypothyroidism [E03.8]  Yes    Anxiety and depression [F41.9, F32.A]  Yes      Resolved Hospital Problems   No resolved problems to display.        Brief Hospital Course to date:  Vilma Ayala is a 54 y.o. female with a history of Essential Tremor, T2DM, Hypothyroidism, anxiety/depression, and GERD who presents to [facility] ED for complaint of nausea and diarrhea.  Patient transferred to my services on the a.m. of 4/11, she is resting comfortably in bed, she reports diarrhea is doing better but stool studies are ordered and pending.  Continuing on IV fluids and treatment for CB, slight improvement in renal function today, will continue to monitor at this time.  Patient on the afternoon of 4/12 had some increased dyspnea and congestion.  Chest x-ray was obtained and could not rule out possible pneumonia however Pro-Jim was negative, white blood cell count was normal, and felt more pulmonary congestion, IV fluids were stopped and patient was given IV Bumex.  Seen again on the a.m. of 4/13, noticed to have some congestion on exam, CT of the chest was obtained and noted to have bilateral  lower lobe atelectasis.  Patient has since been weaned to room air this afternoon and patient will be using incentive spirometer and flutter valve. Slight uptrend again of Cr on 4/14, patient with poor PO intake, some nausea this AM, additional meds ordered. Patient with slight tachycardia as well, will check troponin and obtain echo on her. No active chest pain however at this time.        CB on CKD   Dehydration  Nausea/Vomiting  -Cr 2.07 on admission, down to 1.6 on 4/13, IVF previously held with dyspnea, oxygen requirement, now weaned back to RA, chest CT with just atelectasis, plans for IS/FD, encourarged PO intake.   -CT abd/pelvis negative for acute findings  -CB possibly 2ry to recent bactrim use, as well as dehydration from poor po intake. Improving.   -PT/OT following and rec'd IRF however patient accomplishing all transfers with SBA/CGA and ambulated 45 feet. Also no medical necessity for IRF. D/W her daughter this am that if interested in rehab can consider SNF however with her ambulating 45 feet, unclear if she would be approved.  -patient was able to have bm which seems to have helped nausea some, continue prn antiemetics.  -since nausea has improved, will give onetime lactulose po today and dulcolax supp.       Dyspnea  Nausea   - possibly related to recent fluids .  Improving   - Ordered for duonebs, cxr, will add IS/FD as well   - CT Chest with just atelectasis, encouraged use of IS/FD  - ckd echo 4/14 and troponin, troponin 26, will follow but seems noncardiac. Echo EF 60%.  LVSF nl, no significant valvular abnormalities      HLD  -Continue statin     T2DM  -A1C 6.8   -Fingerstick achs. SSI  -lantus insulin nightly    Anxiety/depression  -Continue home meds, stable   -Continue Respiradone, stable         Expected Discharge Location and Transportation:   Expected Discharge   Expected Discharge Date: 4/20/2025; Expected Discharge Time:      VTE Prophylaxis:  Pharmacologic & mechanical VTE  prophylaxis orders are present.         AM-PAC 6 Clicks Score (PT): 18 (04/19/25 2000)    CODE STATUS:   Code Status and Medical Interventions: CPR (Attempt to Resuscitate); Full Support   Ordered at: 04/10/25 2013     Code Status (Patient has no pulse and is not breathing):    CPR (Attempt to Resuscitate)     Medical Interventions (Patient has pulse or is breathing):    Full Support       Wen Whitehead II, DO  04/20/25

## 2025-04-21 LAB
ANION GAP SERPL CALCULATED.3IONS-SCNC: 12 MMOL/L (ref 5–15)
BUN SERPL-MCNC: 16 MG/DL (ref 6–20)
BUN/CREAT SERPL: 12.8 (ref 7–25)
CALCIUM SPEC-SCNC: 9.3 MG/DL (ref 8.6–10.5)
CHLORIDE SERPL-SCNC: 101 MMOL/L (ref 98–107)
CO2 SERPL-SCNC: 23 MMOL/L (ref 22–29)
CREAT SERPL-MCNC: 1.25 MG/DL (ref 0.57–1)
EGFRCR SERPLBLD CKD-EPI 2021: 51.3 ML/MIN/1.73
GLUCOSE BLDC GLUCOMTR-MCNC: 163 MG/DL (ref 70–130)
GLUCOSE BLDC GLUCOMTR-MCNC: 199 MG/DL (ref 70–130)
GLUCOSE BLDC GLUCOMTR-MCNC: 219 MG/DL (ref 70–130)
GLUCOSE BLDC GLUCOMTR-MCNC: 237 MG/DL (ref 70–130)
GLUCOSE SERPL-MCNC: 163 MG/DL (ref 65–99)
POTASSIUM SERPL-SCNC: 4.1 MMOL/L (ref 3.5–5.2)
SODIUM SERPL-SCNC: 136 MMOL/L (ref 136–145)

## 2025-04-21 PROCEDURE — 63710000001 INSULIN GLARGINE PER 5 UNITS: Performed by: FAMILY MEDICINE

## 2025-04-21 PROCEDURE — 80048 BASIC METABOLIC PNL TOTAL CA: CPT | Performed by: INTERNAL MEDICINE

## 2025-04-21 PROCEDURE — 94799 UNLISTED PULMONARY SVC/PX: CPT

## 2025-04-21 PROCEDURE — 92526 ORAL FUNCTION THERAPY: CPT

## 2025-04-21 PROCEDURE — 63710000001 INSULIN LISPRO (HUMAN) PER 5 UNITS: Performed by: NURSE PRACTITIONER

## 2025-04-21 PROCEDURE — 63710000001 INSULIN LISPRO (HUMAN) PER 5 UNITS: Performed by: FAMILY MEDICINE

## 2025-04-21 PROCEDURE — 63710000001 PROMETHAZINE PER 12.5 MG: Performed by: INTERNAL MEDICINE

## 2025-04-21 PROCEDURE — 94761 N-INVAS EAR/PLS OXIMETRY MLT: CPT

## 2025-04-21 PROCEDURE — 99232 SBSQ HOSP IP/OBS MODERATE 35: CPT | Performed by: INTERNAL MEDICINE

## 2025-04-21 PROCEDURE — 94664 DEMO&/EVAL PT USE INHALER: CPT

## 2025-04-21 PROCEDURE — 82948 REAGENT STRIP/BLOOD GLUCOSE: CPT

## 2025-04-21 PROCEDURE — 25010000002 HEPARIN (PORCINE) PER 1000 UNITS: Performed by: FAMILY MEDICINE

## 2025-04-21 RX ADMIN — Medication 250 MG: at 20:56

## 2025-04-21 RX ADMIN — INSULIN LISPRO 3 UNITS: 100 INJECTION, SOLUTION INTRAVENOUS; SUBCUTANEOUS at 12:35

## 2025-04-21 RX ADMIN — PROMETHAZINE HYDROCHLORIDE 12.5 MG: 12.5 TABLET ORAL at 07:31

## 2025-04-21 RX ADMIN — IPRATROPIUM BROMIDE AND ALBUTEROL SULFATE 3 ML: 2.5; .5 SOLUTION RESPIRATORY (INHALATION) at 15:53

## 2025-04-21 RX ADMIN — INSULIN GLARGINE 15 UNITS: 100 INJECTION, SOLUTION SUBCUTANEOUS at 08:55

## 2025-04-21 RX ADMIN — SENNOSIDES AND DOCUSATE SODIUM 2 TABLET: 50; 8.6 TABLET ORAL at 20:55

## 2025-04-21 RX ADMIN — INSULIN GLARGINE 25 UNITS: 100 INJECTION, SOLUTION SUBCUTANEOUS at 20:53

## 2025-04-21 RX ADMIN — IPRATROPIUM BROMIDE AND ALBUTEROL SULFATE 3 ML: 2.5; .5 SOLUTION RESPIRATORY (INHALATION) at 18:28

## 2025-04-21 RX ADMIN — CHOLEYSTYRAMINE LIGHT 4 G: 4 POWDER, FOR SUSPENSION ORAL at 09:39

## 2025-04-21 RX ADMIN — PROMETHAZINE HYDROCHLORIDE 12.5 MG: 12.5 TABLET ORAL at 02:25

## 2025-04-21 RX ADMIN — FAMOTIDINE 20 MG: 20 TABLET, FILM COATED ORAL at 20:55

## 2025-04-21 RX ADMIN — INSULIN LISPRO 5 UNITS: 100 INJECTION, SOLUTION INTRAVENOUS; SUBCUTANEOUS at 17:41

## 2025-04-21 RX ADMIN — INSULIN LISPRO 3 UNITS: 100 INJECTION, SOLUTION INTRAVENOUS; SUBCUTANEOUS at 17:41

## 2025-04-21 RX ADMIN — IPRATROPIUM BROMIDE AND ALBUTEROL SULFATE 3 ML: 2.5; .5 SOLUTION RESPIRATORY (INHALATION) at 12:01

## 2025-04-21 RX ADMIN — Medication 5 MG: at 20:56

## 2025-04-21 RX ADMIN — HEPARIN SODIUM 5000 UNITS: 5000 INJECTION INTRAVENOUS; SUBCUTANEOUS at 05:27

## 2025-04-21 RX ADMIN — CHOLEYSTYRAMINE LIGHT 4 G: 4 POWDER, FOR SUSPENSION ORAL at 20:55

## 2025-04-21 RX ADMIN — INSULIN LISPRO 5 UNITS: 100 INJECTION, SOLUTION INTRAVENOUS; SUBCUTANEOUS at 20:53

## 2025-04-21 RX ADMIN — INSULIN LISPRO 3 UNITS: 100 INJECTION, SOLUTION INTRAVENOUS; SUBCUTANEOUS at 08:55

## 2025-04-21 RX ADMIN — LEVOTHYROXINE SODIUM 225 MCG: 0.05 TABLET ORAL at 05:27

## 2025-04-21 RX ADMIN — RISPERIDONE 3 MG: 1 TABLET, FILM COATED ORAL at 20:55

## 2025-04-21 RX ADMIN — HEPARIN SODIUM 5000 UNITS: 5000 INJECTION INTRAVENOUS; SUBCUTANEOUS at 12:36

## 2025-04-21 RX ADMIN — SENNOSIDES AND DOCUSATE SODIUM 2 TABLET: 50; 8.6 TABLET ORAL at 08:56

## 2025-04-21 RX ADMIN — Medication 250 MG: at 08:56

## 2025-04-21 RX ADMIN — IPRATROPIUM BROMIDE AND ALBUTEROL SULFATE 3 ML: 2.5; .5 SOLUTION RESPIRATORY (INHALATION) at 08:27

## 2025-04-21 RX ADMIN — ATORVASTATIN CALCIUM 40 MG: 40 TABLET, FILM COATED ORAL at 08:56

## 2025-04-21 RX ADMIN — HEPARIN SODIUM 5000 UNITS: 5000 INJECTION INTRAVENOUS; SUBCUTANEOUS at 20:54

## 2025-04-21 RX ADMIN — RISPERIDONE 3 MG: 1 TABLET, FILM COATED ORAL at 12:36

## 2025-04-21 NOTE — PROGRESS NOTES
"          Clinical Nutrition Assessment     Patient Name: Vilma Ayala  YOB: 1970  MRN: [id number]  Date of Encounter: 04/21/25 15:53 EDT  Admission date: 4/10/2025  Reason for Visit: LOS    Assessment   Nutrition Assessment   Admission Diagnosis:  Acute kidney injury superimposed on chronic kidney disease [N17.9, N18.9]  Acute kidney injury superimposed on CKD [N17.9, N18.9]    Problem List:    Acute kidney injury superimposed on chronic kidney disease    Anxiety and depression    Other specified hypothyroidism    Mixed hyperlipidemia    Type 2 diabetes mellitus without complication, with long-term current use of insulin    Diarrhea    Dehydration    Essential tremor    Acute kidney injury superimposed on CKD      PMH:   She  has a past medical history of Anxiety, Chicken pox, Depression, Diabetes mellitus, Disease of thyroid gland, Measles, and Type 2 diabetes mellitus.    PSH:  She  has a past surgical history that includes Eye surgery and Tonsillectomy.    Applicable Nutrition History:   4/17-SLP Diet Recommendation: mechanical ground textures, thin liquids     Anthropometrics     Height: Height: 167.6 cm (65.98\")  Last Filed Weight: Weight: 96.3 kg (212 lb 4.9 oz) (04/14/25 1220)  Method: Weight Method: Bed scale  BMI: BMI (Calculated): 34.3    UBW:    Weight change: weight loss of 15 lbs (6.7%) over 1 month(s)    Significant?  Yes    Nutrition Focused Physical Exam    Date: 4/21    Pt does not meet criteria for malnutrition diagnosis, at this time.  No muscle wasting/fat loss identified     Subjective   Reported/Observed/Food/Nutrition Related History:     Pt eating well per documented intakes, RN at bedside states pt eating % of meals. Pt states she has had wt loss over the past month 2/2 E.Coli. Pt also endorses poor intake PTA 2/2 to this. Pt agreeable to ONS, noted to be on modified diet per SLP. Spoke w/SLP and current diet order is correct (ground/NTL), states plan for MBS " tomorrow. NKFA.     Current Nutrition Prescription   PO: Diet: Cardiac, Diabetic; Healthy Heart (2-3 Na+); Consistent Carbohydrate; No Mixed Consistencies, Feeding Assistance - Nursing; Texture: Mechanical Ground (NDD 2); Fluid Consistency: Chambers Thick  Oral Nutrition Supplement:   Intake: 77% x 11 meals    Assessment & Plan   Nutrition Diagnosis   Date:  4/21            Updated:    Problem Unintended weight lossacute severe   Etiology Pt report of Ecoli   Signs/Symptoms Wt loss 15lbs/6.7% x 1 month   Status: New    Goal / Objectives:   Nutrition to support treatment and Maintain intake      Nutrition Intervention      Follow treatment progress, Care plan reviewed, Supplement provided    Magic cup ordered w/dinner    Monitoring/Evaluation:   Per protocol, PO intake, Supplement intake, Weight    Mar Vasquez, MS,RD,LD  Time Spent:25

## 2025-04-21 NOTE — PAYOR COMM NOTE
"Vilma Wooten (54 y.o. Female)       Date of Birth   1970    Social Security Number       Address   [address] [facility] [address]    Home Phone   [phone]    MRN   [id number]       Jewish   [employer]    Marital Status   Single                            Admission Date   4/10/2025    Admission Type   Emergency    Admitting Provider   Nanda Wong MD    Attending Provider   Nanda Wong MD    Department, Room/Bed   [facility] 5H, S579/1       Discharge Date       Discharge Disposition       Discharge Destination                                 Attending Provider: Nanda Wong MD    Allergies: Doxepin, Januvia [Sitagliptin]    Isolation: None   Infection: None   Code Status: CPR    Ht: 167.6 cm (65.98\")   Wt: 96.3 kg (212 lb 4.9 oz)    Admission Cmt: None   Principal Problem: Acute kidney injury superimposed on chronic kidney disease [N17.9,N18.9]                   Active Insurance as of 4/10/2025       Primary Coverage       Payor Plan Insurance Group Employer/Plan Group    [facility] MEDICARE REPLACEMENT WELL[employer] MEDICARE ADVANTAGE PPO        Payor Plan Address Payor Plan Phone Number Payor Plan Fax Number Effective Dates    [address] [address]   3/1/2025 - None Entered    [facility] [id number]         Subscriber Name Subscriber Birth Date Member ID       VILMA WOOTEN 1970 [id number]               Secondary Coverage       Payor Plan Insurance Group Employer/Plan Group    [place]        Payor Plan Address Payor Plan Phone Number Payor Plan Fax Number Effective Dates    [address] [phone]  2/27/2025 - None Entered    [facility] [id number]         Subscriber Name Subscriber Birth Date Member ID       VILMA WOOTEN 1970 [id number]                     Emergency Contacts        (Rel.) Home Phone Work Phone Mobile Phone    FRANCIE LOYOLA (Sister) [phone] -- [phone]              [place]: " [facility] [id number] Tax ID [id number]  Insurance Information                  [facility]LLCARE [facility] MEDICARE REPLACEMENT/WELLCARE MEDICARE ADVANTAGE PPO Phone: --    Subscriber: Vilma Ayala Subscriber#: [id number]    Group#: -- Precert#: --    Authorization#: [id number] Effective Date: --        [place] Phone: [phone]    Subscriber: Vilma Ayala Subscriber#: [id number]    Group#: -- Precert#: --    Authorization#: [id number] Effective Date: --          Current Facility-Administered Medications   Medication Dose Route Frequency Provider Last Rate Last Admin    acetaminophen (TYLENOL) tablet 650 mg  650 mg Oral Q4H PRN Hank Garcia PA-C   650 mg at 04/19/25 2326    Or    acetaminophen (TYLENOL) 160 MG/5ML oral solution 650 mg  650 mg Oral Q4H PRN Hank Garcia PA-C        Or    acetaminophen (TYLENOL) suppository 650 mg  650 mg Rectal Q4H PRN Hank Garcia PA-C        atorvastatin (LIPITOR) tablet 40 mg  40 mg Oral Daily Hank Garcia PA-C   40 mg at 04/21/25 0856    sennosides-docusate (PERICOLACE) 8.6-50 MG per tablet 2 tablet  2 tablet Oral BID Ventura, Wen M II, DO   2 tablet at 04/21/25 0856    And    polyethylene glycol (MIRALAX) packet 17 g  17 g Oral Daily PRN Ventura, Wen M II, DO   17 g at 04/17/25 2050    And    bisacodyl (DULCOLAX) EC tablet 5 mg  5 mg Oral Daily PRN Ventura, Wen M II, DO        And    bisacodyl (DULCOLAX) suppository 10 mg  10 mg Rectal Daily PRN Ventura, Wen M II, DO   10 mg at 04/18/25 1203    cholestyramine light packet 4 g  1 packet Oral Q12H JACI Cruz, DO   4 g at 04/21/25 0939    dextrose (D50W) (25 g/50 mL) IV injection 25 g  25 g Intravenous Q15 Min PRN Hank Garcia PA-C        dextrose (GLUTOSE) oral gel 15 g  15 g Oral Q15 Min PRN Hank Garcia PA-C        famotidine (PEPCID) tablet 20 mg  20 mg Oral Nightly Hank Garcia PA-C   20 mg at 04/20/25  2132    glucagon (GLUCAGEN) injection 1 mg  1 mg Intramuscular Q15 Min PRN Hank Garcia PA-C        heparin (porcine) 5000 UNIT/ML injection 5,000 Units  5,000 Units Subcutaneous Q8H JACI Cruz, DO   5,000 Units at 04/21/25 0527    insulin glargine (LANTUS, SEMGLEE) injection 15 Units  15 Units Subcutaneous Daily JACI Cruz, DO   15 Units at 04/21/25 0855    insulin glargine (LANTUS, SEMGLEE) injection 25 Units  25 Units Subcutaneous Nightly JACI Cruz, DO   25 Units at 04/20/25 2128    Insulin Lispro (humaLOG) injection 3 Units  3 Units Subcutaneous TID With Meals Chanda Tucker, ZIA   3 Units at 04/21/25 0855    Insulin Lispro (humaLOG) injection 3-14 Units  3-14 Units Subcutaneous 4x Daily AC & at Bedtime JACI Cruz, DO   5 Units at 04/20/25 2128    ipratropium-albuterol (DUO-NEB) nebulizer solution 3 mL  3 mL Nebulization 4x Daily - RT JACI Cruz, DO   3 mL at 04/21/25 0827    levothyroxine (SYNTHROID, LEVOTHROID) tablet 225 mcg  225 mcg Oral Q AM Hank Garcia PA-C   225 mcg at 04/21/25 0527    melatonin tablet 5 mg  5 mg Oral Nightly PRN Hank Garcia PA-C   5 mg at 04/20/25 2132    promethazine (PHENERGAN) tablet 12.5 mg  12.5 mg Oral Q6H PRN Wen Whitehead II, DO   12.5 mg at 04/21/25 0731    risperiDONE (risperDAL) tablet 3 mg  3 mg Oral BID Hank Garcia PA-C   3 mg at 04/20/25 2131    saccharomyces boulardii (FLORASTOR) capsule 250 mg  250 mg Oral BID JACI Cruz, DO   250 mg at 04/21/25 0856    Sodium Chloride (PF) 0.9 % 10 mL  10 mL Intravenous PRN Hal Saucedo MD   10 mL at 04/19/25 1002     Imaging Results (Last 24 Hours)       ** No results found for the last 24 hours. **          Orders (last 24 hrs)        Start     Ordered    04/21/25 0849  OT Consult: Eval & Treat ADL Performance Below Baseline, Discharge Placement Assessment  Once         04/21/25 0848    04/21/25 0718  POC Glucose Once  PROCEDURE  "ONCE        Comments: Complete no more than 45 minutes prior to patient eating      04/21/25 0715    04/21/25 0600  Basic Metabolic Panel  Morning Draw         04/20/25 1001    04/20/25 2036  POC Glucose Once  PROCEDURE ONCE        Comments: Complete no more than 45 minutes prior to patient eating      04/20/25 2033    04/20/25 1647  POC Glucose Once  PROCEDURE ONCE        Comments: Complete no more than 45 minutes prior to patient eating      04/20/25 1644    04/20/25 1222  POC Glucose Once  PROCEDURE ONCE        Comments: Complete no more than 45 minutes prior to patient eating      04/20/25 1219    04/20/25 1142  DIET MESSAGE Please send roast turkey with chicken gravy, mashed potato, ice cream, carrots, peach yogurt, tea for lunch thank you.  Once        Comments: Please send roast turkey with chicken gravy, mashed potato, ice cream, carrots, peach yogurt, tea for lunch thank you.    04/20/25 1145    04/19/25 1800  Insulin Lispro (humaLOG) injection 3 Units  3 Times Daily With Meals         04/19/25 1603    04/18/25 1716  Ok for no iv access  Misc Nursing Order (Specify)  Every Shift      Comments: Ok for no iv access    04/18/25 1715    04/17/25 1307  promethazine (PHENERGAN) tablet 12.5 mg  Every 6 Hours PRN         04/17/25 1307    04/15/25 1130  sennosides-docusate (PERICOLACE) 8.6-50 MG per tablet 2 tablet  2 Times Daily        Placed in \"And\" Linked Group    04/15/25 1041    04/15/25 1041  polyethylene glycol (MIRALAX) packet 17 g  Daily PRN        Placed in \"And\" Linked Group    04/15/25 1041    04/15/25 1041  bisacodyl (DULCOLAX) EC tablet 5 mg  Daily PRN        Placed in \"And\" Linked Group    04/15/25 1041    04/15/25 1041  bisacodyl (DULCOLAX) suppository 10 mg  Daily PRN        Placed in \"And\" Linked Group    04/15/25 1041    04/14/25 1200  DIET MESSAGE Yogurt with meals  Daily With Breakfast, Lunch & Dinner      Comments: Yogurt with meals    04/14/25 0837    04/14/25 0900  insulin glargine (LANTUS, " SEMGLEE) injection 15 Units  Daily         04/14/25 0734    04/13/25 1730  Insulin Lispro (humaLOG) injection 3-14 Units  4 Times Daily Before Meals & Nightly         04/13/25 1618    04/13/25 1530  Oscillating Positive Expiratory Pressure (OPEP)  Every 4 Hours - RT       04/13/25 1230    04/13/25 1400  heparin (porcine) 5000 UNIT/ML injection 5,000 Units  Every 8 Hours Scheduled         04/13/25 1012    04/13/25 1400  Incentive Spirometry  Every 4 Hours While Awake       04/13/25 1230    04/13/25 0600  levothyroxine (SYNTHROID, LEVOTHROID) tablet 225 mcg  Every Early Morning         04/13/25 0440    04/12/25 2100  insulin glargine (LANTUS, SEMGLEE) injection 25 Units  Nightly         04/12/25 1708    04/12/25 1630  ipratropium-albuterol (DUO-NEB) nebulizer solution 3 mL  4 Times Daily - RT         04/12/25 1431    04/11/25 2100  famotidine (PEPCID) tablet 20 mg  Nightly         04/11/25 0750    04/11/25 1030  cholestyramine light packet 4 g  Every 12 Hours Scheduled         04/11/25 0935    04/11/25 1030  saccharomyces boulardii (FLORASTOR) capsule 250 mg  2 Times Daily         04/11/25 0935    04/11/25 0900  atorvastatin (LIPITOR) tablet 40 mg  Daily         04/10/25 2012    04/11/25 0800  Oral Care  2 Times Daily       04/10/25 2012    04/11/25 0000  Vital Signs  Every 4 Hours       04/10/25 2012    04/10/25 2200  POC Glucose 4x Daily Before Meals & at Bedtime  4 Times Daily Before Meals & at Bedtime      Comments: Complete no more than 45 minutes prior to patient eating      04/10/25 2014    04/10/25 2100  risperiDONE (risperDAL) tablet 3 mg  2 Times Daily         04/10/25 2012    04/10/25 2013  dextrose (GLUTOSE) oral gel 15 g  Every 15 Minutes PRN         04/10/25 2014    04/10/25 2013  dextrose (D50W) (25 g/50 mL) IV injection 25 g  Every 15 Minutes PRN         04/10/25 2014    04/10/25 2013  glucagon (GLUCAGEN) injection 1 mg  Every 15 Minutes PRN         04/10/25 2014    04/10/25 2012  melatonin tablet 5 mg  " Nightly PRN         04/10/25 2012    04/10/25 2012  acetaminophen (TYLENOL) tablet 650 mg  Every 4 Hours PRN        Placed in \"Or\" Linked Group    04/10/25 2012    04/10/25 2012  acetaminophen (TYLENOL) 160 MG/5ML oral solution 650 mg  Every 4 Hours PRN        Placed in \"Or\" Linked Group    04/10/25 2012    04/10/25 2012  acetaminophen (TYLENOL) suppository 650 mg  Every 4 Hours PRN        Placed in \"Or\" Linked Group    04/10/25 2012    04/10/25 2009  Intake & Output  Every Shift       04/10/25 2012    04/10/25 1314  Sodium Chloride (PF) 0.9 % 10 mL  As Needed         04/10/25 1314    Unscheduled  Up With Assistance  As Needed       04/10/25 2012    Unscheduled  Follow Hypoglycemia Standing Orders For Blood Glucose <70 & Notify Provider of Treatment  As Needed      Comments: Follow Hypoglycemia Orders As Outlined in Process Instructions (Open Order Report to View Full Instructions)  Notify Provider Any Time Hypoglycemia Treatment is Administered    04/10/25 2014    Unscheduled  Assist With Feeding Patient  As Needed       04/15/25 1138                  Operative/Procedure Notes (all)    No notes of this type exist for this encounter.       Physician Progress Notes (last 24 hours)  Notes from 04/20/25 1126 through 04/21/25 1126   No notes of this type exist for this encounter.       Consult Notes (last 24 hours)  Notes from 04/20/25 1126 through 04/21/25 1126   No notes of this type exist for this encounter.       "

## 2025-04-21 NOTE — PLAN OF CARE
Goal Outcome Evaluation:  Plan of Care Reviewed With: patient                Anticipated Discharge Disposition (SLP): skilled nursing facility          SLP Swallowing Diagnosis: mild, oral dysphagia, moderate, pharyngeal dysphagia (04/21/25 1500)  Treatment Assessment (SLP): continued, toleration of diet, no clinical signs of, aspiration (04/21/25 1500)  Treatment Assessment Comments (SLP): No s/s of aspiration with trials of thin H2O via ice, spoon, cup and straw, nectar thick and soft solids. Patient completed dysphagia exercises with min cues. MBS ordered to reassess for possible upgrade (04/21/25 1500)  Plan for Continued Treatment (SLP): continue treatment per plan of care (04/21/25 1500)

## 2025-04-21 NOTE — CASE MANAGEMENT/SOCIAL WORK
Continued Stay Note  [facility]     Patient Name: Vilma Ayala  MRN: [id number]  Today's Date: 4/21/2025    Admit Date: 4/10/2025    Plan: rehab   Discharge Plan       Row Name 04/21/25 1624       Plan    Plan rehab    Patient/Family in Agreement with Plan yes    Plan Comments I met w/patient in room, her d/c plan remains rehab. I ordered OT to see as well, no OT ordered, will need for pre cert. Speech following bedside today MBS ordered. Emailed rehab for both services to see in am, will f/u on referrals that were sent for any bed offers on Tuesday.  CM will continue to follow.    Final Discharge Disposition Code 03 - skilled nursing facility (SNF)                   Discharge Codes    No documentation.                 Expected Discharge Date and Time       Expected Discharge Date Expected Discharge Time    Apr 20, 2025               Clarence Garcia RN

## 2025-04-21 NOTE — THERAPY TREATMENT NOTE
Acute Care - Speech Language Pathology   Swallow Treatment Note   [facility]     Patient Name: Vilma Ayala  : 1970  MRN: [id number]  Today's Date: 2025               Admit Date: 4/10/2025    Visit Dx:     ICD-10-CM ICD-9-CM   1. CB (acute kidney injury)  N17.9 584.9   2. Nausea and vomiting, unspecified vomiting type  R11.2 787.01   3. Weakness  R53.1 780.79   4. Oropharyngeal dysphagia  R13.12 787.22     Patient Active Problem List   Diagnosis    Anxiety and depression    Uncontrolled type 2 diabetes mellitus    Other specified hypothyroidism    Obesity    Vitamin D deficiency, unspecified    Diabetic neuropathy, type II diabetes mellitus    Mixed hyperlipidemia    Dyspnea on exertion    Type 2 diabetes mellitus without complication, with long-term current use of insulin    Urinary incontinence    Microscopic hematuria    Nocturia    Nocturnal enuresis    Stress incontinence    Urge incontinence    Decreased GFR    Mild persistent asthma    Obesity (BMI 35.0-39.9 without comorbidity)    Drug-induced parkinsonism    Encounter to establish care    NATASHA (obstructive sleep apnea)    Medicare annual wellness visit, subsequent    Vitamin D deficiency    UTI (urinary tract infection)    Painful urination    Nausea    Acute kidney injury superimposed on chronic kidney disease    Diarrhea    Dehydration    Essential tremor    Acute kidney injury superimposed on CKD     Past Medical History:   Diagnosis Date    Anxiety     Chicken pox     Depression     Diabetes mellitus     Disease of thyroid gland     Measles     Type 2 diabetes mellitus      Past Surgical History:   Procedure Laterality Date    EYE SURGERY      TONSILLECTOMY         SLP Recommendation and Plan  SLP Swallowing Diagnosis: mild, oral dysphagia, moderate, pharyngeal dysphagia (25 1500)  SLP Diet Recommendation: mechanical ground textures, nectar thick liquids (25 1500)  Recommended Precautions and Strategies: general  aspiration precautions, assist with feeding (04/21/25 1500)  SLP Rec. for Method of Medication Administration: meds crushed, with puree, as tolerated (04/21/25 1500)     Monitor for Signs of Aspiration: notify SLP if any concerns (04/21/25 1500)  Recommended Diagnostics: reassess via VFSS (MBS), other (see comments) (4/21) (04/21/25 1500)  Swallow Criteria for Skilled Therapeutic Interventions Met: demonstrates skilled criteria (04/21/25 1500)  Anticipated Discharge Disposition (SLP): skilled nursing facility (04/21/25 1500)  Rehab Potential/Prognosis, Swallowing: good, to achieve stated therapy goals (04/21/25 1500)  Therapy Frequency (Swallow): 5 days per week (04/21/25 1500)  Predicted Duration Therapy Intervention (Days): 1 week (04/21/25 1500)  Oral Care Recommendations: Oral Care BID/PRN, Suction toothbrush (04/21/25 1500)        Daily Summary of Progress (SLP): progress toward functional goals is good (04/21/25 1500)               Treatment Assessment (SLP): continued, toleration of diet, no clinical signs of, aspiration (04/21/25 1500)  Treatment Assessment Comments (SLP): No s/s of aspiration with trials of thin H2O via ice, spoon, cup and straw, nectar thick and soft solids. Patient completed dysphagia exercises with min cues. MBS ordered to reassess for possible upgrade (04/21/25 1500)  Plan for Continued Treatment (SLP): continue treatment per plan of care (04/21/25 1500)                SWALLOW EVALUATION (Last 72 Hours)       SLP Adult Swallow Evaluation       Row Name 04/21/25 1500                   Rehab Evaluation    Document Type therapy note (daily note)  -CH        Patient Observations alert;cooperative;agree to therapy  -CH        Patient/Family/Caregiver Comments/Observations none present  -CH        Patient Effort good  -CH        Symptoms Noted During/After Treatment none  -CH        Oral Care lip/mouth moisturizer applied  -           General Information    Patient Profile Reviewed yes  -CH            Pain    Pretreatment Pain Rating 0/10 - no pain  -        Posttreatment Pain Rating 0/10 - no pain  -           Pain Scale: FACES Pre/Post-Treatment    Pain: FACES Scale, Pretreatment 0-->no hurt  -        Posttreatment Pain Rating 0-->no hurt  -           SLP Evaluation Clinical Impression    SLP Swallowing Diagnosis mild;oral dysphagia;moderate;pharyngeal dysphagia  -        Functional Impact risk of aspiration/pneumonia  -        Rehab Potential/Prognosis, Swallowing good, to achieve stated therapy goals  -        Swallow Criteria for Skilled Therapeutic Interventions Met demonstrates skilled criteria  -           SLP Treatment Clinical Impressions    Treatment Assessment (SLP) continued;toleration of diet;no clinical signs of;aspiration  -        Treatment Assessment Comments (SLP) No s/s of aspiration with trials of thin H2O via ice, spoon, cup and straw, nectar thick and soft solids. Patient completed dysphagia exercises with min cues. MBS ordered to reassess for possible upgrade  -        Daily Summary of Progress (SLP) progress toward functional goals is good  -        Plan for Continued Treatment (SLP) continue treatment per plan of care  -        Care Plan Review evaluation/treatment results reviewed;care plan/treatment goals reviewed  -           Recommendations    Therapy Frequency (Swallow) 5 days per week  -        Predicted Duration Therapy Intervention (Days) 1 week  -        SLP Diet Recommendation mechanical ground textures;nectar thick liquids  -        Recommended Diagnostics reassess via VFSS (MBS);other (see comments)  4/21  -        Recommended Precautions and Strategies general aspiration precautions;assist with feeding  -        Oral Care Recommendations Oral Care BID/PRN;Suction toothbrush  -        SLP Rec. for Method of Medication Administration meds crushed;with puree;as tolerated  -        Monitor for Signs of Aspiration notify SLP if  any concerns  -CH        Anticipated Discharge Disposition (SLP) [facility] nursing [facility]  -CH                  User Key  (r) = Recorded By, (t) = Taken By, (c) = Cosigned By      Initials Name Effective Dates    Gauri Clancy, MS CCC-SLP 01/20/25 -                     EDUCATION  The patient has been educated in the following areas:   Home Exercise Program (HEP) Dysphagia (Swallowing Impairment) Oral Care/Hydration Modified Diet Instruction.        SLP GOALS       Row Name 04/21/25 1500             (LTG) Patient will demonstrate functional swallow for    Diet Texture (Demonstrate functional swallow) soft to chew (whole) textures  -CH      Liquid viscosity (Demonstrate functional swallow) thin liquids  -CH      [place] (Demonstrate functional swallow) with minimal cues (75-90% accuracy)  -CH      Time Frame (Demonstrate functional swallow) 1 week  -CH      Progress/Outcomes (Demonstrate functional swallow) continuing progress toward goal  -CH      Comment (Demonstrate functional swallow) no s/s of aspiration with thin, nectar thick or soft solid trials. MBS ordered to assess for possible upgrade.  -CH         (STG) Patient will tolerate trials of    Consistencies Trialed (Tolerate trials) soft to chew (ground) textures;nectar/ mildly thick liquids  -CH      Desired Outcome (Tolerate trials) without signs/symptoms of aspiration;with adequate oral prep/transit/clearance  -CH      [place] (Tolerate trials) with minimal cues (75-90% accuracy)  -CH      Time Frame (Tolerate trials) 1 week  -CH      Progress/Outcomes (Tolerate trials) continuing progress toward goal  -CH      Comment (Tolerate trials) no s/s of aspiration with thin, nectar thick or soft solid trials  -CH         (STG) Labial Strengthening Goal 1 (SLP)    Activity (Labial Strengthening Goal 1, SLP) increase labial tone  -CH      Increase Labial Tone labial resistance exercises;swallow trials  -CH      [place]/Accuracy (Labial  Strengthening Goal 1, SLP) with minimal cues (75-90% accuracy)  -CH      Time Frame (Labial Strengthening Goal 1, SLP) 1 week  -CH      Progress/Outcomes (Labial Strengthening Goal 1, SLP) continuing progress toward goal  -CH      Comment (Labial Strengthening Goal 1, SLP) completed exercises with min cues  -CH         (STG) Lingual Strengthening Goal 1 (SLP)    Activity (Lingual Strengthening Goal 1, SLP) increase tongue back strength;increase lingual tone/sensation/control/coordination/movement  -CH      Increase Lingual Tone/Sensation/Control/Coordination/Movement lingual movement exercises;swallow trials  -CH      Increase Tongue Back Strength lingual movement exercises;swallow trials;lingual resistance exercises  -CH      [place]/Accuracy (Lingual Strengthening Goal 1, SLP) with minimal cues (75-90% accuracy)  -CH      Time Frame (Lingual Strengthening Goal 1, SLP) 1 week  -CH      Progress/Outcomes (Lingual Strengthening Goal 1, SLP) continuing progress toward goal  -CH      Comment (Lingual Strengthening Goal 1, SLP) completed exercises with min cues  -CH         (STG) Pharyngeal Strengthening Exercise Goal 1 (SLP)    Activity (Pharyngeal Strengthening Goal 1, SLP) increase timing;increase superior movement of the hyolaryngeal complex;increase anterior movement of the hyolaryngeal complex;increase closure at entrance to airway/closure of airway at glottis;increase squeeze/positive pressure generation  -CH      Increase Timing prepping - 3 second prep or suck swallow or 3-step swallow  -CH      Increase Superior Movement of the Hyolaryngeal Complex falsetto  -CH      Increase Anterior Movement of the Hyolaryngeal Complex chin tuck against resistance (CTAR)  -CH      Increase Closure at Entrance to Airway/Closure of Airway at Glottis super-supraglottic swallow;breath hold exercises  -CH      Increase Squeeze/Positive Pressure Generation hard effortful swallow  -CH      [place]/Accuracy (Pharyngeal  Strengthening Goal 1, SLP) with minimal cues (75-90% accuracy)  -CH      Time Frame (Pharyngeal Strengthening Goal 1, SLP) 1 week  -CH      Progress/Outcomes (Pharyngeal Strengthening Goal 1, SLP) continuing progress toward goal  -CH      Comment (Pharyngeal Strengthening Goal 1, SLP) completed exercises with min cues  -CH                User Key  (r) = Recorded By, (t) = Taken By, (c) = Cosigned By      Initials Name Provider Type    Gauri Clancy MS CCC-SLP Speech and Language Pathologist                         Time Calculation:    Time Calculation- SLP       Row Name 04/21/25 1606             Time Calculation- SLP    SLP Start Time 1500  -CH      SLP Received On 04/21/25  -CH         Untimed Charges    18542-DP Treatment Swallow Minutes 53  -CH         Total Minutes    Untimed Charges Total Minutes 53  -CH       Total Minutes 53  -CH                User Key  (r) = Recorded By, (t) = Taken By, (c) = Cosigned By      Initials Name Provider Type    Gauri Clancy MS CCC-SLP Speech and Language Pathologist                    Therapy Charges for Today       Code Description Service Date Service Provider Modifiers Qty    [id number]  ST TREATMENT SWALLOW 4 4/21/2025 Gauri Mims MS CCC-SLP GN 1                 Gauri Mims MS CCC-KAYLENE  4/21/2025

## 2025-04-21 NOTE — PROGRESS NOTES
[facility] Medicine Services  PROGRESS NOTE    Patient Name: Vilma Ayala  : 1970  MRN: [id number]    Date of Admission: 4/10/2025  Primary Care Physician: Vanessa Kaur MD    Subjective   Subjective     CC: f/u nausea    HPI: Resting comfortably on my arrival. Sitting up in chair. Reporting some stomach ache but now doing ok. Wants to get some rest today    Ros   As above      Objective   Objective     Vital Signs:   Temp:  [97.5 °F (36.4 °C)-98.2 °F (36.8 °C)] 98.2 °F (36.8 °C)  Heart Rate:  [] 92  Resp:  [16-18] 18  BP: (110-119)/(74-86) 110/74     Physical Exam:  Constitutional: No acute distress, awake, alert, sitting up in chair   HENT: NCAT, mucous membranes moist  Respiratory: Clear to auscultation bilaterally, respiratory effort normal   Cardiovascular: RRR, no murmurs, rubs, or gallops  Gastrointestinal: Positive bowel sounds, soft, nontender, nondistended  Musculoskeletal: No bilateral ankle edema  Psychiatric: Appropriate affect, cooperative  Neurologic: Oriented x 3, strength symmetric in all extremities, Cranial Nerves grossly intact to confrontation, speech clear  Skin: No rashes     Results Reviewed:  LAB RESULTS:      Lab 25  1536 25  0618 04/15/25  0619 04/15/25  0120 25  2141 25  1925   WBC 13.10* 11.85* 11.67*  --   --   --    HEMOGLOBIN 11.6* 11.8* 11.9*  --   --   --    HEMATOCRIT 34.4 35.7 35.9  --   --   --    PLATELETS 239 243 248  --   --   --    NEUTROS ABS  --  7.67* 7.25*  --   --   --    IMMATURE GRANS (ABS)  --  0.04 0.05  --   --   --    LYMPHS ABS  --  2.99 3.15*  --   --   --    MONOS ABS  --  0.61 0.58  --   --   --    EOS ABS  --  0.51* 0.59*  --   --   --    MCV 87.3 90.2 89.3  --   --   --    LACTATE  --   --   --  1.8 4.1* 2.5*   HSTROP T  --   --   --   --   --  17*         Lab 25  0428 25  1536 25  0618 04/15/25  0619   SODIUM 136 136 138 141   POTASSIUM 4.1 4.1 4.3 4.6   CHLORIDE 101  101 104 105   CO2 23.0 25.0 21.0* 22.0   ANION GAP 12.0 10.0 13.0 14.0   BUN 16 15 18 17   CREATININE 1.25* 1.45* 1.33* 1.33*   EGFR 51.3* 43.0* 47.6* 47.6*   GLUCOSE 163* 232* 128* 142*   CALCIUM 9.3 8.8 9.3 9.1         Lab 04/16/25  0618 04/15/25  0619   TOTAL PROTEIN 5.6* 5.7*   ALBUMIN 3.7 3.6   GLOBULIN 1.9 2.1   ALT (SGPT) 22 26   AST (SGOT) 14 18   BILIRUBIN 0.5 0.5   ALK PHOS 100 100         Lab 04/14/25  1925   HSTROP T 17*                 Brief Urine Lab Results  (Last result in the past 365 days)        Color   Clarity   Blood   Leuk Est   Nitrite   Protein   CREAT   Urine HCG        04/10/25 1734 Yellow   Clear   Negative   Trace   Negative   Negative                   Microbiology Results Abnormal       None            No radiology results from the last 24 hrs    Results for orders placed during the hospital encounter of 04/10/25    Adult Transthoracic Echo Complete W/ Cont if Necessary Per Protocol    Interpretation Summary    Left ventricular systolic function is normal. Estimated left ventricular EF = 60%    No significant valvular abnormalities.      Current medications:  Scheduled Meds:atorvastatin, 40 mg, Oral, Daily  cholestyramine light, 1 packet, Oral, Q12H  famotidine, 20 mg, Oral, Nightly  heparin (porcine), 5,000 Units, Subcutaneous, Q8H  insulin glargine, 15 Units, Subcutaneous, Daily  insulin glargine, 25 Units, Subcutaneous, Nightly  Insulin Lispro, 3 Units, Subcutaneous, TID With Meals  insulin lispro, 3-14 Units, Subcutaneous, 4x Daily AC & at Bedtime  ipratropium-albuterol, 3 mL, Nebulization, 4x Daily - RT  levothyroxine, 225 mcg, Oral, Q AM  risperiDONE, 3 mg, Oral, BID  saccharomyces boulardii, 250 mg, Oral, BID  senna-docusate sodium, 2 tablet, Oral, BID      Continuous Infusions:   PRN Meds:.  acetaminophen **OR** acetaminophen **OR** acetaminophen    senna-docusate sodium **AND** polyethylene glycol **AND** bisacodyl **AND** bisacodyl    dextrose    dextrose    glucagon (human  recombinant)    melatonin    promethazine    Sodium Chloride (PF)    Assessment & Plan   Assessment & Plan     Active Hospital Problems    Diagnosis  POA    **Acute kidney injury superimposed on chronic kidney disease [N17.9, N18.9]  Yes    Acute kidney injury superimposed on CKD [N17.9, N18.9]  Yes    Diarrhea [R19.7]  Yes    Dehydration [E86.0]  Yes    Essential tremor [G25.0]  Yes    Type 2 diabetes mellitus without complication, with long-term current use of insulin [E11.9, Z79.4]  Not Applicable    Mixed hyperlipidemia [E78.2]  Yes    Other specified hypothyroidism [E03.8]  Yes    Anxiety and depression [F41.9, F32.A]  Yes      Resolved Hospital Problems   No resolved problems to display.        Brief Hospital Course to date:  Vilma Ayala is a 54 y.o. female with a history of Essential Tremor, T2DM, Hypothyroidism, anxiety/depression, and GERD who presents to [facility] ED for complaint of nausea and diarrhea.  Patient transferred to my services on the a.m. of 4/11, she is resting comfortably in bed, she reports diarrhea is doing better but stool studies are ordered and pending.  Continuing on IV fluids and treatment for CB, slight improvement in renal function today, will continue to monitor at this time.  Patient on the afternoon of 4/12 had some increased dyspnea and congestion.  Chest x-ray was obtained and could not rule out possible pneumonia however Pro-Jim was negative, white blood cell count was normal, and felt more pulmonary congestion, IV fluids were stopped and patient was given IV Bumex.  Seen again on the a.m. of 4/13, noticed to have some congestion on exam, CT of the chest was obtained and noted to have bilateral lower lobe atelectasis.  Patient has since been weaned to room air this afternoon and patient will be using incentive spirometer and flutter valve. Slight uptrend again of Cr on 4/14, patient with poor PO intake, some nausea this AM, additional meds ordered. Patient with  slight tachycardia as well, will check troponin and obtain echo on her. No active chest pain however at this time.     This patient's problems and plans were partially entered by my partner and updated as appropriate by me 04/21/25.         CB on CKD   Dehydration  Nausea/Vomiting  -Cr 2.07 on admission, down to 1.6 on 4/13, IVF previously held with dyspnea, oxygen requirement, now weaned back to RA, chest CT with just atelectasis, plans for IS/FD, encourarged PO intake.   -CT abd/pelvis negative for acute findings  -CB possibly 2ry to recent bactrim use, as well as dehydration from poor po intake. Improving.   -PT/OT following and rec'd IRF however patient accomplishing all transfers with SBA/CGA and ambulated 45 feet. Also no medical necessity for IRF. My partner D/W her daughter that if interested in rehab can consider SNF however with her ambulating 45 feet, unclear if she would be approved.  -patient was able to have bm which seems to have helped nausea some, continue prn antiemetics.  -since nausea has improved, will give onetime lactulose po today and dulcolax supp.       Dyspnea  Nausea   - possibly related to recent fluids .  Improving   - Ordered for duonebs, cxr, will add IS/FD as well   - CT Chest with just atelectasis, encouraged use of IS/FD  - ckd echo 4/14 and troponin, troponin 26, will follow but seems noncardiac. Echo EF 60%.  LVSF nl, no significant valvular abnormalities      HLD  -Continue statin     T2DM  -A1C 6.8   -Fingerstick achs. SSI  -lantus insulin nightly    Anxiety/depression  -Continue home meds, stable   -Continue Respiradone, stable         Expected Discharge Location and Transportation: tbd  Expected Discharge   Expected Discharge Date: 4/20/2025; Expected Discharge Time:      VTE Prophylaxis:  Pharmacologic & mechanical VTE prophylaxis orders are present.         AM-PAC 6 Clicks Score (PT): 17 (04/20/25 2000)    CODE STATUS:   Code Status and Medical Interventions: CPR (Attempt  to Resuscitate); Full Support   Ordered at: 04/10/25 2013     Code Status (Patient has no pulse and is not breathing):    CPR (Attempt to Resuscitate)     Medical Interventions (Patient has pulse or is breathing):    Full Support       Nanda Wong MD  04/21/25

## 2025-04-21 NOTE — PAYOR COMM NOTE
"Vilma Wooten (54 y.o. Female)       Date of Birth   1970    Social Security Number       Address   [address] [facility] [address]    Home Phone   [phone]    MRN   [id number]       Confucianist   [employer]    Marital Status   Single                            Admission Date   4/10/2025    Admission Type   Emergency    Admitting Provider   Nanda Wong MD    Attending Provider   Nanda Wong MD    Department, Room/Bed   [facility] 5H, S579/1       Discharge Date       Discharge Disposition       Discharge Destination                                 Attending Provider: Nanda Wong MD    Allergies: Doxepin, Januvia [Sitagliptin]    Isolation: None   Infection: None   Code Status: CPR    Ht: 167.6 cm (65.98\")   Wt: 96.3 kg (212 lb 4.9 oz)    Admission Cmt: None   Principal Problem: Acute kidney injury superimposed on chronic kidney disease [N17.9,N18.9]                   Active Insurance as of 4/10/2025       Primary Coverage       Payor Plan Insurance Group Employer/Plan Group    [facility]        Payor Plan Address Payor Plan Phone Number Payor Plan Fax Number Effective Dates    [address] [address]   3/1/2025 - None Entered    [facility] [id number]         Subscriber Name Subscriber Birth Date Member ID       VILMA WOOTEN 1970 [id number]               Secondary Coverage       Payor Plan Insurance Group Employer/Plan Group    [place]        Payor Plan Address Payor Plan Phone Number Payor Plan Fax Number Effective Dates    [address] [phone]  2/27/2025 - None Entered    [facility] [id number]         Subscriber Name Subscriber Birth Date Member ID       VILMA WOOTEN 1970 [id number]                     Emergency Contacts        (Rel.) Home Phone Work Phone Mobile Phone    FRANCIE LOYOLA (Sister) [phone] -- [phone]               "   Physician Progress Notes (last 4 days)        Wen Whitehead II, DO at 25 0901              [facility] Medicine Services  PROGRESS NOTE    Patient Name: Vilma Ayala  : 1970  MRN: [id number]    Date of Admission: 4/10/2025  Primary Care Physician: Vanessa Kaur MD    Subjective   Subjective     CC: f/u nausea    HPI: Resting comfortably on my arrival. Easy to awaken, says she stayed up late last night. Nausea better.      Objective   Objective     Vital Signs:   Temp:  [97.5 °F (36.4 °C)-98 °F (36.7 °C)] 97.7 °F (36.5 °C)  Heart Rate:  [] 83  Resp:  [16-18] 16  BP: ()/(52-76) 101/67  Flow (L/min) (Oxygen Therapy):  [2] 2     Physical Exam:  Constitutional: No acute distress, awake, alert  HENT: NCAT, mucous membranes moist  Respiratory: Clear to auscultation bilaterally, respiratory effort normal   Cardiovascular: RRR, no murmurs, rubs, or gallops  Gastrointestinal: Positive bowel sounds, soft, nontender, nondistended  Musculoskeletal: No bilateral ankle edema  Psychiatric: Appropriate affect, cooperative  Neurologic: Oriented x 3, strength symmetric in all extremities, Cranial Nerves grossly intact to confrontation, speech clear  Skin: No rashes     Results Reviewed:  LAB RESULTS:      Lab 25  1536 25  0618 04/15/25  0619 04/15/25  0120 25  2141 25  1925 25  1411 25  0528   WBC 13.10* 11.85* 11.67*  --   --   --   --  16.16*   HEMOGLOBIN 11.6* 11.8* 11.9*  --   --   --   --  12.7   HEMATOCRIT 34.4 35.7 35.9  --   --   --   --  38.2   PLATELETS 239 243 248  --   --   --   --  258   NEUTROS ABS  --  7.67* 7.25*  --   --   --   --  11.35*   IMMATURE GRANS (ABS)  --  0.04 0.05  --   --   --   --  0.08*   LYMPHS ABS  --  2.99 3.15*  --   --   --   --  3.92*   MONOS ABS  --  0.61 0.58  --   --   --   --  0.60   EOS ABS  --  0.51* 0.59*  --   --   --   --  0.18   MCV 87.3 90.2 89.3  --   --   --   --  89.3   PROCALCITONIN   --   --   --   --   --   --   --  0.07   LACTATE  --   --   --  1.8 4.1* 2.5* 2.3*  --    HSTROP T  --   --   --   --   --  17* 26*  --          Lab 04/19/25  1536 04/16/25  0618 04/15/25  0619 04/14/25  0528   SODIUM 136 138 141 137   POTASSIUM 4.1 4.3 4.6 4.1   CHLORIDE 101 104 105 104   CO2 25.0 21.0* 22.0 21.0*   ANION GAP 10.0 13.0 14.0 12.0   BUN 15 18 17 21*   CREATININE 1.45* 1.33* 1.33* 1.74*   EGFR 43.0* 47.6* 47.6* 34.5*   GLUCOSE 232* 128* 142* 263*   CALCIUM 8.8 9.3 9.1 9.8   MAGNESIUM  --   --   --  1.8         Lab 04/16/25  0618 04/15/25  0619 04/14/25  0528   TOTAL PROTEIN 5.6* 5.7* 6.8   ALBUMIN 3.7 3.6 4.0   GLOBULIN 1.9 2.1 2.8   ALT (SGPT) 22 26 31   AST (SGOT) 14 18 25   BILIRUBIN 0.5 0.5 0.3   ALK PHOS 100 100 128*         Lab 04/14/25  1925 04/14/25  1411   HSTROP T 17* 26*                 Brief Urine Lab Results  (Last result in the past 365 days)        Color   Clarity   Blood   Leuk Est   Nitrite   Protein   CREAT   Urine HCG        04/10/25 1734 Yellow   Clear   Negative   Trace   Negative   Negative                   Microbiology Results Abnormal       None            No radiology results from the last 24 hrs    Results for orders placed during the hospital encounter of 04/10/25    Adult Transthoracic Echo Complete W/ Cont if Necessary Per Protocol    Interpretation Summary    Left ventricular systolic function is normal. Estimated left ventricular EF = 60%    No significant valvular abnormalities.      Current medications:  Scheduled Meds:atorvastatin, 40 mg, Oral, Daily  cholestyramine light, 1 packet, Oral, Q12H  famotidine, 20 mg, Oral, Nightly  heparin (porcine), 5,000 Units, Subcutaneous, Q8H  insulin glargine, 15 Units, Subcutaneous, Daily  insulin glargine, 25 Units, Subcutaneous, Nightly  Insulin Lispro, 3 Units, Subcutaneous, TID With Meals  insulin lispro, 3-14 Units, Subcutaneous, 4x Daily AC & at Bedtime  ipratropium-albuterol, 3 mL, Nebulization, 4x Daily -  RT  levothyroxine, 225 mcg, Oral, Q AM  risperiDONE, 3 mg, Oral, BID  saccharomyces boulardii, 250 mg, Oral, BID  senna-docusate sodium, 2 tablet, Oral, BID      Continuous Infusions:   PRN Meds:.  acetaminophen **OR** acetaminophen **OR** acetaminophen    senna-docusate sodium **AND** polyethylene glycol **AND** bisacodyl **AND** bisacodyl    dextrose    dextrose    glucagon (human recombinant)    melatonin    promethazine    Sodium Chloride (PF)    Assessment & Plan   Assessment & Plan     Active Hospital Problems    Diagnosis  POA    **Acute kidney injury superimposed on chronic kidney disease [N17.9, N18.9]  Yes    Acute kidney injury superimposed on CKD [N17.9, N18.9]  Yes    Diarrhea [R19.7]  Yes    Dehydration [E86.0]  Yes    Essential tremor [G25.0]  Yes    Type 2 diabetes mellitus without complication, with long-term current use of insulin [E11.9, Z79.4]  Not Applicable    Mixed hyperlipidemia [E78.2]  Yes    Other specified hypothyroidism [E03.8]  Yes    Anxiety and depression [F41.9, F32.A]  Yes      Resolved Hospital Problems   No resolved problems to display.        Brief Hospital Course to date:  Vilma Ayala is a 54 y.o. female with a history of Essential Tremor, T2DM, Hypothyroidism, anxiety/depression, and GERD who presents to [facility] ED for complaint of nausea and diarrhea.  Patient transferred to my services on the a.m. of 4/11, she is resting comfortably in bed, she reports diarrhea is doing better but stool studies are ordered and pending.  Continuing on IV fluids and treatment for CB, slight improvement in renal function today, will continue to monitor at this time.  Patient on the afternoon of 4/12 had some increased dyspnea and congestion.  Chest x-ray was obtained and could not rule out possible pneumonia however Pro-Jim was negative, white blood cell count was normal, and felt more pulmonary congestion, IV fluids were stopped and patient was given IV Bumex.  Seen again on the  a.m. of 4/13, noticed to have some congestion on exam, CT of the chest was obtained and noted to have bilateral lower lobe atelectasis.  Patient has since been weaned to room air this afternoon and patient will be using incentive spirometer and flutter valve. Slight uptrend again of Cr on 4/14, patient with poor PO intake, some nausea this AM, additional meds ordered. Patient with slight tachycardia as well, will check troponin and obtain echo on her. No active chest pain however at this time.        CB on CKD   Dehydration  Nausea/Vomiting  -Cr 2.07 on admission, down to 1.6 on 4/13, IVF previously held with dyspnea, oxygen requirement, now weaned back to RA, chest CT with just atelectasis, plans for IS/FD, encourarged PO intake.   -CT abd/pelvis negative for acute findings  -CB possibly 2ry to recent bactrim use, as well as dehydration from poor po intake. Improving.   -PT/OT following and rec'd IRF however patient accomplishing all transfers with SBA/CGA and ambulated 45 feet. Also no medical necessity for IRF. D/W her daughter this am that if interested in rehab can consider SNF however with her ambulating 45 feet, unclear if she would be approved.  -patient was able to have bm which seems to have helped nausea some, continue prn antiemetics.  -since nausea has improved, will give onetime lactulose po today and dulcolax supp.       Dyspnea  Nausea   - possibly related to recent fluids .  Improving   - Ordered for duonebs, cxr, will add IS/FD as well   - CT Chest with just atelectasis, encouraged use of IS/FD  - ckd echo 4/14 and troponin, troponin 26, will follow but seems noncardiac. Echo EF 60%.  LVSF nl, no significant valvular abnormalities      HLD  -Continue statin     T2DM  -A1C 6.8   -Fingerstick achs. SSI  -lantus insulin nightly    Anxiety/depression  -Continue home meds, stable   -Continue Respiradone, stable         Expected Discharge Location and Transportation:   Expected Discharge   Expected  Discharge Date: 2025; Expected Discharge Time:      VTE Prophylaxis:  Pharmacologic & mechanical VTE prophylaxis orders are present.         AM-PAC 6 Clicks Score (PT): 18 (25)    CODE STATUS:   Code Status and Medical Interventions: CPR (Attempt to Resuscitate); Full Support   Ordered at: 04/10/25 2013     Code Status (Patient has no pulse and is not breathing):    CPR (Attempt to Resuscitate)     Medical Interventions (Patient has pulse or is breathing):    Full Support       Wen Whitehead II, DO  25       Electronically signed by Wen Whitehead II DO at 25 1002       JuvencioChanda Gould, ZIA at 25 1110              [facility] Medicine Services  PROGRESS NOTE    Patient Name: Vilma Ayala  : 1970  MRN: [id number]    Date of Admission: 4/10/2025  Primary Care Physician: Vanessa Kaur MD    Subjective   Subjective     CC: f/u nausea    HPI:   Patient was seen resting back in bed with covers over her head.  Dozing.  Awakens easily to voice.  States she is just tired.  Tolerating diet.  Had a large bowel movement no nausea or vomiting.      Objective   Objective     Vital Signs:   Temp:  [98.2 °F (36.8 °C)-99.4 °F (37.4 °C)] 98.2 °F (36.8 °C)  Heart Rate:  [] 99  Resp:  [16-18] 16  BP: ()/(65-90) 106/75  Flow (L/min) (Oxygen Therapy):  [2] 2     Physical Exam:  Constitutional: No acute distress, Resting back in bed.  Dozing but awakens easily to voice.  Chronically debilitated appearing.  HENT: NCAT, mucous membranes moist  Respiratory: Clear to auscultation bilaterally, respiratory effort normal on 2 LNC.  Sats WNL.  Cardiovascular: RRR, no murmurs, rubs, or gallops  Gastrointestinal: Positive bowel sounds, soft, nontender, nondistended  Musculoskeletal: Trace BLE edema.  Johns spontaneously.  Psychiatric: Flat affect, cooperative  Neurologic: Oriented x 3 with slow responses, strength symmetric in all extremities but  generally weak, Cranial Nerves grossly intact to confrontation, speech clear.  Follows commands.  Skin: No rashes   Exam unchanged compared to yesterday/18/2025    Results Reviewed:  LAB RESULTS:      Lab 04/16/25  0618 04/15/25  0619 04/15/25  0120 04/14/25  2141 04/14/25  1925 04/14/25  1411 04/14/25 0528 04/13/25  0435 04/12/25  1811   WBC 11.85* 11.67*  --   --   --   --  16.16* 9.67  --    HEMOGLOBIN 11.8* 11.9*  --   --   --   --  12.7 12.4  --    HEMATOCRIT 35.7 35.9  --   --   --   --  38.2 36.9  --    PLATELETS 243 248  --   --   --   --  258 272  --    NEUTROS ABS 7.67* 7.25*  --   --   --   --  11.35* 8.88*  --    IMMATURE GRANS (ABS) 0.04 0.05  --   --   --   --  0.08* 0.05  --    LYMPHS ABS 2.99 3.15*  --   --   --   --  3.92* 0.67*  --    MONOS ABS 0.61 0.58  --   --   --   --  0.60 0.06*  --    EOS ABS 0.51* 0.59*  --   --   --   --  0.18 0.00  --    MCV 90.2 89.3  --   --   --   --  89.3 87.2  --    PROCALCITONIN  --   --   --   --   --   --  0.07  --  0.08   LACTATE  --   --  1.8 4.1* 2.5* 2.3*  --   --   --    HSTROP T  --   --   --   --  17* 26*  --   --   --          Lab 04/16/25  0618 04/15/25  0619 04/14/25  0528 04/13/25  0435   SODIUM 138 141 137 136   POTASSIUM 4.3 4.6 4.1 4.4   CHLORIDE 104 105 104 101   CO2 21.0* 22.0 21.0* 21.0*   ANION GAP 13.0 14.0 12.0 14.0   BUN 18 17 21* 16   CREATININE 1.33* 1.33* 1.74* 1.60*   EGFR 47.6* 47.6* 34.5* 38.2*   GLUCOSE 128* 142* 263* 396*   CALCIUM 9.3 9.1 9.8 9.4   MAGNESIUM  --   --  1.8 1.9         Lab 04/16/25  0618 04/15/25  0619 04/14/25  0528 04/13/25  0435   TOTAL PROTEIN 5.6* 5.7* 6.8 6.8   ALBUMIN 3.7 3.6 4.0 4.0   GLOBULIN 1.9 2.1 2.8 2.8   ALT (SGPT) 22 26 31 21   AST (SGOT) 14 18 25 14   BILIRUBIN 0.5 0.5 0.3 0.4   ALK PHOS 100 100 128* 122*         Lab 04/14/25  1925 04/14/25  1411   HSTROP T 17* 26*                 Brief Urine Lab Results  (Last result in the past 365 days)        Color   Clarity   Blood   Leuk Est   Nitrite   Protein    CREAT   Urine HCG        04/10/25 1734 Yellow   Clear   Negative   Trace   Negative   Negative                   Microbiology Results Abnormal       None            No radiology results from the last 24 hrs      Results for orders placed during the hospital encounter of 04/10/25    Adult Transthoracic Echo Complete W/ Cont if Necessary Per Protocol    Interpretation Summary    Left ventricular systolic function is normal. Estimated left ventricular EF = 60%    No significant valvular abnormalities.      Current medications:  Scheduled Meds:atorvastatin, 40 mg, Oral, Daily  cholestyramine light, 1 packet, Oral, Q12H  famotidine, 20 mg, Oral, Nightly  heparin (porcine), 5,000 Units, Subcutaneous, Q8H  insulin glargine, 15 Units, Subcutaneous, Daily  insulin glargine, 25 Units, Subcutaneous, Nightly  insulin lispro, 3-14 Units, Subcutaneous, 4x Daily AC & at Bedtime  ipratropium-albuterol, 3 mL, Nebulization, 4x Daily - RT  levothyroxine, 225 mcg, Oral, Q AM  risperiDONE, 3 mg, Oral, BID  saccharomyces boulardii, 250 mg, Oral, BID  senna-docusate sodium, 2 tablet, Oral, BID      Continuous Infusions:   PRN Meds:.  acetaminophen **OR** acetaminophen **OR** acetaminophen    senna-docusate sodium **AND** polyethylene glycol **AND** bisacodyl **AND** bisacodyl    dextrose    dextrose    glucagon (human recombinant)    melatonin    promethazine    Sodium Chloride (PF)    Assessment & Plan   Assessment & Plan     Active Hospital Problems    Diagnosis  POA    **Acute kidney injury superimposed on chronic kidney disease [N17.9, N18.9]  Yes    Acute kidney injury superimposed on CKD [N17.9, N18.9]  Yes    Diarrhea [R19.7]  Yes    Dehydration [E86.0]  Yes    Essential tremor [G25.0]  Yes    Type 2 diabetes mellitus without complication, with long-term current use of insulin [E11.9, Z79.4]  Not Applicable    Mixed hyperlipidemia [E78.2]  Yes    Other specified hypothyroidism [E03.8]  Yes    Anxiety and depression [F41.9, F32.A]   Yes      Resolved Hospital Problems   No resolved problems to display.        Brief Hospital Course to date:  Vilma Ayala is a 54 y.o. female with a history of Essential Tremor, T2DM, Hypothyroidism, anxiety/depression, and GERD who presents to [facility] ED for complaint of nausea and diarrhea.  Patient transferred to my services on the a.m. of 4/11, she is resting comfortably in bed, she reports diarrhea is doing better but stool studies are ordered and pending.  Continuing on IV fluids and treatment for CB, slight improvement in renal function today, will continue to monitor at this time.  Patient on the afternoon of 4/12 had some increased dyspnea and congestion.  Chest x-ray was obtained and could not rule out possible pneumonia however Pro-Jim was negative, white blood cell count was normal, and felt more pulmonary congestion, IV fluids were stopped and patient was given IV Bumex.  Seen again on the a.m. of 4/13, noticed to have some congestion on exam, CT of the chest was obtained and noted to have bilateral lower lobe atelectasis.  Patient has since been weaned to room air this afternoon and patient will be using incentive spirometer and flutter valve. Slight uptrend again of Cr on 4/14, patient with poor PO intake, some nausea this AM, additional meds ordered. Patient with slight tachycardia as well, will check troponin and obtain echo on her. No active chest pain however at this time.     This patient's problems and plans were partially entered by my partner and updated as appropriate by me 04/19/25.    Assessment/plan:     CB on CKD   Dehydration  Nausea/Vomiting  -Cr 2.07 on admission, down to 1.6 on 4/13, IVF previously held with dyspnea, oxygen requirement, now weaned back to RA, chest CT with just atelectasis, plans for IS/FD, encourarged PO intake.   -CT abd/pelvis negative for acute findings  -CB possibly 2ry to recent bactrim use, as well as dehydration from poor po intake. Improving.    -PT/OT following and rec'd IRF however patient accomplishing all transfers with SBA/CGA and ambulated 45 feet. Also no medical necessity for IRF. D/W her daughter this am that if interested in rehab can consider SNF however with her ambulating 45 feet, unclear if she would be approved.  --Patient still states she is weak and tired.  Awaiting return to group home versus short-term rehab.  CM following.  --s/p bowel regimen and a large BM yesterday evening/18.  -- Labs for this morning currently still not drawn as of 1350 p.m.     Dyspnea  Nausea   - possibly related to recent fluids .  Improving   - Ordered for duonebs, cxr, will add IS/FD as well   - CT Chest with just atelectasis, encouraged use of IS/FD  - ckd echo 4/14 and troponin, troponin 26, will follow but seems noncardiac. Echo EF 60%.  LVSF nl, no significant valvular abnormalities      HLD  -Continue statin     T2DM  -A1C 6.8   -Fingerstick achs. SSI  -lantus insulin nightly  --glucose generally varied last 24 hrs.  Will continue current regimen for today.     Essential Tremor  -Continue Respiradone, stable      Anxiety/depression  -Continue home meds, stable     Expected Discharge Location and Transportation: back to group home vs STR.  CM following   Expected Discharge   Expected Discharge Date: 4/20/2025; Expected Discharge Time:      VTE Prophylaxis:  Pharmacologic & mechanical VTE prophylaxis orders are present.         AM-PAC 6 Clicks Score (PT): 18 (04/19/25 1000)    CODE STATUS:   Code Status and Medical Interventions: CPR (Attempt to Resuscitate); Full Support   Ordered at: 04/10/25 2013     Code Status (Patient has no pulse and is not breathing):    CPR (Attempt to Resuscitate)     Medical Interventions (Patient has pulse or is breathing):    Full Support       ZIA Avery  04/19/25       Electronically signed by Chanda Tucker APRN at 04/19/25 3935       Chanda Tucker APRN at 04/18/25 1110               [facility] Medicine Services  PROGRESS NOTE    Patient Name: Vilma Ayala  : 1970  MRN: [id number]    Date of Admission: 4/10/2025  Primary Care Physician: Vanessa Kaur MD    Subjective   Subjective     CC: f/u nausea    HPI:   She was seen resting up in bed in no acute distress.  Awake and alert.  Currently denies nausea.  Still having mild occasional nausea but no vomiting.  States she just feels sleepy today.  No BM per nursing staff.  Awaiting therapy recommendations.  CM following.  Possible short-term rehab versus return to group home.    Objective   Objective     Vital Signs:   Temp:  [98.1 °F (36.7 °C)-99.9 °F (37.7 °C)] 98.7 °F (37.1 °C)  Heart Rate:  [] 85  Resp:  [10-20] 18  BP: (102-122)/(54-76) 102/63  Flow (L/min) (Oxygen Therapy):  [2] 2     Physical Exam:  Constitutional: No acute distress, Resting back in bed.  Drowsy but awake.  Chronically debilitated appearing.  HENT: NCAT, mucous membranes moist  Respiratory: Clear to auscultation bilaterally, respiratory effort normal on 2 LNC.  Sats WNL.  Cardiovascular: RRR, no murmurs, rubs, or gallops  Gastrointestinal: Positive bowel sounds, soft, nontender, nondistended  Musculoskeletal: Trace BLE edema.  Johns spontaneously.  Psychiatric: Flat affect, cooperative  Neurologic: Oriented x 3 with slow responses, strength symmetric in all extremities but generally weak, Cranial Nerves grossly intact to confrontation, speech clear.  Follows commands.  Skin: No rashes     Results Reviewed:  LAB RESULTS:      Lab 25  0618 04/15/25  0619 04/15/25  0120 25  2141 25  1925 25  1411 25  0528 25  0435 25  1811 25  0503   WBC 11.85* 11.67*  --   --   --   --  16.16* 9.67  --  10.55   HEMOGLOBIN 11.8* 11.9*  --   --   --   --  12.7 12.4  --  13.3   HEMATOCRIT 35.7 35.9  --   --   --   --  38.2 36.9  --  40.8   PLATELETS 243 248  --   --   --   --  258 272  --  230    NEUTROS ABS 7.67* 7.25*  --   --   --   --  11.35* 8.88*  --  5.85   IMMATURE GRANS (ABS) 0.04 0.05  --   --   --   --  0.08* 0.05  --  0.03   LYMPHS ABS 2.99 3.15*  --   --   --   --  3.92* 0.67*  --  3.49*   MONOS ABS 0.61 0.58  --   --   --   --  0.60 0.06*  --  0.56   EOS ABS 0.51* 0.59*  --   --   --   --  0.18 0.00  --  0.56*   MCV 90.2 89.3  --   --   --   --  89.3 87.2  --  89.9   PROCALCITONIN  --   --   --   --   --   --  0.07  --  0.08  --    LACTATE  --   --  1.8 4.1* 2.5* 2.3*  --   --   --   --    HSTROP T  --   --   --   --  17* 26*  --   --   --   --          Lab 04/16/25  0618 04/15/25  0619 04/14/25  0528 04/13/25  0435 04/12/25  0503   SODIUM 138 141 137 136 140   POTASSIUM 4.3 4.6 4.1 4.4 3.9   CHLORIDE 104 105 104 101 107   CO2 21.0* 22.0 21.0* 21.0* 23.0   ANION GAP 13.0 14.0 12.0 14.0 10.0   BUN 18 17 21* 16 10   CREATININE 1.33* 1.33* 1.74* 1.60* 1.52*   EGFR 47.6* 47.6* 34.5* 38.2* 40.6*   GLUCOSE 128* 142* 263* 396* 141*   CALCIUM 9.3 9.1 9.8 9.4 9.1   MAGNESIUM  --   --  1.8 1.9 1.7         Lab 04/16/25  0618 04/15/25  0619 04/14/25  0528 04/13/25  0435 04/12/25  0503   TOTAL PROTEIN 5.6* 5.7* 6.8 6.8 6.0   ALBUMIN 3.7 3.6 4.0 4.0 3.6   GLOBULIN 1.9 2.1 2.8 2.8 2.4   ALT (SGPT) 22 26 31 21 21   AST (SGOT) 14 18 25 14 18   BILIRUBIN 0.5 0.5 0.3 0.4 0.6   ALK PHOS 100 100 128* 122* 106         Lab 04/14/25  1925 04/14/25  1411   HSTROP T 17* 26*                 Brief Urine Lab Results  (Last result in the past 365 days)        Color   Clarity   Blood   Leuk Est   Nitrite   Protein   CREAT   Urine HCG        04/10/25 1734 Yellow   Clear   Negative   Trace   Negative   Negative                   Microbiology Results Abnormal       None            No radiology results from the last 24 hrs      Results for orders placed during the hospital encounter of 04/10/25    Adult Transthoracic Echo Complete W/ Cont if Necessary Per Protocol    Interpretation Summary    Left ventricular systolic function  is normal. Estimated left ventricular EF = 60%    No significant valvular abnormalities.      Current medications:  Scheduled Meds:atorvastatin, 40 mg, Oral, Daily  cholestyramine light, 1 packet, Oral, Q12H  famotidine, 20 mg, Oral, Nightly  heparin (porcine), 5,000 Units, Subcutaneous, Q8H  insulin glargine, 15 Units, Subcutaneous, Daily  insulin glargine, 25 Units, Subcutaneous, Nightly  insulin lispro, 3-14 Units, Subcutaneous, 4x Daily AC & at Bedtime  ipratropium-albuterol, 3 mL, Nebulization, 4x Daily - RT  lactulose, 20 g, Oral, Once  levothyroxine, 225 mcg, Oral, Q AM  risperiDONE, 3 mg, Oral, BID  saccharomyces boulardii, 250 mg, Oral, BID  senna-docusate sodium, 2 tablet, Oral, BID  sodium chloride, 10 mL, Intravenous, Q12H      Continuous Infusions:   PRN Meds:.  acetaminophen **OR** acetaminophen **OR** acetaminophen    senna-docusate sodium **AND** polyethylene glycol **AND** bisacodyl **AND** bisacodyl    dextrose    dextrose    glucagon (human recombinant)    melatonin    nitroglycerin    promethazine    Sodium Chloride (PF)    sodium chloride    sodium chloride    Assessment & Plan   Assessment & Plan     Active Hospital Problems    Diagnosis  POA    **Acute kidney injury superimposed on chronic kidney disease [N17.9, N18.9]  Yes    Acute kidney injury superimposed on CKD [N17.9, N18.9]  Yes    Diarrhea [R19.7]  Yes    Dehydration [E86.0]  Yes    Essential tremor [G25.0]  Yes    Type 2 diabetes mellitus without complication, with long-term current use of insulin [E11.9, Z79.4]  Not Applicable    Mixed hyperlipidemia [E78.2]  Yes    Other specified hypothyroidism [E03.8]  Yes    Anxiety and depression [F41.9, F32.A]  Yes      Resolved Hospital Problems   No resolved problems to display.        Brief Hospital Course to date:  Vilma Ayala is a 54 y.o. female with a history of Essential Tremor, T2DM, Hypothyroidism, anxiety/depression, and GERD who presents to [facility] ED for complaint of  nausea and diarrhea.  Patient transferred to my services on the a.m. of 4/11, she is resting comfortably in bed, she reports diarrhea is doing better but stool studies are ordered and pending.  Continuing on IV fluids and treatment for CB, slight improvement in renal function today, will continue to monitor at this time.  Patient on the afternoon of 4/12 had some increased dyspnea and congestion.  Chest x-ray was obtained and could not rule out possible pneumonia however Pro-Jim was negative, white blood cell count was normal, and felt more pulmonary congestion, IV fluids were stopped and patient was given IV Bumex.  Seen again on the a.m. of 4/13, noticed to have some congestion on exam, CT of the chest was obtained and noted to have bilateral lower lobe atelectasis.  Patient has since been weaned to room air this afternoon and patient will be using incentive spirometer and flutter valve. Slight uptrend again of Cr on 4/14, patient with poor PO intake, some nausea this AM, additional meds ordered. Patient with slight tachycardia as well, will check troponin and obtain echo on her. No active chest pain however at this time.     This patient's problems and plans were partially entered by my partner and updated as appropriate by me 04/18/25.    Assessment/plan:  Pt is new to me today      CB on CKD   Dehydration  Nausea/Vomiting  -Cr 2.07 on admission, down to 1.6 on 4/13, IVF previously held with dyspnea, oxygen requirement, now weaned back to RA, chest CT with just atelectasis, plans for IS/FD, encourarged PO intake.   -CT abd/pelvis negative for acute findings  -CB possibly 2ry to recent bactrim use, as well as dehydration from poor po intake. Improving.   -PT/OT following and rec'd IRF however patient accomplishing all transfers with SBA/CGA and ambulated 45 feet. Also no medical necessity for IRF. D/W her daughter this am that if interested in rehab can consider SNF however with her ambulating 45 feet, unclear  if she would be approved.  -Repeated reglan yesterday and enema.  Still no BM.  -since nausea has improved, will give onetime lactulose po today and dulcolax supp.       Dyspnea  Nausea   - possibly related to recent fluids .  Improving   - Ordered for duonebs, cxr, will add IS/FD as well   - CT Chest with just atelectasis, encouraged use of IS/FD  - ckd echo 4/14 and troponin, troponin 26, will follow but seems noncardiac. Echo EF 60%.  LVSF nl, no significant valvular abnormalities      HLD  -Continue statin     T2DM  -A1C 6.8   -Fingerstick achs. SSI  -lantus insulin nightly  --glucose generally varied last 24 hrs.  Will continue current regimen for today.   Lab Results   Component Value Date    GLUCOSE 128 (H) 04/16/2025    GLUCOSE 142 (H) 04/15/2025    GLUCOSE 263 (H) 04/14/2025    GLUCOSE 396 (H) 04/13/2025    GLUCOSE 141 (H) 04/12/2025      Essential Tremor  -Continue Respiradone, stable      Anxiety/depression  -Continue home meds, stable     Expected Discharge Location and Transportation: back to group [place] [facility].   following   Expected Discharge   Expected Discharge Date: 4/19/2025; Expected Discharge Time:      VTE Prophylaxis:  Pharmacologic & mechanical VTE prophylaxis orders are present.         AM-PAC 6 Clicks Score (PT): 17 (04/18/25 0810)    CODE STATUS:   Code Status and Medical Interventions: CPR (Attempt to Resuscitate); Full Support   Ordered at: 04/10/25 2013     Code Status (Patient has no pulse and is not breathing):    CPR (Attempt to Resuscitate)     Medical Interventions (Patient has pulse or is breathing):    Full Support       ZIA Avery  04/18/25       Electronically signed by Chanda Tucker APRN at 04/18/25 [id number]

## 2025-04-21 NOTE — DISCHARGE PLACEMENT REQUEST
"  Admissions [facility]  [phone]      Vilma Wooten (54 y.o. Female)       Date of Birth   1970    Social Security Number       Address   [address] [facility] [address]    Home Phone   [phone]    MRN   [id number]       Judaism   Confucianism    Marital Status   Single                            Admission Date   4/10/2025    Admission Type   Emergency    Admitting Provider   Nanda Wong MD    Attending Provider   Nanda Wong MD    Department, Room/Bed   [address], S579/1       Discharge Date       Discharge Disposition       Discharge Destination                                 Attending Provider: Nanda Wong MD    Allergies: Doxepin, Januvia [Sitagliptin]    Isolation: None   Infection: None   Code Status: CPR    Ht: 167.6 cm (65.98\")   Wt: 96.3 kg (212 lb 4.9 oz)    Admission Cmt: None   Principal Problem: Acute kidney injury superimposed on chronic kidney disease [N17.9,N18.9]                   Active Insurance as of 4/10/2025       Primary Coverage       Payor Plan Insurance Group Employer/Plan Group    [facility] WELLCARE MEDICARE ADVANTAGE PPO        Payor Plan Address Payor Plan Phone Number Payor Plan Fax Number Effective Dates    [address] [address]   3/1/2025 - None Entered    [facility] [id number]         Subscriber Name Subscriber Birth Date Member ID       VILMA WOOTEN BRADEN 1970 [id number]               Secondary Coverage       Payor Plan Insurance Group Employer/Plan Group    [place]        Payor Plan Address Payor Plan Phone Number Payor Plan Fax Number Effective Dates    [address] [phone]  2/27/2025 - None Entered    [facility] [address]         Subscriber Name Subscriber Birth Date Member ID       VILMA WOOTEN BRADEN 1970 [id number]                     Emergency Contacts        (Rel.) Home Phone Work Phone Mobile Phone    FRANCIE LOYOLA (Sister) " [phone] -- [phone]              Insurance Information                  WELLCARE [facility] MEDICARE REPLACEMENT/WELLCARE MEDICARE ADVANTAGE PPO Phone: --    Subscriber: Vilma Ayala Subscriber#: [id number]    Group#: -- Precert#: --    Authorization#: [id number] Effective Date: --        [place] Phone: [phone]    Subscriber: Vilma Ayala Subscriber#: [id number]    Group#: -- Precert#: --    Authorization#: [id number] Effective Date: --        Hank Garcia PA-C   Physician Assistant  Hospitalist     H&P     Attested     Date of Service: 04/10/25 1947  Creation Time: 04/10/25 1947     Attested           Attestation signed by Kerley, Brian Joseph, DO at 04/10/25 5905     I have reviewed this documentation and agree.               Expand All Collapse All[]Expand All by Default         [facility] Medicine Services  HISTORY AND PHYSICAL     Patient Name: Vilma Ayala  : 1970  MRN: [id number]  Primary Care Physician: Vanessa Kaur MD  Date of admission: 4/10/2025     Subjective  Subjective      Chief Complaint:  Nausea, diarrhea        HPI:  Vilma Ayala is a 54 y.o. female with a history of Essential Tremor, T2DM, Hypothyroidism, anxiety/depression, and GERD who presents to [facility] ED for complaint of nausea and diarrhea. She reports that she has felt nauseous for the last few weeks now. However, she denies any instances of vomiting. She states she was recently diagnosed with a UTI on  and was started on Bactrim, and took 3 days of this. Her last dose of Bactrim was a week ago today. She reports that she continues to have nausea, as well as diarrhea which she feels like is a little worse today, which prompted the ED visit. She also endorses some mild epigastric pain, fatigue and poor PO intake over the last week or so as well. She denies fever, chills, chest pain, SOB, cough, or vomiting.             Review of Systems   Constitutional:  Positive for appetite change and fatigue. Negative for chills, fever and unexpected weight change.   Eyes:  Negative for photophobia and visual disturbance.   Respiratory:  Negative for cough, shortness of breath and wheezing.    Cardiovascular:  Negative for chest pain and palpitations.   Gastrointestinal:  Positive for abdominal pain (epigastric), diarrhea and nausea. Negative for vomiting.   Genitourinary:  Negative for dysuria and hematuria.   Musculoskeletal:  Positive for arthralgias and back pain.   Skin: Negative.    Neurological:  Positive for tremors (chronic). Negative for weakness.   Psychiatric/Behavioral:  Negative for confusion. The patient is nervous/anxious.                   Personal History      Medical History        Past Medical History:   Diagnosis Date    Anxiety      Chicken pox      Depression      Diabetes mellitus      Disease of thyroid gland      Measles      Type 2 diabetes mellitus                       Surgical History         Past Surgical History:   Procedure Laterality Date    EYE SURGERY        TONSILLECTOMY                Family History:  family history includes Cancer in her father; Diabetes in her brother, mother, sister, and another family member; Heart disease in her mother; Hypertension in her mother and another family member; No Known Problems in her brother; Obesity in an other family member; Thyroid disease in an other family member.      Social History:  reports that she has never smoked. She has never used smokeless tobacco. She reports that she does not drink alcohol and does not use drugs.  Social History          Social History Narrative    Not on file         Medications:  Accu-Chek Softclix Lancets, Blood Glucose Monitoring Suppl, Cholecalciferol, Fluticasone-Umeclidin-Vilant, FreeStyle Sukhi 3 Plus Sensor, FreeStyle Sukhi 3 Girdwood, Insulin Glargine, Insulin Pen Needle, Insulin Syringe-Needle U-100, LORazepam, Lancet Device,  OneTouch Delica Lancets 33G, albuterol sulfate HFA, ammonium lactate, atorvastatin, benztropine, busPIRone, citalopram, famotidine, glucose, glucose blood, glucose monitor, hydrOXYzine, hydrOXYzine pamoate, ibuprofen, levothyroxine, metFORMIN, ondansetron, pantoprazole, pitavastatin calcium, promethazine, and risperiDONE     Allergies         Allergies   Allergen Reactions    Doxepin Rash    Januvia [Sitagliptin] Other (See Comments)       Insomnia            Objective  Objective      Vital Signs:   Temp:  [98.7 °F (37.1 °C)] 98.7 °F (37.1 °C)  Heart Rate:  [] 105  Resp:  [16] 16  BP: (101-201)/() 120/94     Physical Exam  Constitutional:       General: She is not in acute distress.     Appearance: She is obese.   HENT:      Head: Atraumatic.      Right Ear: External ear normal.      Left Ear: External ear normal.      Nose: Nose normal.   Eyes:      Extraocular Movements: Extraocular movements intact.      Conjunctiva/sclera: Conjunctivae normal.      Pupils: Pupils are equal, round, and reactive to light.   Cardiovascular:      Rate and Rhythm: Normal rate and regular rhythm.      Pulses: Normal pulses.      Heart sounds: Normal heart sounds. No murmur heard.  Pulmonary:      Effort: Pulmonary effort is normal. No respiratory distress.      Breath sounds: Normal breath sounds. No wheezing, rhonchi or rales.   Abdominal:      General: Bowel sounds are normal. There is no distension.      Tenderness: There is abdominal tenderness (mild epigastric tenderness). There is no guarding or rebound.   Musculoskeletal:         General: Normal range of motion.      Cervical back: No rigidity.   Skin:     General: Skin is warm and dry.      Coloration: Skin is not jaundiced.      Findings: No lesion or rash.   Neurological:      Mental Status: She is alert and oriented to person, place, and time.      Comments: Chronic essential tremor noted during exam.    Psychiatric:         Attention and Perception: Attention  normal.         Mood and Affect: Mood normal.         Behavior: Behavior normal.         Thought Content: Thought content normal.            Result Review:  I have personally reviewed the results from the time of this admission to 4/10/2025 20:14 EDT and agree with these findings:  [x]  Laboratory list / accordion  []  Microbiology  [x]  Radiology  []  EKG/Telemetry   []  Cardiology/Vascular   []  Pathology  [x]  Old records  []  Other:        LAB RESULTS:           Lab 04/10/25  1344 04/09/25  1029   WBC 18.06* 18.15*   HEMOGLOBIN 14.6 15.6   HEMATOCRIT 43.3 44.4   PLATELETS 338 373   NEUTROS ABS 14.41* 13.79*   IMMATURE GRANS (ABS) 0.08*  --    LYMPHS ABS 2.78  --    MONOS ABS 0.62  --    EOS ABS 0.12 0.36   MCV 86.4 86.0   LACTATE 1.6  --                Lab 04/10/25  1344 04/09/25  1029   SODIUM 138 136   POTASSIUM 3.9 4.0   CHLORIDE 101 99   CO2 24.0 24.2   ANION GAP 13.0 12.8   BUN 11 9   CREATININE 2.07* 1.88*   EGFR 28.0* 31.5*   GLUCOSE 112* 116*   CALCIUM 9.8 10.2   TSH  --  1.690               Lab 04/10/25  1344 04/09/25  1029   TOTAL PROTEIN 7.6 7.7   ALBUMIN 4.3 4.4   GLOBULIN 3.3 3.3   ALT (SGPT) 33 37*   AST (SGOT) 28 32   BILIRUBIN 0.8 0.9   ALK PHOS 137* 148*   LIPASE 21 21                  Lab 04/09/25  1029   CHOLESTEROL 113   LDL CHOL 55   HDL CHOL 37*   TRIGLYCERIDES 112              Brief Urine Lab Results  (Last result in the past 365 days)          Color   Clarity   Blood   Leuk Est   Nitrite   Protein   CREAT   Urine HCG         04/10/25 1734 Yellow    Clear    Negative    Trace    Negative    Negative                          Microbiology Results (last 10 days)         Procedure Component Value - Date/Time     Urine Culture - Urine, Urine, Clean Catch [[id number]] Collected: 04/01/25 1118     Lab Status: Final result Specimen: Urine, Clean Catch Updated: 04/03/25 0952       Urine Culture 50,000 CFU/mL Mixed Clemencia Isolated     Narrative:       Colonization of the urinary tract without  infection is common. Treatment is discouraged unless the patient is symptomatic, pregnant, or undergoing an invasive urologic procedure.  Specimen contains mixed organisms of questionable pathogenicity suggestive of contamination. If symptoms persist, suggest recollection.                  Radiology results from the last 24 hours:   CT Abdomen Pelvis Without Contrast  Result Date: 4/10/2025  CT ABDOMEN PELVIS WO CONTRAST Date of Exam: 4/10/2025 3:21 PM EDT Indication: N/V, left flank pain. Comparison: Chest CT 3/23/2025 Technique: Axial CT images were obtained of the abdomen and pelvis without the administration of contrast. Reconstructed coronal and sagittal images were also obtained. Automated exposure control and iterative construction methods were used. Findings: Included Chest: Bibasal atelectasis Liver: No significant abnormality.  Gallbladder and biliary tree: No significant abnormality.  Spleen: No significant abnormality.  Pancreas: No significant abnormality.  Adrenal glands: Nonspecific and unchanged mild bilateral adrenal gland thickening.  Kidneys and ureters: No significant abnormality. No hydroureteronephrosis. No radiopaque calculi seen.  Stomach and duodenum:No significant abnormality. Small and large bowel: Colonic diverticulosis. No evidence of bowel obstruction. Appendicoliths within an otherwise normal-appearing appendix. No significant pericolonic inflammatory changes seen. Peritoneal cavity: No free fluid or free air. Bladder: No significant abnormality.  Pelvic organs: No significant abnormality.  Vasculature: Atherosclerotic calcifications.  Lymph nodes: No pathologic appearing lymph nodes by imaging criteria.  Bones and soft tissues: Degenerative changes of the imaged spine. No acute osseous abnormality.      Impression: Impression: No acute findings in the abdomen or pelvis. Electronically Signed: Gregg Colindres  4/10/2025 3:32 PM EDT  Workstation ID: [id number]         Results for orders  placed during the hospital encounter of 09/05/19     Adult Transthoracic Echo Complete W/ Cont if Necessary Per Protocol     Interpretation Summary  · Left ventricular systolic function is normal. Estimated EF = 60%.  · No significant valve dysfunction.        Assessment & Plan  Assessment & Plan        Acute kidney injury superimposed on chronic kidney disease    Diarrhea    Dehydration    Mixed hyperlipidemia    Type 2 diabetes mellitus without complication, with long-term current use of insulin    Essential tremor    Anxiety and depression    Other specified hypothyroidism        Vilma Ayala is a 54 y.o. female with a history of Essential Tremor, T2DM, Hypothyroidism, anxiety/depression, and GERD who presents to [facility] ED for complaint of nausea and diarrhea.         CB on CKD   Dehydration  Nausea/Diarrhea  -Cr 2.07, elevated from recent results.  eGFR 28.0  -UA tonight unremarkable  -CT abd/pelvis negative for acute findings  -CB possibly 2ry to recent bactrim use, as well as dehydration from poor po intake.  -GI panel PCR pending.   -C diff toxin pending given leukocytosis and diarrhea from recent abx use.   -Resp panel PCR pending  -IV fluids  -Nephrology consult for the am  -Avoid anti-diarrheal meds for now pending results of C diff toxin.   -Zofran for nausea     HLD  -Continue statin     T2DM  -Blood glucose 112  -Check A1C  -Fingerstick achs. SSI  -LA insulin nightly     Essential Tremor  -Continue Respiradone     Anxiety/depression  -Continue home meds              DVT prophylaxis:  SCDS     CODE STATUS:  Full Code  Code Status (Patient has no pulse and is not breathing): CPR (Attempt to Resuscitate)  Medical Interventions (Patient has pulse or is breathing): Full Support        Expected DischargeTBD         This note has been completed as part of a split-shared workflow.      Signature: Electronically signed by Hank Garcia PA-C, 04/10/25, 8:05 PM EDT                               Cosigned by: Kerley, Brian Joseph, DO at 04/10/25 2149     Revision History    Routing History  Helen Diallo PT   Physical Therapist  Specialty: Physical Therapy     Therapy Treatment Note     Signed     Date of Service: 25 1325  Creation Time: 25 1501     Signed       Expand All Collapse All[]Expand All by Default    Patient Name: Vilma Ayala             : 1970                      MRN: [id number]                              Today's Date: 2025                                   Admit Date: 4/10/2025                        Visit Dx:   Visit Diagnosis       ICD-10-CM ICD-9-CM   1. CB (acute kidney injury)  N17.9 584.9   2. Nausea and vomiting, unspecified vomiting type  R11.2 787.01   3. Weakness  R53.1 780.79   4. Oropharyngeal dysphagia  R13.12 787.22         Problem List       Patient Active Problem List   Diagnosis    Anxiety and depression    Uncontrolled type 2 diabetes mellitus    Other specified hypothyroidism    Obesity    Vitamin D deficiency, unspecified    Diabetic neuropathy, type II diabetes mellitus    Mixed hyperlipidemia    Dyspnea on exertion    Type 2 diabetes mellitus without complication, with long-term current use of insulin    Urinary incontinence    Microscopic hematuria    Nocturia    Nocturnal enuresis    Stress incontinence    Urge incontinence    Decreased GFR    Mild persistent asthma    Obesity (BMI 35.0-39.9 without comorbidity)    Drug-induced parkinsonism    Encounter to establish care    NATASHA (obstructive sleep apnea)    Medicare annual wellness visit, subsequent    Vitamin D deficiency    UTI (urinary tract infection)    Painful urination    Nausea    Acute kidney injury superimposed on chronic kidney disease    Diarrhea    Dehydration    Essential tremor    Acute kidney injury superimposed on CKD         Medical History        Past Medical History:   Diagnosis Date    Anxiety      Chicken pox      Depression      Diabetes mellitus       Disease of thyroid gland      Measles      Type 2 diabetes mellitus           Surgical History         Past Surgical History:   Procedure Laterality Date    EYE SURGERY        TONSILLECTOMY               General Information        Row Name 04/18/25 1451                 Physical Therapy Time and Intention     Document Type therapy note (daily note)  -ND       Mode of Treatment physical therapy  -ND          Row Name 04/18/25 1451                 General Information     Patient Profile Reviewed yes  -ND       Existing Precautions/Restrictions fall;other (see comments) Tremulous -ND       Barriers to Rehab none identified  -ND          Row Name 04/18/25 1451                 Cognition     Orientation Status (Cognition) oriented x 3  -ND          Row Name 04/18/25 1451                 Safety Issues/Impairments Affecting Functional Mobility     Safety Issues Affecting Function (Mobility) awareness of need for assistance;insight into deficits/self-awareness;safety precaution awareness;safety precautions follow-through/compliance;sequencing abilities  -ND       Impairments Affecting Function (Mobility) balance;strength;endurance/activity tolerance  -ND                       User Key  (r) = Recorded By, (t) = Taken By, (c) = Cosigned By        Initials Name Provider Type     ND Helen Diallo, PT Physical Therapist                            Mobility        Row Name 04/18/25 1452                 Bed Mobility     Bed Mobility supine-sit  -ND       Supine-Sit [place] (Bed Mobility) contact guard;verbal cues;nonverbal cues (demo/gesture)  -ND       Assistive Device (Bed Mobility) bed rails  -ND       Comment, (Bed Mobility) Increased time for task, cues for sequencing. Denies dizziness with position change.  -ND          Row Name 04/18/25 1452                 Bed-Chair Transfer     Bed-Chair [place] (Transfers) contact guard;verbal cues;nonverbal cues (demo/gesture)  -ND       Assistive Device (Bed-Chair  Transfers) walker, standard  -ND       Comment, (Bed-Chair Transfer) SPT from EOB>BSC  -ND          Row Name 04/18/25 1452                 Sit-Stand Transfer     Sit-Stand [place] (Transfers) contact guard;verbal cues;nonverbal cues (demo/gesture)  -ND       Assistive Device (Sit-Stand Transfers) walker, standard  -ND       Comment, (Sit-Stand Transfer) x1 from EOB, x2 from BSC.  -ND          Row Name 04/18/25 1452                 Gait/Stairs (Locomotion)     [place] Level (Gait) contact guard;verbal cues;nonverbal cues (demo/gesture)  -ND       Assistive Device (Gait) walker, front-wheeled  -ND       Distance in Feet (Gait) 3  -ND       Comment, (Gait/Stairs) Pt ambulates short distance from BSC to chair with standard walker and CGA. Encouraged pt to ambulate further but she declines this date due to fatigue.  -ND                       User Key  (r) = Recorded By, (t) = Taken By, (c) = Cosigned By        Initials Name Provider Type     ND Helen Diallo PT Physical Therapist                            Obj/Interventions        Row Name 04/18/25 1454                 Motor Skills     Therapeutic Exercise hip;knee;ankle  -ND          Row Name 04/18/25 1454                 Hip (Therapeutic Exercise)     Hip (Therapeutic Exercise) strengthening exercise  -ND       Hip Strengthening (Therapeutic Exercise) bilateral;marching while seated;10 repetitions  -ND          Row Name 04/18/25 1454                 Knee (Therapeutic Exercise)     Knee (Therapeutic Exercise) strengthening exercise  -ND       Knee Strengthening (Therapeutic Exercise) bilateral;LAQ (long arc quad);10 repetitions  -ND          Row Name 04/18/25 1454                 Ankle (Therapeutic Exercise)     Ankle (Therapeutic Exercise) AROM (active range of motion)  -ND       Ankle AROM (Therapeutic Exercise) bilateral;dorsiflexion;plantarflexion;10 repetitions  -ND          Row Name 04/18/25 1454                 Balance     Balance Assessment  sitting static balance;sitting dynamic balance;standing static balance;standing dynamic balance  -ND       Static Sitting Balance standby assist  -ND       Dynamic Sitting Balance contact guard  -ND       Position, Sitting Balance unsupported;sitting edge of bed  -ND       Static Standing Balance contact guard  -ND       Dynamic Standing Balance contact guard  -ND       Position/Device Used, Standing Balance supported;walker, standard  -ND       Balance Interventions sitting;standing;sit to stand;static;supported;dynamic  -ND       Comment, Balance No knee buckling or LOB noted. Pt requires dependent haritha-hygiene following BSC transfer.  -ND                       User Key  (r) = Recorded By, (t) = Taken By, (c) = Cosigned By        Initials Name Provider Type     Helen Valverde PT Physical Therapist                            Goals/Plan    No documentation.                        Clinical Impression        Row Name 04/18/25 1456                 Pain     Pretreatment Pain Rating 0/10 - no pain  -ND       Posttreatment Pain Rating 0/10 - no pain  -ND          Row Name 04/18/25 1456                 Plan of Care Review     Plan of Care Reviewed With patient  -ND       Progress no change  -ND       Outcome Evaluation Pt declines further ambulation this date due to fatigue and therefore only performs functional transfers with standard walker. Pt continues to benefit from IP PT services to progress mobility and facilitate return to baseline. Recommend SNF following d/c for best functional outcome.  -ND          Row Name 04/18/25 1456                 Vital Signs     Pre Systolic BP Rehab 104  -ND       Pre Treatment Diastolic BP 62  -ND       Post Systolic BP Rehab 111  -ND       Post Treatment Diastolic BP 77  -ND       Pretreatment Heart Rate (beats/min) 96  -ND       Posttreatment Heart Rate (beats/min) 97  -ND       Pre SpO2 (%) 95  -ND       O2 Delivery Pre Treatment room air  -ND       O2 Delivery Intra  Treatment room air  -ND       Post SpO2 (%) 92  -ND       O2 Delivery Post Treatment room air  -ND       Pre Patient Position Supine  -ND       Intra Patient Position Standing  -ND       Post Patient Position Sitting  -ND          Row Name 04/18/25 1456                 Positioning and Restraints     Pre-Treatment Position in bed  -ND       Post Treatment Position chair  -ND       In Chair notified nsg;reclined;legs elevated;call light within reach;encouraged to call for assist;exit alarm on;waffle cushion  -ND                       User Key  (r) = Recorded By, (t) = Taken By, (c) = Cosigned By        Initials Name Provider Type     Helen Valverde PT Physical Therapist                            Outcome Measures        Row Name 04/18/25 1459 04/18/25 0810            How much help from another person do you currently need...     Turning from your back to your side while in flat bed without using bedrails? 3  -ND 3  -TI     Moving from lying on back to sitting on the side of a flat bed without bedrails? 3  -ND 3  -TI     Moving to and from a bed to a chair (including a wheelchair)? 3  -ND 3  -TI     Standing up from a chair using your arms (e.g., wheelchair, bedside chair)? 3  -ND 3  -TI     Climbing 3-5 steps with a railing? 2  -ND 2  -TI     To walk in hospital room? 3  -ND 3  -TI     AM-PAC 6 Clicks Score (PT) 17  -ND 17  -TI     Highest Level of Mobility Goal 5 --> Static standing  -ND 5 --> Static standing  -TI        Row Name 04/18/25 1459                 Functional Assessment     Outcome Measure Options AM-PAC 6 Clicks Basic Mobility (PT)  -ND                       User Key  (r) = Recorded By, (t) = Taken By, (c) = Cosigned By        Initials Name Provider Type     TI Mulu Ann RN Registered Nurse     Hleen Valverde PT Physical Therapist                          Physical Therapy Education           Title: PT OT SLP Therapies (Done)         Topic: Physical Therapy (Done)         Point:  Mobility training (Done)         Learning Progress Summary             Patient Acceptance, E, VU by ND at 4/18/2025 1459     Acceptance, E,D, VU,NR by LR at 4/16/2025 1349     Comment: Educated on benefits of mobility, safety with mobility, correct supine to sit t/f technique, correct sit<->stand t/f technique, correct gait mechanics, LE HEP, and progression of POC.     Acceptance, E, VU,NR by LS at 4/12/2025 0950                            Point: Home exercise program (Done)         Learning Progress Summary             Patient Acceptance, E, VU by ND at 4/18/2025 1459     Acceptance, E,D, VU,NR by LR at 4/16/2025 1349     Comment: Educated on benefits of mobility, safety with mobility, correct supine to sit t/f technique, correct sit<->stand t/f technique, correct gait mechanics, LE HEP, and progression of POC.                            Point: Body mechanics (Done)         Learning Progress Summary             Patient Acceptance, E, VU by ND at 4/18/2025 1459     Acceptance, E,D, VU,NR by LR at 4/16/2025 1349     Comment: Educated on benefits of mobility, safety with mobility, correct supine to sit t/f technique, correct sit<->stand t/f technique, correct gait mechanics, LE HEP, and progression of POC.                            Point: Precautions (Done)         Learning Progress Summary             Patient Acceptance, E, VU by ND at 4/18/2025 1459     Acceptance, E,D, VU,NR by LR at 4/16/2025 1349     Comment: Educated on benefits of mobility, safety with mobility, correct supine to sit t/f technique, correct sit<->stand t/f technique, correct gait mechanics, LE HEP, and progression of POC.                                                User Key         Initials Effective Dates Name Provider Type Discipline     LR 02/03/23 -  Saba Mejía, PT Physical Therapist PT     LS 07/11/23 -  Jody Yeung, PT Physical Therapist PT     ND 11/16/23 -  Helen Diallo, PT Physical Therapist PT                           PT Recommendation and Plan  Progress: no change  Outcome Evaluation: Pt declines further ambulation this date due to fatigue and therefore only performs functional transfers with standard walker. Pt continues to benefit from IP PT services to progress mobility and facilitate return to baseline. Recommend SNF following d/c for best functional outcome.      Time Calculation:        PT Charges         Row Name 04/18/25 1459                       Time Calculation     Start Time 1325  -ND         PT Received On 04/18/25  -ND                 Timed Charges     45917 - PT Therapeutic Exercise Minutes 8  -ND         61717 - PT Therapeutic Activity Minutes 16  -ND                 Total Minutes     Timed Charges Total Minutes 24  -ND          Total Minutes 24  -ND                      User Key  (r) = Recorded By, (t) = Taken By, (c) = Cosigned By        Initials Name Provider Type     ND Helen Diallo, PT Physical Therapist                       Therapy Charges for Today         Code Description Service Date Service Provider Modifiers Qty     98698411097 HC PT THER PROC EA 15 MIN 4/18/2025 Helen Diallo, PT GP 1     01119791390 HC PT THERAPEUTIC ACT EA 15 MIN 4/18/2025 Helen Diallo, PT GP 1                PT G-Codes  Outcome Measure Options: AM-PAC 6 Clicks Basic Mobility (PT)  AM-PAC 6 Clicks Score (PT): 17  PT Discharge Summary  Anticipated Discharge Disposition (PT): skilled nursing facility     Helen Diallo PT                       4/18/2025

## 2025-04-22 ENCOUNTER — APPOINTMENT (OUTPATIENT)
Dept: GENERAL RADIOLOGY | Facility: HOSPITAL | Age: 55
DRG: 683 | End: 2025-04-22
Payer: MEDICARE

## 2025-04-22 LAB
ALBUMIN SERPL-MCNC: 3.7 G/DL (ref 3.5–5.2)
ALBUMIN/GLOB SERPL: 1.4 G/DL
ALP SERPL-CCNC: 108 U/L (ref 39–117)
ALT SERPL W P-5'-P-CCNC: 37 U/L (ref 1–33)
ANION GAP SERPL CALCULATED.3IONS-SCNC: 13 MMOL/L (ref 5–15)
AST SERPL-CCNC: 33 U/L (ref 1–32)
BILIRUB SERPL-MCNC: 0.5 MG/DL (ref 0–1.2)
BUN SERPL-MCNC: 14 MG/DL (ref 6–20)
BUN/CREAT SERPL: 9.9 (ref 7–25)
CALCIUM SPEC-SCNC: 9.5 MG/DL (ref 8.6–10.5)
CHLORIDE SERPL-SCNC: 102 MMOL/L (ref 98–107)
CO2 SERPL-SCNC: 24 MMOL/L (ref 22–29)
CREAT SERPL-MCNC: 1.41 MG/DL (ref 0.57–1)
EGFRCR SERPLBLD CKD-EPI 2021: 44.4 ML/MIN/1.73
GLOBULIN UR ELPH-MCNC: 2.7 GM/DL
GLUCOSE BLDC GLUCOMTR-MCNC: 163 MG/DL (ref 70–130)
GLUCOSE BLDC GLUCOMTR-MCNC: 240 MG/DL (ref 70–130)
GLUCOSE BLDC GLUCOMTR-MCNC: 279 MG/DL (ref 70–130)
GLUCOSE BLDC GLUCOMTR-MCNC: 290 MG/DL (ref 70–130)
GLUCOSE SERPL-MCNC: 118 MG/DL (ref 65–99)
POTASSIUM SERPL-SCNC: 4.3 MMOL/L (ref 3.5–5.2)
PROT SERPL-MCNC: 6.4 G/DL (ref 6–8.5)
SODIUM SERPL-SCNC: 139 MMOL/L (ref 136–145)

## 2025-04-22 PROCEDURE — 99232 SBSQ HOSP IP/OBS MODERATE 35: CPT | Performed by: NURSE PRACTITIONER

## 2025-04-22 PROCEDURE — 63710000001 INSULIN GLARGINE PER 5 UNITS: Performed by: FAMILY MEDICINE

## 2025-04-22 PROCEDURE — 94799 UNLISTED PULMONARY SVC/PX: CPT

## 2025-04-22 PROCEDURE — 94761 N-INVAS EAR/PLS OXIMETRY MLT: CPT

## 2025-04-22 PROCEDURE — 97110 THERAPEUTIC EXERCISES: CPT

## 2025-04-22 PROCEDURE — 97165 OT EVAL LOW COMPLEX 30 MIN: CPT

## 2025-04-22 PROCEDURE — 63710000001 INSULIN LISPRO (HUMAN) PER 5 UNITS: Performed by: FAMILY MEDICINE

## 2025-04-22 PROCEDURE — 94664 DEMO&/EVAL PT USE INHALER: CPT

## 2025-04-22 PROCEDURE — 63710000001 PROMETHAZINE PER 12.5 MG: Performed by: INTERNAL MEDICINE

## 2025-04-22 PROCEDURE — 82948 REAGENT STRIP/BLOOD GLUCOSE: CPT

## 2025-04-22 PROCEDURE — 25010000002 HEPARIN (PORCINE) PER 1000 UNITS: Performed by: FAMILY MEDICINE

## 2025-04-22 PROCEDURE — 97116 GAIT TRAINING THERAPY: CPT

## 2025-04-22 PROCEDURE — 92611 MOTION FLUOROSCOPY/SWALLOW: CPT

## 2025-04-22 PROCEDURE — 63710000001 INSULIN LISPRO (HUMAN) PER 5 UNITS: Performed by: NURSE PRACTITIONER

## 2025-04-22 PROCEDURE — 74230 X-RAY XM SWLNG FUNCJ C+: CPT

## 2025-04-22 PROCEDURE — 80053 COMPREHEN METABOLIC PANEL: CPT | Performed by: INTERNAL MEDICINE

## 2025-04-22 RX ORDER — PROMETHAZINE HYDROCHLORIDE 12.5 MG/1
12.5 TABLET ORAL EVERY 6 HOURS PRN
Status: DISCONTINUED | OUTPATIENT
Start: 2025-04-22 | End: 2025-04-25 | Stop reason: HOSPADM

## 2025-04-22 RX ORDER — PANTOPRAZOLE SODIUM 40 MG/1
40 TABLET, DELAYED RELEASE ORAL DAILY
Status: DISCONTINUED | OUTPATIENT
Start: 2025-04-22 | End: 2025-04-25 | Stop reason: HOSPADM

## 2025-04-22 RX ORDER — CITALOPRAM HYDROBROMIDE 40 MG/1
40 TABLET ORAL DAILY
Status: DISCONTINUED | OUTPATIENT
Start: 2025-04-22 | End: 2025-04-25 | Stop reason: HOSPADM

## 2025-04-22 RX ADMIN — IPRATROPIUM BROMIDE AND ALBUTEROL SULFATE 3 ML: 2.5; .5 SOLUTION RESPIRATORY (INHALATION) at 12:04

## 2025-04-22 RX ADMIN — SENNOSIDES AND DOCUSATE SODIUM 2 TABLET: 50; 8.6 TABLET ORAL at 08:14

## 2025-04-22 RX ADMIN — INSULIN LISPRO 8 UNITS: 100 INJECTION, SOLUTION INTRAVENOUS; SUBCUTANEOUS at 08:16

## 2025-04-22 RX ADMIN — INSULIN LISPRO 3 UNITS: 100 INJECTION, SOLUTION INTRAVENOUS; SUBCUTANEOUS at 12:04

## 2025-04-22 RX ADMIN — CHOLEYSTYRAMINE LIGHT 4 G: 4 POWDER, FOR SUSPENSION ORAL at 08:17

## 2025-04-22 RX ADMIN — Medication 250 MG: at 08:14

## 2025-04-22 RX ADMIN — LEVOTHYROXINE SODIUM 225 MCG: 0.05 TABLET ORAL at 05:19

## 2025-04-22 RX ADMIN — Medication 5 MG: at 21:32

## 2025-04-22 RX ADMIN — INSULIN LISPRO 3 UNITS: 100 INJECTION, SOLUTION INTRAVENOUS; SUBCUTANEOUS at 17:35

## 2025-04-22 RX ADMIN — CITALOPRAM HYDROBROMIDE 40 MG: 40 TABLET ORAL at 17:35

## 2025-04-22 RX ADMIN — INSULIN LISPRO 3 UNITS: 100 INJECTION, SOLUTION INTRAVENOUS; SUBCUTANEOUS at 08:15

## 2025-04-22 RX ADMIN — BUSPIRONE HYDROCHLORIDE 15 MG: 10 TABLET ORAL at 21:18

## 2025-04-22 RX ADMIN — INSULIN GLARGINE 25 UNITS: 100 INJECTION, SOLUTION SUBCUTANEOUS at 21:18

## 2025-04-22 RX ADMIN — IPRATROPIUM BROMIDE AND ALBUTEROL SULFATE 3 ML: 2.5; .5 SOLUTION RESPIRATORY (INHALATION) at 19:28

## 2025-04-22 RX ADMIN — INSULIN LISPRO 8 UNITS: 100 INJECTION, SOLUTION INTRAVENOUS; SUBCUTANEOUS at 21:19

## 2025-04-22 RX ADMIN — BARIUM SULFATE 50 ML: 400 SUSPENSION ORAL at 10:33

## 2025-04-22 RX ADMIN — Medication 250 MG: at 21:18

## 2025-04-22 RX ADMIN — FAMOTIDINE 20 MG: 20 TABLET, FILM COATED ORAL at 21:18

## 2025-04-22 RX ADMIN — HEPARIN SODIUM 5000 UNITS: 5000 INJECTION INTRAVENOUS; SUBCUTANEOUS at 05:19

## 2025-04-22 RX ADMIN — IPRATROPIUM BROMIDE AND ALBUTEROL SULFATE 3 ML: 2.5; .5 SOLUTION RESPIRATORY (INHALATION) at 15:26

## 2025-04-22 RX ADMIN — ATORVASTATIN CALCIUM 40 MG: 40 TABLET, FILM COATED ORAL at 08:14

## 2025-04-22 RX ADMIN — INSULIN LISPRO 5 UNITS: 100 INJECTION, SOLUTION INTRAVENOUS; SUBCUTANEOUS at 17:36

## 2025-04-22 RX ADMIN — RISPERIDONE 3 MG: 1 TABLET, FILM COATED ORAL at 08:15

## 2025-04-22 RX ADMIN — INSULIN GLARGINE 15 UNITS: 100 INJECTION, SOLUTION SUBCUTANEOUS at 08:15

## 2025-04-22 RX ADMIN — RISPERIDONE 3 MG: 1 TABLET, FILM COATED ORAL at 21:17

## 2025-04-22 RX ADMIN — PANTOPRAZOLE SODIUM 40 MG: 40 TABLET, DELAYED RELEASE ORAL at 17:35

## 2025-04-22 RX ADMIN — HEPARIN SODIUM 5000 UNITS: 5000 INJECTION INTRAVENOUS; SUBCUTANEOUS at 21:19

## 2025-04-22 RX ADMIN — HEPARIN SODIUM 5000 UNITS: 5000 INJECTION INTRAVENOUS; SUBCUTANEOUS at 12:05

## 2025-04-22 RX ADMIN — IPRATROPIUM BROMIDE AND ALBUTEROL SULFATE 3 ML: 2.5; .5 SOLUTION RESPIRATORY (INHALATION) at 07:24

## 2025-04-22 RX ADMIN — BARIUM SULFATE 55 ML: 0.81 POWDER, FOR SUSPENSION ORAL at 10:33

## 2025-04-22 RX ADMIN — BARIUM SULFATE 20 ML: 400 PASTE ORAL at 10:33

## 2025-04-22 RX ADMIN — SENNOSIDES AND DOCUSATE SODIUM 2 TABLET: 50; 8.6 TABLET ORAL at 21:17

## 2025-04-22 RX ADMIN — PROMETHAZINE HYDROCHLORIDE 12.5 MG: 12.5 TABLET ORAL at 14:01

## 2025-04-22 NOTE — DISCHARGE PLACEMENT REQUEST
"Vilma Wooten (54 y.o. Female) From Clarence Garcia RN  [id number]      Date of Birth   1970    Social Security Number       Address   [address] [facility] [address]    Home Phone   [phone]    MRN   [id number]       Denominational   Voodoo    Marital Status   Single                            Admission Date   4/10/2025    Admission Type   Emergency    Admitting Provider   Guerrero Archer MD    Attending Provider   Guerrero Archer MD    Department, Room/Bed   [address], S579/1       Discharge Date       Discharge Disposition       Discharge Destination                                 Attending Provider: Guerrero Archer MD    Allergies: Doxepin, Januvia [Sitagliptin]    Isolation: None   Infection: None   Code Status: CPR    Ht: 167.6 cm (65.98\")   Wt: 96.3 kg (212 lb 4.9 oz)    Admission Cmt: None   Principal Problem: Acute kidney injury superimposed on chronic kidney disease [N17.9,N18.9]                   Active Insurance as of 4/10/2025       Primary Coverage       Payor Plan Insurance Group Employer/Plan Group    [facility] WE[employer] MEDICARE ADVANTAGE PPO        Payor Plan Address Payor Plan Phone Number Payor Plan Fax Number Effective Dates    [address] [address]   3/1/2025 - None Entered    [facility] [address]         Subscriber Name Subscriber Birth Date Member ID       VILMA WOOTEN BRADEN 1970 [id number]               Secondary Coverage       Payor Plan Insurance Group Employer/Plan Group    [place]        Payor Plan Address Payor Plan Phone Number Payor Plan Fax Number Effective Dates    [address] [phone]  2/27/2025 - None Entered    [facility] [id number]         Subscriber Name Subscriber Birth Date Member ID       VILMA WOOTEN BRADEN 1970 [id number]                     Emergency Contacts        (Rel.) Home Phone Work Phone Mobile Phone    FRANCIE LOYOLA (Sister) [phone] " -- [phone]                 Physical Therapy Notes (most recent note)        Keiry Moncada, PT at 25 1131  Version 1 of 1         Patient Name: Vilma Ayala  : 1970    MRN: [id number]                              Today's Date: 2025       Admit Date: 4/10/2025    Visit Dx:     ICD-10-CM ICD-9-CM   1. CB (acute kidney injury)  N17.9 584.9   2. Nausea and vomiting, unspecified vomiting type  R11.2 787.01   3. Weakness  R53.1 780.79   4. Oropharyngeal dysphagia  R13.12 787.22     Patient Active Problem List   Diagnosis    Anxiety and depression    Uncontrolled type 2 diabetes mellitus    Other specified hypothyroidism    Obesity    Vitamin D deficiency, unspecified    Diabetic neuropathy, type II diabetes mellitus    Mixed hyperlipidemia    Dyspnea on exertion    Type 2 diabetes mellitus without complication, with long-term current use of insulin    Urinary incontinence    Microscopic hematuria    Nocturia    Nocturnal enuresis    Stress incontinence    Urge incontinence    Decreased GFR    Mild persistent asthma    Obesity (BMI 35.0-39.9 without comorbidity)    Drug-induced parkinsonism    Encounter to establish care    NATASHA (obstructive sleep apnea)    Medicare annual wellness visit, subsequent    Vitamin D deficiency    UTI (urinary tract infection)    Painful urination    Nausea    Acute kidney injury superimposed on chronic kidney disease    Diarrhea    Dehydration    Essential tremor    Acute kidney injury superimposed on CKD     Past Medical History:   Diagnosis Date    Anxiety     Chicken pox     Depression     Diabetes mellitus     Disease of thyroid gland     Measles     Type 2 diabetes mellitus      Past Surgical History:   Procedure Laterality Date    EYE SURGERY      TONSILLECTOMY        General Information       Row Name 25 4039          Physical Therapy Time and Intention    Document Type progress note/recertification  -AMITA     Mode of Treatment physical therapy  -AMITA        Row Name 04/22/25 1308          General Information    Patient Profile Reviewed yes  -KE     Existing Precautions/Restrictions fall  tremors  -KE     Barriers to Rehab medically complex  -KE       Row Name 04/22/25 1308          Cognition    Orientation Status (Cognition) oriented x 3  -KE       Row Name 04/22/25 1308          Safety Issues/Impairments Affecting Functional Mobility    Safety Issues Affecting Function (Mobility) awareness of need for assistance;safety precaution awareness;safety precautions follow-through/compliance;insight into deficits/self-awareness  -KE     Impairments Affecting Function (Mobility) balance;strength;endurance/activity tolerance  -KE               User Key  (r) = Recorded By, (t) = Taken By, (c) = Cosigned By      Initials Name Provider Type    Keiry Heard, PT Physical Therapist                   Mobility       Row Name 04/22/25 1309          Bed Mobility    Comment, (Bed Mobility) [facility] pre/post tx  -KE       Row Name 04/22/25 1309          Sit-Stand Transfer    Sit-Stand [place] (Transfers) contact guard;verbal cues;nonverbal cues (demo/gesture)  -KE     Assistive Device (Sit-Stand Transfers) walker, front-wheeled  -KE     Comment, (Sit-Stand Transfer) VCs for improving HP  -KE       Row Name 04/22/25 1309          Gait/Stairs (Locomotion)    [place] Level (Gait) contact guard;verbal cues;nonverbal cues (demo/gesture)  -KE     Assistive Device (Gait) walker, front-wheeled  -KE     Patient was able to Ambulate yes  -KE     Distance in Feet (Gait) 22  -KE     Deviations/Abnormal Patterns (Gait) bilateral deviations;mal[place] decreased;gait speed decreased;stride length decreased  -KE     Bilateral Gait Deviations forward flexed posture;heel strike decreased  -KE     Comment, (Gait/Stairs) Pt demo step through gait pattern with widened BRENNEN, decr heel strike, fwd flexed posture, and decr foot clearance of R LE w/ swing phase as pt noted to fatigue. Further mobility  limited by fatigue. VCs for improving safety awareness w/ functional mobility  -KE               User Key  (r) = Recorded By, (t) = Taken By, (c) = Cosigned By      Initials Name Provider Type    Keiry Heard PT Physical Therapist                   Obj/Interventions       Row Name 04/22/25 1313          Motor Skills    Therapeutic Exercise ankle;knee;hip  -KE       Row Name 04/22/25 1313          Hip (Therapeutic Exercise)    Hip (Therapeutic Exercise) strengthening exercise  -KE     Hip Strengthening (Therapeutic Exercise) marching while seated;10 repetitions;bilateral  -KE       Row Name 04/22/25 1313          Knee (Therapeutic Exercise)    Knee (Therapeutic Exercise) strengthening exercise  -KE     Knee Strengthening (Therapeutic Exercise) LAQ (long arc quad);SLR (straight leg raise);10 repetitions;bilateral  -KE       Row Name 04/22/25 1313          Ankle (Therapeutic Exercise)    Ankle (Therapeutic Exercise) AROM (active range of motion)  -     Ankle AROM (Therapeutic Exercise) bilateral;dorsiflexion;plantarflexion;10 repetitions  -KE       Row Name 04/22/25 1313          Balance    Balance Assessment sitting static balance;sitting dynamic balance;standing static balance;standing dynamic balance  -KE     Static Sitting Balance standby assist  -KE     Dynamic Sitting Balance contact guard  -KE     Position, Sitting Balance sitting in chair  -KE     Static Standing Balance contact guard  -KE     Dynamic Standing Balance contact guard  -KE     Position/Device Used, Standing Balance supported;walker, front-wheeled  -KE     Balance Interventions sitting;standing;sit to stand;supported;dynamic;static  -KE     Comment, Balance no overt LOB noted  -KE               User Key  (r) = Recorded By, (t) = Taken By, (c) = Cosigned By      Initials Name Provider Type    Keiry Heard PT Physical Therapist                   Goals/Plan       Row Name 04/22/25 1326          Bed Mobility Goal 1 (PT)     Activity/Assistive Device (Bed Mobility Goal 1, PT) sit to supine/supine to sit  -KE     [place] Level/Cues Needed (Bed Mobility Goal 1, PT) modified independence  -KE     Time Frame (Bed Mobility Goal 1, PT) short term goal (STG);5 days  -KE     Progress/Outcomes (Bed Mobility Goal 1, PT) goal revised this date  -       Row Name 04/22/25 1326          Transfer Goal 1 (PT)    Activity/Assistive Device (Transfer Goal 1, PT) sit-to-stand/stand-to-sit;walker, rolling  -KE     [place] Level/Cues Needed (Transfer Goal 1, PT) modified independence  -KE     Time Frame (Transfer Goal 1, PT) long term goal (LTG);10 days  -KE     Progress/Outcome (Transfer Goal 1, PT) goal revised this date  -KE       Row Name 04/22/25 1326          Gait Training Goal 1 (PT)    Activity/Assistive Device (Gait Training Goal 1, PT) gait (walking locomotion);assistive device use;walker, rolling  -KE     [place] Level (Gait Training Goal 1, PT) modified independence  -KE     Distance (Gait Training Goal 1, PT) 100 feet  -KE     Time Frame (Gait Training Goal 1, PT) long term goal (LTG);10 days  -KE     Progress/Outcome (Gait Training Goal 1, PT) progress slower than expected;goal ongoing  -       Row Name 04/22/25 1326          Therapy Assessment/Plan (PT)    Planned Therapy Interventions (PT) balance training;bed mobility training;gait training;home exercise program;neuromuscular re-education;patient/family education;postural re-education;transfer training;stretching;strengthening;ROM (range of motion);stair training  -KE               User Key  (r) = Recorded By, (t) = Taken By, (c) = Cosigned By      Initials Name Provider Type    Keiry Heard, PT Physical Therapist                   Clinical Impression       Row Name 04/22/25 1319 04/22/25 1317       Pain    Pretreatment Pain Rating 0/10 - no pain  -KE 0/10 - no pain  -KE    Posttreatment Pain Rating 0/10 - no pain  -KE 0/10 - no pain  -KE      Row Name 04/22/25 1316           Pain Scale: FACES Pre/Post-Treatment    Pain: FACES Scale, Pretreatment 0-->no hurt  -KE     Posttreatment Pain Rating 0-->no hurt  -KE       Row Name 04/22/25 1319          Plan of Care Review    Plan of Care Reviewed With patient  -KE     Progress improving  -KE     Outcome Evaluation Pt ambulating increased distance w/ FWW, CGA. Pt participating in 5x sit to stand test, scoring 31 sec indicating pt is at a high risk of falls. Pt may continue to benefit from IPPT to address LE weakness, balance deficits, decreased safety awareness, and decreased functional endurance and facilitate improved independence. Pt making good progress toward all goals at this time. PT rec SNF at d/c for best functional outcome.  -KE       Row Name 04/22/25 1319          Vital Signs    Pre Systolic BP Rehab 96  -KE     Pre Treatment Diastolic BP 75  -KE     Intra Systolic BP Rehab 98  -KE     Intra Treatment Diastolic BP 69  -KE     Post Systolic BP Rehab 91  -KE     Post Treatment Diastolic BP 69  -KE     O2 Delivery Pre Treatment room air  -KE     O2 Delivery Intra Treatment room air  -KE     O2 Delivery Post Treatment room air  -KE     Pre Patient Position Sitting  -KE     Intra Patient Position Standing  -KE     Post Patient Position Sitting  -KE       Row Name 04/22/25 1319          Positioning and Restraints    Pre-Treatment Position sitting in chair/recliner  -KE     Post Treatment Position chair  -KE     In Chair notified nsg;reclined;waffle cushion;call light within reach;encouraged to call for assist;exit alarm on;legs elevated;heels elevated  -KE               User Key  (r) = Recorded By, (t) = Taken By, (c) = Cosigned By      Initials Name Provider Type    Keiry Heard, PT Physical Therapist                   Outcome Measures       Row Name 04/22/25 1317 04/22/25 0814       How much help from another person do you currently need...    Turning from your back to your side while in flat bed without using bedrails?  4  -KE 4 (P)   -AG    Moving from lying on back to sitting on the side of a flat bed without bedrails? 3  -KE 3 (P)   -AG    Moving to and from a bed to a chair (including a wheelchair)? 3  -KE 3 (P)   -AG    Standing up from a chair using your arms (e.g., wheelchair, bedside chair)? 3  -KE 3 (P)   -AG    Climbing 3-5 steps with a railing? 3  -KE 3 (P)   -AG    To walk in hospital room? 3  -KE 3 (P)   -AG    AM-PAC 6 Clicks Score (PT) 19  -KE 19 (P)   -AG    Highest Level of Mobility Goal 6 --> Walk 10 steps or more  -KE 6 --> Walk 10 steps or more (P)   -AG      Row Name 04/22/25 1317 04/22/25 0949       Functional Assessment    Outcome Measure Options AM-PAC 6 Clicks Basic Mobility (PT)  -KE AM-PAC 6 Clicks Daily Activity (OT)  -AC      Row Name 04/22/25 1317          5 Times Sit to Stand    5 Times Sit to Stand (seconds) 31 seconds  -KE               User Key  (r) = Recorded By, (t) = Taken By, (c) = Cosigned By      Initials Name Provider Type    AC Minal Dye, OT Occupational Therapist    Keiry Heard, PT Physical Therapist    Hortencia Hernández, Nursing Student Nursing Student                                 Physical Therapy Education       Title: PT OT SLP Therapies (In Progress)       Topic: Physical Therapy (Done)       Point: Mobility training (Done)       Learning Progress Summary            Patient Acceptance, E, VU by AMITA at 4/22/2025 1323    Acceptance, E, VU by ND at 4/18/2025 1459    Acceptance, E,D, VU,NR by LR at 4/16/2025 1349    Comment: Educated on benefits of mobility, safety with mobility, correct supine to sit t/f technique, correct sit<->stand t/f technique, correct gait mechanics, LE HEP, and progression of POC.    Acceptance, E, VU,NR by NICCI at 4/12/2025 0950                      Point: Home exercise program (Done)       Learning Progress Summary            Patient Acceptance, E, VU by AMITA at 4/22/2025 1323    Acceptance, E, VU by ND at 4/18/2025 1459    Acceptance, E,D, VU,NR by  LR at 4/16/2025 1349    Comment: Educated on benefits of mobility, safety with mobility, correct supine to sit t/f technique, correct sit<->stand t/f technique, correct gait mechanics, LE HEP, and progression of POC.                      Point: Body mechanics (Done)       Learning Progress Summary            Patient Acceptance, E, VU by KE at 4/22/2025 1323    Acceptance, E, VU by ND at 4/18/2025 1459    Acceptance, E,D, VU,NR by LR at 4/16/2025 1349    Comment: Educated on benefits of mobility, safety with mobility, correct supine to sit t/f technique, correct sit<->stand t/f technique, correct gait mechanics, LE HEP, and progression of POC.                      Point: Precautions (Done)       Learning Progress Summary            Patient Acceptance, E, VU by KE at 4/22/2025 1323    Acceptance, E, VU by ND at 4/18/2025 1459    Acceptance, E,D, VU,NR by LR at 4/16/2025 1349    Comment: Educated on benefits of mobility, safety with mobility, correct supine to sit t/f technique, correct sit<->stand t/f technique, correct gait mechanics, LE HEP, and progression of POC.                                      User Key       Initials Effective Dates Name Provider Type Discipline    LR 02/03/23 -  Saba Mejía, PT Physical Therapist PT    LS 07/11/23 -  Jody Yeung, PT Physical Therapist PT    ND 11/16/23 -  Helen Diallo, PT Physical Therapist PT    KE 11/16/23 -  Keiry Moncada, PT Physical Therapist PT                  PT Recommendation and Plan  Planned Therapy Interventions (PT): balance training, bed mobility training, gait training, home exercise program, neuromuscular re-education, patient/family education, postural re-education, transfer training, stretching, strengthening, ROM (range of motion), stair training  Progress: improving  Outcome Evaluation: Pt ambulating increased distance w/ FWW, CGA. Pt participating in 5x sit to stand test, scoring 31 sec indicating pt is at a high risk of  falls. Pt may continue to benefit from IPPT to address LE weakness, balance deficits, decreased safety awareness, and decreased functional endurance and facilitate improved independence. Pt making good progress toward all goals at this time. PT rec SNF at d/c for best functional outcome.     Time Calculation:         PT Charges       Row Name 25 1325             Time Calculation    Start Time 1131  -KE      PT Received On 25  -KE      PT Goal Re-Cert Due Date 25  -KE         Timed Charges    06161 - PT Therapeutic Exercise Minutes 12  -KE      14007 - Gait Training Minutes  11  -KE         Total Minutes    Timed Charges Total Minutes 23  -KE       Total Minutes 23  -KE                User Key  (r) = Recorded By, (t) = Taken By, (c) = Cosigned By      Initials Name Provider Type    Keiry Heard PT Physical Therapist                  Therapy Charges for Today       Code Description Service Date Service Provider Modifiers Qty    07254178601 HC PT THER PROC EA 15 MIN 2025 Keiry Moncada, PT GP 1    91898425009 HC GAIT TRAINING EA 15 MIN 2025 Keiry Moncada, PT GP 1            PT G-Codes  Outcome Measure Options: AM-PAC 6 Clicks Basic Mobility (PT)  AM-PAC 6 Clicks Score (PT): 19  AM-PAC 6 Clicks Score (OT): 18  PT Discharge Summary  Anticipated Discharge Disposition (PT): skilled nursing facility    Keiry Moncada PT  2025      Electronically signed by Keiry Moncada PT at 25 1329          Occupational Therapy Notes (most recent note)        Minal Dye, OT at 25 2048          Patient Name: Vilma Ayala  : 1970    MRN: [id number]                              Today's Date: 2025       Admit Date: 4/10/2025    Visit Dx:     ICD-10-CM ICD-9-CM   1. CB (acute kidney injury)  N17.9 584.9   2. Nausea and vomiting, unspecified vomiting type  R11.2 787.01   3. Weakness  R53.1 780.79   4. Oropharyngeal dysphagia  R13.12 787.22     Patient Active  Problem List   Diagnosis    Anxiety and depression    Uncontrolled type 2 diabetes mellitus    Other specified hypothyroidism    Obesity    Vitamin D deficiency, unspecified    Diabetic neuropathy, type II diabetes mellitus    Mixed hyperlipidemia    Dyspnea on exertion    Type 2 diabetes mellitus without complication, with long-term current use of insulin    Urinary incontinence    Microscopic hematuria    Nocturia    Nocturnal enuresis    Stress incontinence    Urge incontinence    Decreased GFR    Mild persistent asthma    Obesity (BMI 35.0-39.9 without comorbidity)    Drug-induced parkinsonism    Encounter to establish care    NATASHA (obstructive sleep apnea)    Medicare annual wellness visit, subsequent    Vitamin D deficiency    UTI (urinary tract infection)    Painful urination    Nausea    Acute kidney injury superimposed on chronic kidney disease    Diarrhea    Dehydration    Essential tremor    Acute kidney injury superimposed on CKD     Past Medical History:   Diagnosis Date    Anxiety     Chicken pox     Depression     Diabetes mellitus     Disease of thyroid gland     Measles     Type 2 diabetes mellitus      Past Surgical History:   Procedure Laterality Date    EYE SURGERY      TONSILLECTOMY        General Information       Row Name 04/22/25 0730          OT Time and Intention    Document Type evaluation  -AC     Mode of Treatment occupational therapy  -AC     Patient Effort good  -AC       Row Name 04/22/25 3621          General Information    Patient Profile Reviewed yes  -AC     Prior Level of Function independent:;all household mobility;ADL's  -AC     Existing Precautions/Restrictions fall  tremors  -AC     Barriers to Rehab medically complex  -AC       Row Name 04/22/25 0089          Occupational Profile    Environmental Supports and Barriers (Occupational Profile) tub shower with shower chair  -AC       Row Name 04/22/25 0735          Living Environment    Current Living Arrangements other (see  comments);independent living facility  [facility] [place]  -     People in Home alone  -       Row Name 04/22/25 0730          Home Main Entrance    Number of Stairs, Main Entrance none  -       Row Name 04/22/25 0730          Cognition    Orientation Status (Cognition) oriented x 3  -       Row Name 04/22/25 0730          Safety Issues/Impairments Affecting Functional Mobility    Safety Issues Affecting Function (Mobility) awareness of need for assistance;insight into deficits/self-awareness;safety precaution awareness;safety precautions follow-through/compliance  -     Impairments Affecting Function (Mobility) balance;strength;endurance/activity tolerance  -               User Key  (r) = Recorded By, (t) = Taken By, (c) = Cosigned By      Initials Name Provider Type     Minal Dye OT Occupational Therapist                     Mobility/ADL's       Row Name 04/22/25 0942          Bed Mobility    Comment, (Bed Mobility) [facility] pre/post tx  -       Row Name 04/22/25 0942          Transfers    Transfers sit-stand transfer  -       Row Name 04/22/25 0942          Sit-Stand Transfer    Sit-Stand [place] (Transfers) contact guard;verbal cues;nonverbal cues (demo/gesture)  -     Assistive Device (Sit-Stand Transfers) walker, front-wheeled  -     Comment, (Sit-Stand Transfer) VCs for hand placement, required 2 attempts to stand, pt reporting unable to maintain standing first attempt d/t fatigue  -       Row Name 04/22/25 0942          Functional Mobility    Functional Mobility- Ind. Level verbal cues required;contact guard assist  -     Functional Mobility- Device walker, front-wheeled  -AC     Functional Mobility-Distance (Feet) 16  -     Functional Mobility- Safety Issues step length decreased  -       Row Name 04/22/25 0942          Activities of Daily Living    BADL Assessment/Intervention lower body dressing;grooming  -       Row Name 04/22/25 0942          Lower Body Dressing  Assessment/Training    [place] Level (Lower Body Dressing) don;socks;minimum assist (75% patient effort)  -     Position (Lower Body Dressing) unsupported sitting  -AC       Row Name 04/22/25 0942          Grooming Assessment/Training    [place] Level (Grooming) wash face, hands;set up;supervision  -AC     Position (Grooming) supported sitting  -               User Key  (r) = Recorded By, (t) = Taken By, (c) = Cosigned By      Initials Name Provider Type    AC Minal Dye, OT Occupational Therapist                   Obj/Interventions       Row Name 04/22/25 0944          Sensory Assessment (Somatosensory)    Sensory Assessment (Somatosensory) UE sensation intact  -       Row Name 04/22/25 0944          Vision Assessment/Intervention    Visual Impairment/Limitations corrective lenses full-time  -       Row Name 04/22/25 0944          Range of Motion Comprehensive    General Range of Motion bilateral upper extremity ROM WNL  -       Row Name 04/22/25 0944          Strength Comprehensive (MMT)    Comment, General Manual Muscle Testing (MMT) Assessment BUE grossly 4-/5  -       Row Name 04/22/25 0944          Motor Skills    Motor Skills coordination  -     Coordination bilateral;upper extremity;minimal impairment  -       Row Name 04/22/25 0944          Balance    Balance Assessment sitting static balance;sitting dynamic balance;standing static balance;standing dynamic balance  -     Static Sitting Balance standby assist  -     Dynamic Sitting Balance contact guard  -AC     Position, Sitting Balance unsupported;sitting edge of bed  -     Static Standing Balance verbal cues;contact guard  -AC     Dynamic Standing Balance verbal cues;contact guard  -AC     Position/Device Used, Standing Balance supported;walker, rolling  -               User Key  (r) = Recorded By, (t) = Taken By, (c) = Cosigned By      Initials Name Provider Type    Minal Valle, OT Occupational Therapist                    Goals/Plan       Row Name 04/22/25 0948          Transfer Goal 1 (OT)    Activity/Assistive Device (Transfer Goal 1, OT) bed-to-chair/chair-to-bed;toilet;walker, rolling  -AC     [place] Level/Cues Needed (Transfer Goal 1, OT) standby assist  -AC     Time Frame (Transfer Goal 1, OT) long term goal (LTG);10 days  -AC     Progress/Outcome (Transfer Goal 1, OT) new goal;goal ongoing  -AC       Row Name 04/22/25 0948          Dressing Goal 1 (OT)    Activity/Device (Dressing Goal 1, OT) lower body dressing;upper body dressing  -AC     [place]/Cues Needed (Dressing Goal 1, OT) set-up required;supervision required  -AC     Time Frame (Dressing Goal 1, OT) long term goal (LTG);10 days  -AC     Progress/Outcome (Dressing Goal 1, OT) new goal;goal ongoing  -       Row Name 04/22/25 0948          Toileting Goal 1 (OT)    Activity/Device (Toileting Goal 1, OT) adjust/manage clothing;perform perineal hygiene  -AC     [place] Level/Cues Needed (Toileting Goal 1, OT) standby assist  -AC     Time Frame (Toileting Goal 1, OT) short term goal (STG);5 days  -AC     Progress/Outcome (Toileting Goal 1, OT) new goal;goal ongoing  -       Row Name 04/22/25 0948          Therapy Assessment/Plan (OT)    Planned Therapy Interventions (OT) activity tolerance training;BADL retraining;functional balance retraining;occupation/activity based interventions;patient/caregiver education/training;strengthening exercise;transfer/mobility retraining  -               User Key  (r) = Recorded By, (t) = Taken By, (c) = Cosigned By      Initials Name Provider Type    AC Minal Dye OT Occupational Therapist                   Clinical Impression       Row Name 04/22/25 0945          Pain Assessment    Pretreatment Pain Rating 0/10 - no pain  -AC     Posttreatment Pain Rating 0/10 - no pain  -AC       Row Name 04/22/25 0945          Plan of Care Review    Plan of Care Reviewed With patient  -AC     Outcome Evaluation Pt  presents below baseline with self care/mobility d/t weakness and decr activity tolerance.  Pt min A LBD,  CGA to ambulate with RW.  OT will follow to advance pt toward PLOF.  Recommend SNF prior to returning [facility] [place] for best outcome.  -       Row Name 04/22/25 0945          Therapy Assessment/Plan (OT)    Criteria for Skilled Therapeutic Interventions Met (OT) yes;skilled treatment is necessary  -AC     Therapy Frequency (OT) daily  -AC       Row Name 04/22/25 0945          Therapy Plan Review/Discharge Plan (OT)    Anticipated Discharge Disposition (OT) skilled nursing facility  -       Row Name 04/22/25 0945          Vital Signs    Pre Systolic BP Rehab 111  -AC     Pre Treatment Diastolic BP 80  -AC     Pre SpO2 (%) 94  -AC     O2 Delivery Pre Treatment room air  -AC     O2 Delivery Intra Treatment room air  -AC     Post SpO2 (%) 94  -AC     O2 Delivery Post Treatment room air  -AC     Pre Patient Position Sitting  -AC     Intra Patient Position Standing  -AC     Post Patient Position Sitting  -AC       Row Name 04/22/25 0945          Positioning and Restraints    Pre-Treatment Position sitting in chair/recliner  -AC     Post Treatment Position chair  -AC     In Chair notified nsg;reclined;call light within reach;encouraged to call for assist;exit alarm on;waffle cushion  -AC               User Key  (r) = Recorded By, (t) = Taken By, (c) = Cosigned By      Initials Name Provider Type    Minal Valle, OT Occupational Therapist                   Outcome Measures       Row Name 04/22/25 0949          How much help from another is currently needed...    Putting on and taking off regular lower body clothing? 3  -AC     Bathing (including washing, rinsing, and drying) 3  -AC     Toileting (which includes using toilet bed pan or urinal) 3  -AC     Putting on and taking off regular upper body clothing 3  -AC     Taking care of personal grooming (such as brushing teeth) 3  -AC     Eating meals 3  -AC     AM-PAC 6  Clicks Score (OT) 18  -       Row Name 04/22/25 0949          Functional Assessment    Outcome Measure Options AM-PAC 6 Clicks Daily Activity (OT)  -               User Key  (r) = Recorded By, (t) = Taken By, (c) = Cosigned By      Initials Name Provider Type    AC Minal Dye, OT Occupational Therapist                    Occupational Therapy Education       Title: PT OT SLP Therapies (In Progress)       Topic: Occupational Therapy (In Progress)       Point: ADL training (In Progress)       Learning Progress Summary            Patient Acceptance, E, NR by  at 4/22/2025 0949                                      User Key       Initials Effective Dates Name Provider Type Discipline     02/03/23 -  Minal Dye, OT Occupational Therapist OT                  OT Recommendation and Plan  Planned Therapy Interventions (OT): activity tolerance training, BADL retraining, functional balance retraining, occupation/activity based interventions, patient/caregiver education/training, strengthening exercise, transfer/mobility retraining  Therapy Frequency (OT): daily  Plan of Care Review  Plan of Care Reviewed With: patient  Outcome Evaluation: Pt presents below baseline with self care/mobility d/t weakness and decr activity tolerance.  Pt min A LBD,  CGA to ambulate with RW.  OT will follow to advance pt toward PLOF.  Recommend SNF prior to returning to [facility] for best outcome.     Time Calculation:   Evaluation Complexity (OT)  Review Occupational Profile/Medical/Therapy History Complexity: brief/low complexity  Assessment, Occupational Performance/Identification of Deficit Complexity: 1-3 performance deficits  Clinical Decision Making Complexity (OT): problem focused assessment/low complexity  Overall Complexity of Evaluation (OT): low complexity     Time Calculation- OT       Row Name 04/22/25 0730             Time Calculation- OT    OT Start Time 0730  -      OT Received On 04/22/25  -      OT Goal Re-Cert Due  Date 05/02/25  -AC         Untimed Charges    OT Eval/Re-eval Minutes 50  -AC         Total Minutes    Untimed Charges Total Minutes 50  -AC       Total Minutes 50  -AC                User Key  (r) = Recorded By, (t) = Taken By, (c) = Cosigned By      Initials Name Provider Type    AC Minal Dye OT Occupational Therapist                  Therapy Charges for Today       Code Description Service Date Service Provider Modifiers Qty    68361761254 HC OT EVAL LOW COMPLEXITY 4 4/22/2025 Minal Dye OT GO 1                 Minal Dye OT  4/22/2025    Electronically signed by Minal Dye OT at 04/22/25 0951

## 2025-04-22 NOTE — PLAN OF CARE
Goal Outcome Evaluation:  Plan of Care Reviewed With: patient           Outcome Evaluation: Pt presents below baseline with self care/mobility d/t weakness and decr activity tolerance.  Pt min A LBD,  CGA to ambulate with RW.  OT will follow to advance pt toward PLOF.  Recommend SNF prior to returning to [facility] for best outcome.    Anticipated Discharge Disposition (OT): skilled nursing facility

## 2025-04-22 NOTE — THERAPY PROGRESS REPORT/RE-CERT
Patient Name: Vilma Ayala  : 1970    MRN: [id number]                              Today's Date: 2025       Admit Date: 4/10/2025    Visit Dx:     ICD-10-CM ICD-9-CM   1. CB (acute kidney injury)  N17.9 584.9   2. Nausea and vomiting, unspecified vomiting type  R11.2 787.01   3. Weakness  R53.1 780.79   4. Oropharyngeal dysphagia  R13.12 787.22     Patient Active Problem List   Diagnosis    Anxiety and depression    Uncontrolled type 2 diabetes mellitus    Other specified hypothyroidism    Obesity    Vitamin D deficiency, unspecified    Diabetic neuropathy, type II diabetes mellitus    Mixed hyperlipidemia    Dyspnea on exertion    Type 2 diabetes mellitus without complication, with long-term current use of insulin    Urinary incontinence    Microscopic hematuria    Nocturia    Nocturnal enuresis    Stress incontinence    Urge incontinence    Decreased GFR    Mild persistent asthma    Obesity (BMI 35.0-39.9 without comorbidity)    Drug-induced parkinsonism    Encounter to establish care    NATASHA (obstructive sleep apnea)    Medicare annual wellness visit, subsequent    Vitamin D deficiency    UTI (urinary tract infection)    Painful urination    Nausea    Acute kidney injury superimposed on chronic kidney disease    Diarrhea    Dehydration    Essential tremor    Acute kidney injury superimposed on CKD     Past Medical History:   Diagnosis Date    Anxiety     Chicken pox     Depression     Diabetes mellitus     Disease of thyroid gland     Measles     Type 2 diabetes mellitus      Past Surgical History:   Procedure Laterality Date    EYE SURGERY      TONSILLECTOMY        General Information       Row Name 25 6983          Physical Therapy Time and Intention    Document Type progress note/recertification  -KE     Mode of Treatment physical therapy  -KE       Row Name 25 1306          General Information    Patient Profile Reviewed yes  -KE     Existing Precautions/Restrictions fall   tremors  -KE     Barriers to Rehab medically complex  -KE       Row Name 04/22/25 1308          Cognition    Orientation Status (Cognition) oriented x 3  -KE       Row Name 04/22/25 1308          Safety Issues/Impairments Affecting Functional Mobility    Safety Issues Affecting Function (Mobility) awareness of need for assistance;safety precaution awareness;safety precautions follow-through/compliance;insight into deficits/self-awareness  -KE     Impairments Affecting Function (Mobility) balance;strength;endurance/activity tolerance  -KE               User Key  (r) = Recorded By, (t) = Taken By, (c) = Cosigned By      Initials Name Provider Type    Keiry Heard, PT Physical Therapist                   Mobility       Row Name 04/22/25 1309          Bed Mobility    Comment, (Bed Mobility) UIC pre/post tx  -KE       Row Name 04/22/25 1309          Sit-Stand Transfer    Sit-Stand [place] (Transfers) contact guard;verbal cues;nonverbal cues (demo/gesture)  -KE     Assistive Device (Sit-Stand Transfers) walker, front-wheeled  -KE     Comment, (Sit-Stand Transfer) VCs for improving HP  -KE       Row Name 04/22/25 1309          Gait/Stairs (Locomotion)    [place] Level (Gait) contact guard;verbal cues;nonverbal cues (demo/gesture)  -KE     Assistive Device (Gait) walker, front-wheeled  -KE     Patient was able to Ambulate yes  -KE     Distance in Feet (Gait) 22  -KE     Deviations/Abnormal Patterns (Gait) bilateral deviations;[place] decreased;gait speed decreased;stride length decreased  -KE     Bilateral Gait Deviations forward flexed posture;heel strike decreased  -KE     Comment, (Gait/Stairs) Pt demo step through gait pattern with widened BRENNEN, decr heel strike, fwd flexed posture, and decr foot clearance of R LE w/ swing phase as pt noted to fatigue. Further mobility limited by fatigue. VCs for improving safety awareness w/ functional mobility  -KE               User Key  (r) = Recorded By, (t) = Taken  By, (c) = Cosigned By      Initials Name Provider Type    Keiry Heard, LUKASZ Physical Therapist                   Obj/Interventions       Row Name 04/22/25 1313          Motor Skills    Therapeutic Exercise ankle;knee;hip  -KE       Row Name 04/22/25 1313          Hip (Therapeutic Exercise)    Hip (Therapeutic Exercise) strengthening exercise  -AMITA     Hip Strengthening (Therapeutic Exercise) marching while seated;10 repetitions;bilateral  -KE       Row Name 04/22/25 1313          Knee (Therapeutic Exercise)    Knee (Therapeutic Exercise) strengthening exercise  -     Knee Strengthening (Therapeutic Exercise) LAQ (long arc quad);SLR (straight leg raise);10 repetitions;bilateral  -KE       Row Name 04/22/25 1313          Ankle (Therapeutic Exercise)    Ankle (Therapeutic Exercise) AROM (active range of motion)  -     Ankle AROM (Therapeutic Exercise) bilateral;dorsiflexion;plantarflexion;10 repetitions  -       Row Name 04/22/25 1313          Balance    Balance Assessment sitting static balance;sitting dynamic balance;standing static balance;standing dynamic balance  -     Static Sitting Balance standby assist  -     Dynamic Sitting Balance contact guard  -KE     Position, Sitting Balance sitting in chair  -KE     Static Standing Balance contact guard  -KE     Dynamic Standing Balance contact guard  -     Position/Device Used, Standing Balance supported;walker, front-wheeled  -KE     Balance Interventions sitting;standing;sit to stand;supported;dynamic;static  -KE     Comment, Balance no overt LOB noted  -               User Key  (r) = Recorded By, (t) = Taken By, (c) = Cosigned By      Initials Name Provider Type    Keiry Heard, PT Physical Therapist                   Goals/Plan       Row Name 04/22/25 1326          Bed Mobility Goal 1 (PT)    Activity/Assistive Device (Bed Mobility Goal 1, PT) sit to supine/supine to sit  -KE     [place] Level/Cues Needed (Bed Mobility Goal 1, PT)  modified independence  -KE     Time Frame (Bed Mobility Goal 1, PT) short term goal (STG);5 days  -KE     Progress/Outcomes (Bed Mobility Goal 1, PT) goal revised this date  -KE       Row Name 04/22/25 1326          Transfer Goal 1 (PT)    Activity/Assistive Device (Transfer Goal 1, PT) sit-to-stand/stand-to-sit;walker, rolling  -KE     [place] Level/Cues Needed (Transfer Goal 1, PT) modified independence  -KE     Time Frame (Transfer Goal 1, PT) long term goal (LTG);10 days  -KE     Progress/Outcome (Transfer Goal 1, PT) goal revised this date  -KE       Row Name 04/22/25 1326          Gait Training Goal 1 (PT)    Activity/Assistive Device (Gait Training Goal 1, PT) gait (walking locomotion);assistive device use;walker, rolling  -KE     [place] Level (Gait Training Goal 1, PT) modified independence  -KE     Distance (Gait Training Goal 1, PT) 100 feet  -KE     Time Frame (Gait Training Goal 1, PT) long term goal (LTG);10 days  -KE     Progress/Outcome (Gait Training Goal 1, PT) progress slower than expected;goal ongoing  -       Row Name 04/22/25 1326          Therapy Assessment/Plan (PT)    Planned Therapy Interventions (PT) balance training;bed mobility training;gait training;home exercise program;neuromuscular re-education;patient/family education;postural re-education;transfer training;stretching;strengthening;ROM (range of motion);stair training  -KE               User Key  (r) = Recorded By, (t) = Taken By, (c) = Cosigned By      Initials Name Provider Type    Keiry Heard, PT Physical Therapist                   Clinical Impression       Row Name 04/22/25 1319 04/22/25 1317       Pain    Pretreatment Pain Rating 0/10 - no pain  -KE 0/10 - no pain  -KE    Posttreatment Pain Rating 0/10 - no pain  -KE 0/10 - no pain  -KE      Row Name 04/22/25 1319          Pain Scale: FACES Pre/Post-Treatment    Pain: FACES Scale, Pretreatment 0-->no hurt  -KE     Posttreatment Pain Rating 0-->no hurt  -KE        Row Name 04/22/25 1319          Plan of Care Review    Plan of Care Reviewed With patient  -KE     Progress improving  -KE     Outcome Evaluation Pt ambulating increased distance w/ FWW, CGA. Pt participating in 5x sit to stand test, scoring 31 sec indicating pt is at a high risk of falls. Pt may continue to benefit from IPPT to address LE weakness, balance deficits, decreased safety awareness, and decreased functional endurance and facilitate improved independence. Pt making good progress toward all goals at this time. PT rec SNF at d/c for best functional outcome.  -KE       Row Name 04/22/25 1319          Vital Signs    Pre Systolic BP Rehab 96  -KE     Pre Treatment Diastolic BP 75  -KE     Intra Systolic BP Rehab 98  -KE     Intra Treatment Diastolic BP 69  -KE     Post Systolic BP Rehab 91  -KE     Post Treatment Diastolic BP 69  -KE     O2 Delivery Pre Treatment room air  -KE     O2 Delivery Intra Treatment room air  -KE     O2 Delivery Post Treatment room air  -KE     Pre Patient Position Sitting  -KE     Intra Patient Position Standing  -KE     Post Patient Position Sitting  -KE       Row Name 04/22/25 1319          Positioning and Restraints    Pre-Treatment Position sitting in chair/recliner  -KE     Post Treatment Position chair  -KE     In Chair notified nsg;reclined;waffle cushion;call light within reach;encouraged to call for assist;exit alarm on;legs elevated;heels elevated  -KE               User Key  (r) = Recorded By, (t) = Taken By, (c) = Cosigned By      Initials Name Provider Type    Keiry Heard, PT Physical Therapist                   Outcome Measures       Row Name 04/22/25 1317 04/22/25 0814       How much help from another person do you currently need...    Turning from your back to your side while in flat bed without using bedrails? 4  -KE 4 (P)   -AG    Moving from lying on back to sitting on the side of a flat bed without bedrails? 3  -KE 3 (P)   -AG    Moving to and from a  bed to a chair (including a wheelchair)? 3  -KE 3 (P)   -AG    Standing up from a chair using your arms (e.g., wheelchair, bedside chair)? 3  -KE 3 (P)   -AG    Climbing 3-5 steps with a railing? 3  -KE 3 (P)   -AG    To walk in hospital room? 3  -KE 3 (P)   -AG    AM-PAC 6 Clicks Score (PT) 19  -KE 19 (P)   -AG    Highest Level of Mobility Goal 6 --> Walk 10 steps or more  -KE 6 --> Walk 10 steps or more (P)   -AG      Row Name 04/22/25 1317 04/22/25 0949       Functional Assessment    Outcome Measure Options AM-PAC 6 Clicks Basic Mobility (PT)  -KE AM-PAC 6 Clicks Daily Activity (OT)  -AC      Row Name 04/22/25 1317          5 Times Sit to Stand    5 Times Sit to Stand (seconds) 31 seconds  -KE               User Key  (r) = Recorded By, (t) = Taken By, (c) = Cosigned By      Initials Name Provider Type    AC Minal Dye, OT Occupational Therapist    Keiry Heard, PT Physical Therapist    Hortencia Hernández, Nursing Student Nursing Student                                 Physical Therapy Education       Title: PT OT SLP Therapies (In Progress)       Topic: Physical Therapy (Done)       Point: Mobility training (Done)       Learning Progress Summary            Patient Acceptance, E, VU by AMITA at 4/22/2025 1323    Acceptance, E, VU by ND at 4/18/2025 1459    Acceptance, E,D, VU,NR by LR at 4/16/2025 1349    Comment: Educated on benefits of mobility, safety with mobility, correct supine to sit t/f technique, correct sit<->stand t/f technique, correct gait mechanics, LE HEP, and progression of POC.    Acceptance, E, VU,NR by LS at 4/12/2025 0950                      Point: Home exercise program (Done)       Learning Progress Summary            Patient Acceptance, E, VU by AMITA at 4/22/2025 1323    Acceptance, E, VU by ND at 4/18/2025 1459    Acceptance, E,D, VU,NR by LR at 4/16/2025 1349    Comment: Educated on benefits of mobility, safety with mobility, correct supine to sit t/f technique, correct sit<->stand  t/f technique, correct gait mechanics, LE HEP, and progression of POC.                      Point: Body mechanics (Done)       Learning Progress Summary            Patient Acceptance, E, VU by KE at 4/22/2025 1323    Acceptance, E, VU by ND at 4/18/2025 1459    Acceptance, E,D, VU,NR by LR at 4/16/2025 1349    Comment: Educated on benefits of mobility, safety with mobility, correct supine to sit t/f technique, correct sit<->stand t/f technique, correct gait mechanics, LE HEP, and progression of POC.                      Point: Precautions (Done)       Learning Progress Summary            Patient Acceptance, E, VU by KE at 4/22/2025 1323    Acceptance, E, VU by ND at 4/18/2025 1459    Acceptance, E,D, VU,NR by LR at 4/16/2025 1349    Comment: Educated on benefits of mobility, safety with mobility, correct supine to sit t/f technique, correct sit<->stand t/f technique, correct gait mechanics, LE HEP, and progression of POC.                                      User Key       Initials Effective Dates Name Provider Type Discipline    LR 02/03/23 -  Saba Mejía, PT Physical Therapist PT    LS 07/11/23 -  Jody Yeung, PT Physical Therapist PT    ND 11/16/23 -  Helen Diallo, PT Physical Therapist PT    KE 11/16/23 -  Keiry Moncada, PT Physical Therapist PT                  PT Recommendation and Plan  Planned Therapy Interventions (PT): balance training, bed mobility training, gait training, home exercise program, neuromuscular re-education, patient/family education, postural re-education, transfer training, stretching, strengthening, ROM (range of motion), stair training  Progress: improving  Outcome Evaluation: Pt ambulating increased distance w/ FWW, CGA. Pt participating in 5x sit to stand test, scoring 31 sec indicating pt is at a high risk of falls. Pt may continue to benefit from IPPT to address LE weakness, balance deficits, decreased safety awareness, and decreased functional endurance  and facilitate improved independence. Pt making good progress toward all goals at this time. PT rec SNF at d/c for best functional outcome.     Time Calculation:         PT Charges       Row Name 04/22/25 1325             Time Calculation    Start Time 1131  -KE      PT Received On 04/22/25  -KE      PT Goal Re-Cert Due Date 05/02/25  -KE         Timed Charges    62728 - PT Therapeutic Exercise Minutes 12  -KE      24420 - Gait Training Minutes  11  -KE         Total Minutes    Timed Charges Total Minutes 23  -KE       Total Minutes 23  -KE                User Key  (r) = Recorded By, (t) = Taken By, (c) = Cosigned By      Initials Name Provider Type    Keiry Heard PT Physical Therapist                  Therapy Charges for Today       Code Description Service Date Service Provider Modifiers Qty    14789979279 HC PT THER PROC EA 15 MIN 4/22/2025 Keiry Moncada PT GP 1    [id number] HC GAIT TRAINING EA 15 MIN 4/22/2025 Keiry Moncada, PT GP 1            PT G-Codes  Outcome Measure Options: AM-PAC 6 Clicks Basic Mobility (PT)  AM-PAC 6 Clicks Score (PT): 19  AM-PAC 6 Clicks Score (OT): 18  PT Discharge Summary  Anticipated Discharge Disposition (PT): skilled nursing facility    Keiry Moncada PT  4/22/2025

## 2025-04-22 NOTE — CASE MANAGEMENT/SOCIAL WORK
Continued Stay Note   [place]     Patient Name: Vilma Ayala  MRN: [id number]  Today's Date: 4/22/2025    Admit Date: 4/10/2025    Plan: rehab   Discharge Plan       Row Name 04/22/25 1425       Plan    Plan rehab    Patient/Family in Agreement with Plan other (see comments)    Plan Comments [facility] did receive fax referral from yesterday, has not reviewed yet, will review today and contact me  once reviewed. I faxed updated PT/OT/Speech notes to her today to [phone] and I faxed updated therapy notes to Carolin as well.    Final Discharge Disposition Code 03 - skilled nursing facility (SNF)                   Discharge Codes    No documentation.                 Expected Discharge Date and Time       Expected Discharge Date Expected Discharge Time    Apr 24, 2025               Clarence Garcia RN

## 2025-04-22 NOTE — PROGRESS NOTES
[facility] Medicine Services  PROGRESS NOTE    Patient Name: Vilma Ayala  : 1970  MRN: [id number]    Date of Admission: 4/10/2025  Primary Care Physician: Vanessa Kaur MD    Subjective   Subjective     CC:  F/u nausea    HPI:  Chair.  Says she is not having any more diarrhea.  Is waiting on rehab.  RN will update patient's home med rec.  Appreciate the help.      Objective   Objective     Vital Signs:   Temp:  [97.8 °F (36.6 °C)-98.4 °F (36.9 °C)] 97.8 °F (36.6 °C)  Heart Rate:  [] 92  Resp:  [16-24] 24  BP: (107-122)/(47-80) 107/47     Physical Exam:  Constitutional: No acute distress, awake, alert  HENT: NCAT, mucous membranes moist  Respiratory: Diminished to auscultation bilaterally, respiratory effort normal, room air  Cardiovascular: RRR, no murmurs, rubs, or gallops  Gastrointestinal: Positive bowel sounds, soft, nontender, nondistended  Musculoskeletal: No bilateral ankle edema  Psychiatric: Appropriate affect, cooperative  Neurologic: Oriented x 3, moves all extremities, speech clear  Skin: No rashes      Results Reviewed:  LAB RESULTS:      Lab 25  1536 25  0618   WBC 13.10* 11.85*   HEMOGLOBIN 11.6* 11.8*   HEMATOCRIT 34.4 35.7   PLATELETS 239 243   NEUTROS ABS  --  7.67*   IMMATURE GRANS (ABS)  --  0.04   LYMPHS ABS  --  2.99   MONOS ABS  --  0.61   EOS ABS  --  0.51*   MCV 87.3 90.2         Lab 25  0518 25  0428 25  1536 25  0618   SODIUM 139 136 136 138   POTASSIUM 4.3 4.1 4.1 4.3   CHLORIDE 102 101 101 104   CO2 24.0 23.0 25.0 21.0*   ANION GAP 13.0 12.0 10.0 13.0   BUN 14 16 15 18   CREATININE 1.41* 1.25* 1.45* 1.33*   EGFR 44.4* 51.3* 43.0* 47.6*   GLUCOSE 118* 163* 232* 128*   CALCIUM 9.5 9.3 8.8 9.3         Lab 25  0518 25  0618   TOTAL PROTEIN 6.4 5.6*   ALBUMIN 3.7 3.7   GLOBULIN 2.7 1.9   ALT (SGPT) 37* 22   AST (SGOT) 33* 14   BILIRUBIN 0.5 0.5   ALK PHOS 108 100                     Brief Urine  Lab Results  (Last result in the past 365 days)        Color   Clarity   Blood   Leuk Est   Nitrite   Protein   CREAT   Urine HCG        04/10/25 1734 Yellow   Clear   Negative   Trace   Negative   Negative                   Microbiology Results Abnormal       None            No radiology results from the last 24 hrs    Results for orders placed during the hospital encounter of 04/10/25    Adult Transthoracic Echo Complete W/ Cont if Necessary Per Protocol    Interpretation Summary    Left ventricular systolic function is normal. Estimated left ventricular EF = 60%    No significant valvular abnormalities.      Current medications:  Scheduled Meds:atorvastatin, 40 mg, Oral, Daily  busPIRone, 15 mg, Oral, Q12H  cholestyramine light, 1 packet, Oral, Q12H  citalopram, 40 mg, Oral, Daily  famotidine, 20 mg, Oral, Nightly  heparin (porcine), 5,000 Units, Subcutaneous, Q8H  insulin glargine, 15 Units, Subcutaneous, Daily  insulin glargine, 25 Units, Subcutaneous, Nightly  Insulin Lispro, 3 Units, Subcutaneous, TID With Meals  insulin lispro, 3-14 Units, Subcutaneous, 4x Daily AC & at Bedtime  ipratropium-albuterol, 3 mL, Nebulization, 4x Daily - RT  levothyroxine, 225 mcg, Oral, Q AM  pantoprazole, 40 mg, Oral, Daily  risperiDONE, 3 mg, Oral, BID  saccharomyces boulardii, 250 mg, Oral, BID  senna-docusate sodium, 2 tablet, Oral, BID      Continuous Infusions:   PRN Meds:.  acetaminophen **OR** acetaminophen **OR** acetaminophen    senna-docusate sodium **AND** polyethylene glycol **AND** bisacodyl **AND** bisacodyl    dextrose    dextrose    glucagon (human recombinant)    melatonin    promethazine    Sodium Chloride (PF)    Assessment & Plan   Assessment & Plan     Active Hospital Problems    Diagnosis  POA    **Acute kidney injury superimposed on chronic kidney disease [N17.9, N18.9]  Yes    Acute kidney injury superimposed on CKD [N17.9, N18.9]  Yes    Diarrhea [R19.7]  Yes    Dehydration [E86.0]  Yes    Essential  tremor [G25.0]  Yes    Type 2 diabetes mellitus without complication, with long-term current use of insulin [E11.9, Z79.4]  Not Applicable    Mixed hyperlipidemia [E78.2]  Yes    Other specified hypothyroidism [E03.8]  Yes    Anxiety and depression [F41.9, F32.A]  Yes      Resolved Hospital Problems   No resolved problems to display.        Brief Hospital Course to date:  Vilma Ayala is a 54 y.o. female  with a history of Essential Tremor, T2DM, hypothyroidism, anxiety/depression, and GERD who presented to [facility] ED for complaint of nausea and diarrhea.  On the afternoon of 4/12, pt had some increased dyspnea and congestion.  Chest x-ray could not rule out possible pneumonia; however Pro-Jim was negative, WBCs wnl, and symptoms felt more pulmonary congestion, so IV fluids stopped and patient was given IV Bumex.  CT chest 4/13 showed bilateral lower lobe atelectasis. Echo on 4/14 with normal EF.  Patient has since been weaned to room air.      This patient's problems and plans were partially entered by my partner and updated as appropriate by me 04/22/25.       CB on CKD   Dehydration  Nausea/Vomiting  -Cr 2.07 on admission, CB possibly 2/2 to recent Bactrim use as well as dehydration from poor po intake. Improving. Cr down to 1.6 on 4/13   -IVF held with dyspnea, new oxygen requirement, now weaned back to RA, chest CT with just atelectasis, plans for IS/FD, encourarged PO intake.   -CT abd/pelvis negative for acute findings  -PT/OT following  -continue prn antiemetics  -Stop cholestyramine, not having any more diarrhea, continue probiotic  -AM BMP     Dyspnea  - possibly related to recent fluids, IVF dc'd, improving   - Duonebs, IS/FD as well   - CT Chest with just atelectasis, encouraged use of IS/FD  - Echo 4/14 with EF 60%, normal LVSF, no significant valvular abnormalities, troponins 26>>17, seems noncardiac.   - SLP evaluated, moderate dysphagia, diet modifications in place  - FEES results  pending  - AM CBC     HLD  -Continue statin     T2DM  -A1C 6.8   -Fingerstick ACHS  -Continue Lantus BID, mealtime insulin, SSI     Anxiety/depression  Essential tremor  -Resume home Celexa, Buspar  -Continue Respiradone, stable     Expected Discharge Location and Transportation: SNF rehab  Expected Discharge pending bed offer, insurance approval  Expected Discharge Date: 4/24/2025; Expected Discharge Time:      VTE Prophylaxis:  Pharmacologic & mechanical VTE prophylaxis orders are present.         AM-PAC 6 Clicks Score (PT): 19 (04/22/25 2347)    CODE STATUS:   Code Status and Medical Interventions: CPR (Attempt to Resuscitate); Full Support   Ordered at: 04/10/25 2013     Code Status (Patient has no pulse and is not breathing):    CPR (Attempt to Resuscitate)     Medical Interventions (Patient has pulse or is breathing):    Full Support       Adore Ramírez, ZIA  04/22/25

## 2025-04-22 NOTE — CASE MANAGEMENT/SOCIAL WORK
Continued Stay Note  [facility]     Patient Name: Vilma Ayala  MRN: [id number]  Today's Date: 4/22/2025    Admit Date: 4/10/2025    Plan: rehab   Discharge Plan       Row Name 04/22/25 0947       Plan    Plan rehab    Patient/Family in Agreement with Plan other (see comments)    Plan Comments I called and left VM w/April @ [facility] to see if received referral. I called and spoke w/Treasure @ [facility], she did not received a referral, I will fax one to her @ [phone].    Final Discharge Disposition Code 03 - skilled nursing facility (SNF)                   Discharge Codes    No documentation.                 Expected Discharge Date and Time       Expected Discharge Date Expected Discharge Time    Apr 20, 2025               Clarence Garcia RN

## 2025-04-22 NOTE — THERAPY EVALUATION
Acute Care - Speech Language Pathology   Swallow Initial Evaluation    [place]    Modified Barium Swallow Study (MBS)       Patient Name: Vilma Ayala  : 1970  MRN: [id number]  Today's Date: 2025               Admit Date: 4/10/2025    Visit Dx:     ICD-10-CM ICD-9-CM   1. CB (acute kidney injury)  N17.9 584.9   2. Nausea and vomiting, unspecified vomiting type  R11.2 787.01   3. Weakness  R53.1 780.79   4. Oropharyngeal dysphagia  R13.12 787.22     Patient Active Problem List   Diagnosis    Anxiety and depression    Uncontrolled type 2 diabetes mellitus    Other specified hypothyroidism    Obesity    Vitamin D deficiency, unspecified    Diabetic neuropathy, type II diabetes mellitus    Mixed hyperlipidemia    Dyspnea on exertion    Type 2 diabetes mellitus without complication, with long-term current use of insulin    Urinary incontinence    Microscopic hematuria    Nocturia    Nocturnal enuresis    Stress incontinence    Urge incontinence    Decreased GFR    Mild persistent asthma    Obesity (BMI 35.0-39.9 without comorbidity)    Drug-induced parkinsonism    Encounter to establish care    NATASHA (obstructive sleep apnea)    Medicare annual wellness visit, subsequent    Vitamin D deficiency    UTI (urinary tract infection)    Painful urination    Nausea    Acute kidney injury superimposed on chronic kidney disease    Diarrhea    Dehydration    Essential tremor    Acute kidney injury superimposed on CKD     Past Medical History:   Diagnosis Date    Anxiety     Chicken pox     Depression     Diabetes mellitus     Disease of thyroid gland     Measles     Type 2 diabetes mellitus      Past Surgical History:   Procedure Laterality Date    EYE SURGERY      TONSILLECTOMY         SLP Recommendation and Plan  SLP Swallowing Diagnosis: mild, oral dysphagia, moderate, pharyngeal dysphagia (25 1100)  SLP Diet Recommendation: mechanical ground textures, no mixed consistencies, nectar thick liquids  (04/22/25 1100)  Recommended Precautions and Strategies: general aspiration precautions, assist with feeding (04/22/25 1100)  SLP Rec. for Method of Medication Administration: meds crushed, with puree, as tolerated (04/22/25 1100)     Monitor for Signs of Aspiration: notify SLP if any concerns (04/22/25 1100)  Recommended Diagnostics: reassess via VFSS (MBS), other (see comments) (4/21) (04/21/25 1500)  Swallow Criteria for Skilled Therapeutic Interventions Met: demonstrates skilled criteria (04/22/25 1100)  Anticipated Discharge Disposition (SLP): skilled nursing facility (04/22/25 1100)  Rehab Potential/Prognosis, Swallowing: good, to achieve stated therapy goals (04/22/25 1100)  Therapy Frequency (Swallow): 5 days per week (04/22/25 1100)  Predicted Duration Therapy Intervention (Days): 1 week (04/22/25 1100)  Oral Care Recommendations: Oral Care BID/PRN, Suction toothbrush (04/22/25 1100)        Daily Summary of Progress (SLP): progress toward functional goals is good (04/21/25 1500)               Treatment Assessment (SLP): continued, toleration of diet, no clinical signs of, aspiration (04/21/25 1500)  Treatment Assessment Comments (SLP): No s/s of aspiration with trials of thin H2O via ice, spoon, cup and straw, nectar thick and soft solids. Patient completed dysphagia exercises with min cues. MBS ordered to reassess for possible upgrade (04/21/25 1500)  Plan for Continued Treatment (SLP): continue treatment per plan of care (04/21/25 1500)                SWALLOW EVALUATION (Last 72 Hours)       SLP Adult Swallow Evaluation       Row Name 04/22/25 1100 04/21/25 1500                Rehab Evaluation    Document Type evaluation;other (see comments)  MBS  -CH therapy note (daily note)  -CH       Subjective Information complains of;fatigue  -CH --       Patient Observations alert;cooperative;agree to therapy  -CH alert;cooperative;agree to therapy  -CH       Patient/Family/Caregiver Comments/Observations none  present  -CH none present  -CH       Patient Effort good  -CH good  -CH       Symptoms Noted During/After Treatment none  -CH none  -CH       Oral Care -- lip/mouth moisturizer applied  -          General Information    Patient Profile Reviewed yes  -CH yes  -CH       Pertinent History Of Current Problem see initial evaluation. MBS completed to assess for possible diet upgrade.  -CH --       Current Method of Nutrition soft to chew textures;chopped;nectar/syrup-thick liquids  -CH --       Precautions/Limitations, Vision WFL;for purposes of eval  -CH --       Precautions/Limitations, Hearing WFL;for purposes of eval  -CH --       Prior Level of Function-Communication unknown  -CH --       Prior Level of Function-Swallowing no diet consistency restrictions;other (see comments)  per patient report  -CH --       Plans/Goals Discussed with patient;agreed upon  -CH --       Barriers to Rehab medically complex  -CH --       Patient's Goals for Discharge patient could not state  -CH --          Pain    Pretreatment Pain Rating 0/10 - no pain  -CH 0/10 - no pain  -CH       Posttreatment Pain Rating 0/10 - no pain  -CH 0/10 - no pain  -CH          Pain Scale: FACES Pre/Post-Treatment    Pain: FACES Scale, Pretreatment 0-->no hurt  -CH 0-->no hurt  -CH       Posttreatment Pain Rating 0-->no hurt  -CH 0-->no hurt  -CH          MBS/VFSS    Utensils Used spoon;cup;straw  -CH --       Consistencies Trialed thin liquids;nectar/syrup-thick liquids;pureed;regular textures  -CH --          MBS/VFSS Interpretation    Oral Prep Phase impaired oral phase of swallowing  -CH --       Oral Transit Phase impaired  -CH --       Oral Residue WFL  -CH --          Oral Preparatory Phase    Oral Preparatory Phase reduced lip closure around utensil;prolonged manipulation  -CH --       Reduced Lip Closure Around Utensil all consistencies tested;secondary to reduced labial seal  -CH --       Prolonged Manipulation all consistencies  tested;secondary to reduced labial seal;secondary to reduced lingual strength;secondary to reduced lingual range of motion;secondary to impaired cognitive status  -CH --          Oral Transit Phase    Impaired Oral Transit Phase increased A-P transit time;premature spillage of liquids into pharynx  -CH --       Delayed Initiation of bolus transit thin liquids  -CH --       Increased A-P Transit Time all consistencies tested;secondary to reduced lingual control;discoordination of lingual movement  -CH --       Premature Spillage of Liquids into Pharynx thin liquids;secondary to reduced lingual control;discoordination of lingual movement  -CH --          Initiation of Pharyngeal Swallow    Initiation of Pharyngeal Swallow bolus in pyriform sinuses  -CH --       Pharyngeal Phase impaired pharyngeal phase of swallowing  -CH --       Penetration Before the Swallow thin liquids;secondary to reduced back of tongue control;secondary to delayed swallow initiation or mistiming  -CH --       Aspiration During the Swallow thin liquids;secondary to delayed swallow initiation or mistiming;secondary to reduced laryngeal elevation;secondary to reduced vestibular closure;secondary to reduced laryngeal (VF) closure  -CH --       Depth of Penetration deep  -CH --       Response to Penetration No  -CH --       No spontaneous response to penetration and non-effective laryngeal clearance with cue (see comments)  -CH --       Response to Aspiration No  -CH --       No spontaneous response to aspiration and non-effective subglottic clearance with cue (see comments)  -CH --       Rosenbek's Scale thin:;8--->level 8;nectar:;pudding/puree:;regular textures:;1--->level 1  -CH --       Pharyngeal Residue thin liquids;laryngeal vestibule;secondary to reduced base of tongue retraction;secondary to reduced posterior pharyngeal wall stripping;secondary to reduced laryngeal elevation;secondary to reduced hyolaryngeal excursion  -CH --       Response  to Residue unable to clear residue  - --       Attempted Compensatory Maneuvers bolus size;bolus presentation style;throat clear after swallow  - --       Response to Attempted Compensatory Maneuvers did not prevent penetration;did not prevent aspiration  - --       Successful Compensatory Maneuver Competency patient able to;demonstrate compensations;with cues  - --          Swallowing Quality of Life Assessment    Education and counseling provided Signs of aspiration;Silent aspiration;Risks of aspiration;Safest diet options;Aspiration precautions  - --          SLP Evaluation Clinical Impression    SLP Swallowing Diagnosis mild;oral dysphagia;moderate;pharyngeal dysphagia  - mild;oral dysphagia;moderate;pharyngeal dysphagia  -       Functional Impact risk of aspiration/pneumonia  - risk of aspiration/pneumonia  -       Rehab Potential/Prognosis, Swallowing good, to achieve stated therapy goals  - good, to achieve stated therapy goals  -       Swallow Criteria for Skilled Therapeutic Interventions Met demonstrates skilled criteria  - demonstrates skilled criteria  -          SLP Treatment Clinical Impressions    Treatment Assessment (SLP) -- continued;toleration of diet;no clinical signs of;aspiration  -       Treatment Assessment Comments (SLP) -- No s/s of aspiration with trials of thin H2O via ice, spoon, cup and straw, nectar thick and soft solids. Patient completed dysphagia exercises with min cues. MBS ordered to reassess for possible upgrade  -       Daily Summary of Progress (SLP) -- progress toward functional goals is good  -       Plan for Continued Treatment (SLP) -- continue treatment per plan of care  -       Care Plan Review -- evaluation/treatment results reviewed;care plan/treatment goals reviewed  -          Recommendations    Therapy Frequency (Swallow) 5 days per week  - 5 days per week  -       Predicted Duration Therapy Intervention (Days) 1 week  - 1  week  -CH       SLP Diet Recommendation mechanical ground textures;no mixed consistencies;nectar thick liquids  - mechanical ground textures;nectar thick liquids  -       Recommended Diagnostics -- reassess via VFSS (MBS);other (see comments)  4/21  -       Recommended Precautions and Strategies general aspiration precautions;assist with feeding  - general aspiration precautions;assist with feeding  -       Oral Care Recommendations Oral Care BID/PRN;Suction toothbrush  -CH Oral Care BID/PRN;Suction toothbrush  -       SLP Rec. for Method of Medication Administration meds crushed;with puree;as tolerated  -CH meds crushed;with puree;as tolerated  -       Monitor for Signs of Aspiration notify SLP if any concerns  -CH notify SLP if any concerns  -CH       Anticipated Discharge Disposition (SLP) skilled nursing facility  - skilled nursing facility  -                 User Key  (r) = Recorded By, (t) = Taken By, (c) = Cosigned By      Initials Name Effective Dates     Gauri Mims MS CCC-SLP 01/20/25 -                     EDUCATION  The patient has been educated in the following areas:   Home Exercise Program (HEP) Dysphagia (Swallowing Impairment) Oral Care/Hydration Modified Diet Instruction.        SLP GOALS       Row Name 04/22/25 1100 04/21/25 1500          (LTG) Patient will demonstrate functional swallow for    Diet Texture (Demonstrate functional swallow) soft to chew (whole) textures  - soft to chew (whole) textures  -     Liquid viscosity (Demonstrate functional swallow) thin liquids  - thin liquids  -     [place] (Demonstrate functional swallow) with minimal cues (75-90% accuracy)  - with minimal cues (75-90% accuracy)  -CH     Time Frame (Demonstrate functional swallow) 1 week  - 1 week  -CH     Progress/Outcomes (Demonstrate functional swallow) continuing progress toward goal  -CH continuing progress toward goal  -CH     Comment (Demonstrate functional swallow) --  no s/s of aspiration with thin, nectar thick or soft solid trials. MBS ordered to assess for possible upgrade.  -CH        (STG) Patient will tolerate trials of    Consistencies Trialed (Tolerate trials) soft to chew (ground) textures;nectar/ mildly thick liquids  -CH soft to chew (ground) textures;nectar/ mildly thick liquids  -CH     Desired Outcome (Tolerate trials) without signs/symptoms of aspiration;with adequate oral prep/transit/clearance  -CH without signs/symptoms of aspiration;with adequate oral prep/transit/clearance  -CH     [place] (Tolerate trials) with minimal cues (75-90% accuracy)  -CH with minimal cues (75-90% accuracy)  -CH     Time Frame (Tolerate trials) 1 week  -CH 1 week  -CH     Progress/Outcomes (Tolerate trials) continuing progress toward goal  -CH continuing progress toward goal  -CH     Comment (Tolerate trials) -- no s/s of aspiration with thin, nectar thick or soft solid trials  -CH        (STG) Labial Strengthening Goal 1 (SLP)    Activity (Labial Strengthening Goal 1, SLP) increase labial tone  -CH increase labial tone  -CH     Increase Labial Tone labial resistance exercises;swallow trials  -CH labial resistance exercises;swallow trials  -CH     [place]/Accuracy (Labial Strengthening Goal 1, SLP) with minimal cues (75-90% accuracy)  -CH with minimal cues (75-90% accuracy)  -CH     Time Frame (Labial Strengthening Goal 1, SLP) 1 week  -CH 1 week  -CH     Progress/Outcomes (Labial Strengthening Goal 1, SLP) continuing progress toward goal  -CH continuing progress toward goal  -CH     Comment (Labial Strengthening Goal 1, SLP) -- completed exercises with min cues  -        (STG) Lingual Strengthening Goal 1 (SLP)    Activity (Lingual Strengthening Goal 1, SLP) increase tongue back strength;increase lingual tone/sensation/control/coordination/movement  -CH increase tongue back strength;increase lingual tone/sensation/control/coordination/movement  -CH     Increase Lingual  Tone/Sensation/Control/Coordination/Movement lingual movement exercises;swallow trials  -CH lingual movement exercises;swallow trials  -CH     Increase Tongue Back Strength lingual movement exercises;swallow trials;lingual resistance exercises  -CH lingual movement exercises;swallow trials;lingual resistance exercises  -CH     [place]/Accuracy (Lingual Strengthening Goal 1, SLP) with minimal cues (75-90% accuracy)  -CH with minimal cues (75-90% accuracy)  -CH     Time Frame (Lingual Strengthening Goal 1, SLP) 1 week  -CH 1 week  -CH     Progress/Outcomes (Lingual Strengthening Goal 1, SLP) continuing progress toward goal  -CH continuing progress toward goal  -CH     Comment (Lingual Strengthening Goal 1, SLP) -- completed exercises with min cues  -CH        (STG) Pharyngeal Strengthening Exercise Goal 1 (SLP)    Activity (Pharyngeal Strengthening Goal 1, SLP) increase timing;increase superior movement of the hyolaryngeal complex;increase anterior movement of the hyolaryngeal complex;increase closure at entrance to airway/closure of airway at glottis;increase squeeze/positive pressure generation  -CH increase timing;increase superior movement of the hyolaryngeal complex;increase anterior movement of the hyolaryngeal complex;increase closure at entrance to airway/closure of airway at glottis;increase squeeze/positive pressure generation  -CH     Increase Timing prepping - 3 second prep or suck swallow or 3-step swallow  -CH prepping - 3 second prep or suck swallow or 3-step swallow  -CH     Increase Superior Movement of the Hyolaryngeal Complex falsetto  -CH falsetto  -CH     Increase Anterior Movement of the Hyolaryngeal Complex chin tuck against resistance (CTAR)  -CH chin tuck against resistance (CTAR)  -CH     Increase Closure at Entrance to Airway/Closure of Airway at Glottis super-supraglottic swallow;breath hold exercises  -CH super-supraglottic swallow;breath hold exercises  -CH     Increase  Squeeze/Positive Pressure Generation hard effortful swallow  -CH hard effortful swallow  -CH     [place]/Accuracy (Pharyngeal Strengthening Goal 1, SLP) with minimal cues (75-90% accuracy)  -CH with minimal cues (75-90% accuracy)  -CH     Time Frame (Pharyngeal Strengthening Goal 1, SLP) 1 week  -CH 1 week  -CH     Progress/Outcomes (Pharyngeal Strengthening Goal 1, SLP) continuing progress toward goal  -CH continuing progress toward goal  -CH     Comment (Pharyngeal Strengthening Goal 1, SLP) -- completed exercises with min cues  -CH               User Key  (r) = Recorded By, (t) = Taken By, (c) = Cosigned By      Initials Name Provider Type    MILES Gauri Mims MS CCC-SLP Speech and Language Pathologist                         Time Calculation:    Time Calculation- SLP       Row Name 04/22/25 1331             Time Calculation- SLP    SLP Start Time 1100  -CH      SLP Received On 04/22/25  -CH         Untimed Charges    60739-ZS Motion Fluoro Eval Swallow Minutes 72  -CH         Total Minutes    Untimed Charges Total Minutes 72  -CH       Total Minutes 72  -CH                User Key  (r) = Recorded By, (t) = Taken By, (c) = Cosigned By      Initials Name Provider Type     Gauri Mims MS CCC-SLP Speech and Language Pathologist                    Therapy Charges for Today       Code Description Service Date Service Provider Modifiers Qty    70333291885 HC ST TREATMENT SWALLOW 4 4/21/2025 Gauri Mims MS CCC-SLP GN 1    [id number] HC ST MOTION FLUORO EVAL SWALLOW 5 4/22/2025 Gauri Mims MS CCC-SLP GN 1                 Gauri Mims MS CCC-KAYLENE  4/22/2025

## 2025-04-22 NOTE — THERAPY EVALUATION
Patient Name: Vilma Ayala  : 1970    MRN: [id number]                              Today's Date: 2025       Admit Date: 4/10/2025    Visit Dx:     ICD-10-CM ICD-9-CM   1. CB (acute kidney injury)  N17.9 584.9   2. Nausea and vomiting, unspecified vomiting type  R11.2 787.01   3. Weakness  R53.1 780.79   4. Oropharyngeal dysphagia  R13.12 787.22     Patient Active Problem List   Diagnosis    Anxiety and depression    Uncontrolled type 2 diabetes mellitus    Other specified hypothyroidism    Obesity    Vitamin D deficiency, unspecified    Diabetic neuropathy, type II diabetes mellitus    Mixed hyperlipidemia    Dyspnea on exertion    Type 2 diabetes mellitus without complication, with long-term current use of insulin    Urinary incontinence    Microscopic hematuria    Nocturia    Nocturnal enuresis    Stress incontinence    Urge incontinence    Decreased GFR    Mild persistent asthma    Obesity (BMI 35.0-39.9 without comorbidity)    Drug-induced parkinsonism    Encounter to establish care    NTAASHA (obstructive sleep apnea)    Medicare annual wellness visit, subsequent    Vitamin D deficiency    UTI (urinary tract infection)    Painful urination    Nausea    Acute kidney injury superimposed on chronic kidney disease    Diarrhea    Dehydration    Essential tremor    Acute kidney injury superimposed on CKD     Past Medical History:   Diagnosis Date    Anxiety     Chicken pox     Depression     Diabetes mellitus     Disease of thyroid gland     Measles     Type 2 diabetes mellitus      Past Surgical History:   Procedure Laterality Date    EYE SURGERY      TONSILLECTOMY        General Information       Row Name 25 0730          OT Time and Intention    Document Type evaluation  -AC     Mode of Treatment occupational therapy  -AC     Patient Effort good  -AC       Row Name 25 0730          General Information    Patient Profile Reviewed yes  -AC     Prior Level of Function independent:;all  household mobility;ADL's  -     Existing Precautions/Restrictions fall  tremors  -     Barriers to Rehab medically complex  -       Row Name 04/22/25 0730          Occupational Profile    Environmental Supports and Barriers (Occupational Profile) tub shower with shower chair  -       Row Name 04/22/25 0730          Living Environment    Current Living Arrangements other (see comments);independent living facility  [facility]  -     People in Home alone  -       Row Name 04/22/25 0730          Home Main Entrance    Number of Stairs, Main Entrance none  -AC       Row Name 04/22/25 0730          Cognition    Orientation Status (Cognition) oriented x 3  -       Row Name 04/22/25 0730          Safety Issues/Impairments Affecting Functional Mobility    Safety Issues Affecting Function (Mobility) awareness of need for assistance;insight into deficits/self-awareness;safety precaution awareness;safety precautions follow-through/compliance  -     Impairments Affecting Function (Mobility) balance;strength;endurance/activity tolerance  -               User Key  (r) = Recorded By, (t) = Taken By, (c) = Cosigned By      Initials Name Provider Type    AC Minal Dye OT Occupational Therapist                     Mobility/ADL's       Row Name 04/22/25 0942          Bed Mobility    Comment, (Bed Mobility) [facility] pre/post tx  -       Row Name 04/22/25 0942          Transfers    Transfers sit-stand transfer  -       Row Name 04/22/25 0942          Sit-Stand Transfer    Sit-Stand Owen (Transfers) contact guard;verbal cues;nonverbal cues (demo/gesture)  -     Assistive Device (Sit-Stand Transfers) walker, front-wheeled  -     Comment, (Sit-Stand Transfer) VCs for hand placement, required 2 attempts to stand, pt reporting unable to maintain standing first attempt d/t fatigue  -       Row Name 04/22/25 0942          Functional Mobility    Functional Mobility- Ind. Level verbal cues  required;contact guard assist  -     Functional Mobility- Device walker, front-wheeled  -     Functional Mobility-Distance (Feet) 16  -     Functional Mobility- Safety Issues step length decreased  -       Row Name 04/22/25 0942          Activities of Daily Living    BADL Assessment/Intervention lower body dressing;grooming  -       Row Name 04/22/25 0942          Lower Body Dressing Assessment/Training    [place]ice Level (Lower Body Dressing) don;socks;minimum assist (75% patient effort)  -     Position (Lower Body Dressing) unsupported sitting  -       Row Name 04/22/25 0942          Grooming Assessment/Training    [place]ice Level (Grooming) wash face, hands;set up;supervision  -     Position (Grooming) supported sitting  -               User Key  (r) = Recorded By, (t) = Taken By, (c) = Cosigned By      Initials Name Provider Type    AC Minal Dye, OT Occupational Therapist                   Obj/Interventions       Row Name 04/22/25 0944          Sensory Assessment (Somatosensory)    Sensory Assessment (Somatosensory) UE sensation intact  -       Row Name 04/22/25 0944          Vision Assessment/Intervention    Visual Impairment/Limitations corrective lenses full-time  -       Row Name 04/22/25 0944          Range of Motion Comprehensive    General Range of Motion bilateral upper extremity ROM WNL  -       Row Name 04/22/25 0944          Strength Comprehensive (MMT)    Comment, General Manual Muscle Testing (MMT) Assessment BUE grossly 4-/5  -       Row Name 04/22/25 0944          Motor Skills    Motor Skills coordination  -     Coordination bilateral;upper extremity;minimal impairment  -       Row Name 04/22/25 0944          Balance    Balance Assessment sitting static balance;sitting dynamic balance;standing static balance;standing dynamic balance  -     Static Sitting Balance standby assist  -     Dynamic Sitting Balance contact guard  -     Position, Sitting  Balance unsupported;sitting edge of bed  -AC     Static Standing Balance verbal cues;contact guard  -AC     Dynamic Standing Balance verbal cues;contact guard  -AC     Position/Device Used, Standing Balance supported;walker, rolling  -AC               User Key  (r) = Recorded By, (t) = Taken By, (c) = Cosigned By      Initials Name Provider Type    AC Minal Dye, OT Occupational Therapist                   Goals/Plan       Row Name 04/22/25 0948          Transfer Goal 1 (OT)    Activity/Assistive Device (Transfer Goal 1, OT) bed-to-chair/chair-to-bed;toilet;walker, rolling  -AC     [place] Level/Cues Needed (Transfer Goal 1, OT) standby assist  -AC     Time Frame (Transfer Goal 1, OT) long term goal (LTG);10 days  -AC     Progress/Outcome (Transfer Goal 1, OT) new goal;goal ongoing  -       Row Name 04/22/25 0948          Dressing Goal 1 (OT)    Activity/Device (Dressing Goal 1, OT) lower body dressing;upper body dressing  -AC     [place]/Cues Needed (Dressing Goal 1, OT) set-up required;supervision required  -AC     Time Frame (Dressing Goal 1, OT) long term goal (LTG);10 days  -AC     Progress/Outcome (Dressing Goal 1, OT) new goal;goal ongoing  -       Row Name 04/22/25 0948          Toileting Goal 1 (OT)    Activity/Device (Toileting Goal 1, OT) adjust/manage clothing;perform perineal hygiene  -AC     [place] Level/Cues Needed (Toileting Goal 1, OT) standby assist  -AC     Time Frame (Toileting Goal 1, OT) short term goal (STG);5 days  -AC     Progress/Outcome (Toileting Goal 1, OT) new goal;goal ongoing  -       Row Name 04/22/25 0948          Therapy Assessment/Plan (OT)    Planned Therapy Interventions (OT) activity tolerance training;BADL retraining;functional balance retraining;occupation/activity based interventions;patient/caregiver education/training;strengthening exercise;transfer/mobility retraining  -AC               User Key  (r) = Recorded By, (t) = Taken By, (c) = Cosigned  By      Initials Name Provider Type    Minal Valle, OT Occupational Therapist                   Clinical Impression       Row Name 04/22/25 0945          Pain Assessment    Pretreatment Pain Rating 0/10 - no pain  -AC     Posttreatment Pain Rating 0/10 - no pain  -AC       Row Name 04/22/25 0945          Plan of Care Review    Plan of Care Reviewed With patient  -AC     Outcome Evaluation Pt presents below baseline with self care/mobility d/t weakness and decr activity tolerance.  Pt min A LBD,  CGA to ambulate with RW.  OT will follow to advance pt toward PLOF.  Recommend SNF prior to returning to [facility] for best outcome.  -       Row Name 04/22/25 0945          Therapy Assessment/Plan (OT)    Criteria for Skilled Therapeutic Interventions Met (OT) yes;skilled treatment is necessary  -     Therapy Frequency (OT) daily  -       Row Name 04/22/25 0945          Therapy Plan Review/Discharge Plan (OT)    Anticipated Discharge Disposition (OT) skilled nursing facility  -       Row Name 04/22/25 0945          Vital Signs    Pre Systolic BP Rehab 111  -AC     Pre Treatment Diastolic BP 80  -AC     Pre SpO2 (%) 94  -AC     O2 Delivery Pre Treatment room air  -AC     O2 Delivery Intra Treatment room air  -AC     Post SpO2 (%) 94  -AC     O2 Delivery Post Treatment room air  -AC     Pre Patient Position Sitting  -AC     Intra Patient Position Standing  -AC     Post Patient Position Sitting  -AC       Row Name 04/22/25 0933          Positioning and Restraints    Pre-Treatment Position sitting in chair/recliner  -AC     Post Treatment Position chair  -AC     In Chair notified nsg;reclined;call light within reach;encouraged to call for assist;exit alarm on;waffle cushion  -               User Key  (r) = Recorded By, (t) = Taken By, (c) = Cosigned By      Initials Name Provider Type    Minal Valle, OT Occupational Therapist                   Outcome Measures       Row Name 04/22/25 0974          How much help  from another is currently needed...    Putting on and taking off regular lower body clothing? 3  -AC     Bathing (including washing, rinsing, and drying) 3  -AC     Toileting (which includes using toilet bed pan or urinal) 3  -AC     Putting on and taking off regular upper body clothing 3  -AC     Taking care of personal grooming (such as brushing teeth) 3  -AC     Eating meals 3  -AC     AM-PAC 6 Clicks Score (OT) 18  -       Row Name 04/22/25 0949          Functional Assessment    Outcome Measure Options AM-PAC 6 Clicks Daily Activity (OT)  -               User Key  (r) = Recorded By, (t) = Taken By, (c) = Cosigned By      Initials Name Provider Type    AC Minal Dye, OT Occupational Therapist                    Occupational Therapy Education       Title: PT OT SLP Therapies (In Progress)       Topic: Occupational Therapy (In Progress)       Point: ADL training (In Progress)       Learning Progress Summary            Patient Acceptance, E, NR by  at 4/22/2025 0949                                      User Key       Initials Effective Dates Name Provider Type Discipline     02/03/23 -  Minal Dye, OT Occupational Therapist OT                  OT Recommendation and Plan  Planned Therapy Interventions (OT): activity tolerance training, BADL retraining, functional balance retraining, occupation/activity based interventions, patient/caregiver education/training, strengthening exercise, transfer/mobility retraining  Therapy Frequency (OT): daily  Plan of Care Review  Plan of Care Reviewed With: patient  Outcome Evaluation: Pt presents below baseline with self care/mobility d/t weakness and decr activity tolerance.  Pt min A LBD,  CGA to ambulate with RW.  OT will follow to advance pt toward PLOF.  Recommend SNF prior [facility].     Time Calculation:   Evaluation Complexity (OT)  Review Occupational Profile/Medical/Therapy History Complexity: brief/low complexity  Assessment,  Occupational Performance/Identification of Deficit Complexity: 1-3 performance deficits  Clinical Decision Making Complexity (OT): problem focused assessment/low complexity  Overall Complexity of Evaluation (OT): low complexity     Time Calculation- OT       Row Name 04/22/25 0730             Time Calculation- OT    OT Start Time 0730  -AC      OT Received On 04/22/25  -AC      OT Goal Re-Cert Due Date 05/02/25  -AC         Untimed Charges    OT Eval/Re-eval Minutes 50  -AC         Total Minutes    Untimed Charges Total Minutes 50  -AC       Total Minutes 50  -AC                User Key  (r) = Recorded By, (t) = Taken By, (c) = Cosigned By      Initials Name Provider Type    AC Minal Dye, OT Occupational Therapist                  Therapy Charges for Today       Code Description Service Date Service Provider Modifiers Qty    [id number]  OT EVAL LOW COMPLEXITY 4 4/22/2025 Minal Dye OT GO 1                 Minal Dye OT  4/22/2025

## 2025-04-22 NOTE — DISCHARGE PLACEMENT REQUEST
"Vilma Wooten (54 y.o. Female) From Clarence arias RN  [id number]      Date of Birth   1970    Social Security Number       Address   [address] [facility] [address]    Home Phone   [phone]    MRN   [id number]       Amish   Tenriism    Marital Status   Single                            Admission Date   4/10/2025    Admission Type   Emergency    Admitting Provider   Guerrero Archer MD    Attending Provider   Guerrero Archer MD    Department, Room/Bed   [address], S579/1       Discharge Date       Discharge Disposition       Discharge Destination                                 Attending Provider: Guerrero Archer MD    Allergies: Doxepin, Januvia [Sitagliptin]    Isolation: None   Infection: None   Code Status: CPR    Ht: 167.6 cm (65.98\")   Wt: 96.3 kg (212 lb 4.9 oz)    Admission Cmt: None   Principal Problem: Acute kidney injury superimposed on chronic kidney disease [N17.9,N18.9]                   Active Insurance as of 4/10/2025       Primary Coverage       Payor Plan Insurance Group Employer/Plan Group    [facility] [employer] MEDICARE ADVANTAGE PPO        Payor Plan Address Payor Plan Phone Number Payor Plan Fax Number Effective Dates    [address] [address]   3/1/2025 - None Entered    [facility] [id number]         Subscriber Name Subscriber Birth Date Member ID       VILMA WOOTEN BRADEN 1970 [id number]               Secondary Coverage       Payor Plan Insurance Group Employer/Plan Group    [place]        Payor Plan Address Payor Plan Phone Number Payor Plan Fax Number Effective Dates    [address] [phone]  2/27/2025 - None Entered    [facility] [address]         Subscriber Name Subscriber Birth Date Member ID       VILMA WOOTEN BRADEN 1970 [id number]                     Emergency Contacts        (Rel.) Home Phone Work Phone Mobile Phone    FRANCIE LOYOLA (Sister) [phone] " -- [phone]                 Physical Therapy Notes (most recent note)        Keiry Moncada, PT at 25 1131  Version 1 of 1         Patient Name: Vilma Ayala  : 1970    MRN: [id number]                              Today's Date: 2025       Admit Date: 4/10/2025    Visit Dx:     ICD-10-CM ICD-9-CM   1. CB (acute kidney injury)  N17.9 584.9   2. Nausea and vomiting, unspecified vomiting type  R11.2 787.01   3. Weakness  R53.1 780.79   4. Oropharyngeal dysphagia  R13.12 787.22     Patient Active Problem List   Diagnosis    Anxiety and depression    Uncontrolled type 2 diabetes mellitus    Other specified hypothyroidism    Obesity    Vitamin D deficiency, unspecified    Diabetic neuropathy, type II diabetes mellitus    Mixed hyperlipidemia    Dyspnea on exertion    Type 2 diabetes mellitus without complication, with long-term current use of insulin    Urinary incontinence    Microscopic hematuria    Nocturia    Nocturnal enuresis    Stress incontinence    Urge incontinence    Decreased GFR    Mild persistent asthma    Obesity (BMI 35.0-39.9 without comorbidity)    Drug-induced parkinsonism    Encounter to establish care    NATASHA (obstructive sleep apnea)    Medicare annual wellness visit, subsequent    Vitamin D deficiency    UTI (urinary tract infection)    Painful urination    Nausea    Acute kidney injury superimposed on chronic kidney disease    Diarrhea    Dehydration    Essential tremor    Acute kidney injury superimposed on CKD     Past Medical History:   Diagnosis Date    Anxiety     Chicken pox     Depression     Diabetes mellitus     Disease of thyroid gland     Measles     Type 2 diabetes mellitus      Past Surgical History:   Procedure Laterality Date    EYE SURGERY      TONSILLECTOMY        General Information       Row Name 25 4857          Physical Therapy Time and Intention    Document Type progress note/recertification  -AMITA     Mode of Treatment physical therapy  -AMITA        Row Name 04/22/25 1308          General Information    Patient Profile Reviewed yes  -KE     Existing Precautions/Restrictions fall  tremors  -KE     Barriers to Rehab medically complex  -KE       Row Name 04/22/25 1308          Cognition    Orientation Status (Cognition) oriented x 3  -KE       Row Name 04/22/25 1308          Safety Issues/Impairments Affecting Functional Mobility    Safety Issues Affecting Function (Mobility) awareness of need for assistance;safety precaution awareness;safety precautions follow-through/compliance;insight into deficits/self-awareness  -KE     Impairments Affecting Function (Mobility) balance;strength;endurance/activity tolerance  -KE               User Key  (r) = Recorded By, (t) = Taken By, (c) = Cosigned By      Initials Name Provider Type    Keiry Heard, PT Physical Therapist                   Mobility       Row Name 04/22/25 1309          Bed Mobility    Comment, (Bed Mobility) [facility] pre/post tx  -KE       Row Name 04/22/25 1309          Sit-Stand Transfer    Sit-Stand [place] (Transfers) contact guard;verbal cues;nonverbal cues (demo/gesture)  -KE     Assistive Device (Sit-Stand Transfers) walker, front-wheeled  -KE     Comment, (Sit-Stand Transfer) VCs for improving HP  -KE       Row Name 04/22/25 1309          Gait/Stairs (Locomotion)    [place] Level (Gait) contact guard;verbal cues;nonverbal cues (demo/gesture)  -KE     Assistive Device (Gait) walker, front-wheeled  -KE     Patient was able to Ambulate yes  -KE     Distance in Feet (Gait) 22  -KE     Deviations/Abnormal Patterns (Gait) bilateral deviations;malina decreased;gait speed decreased;stride length decreased  -KE     Bilateral Gait Deviations forward flexed posture;heel strike decreased  -KE     Comment, (Gait/Stairs) Pt demo step through gait pattern with widened BRENNEN, decr heel strike, fwd flexed posture, and decr foot clearance of R LE w/ swing phase as pt noted to fatigue. Further mobility  limited by fatigue. VCs for improving safety awareness w/ functional mobility  -KE               User Key  (r) = Recorded By, (t) = Taken By, (c) = Cosigned By      Initials Name Provider Type    Keiry Heard PT Physical Therapist                   Obj/Interventions       Row Name 04/22/25 1313          Motor Skills    Therapeutic Exercise ankle;knee;hip  -KE       Row Name 04/22/25 1313          Hip (Therapeutic Exercise)    Hip (Therapeutic Exercise) strengthening exercise  -KE     Hip Strengthening (Therapeutic Exercise) marching while seated;10 repetitions;bilateral  -KE       Row Name 04/22/25 1313          Knee (Therapeutic Exercise)    Knee (Therapeutic Exercise) strengthening exercise  -KE     Knee Strengthening (Therapeutic Exercise) LAQ (long arc quad);SLR (straight leg raise);10 repetitions;bilateral  -KE       Row Name 04/22/25 1313          Ankle (Therapeutic Exercise)    Ankle (Therapeutic Exercise) AROM (active range of motion)  -     Ankle AROM (Therapeutic Exercise) bilateral;dorsiflexion;plantarflexion;10 repetitions  -KE       Row Name 04/22/25 1313          Balance    Balance Assessment sitting static balance;sitting dynamic balance;standing static balance;standing dynamic balance  -KE     Static Sitting Balance standby assist  -KE     Dynamic Sitting Balance contact guard  -KE     Position, Sitting Balance sitting in chair  -KE     Static Standing Balance contact guard  -KE     Dynamic Standing Balance contact guard  -KE     Position/Device Used, Standing Balance supported;walker, front-wheeled  -KE     Balance Interventions sitting;standing;sit to stand;supported;dynamic;static  -KE     Comment, Balance no overt LOB noted  -KE               User Key  (r) = Recorded By, (t) = Taken By, (c) = Cosigned By      Initials Name Provider Type    Keiry Heard PT Physical Therapist                   Goals/Plan       Row Name 04/22/25 1326          Bed Mobility Goal 1 (PT)     Activity/Assistive Device (Bed Mobility Goal 1, PT) sit to supine/supine to sit  -KE     [place] Level/Cues Needed (Bed Mobility Goal 1, PT) modified independence  -KE     Time Frame (Bed Mobility Goal 1, PT) short term goal (STG);5 days  -KE     Progress/Outcomes (Bed Mobility Goal 1, PT) goal revised this date  -       Row Name 04/22/25 1326          Transfer Goal 1 (PT)    Activity/Assistive Device (Transfer Goal 1, PT) sit-to-stand/stand-to-sit;walker, rolling  -KE     [place] Level/Cues Needed (Transfer Goal 1, PT) modified independence  -KE     Time Frame (Transfer Goal 1, PT) long term goal (LTG);10 days  -KE     Progress/Outcome (Transfer Goal 1, PT) goal revised this date  -KE       Row Name 04/22/25 1326          Gait Training Goal 1 (PT)    Activity/Assistive Device (Gait Training Goal 1, PT) gait (walking locomotion);assistive device use;walker, rolling  -KE     [place] Level (Gait Training Goal 1, PT) modified independence  -KE     Distance (Gait Training Goal 1, PT) 100 feet  -KE     Time Frame (Gait Training Goal 1, PT) long term goal (LTG);10 days  -KE     Progress/Outcome (Gait Training Goal 1, PT) progress slower than expected;goal ongoing  -       Row Name 04/22/25 1326          Therapy Assessment/Plan (PT)    Planned Therapy Interventions (PT) balance training;bed mobility training;gait training;home exercise program;neuromuscular re-education;patient/family education;postural re-education;transfer training;stretching;strengthening;ROM (range of motion);stair training  -KE               User Key  (r) = Recorded By, (t) = Taken By, (c) = Cosigned By      Initials Name Provider Type    Keiry Heard, PT Physical Therapist                   Clinical Impression       Row Name 04/22/25 1319 04/22/25 1317       Pain    Pretreatment Pain Rating 0/10 - no pain  -KE 0/10 - no pain  -KE    Posttreatment Pain Rating 0/10 - no pain  -KE 0/10 - no pain  -KE      Row Name 04/22/25 1314           Pain Scale: FACES Pre/Post-Treatment    Pain: FACES Scale, Pretreatment 0-->no hurt  -KE     Posttreatment Pain Rating 0-->no hurt  -KE       Row Name 04/22/25 1319          Plan of Care Review    Plan of Care Reviewed With patient  -KE     Progress improving  -KE     Outcome Evaluation Pt ambulating increased distance w/ FWW, CGA. Pt participating in 5x sit to stand test, scoring 31 sec indicating pt is at a high risk of falls. Pt may continue to benefit from IPPT to address LE weakness, balance deficits, decreased safety awareness, and decreased functional endurance and facilitate improved independence. Pt making good progress toward all goals at this time. PT rec SNF at d/c for best functional outcome.  -KE       Row Name 04/22/25 1319          Vital Signs    Pre Systolic BP Rehab 96  -KE     Pre Treatment Diastolic BP 75  -KE     Intra Systolic BP Rehab 98  -KE     Intra Treatment Diastolic BP 69  -KE     Post Systolic BP Rehab 91  -KE     Post Treatment Diastolic BP 69  -KE     O2 Delivery Pre Treatment room air  -KE     O2 Delivery Intra Treatment room air  -KE     O2 Delivery Post Treatment room air  -KE     Pre Patient Position Sitting  -KE     Intra Patient Position Standing  -KE     Post Patient Position Sitting  -KE       Row Name 04/22/25 1319          Positioning and Restraints    Pre-Treatment Position sitting in chair/recliner  -KE     Post Treatment Position chair  -KE     In Chair notified nsg;reclined;waffle cushion;call light within reach;encouraged to call for assist;exit alarm on;legs elevated;heels elevated  -KE               User Key  (r) = Recorded By, (t) = Taken By, (c) = Cosigned By      Initials Name Provider Type    Keiry Heard, PT Physical Therapist                   Outcome Measures       Row Name 04/22/25 1317 04/22/25 0814       How much help from another person do you currently need...    Turning from your back to your side while in flat bed without using bedrails?  4  -KE 4 (P)   -AG    Moving from lying on back to sitting on the side of a flat bed without bedrails? 3  -KE 3 (P)   -AG    Moving to and from a bed to a chair (including a wheelchair)? 3  -KE 3 (P)   -AG    Standing up from a chair using your arms (e.g., wheelchair, bedside chair)? 3  -KE 3 (P)   -AG    Climbing 3-5 steps with a railing? 3  -KE 3 (P)   -AG    To walk in hospital room? 3  -KE 3 (P)   -AG    AM-PAC 6 Clicks Score (PT) 19  -KE 19 (P)   -AG    Highest Level of Mobility Goal 6 --> Walk 10 steps or more  -KE 6 --> Walk 10 steps or more (P)   -AG      Row Name 04/22/25 1317 04/22/25 0949       Functional Assessment    Outcome Measure Options AM-PAC 6 Clicks Basic Mobility (PT)  -KE AM-PAC 6 Clicks Daily Activity (OT)  -AC      Row Name 04/22/25 1317          5 Times Sit to Stand    5 Times Sit to Stand (seconds) 31 seconds  -KE               User Key  (r) = Recorded By, (t) = Taken By, (c) = Cosigned By      Initials Name Provider Type    AC Minal Dye, OT Occupational Therapist    Keiry Heard, PT Physical Therapist    Hortencia Hernández, Nursing Student Nursing Student                                 Physical Therapy Education       Title: PT OT SLP Therapies (In Progress)       Topic: Physical Therapy (Done)       Point: Mobility training (Done)       Learning Progress Summary            Patient Acceptance, E, VU by AMITA at 4/22/2025 1323    Acceptance, E, VU by ND at 4/18/2025 1459    Acceptance, E,D, VU,NR by LR at 4/16/2025 1349    Comment: Educated on benefits of mobility, safety with mobility, correct supine to sit t/f technique, correct sit<->stand t/f technique, correct gait mechanics, LE HEP, and progression of POC.    Acceptance, E, VU,NR by NICCI at 4/12/2025 0950                      Point: Home exercise program (Done)       Learning Progress Summary            Patient Acceptance, E, VU by AMITA at 4/22/2025 1323    Acceptance, E, VU by ND at 4/18/2025 1459    Acceptance, E,D, VU,NR by  LR at 4/16/2025 1349    Comment: Educated on benefits of mobility, safety with mobility, correct supine to sit t/f technique, correct sit<->stand t/f technique, correct gait mechanics, LE HEP, and progression of POC.                      Point: Body mechanics (Done)       Learning Progress Summary            Patient Acceptance, E, VU by KE at 4/22/2025 1323    Acceptance, E, VU by ND at 4/18/2025 1459    Acceptance, E,D, VU,NR by LR at 4/16/2025 1349    Comment: Educated on benefits of mobility, safety with mobility, correct supine to sit t/f technique, correct sit<->stand t/f technique, correct gait mechanics, LE HEP, and progression of POC.                      Point: Precautions (Done)       Learning Progress Summary            Patient Acceptance, E, VU by KE at 4/22/2025 1323    Acceptance, E, VU by ND at 4/18/2025 1459    Acceptance, E,D, VU,NR by LR at 4/16/2025 1349    Comment: Educated on benefits of mobility, safety with mobility, correct supine to sit t/f technique, correct sit<->stand t/f technique, correct gait mechanics, LE HEP, and progression of POC.                                      User Key       Initials Effective Dates Name Provider Type Discipline    LR 02/03/23 -  Saba Mejía, PT Physical Therapist PT    LS 07/11/23 -  Jody Yeung, PT Physical Therapist PT    ND 11/16/23 -  Helen Diallo, PT Physical Therapist PT    KE 11/16/23 -  Keiry Moncada, PT Physical Therapist PT                  PT Recommendation and Plan  Planned Therapy Interventions (PT): balance training, bed mobility training, gait training, home exercise program, neuromuscular re-education, patient/family education, postural re-education, transfer training, stretching, strengthening, ROM (range of motion), stair training  Progress: improving  Outcome Evaluation: Pt ambulating increased distance w/ FWW, CGA. Pt participating in 5x sit to stand test, scoring 31 sec indicating pt is at a high risk of  falls. Pt may continue to benefit from IPPT to address LE weakness, balance deficits, decreased safety awareness, and decreased functional endurance and facilitate improved independence. Pt making good progress toward all goals at this time. PT rec SNF at d/c for best functional outcome.     Time Calculation:         PT Charges       Row Name 25 1325             Time Calculation    Start Time 1131  -KE      PT Received On 25  -KE      PT Goal Re-Cert Due Date 25  -KE         Timed Charges    18676 - PT Therapeutic Exercise Minutes 12  -KE      55824 - Gait Training Minutes  11  -KE         Total Minutes    Timed Charges Total Minutes 23  -KE       Total Minutes 23  -KE                User Key  (r) = Recorded By, (t) = Taken By, (c) = Cosigned By      Initials Name Provider Type    Keiry Heard PT Physical Therapist                  Therapy Charges for Today       Code Description Service Date Service Provider Modifiers Qty    36084067572 HC PT THER PROC EA 15 MIN 2025 Keiry Moncada, PT GP 1    [id number] HC GAIT TRAINING EA 15 MIN 2025 Keiry Moncada, PT GP 1            PT G-Codes  Outcome Measure Options: AM-PAC 6 Clicks Basic Mobility (PT)  AM-PAC 6 Clicks Score (PT): 19  AM-PAC 6 Clicks Score (OT): 18  PT Discharge Summary  Anticipated Discharge Disposition (PT): skilled nursing facility    Keiry Moncada PT  2025      Electronically signed by Keiry Moncada PT at 25 1329          Occupational Therapy Notes (most recent note)        Minal Dye, OT at 25 2183          Patient Name: Vilma Ayala  : 1970    MRN: [id number]                              Today's Date: 2025       Admit Date: 4/10/2025    Visit Dx:     ICD-10-CM ICD-9-CM   1. CB (acute kidney injury)  N17.9 584.9   2. Nausea and vomiting, unspecified vomiting type  R11.2 787.01   3. Weakness  R53.1 780.79   4. Oropharyngeal dysphagia  R13.12 787.22     Patient Active  Problem List   Diagnosis    Anxiety and depression    Uncontrolled type 2 diabetes mellitus    Other specified hypothyroidism    Obesity    Vitamin D deficiency, unspecified    Diabetic neuropathy, type II diabetes mellitus    Mixed hyperlipidemia    Dyspnea on exertion    Type 2 diabetes mellitus without complication, with long-term current use of insulin    Urinary incontinence    Microscopic hematuria    Nocturia    Nocturnal enuresis    Stress incontinence    Urge incontinence    Decreased GFR    Mild persistent asthma    Obesity (BMI 35.0-39.9 without comorbidity)    Drug-induced parkinsonism    Encounter to establish care    NATASHA (obstructive sleep apnea)    Medicare annual wellness visit, subsequent    Vitamin D deficiency    UTI (urinary tract infection)    Painful urination    Nausea    Acute kidney injury superimposed on chronic kidney disease    Diarrhea    Dehydration    Essential tremor    Acute kidney injury superimposed on CKD     Past Medical History:   Diagnosis Date    Anxiety     Chicken pox     Depression     Diabetes mellitus     Disease of thyroid gland     Measles     Type 2 diabetes mellitus      Past Surgical History:   Procedure Laterality Date    EYE SURGERY      TONSILLECTOMY        General Information       Row Name 04/22/25 0730          OT Time and Intention    Document Type evaluation  -AC     Mode of Treatment occupational therapy  -AC     Patient Effort good  -AC       Row Name 04/22/25 3583          General Information    Patient Profile Reviewed yes  -AC     Prior Level of Function independent:;all household mobility;ADL's  -AC     Existing Precautions/Restrictions fall  tremors  -AC     Barriers to Rehab medically complex  -AC       Row Name 04/22/25 8073          Occupational Profile    Environmental Supports and Barriers (Occupational Profile) tub shower with shower chair  -AC       Row Name 04/22/25 0739          Living Environment    Current Living Arrangements other (see  comments);independent living facility  [facility] [place]  -     People in Home alone  -       Row Name 04/22/25 0730          Home Main Entrance    Number of Stairs, Main Entrance none  -       Row Name 04/22/25 0730          Cognition    Orientation Status (Cognition) oriented x 3  -       Row Name 04/22/25 0730          Safety Issues/Impairments Affecting Functional Mobility    Safety Issues Affecting Function (Mobility) awareness of need for assistance;insight into deficits/self-awareness;safety precaution awareness;safety precautions follow-through/compliance  -     Impairments Affecting Function (Mobility) balance;strength;endurance/activity tolerance  -               User Key  (r) = Recorded By, (t) = Taken By, (c) = Cosigned By      Initials Name Provider Type     Minal Dye OT Occupational Therapist                     Mobility/ADL's       Row Name 04/22/25 0942          Bed Mobility    Comment, (Bed Mobility) [facility] pre/post tx  -       Row Name 04/22/25 0942          Transfers    Transfers sit-stand transfer  -       Row Name 04/22/25 0942          Sit-Stand Transfer    Sit-Stand [place] (Transfers) contact guard;verbal cues;nonverbal cues (demo/gesture)  -     Assistive Device (Sit-Stand Transfers) walker, front-wheeled  -     Comment, (Sit-Stand Transfer) VCs for hand placement, required 2 attempts to stand, pt reporting unable to maintain standing first attempt d/t fatigue  -       Row Name 04/22/25 0942          Functional Mobility    Functional Mobility- Ind. Level verbal cues required;contact guard assist  -     Functional Mobility- Device walker, front-wheeled  -AC     Functional Mobility-Distance (Feet) 16  -     Functional Mobility- Safety Issues step length decreased  -       Row Name 04/22/25 0942          Activities of Daily Living    BADL Assessment/Intervention lower body dressing;grooming  -       Row Name 04/22/25 0942          Lower Body Dressing  Assessment/Training    [place] Level (Lower Body Dressing) don;socks;minimum assist (75% patient effort)  -     Position (Lower Body Dressing) unsupported sitting  -AC       Row Name 04/22/25 0942          Grooming Assessment/Training    [place] Level (Grooming) wash face, hands;set up;supervision  -AC     Position (Grooming) supported sitting  -               User Key  (r) = Recorded By, (t) = Taken By, (c) = Cosigned By      Initials Name Provider Type    AC Minal Dye, OT Occupational Therapist                   Obj/Interventions       Row Name 04/22/25 0944          Sensory Assessment (Somatosensory)    Sensory Assessment (Somatosensory) UE sensation intact  -       Row Name 04/22/25 0944          Vision Assessment/Intervention    Visual Impairment/Limitations corrective lenses full-time  -       Row Name 04/22/25 0944          Range of Motion Comprehensive    General Range of Motion bilateral upper extremity ROM WNL  -       Row Name 04/22/25 0944          Strength Comprehensive (MMT)    Comment, General Manual Muscle Testing (MMT) Assessment BUE grossly 4-/5  -       Row Name 04/22/25 0944          Motor Skills    Motor Skills coordination  -     Coordination bilateral;upper extremity;minimal impairment  -       Row Name 04/22/25 0944          Balance    Balance Assessment sitting static balance;sitting dynamic balance;standing static balance;standing dynamic balance  -     Static Sitting Balance standby assist  -     Dynamic Sitting Balance contact guard  -AC     Position, Sitting Balance unsupported;sitting edge of bed  -     Static Standing Balance verbal cues;contact guard  -AC     Dynamic Standing Balance verbal cues;contact guard  -AC     Position/Device Used, Standing Balance supported;walker, rolling  -               User Key  (r) = Recorded By, (t) = Taken By, (c) = Cosigned By      Initials Name Provider Type    Minal Valle, OT Occupational Therapist                    Goals/Plan       Row Name 04/22/25 0948          Transfer Goal 1 (OT)    Activity/Assistive Device (Transfer Goal 1, OT) bed-to-chair/chair-to-bed;toilet;walker, rolling  -AC     [place] Level/Cues Needed (Transfer Goal 1, OT) standby assist  -AC     Time Frame (Transfer Goal 1, OT) long term goal (LTG);10 days  -AC     Progress/Outcome (Transfer Goal 1, OT) new goal;goal ongoing  -AC       Row Name 04/22/25 0948          Dressing Goal 1 (OT)    Activity/Device (Dressing Goal 1, OT) lower body dressing;upper body dressing  -AC     [place]/Cues Needed (Dressing Goal 1, OT) set-up required;supervision required  -AC     Time Frame (Dressing Goal 1, OT) long term goal (LTG);10 days  -AC     Progress/Outcome (Dressing Goal 1, OT) new goal;goal ongoing  -       Row Name 04/22/25 0948          Toileting Goal 1 (OT)    Activity/Device (Toileting Goal 1, OT) adjust/manage clothing;perform perineal hygiene  -AC     [place] Level/Cues Needed (Toileting Goal 1, OT) standby assist  -AC     Time Frame (Toileting Goal 1, OT) short term goal (STG);5 days  -AC     Progress/Outcome (Toileting Goal 1, OT) new goal;goal ongoing  -       Row Name 04/22/25 0948          Therapy Assessment/Plan (OT)    Planned Therapy Interventions (OT) activity tolerance training;BADL retraining;functional balance retraining;occupation/activity based interventions;patient/caregiver education/training;strengthening exercise;transfer/mobility retraining  -               User Key  (r) = Recorded By, (t) = Taken By, (c) = Cosigned By      Initials Name Provider Type    AC Minal Dye OT Occupational Therapist                   Clinical Impression       Row Name 04/22/25 0945          Pain Assessment    Pretreatment Pain Rating 0/10 - no pain  -AC     Posttreatment Pain Rating 0/10 - no pain  -AC       Row Name 04/22/25 0945          Plan of Care Review    Plan of Care Reviewed With patient  -AC     Outcome Evaluation Pt  presents below baseline with self care/mobility d/t weakness and decr activity tolerance.  Pt min A LBD,  CGA to ambulate with RW.  OT will follow to advance pt toward PLOF.  Recommend SNF prior to returning to [facility] for best outcome.  -       Row Name 04/22/25 0945          Therapy Assessment/Plan (OT)    Criteria for Skilled Therapeutic Interventions Met (OT) yes;skilled treatment is necessary  -AC     Therapy Frequency (OT) daily  -AC       Row Name 04/22/25 0945          Therapy Plan Review/Discharge Plan (OT)    Anticipated Discharge Disposition (OT) skilled nursing facility  -       Row Name 04/22/25 0945          Vital Signs    Pre Systolic BP Rehab 111  -AC     Pre Treatment Diastolic BP 80  -AC     Pre SpO2 (%) 94  -AC     O2 Delivery Pre Treatment room air  -AC     O2 Delivery Intra Treatment room air  -AC     Post SpO2 (%) 94  -AC     O2 Delivery Post Treatment room air  -AC     Pre Patient Position Sitting  -AC     Intra Patient Position Standing  -AC     Post Patient Position Sitting  -AC       Row Name 04/22/25 0945          Positioning and Restraints    Pre-Treatment Position sitting in chair/recliner  -AC     Post Treatment Position chair  -AC     In Chair notified nsg;reclined;call light within reach;encouraged to call for assist;exit alarm on;waffle cushion  -AC               User Key  (r) = Recorded By, (t) = Taken By, (c) = Cosigned By      Initials Name Provider Type    Minal Valle, OT Occupational Therapist                   Outcome Measures       Row Name 04/22/25 0949          How much help from another is currently needed...    Putting on and taking off regular lower body clothing? 3  -AC     Bathing (including washing, rinsing, and drying) 3  -AC     Toileting (which includes using toilet bed pan or urinal) 3  -AC     Putting on and taking off regular upper body clothing 3  -AC     Taking care of personal grooming (such as brushing teeth) 3  -AC     Eating meals 3  -AC     AM-PAC 6  Clicks Score (OT) 18  -       Row Name 04/22/25 0949          Functional Assessment    Outcome Measure Options AM-PAC 6 Clicks Daily Activity (OT)  -               User Key  (r) = Recorded By, (t) = Taken By, (c) = Cosigned By      Initials Name Provider Type    AC Minal Dye, OT Occupational Therapist                    Occupational Therapy Education       Title: PT OT SLP Therapies (In Progress)       Topic: Occupational Therapy (In Progress)       Point: ADL training (In Progress)       Learning Progress Summary            Patient Acceptance, E, NR by  at 4/22/2025 0949                                      User Key       Initials Effective Dates Name Provider Type Discipline     02/03/23 -  Minal Dye, OT Occupational Therapist OT                  OT Recommendation and Plan  Planned Therapy Interventions (OT): activity tolerance training, BADL retraining, functional balance retraining, occupation/activity based interventions, patient/caregiver education/training, strengthening exercise, transfer/mobility retraining  Therapy Frequency (OT): daily  Plan of Care Review  Plan of Care Reviewed With: patient  Outcome Evaluation: Pt presents below baseline with self care/mobility d/t weakness and decr activity tolerance.  Pt min A LBD,  CGA to ambulate with RW.  OT will follow to advance pt toward PLOF.  Recommend SNF prior to returning to [facility] for best outcome.     Time Calculation:   Evaluation Complexity (OT)  Review Occupational Profile/Medical/Therapy History Complexity: brief/low complexity  Assessment, Occupational Performance/Identification of Deficit Complexity: 1-3 performance deficits  Clinical Decision Making Complexity (OT): problem focused assessment/low complexity  Overall Complexity of Evaluation (OT): low complexity     Time Calculation- OT       Row Name 04/22/25 0730             Time Calculation- OT    OT Start Time 0730  -      OT Received On 04/22/25  -      OT Goal Re-Cert Due  Date 05/02/25  -AC         Untimed Charges    OT Eval/Re-eval Minutes 50  -AC         Total Minutes    Untimed Charges Total Minutes 50  -AC       Total Minutes 50  -AC                User Key  (r) = Recorded By, (t) = Taken By, (c) = Cosigned By      Initials Name Provider Type    AC Minal Dye OT Occupational Therapist                  Therapy Charges for Today       Code Description Service Date Service Provider Modifiers Qty    [id number] HC OT EVAL LOW COMPLEXITY 4 4/22/2025 Minal Dye OT GO 1                 Minal Dye OT  4/22/2025    Electronically signed by Minal Dye OT at 04/22/25 0970          Speech Language Pathology Notes (most recent note)        Mims, Gauri, MS CCC-SLP at 04/22/25 0697          Goal Outcome Evaluation:  Plan of Care Reviewed With: patient                Anticipated Discharge Disposition (SLP): skilled nursing facility          SLP Swallowing Diagnosis: mild, oral dysphagia, moderate, pharyngeal dysphagia (04/22/25 1100)               Electronically signed by Gauri Mims MS CCC-SLP at 04/22/25 0381

## 2025-04-22 NOTE — DISCHARGE PLACEMENT REQUEST
"Vilma Wooten (54 y.o. Female) From Clarence Garcia RN  [id number]      Date of Birth   1970    Social Security Number       Address   [address] [facility] [address]    Home Phone   [phone]    MRN   [id number]       Confucianist   Hinduism    Marital Status   Single                            Admission Date   4/10/2025    Admission Type   Emergency    Admitting Provider   Guerrero Archer MD    Attending Provider   Guerrero Archer MD    Department, Room/Bed   [address], S579/1       Discharge Date       Discharge Disposition       Discharge Destination                                 Attending Provider: Guerrero Archer MD    Allergies: Doxepin, Januvia [Sitagliptin]    Isolation: None   Infection: None   Code Status: CPR    Ht: 167.6 cm (65.98\")   Wt: 96.3 kg (212 lb 4.9 oz)    Admission Cmt: None   Principal Problem: Acute kidney injury superimposed on chronic kidney disease [N17.9,N18.9]                   Active Insurance as of 4/10/2025       Primary Coverage       Payor Plan Insurance Group Employer/Plan Group    [facility] WELL[employer] MEDICARE ADVANTAGE PPO        Payor Plan Address Payor Plan Phone Number Payor Plan Fax Number Effective Dates    [address] [address]   3/1/2025 - None Entered    [facility] [address]         Subscriber Name Subscriber Birth Date Member ID       VILMA WOOTEN BRADEN 1970 [id number]               Secondary Coverage       Payor Plan Insurance Group Employer/Plan Group    [place]        Payor Plan Address Payor Plan Phone Number Payor Plan Fax Number Effective Dates    [address] [phone]  2/27/2025 - None Entered    [facility] [address]         Subscriber Name Subscriber Birth Date Member ID       VILMA WOOTEN BRADEN 1970 [id number]                     Emergency Contacts        (Rel.) Home Phone Work Phone Mobile Phone    FRANICE LOYOLA (Sister) [phone] " -- [phone]                 History & Physical        Hank Garcia PA-C at 04/10/25 1947       Attestation signed by Kerley, Brian Joseph, DO at 04/10/25 2149      I have reviewed this documentation and agree.                          [facility] Medicine Services  HISTORY AND PHYSICAL    Patient Name: Vilma Ayala  : 1970  MRN: [id number]  Primary Care Physician: Vanessa Kaur MD  Date of admission: 4/10/2025    Subjective  Subjective     Chief Complaint:  Nausea, diarrhea      HPI:  Vilma Ayala is a 54 y.o. female with a history of Essential Tremor, T2DM, Hypothyroidism, anxiety/depression, and GERD who presents to [facility] ED for complaint of nausea and diarrhea. She reports that she has felt nauseous for the last few weeks now. However, she denies any instances of vomiting. She states she was recently diagnosed with a UTI on  and was started on Bactrim, and took 3 days of this. Her last dose of Bactrim was a week ago today. She reports that she continues to have nausea, as well as diarrhea which she feels like is a little worse today, which prompted the ED visit. She also endorses some mild epigastric pain, fatigue and poor PO intake over the last week or so as well. She denies fever, chills, chest pain, SOB, cough, or vomiting.         Review of Systems   Constitutional:  Positive for appetite change and fatigue. Negative for chills, fever and unexpected weight change.   Eyes:  Negative for photophobia and visual disturbance.   Respiratory:  Negative for cough, shortness of breath and wheezing.    Cardiovascular:  Negative for chest pain and palpitations.   Gastrointestinal:  Positive for abdominal pain (epigastric), diarrhea and nausea. Negative for vomiting.   Genitourinary:  Negative for dysuria and hematuria.   Musculoskeletal:  Positive for arthralgias and back pain.   Skin: Negative.    Neurological:  Positive for tremors (chronic).  Negative for weakness.   Psychiatric/Behavioral:  Negative for confusion. The patient is nervous/anxious.               Personal History     Past Medical History:   Diagnosis Date    Anxiety     Chicken pox     Depression     Diabetes mellitus     Disease of thyroid gland     Measles     Type 2 diabetes mellitus              Past Surgical History:   Procedure Laterality Date    EYE SURGERY      TONSILLECTOMY         Family History:  family history includes Cancer in her father; Diabetes in her brother, mother, sister, and another family member; Heart disease in her mother; Hypertension in her mother and another family member; No Known Problems in her brother; Obesity in an other family member; Thyroid disease in an other family member.     Social History:  reports that she has never smoked. She has never used smokeless tobacco. She reports that she does not drink alcohol and does not use drugs.  Social History     Social History Narrative    Not on file       Medications:  Accu-Chek Softclix Lancets, Blood Glucose Monitoring Suppl, Cholecalciferol, Fluticasone-Umeclidin-Vilant, FreeStyle Sukhi 3 Plus Sensor, FreeStyle Sukhi 3 Scotts, Insulin Glargine, Insulin Pen Needle, Insulin Syringe-Needle U-100, LORazepam, Lancet Device, OneTouch Delica Lancets 33G, albuterol sulfate HFA, ammonium lactate, atorvastatin, benztropine, busPIRone, citalopram, famotidine, glucose, glucose blood, glucose monitor, hydrOXYzine, hydrOXYzine pamoate, ibuprofen, levothyroxine, metFORMIN, ondansetron, pantoprazole, pitavastatin calcium, promethazine, and risperiDONE    Allergies   Allergen Reactions    Doxepin Rash    Januvia [Sitagliptin] Other (See Comments)     Insomnia       Objective  Objective     Vital Signs:   Temp:  [98.7 °F (37.1 °C)] 98.7 °F (37.1 °C)  Heart Rate:  [] 105  Resp:  [16] 16  BP: (101-201)/() 120/94    Physical Exam  Constitutional:       General: She is not in acute distress.     Appearance: She is  obese.   HENT:      Head: Atraumatic.      Right Ear: External ear normal.      Left Ear: External ear normal.      Nose: Nose normal.   Eyes:      Extraocular Movements: Extraocular movements intact.      Conjunctiva/sclera: Conjunctivae normal.      Pupils: Pupils are equal, round, and reactive to light.   Cardiovascular:      Rate and Rhythm: Normal rate and regular rhythm.      Pulses: Normal pulses.      Heart sounds: Normal heart sounds. No murmur heard.  Pulmonary:      Effort: Pulmonary effort is normal. No respiratory distress.      Breath sounds: Normal breath sounds. No wheezing, rhonchi or rales.   Abdominal:      General: Bowel sounds are normal. There is no distension.      Tenderness: There is abdominal tenderness (mild epigastric tenderness). There is no guarding or rebound.   Musculoskeletal:         General: Normal range of motion.      Cervical back: No rigidity.   Skin:     General: Skin is warm and dry.      Coloration: Skin is not jaundiced.      Findings: No lesion or rash.   Neurological:      Mental Status: She is alert and oriented to person, place, and time.      Comments: Chronic essential tremor noted during exam.    Psychiatric:         Attention and Perception: Attention normal.         Mood and Affect: Mood normal.         Behavior: Behavior normal.         Thought Content: Thought content normal.          Result Review:  I have personally reviewed the results from the time of this admission to 4/10/2025 20:14 EDT and agree with these findings:  [x]  Laboratory list / accordion  []  Microbiology  [x]  Radiology  []  EKG/Telemetry   []  Cardiology/Vascular   []  Pathology  [x]  Old records  []  Other:      LAB RESULTS:      Lab 04/10/25  1344 04/09/25  1029   WBC 18.06* 18.15*   HEMOGLOBIN 14.6 15.6   HEMATOCRIT 43.3 44.4   PLATELETS 338 373   NEUTROS ABS 14.41* 13.79*   IMMATURE GRANS (ABS) 0.08*  --    LYMPHS ABS 2.78  --    MONOS ABS 0.62  --    EOS ABS 0.12 0.36   MCV 86.4 86.0    LACTATE 1.6  --          Lab 04/10/25  1344 04/09/25  1029   SODIUM 138 136   POTASSIUM 3.9 4.0   CHLORIDE 101 99   CO2 24.0 24.2   ANION GAP 13.0 12.8   BUN 11 9   CREATININE 2.07* 1.88*   EGFR 28.0* 31.5*   GLUCOSE 112* 116*   CALCIUM 9.8 10.2   TSH  --  1.690         Lab 04/10/25  1344 04/09/25  1029   TOTAL PROTEIN 7.6 7.7   ALBUMIN 4.3 4.4   GLOBULIN 3.3 3.3   ALT (SGPT) 33 37*   AST (SGOT) 28 32   BILIRUBIN 0.8 0.9   ALK PHOS 137* 148*   LIPASE 21 21             Lab 04/09/25  1029   CHOLESTEROL 113   LDL CHOL 55   HDL CHOL 37*   TRIGLYCERIDES 112             Brief Urine Lab Results  (Last result in the past 365 days)        Color   Clarity   Blood   Leuk Est   Nitrite   Protein   CREAT   Urine HCG        04/10/25 1734 Yellow   Clear   Negative   Trace   Negative   Negative                 Microbiology Results (last 10 days)       Procedure Component Value - Date/Time    Urine Culture - Urine, Urine, Clean Catch [[id number]] Collected: 04/01/25 1118    Lab Status: Final result Specimen: Urine, Clean Catch Updated: 04/03/25 0952     Urine Culture 50,000 CFU/mL Mixed Clemencia Isolated    Narrative:      Colonization of the urinary tract without infection is common. Treatment is discouraged unless the patient is symptomatic, pregnant, or undergoing an invasive urologic procedure.  Specimen contains mixed organisms of questionable pathogenicity suggestive of contamination. If symptoms persist, suggest recollection.            CT Abdomen Pelvis Without Contrast  Result Date: 4/10/2025  CT ABDOMEN PELVIS WO CONTRAST Date of Exam: 4/10/2025 3:21 PM EDT Indication: N/V, left flank pain. Comparison: Chest CT 3/23/2025 Technique: Axial CT images were obtained of the abdomen and pelvis without the administration of contrast. Reconstructed coronal and sagittal images were also obtained. Automated exposure control and iterative construction methods were used. Findings: Included Chest: Bibasal atelectasis Liver: No significant  abnormality.  Gallbladder and biliary tree: No significant abnormality.  Spleen: No significant abnormality.  Pancreas: No significant abnormality.  Adrenal glands: Nonspecific and unchanged mild bilateral adrenal gland thickening.  Kidneys and ureters: No significant abnormality. No hydroureteronephrosis. No radiopaque calculi seen.  Stomach and duodenum:No significant abnormality. Small and large bowel: Colonic diverticulosis. No evidence of bowel obstruction. Appendicoliths within an otherwise normal-appearing appendix. No significant pericolonic inflammatory changes seen. Peritoneal cavity: No free fluid or free air. Bladder: No significant abnormality.  Pelvic organs: No significant abnormality.  Vasculature: Atherosclerotic calcifications.  Lymph nodes: No pathologic appearing lymph nodes by imaging criteria.  Bones and soft tissues: Degenerative changes of the imaged spine. No acute osseous abnormality.     Impression: Impression: No acute findings in the abdomen or pelvis. Electronically Signed: Gregg Colindres  4/10/2025 3:32 PM EDT  Workstation ID: [id number]      Results for orders placed during the hospital encounter of 09/05/19    Adult Transthoracic Echo Complete W/ Cont if Necessary Per Protocol    Interpretation Summary  · Left ventricular systolic function is normal. Estimated EF = 60%.  · No significant valve dysfunction.      Assessment & Plan  Assessment & Plan       Acute kidney injury superimposed on chronic kidney disease    Diarrhea    Dehydration    Mixed hyperlipidemia    Type 2 diabetes mellitus without complication, with long-term current use of insulin    Essential tremor    Anxiety and depression    Other specified hypothyroidism      Vilma Ayala is a 54 y.o. female with a history of Essential Tremor, T2DM, Hypothyroidism, anxiety/depression, and GERD who presents to [facility] ED for complaint of nausea and diarrhea.       CB on CKD   Dehydration  Nausea/Diarrhea  -Cr 2.07,  elevated from recent results.  eGFR 28.0  -UA tonight unremarkable  -CT abd/pelvis negative for acute findings  -CB possibly 2ry to recent bactrim use, as well as dehydration from poor po intake.  -GI panel PCR pending.   -C diff toxin pending given leukocytosis and diarrhea from recent abx use.   -Resp panel PCR pending  -IV fluids  -Nephrology consult for the am  -Avoid anti-diarrheal meds for now pending results of C diff toxin.   -Zofran for nausea    HLD  -Continue statin    T2DM  -Blood glucose 112  -Check A1C  -Fingerstick achs. SSI  -LA insulin nightly    Essential Tremor  -Continue Respiradone    Anxiety/depression  -Continue home meds          DVT prophylaxis:  SCDS    CODE STATUS:  Full Code  Code Status (Patient has no pulse and is not breathing): CPR (Attempt to Resuscitate)  Medical Interventions (Patient has pulse or is breathing): Full Support      Expected DischargeTBD       This note has been completed as part of a split-shared workflow.     Signature: Electronically signed by Hank Garcia PA-C, 04/10/25, 8:05 PM EDT                Electronically signed by Kerley, Brian Joseph, DO at 04/10/25 1050       Operative/Procedure Notes (most recent note)    No notes of this type exist for this encounter.          Physician Progress Notes (most recent note)        Nanda Wong MD at 25 1413              [facility] Medicine Services  PROGRESS NOTE    Patient Name: Vilma Ayala  : 1970  MRN: [id number]    Date of Admission: 4/10/2025  Primary Care Physician: Vanessa Kaur MD    Subjective   Subjective     CC: f/u nausea    HPI: Resting comfortably on my arrival. Sitting up in chair. Reporting some stomach ache but now doing ok. Wants to get some rest today    Ros   As above      Objective   Objective     Vital Signs:   Temp:  [97.5 °F (36.4 °C)-98.2 °F (36.8 °C)] 98.2 °F (36.8 °C)  Heart Rate:  [] 92  Resp:  [16-18] 18  BP:  (110-119)/(74-86) 110/74     Physical Exam:  Constitutional: No acute distress, awake, alert, sitting up in chair   HENT: NCAT, mucous membranes moist  Respiratory: Clear to auscultation bilaterally, respiratory effort normal   Cardiovascular: RRR, no murmurs, rubs, or gallops  Gastrointestinal: Positive bowel sounds, soft, nontender, nondistended  Musculoskeletal: No bilateral ankle edema  Psychiatric: Appropriate affect, cooperative  Neurologic: Oriented x 3, strength symmetric in all extremities, Cranial Nerves grossly intact to confrontation, speech clear  Skin: No rashes     Results Reviewed:  LAB RESULTS:      Lab 04/19/25  1536 04/16/25  0618 04/15/25  0619 04/15/25  0120 04/14/25  2141 04/14/25  1925   WBC 13.10* 11.85* 11.67*  --   --   --    HEMOGLOBIN 11.6* 11.8* 11.9*  --   --   --    HEMATOCRIT 34.4 35.7 35.9  --   --   --    PLATELETS 239 243 248  --   --   --    NEUTROS ABS  --  7.67* 7.25*  --   --   --    IMMATURE GRANS (ABS)  --  0.04 0.05  --   --   --    LYMPHS ABS  --  2.99 3.15*  --   --   --    MONOS ABS  --  0.61 0.58  --   --   --    EOS ABS  --  0.51* 0.59*  --   --   --    MCV 87.3 90.2 89.3  --   --   --    LACTATE  --   --   --  1.8 4.1* 2.5*   HSTROP T  --   --   --   --   --  17*         Lab 04/21/25  0428 04/19/25  1536 04/16/25  0618 04/15/25  0619   SODIUM 136 136 138 141   POTASSIUM 4.1 4.1 4.3 4.6   CHLORIDE 101 101 104 105   CO2 23.0 25.0 21.0* 22.0   ANION GAP 12.0 10.0 13.0 14.0   BUN 16 15 18 17   CREATININE 1.25* 1.45* 1.33* 1.33*   EGFR 51.3* 43.0* 47.6* 47.6*   GLUCOSE 163* 232* 128* 142*   CALCIUM 9.3 8.8 9.3 9.1         Lab 04/16/25  0618 04/15/25  0619   TOTAL PROTEIN 5.6* 5.7*   ALBUMIN 3.7 3.6   GLOBULIN 1.9 2.1   ALT (SGPT) 22 26   AST (SGOT) 14 18   BILIRUBIN 0.5 0.5   ALK PHOS 100 100         Lab 04/14/25  1925   HSTROP T 17*                 Brief Urine Lab Results  (Last result in the past 365 days)        Color   Clarity   Blood   Leuk Est   Nitrite   Protein    CREAT   Urine HCG        04/10/25 1734 Yellow   Clear   Negative   Trace   Negative   Negative                   Microbiology Results Abnormal       None            No radiology results from the last 24 hrs    Results for orders placed during the hospital encounter of 04/10/25    Adult Transthoracic Echo Complete W/ Cont if Necessary Per Protocol    Interpretation Summary    Left ventricular systolic function is normal. Estimated left ventricular EF = 60%    No significant valvular abnormalities.      Current medications:  Scheduled Meds:atorvastatin, 40 mg, Oral, Daily  cholestyramine light, 1 packet, Oral, Q12H  famotidine, 20 mg, Oral, Nightly  heparin (porcine), 5,000 Units, Subcutaneous, Q8H  insulin glargine, 15 Units, Subcutaneous, Daily  insulin glargine, 25 Units, Subcutaneous, Nightly  Insulin Lispro, 3 Units, Subcutaneous, TID With Meals  insulin lispro, 3-14 Units, Subcutaneous, 4x Daily AC & at Bedtime  ipratropium-albuterol, 3 mL, Nebulization, 4x Daily - RT  levothyroxine, 225 mcg, Oral, Q AM  risperiDONE, 3 mg, Oral, BID  saccharomyces boulardii, 250 mg, Oral, BID  senna-docusate sodium, 2 tablet, Oral, BID      Continuous Infusions:   PRN Meds:.  acetaminophen **OR** acetaminophen **OR** acetaminophen    senna-docusate sodium **AND** polyethylene glycol **AND** bisacodyl **AND** bisacodyl    dextrose    dextrose    glucagon (human recombinant)    melatonin    promethazine    Sodium Chloride (PF)    Assessment & Plan   Assessment & Plan     Active Hospital Problems    Diagnosis  POA    **Acute kidney injury superimposed on chronic kidney disease [N17.9, N18.9]  Yes    Acute kidney injury superimposed on CKD [N17.9, N18.9]  Yes    Diarrhea [R19.7]  Yes    Dehydration [E86.0]  Yes    Essential tremor [G25.0]  Yes    Type 2 diabetes mellitus without complication, with long-term current use of insulin [E11.9, Z79.4]  Not Applicable    Mixed hyperlipidemia [E78.2]  Yes    Other specified hypothyroidism  [E03.8]  Yes    Anxiety and depression [F41.9, F32.A]  Yes      Resolved Hospital Problems   No resolved problems to display.        Brief Hospital Course to date:  Vilma Ayala is a 54 y.o. female with a history of Essential Tremor, T2DM, Hypothyroidism, anxiety/depression, and GERD who presents to [facility] ED for complaint of nausea and diarrhea.  Patient transferred to my services on the a.m. of 4/11, she is resting comfortably in bed, she reports diarrhea is doing better but stool studies are ordered and pending.  Continuing on IV fluids and treatment for CB, slight improvement in renal function today, will continue to monitor at this time.  Patient on the afternoon of 4/12 had some increased dyspnea and congestion.  Chest x-ray was obtained and could not rule out possible pneumonia however Pro-Jim was negative, white blood cell count was normal, and felt more pulmonary congestion, IV fluids were stopped and patient was given IV Bumex.  Seen again on the a.m. of 4/13, noticed to have some congestion on exam, CT of the chest was obtained and noted to have bilateral lower lobe atelectasis.  Patient has since been weaned to room air this afternoon and patient will be using incentive spirometer and flutter valve. Slight uptrend again of Cr on 4/14, patient with poor PO intake, some nausea this AM, additional meds ordered. Patient with slight tachycardia as well, will check troponin and obtain echo on her. No active chest pain however at this time.     This patient's problems and plans were partially entered by my partner and updated as appropriate by me 04/21/25.         CB on CKD   Dehydration  Nausea/Vomiting  -Cr 2.07 on admission, down to 1.6 on 4/13, IVF previously held with dyspnea, oxygen requirement, now weaned back to RA, chest CT with just atelectasis, plans for IS/FD, encourarged PO intake.   -CT abd/pelvis negative for acute findings  -CB possibly 2ry to recent bactrim use, as well as  dehydration from poor po intake. Improving.   -PT/OT following and rec'd IRF however patient accomplishing all transfers with SBA/CGA and ambulated 45 feet. Also no medical necessity for IRF. My partner D/W her daughter that if interested in rehab can consider SNF however with her ambulating 45 feet, unclear if she would be approved.  -patient was able to have bm which seems to have helped nausea some, continue prn antiemetics.  -since nausea has improved, will give onetime lactulose po today and dulcolax supp.       Dyspnea  Nausea   - possibly related to recent fluids .  Improving   - Ordered for duonebs, cxr, will add IS/FD as well   - CT Chest with just atelectasis, encouraged use of IS/FD  - ckd echo 4/14 and troponin, troponin 26, will follow but seems noncardiac. Echo EF 60%.  LVSF nl, no significant valvular abnormalities      HLD  -Continue statin     T2DM  -A1C 6.8   -Fingerstick achs. SSI  -lantus insulin nightly    Anxiety/depression  -Continue home meds, stable   -Continue Respiradone, stable         Expected Discharge Location and Transportation: tbd  Expected Discharge   Expected Discharge Date: 4/20/2025; Expected Discharge Time:      VTE Prophylaxis:  Pharmacologic & mechanical VTE prophylaxis orders are present.         AM-PAC 6 Clicks Score (PT): 17 (04/20/25 2000)    CODE STATUS:   Code Status and Medical Interventions: CPR (Attempt to Resuscitate); Full Support   Ordered at: 04/10/25 2013     Code Status (Patient has no pulse and is not breathing):    CPR (Attempt to Resuscitate)     Medical Interventions (Patient has pulse or is breathing):    Full Support       Nanda Wong MD  04/21/25       Electronically signed by Nanda Wong MD at 04/21/25 1415       Consult Notes (most recent note)    No notes of this type exist for this encounter.          Physical Therapy Notes (most recent note)        Helen Diallo, PT at 04/18/25 1325  Version 1 of 1         Patient Name: Vilma Maria Luisa  Jamie  : 1970    MRN: [id number]                              Today's Date: 2025       Admit Date: 4/10/2025    Visit Dx:     ICD-10-CM ICD-9-CM   1. CB (acute kidney injury)  N17.9 584.9   2. Nausea and vomiting, unspecified vomiting type  R11.2 787.01   3. Weakness  R53.1 780.79   4. Oropharyngeal dysphagia  R13.12 787.22     Patient Active Problem List   Diagnosis    Anxiety and depression    Uncontrolled type 2 diabetes mellitus    Other specified hypothyroidism    Obesity    Vitamin D deficiency, unspecified    Diabetic neuropathy, type II diabetes mellitus    Mixed hyperlipidemia    Dyspnea on exertion    Type 2 diabetes mellitus without complication, with long-term current use of insulin    Urinary incontinence    Microscopic hematuria    Nocturia    Nocturnal enuresis    Stress incontinence    Urge incontinence    Decreased GFR    Mild persistent asthma    Obesity (BMI 35.0-39.9 without comorbidity)    Drug-induced parkinsonism    Encounter to establish care    NATASHA (obstructive sleep apnea)    Medicare annual wellness visit, subsequent    Vitamin D deficiency    UTI (urinary tract infection)    Painful urination    Nausea    Acute kidney injury superimposed on chronic kidney disease    Diarrhea    Dehydration    Essential tremor    Acute kidney injury superimposed on CKD     Past Medical History:   Diagnosis Date    Anxiety     Chicken pox     Depression     Diabetes mellitus     Disease of thyroid gland     Measles     Type 2 diabetes mellitus      Past Surgical History:   Procedure Laterality Date    EYE SURGERY      TONSILLECTOMY        General Information       Row Name 25 1451          Physical Therapy Time and Intention    Document Type therapy note (daily note)  -ND     Mode of Treatment physical therapy  -ND       Row Name 25 1451          General Information    Patient Profile Reviewed yes  -ND     Existing Precautions/Restrictions fall;other (see comments)  Tremulous  -ND      Barriers to Rehab none identified  -ND       Row Name 04/18/25 1451          Cognition    Orientation Status (Cognition) oriented x 3  -ND       Row Name 04/18/25 1451          Safety Issues/Impairments Affecting Functional Mobility    Safety Issues Affecting Function (Mobility) awareness of need for assistance;insight into deficits/self-awareness;safety precaution awareness;safety precautions follow-through/compliance;sequencing abilities  -ND     Impairments Affecting Function (Mobility) balance;strength;endurance/activity tolerance  -ND               User Key  (r) = Recorded By, (t) = Taken By, (c) = Cosigned By      Initials Name Provider Type    ND Helen Diallo, PT Physical Therapist                   Mobility       Row Name 04/18/25 1452          Bed Mobility    Bed Mobility supine-sit  -ND     Supine-Sit [place] (Bed Mobility) contact guard;verbal cues;nonverbal cues (demo/gesture)  -ND     Assistive Device (Bed Mobility) bed rails  -ND     Comment, (Bed Mobility) Increased time for task, cues for sequencing. Denies dizziness with position change.  -ND       Row Name 04/18/25 1452          Bed-Chair Transfer    Bed-Chair [place] (Transfers) contact guard;verbal cues;nonverbal cues (demo/gesture)  -ND     Assistive Device (Bed-Chair Transfers) walker, standard  -ND     Comment, (Bed-Chair Transfer) SPT from EOB>BSC  -ND       Row Name 04/18/25 1452          Sit-Stand Transfer    Sit-Stand [place] (Transfers) contact guard;verbal cues;nonverbal cues (demo/gesture)  -ND     Assistive Device (Sit-Stand Transfers) walker, standard  -ND     Comment, (Sit-Stand Transfer) x1 from EOB, x2 from BSC.  -ND       Row Name 04/18/25 1452          Gait/Stairs (Locomotion)    [place] Level (Gait) contact guard;verbal cues;nonverbal cues (demo/gesture)  -ND     Assistive Device (Gait) walker, front-wheeled  -ND     Distance in Feet (Gait) 3  -ND     Comment, (Gait/Stairs) Pt ambulates short  distance from BSC to chair with standard walker and CGA. Encouraged pt to ambulate further but she declines this date due to fatigue.  -ND               User Key  (r) = Recorded By, (t) = Taken By, (c) = Cosigned By      Initials Name Provider Type    Helen Valverde PT Physical Therapist                   Obj/Interventions       Row Name 04/18/25 1454          Motor Skills    Therapeutic Exercise hip;knee;ankle  -ND       Row Name 04/18/25 1454          Hip (Therapeutic Exercise)    Hip (Therapeutic Exercise) strengthening exercise  -ND     Hip Strengthening (Therapeutic Exercise) bilateral;marching while seated;10 repetitions  -ND       Row Name 04/18/25 1454          Knee (Therapeutic Exercise)    Knee (Therapeutic Exercise) strengthening exercise  -ND     Knee Strengthening (Therapeutic Exercise) bilateral;LAQ (long arc quad);10 repetitions  -ND       Row Name 04/18/25 1454          Ankle (Therapeutic Exercise)    Ankle (Therapeutic Exercise) AROM (active range of motion)  -ND     Ankle AROM (Therapeutic Exercise) bilateral;dorsiflexion;plantarflexion;10 repetitions  -ND       Row Name 04/18/25 1454          Balance    Balance Assessment sitting static balance;sitting dynamic balance;standing static balance;standing dynamic balance  -ND     Static Sitting Balance standby assist  -ND     Dynamic Sitting Balance contact guard  -ND     Position, Sitting Balance unsupported;sitting edge of bed  -ND     Static Standing Balance contact guard  -ND     Dynamic Standing Balance contact guard  -ND     Position/Device Used, Standing Balance supported;walker, standard  -ND     Balance Interventions sitting;standing;sit to stand;static;supported;dynamic  -ND     Comment, Balance No knee buckling or LOB noted. Pt requires dependent haritha-hygiene following BSC transfer.  -ND               User Key  (r) = Recorded By, (t) = Taken By, (c) = Cosigned By      Initials Name Provider Type    Helen Valverde PT Physical  Therapist                   Goals/Plan    No documentation.                  Clinical Impression       Row Name 04/18/25 1456          Pain    Pretreatment Pain Rating 0/10 - no pain  -ND     Posttreatment Pain Rating 0/10 - no pain  -ND       Row Name 04/18/25 1456          Plan of Care Review    Plan of Care Reviewed With patient  -ND     Progress no change  -ND     Outcome Evaluation Pt declines further ambulation this date due to fatigue and therefore only performs functional transfers with standard walker. Pt continues to benefit from IP PT services to progress mobility and facilitate return to baseline. Recommend SNF following d/c for best functional outcome.  -ND       Row Name 04/18/25 1456          Vital Signs    Pre Systolic BP Rehab 104  -ND     Pre Treatment Diastolic BP 62  -ND     Post Systolic BP Rehab 111  -ND     Post Treatment Diastolic BP 77  -ND     Pretreatment Heart Rate (beats/min) 96  -ND     Posttreatment Heart Rate (beats/min) 97  -ND     Pre SpO2 (%) 95  -ND     O2 Delivery Pre Treatment room air  -ND     O2 Delivery Intra Treatment room air  -ND     Post SpO2 (%) 92  -ND     O2 Delivery Post Treatment room air  -ND     Pre Patient Position Supine  -ND     Intra Patient Position Standing  -ND     Post Patient Position Sitting  -ND       Row Name 04/18/25 1456          Positioning and Restraints    Pre-Treatment Position in bed  -ND     Post Treatment Position chair  -ND     In Chair notified nsg;reclined;legs elevated;call light within reach;encouraged to call for assist;exit alarm on;waffle cushion  -ND               User Key  (r) = Recorded By, (t) = Taken By, (c) = Cosigned By      Initials Name Provider Type    Helen Valverde, LUKASZ Physical Therapist                   Outcome Measures       Row Name 04/18/25 1459 04/18/25 0810       How much help from another person do you currently need...    Turning from your back to your side while in flat bed without using bedrails? 3  -ND 3   -TI    Moving from lying on back to sitting on the side of a flat bed without bedrails? 3  -ND 3  -TI    Moving to and from a bed to a chair (including a wheelchair)? 3  -ND 3  -TI    Standing up from a chair using your arms (e.g., wheelchair, bedside chair)? 3  -ND 3  -TI    Climbing 3-5 steps with a railing? 2  -ND 2  -TI    To walk in hospital room? 3  -ND 3  -TI    AM-PAC 6 Clicks Score (PT) 17  -ND 17  -TI    Highest Level of Mobility Goal 5 --> Static standing  -ND 5 --> Static standing  -TI      Row Name 04/18/25 1459          Functional Assessment    Outcome Measure Options AM-PAC 6 Clicks Basic Mobility (PT)  -ND               User Key  (r) = Recorded By, (t) = Taken By, (c) = Cosigned By      Initials Name Provider Type    TI Mulu Ann RN Registered Nurse    Helen Valverde, LUKASZ Physical Therapist                                 Physical Therapy Education       Title: PT OT SLP Therapies (Done)       Topic: Physical Therapy (Done)       Point: Mobility training (Done)       Learning Progress Summary            Patient Acceptance, E, VU by ND at 4/18/2025 1459    Acceptance, E,D, VU,NR by LR at 4/16/2025 1349    Comment: Educated on benefits of mobility, safety with mobility, correct supine to sit t/f technique, correct sit<->stand t/f technique, correct gait mechanics, LE HEP, and progression of POC.    Acceptance, E, VU,NR by LS at 4/12/2025 0950                      Point: Home exercise program (Done)       Learning Progress Summary            Patient Acceptance, E, VU by ND at 4/18/2025 1459    Acceptance, E,D, VU,NR by LR at 4/16/2025 1349    Comment: Educated on benefits of mobility, safety with mobility, correct supine to sit t/f technique, correct sit<->stand t/f technique, correct gait mechanics, LE HEP, and progression of POC.                      Point: Body mechanics (Done)       Learning Progress Summary            Patient Acceptance, E, VU by ND at 4/18/2025 1459    Acceptance,  E,D, VU,NR by LR at 4/16/2025 1349    Comment: Educated on benefits of mobility, safety with mobility, correct supine to sit t/f technique, correct sit<->stand t/f technique, correct gait mechanics, LE HEP, and progression of POC.                      Point: Precautions (Done)       Learning Progress Summary            Patient Acceptance, E, VU by ND at 4/18/2025 1459    Acceptance, E,D, VU,NR by LR at 4/16/2025 1349    Comment: Educated on benefits of mobility, safety with mobility, correct supine to sit t/f technique, correct sit<->stand t/f technique, correct gait mechanics, LE HEP, and progression of POC.                                      User Key       Initials Effective Dates Name Provider Type Discipline    LR 02/03/23 -  Saba Mejía, PT Physical Therapist PT    LS 07/11/23 -  Jody Yeung, PT Physical Therapist PT    ND 11/16/23 -  Helen Diallo, PT Physical Therapist PT                  PT Recommendation and Plan     Progress: no change  Outcome Evaluation: Pt declines further ambulation this date due to fatigue and therefore only performs functional transfers with standard walker. Pt continues to benefit from IP PT services to progress mobility and facilitate return to baseline. Recommend SNF following d/c for best functional outcome.     Time Calculation:         PT Charges       Row Name 04/18/25 5197             Time Calculation    Start Time 1325  -ND      PT Received On 04/18/25  -ND         Timed Charges    42917 - PT Therapeutic Exercise Minutes 8  -ND      31189 - PT Therapeutic Activity Minutes 16  -ND         Total Minutes    Timed Charges Total Minutes 24  -ND       Total Minutes 24  -ND                User Key  (r) = Recorded By, (t) = Taken By, (c) = Cosigned By      Initials Name Provider Type    ND Helen Diallo, PT Physical Therapist                  Therapy Charges for Today       Code Description Service Date Service Provider Modifiers Qty    61133976758   PT THER PROC EA 15 MIN 2025 Helen Diallo, PT GP 1    [id number]  PT THERAPEUTIC ACT EA 15 MIN 2025 Helen Diallo, PT GP 1            PT G-Codes  Outcome Measure Options: AM-PAC 6 Clicks Basic Mobility (PT)  AM-PAC 6 Clicks Score (PT): 17  PT Discharge Summary  Anticipated Discharge Disposition (PT): skilled nursing facility    Helen Diallo PT  2025      Electronically signed by Helen Diallo, PT at 25 1501          Occupational Therapy Notes (most recent note)        Minal Dye, OT at 25 0701          Patient Name: Vilma Ayala  : 1970    MRN: [id number]                              Today's Date: 2025       Admit Date: 4/10/2025    Visit Dx:     ICD-10-CM ICD-9-CM   1. CB (acute kidney injury)  N17.9 584.9   2. Nausea and vomiting, unspecified vomiting type  R11.2 787.01   3. Weakness  R53.1 780.79   4. Oropharyngeal dysphagia  R13.12 787.22     Patient Active Problem List   Diagnosis    Anxiety and depression    Uncontrolled type 2 diabetes mellitus    Other specified hypothyroidism    Obesity    Vitamin D deficiency, unspecified    Diabetic neuropathy, type II diabetes mellitus    Mixed hyperlipidemia    Dyspnea on exertion    Type 2 diabetes mellitus without complication, with long-term current use of insulin    Urinary incontinence    Microscopic hematuria    Nocturia    Nocturnal enuresis    Stress incontinence    Urge incontinence    Decreased GFR    Mild persistent asthma    Obesity (BMI 35.0-39.9 without comorbidity)    Drug-induced parkinsonism    Encounter to establish care    NATASHA (obstructive sleep apnea)    Medicare annual wellness visit, subsequent    Vitamin D deficiency    UTI (urinary tract infection)    Painful urination    Nausea    Acute kidney injury superimposed on chronic kidney disease    Diarrhea    Dehydration    Essential tremor    Acute kidney injury superimposed on CKD     Past Medical History:   Diagnosis Date     Anxiety     Chicken pox     Depression     Diabetes mellitus     Disease of thyroid gland     Measles     Type 2 diabetes mellitus      Past Surgical History:   Procedure Laterality Date    EYE SURGERY      TONSILLECTOMY        General Information       Row Name 04/22/25 0730          OT Time and Intention    Document Type evaluation  -AC     Mode of Treatment occupational therapy  -AC     Patient Effort good  -AC       Row Name 04/22/25 0730          General Information    Patient Profile Reviewed yes  -AC     Prior Level of Function independent:;all household mobility;ADL's  -AC     Existing Precautions/Restrictions fall  tremors  -AC     Barriers to Rehab medically complex  -AC       Row Name 04/22/25 0730          Occupational Profile    Environmental Supports and Barriers (Occupational Profile) tub shower with shower chair  -AC       Row Name 04/22/25 0730          Living Environment    Current Living Arrangements other (see comments);independent living facility  ILF - [facility]  -AC     People in Home alone  -AC       Row Name 04/22/25 0730          Home Main Entrance    Number of Stairs, Main Entrance none  -AC       Row Name 04/22/25 0730          Cognition    Orientation Status (Cognition) oriented x 3  -AC       Row Name 04/22/25 0730          Safety Issues/Impairments Affecting Functional Mobility    Safety Issues Affecting Function (Mobility) awareness of need for assistance;insight into deficits/self-awareness;safety precaution awareness;safety precautions follow-through/compliance  -AC     Impairments Affecting Function (Mobility) balance;strength;endurance/activity tolerance  -AC               User Key  (r) = Recorded By, (t) = Taken By, (c) = Cosigned By      Initials Name Provider Type    AC Minal Dye OT Occupational Therapist                     Mobility/ADL's       Row Name 04/22/25 0942          Bed Mobility    Comment, (Bed Mobility) UI pre/post tx  -AC       Row Name 04/22/25  0942          Transfers    Transfers sit-stand transfer  -       Row Name 04/22/25 0942          Sit-Stand Transfer    Sit-Stand [place] (Transfers) contact guard;verbal cues;nonverbal cues (demo/gesture)  -     Assistive Device (Sit-Stand Transfers) walker, front-wheeled  -AC     Comment, (Sit-Stand Transfer) VCs for hand placement, required 2 attempts to stand, pt reporting unable to maintain standing first attempt d/t fatigue  -       Row Name 04/22/25 0942          Functional Mobility    Functional Mobility- Ind. Level verbal cues required;contact guard assist  -     Functional Mobility- Device walker, front-wheeled  -AC     Functional Mobility-Distance (Feet) 16  -     Functional Mobility- Safety Issues step length decreased  -       Row Name 04/22/25 0942          Activities of Daily Living    BADL Assessment/Intervention lower body dressing;grooming  -       Row Name 04/22/25 0942          Lower Body Dressing Assessment/Training    [place] Level (Lower Body Dressing) don;socks;minimum assist (75% patient effort)  -     Position (Lower Body Dressing) unsupported sitting  -       Row Name 04/22/25 0942          Grooming Assessment/Training    [place] Level (Grooming) wash face, hands;set up;supervision  -     Position (Grooming) supported sitting  -AC               User Key  (r) = Recorded By, (t) = Taken By, (c) = Cosigned By      Initials Name Provider Type    Minal Valle, OT Occupational Therapist                   Obj/Interventions       Row Name 04/22/25 0944          Sensory Assessment (Somatosensory)    Sensory Assessment (Somatosensory) UE sensation intact  -       Row Name 04/22/25 0944          Vision Assessment/Intervention    Visual Impairment/Limitations corrective lenses full-time  -       Row Name 04/22/25 0944          Range of Motion Comprehensive    General Range of Motion bilateral upper extremity ROM WNL  -       Row Name 04/22/25 0944           Strength Comprehensive (MMT)    Comment, General Manual Muscle Testing (MMT) Assessment BUE grossly 4-/5  -       Row Name 04/22/25 0944          Motor Skills    Motor Skills coordination  -     Coordination bilateral;upper extremity;minimal impairment  -       Row Name 04/22/25 0944          Balance    Balance Assessment sitting static balance;sitting dynamic balance;standing static balance;standing dynamic balance  -AC     Static Sitting Balance standby assist  -AC     Dynamic Sitting Balance contact guard  -AC     Position, Sitting Balance unsupported;sitting edge of bed  -AC     Static Standing Balance verbal cues;contact guard  -AC     Dynamic Standing Balance verbal cues;contact guard  -AC     Position/Device Used, Standing Balance supported;walker, rolling  -AC               User Key  (r) = Recorded By, (t) = Taken By, (c) = Cosigned By      Initials Name Provider Type    AC Minal Dye, OT Occupational Therapist                   Goals/Plan       Row Name 04/22/25 0948          Transfer Goal 1 (OT)    Activity/Assistive Device (Transfer Goal 1, OT) bed-to-chair/chair-to-bed;toilet;walker, rolling  -AC     [place] Level/Cues Needed (Transfer Goal 1, OT) standby assist  -AC     Time Frame (Transfer Goal 1, OT) long term goal (LTG);10 days  -AC     Progress/Outcome (Transfer Goal 1, OT) new goal;goal ongoing  -       Row Name 04/22/25 0948          Dressing Goal 1 (OT)    Activity/Device (Dressing Goal 1, OT) lower body dressing;upper body dressing  -     [place]/Cues Needed (Dressing Goal 1, OT) set-up required;supervision required  -AC     Time Frame (Dressing Goal 1, OT) long term goal (LTG);10 days  -AC     Progress/Outcome (Dressing Goal 1, OT) new goal;goal ongoing  -       Row Name 04/22/25 0948          Toileting Goal 1 (OT)    Activity/Device (Toileting Goal 1, OT) adjust/manage clothing;perform perineal hygiene  -AC     [place] Level/Cues Needed (Toileting Goal 1, OT)  standby assist  -     Time Frame (Toileting Goal 1, OT) short term goal (STG);5 days  -     Progress/Outcome (Toileting Goal 1, OT) new goal;goal ongoing  -       Row Name 04/22/25 0920          Therapy Assessment/Plan (OT)    Planned Therapy Interventions (OT) activity tolerance training;BADL retraining;functional balance retraining;occupation/activity based interventions;patient/caregiver education/training;strengthening exercise;transfer/mobility retraining  -               User Key  (r) = Recorded By, (t) = Taken By, (c) = Cosigned By      Initials Name Provider Type    AC Minal Dye, OT Occupational Therapist                   Clinical Impression       Row Name 04/22/25 0986          Pain Assessment    Pretreatment Pain Rating 0/10 - no pain  -     Posttreatment Pain Rating 0/10 - no pain  -       Row Name 04/22/25 0947          Plan of Care Review    Plan of Care Reviewed With patient  -     Outcome Evaluation Pt presents below baseline with self care/mobility d/t weakness and decr activity tolerance.  Pt min A LBD,  CGA to ambulate with RW.  OT will follow to advance pt toward PLOF.  Recommend SNF prior to returning to [facility] for best outcome.  -       Row Name 04/22/25 0936          Therapy Assessment/Plan (OT)    Criteria for Skilled Therapeutic Interventions Met (OT) yes;skilled treatment is necessary  -     Therapy Frequency (OT) daily  -       Row Name 04/22/25 0986          Therapy Plan Review/Discharge Plan (OT)    Anticipated Discharge Disposition (OT) skilled nursing facility  -       Row Name 04/22/25 0958          Vital Signs    Pre Systolic BP Rehab 111  -AC     Pre Treatment Diastolic BP 80  -AC     Pre SpO2 (%) 94  -AC     O2 Delivery Pre Treatment room air  -AC     O2 Delivery Intra Treatment room air  -AC     Post SpO2 (%) 94  -AC     O2 Delivery Post Treatment room air  -AC     Pre Patient Position Sitting  -AC     Intra Patient Position Standing  -AC     Post Patient  Position Sitting  -       Row Name 04/22/25 0945          Positioning and Restraints    Pre-Treatment Position sitting in chair/recliner  -AC     Post Treatment Position chair  -AC     In Chair notified nsg;reclined;call light within reach;encouraged to call for assist;exit alarm on;waffle cushion  -               User Key  (r) = Recorded By, (t) = Taken By, (c) = Cosigned By      Initials Name Provider Type     Minal Dye, OT Occupational Therapist                   Outcome Measures       Row Name 04/22/25 0949          How much help from another is currently needed...    Putting on and taking off regular lower body clothing? 3  -AC     Bathing (including washing, rinsing, and drying) 3  -AC     Toileting (which includes using toilet bed pan or urinal) 3  -AC     Putting on and taking off regular upper body clothing 3  -AC     Taking care of personal grooming (such as brushing teeth) 3  -AC     Eating meals 3  -AC     AM-PAC 6 Clicks Score (OT) 18  -AC       Row Name 04/22/25 0949          Functional Assessment    Outcome Measure Options AM-PAC 6 Clicks Daily Activity (OT)  -               User Key  (r) = Recorded By, (t) = Taken By, (c) = Cosigned By      Initials Name Provider Type    Minal Valle, OT Occupational Therapist                    Occupational Therapy Education       Title: PT OT SLP Therapies (In Progress)       Topic: Occupational Therapy (In Progress)       Point: ADL training (In Progress)       Learning Progress Summary            Patient Acceptance, E, NR by  at 4/22/2025 0949                                      User Key       Initials Effective Dates Name Provider Type Discipline     02/03/23 -  Minal Dye, OT Occupational Therapist OT                  OT Recommendation and Plan  Planned Therapy Interventions (OT): activity tolerance training, BADL retraining, functional balance retraining, occupation/activity based interventions, patient/caregiver education/training,  strengthening exercise, transfer/mobility retraining  Therapy Frequency (OT): daily  Plan of Care Review  Plan of Care Reviewed With: patient  Outcome Evaluation: Pt presents below baseline with self care/mobility d/t weakness and decr activity tolerance.  Pt min A LBD,  CGA to ambulate with RW.  OT will follow to advance pt toward PLOF.  Recommend SNF prior to returning to [facility] for best outcome.     Time Calculation:   Evaluation Complexity (OT)  Review Occupational Profile/Medical/Therapy History Complexity: brief/low complexity  Assessment, Occupational Performance/Identification of Deficit Complexity: 1-3 performance deficits  Clinical Decision Making Complexity (OT): problem focused assessment/low complexity  Overall Complexity of Evaluation (OT): low complexity     Time Calculation- OT       Row Name 04/22/25 0730             Time Calculation- OT    OT Start Time 0730  -AC      OT Received On 04/22/25  -AC      OT Goal Re-Cert Due Date 05/02/25  -AC         Untimed Charges    OT Eval/Re-eval Minutes 50  -AC         Total Minutes    Untimed Charges Total Minutes 50  -AC       Total Minutes 50  -AC                User Key  (r) = Recorded By, (t) = Taken By, (c) = Cosigned By      Initials Name Provider Type    AC Minal Dye OT Occupational Therapist                  Therapy Charges for Today       Code Description Service Date Service Provider Modifiers Qty    [id number] HC OT EVAL LOW COMPLEXITY 4 4/22/2025 Minal Dye OT GO 1                 Minal Dye OT  4/22/2025    Electronically signed by Minal Dye OT at 04/22/25 0974          Speech Language Pathology Notes (most recent note)        Gauri Mims MS CCC-SLP at 04/21/25 1608          Goal Outcome Evaluation:  Plan of Care Reviewed With: patient                Anticipated Discharge Disposition (SLP): skilled nursing facility          SLP Swallowing Diagnosis: mild, oral dysphagia, moderate, pharyngeal dysphagia (04/21/25  1500)  Treatment Assessment (SLP): continued, toleration of diet, no clinical signs of, aspiration (04/21/25 1500)  Treatment Assessment Comments (SLP): No s/s of aspiration with trials of thin H2O via ice, spoon, cup and straw, nectar thick and soft solids. Patient completed dysphagia exercises with min cues. MBS ordered to reassess for possible upgrade (04/21/25 1500)  Plan for Continued Treatment (SLP): continue treatment per plan of care (04/21/25 1500)      Electronically signed by Gauri Mims MS CCC-SLP at 04/21/25 1232

## 2025-04-22 NOTE — PLAN OF CARE
Goal Outcome Evaluation:  Plan of Care Reviewed With: patient        Progress: improving  Outcome Evaluation: Pt ambulating increased distance w/ FWW, CGA. Pt participating in 5x sit to stand test, scoring 31 sec indicating pt is at a high risk of falls. Pt may continue to benefit from IPPT to address LE weakness, balance deficits, decreased safety awareness, and decreased functional endurance and facilitate improved independence. Pt making good progress toward all goals at this time. PT rec SNF at d/c for best functional outcome.    Anticipated Discharge Disposition (PT): skilled nursing facility

## 2025-04-23 ENCOUNTER — TELEPHONE (OUTPATIENT)
Dept: INTERNAL MEDICINE | Facility: CLINIC | Age: 55
End: 2025-04-23

## 2025-04-23 LAB
ANION GAP SERPL CALCULATED.3IONS-SCNC: 14 MMOL/L (ref 5–15)
BACTERIA UR QL AUTO: ABNORMAL /HPF
BILIRUB UR QL STRIP: NEGATIVE
BUN SERPL-MCNC: 17 MG/DL (ref 6–20)
BUN/CREAT SERPL: 12.9 (ref 7–25)
CALCIUM SPEC-SCNC: 9.4 MG/DL (ref 8.6–10.5)
CHLORIDE SERPL-SCNC: 100 MMOL/L (ref 98–107)
CLARITY UR: CLEAR
CO2 SERPL-SCNC: 23 MMOL/L (ref 22–29)
COLOR UR: YELLOW
CREAT SERPL-MCNC: 1.32 MG/DL (ref 0.57–1)
CRP SERPL-MCNC: 0.6 MG/DL (ref 0–0.5)
DEPRECATED RDW RBC AUTO: 47 FL (ref 37–54)
EGFRCR SERPLBLD CKD-EPI 2021: 48.1 ML/MIN/1.73
ERYTHROCYTE [DISTWIDTH] IN BLOOD BY AUTOMATED COUNT: 14.6 % (ref 12.3–15.4)
GLUCOSE BLDC GLUCOMTR-MCNC: 202 MG/DL (ref 70–130)
GLUCOSE BLDC GLUCOMTR-MCNC: 206 MG/DL (ref 70–130)
GLUCOSE BLDC GLUCOMTR-MCNC: 238 MG/DL (ref 70–130)
GLUCOSE BLDC GLUCOMTR-MCNC: 304 MG/DL (ref 70–130)
GLUCOSE SERPL-MCNC: 138 MG/DL (ref 65–99)
GLUCOSE UR STRIP-MCNC: ABNORMAL MG/DL
HCT VFR BLD AUTO: 37 % (ref 34–46.6)
HGB BLD-MCNC: 12.5 G/DL (ref 12–15.9)
HGB UR QL STRIP.AUTO: NEGATIVE
HYALINE CASTS UR QL AUTO: ABNORMAL /LPF
KETONES UR QL STRIP: NEGATIVE
LEUKOCYTE ESTERASE UR QL STRIP.AUTO: ABNORMAL
MCH RBC QN AUTO: 30 PG (ref 26.6–33)
MCHC RBC AUTO-ENTMCNC: 33.8 G/DL (ref 31.5–35.7)
MCV RBC AUTO: 88.7 FL (ref 79–97)
NITRITE UR QL STRIP: NEGATIVE
PH UR STRIP.AUTO: 5.5 [PH] (ref 5–8)
PLATELET # BLD AUTO: 278 10*3/MM3 (ref 140–450)
PMV BLD AUTO: 10.7 FL (ref 6–12)
POTASSIUM SERPL-SCNC: 4.5 MMOL/L (ref 3.5–5.2)
PROCALCITONIN SERPL-MCNC: 0.12 NG/ML (ref 0–0.25)
PROT UR QL STRIP: NEGATIVE
RBC # BLD AUTO: 4.17 10*6/MM3 (ref 3.77–5.28)
RBC # UR STRIP: ABNORMAL /HPF
REF LAB TEST METHOD: ABNORMAL
SODIUM SERPL-SCNC: 137 MMOL/L (ref 136–145)
SP GR UR STRIP: 1.01 (ref 1–1.03)
SQUAMOUS #/AREA URNS HPF: ABNORMAL /HPF
UROBILINOGEN UR QL STRIP: ABNORMAL
WBC # UR STRIP: ABNORMAL /HPF
WBC NRBC COR # BLD AUTO: 15.67 10*3/MM3 (ref 3.4–10.8)

## 2025-04-23 PROCEDURE — 85027 COMPLETE CBC AUTOMATED: CPT | Performed by: NURSE PRACTITIONER

## 2025-04-23 PROCEDURE — 63710000001 INSULIN GLARGINE PER 5 UNITS: Performed by: FAMILY MEDICINE

## 2025-04-23 PROCEDURE — 63710000001 INSULIN LISPRO (HUMAN) PER 5 UNITS: Performed by: FAMILY MEDICINE

## 2025-04-23 PROCEDURE — 81001 URINALYSIS AUTO W/SCOPE: CPT | Performed by: NURSE PRACTITIONER

## 2025-04-23 PROCEDURE — 94799 UNLISTED PULMONARY SVC/PX: CPT

## 2025-04-23 PROCEDURE — 63710000001 PROMETHAZINE PER 12.5 MG: Performed by: NURSE PRACTITIONER

## 2025-04-23 PROCEDURE — 97535 SELF CARE MNGMENT TRAINING: CPT

## 2025-04-23 PROCEDURE — 63710000001 INSULIN LISPRO (HUMAN) PER 5 UNITS: Performed by: NURSE PRACTITIONER

## 2025-04-23 PROCEDURE — 86140 C-REACTIVE PROTEIN: CPT | Performed by: NURSE PRACTITIONER

## 2025-04-23 PROCEDURE — 94664 DEMO&/EVAL PT USE INHALER: CPT

## 2025-04-23 PROCEDURE — 84145 PROCALCITONIN (PCT): CPT | Performed by: NURSE PRACTITIONER

## 2025-04-23 PROCEDURE — 80048 BASIC METABOLIC PNL TOTAL CA: CPT | Performed by: NURSE PRACTITIONER

## 2025-04-23 PROCEDURE — 25010000002 HEPARIN (PORCINE) PER 1000 UNITS: Performed by: FAMILY MEDICINE

## 2025-04-23 PROCEDURE — 99232 SBSQ HOSP IP/OBS MODERATE 35: CPT | Performed by: NURSE PRACTITIONER

## 2025-04-23 PROCEDURE — 82948 REAGENT STRIP/BLOOD GLUCOSE: CPT

## 2025-04-23 RX ORDER — PHENAZOPYRIDINE HYDROCHLORIDE 100 MG/1
100 TABLET, FILM COATED ORAL
Status: COMPLETED | OUTPATIENT
Start: 2025-04-23 | End: 2025-04-25

## 2025-04-23 RX ADMIN — Medication 250 MG: at 20:33

## 2025-04-23 RX ADMIN — INSULIN GLARGINE 25 UNITS: 100 INJECTION, SOLUTION SUBCUTANEOUS at 20:40

## 2025-04-23 RX ADMIN — IPRATROPIUM BROMIDE AND ALBUTEROL SULFATE 3 ML: 2.5; .5 SOLUTION RESPIRATORY (INHALATION) at 12:21

## 2025-04-23 RX ADMIN — INSULIN LISPRO 10 UNITS: 100 INJECTION, SOLUTION INTRAVENOUS; SUBCUTANEOUS at 20:33

## 2025-04-23 RX ADMIN — FAMOTIDINE 20 MG: 20 TABLET, FILM COATED ORAL at 20:33

## 2025-04-23 RX ADMIN — CITALOPRAM HYDROBROMIDE 40 MG: 40 TABLET ORAL at 08:48

## 2025-04-23 RX ADMIN — IPRATROPIUM BROMIDE AND ALBUTEROL SULFATE 3 ML: 2.5; .5 SOLUTION RESPIRATORY (INHALATION) at 07:02

## 2025-04-23 RX ADMIN — HEPARIN SODIUM 5000 UNITS: 5000 INJECTION INTRAVENOUS; SUBCUTANEOUS at 15:22

## 2025-04-23 RX ADMIN — BUSPIRONE HYDROCHLORIDE 15 MG: 10 TABLET ORAL at 08:48

## 2025-04-23 RX ADMIN — ATORVASTATIN CALCIUM 40 MG: 40 TABLET, FILM COATED ORAL at 08:48

## 2025-04-23 RX ADMIN — Medication 250 MG: at 08:49

## 2025-04-23 RX ADMIN — LEVOTHYROXINE SODIUM 225 MCG: 0.05 TABLET ORAL at 05:33

## 2025-04-23 RX ADMIN — INSULIN LISPRO 5 UNITS: 100 INJECTION, SOLUTION INTRAVENOUS; SUBCUTANEOUS at 11:52

## 2025-04-23 RX ADMIN — INSULIN GLARGINE 15 UNITS: 100 INJECTION, SOLUTION SUBCUTANEOUS at 08:48

## 2025-04-23 RX ADMIN — IPRATROPIUM BROMIDE AND ALBUTEROL SULFATE 3 ML: 2.5; .5 SOLUTION RESPIRATORY (INHALATION) at 20:01

## 2025-04-23 RX ADMIN — PANTOPRAZOLE SODIUM 40 MG: 40 TABLET, DELAYED RELEASE ORAL at 08:49

## 2025-04-23 RX ADMIN — PROMETHAZINE HYDROCHLORIDE 12.5 MG: 12.5 TABLET ORAL at 05:35

## 2025-04-23 RX ADMIN — PHENAZOPYRIDINE 100 MG: 100 TABLET ORAL at 17:19

## 2025-04-23 RX ADMIN — RISPERIDONE 3 MG: 1 TABLET, FILM COATED ORAL at 10:27

## 2025-04-23 RX ADMIN — SENNOSIDES AND DOCUSATE SODIUM 2 TABLET: 50; 8.6 TABLET ORAL at 20:33

## 2025-04-23 RX ADMIN — INSULIN LISPRO 3 UNITS: 100 INJECTION, SOLUTION INTRAVENOUS; SUBCUTANEOUS at 08:49

## 2025-04-23 RX ADMIN — HEPARIN SODIUM 5000 UNITS: 5000 INJECTION INTRAVENOUS; SUBCUTANEOUS at 21:18

## 2025-04-23 RX ADMIN — HEPARIN SODIUM 5000 UNITS: 5000 INJECTION INTRAVENOUS; SUBCUTANEOUS at 05:33

## 2025-04-23 RX ADMIN — IPRATROPIUM BROMIDE AND ALBUTEROL SULFATE 3 ML: 2.5; .5 SOLUTION RESPIRATORY (INHALATION) at 15:24

## 2025-04-23 RX ADMIN — INSULIN LISPRO 3 UNITS: 100 INJECTION, SOLUTION INTRAVENOUS; SUBCUTANEOUS at 17:19

## 2025-04-23 RX ADMIN — Medication 5 MG: at 20:33

## 2025-04-23 RX ADMIN — INSULIN LISPRO 5 UNITS: 100 INJECTION, SOLUTION INTRAVENOUS; SUBCUTANEOUS at 17:20

## 2025-04-23 RX ADMIN — SENNOSIDES AND DOCUSATE SODIUM 2 TABLET: 50; 8.6 TABLET ORAL at 08:48

## 2025-04-23 RX ADMIN — BUSPIRONE HYDROCHLORIDE 15 MG: 10 TABLET ORAL at 20:33

## 2025-04-23 RX ADMIN — RISPERIDONE 3 MG: 1 TABLET, FILM COATED ORAL at 20:32

## 2025-04-23 RX ADMIN — INSULIN LISPRO 3 UNITS: 100 INJECTION, SOLUTION INTRAVENOUS; SUBCUTANEOUS at 11:53

## 2025-04-23 RX ADMIN — INSULIN LISPRO 5 UNITS: 100 INJECTION, SOLUTION INTRAVENOUS; SUBCUTANEOUS at 08:47

## 2025-04-23 NOTE — CASE MANAGEMENT/SOCIAL WORK
Continued Stay Note   [facility]     Patient Name: Vilma Ayala  MRN: [id number]  Today's Date: 4/23/2025    Admit Date: 4/10/2025    Plan: SNF rehab   Discharge Plan       Row Name 04/23/25 1640       Plan    Plan SNF rehab    Patient/Family in Agreement with Plan yes    Plan Comments I spoke w/April @ [facility], she has submitted for insurance pre cert for SNF bed. I updated sister Jory via phone and updated patient in room. Patient remains agreeable to SNF rehab @ d/c then to return home to her independent living once completed. CM will continue to follow.    Final Discharge Disposition Code 03 - skilled nursing facility (SNF)                   Discharge Codes    No documentation.                 Expected Discharge Date and Time       Expected Discharge Date Expected Discharge Time    Apr 24, 2025               Clarence Garcia RN

## 2025-04-23 NOTE — CASE MANAGEMENT/SOCIAL WORK
Continued Stay Note  [facility]     Patient Name: Vilma Ayala  MRN: [id number]  Today's Date: 4/23/2025    Admit Date: 4/10/2025    Plan: SNF rehab   Discharge Plan       Row Name 04/23/25 1008       Plan    Plan SNF rehab    Patient/Family in Agreement with Plan other (see comments)    Plan Comments I spoke w/April @ [facility], plans to review referral today and contact me post. I informed that if she has a bed offer, she could initiate insurance pre cert today. I left GIULIANA w/Treasure @ [facility] to see if she has reviewed and has any skilled beds available.    Final Discharge Disposition Code 03 - skilled nursing facility (SNF)                   Discharge Codes    No documentation.                 Expected Discharge Date and Time       Expected Discharge Date Expected Discharge Time    Apr 24, 2025               Clarence Garcia RN

## 2025-04-23 NOTE — PROGRESS NOTES
[facility] Medicine Services  PROGRESS NOTE    Patient Name: Vilma Ayala  : 1970  MRN: [id number]    Date of Admission: 4/10/2025  Primary Care Physician: Vanessa Kaur MD    Subjective   Subjective     CC:  F/u nausea    HPI:   Patient resting in bed. Says she is having some burning with urination today and has had UTIs in the past. Also reports back pain but says she thinks it is from sitting in the chair. Says she feels a little short of breath intermittently. Says she is not having any more diarrhea.      Objective   Objective     Vital Signs:   Temp:  [97.7 °F (36.5 °C)-98.1 °F (36.7 °C)] 98.1 °F (36.7 °C)  Heart Rate:  [] 106  Resp:  [16-24] 16  BP: (103-113)/(71-89) 113/89     Physical Exam:   Constitutional: No acute distress, awake, alert  HENT: NCAT, mucous membranes moist  Respiratory: Diminished to auscultation LLL, respiratory effort normal, room air  Cardiovascular: RRR, no murmurs, rubs, or gallops  Gastrointestinal: Positive bowel sounds, soft, nontender, nondistended  Musculoskeletal: No bilateral ankle edema  Psychiatric: Appropriate affect, cooperative  Neurologic: Oriented x 3, moves all extremities, speech clear  Skin: No rashes      Results Reviewed:  LAB RESULTS:      Lab 25  0513 25  1536   WBC 15.67* 13.10*   HEMOGLOBIN 12.5 11.6*   HEMATOCRIT 37.0 34.4   PLATELETS 278 239   MCV 88.7 87.3         Lab 25  0513 25  0518 25  0428 25  1536   SODIUM 137 139 136 136   POTASSIUM 4.5 4.3 4.1 4.1   CHLORIDE 100 102 101 101   CO2 23.0 24.0 23.0 25.0   ANION GAP 14.0 13.0 12.0 10.0   BUN 17 14 16 15   CREATININE 1.32* 1.41* 1.25* 1.45*   EGFR 48.1* 44.4* 51.3* 43.0*   GLUCOSE 138* 118* 163* 232*   CALCIUM 9.4 9.5 9.3 8.8         Lab 25  0518   TOTAL PROTEIN 6.4   ALBUMIN 3.7   GLOBULIN 2.7   ALT (SGPT) 37*   AST (SGOT) 33*   BILIRUBIN 0.5   ALK PHOS 108                     Brief Urine Lab Results  (Last result  in the past 365 days)        Color   Clarity   Blood   Leuk Est   Nitrite   Protein   CREAT   Urine HCG        04/10/25 1734 Yellow   Clear   Negative   Trace   Negative   Negative                   Microbiology Results Abnormal       None            FL Video Swallow With Speech Single Contrast  Result Date: 4/22/2025  FL VIDEO SWALLOW W SPEECH SINGLE-CONTRAST Date of Exam: 4/22/2025 10:17 AM EDT Indication: dysphagia.   Comparison: None available. Technique:   The speech pathologist administered food and/or liquid mixed with barium to the patient with cine/video imaging.  Imaging assistance was provided to the speech pathologist and an image was saved. Fluoroscopic Time: 54 seconds Number of Images: 7 associated fluoroscopic loops were saved Findings: Aspiration was seen during fluoroscopic guided modified barium swallowing series. Please see speech therapy report for full details and recommendations.     Impression: Impression: Fluoroscopy provided for modified barium swallow. Aspiration was seen during swallowing evaluation. Please see speech therapy report for full details and recommendations. Report dictated by: Ermelinda Queen PA-c  I have personally reviewed this case and agree with the findings above: Electronically Signed: Guerrero Rodriguez MD  4/22/2025 5:27 PM EDT  Workstation ID: [id number]      Results for orders placed during the hospital encounter of 04/10/25    Adult Transthoracic Echo Complete W/ Cont if Necessary Per Protocol    Interpretation Summary    Left ventricular systolic function is normal. Estimated left ventricular EF = 60%    No significant valvular abnormalities.      Current medications:  Scheduled Meds:atorvastatin, 40 mg, Oral, Daily  busPIRone, 15 mg, Oral, Q12H  citalopram, 40 mg, Oral, Daily  famotidine, 20 mg, Oral, Nightly  heparin (porcine), 5,000 Units, Subcutaneous, Q8H  insulin glargine, 15 Units, Subcutaneous, Daily  insulin glargine, 25 Units, Subcutaneous, Nightly  Insulin Lispro,  3 Units, Subcutaneous, TID With Meals  insulin lispro, 3-14 Units, Subcutaneous, 4x Daily AC & at Bedtime  ipratropium-albuterol, 3 mL, Nebulization, 4x Daily - RT  levothyroxine, 225 mcg, Oral, Q AM  pantoprazole, 40 mg, Oral, Daily  risperiDONE, 3 mg, Oral, BID  saccharomyces boulardii, 250 mg, Oral, BID  senna-docusate sodium, 2 tablet, Oral, BID      Continuous Infusions:   PRN Meds:.  acetaminophen **OR** acetaminophen **OR** acetaminophen    senna-docusate sodium **AND** polyethylene glycol **AND** bisacodyl **AND** bisacodyl    dextrose    dextrose    glucagon (human recombinant)    melatonin    promethazine    Sodium Chloride (PF)    Assessment & Plan   Assessment & Plan     Active Hospital Problems    Diagnosis  POA    **Acute kidney injury superimposed on chronic kidney disease [N17.9, N18.9]  Yes    Acute kidney injury superimposed on CKD [N17.9, N18.9]  Yes    Diarrhea [R19.7]  Yes    Dehydration [E86.0]  Yes    Essential tremor [G25.0]  Yes    Type 2 diabetes mellitus without complication, with long-term current use of insulin [E11.9, Z79.4]  Not Applicable    Mixed hyperlipidemia [E78.2]  Yes    Other specified hypothyroidism [E03.8]  Yes    Anxiety and depression [F41.9, F32.A]  Yes      Resolved Hospital Problems   No resolved problems to display.        Brief Hospital Course to date:  Vilma Ayala is a 54 y.o. female  with a history of Essential Tremor, T2DM, hypothyroidism, anxiety/depression, and GERD who presented to [facility] ED for complaint of nausea and diarrhea.  On the afternoon of 4/12, pt had some increased dyspnea and congestion.  Chest x-ray could not rule out possible pneumonia; however Pro-Jim was negative, WBCs wnl, and symptoms felt more pulmonary congestion, so IV fluids stopped and patient was given IV Bumex.  CT chest 4/13 showed bilateral lower lobe atelectasis. Echo on 4/14 with normal EF.  Patient has since been weaned to room air.      This patient's problems and  plans were partially entered by my partner and updated as appropriate by me 04/23/25.       CB on CKD - resolved  Dehydration - resolved  Nausea/Vomiting - resolved  -Cr 2.07 on admission, CB possibly 2/2 to recent Bactrim use as well as dehydration from poor po intake. Baseline appears to be ~ 1.3-1.4 per chart review.  -IVF held with dyspnea, new oxygen requirement, now weaned back to RA, chest CT with just atelectasis, plans for IS/FD, encourarged PO intake.   -CT abd/pelvis negative for acute findings  -PT/OT following  -continue prn antiemetics  -Stop cholestyramine, not having any more diarrhea, continue probiotic  -Continue bowel regimen  -AM BMP     Dyspnea  - possibly related to recent fluids, IVF dc'd, improving   - Duonebs, IS/FD as well   - CT Chest with just atelectasis, encouraged use of IS/FD  - Echo 4/14 with EF 60%, normal LVSF, no significant valvular abnormalities, troponins 26>>17, seems noncardiac.   - SLP evaluated, moderate dysphagia, MBS with signs of aspiration, diet modifications in place  - WBCs increased to 15.67 today; still afebrile, intermittently tachycardic; however, procal negative, CRP slightly elevated 0.6  - Pt satting 98% on RA  - AM CBC w/ diff to monitor WBCs     Dysuria  - Pt reports burning with urination, low back pain 4/23  - UA benign  - Pyridium TID prn x 2 days  - AM CBC w/ diff    HLD  -Continue statin     T2DM  -A1C 6.8   -Fingerstick ACHS  -Continue Lantus BID, mealtime insulin, SSI     Anxiety/depression  Essential tremor  -Resume home Celexa, Buspar  -Continue Respiradone, stable     Expected Discharge Location and Transportation: SNF rehab  Expected Discharge pending bed offer, insurance approval  Expected Discharge Date: 4/24/2025; Expected Discharge Time:      VTE Prophylaxis:  Pharmacologic & mechanical VTE prophylaxis orders are present.         AM-PAC 6 Clicks Score (PT): 19 (04/23/25 3553)    CODE STATUS:   Code Status and Medical Interventions: CPR  (Attempt to Resuscitate); Full Support   Ordered at: 04/10/25 2013     Code Status (Patient has no pulse and is not breathing):    CPR (Attempt to Resuscitate)     Medical Interventions (Patient has pulse or is breathing):    Full Support       Adore Ramírez, APRN  04/23/25

## 2025-04-23 NOTE — PLAN OF CARE
Goal Outcome Evaluation:  Plan of Care Reviewed With: patient        Progress: no change  Outcome Evaluation: Pt. progressing towards baseline with ADLs and functional mobility. Completed bed mobility and T/Fs with CGA. Demonstrates grooming with SUA and UBD with Min A. Continues to be limited by decreased activity tolerance, generalized weakness, and balance. Will continue to progress as able.

## 2025-04-23 NOTE — THERAPY TREATMENT NOTE
Patient Name: Vilma Ayala  : 1970    MRN: [id number]                              Today's Date: 2025       Admit Date: 4/10/2025    Visit Dx:     ICD-10-CM ICD-9-CM   1. CB (acute kidney injury)  N17.9 584.9   2. Nausea and vomiting, unspecified vomiting type  R11.2 787.01   3. Weakness  R53.1 780.79   4. Oropharyngeal dysphagia  R13.12 787.22     Patient Active Problem List   Diagnosis    Anxiety and depression    Uncontrolled type 2 diabetes mellitus    Other specified hypothyroidism    Obesity    Vitamin D deficiency, unspecified    Diabetic neuropathy, type II diabetes mellitus    Mixed hyperlipidemia    Dyspnea on exertion    Type 2 diabetes mellitus without complication, with long-term current use of insulin    Urinary incontinence    Microscopic hematuria    Nocturia    Nocturnal enuresis    Stress incontinence    Urge incontinence    Decreased GFR    Mild persistent asthma    Obesity (BMI 35.0-39.9 without comorbidity)    Drug-induced parkinsonism    Encounter to establish care    NATASHA (obstructive sleep apnea)    Medicare annual wellness visit, subsequent    Vitamin D deficiency    UTI (urinary tract infection)    Painful urination    Nausea    Acute kidney injury superimposed on chronic kidney disease    Diarrhea    Dehydration    Essential tremor    Acute kidney injury superimposed on CKD     Past Medical History:   Diagnosis Date    Anxiety     Chicken pox     Depression     Diabetes mellitus     Disease of thyroid gland     Measles     Type 2 diabetes mellitus      Past Surgical History:   Procedure Laterality Date    EYE SURGERY      TONSILLECTOMY        General Information       Row Name 25 1506          OT Time and Intention    Document Type therapy note (daily note)  -LC     Mode of Treatment occupational therapy  -LC       Row Name 25 1506          General Information    Patient Profile Reviewed yes  -LC     Prior Level of Function independent:;all household  mobility;transfer;ADL's  -     Existing Precautions/Restrictions fall  -     Barriers to Rehab medically complex  -       Row Name 04/23/25 1506          Cognition    Orientation Status (Cognition) oriented x 3  -       Row Name 04/23/25 1506          Safety Issues/Impairments Affecting Functional Mobility    Safety Issues Affecting Function (Mobility) awareness of need for assistance;insight into deficits/self-awareness;safety precaution awareness;safety precautions follow-through/compliance;sequencing abilities  -     Impairments Affecting Function (Mobility) balance;endurance/activity tolerance;strength  -               User Key  (r) = Recorded By, (t) = Taken By, (c) = Cosigned By      Initials Name Provider Type     Saba Shelton, OT Occupational Therapist                     Mobility/ADL's       Row Name 04/23/25 1509          Bed Mobility    Supine-Sit [facility] (Bed Mobility) contact guard;verbal cues  -     Assistive Device (Bed Mobility) bed rails  -     Comment, (Bed Mobility) VC's to sequence  -       Row Name 04/23/25 1509          Transfers    Transfers sit-stand transfer  -     Comment, (Transfers) VC's to sequence transitioning from supine to sit EOB.  -       Row Name 04/23/25 1509          Bed-Chair Transfer    Bed-Chair [facility] (Transfers) contact guard;verbal cues  -     Assistive Device (Bed-Chair Transfers) walker, front-wheeled  -     Comment, (Bed-Chair Transfer) VC's for hand placement and sequencing  -       Row Name 04/23/25 1509          Sit-Stand Transfer    Sit-Stand [facility] (Transfers) contact guard;verbal cues  -     Assistive Device (Sit-Stand Transfers) walker, front-wheeled  -       Row Name 04/23/25 1509          Activities of Daily Living    BADL Assessment/Intervention upper body dressing;grooming  -       Row Name 04/23/25 1509          Grooming Assessment/Training    [facility] Level (Grooming) hair care,  combing/brushing;oral care regimen;wash face, hands  -     Position (Grooming) supported sitting  -     Comment, (Grooming) Increased activity tolerance  -       Row Name 04/23/25 1509          Upper Body Dressing Assessment/Training    [facility] Level (Upper Body Dressing) don;doff;front opening garment;minimum assist (75% patient effort)  -     Position (Upper Body Dressing) unsupported sitting  -               User Key  (r) = Recorded By, (t) = Taken By, (c) = Cosigned By      Initials Name Provider Type    Saba Nunes OT Occupational Therapist                   Obj/Interventions       Row Name 04/23/25 1515          Balance    Balance Assessment sitting static balance;sitting dynamic balance;standing static balance;standing dynamic balance  -     Static Sitting Balance standby assist  -     Dynamic Sitting Balance contact guard  -     Position, Sitting Balance unsupported;sitting in chair  -     Static Standing Balance contact guard;verbal cues  -     Dynamic Standing Balance contact guard;verbal cues  -     Position/Device Used, Standing Balance supported;walker, front-wheeled  -     Balance Interventions sitting;standing;sit to stand;supported;occupation based/functional task;weight shifting activity  -     Comment, Balance CGA for safety  -               User Key  (r) = Recorded By, (t) = Taken By, (c) = Cosigned By      Initials Name Provider Type    Saba Nunes OT Occupational Therapist                   Goals/Plan    No documentation.                  Clinical Impression       Row Name 04/23/25 1519          Pain Assessment    Pretreatment Pain Rating 0/10 - no pain  -     Posttreatment Pain Rating 0/10 - no pain  -       Row Name 04/23/25 1519          Plan of Care Review    Plan of Care Reviewed With patient  -     Progress no change  -     Outcome Evaluation Pt. progressing towards baseline with ADLs and functional mobility. Completed bed mobility and  T/Fs with CGA. Demonstrates grooming with SUA and UBD with Min A. Continues to be limited by decreased activity tolerance, generalized weakness, and balance. Will continue to progress as able.  -       Row Name 04/23/25 1519          Positioning and Restraints    Pre-Treatment Position in bed  -     Post Treatment Position chair  -LC     In Chair notified nsg;reclined;call light within reach;encouraged to call for assist;exit alarm on;waffle cushion;legs elevated  -               User Key  (r) = Recorded By, (t) = Taken By, (c) = Cosigned By      Initials Name Provider Type     Saba Shelton OT Occupational Therapist                   Outcome Measures       Row Name 04/23/25 0815          How much help from another person do you currently need...    Turning from your back to your side while in flat bed without using bedrails? 4  -LC     Moving from lying on back to sitting on the side of a flat bed without bedrails? 3  -LC     Moving to and from a bed to a chair (including a wheelchair)? 3  -LC     Standing up from a chair using your arms (e.g., wheelchair, bedside chair)? 3  -LC     Climbing 3-5 steps with a railing? 3  -LC     To walk in hospital room? 3  -LC     AM-PAC 6 Clicks Score (PT) 19  -     Highest Level of Mobility Goal 6 --> Walk 10 steps or more  -               User Key  (r) = Recorded By, (t) = Taken By, (c) = Cosigned By      Initials Name Provider Type     Catherine Wilkerson RN Registered Nurse                    Occupational Therapy Education       Title: PT OT SLP Therapies (In Progress)       Topic: Occupational Therapy (In Progress)       Point: ADL training (In Progress)       Learning Progress Summary            Patient Acceptance, E, NR by  at 4/23/2025 1425    Acceptance, E, NR by AC at 4/22/2025 0949                      Point: Precautions (In Progress)       Learning Progress Summary            Patient Acceptance, E, NR by  at 4/23/2025 1425                      Point:  Body mechanics (In Progress)       Learning Progress Summary            Patient Acceptance, E, NR by  at 4/23/2025 1425                                      User Key       Initials Effective Dates Name Provider Type Discipline    AC 02/03/23 -  Minal Dye OT Occupational Therapist OT     06/16/21 -  Saba Shelton OT Occupational Therapist OT                  OT Recommendation and Plan     Plan of Care Review  Plan of Care Reviewed With: patient  Progress: no change  Outcome Evaluation: Pt. progressing towards baseline with ADLs and functional mobility. Completed bed mobility and T/Fs with CGA. Demonstrates grooming with SUA and UBD with Min A. Continues to be limited by decreased activity tolerance, generalized weakness, and balance. Will continue to progress as able.     Time Calculation:         Time Calculation- OT       Row Name 04/23/25 1425             Time Calculation- OT    OT Start Time 1425  -      OT Received On 04/23/25  -      OT Goal Re-Cert Due Date 05/02/25  -         Timed Charges    35730 - OT Self Care/Mgmt Minutes 25  -         Total Minutes    Timed Charges Total Minutes 25  -       Total Minutes 25  -                User Key  (r) = Recorded By, (t) = Taken By, (c) = Cosigned By      Initials Name Provider Type     Saba Shelton OT Occupational Therapist                  Therapy Charges for Today       Code Description Service Date Service Provider Modifiers Qty    [id number] HC OT SELF CARE/MGMT/TRAIN EA 15 MIN 4/23/2025 Saba Shelton OT GO 2                 Saba Shelton OT  4/23/2025

## 2025-04-23 NOTE — PLAN OF CARE
Problem: Adult Inpatient Plan of Care  Goal: Plan of Care Review  Outcome: Progressing  Goal: Patient-Specific Goal (Individualized)  Outcome: Progressing  Goal: Absence of Hospital-Acquired Illness or Injury  Outcome: Progressing  Intervention: Identify and Manage Fall Risk  Recent Flowsheet Documentation  Taken 4/23/2025 0400 by Roberto Walton RN  Safety Promotion/Fall Prevention:   activity supervised   assistive device/personal items within reach   clutter free environment maintained   nonskid shoes/slippers when out of bed   safety round/check completed   room organization consistent  Taken 4/23/2025 0000 by Roberto Walton RN  Safety Promotion/Fall Prevention:   activity supervised   assistive device/personal items within reach   clutter free environment maintained   room organization consistent   safety round/check completed  Taken 4/22/2025 2000 by Roberto Walton RN  Safety Promotion/Fall Prevention:   activity supervised   assistive device/personal items within reach   clutter free environment maintained   nonskid shoes/slippers when out of bed   room organization consistent   safety round/check completed  Intervention: Prevent Skin Injury  Recent Flowsheet Documentation  Taken 4/23/2025 0400 by Roberto Walton RN  Body Position: (sleeping in chair) other (see comments)  Taken 4/23/2025 0000 by Roberto Walton RN  Body Position: (in chair)   position changed independently   other (see comments)  Taken 4/22/2025 2000 by Roberto Walton RN  Body Position: (siting up in chair)   position changed independently   other (see comments)  Skin Protection: incontinence pads utilized  Intervention: Prevent and Manage VTE (Venous Thromboembolism) Risk  Recent Flowsheet Documentation  Taken 4/22/2025 2000 by Roberto Walton RN  VTE Prevention/Management: (subq heparin) other (see comments)  Intervention: Prevent Infection  Recent Flowsheet Documentation  Taken 4/23/2025 0400 by Roberto Walton RN  Infection  Prevention: environmental surveillance performed  Taken 4/23/2025 0000 by Roberto Walton RN  Infection Prevention: environmental surveillance performed  Taken 4/22/2025 2000 by Roberto Walton RN  Infection Prevention: environmental surveillance performed  Goal: Optimal Comfort and Wellbeing  Outcome: Progressing  Intervention: Provide Person-Centered Care  Recent Flowsheet Documentation  Taken 4/22/2025 2000 by Roberto Walton RN  Trust Relationship/Rapport:   care explained   choices provided   empathic listening provided   questions encouraged   questions answered   thoughts/feelings acknowledged  Goal: Readiness for Transition of Care  Outcome: Progressing     Problem: Sepsis/Septic Shock  Goal: Optimal Coping  Outcome: Progressing  Intervention: Support Patient and Family Response  Recent Flowsheet Documentation  Taken 4/22/2025 2000 by Roberto Walton RN  Supportive Measures: active listening utilized  Goal: Absence of Bleeding  Outcome: Progressing  Goal: Blood Glucose Level Within Target Range  Outcome: Progressing  Intervention: Optimize Glycemic Control  Recent Flowsheet Documentation  Taken 4/22/2025 2000 by Roberto Walton RN  Hyperglycemia Management: blood glucose monitored  Hypoglycemia Management: blood glucose monitored  Goal: Absence of Infection Signs and Symptoms  Outcome: Progressing  Intervention: Initiate Sepsis Management  Recent Flowsheet Documentation  Taken 4/23/2025 0400 by Roberto Walton RN  Infection Prevention: environmental surveillance performed  Taken 4/23/2025 0000 by Roberto Walton RN  Infection Prevention: environmental surveillance performed  Taken 4/22/2025 2000 by Roberto Walton, RN  Infection Prevention: environmental surveillance performed  Intervention: Promote Recovery  Recent Flowsheet Documentation  Taken 4/22/2025 2000 by Roberto Walton, RN  Activity Management: activity encouraged  Sleep/Rest Enhancement: consistent schedule promoted  Goal: Optimal  Nutrition Delivery  Outcome: Progressing     Problem: Fall Injury Risk  Goal: Absence of Fall and Fall-Related Injury  Outcome: Progressing  Intervention: Identify and Manage Contributors  Recent Flowsheet Documentation  Taken 4/22/2025 2000 by Roberto Walton RN  Medication Review/Management: medications reviewed  Intervention: Promote Injury-Free Environment  Recent Flowsheet Documentation  Taken 4/23/2025 0400 by Roberto Walton, RN  Safety Promotion/Fall Prevention:   activity supervised   assistive device/personal items within reach   clutter free environment maintained   nonskid shoes/slippers when out of bed   safety round/check completed   room organization consistent  Taken 4/23/2025 0000 by Roberto Walton RN  Safety Promotion/Fall Prevention:   activity supervised   assistive device/personal items within reach   clutter free environment maintained   room organization consistent   safety round/check completed  Taken 4/22/2025 2000 by Roberto Walton RN  Safety Promotion/Fall Prevention:   activity supervised   assistive device/personal items within reach   clutter free environment maintained   nonskid shoes/slippers when out of bed   room organization consistent   safety round/check completed     Problem: Skin Injury Risk Increased  Goal: Skin Health and Integrity  Outcome: Progressing  Intervention: Optimize Skin Protection  Recent Flowsheet Documentation  Taken 4/23/2025 0000 by Roberto Walton, RN  Head of Bed (HOB) Positioning: HOB elevated  Taken 4/22/2025 2000 by Roberto Walton RN  Activity Management: activity encouraged  Pressure Reduction Techniques:   heels elevated off bed   frequent weight shift encouraged  Head of Bed (HOB) Positioning: HOB elevated  Pressure Reduction Devices: pressure-redistributing mattress utilized  Skin Protection: incontinence pads utilized   Goal Outcome Evaluation:

## 2025-04-23 NOTE — TELEPHONE ENCOUNTER
THE PATIENT WANTS TO LET THE DOCTOR KNOW THAT SHE DID NOT MAKE HER APPOINTMENT FOR TODAY 04/23/2025 BECAUSE SHE IS IN THE HOSPITAL AT [facility]

## 2025-04-23 NOTE — PLAN OF CARE
Problem: Adult Inpatient Plan of Care  Goal: Plan of Care Review  Outcome: Progressing  Flowsheets (Taken 4/23/2025 1811)  Progress: improving  Outcome Evaluation: Pleasant, cooperative, verbalizes POC. Urine specimen sent this afternoon. Ambulating to the bathroom with walker and tolerating well. Adequate I/O. VSS. tele shows a SR. Denies pain. Anticipate transfer to a SNF when medically ready.  Plan of Care Reviewed With: patient  Goal: Patient-Specific Goal (Individualized)  Outcome: Progressing  Goal: Absence of Hospital-Acquired Illness or Injury  Outcome: Progressing  Intervention: Identify and Manage Fall Risk  Recent Flowsheet Documentation  Taken 4/23/2025 1800 by Catherine Wilkerson, RN  Safety Promotion/Fall Prevention:   activity supervised   assistive device/personal items within reach   clutter free environment maintained   fall prevention program maintained   mobility aid in reach   nonskid shoes/slippers when out of bed   room organization consistent   safety round/check completed  Taken 4/23/2025 1600 by Catherine Wilkerson, RN  Safety Promotion/Fall Prevention:   activity supervised   assistive device/personal items within reach   clutter free environment maintained   nonskid shoes/slippers when out of bed   room organization consistent   safety round/check completed   mobility aid in reach  Taken 4/23/2025 1400 by Catherine Wilkerson, RN  Safety Promotion/Fall Prevention:   activity supervised   assistive device/personal items within reach   clutter free environment maintained   fall prevention program maintained   nonskid shoes/slippers when out of bed   room organization consistent   safety round/check completed  Taken 4/23/2025 1200 by Catherine Wilkerson, RN  Safety Promotion/Fall Prevention:   activity supervised   assistive device/personal items within reach   clutter free environment maintained   fall prevention program maintained   nonskid shoes/slippers when out of bed   room organization consistent   safety  round/check completed  Taken 4/23/2025 1000 by Catherine Wilkerson RN  Safety Promotion/Fall Prevention:   activity supervised   assistive device/personal items within reach   clutter free environment maintained   fall prevention program maintained   nonskid shoes/slippers when out of bed   room organization consistent   safety round/check completed  Taken 4/23/2025 0815 by Catherine Wilkerson RN  Safety Promotion/Fall Prevention:   activity supervised   assistive device/personal items within reach   clutter free environment maintained   fall prevention program maintained   nonskid shoes/slippers when out of bed   room organization consistent   safety round/check completed  Intervention: Prevent Skin Injury  Recent Flowsheet Documentation  Taken 4/23/2025 1800 by Catherine Wilkerson RN  Body Position:   neutral body alignment   neutral head position   position changed independently  Skin Protection:   incontinence pads utilized   protective footwear used   transparent dressing maintained  Taken 4/23/2025 1600 by Catherine Wilkerson RN  Body Position:   neutral body alignment   neutral head position  Skin Protection:   incontinence pads utilized   protective footwear used   transparent dressing maintained  Taken 4/23/2025 1400 by Catherine Wilkerson RN  Body Position:   neutral body alignment   neutral head position   position changed independently  Skin Protection:   incontinence pads utilized   protective footwear used   transparent dressing maintained  Taken 4/23/2025 1200 by Catherine Wilkerson RN  Body Position: position changed independently  Skin Protection:   incontinence pads utilized   protective footwear used   transparent dressing maintained  Taken 4/23/2025 1000 by Catherine Wilkerson RN  Body Position:   neutral body alignment   neutral head position   legs elevated  Skin Protection:   incontinence pads utilized   protective footwear used   transparent dressing maintained  Taken 4/23/2025 0815 by Catherine Wilkerson RN  Body Position:    neutral body alignment   neutral head position   legs elevated  Skin Protection:   incontinence pads utilized   protective footwear used   transparent dressing maintained  Intervention: Prevent and Manage VTE (Venous Thromboembolism) Risk  Recent Flowsheet Documentation  Taken 4/23/2025 1600 by Catherine Wilkerson RN  VTE Prevention/Management: (SQ heparin therapy) other (see comments)  Taken 4/23/2025 1200 by Catherine Wilkerson RN  VTE Prevention/Management: (SQ heparin therapy) other (see comments)  Taken 4/23/2025 0815 by Catherine Wilkerson RN  VTE Prevention/Management: (SQ heparin therapy)   bilateral   SCDs (sequential compression devices) off   other (see comments)  Intervention: Prevent Infection  Recent Flowsheet Documentation  Taken 4/23/2025 1800 by Catherine Wilkerson RN  Infection Prevention:   environmental surveillance performed   equipment surfaces disinfected   hand hygiene promoted   rest/sleep promoted   single patient room provided  Taken 4/23/2025 1600 by Catherine Wilkerson RN  Infection Prevention:   environmental surveillance performed   equipment surfaces disinfected   hand hygiene promoted   rest/sleep promoted   single patient room provided  Taken 4/23/2025 1400 by Catherine Wilkerson RN  Infection Prevention:   environmental surveillance performed   equipment surfaces disinfected   hand hygiene promoted   rest/sleep promoted   single patient room provided  Taken 4/23/2025 1200 by Catherine Wilkerson RN  Infection Prevention:   environmental surveillance performed   equipment surfaces disinfected   hand hygiene promoted   rest/sleep promoted   single patient room provided  Taken 4/23/2025 1000 by Catherine Wilkerson RN  Infection Prevention:   environmental surveillance performed   equipment surfaces disinfected   hand hygiene promoted   rest/sleep promoted   single patient room provided  Taken 4/23/2025 0815 by Catherine Wilkerson RN  Infection Prevention:   equipment surfaces disinfected   environmental surveillance performed    hand hygiene promoted   rest/sleep promoted   single patient room provided  Goal: Optimal Comfort and Wellbeing  Outcome: Progressing  Intervention: Monitor Pain and Promote Comfort  Recent Flowsheet Documentation  Taken 4/23/2025 1800 by Catherine Wilkerson RN  Pain Management Interventions:   position adjusted   pillow support provided  Taken 4/23/2025 1600 by Catherine Wilkerson RN  Pain Management Interventions:   pillow support provided   position adjusted  Taken 4/23/2025 1400 by Catherine Wilkerson RN  Pain Management Interventions:   pillow support provided   position adjusted  Taken 4/23/2025 1200 by Catherine Wilkerson RN  Pain Management Interventions:   position adjusted   pillow support provided  Taken 4/23/2025 1000 by Catherine Wilkerson RN  Pain Management Interventions:   position adjusted   pillow support provided  Intervention: Provide Person-Centered Care  Recent Flowsheet Documentation  Taken 4/23/2025 1800 by Catherine Wilkerson RN  Trust Relationship/Rapport:   care explained   choices provided   emotional support provided   empathic listening provided   questions answered   questions encouraged   reassurance provided   thoughts/feelings acknowledged  Taken 4/23/2025 1600 by Catherine Wilkerson RN  Trust Relationship/Rapport:   care explained   choices provided   emotional support provided   empathic listening provided   questions answered   questions encouraged   reassurance provided   thoughts/feelings acknowledged  Taken 4/23/2025 1400 by Catherine Wilkerson RN  Trust Relationship/Rapport:   care explained   choices provided   emotional support provided   empathic listening provided   questions answered   questions encouraged   reassurance provided   thoughts/feelings acknowledged  Taken 4/23/2025 1200 by Catherine Wilkerson RN  Trust Relationship/Rapport:   care explained   choices provided   emotional support provided   questions answered   empathic listening provided   thoughts/feelings acknowledged   reassurance provided    questions encouraged  Taken 4/23/2025 1000 by Catherine Wilkerson, RN  Trust Relationship/Rapport:   care explained   emotional support provided   choices provided   empathic listening provided   questions answered   questions encouraged   reassurance provided   thoughts/feelings acknowledged  Taken 4/23/2025 0815 by Catherine Wilkerson, RN  Trust Relationship/Rapport:   care explained   choices provided   emotional support provided   empathic listening provided   questions encouraged   questions answered   reassurance provided   thoughts/feelings acknowledged  Goal: Readiness for Transition of Care  Outcome: Progressing   Goal Outcome Evaluation:  Plan of Care Reviewed With: patient        Progress: improving  Outcome Evaluation: Pleasant, cooperative, verbalizes POC. Urine specimen sent this afternoon. Ambulating to the bathroom with walker and tolerating well. Adequate I/O. VSS. tele shows a SR. Denies pain. Anticipate transfer to a SNF when medically ready.

## 2025-04-24 LAB
ANION GAP SERPL CALCULATED.3IONS-SCNC: 13 MMOL/L (ref 5–15)
BASOPHILS # BLD AUTO: 0.06 10*3/MM3 (ref 0–0.2)
BASOPHILS NFR BLD AUTO: 0.3 % (ref 0–1.5)
BUN SERPL-MCNC: 18 MG/DL (ref 6–20)
BUN/CREAT SERPL: 12 (ref 7–25)
CALCIUM SPEC-SCNC: 9.3 MG/DL (ref 8.6–10.5)
CHLORIDE SERPL-SCNC: 101 MMOL/L (ref 98–107)
CO2 SERPL-SCNC: 22 MMOL/L (ref 22–29)
CREAT SERPL-MCNC: 1.5 MG/DL (ref 0.57–1)
DEPRECATED RDW RBC AUTO: 47.7 FL (ref 37–54)
EGFRCR SERPLBLD CKD-EPI 2021: 41.2 ML/MIN/1.73
EOSINOPHIL # BLD AUTO: 0.59 10*3/MM3 (ref 0–0.4)
EOSINOPHIL NFR BLD AUTO: 3.4 % (ref 0.3–6.2)
ERYTHROCYTE [DISTWIDTH] IN BLOOD BY AUTOMATED COUNT: 14.8 % (ref 12.3–15.4)
GLUCOSE BLDC GLUCOMTR-MCNC: 153 MG/DL (ref 70–130)
GLUCOSE BLDC GLUCOMTR-MCNC: 188 MG/DL (ref 70–130)
GLUCOSE BLDC GLUCOMTR-MCNC: 266 MG/DL (ref 70–130)
GLUCOSE BLDC GLUCOMTR-MCNC: 379 MG/DL (ref 70–130)
GLUCOSE SERPL-MCNC: 171 MG/DL (ref 65–99)
HCT VFR BLD AUTO: 35.7 % (ref 34–46.6)
HGB BLD-MCNC: 11.9 G/DL (ref 12–15.9)
IMM GRANULOCYTES # BLD AUTO: 0.11 10*3/MM3 (ref 0–0.05)
IMM GRANULOCYTES NFR BLD AUTO: 0.6 % (ref 0–0.5)
LYMPHOCYTES # BLD AUTO: 4.22 10*3/MM3 (ref 0.7–3.1)
LYMPHOCYTES NFR BLD AUTO: 24.6 % (ref 19.6–45.3)
MCH RBC QN AUTO: 29.9 PG (ref 26.6–33)
MCHC RBC AUTO-ENTMCNC: 33.3 G/DL (ref 31.5–35.7)
MCV RBC AUTO: 89.7 FL (ref 79–97)
MONOCYTES # BLD AUTO: 0.85 10*3/MM3 (ref 0.1–0.9)
MONOCYTES NFR BLD AUTO: 4.9 % (ref 5–12)
NEUTROPHILS NFR BLD AUTO: 11.35 10*3/MM3 (ref 1.7–7)
NEUTROPHILS NFR BLD AUTO: 66.2 % (ref 42.7–76)
NRBC BLD AUTO-RTO: 0 /100 WBC (ref 0–0.2)
PLATELET # BLD AUTO: 272 10*3/MM3 (ref 140–450)
PMV BLD AUTO: 10.5 FL (ref 6–12)
POTASSIUM SERPL-SCNC: 4.3 MMOL/L (ref 3.5–5.2)
PROCALCITONIN SERPL-MCNC: 0.12 NG/ML (ref 0–0.25)
RBC # BLD AUTO: 3.98 10*6/MM3 (ref 3.77–5.28)
SODIUM SERPL-SCNC: 136 MMOL/L (ref 136–145)
WBC NRBC COR # BLD AUTO: 17.18 10*3/MM3 (ref 3.4–10.8)

## 2025-04-24 PROCEDURE — 85025 COMPLETE CBC W/AUTO DIFF WBC: CPT | Performed by: NURSE PRACTITIONER

## 2025-04-24 PROCEDURE — 82948 REAGENT STRIP/BLOOD GLUCOSE: CPT

## 2025-04-24 PROCEDURE — 25010000002 HEPARIN (PORCINE) PER 1000 UNITS: Performed by: FAMILY MEDICINE

## 2025-04-24 PROCEDURE — 92526 ORAL FUNCTION THERAPY: CPT

## 2025-04-24 PROCEDURE — 84145 PROCALCITONIN (PCT): CPT | Performed by: NURSE PRACTITIONER

## 2025-04-24 PROCEDURE — 63710000001 INSULIN GLARGINE PER 5 UNITS: Performed by: FAMILY MEDICINE

## 2025-04-24 PROCEDURE — 63710000001 INSULIN LISPRO (HUMAN) PER 5 UNITS: Performed by: FAMILY MEDICINE

## 2025-04-24 PROCEDURE — 97535 SELF CARE MNGMENT TRAINING: CPT

## 2025-04-24 PROCEDURE — 97530 THERAPEUTIC ACTIVITIES: CPT

## 2025-04-24 PROCEDURE — 63710000001 INSULIN LISPRO (HUMAN) PER 5 UNITS: Performed by: NURSE PRACTITIONER

## 2025-04-24 PROCEDURE — 97110 THERAPEUTIC EXERCISES: CPT

## 2025-04-24 PROCEDURE — 99232 SBSQ HOSP IP/OBS MODERATE 35: CPT | Performed by: NURSE PRACTITIONER

## 2025-04-24 PROCEDURE — 80048 BASIC METABOLIC PNL TOTAL CA: CPT | Performed by: NURSE PRACTITIONER

## 2025-04-24 PROCEDURE — 97116 GAIT TRAINING THERAPY: CPT

## 2025-04-24 RX ORDER — IPRATROPIUM BROMIDE AND ALBUTEROL SULFATE 2.5; .5 MG/3ML; MG/3ML
3 SOLUTION RESPIRATORY (INHALATION) 4 TIMES DAILY PRN
Status: DISCONTINUED | OUTPATIENT
Start: 2025-04-24 | End: 2025-04-25 | Stop reason: HOSPADM

## 2025-04-24 RX ADMIN — BUSPIRONE HYDROCHLORIDE 15 MG: 10 TABLET ORAL at 08:53

## 2025-04-24 RX ADMIN — INSULIN GLARGINE 25 UNITS: 100 INJECTION, SOLUTION SUBCUTANEOUS at 21:20

## 2025-04-24 RX ADMIN — PHENAZOPYRIDINE 100 MG: 100 TABLET ORAL at 08:52

## 2025-04-24 RX ADMIN — CITALOPRAM HYDROBROMIDE 40 MG: 40 TABLET ORAL at 08:52

## 2025-04-24 RX ADMIN — HEPARIN SODIUM 5000 UNITS: 5000 INJECTION INTRAVENOUS; SUBCUTANEOUS at 21:20

## 2025-04-24 RX ADMIN — INSULIN LISPRO 3 UNITS: 100 INJECTION, SOLUTION INTRAVENOUS; SUBCUTANEOUS at 12:39

## 2025-04-24 RX ADMIN — PHENAZOPYRIDINE 100 MG: 100 TABLET ORAL at 17:42

## 2025-04-24 RX ADMIN — INSULIN LISPRO 3 UNITS: 100 INJECTION, SOLUTION INTRAVENOUS; SUBCUTANEOUS at 08:51

## 2025-04-24 RX ADMIN — HEPARIN SODIUM 5000 UNITS: 5000 INJECTION INTRAVENOUS; SUBCUTANEOUS at 05:47

## 2025-04-24 RX ADMIN — INSULIN LISPRO 8 UNITS: 100 INJECTION, SOLUTION INTRAVENOUS; SUBCUTANEOUS at 17:41

## 2025-04-24 RX ADMIN — PHENAZOPYRIDINE 100 MG: 100 TABLET ORAL at 12:40

## 2025-04-24 RX ADMIN — ATORVASTATIN CALCIUM 40 MG: 40 TABLET, FILM COATED ORAL at 08:52

## 2025-04-24 RX ADMIN — RISPERIDONE 3 MG: 1 TABLET, FILM COATED ORAL at 21:19

## 2025-04-24 RX ADMIN — RISPERIDONE 3 MG: 1 TABLET, FILM COATED ORAL at 08:52

## 2025-04-24 RX ADMIN — FAMOTIDINE 20 MG: 20 TABLET, FILM COATED ORAL at 21:19

## 2025-04-24 RX ADMIN — INSULIN GLARGINE 15 UNITS: 100 INJECTION, SOLUTION SUBCUTANEOUS at 08:53

## 2025-04-24 RX ADMIN — LEVOTHYROXINE SODIUM 225 MCG: 0.05 TABLET ORAL at 05:47

## 2025-04-24 RX ADMIN — Medication 250 MG: at 08:52

## 2025-04-24 RX ADMIN — INSULIN LISPRO 12 UNITS: 100 INJECTION, SOLUTION INTRAVENOUS; SUBCUTANEOUS at 21:19

## 2025-04-24 RX ADMIN — INSULIN LISPRO 3 UNITS: 100 INJECTION, SOLUTION INTRAVENOUS; SUBCUTANEOUS at 17:41

## 2025-04-24 RX ADMIN — SENNOSIDES AND DOCUSATE SODIUM 2 TABLET: 50; 8.6 TABLET ORAL at 08:53

## 2025-04-24 RX ADMIN — PANTOPRAZOLE SODIUM 40 MG: 40 TABLET, DELAYED RELEASE ORAL at 08:53

## 2025-04-24 RX ADMIN — BUSPIRONE HYDROCHLORIDE 15 MG: 10 TABLET ORAL at 21:19

## 2025-04-24 NOTE — PLAN OF CARE
Goal Outcome Evaluation:  Plan of Care Reviewed With: (P) patient        Progress: (P) no change       Anticipated Discharge Disposition (SLP): (P) skilled nursing facility             Treatment Assessment (SLP): (P) continued, toleration of diet, no clinical signs of, aspiration (04/24/25 1055)  Treatment Assessment Comments (SLP): (P) Pt initially required sternal rub and repositioning of her chair to maintain alertness. Pt endorsed toleration of diet and compliance with compensatory strategies. Swallowing exercises completed using 4 oz of nectar thick liquids. Required minimal cues. No s/sx of aspiration observed. Exercises handout left in pt's room. Encouraged daily practice and continued compliance with strategies. (04/24/25 1055)  Plan for Continued Treatment (SLP): (P) continue treatment per plan of care (04/24/25 1055)

## 2025-04-24 NOTE — PROGRESS NOTES
[facility] Medicine Services  PROGRESS NOTE    Patient Name: Vilma Ayala  : 1970  MRN: [id number]    Date of Admission: 4/10/2025  Primary Care Physician: Vanessa Kaur MD    Subjective   Subjective     CC:  F/u nausea    HPI:   Resting in chair, continues to doze off to sleep. No pain or new needs. No visitors.       Objective   Objective     Vital Signs:   Temp:  [98 °F (36.7 °C)-98.4 °F (36.9 °C)] 98.4 °F (36.9 °C)  Heart Rate:  [93] 93  Resp:  [18-20] 20  BP: (102-117)/(80-98) 113/90     Physical Exam:   Constitutional: No acute distress, sleeping   HENT: NCAT, mucous membranes moist  Respiratory: Diminished bases, respiratory effort normal, room air  Cardiovascular: RRR, no murmurs, rubs, or gallops  Gastrointestinal: Positive bowel sounds, soft, nontender, nondistended  Musculoskeletal: No bilateral ankle edema  Psychiatric: Appropriate affect, cooperative  Neurologic: Oriented x 3, moves all extremities, speech clear  Skin: No rashes      Results Reviewed:  LAB RESULTS:      Lab 25  0401 25  0513 25  1536   WBC 17.18* 15.67* 13.10*   HEMOGLOBIN 11.9* 12.5 11.6*   HEMATOCRIT 35.7 37.0 34.4   PLATELETS 272 278 239   NEUTROS ABS 11.35*  --   --    IMMATURE GRANS (ABS) 0.11*  --   --    LYMPHS ABS 4.22*  --   --    MONOS ABS 0.85  --   --    EOS ABS 0.59*  --   --    MCV 89.7 88.7 87.3   CRP  --  0.60*  --    PROCALCITONIN  --  0.12  --          Lab 25  0401 25  0513 25  0518 25  0428 25  1536   SODIUM 136 137 139 136 136   POTASSIUM 4.3 4.5 4.3 4.1 4.1   CHLORIDE 101 100 102 101 101   CO2 22.0 23.0 24.0 23.0 25.0   ANION GAP 13.0 14.0 13.0 12.0 10.0   BUN 18 17 14 16 15   CREATININE 1.50* 1.32* 1.41* 1.25* 1.45*   EGFR 41.2* 48.1* 44.4* 51.3* 43.0*   GLUCOSE 171* 138* 118* 163* 232*   CALCIUM 9.3 9.4 9.5 9.3 8.8         Lab 25  0518   TOTAL PROTEIN 6.4   ALBUMIN 3.7   GLOBULIN 2.7   ALT (SGPT) 37*   AST (SGOT) 33*    BILIRUBIN 0.5   ALK PHOS 108                     Brief Urine Lab Results  (Last result in the past 365 days)        Color   Clarity   Blood   Leuk Est   Nitrite   Protein   CREAT   Urine HCG        04/23/25 1652 Yellow   Clear   Negative   Trace   Negative   Negative                   Microbiology Results Abnormal       None            No radiology results from the last 24 hrs      Results for orders placed during the hospital encounter of 04/10/25    Adult Transthoracic Echo Complete W/ Cont if Necessary Per Protocol    Interpretation Summary    Left ventricular systolic function is normal. Estimated left ventricular EF = 60%    No significant valvular abnormalities.      Current medications:  Scheduled Meds:atorvastatin, 40 mg, Oral, Daily  busPIRone, 15 mg, Oral, Q12H  citalopram, 40 mg, Oral, Daily  famotidine, 20 mg, Oral, Nightly  heparin (porcine), 5,000 Units, Subcutaneous, Q8H  insulin glargine, 15 Units, Subcutaneous, Daily  insulin glargine, 25 Units, Subcutaneous, Nightly  Insulin Lispro, 3 Units, Subcutaneous, TID With Meals  insulin lispro, 3-14 Units, Subcutaneous, 4x Daily AC & at Bedtime  levothyroxine, 225 mcg, Oral, Q AM  pantoprazole, 40 mg, Oral, Daily  phenazopyridine, 100 mg, Oral, TID With Meals  risperiDONE, 3 mg, Oral, BID  saccharomyces boulardii, 250 mg, Oral, BID  senna-docusate sodium, 2 tablet, Oral, BID      Continuous Infusions:   PRN Meds:.  acetaminophen **OR** acetaminophen **OR** acetaminophen    senna-docusate sodium **AND** polyethylene glycol **AND** bisacodyl **AND** bisacodyl    dextrose    dextrose    glucagon (human recombinant)    ipratropium-albuterol    melatonin    promethazine    Sodium Chloride (PF)    Assessment & Plan   Assessment & Plan     Active Hospital Problems    Diagnosis  POA    **Acute kidney injury superimposed on chronic kidney disease [N17.9, N18.9]  Yes    Acute kidney injury superimposed on CKD [N17.9, N18.9]  Yes    Diarrhea [R19.7]  Yes     Dehydration [E86.0]  Yes    Essential tremor [G25.0]  Yes    Type 2 diabetes mellitus without complication, with long-term current use of insulin [E11.9, Z79.4]  Not Applicable    Mixed hyperlipidemia [E78.2]  Yes    Other specified hypothyroidism [E03.8]  Yes    Anxiety and depression [F41.9, F32.A]  Yes      Resolved Hospital Problems   No resolved problems to display.        Brief Hospital Course to date:  Vilma Ayala is a 54 y.o. female  with a history of Essential Tremor, T2DM, hypothyroidism, anxiety/depression, and GERD who presented to [facility] ED for complaint of nausea and diarrhea.  On the afternoon of 4/12, pt had some increased dyspnea and congestion.  Chest x-ray could not rule out possible pneumonia; however Pro-Jim was negative, WBCs wnl, and symptoms felt more pulmonary congestion, so IV fluids stopped and patient was given IV Bumex.  CT chest 4/13 showed bilateral lower lobe atelectasis. Echo on 4/14 with normal EF.  Patient has since been weaned to room air.      This patient's problems and plans were partially entered by my partner and updated as appropriate by me 04/24/25.       CB on CKD - resolved  Dehydration - resolved  Nausea/Vomiting - resolved  -Cr 2.07 on admission, CB possibly 2/2 to recent Bactrim use as well as dehydration from poor po intake. Baseline appears to be ~ 1.3-1.4 per chart review.  -IVF held with dyspnea, new oxygen requirement, now weaned back to RA, chest CT with just atelectasis, plans for IS/FD, encourarged PO intake.   -CT abd/pelvis negative for acute findings  -PT/OT following  -continue prn antiemetics  -Stop cholestyramine, not having any more diarrhea, continue probiotic  -Continue bowel regimen  -AM BMP     Dyspnea  - possibly related to recent fluids, IVF dc'd, improving   - Duonebs, IS/FD as well   - CT Chest with just atelectasis, encouraged use of IS/FD  - Echo 4/14 with EF 60%, normal LVSF, no significant valvular abnormalities, troponins  26>>17, seems noncardiac.   - SLP evaluated, moderate dysphagia, MBS with signs of aspiration, diet modifications in place  - WBCs increased; still afebrile, intermittently tachycardic; however, procal negative, CRP slightly elevated 0.6  - Pt satting 98% on RA  - AM CBC w/ diff to monitor WBCs  --CXR in am as well      Dysuria  - Pt reports burning with urination, low back pain 4/23  - UA benign  - Pyridium TID prn x 2 days      HLD  -Continue statin     T2DM  -A1C 6.8   -Fingerstick ACHS  -Continue Lantus BID, mealtime insulin, SSI     Anxiety/depression  Essential tremor  -Resume home Celexa, Buspar  -Continue Respiradone, stable     Expected Discharge Location and Transportation: SNF rehab  Expected Discharge pending bed offer, insurance approval  Expected Discharge Date: 4/27/2025; Expected Discharge Time:      VTE Prophylaxis:  Pharmacologic & mechanical VTE prophylaxis orders are present.         AM-PAC 6 Clicks Score (PT): 19 (04/24/25 1619)    CODE STATUS:   Code Status and Medical Interventions: CPR (Attempt to Resuscitate); Full Support   Ordered at: 04/10/25 2013     Code Status (Patient has no pulse and is not breathing):    CPR (Attempt to Resuscitate)     Medical Interventions (Patient has pulse or is breathing):    Full Support       Karlie Barry, ZIA  04/24/25

## 2025-04-24 NOTE — PLAN OF CARE
Goal Outcome Evaluation:  Plan of Care Reviewed With: patient        Progress: improving  Outcome Evaluation: Pt with good effort in therapy session. Plan to continue progressing current PT POC as tolerated to address deficits in strength, balance, and functional endurance and facilitate improved independence.    Anticipated Discharge Disposition (PT): skilled nursing facility

## 2025-04-24 NOTE — CASE MANAGEMENT/SOCIAL WORK
Continued Stay Note  [facility]     Patient Name: Vilma Ayala  MRN: [id number]  Today's Date: 4/24/2025    Admit Date: 4/10/2025    Plan: SNF rehab   Discharge Plan       Row Name 04/24/25 1346       Plan    Plan SNF rehab    Patient/Family in Agreement with Plan other (see comments)    Plan Comments Left VM w/April to check on insurance pre cert that was initiated on 4/23, will await return call.    Final Discharge Disposition Code 03 - skilled nursing facility (SNF)                   Discharge Codes    No documentation.                 Expected Discharge Date and Time       Expected Discharge Date Expected Discharge Time    Apr 27, 2025               Clarence Garcia RN

## 2025-04-24 NOTE — THERAPY TREATMENT NOTE
Patient Name: Vilma Ayala  : 1970    MRN: [id number]                              Today's Date: 2025       Admit Date: 4/10/2025    Visit Dx:     ICD-10-CM ICD-9-CM   1. CB (acute kidney injury)  N17.9 584.9   2. Nausea and vomiting, unspecified vomiting type  R11.2 787.01   3. Weakness  R53.1 780.79   4. Oropharyngeal dysphagia  R13.12 787.22     Patient Active Problem List   Diagnosis    Anxiety and depression    Uncontrolled type 2 diabetes mellitus    Other specified hypothyroidism    Obesity    Vitamin D deficiency, unspecified    Diabetic neuropathy, type II diabetes mellitus    Mixed hyperlipidemia    Dyspnea on exertion    Type 2 diabetes mellitus without complication, with long-term current use of insulin    Urinary incontinence    Microscopic hematuria    Nocturia    Nocturnal enuresis    Stress incontinence    Urge incontinence    Decreased GFR    Mild persistent asthma    Obesity (BMI 35.0-39.9 without comorbidity)    Drug-induced parkinsonism    Encounter to establish care    NATASHA (obstructive sleep apnea)    Medicare annual wellness visit, subsequent    Vitamin D deficiency    UTI (urinary tract infection)    Painful urination    Nausea    Acute kidney injury superimposed on chronic kidney disease    Diarrhea    Dehydration    Essential tremor    Acute kidney injury superimposed on CKD     Past Medical History:   Diagnosis Date    Anxiety     Chicken pox     Depression     Diabetes mellitus     Disease of thyroid gland     Measles     Type 2 diabetes mellitus      Past Surgical History:   Procedure Laterality Date    EYE SURGERY      TONSILLECTOMY        General Information       Row Name 25 1614          Physical Therapy Time and Intention    Document Type therapy note (daily note)  -KE     Mode of Treatment physical therapy  -KE       Row Name 25 1614          General Information    Patient Profile Reviewed yes  -KE     Existing Precautions/Restrictions fall  tremors   -KE     Barriers to Rehab medically complex  -KE       Row Name 04/24/25 1614          Cognition    Orientation Status (Cognition) oriented x 3  -KE       Row Name 04/24/25 1614          Safety Issues/Impairments Affecting Functional Mobility    Safety Issues Affecting Function (Mobility) awareness of need for assistance;safety precaution awareness;safety precautions follow-through/compliance;insight into deficits/self-awareness;sequencing abilities  -KE     Impairments Affecting Function (Mobility) balance;endurance/activity tolerance;strength;coordination;cognition  -KE     Cognitive Impairments, Mobility Safety/Performance safety precaution awareness;insight into deficits/self-awareness;awareness, need for assistance  -KE               User Key  (r) = Recorded By, (t) = Taken By, (c) = Cosigned By      Initials Name Provider Type    Keiry Heard, PT Physical Therapist                   Mobility       Row Name 04/24/25 1615          Bed Mobility    Bed Mobility supine-sit  -KE     Supine-Sit [place] (Bed Mobility) minimum assist (75% patient effort);1 person assist  -KE     Assistive Device (Bed Mobility) bed rails  -KE     Comment, (Bed Mobility) increased time to complete  -KE       Row Name 04/24/25 1615          Sit-Stand Transfer    Sit-Stand [place] (Transfers) contact guard;verbal cues  -KE     Assistive Device (Sit-Stand Transfers) walker, front-wheeled  -KE       Row Name 04/24/25 1615          Gait/Stairs (Locomotion)    [place] Level (Gait) contact guard;verbal cues;nonverbal cues (demo/gesture)  -KE     Assistive Device (Gait) walker, front-wheeled  -KE     Patient was able to Ambulate yes  -KE     Distance in Feet (Gait) 34  -KE     Deviations/Abnormal Patterns (Gait) bilateral deviations;malina decreased;gait speed decreased;stride length decreased  -KE     Bilateral Gait Deviations forward flexed posture;heel strike decreased  -KE     Right Sided Gait Deviations Trendelenburg  sign  -KE     Comment, (Gait/Stairs) demo step through gait pattern with slow gait speed and FWd flexed posture. No overt LOB. VCs for forward gaze. Further mobility limited by fatigue.  -KE               User Key  (r) = Recorded By, (t) = Taken By, (c) = Cosigned By      Initials Name Provider Type    Keiry Heard, LUKASZ Physical Therapist                   Obj/Interventions       Row Name 04/24/25 1616          Motor Skills    Therapeutic Exercise hip;knee;ankle  -       Row Name 04/24/25 1616          Hip (Therapeutic Exercise)    Hip (Therapeutic Exercise) strengthening exercise  -     Hip Strengthening (Therapeutic Exercise) heel slides;marching while seated;bilateral;10 repetitions  bridges  -       Row Name 04/24/25 1616          Knee (Therapeutic Exercise)    Knee (Therapeutic Exercise) strengthening exercise  -     Knee Strengthening (Therapeutic Exercise) LAQ (long arc quad);SLR (straight leg raise);bilateral;10 repetitions  -       Row Name 04/24/25 1616          Ankle (Therapeutic Exercise)    Ankle (Therapeutic Exercise) AROM (active range of motion)  -     Ankle AROM (Therapeutic Exercise) bilateral;10 repetitions;dorsiflexion;plantarflexion  -       Row Name 04/24/25 1616          Balance    Balance Assessment sitting static balance;sitting dynamic balance;standing static balance;standing dynamic balance  -KE     Static Sitting Balance standby assist  -     Dynamic Sitting Balance contact guard  -KE     Position, Sitting Balance sitting edge of bed  -KE     Static Standing Balance contact guard  -KE     Dynamic Standing Balance contact guard  -KE     Position/Device Used, Standing Balance supported  -     Balance Interventions sitting;standing;sit to stand;supported;dynamic;static  -KE     Comment, Balance no overt LOB  -KE               User Key  (r) = Recorded By, (t) = Taken By, (c) = Cosigned By      Initials Name Provider Type    Keiry Heard, LUKASZ Physical Therapist                    Goals/Plan    No documentation.                  Clinical Impression       Row Name 04/24/25 1617          Pain    Pretreatment Pain Rating 0/10 - no pain  -KE     Posttreatment Pain Rating 0/10 - no pain  -KE       Row Name 04/24/25 1617          Plan of Care Review    Plan of Care Reviewed With patient  -KE     Outcome Evaluation Pt with good effort in therapy session. Plan to continue progressing current PT POC as tolerated to address deficits in strength, balance, and functional endurance and facilitate improved independence.  -KE       Row Name 04/24/25 1617          Vital Signs    O2 Delivery Pre Treatment room air  -KE     O2 Delivery Intra Treatment room air  -KE     O2 Delivery Post Treatment room air  -KE     Pre Patient Position Supine  -KE     Intra Patient Position Standing  -KE     Post Patient Position Sitting  -KE       Row Name 04/24/25 1617          Positioning and Restraints    Pre-Treatment Position in bed  -KE     Post Treatment Position chair  -KE     In Chair notified nsg;reclined;waffle cushion;call light within reach;encouraged to call for assist;exit alarm on;legs elevated  -KE               User Key  (r) = Recorded By, (t) = Taken By, (c) = Cosigned By      Initials Name Provider Type    Keiry Heard, PT Physical Therapist                   Outcome Measures       Row Name 04/24/25 1619          How much help from another person do you currently need...    Turning from your back to your side while in flat bed without using bedrails? 4  -KE     Moving from lying on back to sitting on the side of a flat bed without bedrails? 3  -KE     Moving to and from a bed to a chair (including a wheelchair)? 3  -KE     Standing up from a chair using your arms (e.g., wheelchair, bedside chair)? 3  -KE     Climbing 3-5 steps with a railing? 3  -KE     To walk in hospital room? 3  -KE     AM-PAC 6 Clicks Score (PT) 19  -KE     Highest Level of Mobility Goal 6 --> Walk 10 steps or  more  -       Row Name 04/24/25 1619 04/24/25 0915       Functional Assessment    Outcome Measure Options AM-PAC 6 Clicks Basic Mobility (PT)  -KE AM-PAC 6 Clicks Daily Activity (OT)  -AC              User Key  (r) = Recorded By, (t) = Taken By, (c) = Cosigned By      Initials Name Provider Type    AC Minal Dye, OT Occupational Therapist    Keiry Heard, PT Physical Therapist                                 Physical Therapy Education       Title: PT OT SLP Therapies (In Progress)       Topic: Physical Therapy (Done)       Point: Mobility training (Done)       Learning Progress Summary            Patient Acceptance, E, VU by AMITA at 4/22/2025 1323    Acceptance, E, VU by ND at 4/18/2025 1459    Acceptance, E,D, VU,NR by LR at 4/16/2025 1349    Comment: Educated on benefits of mobility, safety with mobility, correct supine to sit t/f technique, correct sit<->stand t/f technique, correct gait mechanics, LE HEP, and progression of POC.    Acceptance, E, VU,NR by LS at 4/12/2025 0950                      Point: Home exercise program (Done)       Learning Progress Summary            Patient Acceptance, E, VU by AMITA at 4/22/2025 1323    Acceptance, E, VU by ND at 4/18/2025 1459    Acceptance, E,D, VU,NR by LR at 4/16/2025 1349    Comment: Educated on benefits of mobility, safety with mobility, correct supine to sit t/f technique, correct sit<->stand t/f technique, correct gait mechanics, LE HEP, and progression of POC.                      Point: Body mechanics (Done)       Learning Progress Summary            Patient Acceptance, E, VU by AMITA at 4/22/2025 1323    Acceptance, E, VU by ND at 4/18/2025 1459    Acceptance, E,D, VU,NR by LR at 4/16/2025 1349    Comment: Educated on benefits of mobility, safety with mobility, correct supine to sit t/f technique, correct sit<->stand t/f technique, correct gait mechanics, LE HEP, and progression of POC.                      Point: Precautions (Done)       Learning  Progress Summary            Patient Acceptance, E, VU by KE at 4/22/2025 1323    Acceptance, E, VU by ND at 4/18/2025 1459    Acceptance, E,D, VU,NR by LR at 4/16/2025 1349    Comment: Educated on benefits of mobility, safety with mobility, correct supine to sit t/f technique, correct sit<->stand t/f technique, correct gait mechanics, LE HEP, and progression of POC.                                      User Key       Initials Effective Dates Name Provider Type Discipline    LR 02/03/23 -  Saba Mejía, PT Physical Therapist PT    LS 07/11/23 -  Jody Yeung, PT Physical Therapist PT    ND 11/16/23 -  Helen Diallo, PT Physical Therapist PT    AMITA 11/16/23 -  Keiry Moncada, PT Physical Therapist PT                  PT Recommendation and Plan  Planned Therapy Interventions (PT): balance training, bed mobility training, gait training, home exercise program, neuromuscular re-education, patient/family education, postural re-education, transfer training, stretching, strengthening, ROM (range of motion), stair training  Progress: improving  Outcome Evaluation: Pt with good effort in therapy session. Plan to continue progressing current PT POC as tolerated to address deficits in strength, balance, and functional endurance and facilitate improved independence.     Time Calculation:         PT Charges       Row Name 04/24/25 1619             Time Calculation    Start Time 1513  -KE      PT Received On 04/24/25  -KE         Timed Charges    37433 - PT Therapeutic Exercise Minutes 12  -KE      68319 - Gait Training Minutes  11  -KE         Total Minutes    Timed Charges Total Minutes 23  -KE       Total Minutes 23  -KE                User Key  (r) = Recorded By, (t) = Taken By, (c) = Cosigned By      Initials Name Provider Type    Keiry Heard, PT Physical Therapist                  Therapy Charges for Today       Code Description Service Date Service Provider Modifiers Qty    16375981076 HC PT  THER PROC EA 15 MIN 4/24/2025 Keiry Moncada, PT GP 1    28791740797 HC GAIT TRAINING EA 15 MIN 4/24/2025 Keiry Moncada, PT GP 1            PT G-Codes  Outcome Measure Options: AM-PAC 6 Clicks Basic Mobility (PT)  AM-PAC 6 Clicks Score (PT): 19  AM-PAC 6 Clicks Score (OT): 18  PT Discharge Summary  Anticipated Discharge Disposition (PT): skilled nursing facility    Keiry Moncada PT  4/24/2025

## 2025-04-24 NOTE — THERAPY TREATMENT NOTE
Patient Name: Vilma Ayala  : 1970    MRN: [id number]                              Today's Date: 2025       Admit Date: 4/10/2025    Visit Dx:     ICD-10-CM ICD-9-CM   1. CB (acute kidney injury)  N17.9 584.9   2. Nausea and vomiting, unspecified vomiting type  R11.2 787.01   3. Weakness  R53.1 780.79   4. Oropharyngeal dysphagia  R13.12 787.22     Patient Active Problem List   Diagnosis    Anxiety and depression    Uncontrolled type 2 diabetes mellitus    Other specified hypothyroidism    Obesity    Vitamin D deficiency, unspecified    Diabetic neuropathy, type II diabetes mellitus    Mixed hyperlipidemia    Dyspnea on exertion    Type 2 diabetes mellitus without complication, with long-term current use of insulin    Urinary incontinence    Microscopic hematuria    Nocturia    Nocturnal enuresis    Stress incontinence    Urge incontinence    Decreased GFR    Mild persistent asthma    Obesity (BMI 35.0-39.9 without comorbidity)    Drug-induced parkinsonism    Encounter to establish care    NATASHA (obstructive sleep apnea)    Medicare annual wellness visit, subsequent    Vitamin D deficiency    UTI (urinary tract infection)    Painful urination    Nausea    Acute kidney injury superimposed on chronic kidney disease    Diarrhea    Dehydration    Essential tremor    Acute kidney injury superimposed on CKD     Past Medical History:   Diagnosis Date    Anxiety     Chicken pox     Depression     Diabetes mellitus     Disease of thyroid gland     Measles     Type 2 diabetes mellitus      Past Surgical History:   Procedure Laterality Date    EYE SURGERY      TONSILLECTOMY        General Information       Row Name 25 0910          OT Time and Intention    Document Type therapy note (daily note)  -AC     Mode of Treatment occupational therapy  -AC       Row Name 25 0910          General Information    Patient Profile Reviewed yes  -AC     Existing Precautions/Restrictions fall  tremors  -AC      Barriers to Rehab medically complex  -       Row Name 04/24/25 0910          Cognition    Orientation Status (Cognition) oriented x 3  -AC       Row Name 04/24/25 0910          Safety Issues/Impairments Affecting Functional Mobility    Safety Issues Affecting Function (Mobility) awareness of need for assistance;insight into deficits/self-awareness;safety precaution awareness  -     Impairments Affecting Function (Mobility) balance;endurance/activity tolerance;strength;coordination  -               User Key  (r) = Recorded By, (t) = Taken By, (c) = Cosigned By      Initials Name Provider Type     Minal Dye, OT Occupational Therapist                     Mobility/ADL's       Row Name 04/24/25 0911          Bed Mobility    Comment, (Bed Mobility) [facility] pre/post tx  -       Row Name 04/24/25 0911          Transfers    Transfers sit-stand transfer;toilet transfer  -       Row Name 04/24/25 0911          Sit-Stand Transfer    Sit-Stand [place] (Transfers) contact guard;verbal cues  -     Assistive Device (Sit-Stand Transfers) walker, front-wheeled  -       Row Name 04/24/25 0911          Toilet Transfer    [place] Level (Toilet Transfer) verbal cues;contact guard  -     Assistive Device (Toilet Transfer) walker, front-wheeled  -AC       Row Name 04/24/25 0911          Functional Mobility    Functional Mobility- Ind. Level verbal cues required;contact guard assist  -     Functional Mobility- Device walker, front-wheeled  -AC     Functional Mobility-Distance (Feet) 30  -       Row Name 04/24/25 0911          Activities of Daily Living    BADL Assessment/Intervention lower body dressing;grooming;toileting  -       Row Name 04/24/25 0911          Lower Body Dressing Assessment/Training    [place] Level (Lower Body Dressing) don;socks;minimum assist (75% patient effort)  -     Position (Lower Body Dressing) unsupported sitting  -       Row Name 04/24/25 0911          Grooming  Assessment/Training    [place] Level (Grooming) wash face, hands;set up  -AC     Position (Grooming) supported sitting  -AC       Row Name 04/24/25 0911          Toileting Assessment/Training    [place] Level (Toileting) adjust/manage clothing;perform perineal hygiene;minimum assist (75% patient effort)  -AC     Position (Toileting) unsupported sitting;supported standing  -AC               User Key  (r) = Recorded By, (t) = Taken By, (c) = Cosigned By      Initials Name Provider Type    Minal Valle, OT Occupational Therapist                   Obj/Interventions       Row Name 04/24/25 0912          Balance    Balance Assessment sitting static balance;sitting dynamic balance;standing static balance;standing dynamic balance  -AC     Static Sitting Balance standby assist  -AC     Dynamic Sitting Balance contact guard  -AC     Position, Sitting Balance unsupported;sitting in chair  -AC     Static Standing Balance verbal cues;contact guard  -AC     Dynamic Standing Balance verbal cues;contact guard  -AC     Position/Device Used, Standing Balance supported;walker, front-wheeled  -AC               User Key  (r) = Recorded By, (t) = Taken By, (c) = Cosigned By      Initials Name Provider Type    Minal Valle, OT Occupational Therapist                   Goals/Plan    No documentation.                  Clinical Impression       Row Name 04/24/25 0913          Pain Assessment    Pretreatment Pain Rating 0/10 - no pain  -AC     Posttreatment Pain Rating 0/10 - no pain  -AC       Row Name 04/24/25 0913          Plan of Care Review    Plan of Care Reviewed With patient  -AC     Progress no change  -     Outcome Evaluation Pt CGA commode transfer, min A toileting tasks,  min A LBD.  Pt with good effort, but continues below baseline with self care d/t weakness and decr activity.  Recommend SNF upon d/c.  -AC       Row Name 04/24/25 0913          Vital Signs    Pre Systolic BP Rehab 102  -AC     Pre Treatment  Diastolic BP 80  -AC     Pre SpO2 (%) 93  -AC     O2 Delivery Pre Treatment room air  -AC     O2 Delivery Intra Treatment room air  -AC     Post SpO2 (%) 91  -AC     O2 Delivery Post Treatment room air  -AC     Pre Patient Position Sitting  -AC     Intra Patient Position Standing  -AC     Post Patient Position Sitting  -AC       Row Name 04/24/25 0913          Positioning and Restraints    Pre-Treatment Position sitting in chair/recliner  -AC     Post Treatment Position chair  -AC     In Chair notified nsg;reclined;call light within reach;encouraged to call for assist;exit alarm on;waffle cushion  -AC               User Key  (r) = Recorded By, (t) = Taken By, (c) = Cosigned By      Initials Name Provider Type    Minal Valle, OT Occupational Therapist                   Outcome Measures       Row Name 04/24/25 0915          How much help from another is currently needed...    Putting on and taking off regular lower body clothing? 3  -AC     Bathing (including washing, rinsing, and drying) 3  -AC     Toileting (which includes using toilet bed pan or urinal) 3  -AC     Putting on and taking off regular upper body clothing 3  -AC     Taking care of personal grooming (such as brushing teeth) 3  -AC     Eating meals 3  -AC     AM-PAC 6 Clicks Score (OT) 18  -AC       Row Name 04/24/25 0915          Functional Assessment    Outcome Measure Options AM-PAC 6 Clicks Daily Activity (OT)  -AC               User Key  (r) = Recorded By, (t) = Taken By, (c) = Cosigned By      Initials Name Provider Type    Minal Valle, OT Occupational Therapist                    Occupational Therapy Education       Title: PT OT SLP Therapies (In Progress)       Topic: Occupational Therapy (In Progress)       Point: ADL training (In Progress)       Learning Progress Summary            Patient Acceptance, E, NR by JORDON at 4/24/2025 0916    Acceptance, E, NR by [facility] at 4/23/2025 1425    Acceptance, E, NR by JORDON at 4/22/2025 0949                       Point: Precautions (In Progress)       Learning Progress Summary            Patient Acceptance, E, NR by  at 4/23/2025 1425                      Point: Body mechanics (In Progress)       Learning Progress Summary            Patient Acceptance, E, NR by  at 4/23/2025 1425                                      User Key       Initials Effective Dates Name Provider Type Discipline     02/03/23 -  Minal Dye, OT Occupational Therapist OT     06/16/21 -  Saba Shelton OT Occupational Therapist OT                  OT Recommendation and Plan  Planned Therapy Interventions (OT): activity tolerance training, BADL retraining, functional balance retraining, occupation/activity based interventions, patient/caregiver education/training, strengthening exercise, transfer/mobility retraining  Therapy Frequency (OT): daily  Plan of Care Review  Plan of Care Reviewed With: patient  Progress: no change  Outcome Evaluation: Pt CGA commode transfer, min A toileting tasks,  min A LBD.  Pt with good effort, but continues below baseline with self care d/t weakness and decr activity.  Recommend SNF upon d/c.     Time Calculation:   Evaluation Complexity (OT)  Review Occupational Profile/Medical/Therapy History Complexity: brief/low complexity  Assessment, Occupational Performance/Identification of Deficit Complexity: 1-3 performance deficits  Clinical Decision Making Complexity (OT): problem focused assessment/low complexity  Overall Complexity of Evaluation (OT): low complexity     Time Calculation- OT       Row Name 04/24/25 0730             Time Calculation- OT    OT Start Time 0730  -AC      OT Received On 04/24/25  -AC      OT Goal Re-Cert Due Date 05/02/25  -AC         Timed Charges    34632 - OT Therapeutic Activity Minutes 10  -AC      19600 - OT Self Care/Mgmt Minutes 15  -AC         Total Minutes    Timed Charges Total Minutes 25  -AC       Total Minutes 25  -AC                User Key  (r) = Recorded By, (t) =  Taken By, (c) = Cosigned By      Initials Name Provider Type    AC Minal Dye, OT Occupational Therapist                  Therapy Charges for Today       Code Description Service Date Service Provider Modifiers Qty    26524087754  OT THERAPEUTIC ACT EA 15 MIN 4/24/2025 Minal Dye, OT GO 1    [id number]  OT SELF CARE/MGMT/TRAIN EA 15 MIN 4/24/2025 Minal Dye, OT GO 1                 Minal Dye OT  4/24/2025

## 2025-04-24 NOTE — PLAN OF CARE
Goal Outcome Evaluation:  Plan of Care Reviewed With: patient        Progress: no change  Outcome Evaluation: Pt CGA commode transfer, min A toileting tasks,  min A LBD.  Pt with good effort, but continues below baseline with self care d/t weakness and decr activity.  Recommend SNF upon d/c.    Anticipated Discharge Disposition (OT): skilled nursing facility

## 2025-04-24 NOTE — THERAPY TREATMENT NOTE
Acute Care - Speech Language Pathology   Swallow Treatment Note [facility]     Patient Name: Vilma Ayala  : 1970  MRN: [id number]  Today's Date: 2025               Admit Date: 4/10/2025    Visit Dx:     ICD-10-CM ICD-9-CM   1. CB (acute kidney injury)  N17.9 584.9   2. Nausea and vomiting, unspecified vomiting type  R11.2 787.01   3. Weakness  R53.1 780.79   4. Oropharyngeal dysphagia  R13.12 787.22     Patient Active Problem List   Diagnosis    Anxiety and depression    Uncontrolled type 2 diabetes mellitus    Other specified hypothyroidism    Obesity    Vitamin D deficiency, unspecified    Diabetic neuropathy, type II diabetes mellitus    Mixed hyperlipidemia    Dyspnea on exertion    Type 2 diabetes mellitus without complication, with long-term current use of insulin    Urinary incontinence    Microscopic hematuria    Nocturia    Nocturnal enuresis    Stress incontinence    Urge incontinence    Decreased GFR    Mild persistent asthma    Obesity (BMI 35.0-39.9 without comorbidity)    Drug-induced parkinsonism    Encounter to establish care    NATASHA (obstructive sleep apnea)    Medicare annual wellness visit, subsequent    Vitamin D deficiency    UTI (urinary tract infection)    Painful urination    Nausea    Acute kidney injury superimposed on chronic kidney disease    Diarrhea    Dehydration    Essential tremor    Acute kidney injury superimposed on CKD     Past Medical History:   Diagnosis Date    Anxiety     Chicken pox     Depression     Diabetes mellitus     Disease of thyroid gland     Measles     Type 2 diabetes mellitus      Past Surgical History:   Procedure Laterality Date    EYE SURGERY      TONSILLECTOMY         SLP Recommendation and Plan     SLP Diet Recommendation: (P) mechanical ground textures, no mixed consistencies, nectar thick liquids (25 1055)  Recommended Precautions and Strategies: (P) general aspiration precautions, assist with feeding (25 1055)  SLP Rec.  for Method of Medication Administration: (P) meds crushed, with puree, as tolerated (04/24/25 1055)     Monitor for Signs of Aspiration: (P) yes, notify SLP if any concerns (04/24/25 1055)        Anticipated Discharge Disposition (SLP): (P) skilled nursing facility (04/24/25 1055)     Therapy Frequency (Swallow): (P) 5 days per week (04/24/25 1055)  Predicted Duration Therapy Intervention (Days): (P) 1 week (04/24/25 1055)  Oral Care Recommendations: (P) Oral Care BID/PRN, Suction toothbrush (04/24/25 1055)        Daily Summary of Progress (SLP): (P) progress toward functional goals as expected (04/24/25 1055)               Treatment Assessment (SLP): (P) continued, toleration of diet, no clinical signs of, aspiration (04/24/25 1055)  Treatment Assessment Comments (SLP): (P) Pt initially required sternal rub and repositioning of her chair to maintain alertness. Pt endorsed toleration of diet and compliance with compensatory strategies. Swallowing exercises completed using 4 oz of nectar thick liquids. Required minimal cues. No s/sx of aspiration observed. Exercises handout left in pt's room. Encouraged daily practice and continued compliance with strategies. (04/24/25 1055)  Plan for Continued Treatment (SLP): (P) continue treatment per plan of care (04/24/25 1055)         Progress: (P) no change      SWALLOW EVALUATION (Last 72 Hours)       SLP Adult Swallow Evaluation       Row Name 04/24/25 1055 04/22/25 1100 04/21/25 1500             Rehab Evaluation    Document Type therapy note (daily note) (P)   -MC evaluation;other (see comments)  [facility]  -CH therapy note (daily note)  -CH      Subjective Information no complaints (P)   -MC complains of;fatigue  -CH --      Patient Observations alert;cooperative;agree to therapy (P)   -MC alert;cooperative;agree to therapy  -CH alert;cooperative;agree to therapy  -CH      Patient/Family/Caregiver Comments/Observations none present (P)   -MC none present  -CH none present  -CH       Patient Effort good (P)   -MC good  -CH good  -CH      Symptoms Noted During/After Treatment none (P)   - none  -CH none  -CH      Oral Care -- -- lip/mouth moisturizer applied  -         General Information    Patient Profile Reviewed -- yes  -CH yes  -CH      Pertinent History Of Current Problem -- see initial evaluation. MBS completed to assess for possible diet upgrade.  - --      Current Method of Nutrition -- soft to chew textures;chopped;nectar/syrup-thick liquids  - --      Precautions/Limitations, Vision -- WFL;for purposes of eval  -CH --      Precautions/Limitations, Hearing -- WFL;for purposes of eval  -CH --      Prior Level of Function-Communication -- unknown  - --      Prior Level of Function-Swallowing -- no diet consistency restrictions;other (see comments)  per patient report  - --      Plans/Goals Discussed with -- patient;agreed upon  - --      Barriers to Rehab -- medically complex  - --      Patient's Goals for Discharge -- patient could not state  - --         Pain    Pretreatment Pain Rating 0/10 - no pain (P)   - 0/10 - no pain  - 0/10 - no pain  -      Posttreatment Pain Rating 0/10 - no pain (P)   - 0/10 - no pain  - 0/10 - no pain  -         Pain Scale: FACES Pre/Post-Treatment    Pain: FACES Scale, Pretreatment -- 0-->no hurt  -CH 0-->no hurt  -CH      Posttreatment Pain Rating -- 0-->no hurt  -CH 0-->no hurt  -CH         MBS/VFSS    Utensils Used -- spoon;cup;straw  - --      Consistencies Trialed -- thin liquids;nectar/syrup-thick liquids;pureed;regular textures  - --         MBS/VFSS Interpretation    Oral Prep Phase -- impaired oral phase of swallowing  - --      Oral Transit Phase -- impaired  -CH --      Oral Residue -- WFL  - --         Oral Preparatory Phase    Oral Preparatory Phase -- reduced lip closure around utensil;prolonged manipulation  - --      Reduced Lip Closure Around Utensil -- all consistencies tested;secondary to reduced  labial seal  -CH --      Prolonged Manipulation -- all consistencies tested;secondary to reduced labial seal;secondary to reduced lingual strength;secondary to reduced lingual range of motion;secondary to impaired cognitive status  -CH --         Oral Transit Phase    Impaired Oral Transit Phase -- increased A-P transit time;premature spillage of liquids into pharynx  -CH --      Delayed Initiation of bolus transit -- thin liquids  -CH --      Increased A-P Transit Time -- all consistencies tested;secondary to reduced lingual control;discoordination of lingual movement  -CH --      Premature Spillage of Liquids into Pharynx -- thin liquids;secondary to reduced lingual control;discoordination of lingual movement  -CH --         Initiation of Pharyngeal Swallow    Initiation of Pharyngeal Swallow -- bolus in pyriform sinuses  -CH --      Pharyngeal Phase -- impaired pharyngeal phase of swallowing  -CH --      Penetration Before the Swallow -- thin liquids;secondary to reduced back of tongue control;secondary to delayed swallow initiation or mistiming  -CH --      Aspiration During the Swallow -- thin liquids;secondary to delayed swallow initiation or mistiming;secondary to reduced laryngeal elevation;secondary to reduced vestibular closure;secondary to reduced laryngeal (VF) closure  -CH --      Depth of Penetration -- deep  -CH --      Response to Penetration -- No  -CH --      No spontaneous response to penetration and -- non-effective laryngeal clearance with cue (see comments)  -CH --      Response to Aspiration -- No  -CH --      No spontaneous response to aspiration and -- non-effective subglottic clearance with cue (see comments)  -CH --      Rosenbek's Scale -- thin:;8--->level 8;nectar:;pudding/puree:;regular textures:;1--->level 1  -CH --      Pharyngeal Residue -- thin liquids;laryngeal vestibule;secondary to reduced base of tongue retraction;secondary to reduced posterior pharyngeal wall  stripping;secondary to reduced laryngeal elevation;secondary to reduced hyolaryngeal excursion  - --      Response to Residue -- unable to clear residue  - --      Attempted Compensatory Maneuvers -- bolus size;bolus presentation style;throat clear after swallow  - --      Response to Attempted Compensatory Maneuvers -- did not prevent penetration;did not prevent aspiration  - --      Successful Compensatory Maneuver Competency -- patient able to;demonstrate compensations;with cues  - --         Swallowing Quality of Life Assessment    Education and counseling provided -- Signs of aspiration;Silent aspiration;Risks of aspiration;Safest diet options;Aspiration precautions  - --         SLP Evaluation Clinical Impression    SLP Swallowing Diagnosis -- mild;oral dysphagia;moderate;pharyngeal dysphagia  - mild;oral dysphagia;moderate;pharyngeal dysphagia  -      Functional Impact -- risk of aspiration/pneumonia  - risk of aspiration/pneumonia  -      Rehab Potential/Prognosis, Swallowing -- good, to achieve stated therapy goals  -CH good, to achieve stated therapy goals  -      Swallow Criteria for Skilled Therapeutic Interventions Met -- demonstrates skilled criteria  -CH demonstrates skilled criteria  -         SLP Treatment Clinical Impressions    Treatment Assessment (SLP) continued;toleration of diet;no clinical signs of;aspiration (P)   -MC -- continued;toleration of diet;no clinical signs of;aspiration  -      Treatment Assessment Comments (SLP) -- -- No s/s of aspiration with trials of thin H2O via ice, spoon, cup and straw, nectar thick and soft solids. Patient completed dysphagia exercises with min cues. MBS ordered to reassess for possible upgrade  -      Daily Summary of Progress (SLP) progress toward functional goals as expected (P)   -MC -- progress toward functional goals is good  -      Plan for Continued Treatment (SLP) continue treatment per plan of care (P)   - --  continue treatment per plan of care  -      Care Plan Review care plan/treatment goals reviewed (P)   - -- evaluation/treatment results reviewed;care plan/treatment goals reviewed  -         Recommendations    Therapy Frequency (Swallow) 5 days per week (P)   - 5 days per week  - 5 days per week  -      Predicted Duration Therapy Intervention (Days) 1 week (P)   - 1 week  - 1 week  -      SLP Diet Recommendation mechanical ground textures;no mixed consistencies;nectar thick liquids (P)   - mechanical ground textures;no mixed consistencies;nectar thick liquids  - mechanical ground textures;nectar thick liquids  -      Recommended Diagnostics -- -- reassess via VFSS (MBS);other (see comments)  4/21  -      Recommended Precautions and Strategies general aspiration precautions;assist with feeding (P)   - general aspiration precautions;assist with feeding  - general aspiration precautions;assist with feeding  -      Oral Care Recommendations Oral Care BID/PRN;Suction toothbrush (P)   - Oral Care BID/PRN;Suction toothbrush  - Oral Care BID/PRN;Suction toothbrush  -      SLP Rec. for Method of Medication Administration meds crushed;with puree;as tolerated (P)   - meds crushed;with puree;as tolerated  - meds crushed;with puree;as tolerated  -      Monitor for Signs of Aspiration yes;notify SLP if any concerns (P)   - notify SLP if any concerns  - notify SLP if any concerns  -      Anticipated Discharge Disposition (SLP) skilled nursing facility (P)   - skilled nursing facility  - skilled nursing facility  -                User Key  (r) = Recorded By, (t) = Taken By, (c) = Cosigned By      Initials Name Effective Dates     Gauri Mims, MS CCC-SLP 01/20/25 -      Lynn Wilkerson, Speech Therapy Student 01/16/25 -                     EDUCATION  The patient has been educated in the following areas:   Dysphagia (Swallowing Impairment).        SLP GOALS       Row  Name 04/24/25 1055 04/22/25 1100 04/21/25 1500       (LTG) Patient will demonstrate functional swallow for    Diet Texture (Demonstrate functional swallow) soft to chew (whole) textures (P)   -MC soft to chew (whole) textures  -CH soft to chew (whole) textures  -CH    Liquid viscosity (Demonstrate functional swallow) thin liquids (P)   -MC thin liquids  -CH thin liquids  -CH    [place] (Demonstrate functional swallow) with minimal cues (75-90% accuracy) (P)   -MC with minimal cues (75-90% accuracy)  -CH with minimal cues (75-90% accuracy)  -CH    Time Frame (Demonstrate functional swallow) 1 week (P)   -MC 1 week  -CH 1 week  -CH    Progress/Outcomes (Demonstrate functional swallow) continuing progress toward goal (P)   -MC continuing progress toward goal  -CH continuing progress toward goal  -CH    Comment (Demonstrate functional swallow) -- -- no s/s of aspiration with thin, nectar thick or soft solid trials. MBS ordered to assess for possible upgrade.  -CH       (STG) Patient will tolerate trials of    Consistencies Trialed (Tolerate trials) soft to chew (ground) textures;nectar/ mildly thick liquids (P)   -MC soft to chew (ground) textures;nectar/ mildly thick liquids  -CH soft to chew (ground) textures;nectar/ mildly thick liquids  -CH    Desired Outcome (Tolerate trials) without signs/symptoms of aspiration;with adequate oral prep/transit/clearance (P)   -MC without signs/symptoms of aspiration;with adequate oral prep/transit/clearance  -CH without signs/symptoms of aspiration;with adequate oral prep/transit/clearance  -CH    [place] (Tolerate trials) with minimal cues (75-90% accuracy) (P)   -MC with minimal cues (75-90% accuracy)  -CH with minimal cues (75-90% accuracy)  -CH    Time Frame (Tolerate trials) 1 week (P)   -MC 1 week  -CH 1 week  -CH    Progress/Outcomes (Tolerate trials) continuing progress toward goal (P)   -MC continuing progress toward goal  -CH continuing progress toward goal  -CH     Comment (Tolerate trials) No s/sx of aspiration observed with nectar. (P)   -MC -- no s/s of aspiration with thin, nectar thick or soft solid trials  -       (STG) Labial Strengthening Goal 1 (SLP)    Activity (Labial Strengthening Goal 1, SLP) increase labial tone (P)   -MC increase labial tone  - increase labial tone  -    Increase Labial Tone labial resistance exercises;swallow trials (P)   -MC labial resistance exercises;swallow trials  -CH labial resistance exercises;swallow trials  -    [place]/Accuracy (Labial Strengthening Goal 1, SLP) with minimal cues (75-90% accuracy) (P)   -MC with minimal cues (75-90% accuracy)  -CH with minimal cues (75-90% accuracy)  -    Time Frame (Labial Strengthening Goal 1, SLP) 1 week (P)   -MC 1 week  -CH 1 week  -    Progress/Outcomes (Labial Strengthening Goal 1, SLP) continuing progress toward goal (P)   -MC continuing progress toward goal  -CH continuing progress toward goal  -    Comment (Labial Strengthening Goal 1, SLP) -- -- completed exercises with min cues  -       (STG) Lingual Strengthening Goal 1 (SLP)    Activity (Lingual Strengthening Goal 1, SLP) increase tongue back strength;increase lingual tone/sensation/control/coordination/movement (P)   - increase tongue back strength;increase lingual tone/sensation/control/coordination/movement  - increase tongue back strength;increase lingual tone/sensation/control/coordination/movement  -    Increase Lingual Tone/Sensation/Control/Coordination/Movement lingual movement exercises;swallow trials (P)   -MC lingual movement exercises;swallow trials  - lingual movement exercises;swallow trials  -    Increase Tongue Back Strength lingual movement exercises;swallow trials;lingual resistance exercises (P)   -MC lingual movement exercises;swallow trials;lingual resistance exercises  - lingual movement exercises;swallow trials;lingual resistance exercises  -    [place]/Accuracy (Lingual  Strengthening Goal 1, SLP) with minimal cues (75-90% accuracy) (P)   -MC with minimal cues (75-90% accuracy)  -CH with minimal cues (75-90% accuracy)  -CH    Time Frame (Lingual Strengthening Goal 1, SLP) 1 week (P)   -MC 1 week  -CH 1 week  -CH    Progress/Outcomes (Lingual Strengthening Goal 1, SLP) continuing progress toward goal (P)   -MC continuing progress toward goal  -CH continuing progress toward goal  -CH    Comment (Lingual Strengthening Goal 1, SLP) -- -- completed exercises with min cues  -CH       (STG) Pharyngeal Strengthening Exercise Goal 1 (SLP)    Activity (Pharyngeal Strengthening Goal 1, SLP) increase timing;increase superior movement of the hyolaryngeal complex;increase anterior movement of the hyolaryngeal complex;increase closure at entrance to airway/closure of airway at glottis;increase squeeze/positive pressure generation (P)   -MC increase timing;increase superior movement of the hyolaryngeal complex;increase anterior movement of the hyolaryngeal complex;increase closure at entrance to airway/closure of airway at glottis;increase squeeze/positive pressure generation  -CH increase timing;increase superior movement of the hyolaryngeal complex;increase anterior movement of the hyolaryngeal complex;increase closure at entrance to airway/closure of airway at glottis;increase squeeze/positive pressure generation  -CH    Increase Timing prepping - 3 second prep or suck swallow or 3-step swallow (P)   -MC prepping - 3 second prep or suck swallow or 3-step swallow  -CH prepping - 3 second prep or suck swallow or 3-step swallow  -CH    Increase Superior Movement of the Hyolaryngeal Complex falsetto (P)   -MC falsetto  -CH falsetto  -CH    Increase Anterior Movement of the Hyolaryngeal Complex chin tuck against resistance (CTAR) (P)   -MC chin tuck against resistance (CTAR)  -CH chin tuck against resistance (CTAR)  -CH    Increase Closure at Entrance to Airway/Closure of Airway at Glottis  super-supraglottic swallow;breath hold exercises (P)   -MC super-supraglottic swallow;breath hold exercises  - super-supraglottic swallow;breath hold exercises  -    Increase Squeeze/Positive Pressure Generation hard effortful swallow (P)   -MC hard effortful swallow  -CH hard effortful swallow  -CH    [place]/Accuracy (Pharyngeal Strengthening Goal 1, SLP) with minimal cues (75-90% accuracy) (P)   -MC with minimal cues (75-90% accuracy)  -CH with minimal cues (75-90% accuracy)  -CH    Time Frame (Pharyngeal Strengthening Goal 1, SLP) 1 week (P)   -MC 1 week  -CH 1 week  -CH    Progress/Outcomes (Pharyngeal Strengthening Goal 1, SLP) continuing progress toward goal (P)   -MC continuing progress toward goal  -CH continuing progress toward goal  -CH    Comment (Pharyngeal Strengthening Goal 1, SLP) All exercises completed 5 times each with minimal cues. (P)   -MC -- completed exercises with min cues  -              User Key  (r) = Recorded By, (t) = Taken By, (c) = Cosigned By      Initials Name Provider Type     Gauri Mims, MS CCC-SLP Speech and Language Pathologist    Lynn Eller Speech Therapy Student SLP Student                         Time Calculation:    Time Calculation- SLP       Row Name 04/24/25 1322             Time Calculation- SLP    SLP Start Time 1055 (P)   -      SLP Received On 04/24/25 (P)   -         Untimed Charges    21880-AG Treatment Swallow Minutes 40 (P)   -         Total Minutes    Untimed Charges Total Minutes 40 (P)   -       Total Minutes 40 (P)   -                User Key  (r) = Recorded By, (t) = Taken By, (c) = Cosigned By      Initials Name Provider Type     Lynn Wilkerson Speech Therapy Student SLP Student                    Therapy Charges for Today       Code Description Service Date Service Provider Modifiers Qty    [id number] HC ST TREATMENT SWALLOW 3 4/24/2025 Lynn Wilkerson Speech Therapy Student GN 1                 Lynn Wilkerson  Speech Therapy Student  4/24/2025

## 2025-04-25 ENCOUNTER — APPOINTMENT (OUTPATIENT)
Dept: GENERAL RADIOLOGY | Facility: HOSPITAL | Age: 55
DRG: 683 | End: 2025-04-25
Payer: MEDICARE

## 2025-04-25 VITALS
OXYGEN SATURATION: 95 % | HEIGHT: 66 IN | BODY MASS INDEX: 34.12 KG/M2 | DIASTOLIC BLOOD PRESSURE: 84 MMHG | RESPIRATION RATE: 20 BRPM | HEART RATE: 112 BPM | WEIGHT: 212.3 LBS | SYSTOLIC BLOOD PRESSURE: 107 MMHG | TEMPERATURE: 97.4 F

## 2025-04-25 LAB
ANION GAP SERPL CALCULATED.3IONS-SCNC: 12 MMOL/L (ref 5–15)
BASOPHILS # BLD AUTO: 0.05 10*3/MM3 (ref 0–0.2)
BASOPHILS NFR BLD AUTO: 0.3 % (ref 0–1.5)
BUN SERPL-MCNC: 21 MG/DL (ref 6–20)
BUN/CREAT SERPL: 15.1 (ref 7–25)
CALCIUM SPEC-SCNC: 9.7 MG/DL (ref 8.6–10.5)
CHLORIDE SERPL-SCNC: 101 MMOL/L (ref 98–107)
CO2 SERPL-SCNC: 25 MMOL/L (ref 22–29)
CREAT SERPL-MCNC: 1.39 MG/DL (ref 0.57–1)
DEPRECATED RDW RBC AUTO: 46 FL (ref 37–54)
EGFRCR SERPLBLD CKD-EPI 2021: 45.2 ML/MIN/1.73
EOSINOPHIL # BLD AUTO: 0.52 10*3/MM3 (ref 0–0.4)
EOSINOPHIL NFR BLD AUTO: 3.3 % (ref 0.3–6.2)
ERYTHROCYTE [DISTWIDTH] IN BLOOD BY AUTOMATED COUNT: 14.9 % (ref 12.3–15.4)
GLUCOSE BLDC GLUCOMTR-MCNC: 132 MG/DL (ref 70–130)
GLUCOSE BLDC GLUCOMTR-MCNC: 97 MG/DL (ref 70–130)
GLUCOSE SERPL-MCNC: 87 MG/DL (ref 65–99)
HCT VFR BLD AUTO: 35.9 % (ref 34–46.6)
HGB BLD-MCNC: 12.1 G/DL (ref 12–15.9)
IMM GRANULOCYTES # BLD AUTO: 0.08 10*3/MM3 (ref 0–0.05)
IMM GRANULOCYTES NFR BLD AUTO: 0.5 % (ref 0–0.5)
LYMPHOCYTES # BLD AUTO: 4.51 10*3/MM3 (ref 0.7–3.1)
LYMPHOCYTES NFR BLD AUTO: 28.7 % (ref 19.6–45.3)
MCH RBC QN AUTO: 29.5 PG (ref 26.6–33)
MCHC RBC AUTO-ENTMCNC: 33.7 G/DL (ref 31.5–35.7)
MCV RBC AUTO: 87.6 FL (ref 79–97)
MONOCYTES # BLD AUTO: 0.71 10*3/MM3 (ref 0.1–0.9)
MONOCYTES NFR BLD AUTO: 4.5 % (ref 5–12)
NEUTROPHILS NFR BLD AUTO: 62.7 % (ref 42.7–76)
NEUTROPHILS NFR BLD AUTO: 9.86 10*3/MM3 (ref 1.7–7)
NRBC BLD AUTO-RTO: 0 /100 WBC (ref 0–0.2)
PLATELET # BLD AUTO: 284 10*3/MM3 (ref 140–450)
PMV BLD AUTO: 10.3 FL (ref 6–12)
POTASSIUM SERPL-SCNC: 3.9 MMOL/L (ref 3.5–5.2)
RBC # BLD AUTO: 4.1 10*6/MM3 (ref 3.77–5.28)
SODIUM SERPL-SCNC: 138 MMOL/L (ref 136–145)
WBC NRBC COR # BLD AUTO: 15.73 10*3/MM3 (ref 3.4–10.8)

## 2025-04-25 PROCEDURE — 99239 HOSP IP/OBS DSCHRG MGMT >30: CPT | Performed by: NURSE PRACTITIONER

## 2025-04-25 PROCEDURE — 63710000001 PROMETHAZINE PER 12.5 MG: Performed by: NURSE PRACTITIONER

## 2025-04-25 PROCEDURE — 82948 REAGENT STRIP/BLOOD GLUCOSE: CPT

## 2025-04-25 PROCEDURE — 71045 X-RAY EXAM CHEST 1 VIEW: CPT

## 2025-04-25 PROCEDURE — 85025 COMPLETE CBC W/AUTO DIFF WBC: CPT | Performed by: NURSE PRACTITIONER

## 2025-04-25 PROCEDURE — 63710000001 INSULIN GLARGINE PER 5 UNITS: Performed by: FAMILY MEDICINE

## 2025-04-25 PROCEDURE — 25010000002 HEPARIN (PORCINE) PER 1000 UNITS: Performed by: FAMILY MEDICINE

## 2025-04-25 PROCEDURE — 80048 BASIC METABOLIC PNL TOTAL CA: CPT | Performed by: NURSE PRACTITIONER

## 2025-04-25 PROCEDURE — 63710000001 INSULIN LISPRO (HUMAN) PER 5 UNITS: Performed by: NURSE PRACTITIONER

## 2025-04-25 RX ORDER — POLYETHYLENE GLYCOL 3350 17 G/17G
17 POWDER, FOR SOLUTION ORAL DAILY PRN
Start: 2025-04-25

## 2025-04-25 RX ORDER — SACCHAROMYCES BOULARDII 250 MG
250 CAPSULE ORAL 2 TIMES DAILY
Start: 2025-04-25

## 2025-04-25 RX ORDER — HYDROXYZINE PAMOATE 25 MG/1
25 CAPSULE ORAL 3 TIMES DAILY PRN
Start: 2025-04-25

## 2025-04-25 RX ORDER — INSULIN LISPRO 100 [IU]/ML
6 INJECTION, SOLUTION INTRAVENOUS; SUBCUTANEOUS
Start: 2025-04-25

## 2025-04-25 RX ORDER — IPRATROPIUM BROMIDE AND ALBUTEROL SULFATE 2.5; .5 MG/3ML; MG/3ML
3 SOLUTION RESPIRATORY (INHALATION) 4 TIMES DAILY PRN
Start: 2025-04-25

## 2025-04-25 RX ORDER — INSULIN LISPRO 100 [IU]/ML
2-9 INJECTION, SOLUTION INTRAVENOUS; SUBCUTANEOUS
Status: DISCONTINUED | OUTPATIENT
Start: 2025-04-25 | End: 2025-04-25 | Stop reason: HOSPADM

## 2025-04-25 RX ORDER — AMOXICILLIN 250 MG
2 CAPSULE ORAL 2 TIMES DAILY
Start: 2025-04-25

## 2025-04-25 RX ORDER — INSULIN LISPRO 100 [IU]/ML
6 INJECTION, SOLUTION INTRAVENOUS; SUBCUTANEOUS
Status: DISCONTINUED | OUTPATIENT
Start: 2025-04-25 | End: 2025-04-25 | Stop reason: HOSPADM

## 2025-04-25 RX ORDER — ACETAMINOPHEN 325 MG/1
650 TABLET ORAL EVERY 4 HOURS PRN
Start: 2025-04-25

## 2025-04-25 RX ORDER — FAMOTIDINE 20 MG/1
20 TABLET, FILM COATED ORAL NIGHTLY
Start: 2025-04-25

## 2025-04-25 RX ORDER — INSULIN LISPRO 100 [IU]/ML
2-9 INJECTION, SOLUTION INTRAVENOUS; SUBCUTANEOUS
Start: 2025-04-25

## 2025-04-25 RX ORDER — PANTOPRAZOLE SODIUM 40 MG/1
40 TABLET, DELAYED RELEASE ORAL DAILY
Start: 2025-04-26 | End: 2026-03-18

## 2025-04-25 RX ADMIN — ATORVASTATIN CALCIUM 40 MG: 40 TABLET, FILM COATED ORAL at 08:22

## 2025-04-25 RX ADMIN — BUSPIRONE HYDROCHLORIDE 15 MG: 10 TABLET ORAL at 08:22

## 2025-04-25 RX ADMIN — PHENAZOPYRIDINE 100 MG: 100 TABLET ORAL at 08:24

## 2025-04-25 RX ADMIN — HEPARIN SODIUM 5000 UNITS: 5000 INJECTION INTRAVENOUS; SUBCUTANEOUS at 06:11

## 2025-04-25 RX ADMIN — CITALOPRAM HYDROBROMIDE 40 MG: 40 TABLET ORAL at 08:22

## 2025-04-25 RX ADMIN — RISPERIDONE 3 MG: 1 TABLET, FILM COATED ORAL at 08:37

## 2025-04-25 RX ADMIN — PROMETHAZINE HYDROCHLORIDE 12.5 MG: 12.5 TABLET ORAL at 08:37

## 2025-04-25 RX ADMIN — INSULIN LISPRO 3 UNITS: 100 INJECTION, SOLUTION INTRAVENOUS; SUBCUTANEOUS at 08:30

## 2025-04-25 RX ADMIN — PHENAZOPYRIDINE 100 MG: 100 TABLET ORAL at 12:44

## 2025-04-25 RX ADMIN — INSULIN LISPRO 3 UNITS: 100 INJECTION, SOLUTION INTRAVENOUS; SUBCUTANEOUS at 12:44

## 2025-04-25 RX ADMIN — Medication 250 MG: at 08:22

## 2025-04-25 RX ADMIN — SENNOSIDES AND DOCUSATE SODIUM 2 TABLET: 50; 8.6 TABLET ORAL at 08:22

## 2025-04-25 RX ADMIN — PANTOPRAZOLE SODIUM 40 MG: 40 TABLET, DELAYED RELEASE ORAL at 08:24

## 2025-04-25 RX ADMIN — LEVOTHYROXINE SODIUM 225 MCG: 0.05 TABLET ORAL at 06:12

## 2025-04-25 RX ADMIN — INSULIN GLARGINE 15 UNITS: 100 INJECTION, SOLUTION SUBCUTANEOUS at 08:24

## 2025-04-25 NOTE — PAYOR COMM NOTE
"Herminia Roche RN  [facility]  Utilization Management  P:[phone]  F:[phone]    Auth # [id number]     Gabriela Wootenbeth Braden (54 y.o. Female)       Date of Birth   1970    Social Security Number       Address   [address] [facility] [address]    Home Phone   [phone]    MRN   [id number]       Nondenominational   Caodaism    Marital Status   Single                            Admission Date   4/10/2025    Admission Type   Emergency    Admitting Provider   Guerrero Archer MD    Attending Provider   Guerrero Archer MD    Department, Room/Bed   [facility] 5H, S579/1       Discharge Date       Discharge Disposition   Skilled Nursing Facility (DC - External)    Discharge Destination                                 Attending Provider: Guerrero Archer MD    Allergies: Doxepin, Januvia [Sitagliptin]    Isolation: None   Infection: None   Code Status: CPR    Ht: 167.6 cm (65.98\")   Wt: 96.3 kg (212 lb 4.9 oz)    Admission Cmt: None   Principal Problem: Acute kidney injury superimposed on chronic kidney disease [N17.9,N18.9]                   Active Insurance as of 4/10/2025       Primary Coverage       Payor Plan Insurance Group Employer/Plan Group    [facility] MEDICARE REPLACEMENT [employer] MEDICARE ADVANTAGE PPO        Payor Plan Address Payor Plan Phone Number Payor Plan Fax Number Effective Dates    [address] [address]   3/1/2025 - None Entered    [facility] [address]         Subscriber Name Subscriber Birth Date Member ID       VILMA WOOTEN BRADEN 1970 [id number]               Secondary Coverage       Payor Plan Insurance Group Employer/Plan Group    [place]        Payor Plan Address Payor Plan Phone Number Payor Plan Fax Number Effective Dates    [address] [phone]  2/27/2025 - None Entered    [facility] [address]         Subscriber Name Subscriber Birth Date Member ID       WOOTENVILMA BRADEN 1970 [id number]               "       Emergency Contacts        (Rel.) Home Phone Work Phone Mobile Phone    FRANCIE LOYOLA (Sister) [phone] -- [phone]              Current Facility-Administered Medications   Medication Dose Route Frequency Provider Last Rate Last Admin    acetaminophen (TYLENOL) tablet 650 mg  650 mg Oral Q4H PRN Hank Garcia PA-C   650 mg at 04/19/25 2326    Or    acetaminophen (TYLENOL) 160 MG/5ML oral solution 650 mg  650 mg Oral Q4H PRN Hank Garcia PA-C        Or    acetaminophen (TYLENOL) suppository 650 mg  650 mg Rectal Q4H PRN Hank Garcia PA-C        atorvastatin (LIPITOR) tablet 40 mg  40 mg Oral Daily Hank Garcia PA-C   40 mg at 04/25/25 0822    sennosides-docusate (PERICOLACE) 8.6-50 MG per tablet 2 tablet  2 tablet Oral BID Ventura, Wen M II, DO   2 tablet at 04/25/25 0822    And    polyethylene glycol (MIRALAX) packet 17 g  17 g Oral Daily PRN Ventura, Wen M II, DO   17 g at 04/17/25 2050    And    bisacodyl (DULCOLAX) EC tablet 5 mg  5 mg Oral Daily PRN Ventura, Wen M II, DO        And    bisacodyl (DULCOLAX) suppository 10 mg  10 mg Rectal Daily PRN Ventura, Wen M II, DO   10 mg at 04/18/25 1203    busPIRone (BUSPAR) tablet 15 mg  15 mg Oral Q12H Adore Ramírez APRN   15 mg at 04/25/25 0822    citalopram (CeleXA) tablet 40 mg  40 mg Oral Daily Adore Ramírez, APRN   40 mg at 04/25/25 0822    dextrose (D50W) (25 g/50 mL) IV injection 25 g  25 g Intravenous Q15 Min PRN Hank Garcia PA-C        dextrose (GLUTOSE) oral gel 15 g  15 g Oral Q15 Min PRN Hank Garcia PA-C        famotidine (PEPCID) tablet 20 mg  20 mg Oral Nightly Hank Garcia PA-C   20 mg at 04/24/25 2119    glucagon (GLUCAGEN) injection 1 mg  1 mg Intramuscular Q15 Min PRN Hank Garcia PA-C        heparin (porcine) 5000 UNIT/ML injection 5,000 Units  5,000 Units Subcutaneous Q8H JACI Cruz DO   5,000 Units at  04/25/25 0611    insulin glargine (LANTUS, SEMGLEE) injection 15 Units  15 Units Subcutaneous Daily JACI Cruz,    15 Units at 04/25/25 0824    insulin glargine (LANTUS, SEMGLEE) injection 20 Units  20 Units Subcutaneous Nightly Chanda Tucker, ZIA        Insulin Lispro (humaLOG) injection 2-9 Units  2-9 Units Subcutaneous 4x Daily AC & at Bedtime Chanda Tucker APRN        Insulin Lispro (humaLOG) injection 6 Units  6 Units Subcutaneous TID With Meals Chanda Tucker APRN        ipratropium-albuterol (DUO-NEB) nebulizer solution 3 mL  3 mL Nebulization 4x Daily PRN Guerrero Archer MD        levothyroxine (SYNTHROID, LEVOTHROID) tablet 225 mcg  225 mcg Oral Q AM Hank Garcia PA-C   225 mcg at 04/25/25 0612    melatonin tablet 5 mg  5 mg Oral Nightly PRN Hank Garcia PA-C   5 mg at 04/23/25 2033    pantoprazole (PROTONIX) EC tablet 40 mg  40 mg Oral Daily Adore Ramírez APRN   40 mg at 04/25/25 0824    promethazine (PHENERGAN) tablet 12.5 mg  12.5 mg Oral Q6H PRN Adore Ramírez APRN   12.5 mg at 04/25/25 0837    risperiDONE (risperDAL) tablet 3 mg  3 mg Oral BID Hank Garcia PA-C   3 mg at 04/25/25 0837    saccharomyces boulardii (FLORASTOR) capsule 250 mg  250 mg Oral BID JACI Cruz, DO   250 mg at 04/25/25 0822    Sodium Chloride (PF) 0.9 % 10 mL  10 mL Intravenous PRN Adore Ramírez APRN   10 mL at 04/19/25 1002     Lab Results (last 72 hours)       Procedure Component Value Units Date/Time    POC Glucose Once [[id number]]  (Abnormal) Collected: 04/25/25 1130    Specimen: Blood Updated: 04/25/25 1136     Glucose 132 mg/dL     POC Glucose Once [[id number]]  (Normal) Collected: 04/25/25 0715    Specimen: Blood Updated: 04/25/25 0716     Glucose 97 mg/dL     Basic Metabolic Panel [[id number]]  (Abnormal) Collected: 04/25/25 0428    Specimen: Blood Updated: 04/25/25 0515     Glucose 87 mg/dL      BUN 21 mg/dL       Creatinine 1.39 mg/dL      Sodium 138 mmol/L      Potassium 3.9 mmol/L      Chloride 101 mmol/L      CO2 25.0 mmol/L      Calcium 9.7 mg/dL      BUN/Creatinine Ratio 15.1     Anion Gap 12.0 mmol/L      eGFR 45.2 mL/min/1.73     Narrative:      GFR Categories in Chronic Kidney Disease (CKD)      GFR Category          GFR (mL/min/1.73)    Interpretation  G1                     90 or greater         Normal or high (1)  G2                      60-89                Mild decrease (1)  G3a                   45-59                Mild to moderate decrease  G3b                   30-44                Moderate to severe decrease  G4                    15-29                Severe decrease  G5                    14 or less           Kidney failure          (1)In the absence of evidence of kidney disease, neither GFR category G1 or G2 fulfill the criteria for CKD.    eGFR calculation 2021 CKD-EPI creatinine equation, which does not include race as a factor    CBC & Differential [[id number]]  (Abnormal) Collected: 04/25/25 0428    Specimen: Blood Updated: 04/25/25 0453    Narrative:      The following orders were created for panel order CBC & Differential.  Procedure                               Abnormality         Status                     ---------                               -----------         ------                     CBC Auto Differential[[id number]]        Abnormal            Final result                 Please view results for these tests on the individual orders.    CBC Auto Differential [[id number]]  (Abnormal) Collected: 04/25/25 0428    Specimen: Blood Updated: 04/25/25 0453     WBC 15.73 10*3/mm3      RBC 4.10 10*6/mm3      Hemoglobin 12.1 g/dL      Hematocrit 35.9 %      MCV 87.6 fL      MCH 29.5 pg      MCHC 33.7 g/dL      RDW 14.9 %      RDW-SD 46.0 fl      MPV 10.3 fL      Platelets 284 10*3/mm3      Neutrophil % 62.7 %      Lymphocyte % 28.7 %      Monocyte % 4.5 %      Eosinophil % 3.3 %      Basophil % 0.3 %   "    Immature Grans % 0.5 %      Neutrophils, Absolute 9.86 10*3/mm3      Lymphocytes, Absolute 4.51 10*3/mm3      Monocytes, Absolute 0.71 10*3/mm3      Eosinophils, Absolute 0.52 10*3/mm3      Basophils, Absolute 0.05 10*3/mm3      Immature Grans, Absolute 0.08 10*3/mm3      nRBC 0.0 /100 WBC     POC Glucose Once [[id number]]  (Abnormal) Collected: 04/24/25 2104    Specimen: Blood Updated: 04/24/25 2106     Glucose 379 mg/dL     Procalcitonin [[id number]]  (Normal) Collected: 04/24/25 0401    Specimen: Blood Updated: 04/24/25 1815     Procalcitonin 0.12 ng/mL     Narrative:      As a Marker for Sepsis (Non-Neonates):    1. <0.5 ng/mL represents a low risk of severe sepsis and/or septic shock.  2. >2 ng/mL represents a high risk of severe sepsis and/or septic shock.    As a Marker for Lower Respiratory Tract Infections that require antibiotic therapy:    PCT on Admission    Antibiotic Therapy       6-12 Hrs later    >0.5                Strongly Recommended  >0.25 - <0.5        Recommended   0.1 - 0.25          Discouraged              Remeasure/reassess PCT  <0.1                Strongly Discouraged     Remeasure/reassess PCT    As 28 day mortality risk marker: \"Change in Procalcitonin Result\" (>80% or <=80%) if Day 0 (or Day 1) and Day 4 values are available. Refer to http://www.[facility]-pct-calculator.com    Change in PCT <=80%  A decrease of PCT levels below or equal to 80% defines a positive change in PCT test result representing a higher risk for 28-day all-cause mortality of patients diagnosed with severe sepsis for septic shock.    Change in PCT >80%  A decrease of PCT levels of more than 80% defines a negative change in PCT result representing a lower risk for 28-day all-cause mortality of patients diagnosed with severe sepsis or septic shock.       POC Glucose Once [[id number]]  (Abnormal) Collected: 04/24/25 1649    Specimen: Blood Updated: 04/24/25 1650     Glucose 266 mg/dL     POC Glucose Once [[id number]]  " (Abnormal) Collected: 04/24/25 1213    Specimen: Blood Updated: 04/24/25 1216     Glucose 188 mg/dL     POC Glucose Once [[id number]]  (Abnormal) Collected: 04/24/25 0735    Specimen: Blood Updated: 04/24/25 0737     Glucose 153 mg/dL     Basic Metabolic Panel [[id number]]  (Abnormal) Collected: 04/24/25 0401    Specimen: Blood Updated: 04/24/25 0449     Glucose 171 mg/dL      BUN 18 mg/dL      Creatinine 1.50 mg/dL      Sodium 136 mmol/L      Potassium 4.3 mmol/L      Chloride 101 mmol/L      CO2 22.0 mmol/L      Calcium 9.3 mg/dL      BUN/Creatinine Ratio 12.0     Anion Gap 13.0 mmol/L      eGFR 41.2 mL/min/1.73     Narrative:      GFR Categories in Chronic Kidney Disease (CKD)      GFR Category          GFR (mL/min/1.73)    Interpretation  G1                     90 or greater         Normal or high (1)  G2                      60-89                Mild decrease (1)  G3a                   45-59                Mild to moderate decrease  G3b                   30-44                Moderate to severe decrease  G4                    15-29                Severe decrease  G5                    14 or less           Kidney failure          (1)In the absence of evidence of kidney disease, neither GFR category G1 or G2 fulfill the criteria for CKD.    eGFR calculation 2021 CKD-EPI creatinine equation, which does not include race as a factor    CBC & Differential [[id number]]  (Abnormal) Collected: 04/24/25 0401    Specimen: Blood Updated: 04/24/25 0419    Narrative:      The following orders were created for panel order CBC & Differential.  Procedure                               Abnormality         Status                     ---------                               -----------         ------                     CBC Auto Differential[[id number]]        Abnormal            Final result                 Please view results for these tests on the individual orders.    CBC Auto Differential [[id number]]  (Abnormal) Collected: 04/24/25  0401    Specimen: Blood Updated: 04/24/25 0419     WBC 17.18 10*3/mm3      RBC 3.98 10*6/mm3      Hemoglobin 11.9 g/dL      Hematocrit 35.7 %      MCV 89.7 fL      MCH 29.9 pg      MCHC 33.3 g/dL      RDW 14.8 %      RDW-SD 47.7 fl      MPV 10.5 fL      Platelets 272 10*3/mm3      Neutrophil % 66.2 %      Lymphocyte % 24.6 %      Monocyte % 4.9 %      Eosinophil % 3.4 %      Basophil % 0.3 %      Immature Grans % 0.6 %      Neutrophils, Absolute 11.35 10*3/mm3      Lymphocytes, Absolute 4.22 10*3/mm3      Monocytes, Absolute 0.85 10*3/mm3      Eosinophils, Absolute 0.59 10*3/mm3      Basophils, Absolute 0.06 10*3/mm3      Immature Grans, Absolute 0.11 10*3/mm3      nRBC 0.0 /100 WBC     POC Glucose Once [[id number]]  (Abnormal) Collected: 04/23/25 2021    Specimen: Blood Updated: 04/23/25 2023     Glucose 304 mg/dL     Urinalysis With Culture If Indicated - Urine, Clean Catch [[id number]]  (Abnormal) Collected: 04/23/25 1652    Specimen: Urine, Clean Catch Updated: 04/23/25 1716     Color, UA Yellow     Appearance, UA Clear     pH, UA 5.5     Specific Gravity, UA 1.011     Glucose,  mg/dL (Trace)     Ketones, UA Negative     Bilirubin, UA Negative     Blood, UA Negative     Protein, UA Negative     Leuk Esterase, UA Trace     Nitrite, UA Negative     Urobilinogen, UA 0.2 E.U./dL    Narrative:      In absence of clinical symptoms, the presence of pyuria, bacteria, and/or nitrites on the urinalysis result does not correlate with infection.    Urinalysis, Microscopic Only - Urine, Clean Catch [[id number]]  (Abnormal) Collected: 04/23/25 1652    Specimen: Urine, Clean Catch Updated: 04/23/25 1716     RBC, UA 0-2 /HPF      WBC, UA 3-5 /HPF      Comment: Urine culture not indicated.        Bacteria, UA None Seen /HPF      Squamous Epithelial Cells, UA 0-2 /HPF      Hyaline Casts, UA None Seen /LPF      Methodology Automated Microscopy    POC Glucose Once [[id number]]  (Abnormal) Collected: 04/23/25 1702    Specimen:  "Blood Updated: 04/23/25 1703     Glucose 206 mg/dL     POC Glucose Once [[id number]]  (Abnormal) Collected: 04/23/25 1149    Specimen: Blood Updated: 04/23/25 1151     Glucose 202 mg/dL     Procalcitonin [[id number]]  (Normal) Collected: 04/23/25 0513    Specimen: Blood Updated: 04/23/25 1032     Procalcitonin 0.12 ng/mL     Narrative:      As a Marker for Sepsis (Non-Neonates):    1. <0.5 ng/mL represents a low risk of severe sepsis and/or septic shock.  2. >2 ng/mL represents a high risk of severe sepsis and/or septic shock.    As a Marker for Lower Respiratory Tract Infections that require antibiotic therapy:    PCT on Admission    Antibiotic Therapy       6-12 Hrs later    >0.5                Strongly Recommended  >0.25 - <0.5        Recommended   0.1 - 0.25          Discouraged              Remeasure/reassess PCT  <0.1                Strongly Discouraged     Remeasure/reassess PCT    As 28 day mortality risk marker: \"Change in Procalcitonin Result\" (>80% or <=80%) if Day 0 (or Day 1) and Day 4 values are available. Refer to http://www.[facility]-pct-calculator.com    Change in PCT <=80%  A decrease of PCT levels below or equal to 80% defines a positive change in PCT test result representing a higher risk for 28-day all-cause mortality of patients diagnosed with severe sepsis for septic shock.    Change in PCT >80%  A decrease of PCT levels of more than 80% defines a negative change in PCT result representing a lower risk for 28-day all-cause mortality of patients diagnosed with severe sepsis or septic shock.       C-reactive Protein [[id number]]  (Abnormal) Collected: 04/23/25 0513    Specimen: Blood Updated: 04/23/25 1024     C-Reactive Protein 0.60 mg/dL     POC Glucose Once [[id number]]  (Abnormal) Collected: 04/23/25 0803    Specimen: Blood Updated: 04/23/25 0805     Glucose 238 mg/dL     Basic Metabolic Panel [[id number]]  (Abnormal) Collected: 04/23/25 0513    Specimen: Blood Updated: 04/23/25 0609     Glucose 138 " mg/dL      BUN 17 mg/dL      Creatinine 1.32 mg/dL      Sodium 137 mmol/L      Potassium 4.5 mmol/L      Chloride 100 mmol/L      CO2 23.0 mmol/L      Calcium 9.4 mg/dL      BUN/Creatinine Ratio 12.9     Anion Gap 14.0 mmol/L      eGFR 48.1 mL/min/1.73     Narrative:      GFR Categories in Chronic Kidney Disease (CKD)      GFR Category          GFR (mL/min/1.73)    Interpretation  G1                     90 or greater         Normal or high (1)  G2                      60-89                Mild decrease (1)  G3a                   45-59                Mild to moderate decrease  G3b                   30-44                Moderate to severe decrease  G4                    15-29                Severe decrease  G5                    14 or less           Kidney failure          (1)In the absence of evidence of kidney disease, neither GFR category G1 or G2 fulfill the criteria for CKD.    eGFR calculation 2021 CKD-EPI creatinine equation, which does not include race as a factor    CBC (No Diff) [[id number]]  (Abnormal) Collected: 04/23/25 0513    Specimen: Blood Updated: 04/23/25 0550     WBC 15.67 10*3/mm3      RBC 4.17 10*6/mm3      Hemoglobin 12.5 g/dL      Hematocrit 37.0 %      MCV 88.7 fL      MCH 30.0 pg      MCHC 33.8 g/dL      RDW 14.6 %      RDW-SD 47.0 fl      MPV 10.7 fL      Platelets 278 10*3/mm3     POC Glucose Once [[id number]]  (Abnormal) Collected: 04/22/25 1957    Specimen: Blood Updated: 04/22/25 1959     Glucose 279 mg/dL     POC Glucose Once [[id number]]  (Abnormal) Collected: 04/22/25 1701    Specimen: Blood Updated: 04/22/25 1702     Glucose 240 mg/dL           Imaging Results (Last 72 Hours)       Procedure Component Value Units Date/Time    XR Chest 1 View [[id number]] Collected: 04/25/25 0736     Updated: 04/25/25 0740    Narrative:      XR CHEST 1 VW    Date of Exam: 4/25/2025 4:15 AM EDT    Indication: leukocytosis    Comparison: None available.    Findings:  Low lung volumes resulting in bibasilar  atelectasis. No asymmetric suspicious consolidation. Heart size and pulmonary vasculature appear within normal limits      Impression:      Impression:  Hypoinspiratory film demonstrating bibasilar atelectasis. No pneumonia visualized      Electronically Signed: Lincoln Wilson    2025 7:37 AM EDT    Workstation ID: [id number]    FL Video Swallow With Speech Single Contrast [[id number]] Collected: 25 1055     Updated: 25 173    Narrative:      FL VIDEO SWALLOW W SPEECH SINGLE-CONTRAST    Date of Exam: 2025 10:17 AM EDT    Indication: dysphagia.       Comparison: None available.    Technique:   The speech pathologist administered food and/or liquid mixed with barium to the patient with cine/video imaging.  Imaging assistance was provided to the speech pathologist and an image was saved.    Fluoroscopic Time: 54 seconds    Number of Images: 7 associated fluoroscopic loops were saved    Findings:  Aspiration was seen during fluoroscopic guided modified barium swallowing series. Please see speech therapy report for full details and recommendations.      Impression:      Impression:  Fluoroscopy provided for modified barium swallow. Aspiration was seen during swallowing evaluation. Please see speech therapy report for full details and recommendations.      Report dictated by: Ermelinda Queen PA-c      I have personally reviewed this case and agree with the findings above:    Electronically Signed: Guerrero Rodriguez MD    2025 5:27 PM EDT    Workstation ID: [id number]          Operative/Procedure Notes (last 72 hours)  Notes from 25 1404 through 25 1404   No notes of this type exist for this encounter.          Physician Progress Notes (last 72 hours)        Karlie Barry, ZIA at 25 1620              [facility]  PROGRESS NOTE    Patient Name: Vilma Ayala  : 1970  MRN: [id number]    Date of Admission: 4/10/2025  Primary Care  Physician: Vanessa Kaur MD    Subjective   Subjective     CC:  F/u nausea    HPI:   Resting in chair, continues to doze off to sleep. No pain or new needs. No visitors.       Objective   Objective     Vital Signs:   Temp:  [98 °F (36.7 °C)-98.4 °F (36.9 °C)] 98.4 °F (36.9 °C)  Heart Rate:  [93] 93  Resp:  [18-20] 20  BP: (102-117)/(80-98) 113/90     Physical Exam:   Constitutional: No acute distress, sleeping   HENT: NCAT, mucous membranes moist  Respiratory: Diminished bases, respiratory effort normal, room air  Cardiovascular: RRR, no murmurs, rubs, or gallops  Gastrointestinal: Positive bowel sounds, soft, nontender, nondistended  Musculoskeletal: No bilateral ankle edema  Psychiatric: Appropriate affect, cooperative  Neurologic: Oriented x 3, moves all extremities, speech clear  Skin: No rashes      Results Reviewed:  LAB RESULTS:      Lab 04/24/25  0401 04/23/25  0513 04/19/25  1536   WBC 17.18* 15.67* 13.10*   HEMOGLOBIN 11.9* 12.5 11.6*   HEMATOCRIT 35.7 37.0 34.4   PLATELETS 272 278 239   NEUTROS ABS 11.35*  --   --    IMMATURE GRANS (ABS) 0.11*  --   --    LYMPHS ABS 4.22*  --   --    MONOS ABS 0.85  --   --    EOS ABS 0.59*  --   --    MCV 89.7 88.7 87.3   CRP  --  0.60*  --    PROCALCITONIN  --  0.12  --          Lab 04/24/25  0401 04/23/25  0513 04/22/25  0518 04/21/25  0428 04/19/25  1536   SODIUM 136 137 139 136 136   POTASSIUM 4.3 4.5 4.3 4.1 4.1   CHLORIDE 101 100 102 101 101   CO2 22.0 23.0 24.0 23.0 25.0   ANION GAP 13.0 14.0 13.0 12.0 10.0   BUN 18 17 14 16 15   CREATININE 1.50* 1.32* 1.41* 1.25* 1.45*   EGFR 41.2* 48.1* 44.4* 51.3* 43.0*   GLUCOSE 171* 138* 118* 163* 232*   CALCIUM 9.3 9.4 9.5 9.3 8.8         Lab 04/22/25  0518   TOTAL PROTEIN 6.4   ALBUMIN 3.7   GLOBULIN 2.7   ALT (SGPT) 37*   AST (SGOT) 33*   BILIRUBIN 0.5   ALK PHOS 108                     Brief Urine Lab Results  (Last result in the past 365 days)        Color   Clarity   Blood   Leuk Est   Nitrite   Protein   CREAT    Urine HCG        04/23/25 1652 Yellow   Clear   Negative   Trace   Negative   Negative                   Microbiology Results Abnormal       None            No radiology results from the last 24 hrs      Results for orders placed during the hospital encounter of 04/10/25    Adult Transthoracic Echo Complete W/ Cont if Necessary Per Protocol    Interpretation Summary    Left ventricular systolic function is normal. Estimated left ventricular EF = 60%    No significant valvular abnormalities.      Current medications:  Scheduled Meds:atorvastatin, 40 mg, Oral, Daily  busPIRone, 15 mg, Oral, Q12H  citalopram, 40 mg, Oral, Daily  famotidine, 20 mg, Oral, Nightly  heparin (porcine), 5,000 Units, Subcutaneous, Q8H  insulin glargine, 15 Units, Subcutaneous, Daily  insulin glargine, 25 Units, Subcutaneous, Nightly  Insulin Lispro, 3 Units, Subcutaneous, TID With Meals  insulin lispro, 3-14 Units, Subcutaneous, 4x Daily AC & at Bedtime  levothyroxine, 225 mcg, Oral, Q AM  pantoprazole, 40 mg, Oral, Daily  phenazopyridine, 100 mg, Oral, TID With Meals  risperiDONE, 3 mg, Oral, BID  saccharomyces boulardii, 250 mg, Oral, BID  senna-docusate sodium, 2 tablet, Oral, BID      Continuous Infusions:   PRN Meds:.  acetaminophen **OR** acetaminophen **OR** acetaminophen    senna-docusate sodium **AND** polyethylene glycol **AND** bisacodyl **AND** bisacodyl    dextrose    dextrose    glucagon (human recombinant)    ipratropium-albuterol    melatonin    promethazine    Sodium Chloride (PF)    Assessment & Plan   Assessment & Plan     Active Hospital Problems    Diagnosis  POA    **Acute kidney injury superimposed on chronic kidney disease [N17.9, N18.9]  Yes    Acute kidney injury superimposed on CKD [N17.9, N18.9]  Yes    Diarrhea [R19.7]  Yes    Dehydration [E86.0]  Yes    Essential tremor [G25.0]  Yes    Type 2 diabetes mellitus without complication, with long-term current use of insulin [E11.9, Z79.4]  Not Applicable    Mixed  hyperlipidemia [E78.2]  Yes    Other specified hypothyroidism [E03.8]  Yes    Anxiety and depression [F41.9, F32.A]  Yes      Resolved Hospital Problems   No resolved problems to display.        Brief Hospital Course to date:  Vilma Ayala is a 54 y.o. female  with a history of Essential Tremor, T2DM, hypothyroidism, anxiety/depression, and GERD who presented to [facility] ED for complaint of nausea and diarrhea.  On the afternoon of 4/12, pt had some increased dyspnea and congestion.  Chest x-ray could not rule out possible pneumonia; however Pro-Jim was negative, WBCs wnl, and symptoms felt more pulmonary congestion, so IV fluids stopped and patient was given IV Bumex.  CT chest 4/13 showed bilateral lower lobe atelectasis. Echo on 4/14 with normal EF.  Patient has since been weaned to room air.      This patient's problems and plans were partially entered by my partner and updated as appropriate by me 04/24/25.       CB on CKD - resolved  Dehydration - resolved  Nausea/Vomiting - resolved  -Cr 2.07 on admission, CB possibly 2/2 to recent Bactrim use as well as dehydration from poor po intake. Baseline appears to be ~ 1.3-1.4 per chart review.  -IVF held with dyspnea, new oxygen requirement, now weaned back to RA, chest CT with just atelectasis, plans for IS/FD, encourarged PO intake.   -CT abd/pelvis negative for acute findings  -PT/OT following  -continue prn antiemetics  -Stop cholestyramine, not having any more diarrhea, continue probiotic  -Continue bowel regimen  -AM BMP     Dyspnea  - possibly related to recent fluids, IVF dc'd, improving   - Duonebs, IS/FD as well   - CT Chest with just atelectasis, encouraged use of IS/FD  - Echo 4/14 with EF 60%, normal LVSF, no significant valvular abnormalities, troponins 26>>17, seems noncardiac.   - SLP evaluated, moderate dysphagia, MBS with signs of aspiration, diet modifications in place  - WBCs increased; still afebrile, intermittently tachycardic;  however, procal negative, CRP slightly elevated 0.6  - Pt satting 98% on RA  - AM CBC w/ diff to monitor WBCs  --CXR in am as well      Dysuria  - Pt reports burning with urination, low back pain   - UA benign  - Pyridium TID prn x 2 days      HLD  -Continue statin     T2DM  -A1C 6.8   -Fingerstick ACHS  -Continue Lantus BID, mealtime insulin, SSI     Anxiety/depression  Essential tremor  -Resume home Celexa, Buspar  -Continue Respiradone, stable     Expected Discharge Location and Transportation: SNF rehab  Expected Discharge pending bed offer, insurance approval  Expected Discharge Date: 2025; Expected Discharge Time:      VTE Prophylaxis:  Pharmacologic & mechanical VTE prophylaxis orders are present.         AM-PAC 6 Clicks Score (PT): 19 (25 1619)    CODE STATUS:   Code Status and Medical Interventions: CPR (Attempt to Resuscitate); Full Support   Ordered at: 04/10/25 2013     Code Status (Patient has no pulse and is not breathing):    CPR (Attempt to Resuscitate)     Medical Interventions (Patient has pulse or is breathing):    Full Support       ZIA Crandall  25        Electronically signed by Karlie Barry APRN at 25 1633       Adore Ramírez APRN at 25 0985              [facility]  PROGRESS NOTE    Patient Name: iVlma Ayala  : 1970  MRN: [id number]    Date of Admission: 4/10/2025  Primary Care Physician: Vanessa Kaur MD    Subjective   Subjective     CC:  F/u nausea    HPI:   Patient resting in bed. Says she is having some burning with urination today and has had UTIs in the past. Also reports back pain but says she thinks it is from sitting in the chair. Says she feels a little short of breath intermittently. Says she is not having any more diarrhea.      Objective   Objective     Vital Signs:   Temp:  [97.7 °F (36.5 °C)-98.1 °F (36.7 °C)] 98.1 °F (36.7 °C)  Heart Rate:  [] 106  Resp:   [16-24] 16  BP: (103-113)/(71-89) 113/89     Physical Exam:   Constitutional: No acute distress, awake, alert  HENT: NCAT, mucous membranes moist  Respiratory: Diminished to auscultation LLL, respiratory effort normal, room air  Cardiovascular: RRR, no murmurs, rubs, or gallops  Gastrointestinal: Positive bowel sounds, soft, nontender, nondistended  Musculoskeletal: No bilateral ankle edema  Psychiatric: Appropriate affect, cooperative  Neurologic: Oriented x 3, moves all extremities, speech clear  Skin: No rashes      Results Reviewed:  LAB RESULTS:      Lab 04/23/25  0513 04/19/25  1536   WBC 15.67* 13.10*   HEMOGLOBIN 12.5 11.6*   HEMATOCRIT 37.0 34.4   PLATELETS 278 239   MCV 88.7 87.3         Lab 04/23/25  0513 04/22/25  0518 04/21/25  0428 04/19/25  1536   SODIUM 137 139 136 136   POTASSIUM 4.5 4.3 4.1 4.1   CHLORIDE 100 102 101 101   CO2 23.0 24.0 23.0 25.0   ANION GAP 14.0 13.0 12.0 10.0   BUN 17 14 16 15   CREATININE 1.32* 1.41* 1.25* 1.45*   EGFR 48.1* 44.4* 51.3* 43.0*   GLUCOSE 138* 118* 163* 232*   CALCIUM 9.4 9.5 9.3 8.8         Lab 04/22/25  0518   TOTAL PROTEIN 6.4   ALBUMIN 3.7   GLOBULIN 2.7   ALT (SGPT) 37*   AST (SGOT) 33*   BILIRUBIN 0.5   ALK PHOS 108                     Brief Urine Lab Results  (Last result in the past 365 days)        Color   Clarity   Blood   Leuk Est   Nitrite   Protein   CREAT   Urine HCG        04/10/25 1734 Yellow   Clear   Negative   Trace   Negative   Negative                   Microbiology Results Abnormal       None            FL Video Swallow With Speech Single Contrast  Result Date: 4/22/2025  FL VIDEO SWALLOW W SPEECH SINGLE-CONTRAST Date of Exam: 4/22/2025 10:17 AM EDT Indication: dysphagia.   Comparison: None available. Technique:   The speech pathologist administered food and/or liquid mixed with barium to the patient with cine/video imaging.  Imaging assistance was provided to the speech pathologist and an image was saved. Fluoroscopic Time: 54 seconds Number  of Images: 7 associated fluoroscopic loops were saved Findings: Aspiration was seen during fluoroscopic guided modified barium swallowing series. Please see speech therapy report for full details and recommendations.     Impression: Impression: Fluoroscopy provided for modified barium swallow. Aspiration was seen during swallowing evaluation. Please see speech therapy report for full details and recommendations. Report dictated by: Ermelinda Queen PA-c  I have personally reviewed this case and agree with the findings above: Electronically Signed: Guerrero Rodriguez MD  4/22/2025 5:27 PM EDT  Workstation ID: [id number]      Results for orders placed during the hospital encounter of 04/10/25    Adult Transthoracic Echo Complete W/ Cont if Necessary Per Protocol    Interpretation Summary    Left ventricular systolic function is normal. Estimated left ventricular EF = 60%    No significant valvular abnormalities.      Current medications:  Scheduled Meds:atorvastatin, 40 mg, Oral, Daily  busPIRone, 15 mg, Oral, Q12H  citalopram, 40 mg, Oral, Daily  famotidine, 20 mg, Oral, Nightly  heparin (porcine), 5,000 Units, Subcutaneous, Q8H  insulin glargine, 15 Units, Subcutaneous, Daily  insulin glargine, 25 Units, Subcutaneous, Nightly  Insulin Lispro, 3 Units, Subcutaneous, TID With Meals  insulin lispro, 3-14 Units, Subcutaneous, 4x Daily AC & at Bedtime  ipratropium-albuterol, 3 mL, Nebulization, 4x Daily - RT  levothyroxine, 225 mcg, Oral, Q AM  pantoprazole, 40 mg, Oral, Daily  risperiDONE, 3 mg, Oral, BID  saccharomyces boulardii, 250 mg, Oral, BID  senna-docusate sodium, 2 tablet, Oral, BID      Continuous Infusions:   PRN Meds:.  acetaminophen **OR** acetaminophen **OR** acetaminophen    senna-docusate sodium **AND** polyethylene glycol **AND** bisacodyl **AND** bisacodyl    dextrose    dextrose    glucagon (human recombinant)    melatonin    promethazine    Sodium Chloride (PF)    Assessment & Plan   Assessment & Plan     Active  Hospital Problems    Diagnosis  POA    **Acute kidney injury superimposed on chronic kidney disease [N17.9, N18.9]  Yes    Acute kidney injury superimposed on CKD [N17.9, N18.9]  Yes    Diarrhea [R19.7]  Yes    Dehydration [E86.0]  Yes    Essential tremor [G25.0]  Yes    Type 2 diabetes mellitus without complication, with long-term current use of insulin [E11.9, Z79.4]  Not Applicable    Mixed hyperlipidemia [E78.2]  Yes    Other specified hypothyroidism [E03.8]  Yes    Anxiety and depression [F41.9, F32.A]  Yes      Resolved Hospital Problems   No resolved problems to display.        Brief Hospital Course to date:  Vilma Ayala is a 54 y.o. female  with a history of Essential Tremor, T2DM, hypothyroidism, anxiety/depression, and GERD who presented to [facility] ED for complaint of nausea and diarrhea.  On the afternoon of 4/12, pt had some increased dyspnea and congestion.  Chest x-ray could not rule out possible pneumonia; however Pro-Jim was negative, WBCs wnl, and symptoms felt more pulmonary congestion, so IV fluids stopped and patient was given IV Bumex.  CT chest 4/13 showed bilateral lower lobe atelectasis. Echo on 4/14 with normal EF.  Patient has since been weaned to room air.      This patient's problems and plans were partially entered by my partner and updated as appropriate by me 04/23/25.       CB on CKD - resolved  Dehydration - resolved  Nausea/Vomiting - resolved  -Cr 2.07 on admission, CB possibly 2/2 to recent Bactrim use as well as dehydration from poor po intake. Baseline appears to be ~ 1.3-1.4 per chart review.  -IVF held with dyspnea, new oxygen requirement, now weaned back to RA, chest CT with just atelectasis, plans for IS/FD, encourarged PO intake.   -CT abd/pelvis negative for acute findings  -PT/OT following  -continue prn antiemetics  -Stop cholestyramine, not having any more diarrhea, continue probiotic  -Continue bowel regimen  -AM BMP     Dyspnea  - possibly related to  recent fluids, IVF dc'd, improving   - Duonebs, IS/FD as well   - CT Chest with just atelectasis, encouraged use of IS/FD  - Echo 4/14 with EF 60%, normal LVSF, no significant valvular abnormalities, troponins 26>>17, seems noncardiac.   - SLP evaluated, moderate dysphagia, MBS with signs of aspiration, diet modifications in place  - WBCs increased to 15.67 today; still afebrile, intermittently tachycardic; however, procal negative, CRP slightly elevated 0.6  - Pt satting 98% on RA  - AM CBC w/ diff to monitor WBCs     Dysuria  - Pt reports burning with urination, low back pain 4/23  - UA benign  - Pyridium TID prn x 2 days  - AM CBC w/ diff    HLD  -Continue statin     T2DM  -A1C 6.8   -Fingerstick ACHS  -Continue Lantus BID, mealtime insulin, SSI     Anxiety/depression  Essential tremor  -Resume home Celexa, Buspar  -Continue Respiradone, stable     Expected Discharge Location and Transportation: SNF rehab  Expected Discharge pending bed offer, insurance approval  Expected Discharge Date: 4/24/2025; Expected Discharge Time:      VTE Prophylaxis:  Pharmacologic & mechanical VTE prophylaxis orders are present.         AM-PAC 6 Clicks Score (PT): 19 (04/23/25 1731)    CODE STATUS:   Code Status and Medical Interventions: CPR (Attempt to Resuscitate); Full Support   Ordered at: 04/10/25 2013     Code Status (Patient has no pulse and is not breathing):    CPR (Attempt to Resuscitate)     Medical Interventions (Patient has pulse or is breathing):    Full Support       ZIA Sarmiento  04/23/25        Electronically signed by Adore Ramírez APRN at 04/23/25 [id number]

## 2025-04-25 NOTE — CASE MANAGEMENT/SOCIAL WORK
Continued Stay Note  [facility]     Patient Name: Vilma Ayala  MRN: [id number]  Today's Date: 4/25/2025    Admit Date: 4/10/2025    Plan: SNF rehab   Discharge Plan       Row Name 04/25/25 0928       Plan    Plan SNF rehab    Patient/Family in Agreement with Plan other (see comments)    Plan Comments I spoke w/April @ [facility], insurance pre cert remains pending @ this time. She will contact me today w/any updates. CM will continue to follow.    Final Discharge Disposition Code 03 - skilled nursing facility (SNF)                   Discharge Codes    No documentation.                 Expected Discharge Date and Time       Expected Discharge Date Expected Discharge Time    Apr 27, 2025               Clarence Garcia RN

## 2025-04-25 NOTE — DISCHARGE SUMMARY
[facility] Medicine Services  TRANSFER SUMMARY    Patient Name: Vilma Ayala  : 1970  MRN: [id number]    Date of Admission: 4/10/2025  Date of Discharge:  2025  Length of Stay: 12  Primary Care Physician: Vanessa Kaur MD    Consults       No orders found from 3/12/2025 to 2025.            Hospital Course     Presenting Problem:   Acute kidney injury superimposed on chronic kidney disease [N17.9, N18.9]  Acute kidney injury superimposed on CKD [N17.9, N18.9]    Active Hospital Problems    Diagnosis  POA    **Acute kidney injury superimposed on chronic kidney disease [N17.9, N18.9]  Yes    Acute kidney injury superimposed on CKD [N17.9, N18.9]  Yes    Diarrhea [R19.7]  Yes    Dehydration [E86.0]  Yes    Essential tremor [G25.0]  Yes    Type 2 diabetes mellitus without complication, with long-term current use of insulin [E11.9, Z79.4]  Not Applicable    Mixed hyperlipidemia [E78.2]  Yes    Other specified hypothyroidism [E03.8]  Yes    Anxiety and depression [F41.9, F32.A]  Yes      Resolved Hospital Problems   No resolved problems to display.          Hospital Course:  Vilma Ayala is a 54 y.o. female  with a history of Essential Tremor, T2DM, hypothyroidism, anxiety/depression, and GERD who presented to [facility] ED for complaint of nausea and diarrhea.  On the afternoon of , pt had some increased dyspnea and congestion.  Chest x-ray could not rule out possible pneumonia; however Pro-Jim was negative, WBCs wnl, and symptoms felt more pulmonary congestion, so IV fluids stopped and patient was given IV Bumex.  CT chest  showed bilateral lower lobe atelectasis. Echo on  with normal EF.  Patient has since been weaned to room air.       CB on CKD - resolved  Dehydration - resolved  Nausea/Vomiting - resolved  -Cr 2.07 on admission, CB possibly 2/2 to recent Bactrim use as well as dehydration from poor po intake. Baseline appears to be ~  1.3-1.4 per chart review.  -IVF held with dyspnea, new oxygen requirement, now weaned back to RA, chest CT with just atelectasis, plans for IS/FD, encourarged PO intake. Stable   -CT abd/pelvis negative for acute findings  -PT/OT following. Planning for rehab.   -continue prn antiemetics  -prior Stopped cholestyramine, not having any more diarrhea, - continue probiotic  -Continue bowel regimen     Dyspnea  - possibly related to recent fluids, IVF dc'd, improving   - Duonebs, IS/FD as well   - CT Chest with just atelectasis, encouraged use of IS/FD  - Echo 4/14 with EF 60%, normal LVSF, no significant valvular abnormalities, troponins 26>>17, seems noncardiac.   - SLP evaluated, moderate dysphagia, MBS with signs of aspiration, diet modifications in place  - WBCs increased; still afebrile, intermittently tachycardic; however, procal negative, CRP slightly elevated 0.6  - Pt satting 98% on RA. This am cbc with wbc improved at 15.  Trending down.   - monitor      Dysuria  - Pt reports burning with urination, low back pain 4/23  - UA benign  - Pyridium TID prn x 2 days. Improved      HLD  -Continue statin     T2DM  -A1C 6.8   -Fingerstick ACHS  -Continue Lantus BID, mealtime insulin, SSI.  Glucoses normal in the a.m. and overnight.  Slightly high postprandial.  --will slightly adjust basal/bolus regimen today.  Should help.  Monitor and adjust further at SNF     Anxiety/depression  Essential tremor  -Resume home Celexa, Buspar  -Continue Respiradone, stable      She was seen this morning at 10:15 AM resting back in the chair.  Awake and alert.  No acute distress.  Slow speech at baseline.  Oriented x 3.  Does not have great appetite yet but slowly improving.  Denies pain.  No current nausea or vomiting.  Awaiting transfer to rehab.  Addendum: 12:45 PM  Patient has now been approved to transfer to [facility] for rehab today.  Going on medications as below with follow-ups as noted.      Discharge Follow Up Recommendations  for outpatient labs/diagnostics:  Patient is cleared for transfer to rehab today.  Going on medications as below with follow-ups as noted.    --To be seen by provider at facility on arrival, PCP 1 week of discharge    Day of Discharge     HPI:   Patient was seen at 10:15 AM resting up in the chair no acute distress.  Awake and alert.  Still reports having decreased appetite but slowly improving.  Tolerating diet okay.  Mild intermittent nausea but none currently.  Eager to get to rehab.    Review of Systems  Gen- No fevers, chills  CV- No chest pain, palpitations  Resp- No cough, dyspnea  GI- as above     Vital Signs:   Temp:  [97.4 °F (36.3 °C)-98.5 °F (36.9 °C)] 97.4 °F (36.3 °C)  Heart Rate:  [103-112] 103  Resp:  [18-20] 20  BP: (104-108)/(78-84) 107/84     Physical Exam:  Constitutional: No acute distress, awake and alert.  Resting back in bed.  HENT: NCAT, mucous membranes moist  Respiratory: Clear but diminished at bases, respiratory effort normal, room air with sats WNL.  Cardiovascular: RRR to mild sinus tach, no murmurs, rubs, or gallops  Gastrointestinal: Positive bowel sounds, soft, nontender, nondistended  Musculoskeletal: No bilateral ankle edema. [place] spont   Psychiatric: Appropriate affect, cooperative  Neurologic: Oriented x 3, slow speech chronically. moves all extremities, speech clear. Follows commands   Skin: No rashes    Pertinent Results     Results from last 7 days   Lab Units 04/25/25  0428 04/24/25  0401 04/23/25  0513 04/22/25  0518 04/21/25  0428 04/19/25  1536   WBC 10*3/mm3 15.73* 17.18* 15.67*  --   --  13.10*   HEMOGLOBIN g/dL 12.1 11.9* 12.5  --   --  11.6*   HEMATOCRIT % 35.9 35.7 37.0  --   --  34.4   PLATELETS 10*3/mm3 284 272 278  --   --  239   SODIUM mmol/L 138 136 137 139 136 136   POTASSIUM mmol/L 3.9 4.3 4.5 4.3 4.1 4.1   CHLORIDE mmol/L 101 101 100 102 101 101   CO2 mmol/L 25.0 22.0 23.0 24.0 23.0 25.0   BUN mg/dL 21* 18 17 14 16 15   CREATININE mg/dL 1.39* 1.50* 1.32* 1.41*  "1.25* 1.45*   GLUCOSE mg/dL 87 171* 138* 118* 163* 232*   CALCIUM mg/dL 9.7 9.3 9.4 9.5 9.3 8.8     Results from last 7 days   Lab Units 04/22/25  0518   BILIRUBIN mg/dL 0.5   ALK PHOS U/L 108   ALT (SGPT) U/L 37*   AST (SGOT) U/L 33*           Invalid input(s): \"TG\", \"LDLCALC\", \"LDLREALC\"      Brief Urine Lab Results  (Last result in the past 365 days)        Color   Clarity   Blood   Leuk Est   Nitrite   Protein   CREAT   Urine HCG        04/23/25 1652 Yellow   Clear   Negative   Trace   Negative   Negative                   Microbiology Results Abnormal       None            Imaging Results (All)       Procedure Component Value Units Date/Time    XR Chest 1 View [[id number]] Collected: 04/25/25 0736     Updated: 04/25/25 0740    Narrative:      XR CHEST 1 VW    Date of Exam: 4/25/2025 4:15 AM EDT    Indication: leukocytosis    Comparison: None available.    Findings:  Low lung volumes resulting in bibasilar atelectasis. No asymmetric suspicious consolidation. Heart size and pulmonary vasculature appear within normal limits      Impression:      Impression:  Hypoinspiratory film demonstrating bibasilar atelectasis. No pneumonia visualized      Electronically Signed: Lincoln Wilson    4/25/2025 7:37 AM EDT    Workstation ID: [id number]    FL Video Swallow With Speech Single Contrast [[id number]] Collected: 04/22/25 1055     Updated: 04/22/25 1730    Narrative:      FL VIDEO SWALLOW W SPEECH SINGLE-CONTRAST    Date of Exam: 4/22/2025 10:17 AM EDT    Indication: dysphagia.       Comparison: None available.    Technique:   The speech pathologist administered food and/or liquid mixed with barium to the patient with cine/video imaging.  Imaging assistance was provided to the speech pathologist and an image was saved.    Fluoroscopic Time: 54 seconds    Number of Images: 7 associated fluoroscopic loops were saved    Findings:  Aspiration was seen during fluoroscopic guided modified barium swallowing series. Please see speech " therapy report for full details and recommendations.      Impression:      Impression:  Fluoroscopy provided for modified barium swallow. Aspiration was seen during swallowing evaluation. Please see speech therapy report for full details and recommendations.      Report dictated by: Ermelinda Queen PA-c      I have personally reviewed this case and agree with the findings above:    Electronically Signed: Guerrero Rodriguez MD    4/22/2025 5:27 PM EDT    Workstation ID: [id number]    FL Video Swallow With Speech Single Contrast [[id number]] Collected: 04/15/25 1102     Updated: 04/15/25 1626    Narrative:      FL VIDEO SWALLOW W SPEECH SINGLE-CONTRAST    Date of Exam: 4/15/2025 10:35 AM EDT    Indication: dysphagia.       Comparison: None available.    Technique:   The speech pathologist administered food and/or liquid mixed with barium to the patient with cine/video imaging.  Imaging assistance was provided to the speech pathologist and an image was saved.    Fluoroscopic Time: 1 minute and 54 seconds    Number of Images: 14 associated fluoroscopic loops were saved    Findings:  Aspiration was seen during fluoroscopic guided modified barium swallowing series. Please see speech therapy report for full details and recommendations.      Impression:      Impression:  Fluoroscopy provided for a modified barium swallow. Aspiration was seen during swallowing evaluation. Please see speech therapy report for full details and recommendations.      Report dictated by: Ermelinda Queen PA-c      I have personally reviewed this case and agree with the findings above:    Electronically Signed: Mike Morales MD    4/15/2025 4:23 PM EDT    Workstation ID: [id number]    CT Chest Without Contrast Diagnostic [[id number]] Collected: 04/13/25 1223     Updated: 04/13/25 1230    Narrative:      CT CHEST WO CONTRAST DIAGNOSTIC    Date of Exam: 4/13/2025 11:52 AM EDT    Indication: Dyspnea, suspected pulm edema, r/o PNA.    Comparison: Chest radiograph  4/12/2025.    Technique: Axial CT images were obtained of the chest without contrast administration.  Reconstructed coronal and sagittal images were also obtained. Automated exposure control and iterative construction methods were used.      Findings:    Thyroid and thoracic inlet: No significant abnormality.    Lymph nodes: No pathologic appearing lymph nodes by imaging criteria.    Cardiovascular: Normal appearing heart size. No pericardial effusion. Aorta and main pulmonary artery diameters are within normal range.No coronary artery calcifications.    Esophagus: No significant abnormality.    Lung parenchyma: Scattered atelectasis including in the lower lobes. No suspicious appearing opacities. No findings suggestive of pulmonary edema.    Airways: Patent trachea and mainstem bronchi.    Pleura: No pleural effusion or pneumothorax.    Chest wall and osseous structures: Degenerative changes of the imaged spine. No acute osseous abnormality.    Included abdomen: No significant abnormality.      Impression:      Impression:  No acute intrathoracic findings.    Bilateral lower lobe atelectasis.      Electronically Signed: Gregg Colindres    4/13/2025 12:26 PM EDT    Workstation ID: [id number]    XR Chest 1 View [[id number]] Collected: 04/12/25 1618     Updated: 04/12/25 1622    Narrative:      XR CHEST 1 VW    Date of Exam: 4/12/2025 2:40 PM EDT    Indication: Dyspnea    Comparison: AP chest x-ray 3/20/2023, CT abdomen pelvis 4/10/2025    Findings:  There are mild patchy left basilar airspace opacities. Right lung appears clear. No pneumothorax or large pleural effusion is seen. Cardiomediastinal contours appear stable.      Impression:      Impression:  Mild patchy left basilar airspace opacities, which may be due to atelectasis and/or pneumonia.        Electronically Signed: Baylee Tabor    4/12/2025 4:19 PM EDT    Workstation ID: [id number]    CT Abdomen Pelvis Without Contrast [[id number]] Collected: 04/10/25 1527      Updated: 04/10/25 1536    Narrative:      CT ABDOMEN PELVIS WO CONTRAST    Date of Exam: 4/10/2025 3:21 PM EDT    Indication: N/V, left flank pain.    Comparison: Chest CT 3/23/2025    Technique: Axial CT images were obtained of the abdomen and pelvis without the administration of contrast. Reconstructed coronal and sagittal images were also obtained. Automated exposure control and iterative construction methods were used.      Findings:    Included Chest: Bibasal atelectasis    Liver: No significant abnormality.     Gallbladder and biliary tree: No significant abnormality.     Spleen: No significant abnormality.     Pancreas: No significant abnormality.     Adrenal glands: Nonspecific and unchanged mild bilateral adrenal gland thickening.     Kidneys and ureters: No significant abnormality. No hydroureteronephrosis. No radiopaque calculi seen.     Stomach and duodenum:No significant abnormality.    Small and large bowel: Colonic diverticulosis. No evidence of bowel obstruction. Appendicoliths within an otherwise normal-appearing appendix. No significant pericolonic inflammatory changes seen.    Peritoneal cavity: No free fluid or free air.    Bladder: No significant abnormality.     Pelvic organs: No significant abnormality.     Vasculature: Atherosclerotic calcifications.     Lymph nodes: No pathologic appearing lymph nodes by imaging criteria.     Bones and soft tissues: Degenerative changes of the imaged spine. No acute osseous abnormality.      Impression:      Impression:  No acute findings in the abdomen or pelvis.      Electronically Signed: Gregg Colindres    4/10/2025 3:32 PM EDT    Workstation ID: [id number]            Results for orders placed during the hospital encounter of 04/10/25    Adult Transthoracic Echo Complete W/ Cont if Necessary Per Protocol    Interpretation Summary    Left ventricular systolic function is normal. Estimated left ventricular EF = 60%    No significant valvular  abnormalities.          Discharge Details        Discharge Medications        PAUSE taking these medications        Instructions Start Date   metFORMIN 500 MG tablet  Wait to take this until your doctor or other care provider tells you to start again.  Commonly known as: GLUCOPHAGE   TAKE 2 TABLETS BY MOUTH ONCE DAILY WITH FOOD             New Medications        Instructions Start Date   acetaminophen 325 MG tablet  Commonly known as: TYLENOL   650 mg, Oral, Every 4 Hours PRN      insulin glargine 100 UNIT/ML injection  Commonly known as: LANTUS, SEMGLEE  Replaces: Lantus SoloStar 100 UNIT/ML injection pen   20 Units, Subcutaneous, Nightly      insulin glargine 100 UNIT/ML injection  Commonly known as: LANTUS, SEMGLEE   15 Units, Subcutaneous, Daily   Start Date: April 26, 2025     Insulin Lispro 100 UNIT/ML injection  Commonly known as: humaLOG   6 Units, Subcutaneous, 3 Times Daily With Meals      Insulin Lispro 100 UNIT/ML injection  Commonly known as: humaLOG   2-9 Units, Subcutaneous, 4 Times Daily Before Meals & Nightly      ipratropium-albuterol 0.5-2.5 mg/3 ml nebulizer  Commonly known as: DUO-NEB   3 mL, Nebulization, 4 Times Daily PRN      melatonin 5 MG tablet tablet   5 mg, Oral, Nightly PRN      polyethylene glycol 17 g packet  Commonly known as: MIRALAX   17 g, Oral, Daily PRN      saccharomyces boulardii 250 MG capsule  Commonly known as: FLORASTOR   250 mg, Oral, 2 Times Daily      sennosides-docusate 8.6-50 MG per tablet  Commonly known as: PERICOLACE   2 tablets, Oral, 2 Times Daily             Changes to Medications        Instructions Start Date   famotidine 20 MG tablet  Commonly known as: PEPCID  What changed:   medication strength  how much to take  when to take this   20 mg, Oral, Nightly      hydrOXYzine pamoate 25 MG capsule  Commonly known as: VISTARIL  What changed:   medication strength  how much to take   25 mg, Oral, 3 Times Daily PRN             Continue These Medications         "Instructions Start Date   Accu-Chek Softclix Lancets lancets   USE 1  TO CHECK GLUCOSE 4 times a day DAILY dx e11.65 on insulin      OneTouch Delica Lancets 33G misc   Use to test blood sugar twice daily and as needed for symptoms of low blood sugar      atorvastatin 40 MG tablet  Commonly known as: LIPITOR   1 tablet, Daily      B-D UF III MINI PEN NEEDLES 31G X 5 MM misc  Generic drug: Insulin Pen Needle   1 Units, Subcutaneous, Daily      Blood Glucose Monitoring Suppl w/Device kit   Use daily to test blood sugars uses accu chek      busPIRone 15 MG tablet  Commonly known as: BUSPAR   1 tablet, Every 12 Hours Scheduled      citalopram 40 MG tablet  Commonly known as: CeleXA   40 mg, Oral, Daily      FreeStyle Sukhi 3 Plus Sensor   Not Applicable, Every 14 Days      FreeStyle Sukhi 3 [place] device   1 Device, Not Applicable, 4 Times Daily      glucose 4 GM chewable tablet  Commonly known as: DEX4   16 g, Oral, As Needed      glucose monitor monitoring kit   Use as directed, E11.9      Insulin Syringe-Needle U-100 31G X 15/64\" 0.5 ML misc   Use 2 per day to administer insulin      Lancet Device misc   Use with glucometer up to QID as directed, E11.9, Z79.4      levothyroxine 200 MCG tablet  Commonly known as: SYNTHROID, LEVOTHROID   200 mcg, Oral, Daily      levothyroxine 25 MCG tablet  Commonly known as: SYNTHROID, LEVOTHROID   25 mcg, Oral, Every Early Morning      OneTouch Verio test strip  Generic drug: glucose blood   Use to test blood sugar twice daily and as needed for symptoms of low blood sugar      pantoprazole 40 MG EC tablet  Commonly known as: Protonix   40 mg, Oral, Daily   Start Date: April 26, 2025     promethazine 12.5 MG tablet  Commonly known as: PHENERGAN   12.5 mg, Oral, Every 6 Hours PRN      risperiDONE 3 MG tablet  Commonly known as: risperDAL   3 mg, 2 Times Daily      Trelegy Ellipta 100-62.5-25 MCG/ACT inhaler  Generic drug: Fluticasone-Umeclidin-Vilant   1 puff, Inhalation, Daily    "   Ventolin  (90 Base) MCG/ACT inhaler  Generic drug: albuterol sulfate HFA   2 puffs, Inhalation, 4 Times Daily - RT             Stop These Medications      benztropine 0.5 MG tablet  Commonly known as: COGENTIN     ibuprofen 800 MG tablet  Commonly known as: ADVIL,MOTRIN     Lantus SoloStar 100 UNIT/ML injection pen  Generic drug: Insulin Glargine  Replaced by: insulin glargine 100 UNIT/ML injection     LORazepam 0.5 MG tablet  Commonly known as: ATIVAN     ondansetron 4 MG tablet  Commonly known as: Zofran              Allergies   Allergen Reactions    Doxepin Rash    Januvia [Sitagliptin] Other (See Comments)     Insomnia         Discharge Disposition:  Skilled Nursing Facility (DC - External)    Discharge Diet:  Diet Order   Procedures    Diet: Cardiac, Diabetic; Healthy Heart (2-3 Na+); Consistent Carbohydrate; No Mixed Consistencies, Feeding Assistance - Nursing; Texture: Mechanical Ground (NDD 2); Fluid Consistency: Nectar Thick       Discharge Activity:  Activity Instructions       Activity as Tolerated                CODE STATUS:    Code Status and Medical Interventions: CPR (Attempt to Resuscitate); Full Support   Ordered at: 04/10/25 2013     Code Status (Patient has no pulse and is not breathing):    CPR (Attempt to Resuscitate)     Medical Interventions (Patient has pulse or is breathing):    Full Support         Future Appointments   Date Time Provider Department Center   5/27/2025  2:15 PM Selina Lopez MD [facility]   9/19/2025  9:45 AM Vanessa Kaur MD [facility]       Additional Instructions for the Follow-ups that You Need to Schedule       Discharge Follow-up with PCP   As directed       Currently Documented PCP:    Vanessa Kaur MD    PCP Phone Number:    [phone]     Follow Up Details: to be seen by provider at facility on arrival, PCP 1 week of discharge              Electronically signed by ZIA Avery, 04/25/25, 1:02 PM EDT.      Time  Spent on Discharge: I spent 45 minutes on this discharge activity which included: face-to-face encounter with the patient, reviewing the data in the system, coordination of the care with the nursing staff as well as consultants, documentation, and entering orders.

## 2025-04-25 NOTE — CASE MANAGEMENT/SOCIAL WORK
Case Management Discharge Note      Final Note: I was contacted by April @ [facility], she has insurance approval for bed today. I notified Chanda VILLANUEVA, medically ready. RN to call report to [phone] and send d/c packet w/patient. I will fax d/c summary to [phone] when completed in [facility]. Pharmacy in [facility] has been changed to facility pharmacy of [facility]. [facility] will send a  van for  between 1415PM-1430Pm today. They will  @ [address] [facility] [address]. Van is blue and white and has [facility] written on it per April. I updated Ms. Ayala in room, she is eager and agreeable to go to [facility] for SNF rehab. I updated her sister Jory by phone on above. She will take Ms. Ayala clothes to [facility]. No other d/c needs expressed @ this time.         Selected Continued Care - Admitted Since 4/10/2025       Destination Coordination complete.      Service Provider Services Address Phone Fax Patient Preferred    [facility] [address] [place] [address] [phone] --              Durable Medical Equipment    No services have been selected for the patient.                Dialysis/Infusion    No services have been selected for the patient.                Home Medical Care    No services have been selected for the patient.                Therapy    No services have been selected for the patient.                Community Resources    No services have been selected for the patient.                Community & DME    No services have been selected for the patient.                         Final Discharge Disposition Code: 03 - skilled nursing facility (SNF)

## 2025-04-25 NOTE — DISCHARGE PLACEMENT REQUEST
"Vilma Wooten (54 y.o. Female) From Clarence Garcia RN  [id number]      Date of Birth   1970    Social Security Number       Address   [address] [facility] [address]    Home Phone   [phone]    MRN   [id number]       Anglican   Amish    Marital Status   Single                            Admission Date   4/10/2025    Admission Type   Emergency    Admitting Provider   Guerrero Archer MD    Attending Provider   Guerrero Archer MD    Department, Room/Bed   [address], S579/1       Discharge Date       Discharge Disposition   Skilled Nursing Facility (DC - External)    Discharge Destination                                 Attending Provider: Guerrero Archer MD    Allergies: Doxepin, Januvia [Sitagliptin]    Isolation: None   Infection: None   Code Status: CPR    Ht: 167.6 cm (65.98\")   Wt: 96.3 kg (212 lb 4.9 oz)    Admission Cmt: None   Principal Problem: Acute kidney injury superimposed on chronic kidney disease [N17.9,N18.9]                   Active Insurance as of 4/10/2025       Primary Coverage       Payor Plan Insurance Group Employer/Plan Group    [facility]AC[facility] WELL[employer] MEDICARE ADVANTAGE PPO        Payor Plan Address Payor Plan Phone Number Payor Plan Fax Number Effective Dates    [address] [address]   3/1/2025 - None Entered    [facility] [id number]         Subscriber Name Subscriber Birth Date Member ID       VILMA WOOTEN BRADEN 1970 [id number]               Secondary Coverage       Payor Plan Insurance Group Employer/Plan Group    [place]        Payor Plan Address Payor Plan Phone Number Payor Plan Fax Number Effective Dates    [address] [phone]  2/27/2025 - None Entered    [facility] [id number]         Subscriber Name Subscriber Birth Date Member ID       VILMA WOOTEN BRADEN 1970 [id number]                     Emergency Contacts        (Rel.) Home Phone Work Phone Mobile " Phone    FRANCIE LOYOLA (Sister) [phone] -- [phone]                   Discharge Summary        HenningSauravGould ChandaZIA Juan at 25 1302              [facility] Medicine Services  TRANSFER SUMMARY    Patient Name: Vilma Ayala  : 1970  MRN: [id number]    Date of Admission: 4/10/2025  Date of Discharge:  2025  Length of Stay: 12  Primary Care Physician: Vanessa Kaur MD    Consults       No orders found from 3/12/2025 to 2025.            Hospital Course     Presenting Problem:   Acute kidney injury superimposed on chronic kidney disease [N17.9, N18.9]  Acute kidney injury superimposed on CKD [N17.9, N18.9]    Active Hospital Problems    Diagnosis  POA    **Acute kidney injury superimposed on chronic kidney disease [N17.9, N18.9]  Yes    Acute kidney injury superimposed on CKD [N17.9, N18.9]  Yes    Diarrhea [R19.7]  Yes    Dehydration [E86.0]  Yes    Essential tremor [G25.0]  Yes    Type 2 diabetes mellitus without complication, with long-term current use of insulin [E11.9, Z79.4]  Not Applicable    Mixed hyperlipidemia [E78.2]  Yes    Other specified hypothyroidism [E03.8]  Yes    Anxiety and depression [F41.9, F32.A]  Yes      Resolved Hospital Problems   No resolved problems to display.          Hospital Course:  Vilma Ayala is a 54 y.o. female  with a history of Essential Tremor, T2DM, hypothyroidism, anxiety/depression, and GERD who presented to [facility] ED for complaint of nausea and diarrhea.  On the afternoon of , pt had some increased dyspnea and congestion.  Chest x-ray could not rule out possible pneumonia; however Pro-Jim was negative, WBCs wnl, and symptoms felt more pulmonary congestion, so IV fluids stopped and patient was given IV Bumex.  CT chest  showed bilateral lower lobe atelectasis. Echo on  with normal EF.  Patient has since been weaned to room air.       CB on CKD - resolved  Dehydration -  resolved  Nausea/Vomiting - resolved  -Cr 2.07 on admission, CB possibly 2/2 to recent Bactrim use as well as dehydration from poor po intake. Baseline appears to be ~ 1.3-1.4 per chart review.  -IVF held with dyspnea, new oxygen requirement, now weaned back to RA, chest CT with just atelectasis, plans for IS/FD, encourarged PO intake. Stable   -CT abd/pelvis negative for acute findings  -PT/OT following. Planning for rehab.   -continue prn antiemetics  -prior Stopped cholestyramine, not having any more diarrhea, - continue probiotic  -Continue bowel regimen     Dyspnea  - possibly related to recent fluids, IVF dc'd, improving   - Duonebs, IS/FD as well   - CT Chest with just atelectasis, encouraged use of IS/FD  - Echo 4/14 with EF 60%, normal LVSF, no significant valvular abnormalities, troponins 26>>17, seems noncardiac.   - SLP evaluated, moderate dysphagia, MBS with signs of aspiration, diet modifications in place  - WBCs increased; still afebrile, intermittently tachycardic; however, procal negative, CRP slightly elevated 0.6  - Pt satting 98% on RA. This am cbc with wbc improved at 15.  Trending down.   - monitor      Dysuria  - Pt reports burning with urination, low back pain 4/23  - UA benign  - Pyridium TID prn x 2 days. Improved      HLD  -Continue statin     T2DM  -A1C 6.8   -Fingerstick ACHS  -Continue Lantus BID, mealtime insulin, SSI.  Glucoses normal in the a.m. and overnight.  Slightly high postprandial.  --will slightly adjust basal/bolus regimen today.  Should help.  Monitor and adjust further at [facility]     Anxiety/depression  Essential tremor  -Resume home Celexa, Buspar  -Continue Respiradone, stable      She was seen this morning at 10:15 AM resting back in the chair.  Awake and alert.  No acute distress.  Slow speech at baseline.  Oriented x 3.  Does not have great appetite yet but slowly improving.  Denies pain.  No current nausea or vomiting.  Awaiting transfer to rehab.  Addendum: 12:45  PM  Patient has now been approved to transfer to [facility] for rehab today.  Going on medications as below with follow-ups as noted.      Discharge Follow Up Recommendations for outpatient labs/diagnostics:  Patient is cleared for transfer to rehab today.  Going on medications as below with follow-ups as noted.    --To be seen by provider at facility on arrival, PCP 1 week of discharge    Day of Discharge     HPI:   Patient was seen at 10:15 AM resting up in the chair no acute distress.  Awake and alert.  Still reports having decreased appetite but slowly improving.  Tolerating diet okay.  Mild intermittent nausea but none currently.  Eager to get to rehab.    Review of Systems  Gen- No fevers, chills  CV- No chest pain, palpitations  Resp- No cough, dyspnea  GI- as above     Vital Signs:   Temp:  [97.4 °F (36.3 °C)-98.5 °F (36.9 °C)] 97.4 °F (36.3 °C)  Heart Rate:  [103-112] 103  Resp:  [18-20] 20  BP: (104-108)/(78-84) 107/84     Physical Exam:  Constitutional: No acute distress, awake and alert.  Resting back in bed.  HENT: NCAT, mucous membranes moist  Respiratory: Clear but diminished at bases, respiratory effort normal, room air with sats WNL.  Cardiovascular: RRR to mild sinus tach, no murmurs, rubs, or gallops  Gastrointestinal: Positive bowel sounds, soft, nontender, nondistended  Musculoskeletal: No bilateral ankle edema. [place] spont   Psychiatric: Appropriate affect, cooperative  Neurologic: Oriented x 3, slow speech chronically. moves all extremities, speech clear. Follows commands   Skin: No rashes    Pertinent Results     Results from last 7 days   Lab Units 04/25/25  0428 04/24/25  0401 04/23/25  0513 04/22/25  0518 04/21/25  0428 04/19/25  1536   WBC 10*3/mm3 15.73* 17.18* 15.67*  --   --  13.10*   HEMOGLOBIN g/dL 12.1 11.9* 12.5  --   --  11.6*   HEMATOCRIT % 35.9 35.7 37.0  --   --  34.4   PLATELETS 10*3/mm3 284 272 278  --   --  239   SODIUM mmol/L 138 136 137 139 136 136   POTASSIUM mmol/L 3.9 4.3  "4.5 4.3 4.1 4.1   CHLORIDE mmol/L 101 101 100 102 101 101   CO2 mmol/L 25.0 22.0 23.0 24.0 23.0 25.0   BUN mg/dL 21* 18 17 14 16 15   CREATININE mg/dL 1.39* 1.50* 1.32* 1.41* 1.25* 1.45*   GLUCOSE mg/dL 87 171* 138* 118* 163* 232*   CALCIUM mg/dL 9.7 9.3 9.4 9.5 9.3 8.8     Results from last 7 days   Lab Units 04/22/25  0518   BILIRUBIN mg/dL 0.5   ALK PHOS U/L 108   ALT (SGPT) U/L 37*   AST (SGOT) U/L 33*           Invalid input(s): \"TG\", \"LDLCALC\", \"LDLREALC\"      Brief Urine Lab Results  (Last result in the past 365 days)        Color   Clarity   Blood   Leuk Est   Nitrite   Protein   CREAT   Urine HCG        04/23/25 1652 Yellow   Clear   Negative   Trace   Negative   Negative                   Microbiology Results Abnormal       None            Imaging Results (All)       Procedure Component Value Units Date/Time    XR Chest 1 View [[id number]] Collected: 04/25/25 0736     Updated: 04/25/25 0740    Narrative:      XR CHEST 1 VW    Date of Exam: 4/25/2025 4:15 AM EDT    Indication: leukocytosis    Comparison: None available.    Findings:  Low lung volumes resulting in bibasilar atelectasis. No asymmetric suspicious consolidation. Heart size and pulmonary vasculature appear within normal limits      Impression:      Impression:  Hypoinspiratory film demonstrating bibasilar atelectasis. No pneumonia visualized      Electronically Signed: Lincoln Wilson    4/25/2025 7:37 AM EDT    Workstation ID: [id number]    FL Video Swallow With Speech Single Contrast [[id number]] Collected: 04/22/25 1055     Updated: 04/22/25 1730    Narrative:      FL VIDEO SWALLOW W SPEECH SINGLE-CONTRAST    Date of Exam: 4/22/2025 10:17 AM EDT    Indication: dysphagia.       Comparison: None available.    Technique:   The speech pathologist administered food and/or liquid mixed with barium to the patient with cine/video imaging.  Imaging assistance was provided to the speech pathologist and an image was saved.    Fluoroscopic Time: 54 " seconds    Number of Images: 7 associated fluoroscopic loops were saved    Findings:  Aspiration was seen during fluoroscopic guided modified barium swallowing series. Please see speech therapy report for full details and recommendations.      Impression:      Impression:  Fluoroscopy provided for modified barium swallow. Aspiration was seen during swallowing evaluation. Please see speech therapy report for full details and recommendations.      Report dictated by: Ermelinda Queen PA-c      I have personally reviewed this case and agree with the findings above:    Electronically Signed: Guerrero Rodriguez MD    4/22/2025 5:27 PM EDT    Workstation ID: [id number]    FL Video Swallow With Speech Single Contrast [[id number]] Collected: 04/15/25 1102     Updated: 04/15/25 1626    Narrative:      FL VIDEO SWALLOW W SPEECH SINGLE-CONTRAST    Date of Exam: 4/15/2025 10:35 AM EDT    Indication: dysphagia.       Comparison: None available.    Technique:   The speech pathologist administered food and/or liquid mixed with barium to the patient with cine/video imaging.  Imaging assistance was provided to the speech pathologist and an image was saved.    Fluoroscopic Time: 1 minute and 54 seconds    Number of Images: 14 associated fluoroscopic loops were saved    Findings:  Aspiration was seen during fluoroscopic guided modified barium swallowing series. Please see speech therapy report for full details and recommendations.      Impression:      Impression:  Fluoroscopy provided for a modified barium swallow. Aspiration was seen during swallowing evaluation. Please see speech therapy report for full details and recommendations.      Report dictated by: Ermelinda Queen PA-c      I have personally reviewed this case and agree with the findings above:    Electronically Signed: Mike Morales MD    4/15/2025 4:23 PM EDT    Workstation ID: [id number]    CT Chest Without Contrast Diagnostic [[id number]] Collected: 04/13/25 1223     Updated: 04/13/25  1230    Narrative:      CT CHEST WO CONTRAST DIAGNOSTIC    Date of Exam: 4/13/2025 11:52 AM EDT    Indication: Dyspnea, suspected pulm edema, r/o PNA.    Comparison: Chest radiograph 4/12/2025.    Technique: Axial CT images were obtained of the chest without contrast administration.  Reconstructed coronal and sagittal images were also obtained. Automated exposure control and iterative construction methods were used.      Findings:    Thyroid and thoracic inlet: No significant abnormality.    Lymph nodes: No pathologic appearing lymph nodes by imaging criteria.    Cardiovascular: Normal appearing heart size. No pericardial effusion. Aorta and main pulmonary artery diameters are within normal range.No coronary artery calcifications.    Esophagus: No significant abnormality.    Lung parenchyma: Scattered atelectasis including in the lower lobes. No suspicious appearing opacities. No findings suggestive of pulmonary edema.    Airways: Patent trachea and mainstem bronchi.    Pleura: No pleural effusion or pneumothorax.    Chest wall and osseous structures: Degenerative changes of the imaged spine. No acute osseous abnormality.    Included abdomen: No significant abnormality.      Impression:      Impression:  No acute intrathoracic findings.    Bilateral lower lobe atelectasis.      Electronically Signed: Gregg Colindres    4/13/2025 12:26 PM EDT    Workstation ID: [id number]    XR Chest 1 View [[id number]] Collected: 04/12/25 1618     Updated: 04/12/25 1622    Narrative:      XR CHEST 1 VW    Date of Exam: 4/12/2025 2:40 PM EDT    Indication: Dyspnea    Comparison: AP chest x-ray 3/20/2023, CT abdomen pelvis 4/10/2025    Findings:  There are mild patchy left basilar airspace opacities. Right lung appears clear. No pneumothorax or large pleural effusion is seen. Cardiomediastinal contours appear stable.      Impression:      Impression:  Mild patchy left basilar airspace opacities, which may be due to atelectasis and/or  pneumonia.        Electronically Signed: Baylee Tabor    4/12/2025 4:19 PM EDT    Workstation ID: [id number]    CT Abdomen Pelvis Without Contrast [[id number]] Collected: 04/10/25 1527     Updated: 04/10/25 1536    Narrative:      CT ABDOMEN PELVIS WO CONTRAST    Date of Exam: 4/10/2025 3:21 PM EDT    Indication: N/V, left flank pain.    Comparison: Chest CT 3/23/2025    Technique: Axial CT images were obtained of the abdomen and pelvis without the administration of contrast. Reconstructed coronal and sagittal images were also obtained. Automated exposure control and iterative construction methods were used.      Findings:    Included Chest: Bibasal atelectasis    Liver: No significant abnormality.     Gallbladder and biliary tree: No significant abnormality.     Spleen: No significant abnormality.     Pancreas: No significant abnormality.     Adrenal glands: Nonspecific and unchanged mild bilateral adrenal gland thickening.     Kidneys and ureters: No significant abnormality. No hydroureteronephrosis. No radiopaque calculi seen.     Stomach and duodenum:No significant abnormality.    Small and large bowel: Colonic diverticulosis. No evidence of bowel obstruction. Appendicoliths within an otherwise normal-appearing appendix. No significant pericolonic inflammatory changes seen.    Peritoneal cavity: No free fluid or free air.    Bladder: No significant abnormality.     Pelvic organs: No significant abnormality.     Vasculature: Atherosclerotic calcifications.     Lymph nodes: No pathologic appearing lymph nodes by imaging criteria.     Bones and soft tissues: Degenerative changes of the imaged spine. No acute osseous abnormality.      Impression:      Impression:  No acute findings in the abdomen or pelvis.      Electronically Signed: Gregg Colindres    4/10/2025 3:32 PM EDT    Workstation ID: [id number]            Results for orders placed during the hospital encounter of 04/10/25    Adult Transthoracic Echo Complete  W/ Cont if Necessary Per Protocol    Interpretation Summary    Left ventricular systolic function is normal. Estimated left ventricular EF = 60%    No significant valvular abnormalities.          Discharge Details        Discharge Medications        PAUSE taking these medications        Instructions Start Date   metFORMIN 500 MG tablet  Wait to take this until your doctor or other care provider tells you to start again.  Commonly known as: GLUCOPHAGE   TAKE 2 TABLETS BY MOUTH ONCE DAILY WITH FOOD             New Medications        Instructions Start Date   acetaminophen 325 MG tablet  Commonly known as: TYLENOL   650 mg, Oral, Every 4 Hours PRN      insulin glargine 100 UNIT/ML injection  Commonly known as: LANTUS, SEMGLEE  Replaces: Lantus SoloStar 100 UNIT/ML injection pen   20 Units, Subcutaneous, Nightly      insulin glargine 100 UNIT/ML injection  Commonly known as: LANTUS, SEMGLEE   15 Units, Subcutaneous, Daily   Start Date: April 26, 2025     Insulin Lispro 100 UNIT/ML injection  Commonly known as: humaLOG   6 Units, Subcutaneous, 3 Times Daily With Meals      Insulin Lispro 100 UNIT/ML injection  Commonly known as: humaLOG   2-9 Units, Subcutaneous, 4 Times Daily Before Meals & Nightly      ipratropium-albuterol 0.5-2.5 mg/3 ml nebulizer  Commonly known as: DUO-NEB   3 mL, Nebulization, 4 Times Daily PRN      melatonin 5 MG tablet tablet   5 mg, Oral, Nightly PRN      polyethylene glycol 17 g packet  Commonly known as: MIRALAX   17 g, Oral, Daily PRN      saccharomyces boulardii 250 MG capsule  Commonly known as: FLORASTOR   250 mg, Oral, 2 Times Daily      sennosides-docusate 8.6-50 MG per tablet  Commonly known as: PERICOLACE   2 tablets, Oral, 2 Times Daily             Changes to Medications        Instructions Start Date   famotidine 20 MG tablet  Commonly known as: PEPCID  What changed:   medication strength  how much to take  when to take this   20 mg, Oral, Nightly      hydrOXYzine pamoate 25 MG  "capsule  Commonly known as: VISTARIL  What changed:   medication strength  how much to take   25 mg, Oral, 3 Times Daily PRN             Continue These Medications        Instructions Start Date   Accu-Chek Softclix Lancets lancets   USE 1  TO CHECK GLUCOSE 4 times a day DAILY dx e11.65 on insulin      OneTouch Delica Lancets 33G misc   Use to test blood sugar twice daily and as needed for symptoms of low blood sugar      atorvastatin 40 MG tablet  Commonly known as: LIPITOR   1 tablet, Daily      B-D UF III MINI PEN NEEDLES 31G X 5 MM misc  Generic drug: Insulin Pen Needle   1 Units, Subcutaneous, Daily      Blood Glucose Monitoring Suppl w/Device kit   Use daily to test blood sugars uses accu chek      busPIRone 15 MG tablet  Commonly known as: BUSPAR   1 tablet, Every 12 Hours Scheduled      citalopram 40 MG tablet  Commonly known as: CeleXA   40 mg, Oral, Daily      FreeStyle Sukhi 3 Plus Sensor   Not Applicable, Every 14 Days      FreeStyle Sukhi 3 [place] device   1 Device, Not Applicable, 4 Times Daily      glucose 4 GM chewable tablet  Commonly known as: DEX4   16 g, Oral, As Needed      glucose monitor monitoring kit   Use as directed, E11.9      Insulin Syringe-Needle U-100 31G X 15/64\" 0.5 ML misc   Use 2 per day to administer insulin      Lancet Device misc   Use with glucometer up to QID as directed, E11.9, Z79.4      levothyroxine 200 MCG tablet  Commonly known as: SYNTHROID, LEVOTHROID   200 mcg, Oral, Daily      levothyroxine 25 MCG tablet  Commonly known as: SYNTHROID, LEVOTHROID   25 mcg, Oral, Every Early Morning      OneTouch Verio test strip  Generic drug: glucose blood   Use to test blood sugar twice daily and as needed for symptoms of low blood sugar      pantoprazole 40 MG EC tablet  Commonly known as: Protonix   40 mg, Oral, Daily   Start Date: April 26, 2025     promethazine 12.5 MG tablet  Commonly known as: PHENERGAN   12.5 mg, Oral, Every 6 Hours PRN      risperiDONE 3 MG tablet  Commonly " known as: risperDAL   3 mg, 2 Times Daily      Trelegy Ellipta 100-62.5-25 MCG/ACT inhaler  Generic drug: Fluticasone-Umeclidin-Vilant   1 puff, Inhalation, Daily      Ventolin  (90 Base) MCG/ACT inhaler  Generic drug: albuterol sulfate HFA   2 puffs, Inhalation, 4 Times Daily - RT             Stop These Medications      benztropine 0.5 MG tablet  Commonly known as: COGENTIN     ibuprofen 800 MG tablet  Commonly known as: ADVIL,MOTRIN     Lantus SoloStar 100 UNIT/ML injection pen  Generic drug: Insulin Glargine  Replaced by: insulin glargine 100 UNIT/ML injection     LORazepam 0.5 MG tablet  Commonly known as: ATIVAN     ondansetron 4 MG tablet  Commonly known as: Zofran              Allergies   Allergen Reactions    Doxepin Rash    Januvia [Sitagliptin] Other (See Comments)     Insomnia         Discharge Disposition:  Skilled Nursing Facility (DC - External)    Discharge Diet:  Diet Order   Procedures    Diet: Cardiac, Diabetic; Healthy Heart (2-3 Na+); Consistent Carbohydrate; No Mixed Consistencies, Feeding Assistance - Nursing; Texture: Mechanical Ground (NDD 2); Fluid Consistency: Nectar Thick       Discharge Activity:  Activity Instructions       Activity as Tolerated                CODE STATUS:    Code Status and Medical Interventions: CPR (Attempt to Resuscitate); Full Support   Ordered at: 04/10/25 2013     Code Status (Patient has no pulse and is not breathing):    CPR (Attempt to Resuscitate)     Medical Interventions (Patient has pulse or is breathing):    Full Support         Future Appointments   Date Time Provider Department Center   5/27/2025  2:15 PM Selina Lopez MD [facility]   9/19/2025  9:45 AM Vanessa Kaur MD [facility]       Additional Instructions for the Follow-ups that You Need to Schedule       Discharge Follow-up with PCP   As directed       Currently Documented PCP:    Vanessa Kaur MD    PCP Phone Number:    [phone]     Follow Up Details: to be seen  by provider at facility on arrival, PCP 1 week of discharge              Electronically signed by ZIA Avery, 04/25/25, 1:02 PM EDT.      Time Spent on Discharge: I spent 45 minutes on this discharge activity which included: face-to-face encounter with the patient, reviewing the data in the system, coordination of the care with the nursing staff as well as consultants, documentation, and entering orders.        Electronically signed by Chanda Tucker APRN at 04/25/25 6930

## 2025-04-29 NOTE — PAYOR COMM NOTE
"Herminia Roche RN  [facility]  Utilization Management  P:[phone]  F:[phone]    Auth # [id number]   Gabriela Wootenbeth Braden (54 y.o. Female)       Date of Birth   1970    Social Security Number       Address   [address] [facility] [address]    Home Phone   [phone]    MRN   [id number]       [place]   [place]    Marital Status   Single                            Admission Date   4/10/2025    Admission Type   Emergency    Admitting Provider   Guerrero Archer MD    Attending Provider       Department, Room/Bed   [facility] 5H, S579/1       Discharge Date   4/25/2025    Discharge Disposition   Skilled Nursing Facility (DC - External)    Discharge Destination                                 Attending Provider: (none)   Allergies: Doxepin, Januvia [Sitagliptin]    Isolation: None   Infection: None   Code Status: Prior    Ht: 167.6 cm (65.98\")   Wt: 96.3 kg (212 lb 4.9 oz)    Admission Cmt: None   Principal Problem: Acute kidney injury superimposed on chronic kidney disease [N17.9,N18.9]                   Active Insurance as of 4/10/2025       Primary Coverage       Payor Plan Insurance Group Employer/Plan Group    [facility] MEDICARE REPLACEMENT [employer] MEDICARE ADVANTAGE PPO        Payor Plan Address Payor Plan Phone Number Payor Plan Fax Number Effective Dates    [address] [address]   3/1/2025 - None Entered    [facility] [id number]         Subscriber Name Subscriber Birth Date Member ID       VILMA WOOTEN BRADEN 1970 [id number]               Secondary Coverage       Payor Plan Insurance Group Employer/Plan Group    [place]        Payor Plan Address Payor Plan Phone Number Payor Plan Fax Number Effective Dates    [address] [phone]  2/27/2025 - None Entered    [place] [address]         Subscriber Name Subscriber Birth Date Member ID       WOOTENVILMA BRADEN 1970 [id number]                     Emergency Contacts  "       (Rel.) Home Phone Work Phone Mobile Phone    FRANCIE LOYOLA (Sister) [phone] -- [phone]                 Discharge Summary        Chanda Tucker APRN at 25 1302       Attestation signed by Guerrero Archer MD at 25 0621      I have reviewed this documentation and agree.                          [facility] Medicine Services  TRANSFER SUMMARY    Patient Name: Vilma Ayala  : 1970  MRN: [id number]    Date of Admission: 4/10/2025  Date of Discharge:  2025  Length of Stay: 12  Primary Care Physician: Vanessa Kaur MD    Consults       No orders found from 3/12/2025 to 2025.            Hospital Course     Presenting Problem:   Acute kidney injury superimposed on chronic kidney disease [N17.9, N18.9]  Acute kidney injury superimposed on CKD [N17.9, N18.9]    Active Hospital Problems    Diagnosis  POA    **Acute kidney injury superimposed on chronic kidney disease [N17.9, N18.9]  Yes    Acute kidney injury superimposed on CKD [N17.9, N18.9]  Yes    Diarrhea [R19.7]  Yes    Dehydration [E86.0]  Yes    Essential tremor [G25.0]  Yes    Type 2 diabetes mellitus without complication, with long-term current use of insulin [E11.9, Z79.4]  Not Applicable    Mixed hyperlipidemia [E78.2]  Yes    Other specified hypothyroidism [E03.8]  Yes    Anxiety and depression [F41.9, F32.A]  Yes      Resolved Hospital Problems   No resolved problems to display.          Hospital Course:  Vilma Ayala is a 54 y.o. female  with a history of Essential Tremor, T2DM, hypothyroidism, anxiety/depression, and GERD who presented to [facility] ED for complaint of nausea and diarrhea.  On the afternoon of , pt had some increased dyspnea and congestion.  Chest x-ray could not rule out possible pneumonia; however Pro-Jim was negative, WBCs wnl, and symptoms felt more pulmonary congestion, so IV fluids stopped and patient was given IV Bumex.  CT  chest 4/13 showed bilateral lower lobe atelectasis. Echo on 4/14 with normal EF.  Patient has since been weaned to room air.       CB on CKD - resolved  Dehydration - resolved  Nausea/Vomiting - resolved  -Cr 2.07 on admission, CB possibly 2/2 to recent Bactrim use as well as dehydration from poor po intake. Baseline appears to be ~ 1.3-1.4 per chart review.  -IVF held with dyspnea, new oxygen requirement, now weaned back to RA, chest CT with just atelectasis, plans for IS/FD, encourarged PO intake. Stable   -CT abd/pelvis negative for acute findings  -PT/OT following. Planning for rehab.   -continue prn antiemetics  -prior Stopped cholestyramine, not having any more diarrhea, - continue probiotic  -Continue bowel regimen     Dyspnea  - possibly related to recent fluids, IVF dc'd, improving   - Duonebs, IS/FD as well   - CT Chest with just atelectasis, encouraged use of IS/FD  - Echo 4/14 with EF 60%, normal LVSF, no significant valvular abnormalities, troponins 26>>17, seems noncardiac.   - SLP evaluated, moderate dysphagia, MBS with signs of aspiration, diet modifications in place  - WBCs increased; still afebrile, intermittently tachycardic; however, procal negative, CRP slightly elevated 0.6  - Pt satting 98% on RA. This am cbc with wbc improved at 15.  Trending down.   - monitor      Dysuria  - Pt reports burning with urination, low back pain 4/23  - UA benign  - Pyridium TID prn x 2 days. Improved      HLD  -Continue statin     T2DM  -A1C 6.8   -Fingerstick ACHS  -Continue Lantus BID, mealtime insulin, SSI.  Glucoses normal in the a.m. and overnight.  Slightly high postprandial.  --will slightly adjust basal/bolus regimen today.  Should help.  Monitor and adjust further at SNF     Anxiety/depression  Essential tremor  -Resume home Celexa, Buspar  -Continue Respiradone, stable      She was seen this morning at 10:15 AM resting back in the chair.  Awake and alert.  No acute distress.  Slow speech at baseline.   Oriented x 3.  Does not have great appetite yet but slowly improving.  Denies pain.  No current nausea or vomiting.  Awaiting transfer to rehab.  Addendum: 12:45 PM  Patient has now been approved to transfer to [facility] for rehab today.  Going on medications as below with follow-ups as noted.      Discharge Follow Up Recommendations for outpatient labs/diagnostics:  Patient is cleared for transfer to rehab today.  Going on medications as below with follow-ups as noted.    --To be seen by provider at facility on arrival, PCP 1 week of discharge    Day of Discharge     HPI:   Patient was seen at 10:15 AM resting up in the chair no acute distress.  Awake and alert.  Still reports having decreased appetite but slowly improving.  Tolerating diet okay.  Mild intermittent nausea but none currently.  Eager to get to rehab.    Review of Systems  Gen- No fevers, chills  CV- No chest pain, palpitations  Resp- No cough, dyspnea  GI- as above     Vital Signs:   Temp:  [97.4 °F (36.3 °C)-98.5 °F (36.9 °C)] 97.4 °F (36.3 °C)  Heart Rate:  [103-112] 103  Resp:  [18-20] 20  BP: (104-108)/(78-84) 107/84     Physical Exam:  Constitutional: No acute distress, awake and alert.  Resting back in bed.  HENT: NCAT, mucous membranes moist  Respiratory: Clear but diminished at bases, respiratory effort normal, room air with sats WNL.  Cardiovascular: RRR to mild sinus tach, no murmurs, rubs, or gallops  Gastrointestinal: Positive bowel sounds, soft, nontender, nondistended  Musculoskeletal: No bilateral ankle edema. WAKEIFELD spont   Psychiatric: Appropriate affect, cooperative  Neurologic: Oriented x 3, slow speech chronically. moves all extremities, speech clear. Follows commands   Skin: No rashes    Pertinent Results     Results from last 7 days   Lab Units 04/25/25  0428 04/24/25  0401 04/23/25  0513 04/22/25  0518 04/21/25  0428 04/19/25  1536   WBC 10*3/mm3 15.73* 17.18* 15.67*  --   --  13.10*   HEMOGLOBIN g/dL 12.1 11.9* 12.5  --   --   "11.6*   HEMATOCRIT % 35.9 35.7 37.0  --   --  34.4   PLATELETS 10*3/mm3 284 272 278  --   --  239   SODIUM mmol/L 138 136 137 139 136 136   POTASSIUM mmol/L 3.9 4.3 4.5 4.3 4.1 4.1   CHLORIDE mmol/L 101 101 100 102 101 101   CO2 mmol/L 25.0 22.0 23.0 24.0 23.0 25.0   BUN mg/dL 21* 18 17 14 16 15   CREATININE mg/dL 1.39* 1.50* 1.32* 1.41* 1.25* 1.45*   GLUCOSE mg/dL 87 171* 138* 118* 163* 232*   CALCIUM mg/dL 9.7 9.3 9.4 9.5 9.3 8.8     Results from last 7 days   Lab Units 04/22/25  0518   BILIRUBIN mg/dL 0.5   ALK PHOS U/L 108   ALT (SGPT) U/L 37*   AST (SGOT) U/L 33*           Invalid input(s): \"TG\", \"LDLCALC\", \"LDLREALC\"      Brief Urine Lab Results  (Last result in the past 365 days)        Color   Clarity   Blood   Leuk Est   Nitrite   Protein   CREAT   Urine HCG        04/23/25 1652 Yellow   Clear   Negative   Trace   Negative   Negative                   Microbiology Results Abnormal       None            Imaging Results (All)       Procedure Component Value Units Date/Time    XR Chest 1 View [[id number]] Collected: 04/25/25 0736     Updated: 04/25/25 0740    Narrative:      XR CHEST 1 VW    Date of Exam: 4/25/2025 4:15 AM EDT    Indication: leukocytosis    Comparison: None available.    Findings:  Low lung volumes resulting in bibasilar atelectasis. No asymmetric suspicious consolidation. Heart size and pulmonary vasculature appear within normal limits      Impression:      Impression:  Hypoinspiratory film demonstrating bibasilar atelectasis. No pneumonia visualized      Electronically Signed: Lincoln Wilson    4/25/2025 7:37 AM EDT    Workstation ID: [id number]    FL Video Swallow With Speech Single Contrast [[id number]] Collected: 04/22/25 1055     Updated: 04/22/25 1730    Narrative:      FL VIDEO SWALLOW W SPEECH SINGLE-CONTRAST    Date of Exam: 4/22/2025 10:17 AM EDT    Indication: dysphagia.       Comparison: None available.    Technique:   The speech pathologist administered food and/or liquid mixed with " barium to the patient with cine/video imaging.  Imaging assistance was provided to the speech pathologist and an image was saved.    Fluoroscopic Time: 54 seconds    Number of Images: 7 associated fluoroscopic loops were saved    Findings:  Aspiration was seen during fluoroscopic guided modified barium swallowing series. Please see speech therapy report for full details and recommendations.      Impression:      Impression:  Fluoroscopy provided for modified barium swallow. Aspiration was seen during swallowing evaluation. Please see speech therapy report for full details and recommendations.      Report dictated by: Ermelinda Queen PA-c      I have personally reviewed this case and agree with the findings above:    Electronically Signed: Guerrero Rodriguez MD    4/22/2025 5:27 PM EDT    Workstation ID: [id number]    FL Video Swallow With Speech Single Contrast [[id number]] Collected: 04/15/25 1102     Updated: 04/15/25 1626    Narrative:      FL VIDEO SWALLOW W SPEECH SINGLE-CONTRAST    Date of Exam: 4/15/2025 10:35 AM EDT    Indication: dysphagia.       Comparison: None available.    Technique:   The speech pathologist administered food and/or liquid mixed with barium to the patient with cine/video imaging.  Imaging assistance was provided to the speech pathologist and an image was saved.    Fluoroscopic Time: 1 minute and 54 seconds    Number of Images: 14 associated fluoroscopic loops were saved    Findings:  Aspiration was seen during fluoroscopic guided modified barium swallowing series. Please see speech therapy report for full details and recommendations.      Impression:      Impression:  Fluoroscopy provided for a modified barium swallow. Aspiration was seen during swallowing evaluation. Please see speech therapy report for full details and recommendations.      Report dictated by: Ermelinda Queen PA-c      I have personally reviewed this case and agree with the findings above:    Electronically Signed: Mike Morales  MD    4/15/2025 4:23 PM EDT    Workstation ID: [id number]    CT Chest Without Contrast Diagnostic [[id number]] Collected: 04/13/25 1223     Updated: 04/13/25 1230    Narrative:      CT CHEST WO CONTRAST DIAGNOSTIC    Date of Exam: 4/13/2025 11:52 AM EDT    Indication: Dyspnea, suspected pulm edema, r/o PNA.    Comparison: Chest radiograph 4/12/2025.    Technique: Axial CT images were obtained of the chest without contrast administration.  Reconstructed coronal and sagittal images were also obtained. Automated exposure control and iterative construction methods were used.      Findings:    Thyroid and thoracic inlet: No significant abnormality.    Lymph nodes: No pathologic appearing lymph nodes by imaging criteria.    Cardiovascular: Normal appearing heart size. No pericardial effusion. Aorta and main pulmonary artery diameters are within normal range.No coronary artery calcifications.    Esophagus: No significant abnormality.    Lung parenchyma: Scattered atelectasis including in the lower lobes. No suspicious appearing opacities. No findings suggestive of pulmonary edema.    Airways: Patent trachea and mainstem bronchi.    Pleura: No pleural effusion or pneumothorax.    Chest wall and osseous structures: Degenerative changes of the imaged spine. No acute osseous abnormality.    Included abdomen: No significant abnormality.      Impression:      Impression:  No acute intrathoracic findings.    Bilateral lower lobe atelectasis.      Electronically Signed: Gregg Colindres    4/13/2025 12:26 PM EDT    Workstation ID: [id number]    XR Chest 1 View [[id number]] Collected: 04/12/25 1618     Updated: 04/12/25 1622    Narrative:      XR CHEST 1 VW    Date of Exam: 4/12/2025 2:40 PM EDT    Indication: Dyspnea    Comparison: AP chest x-ray 3/20/2023, CT abdomen pelvis 4/10/2025    Findings:  There are mild patchy left basilar airspace opacities. Right lung appears clear. No pneumothorax or large pleural effusion is seen.  Cardiomediastinal contours appear stable.      Impression:      Impression:  Mild patchy left basilar airspace opacities, which may be due to atelectasis and/or pneumonia.        Electronically Signed: Baylee Abbottley    4/12/2025 4:19 PM EDT    Workstation ID: [id number]    CT Abdomen Pelvis Without Contrast [[id number]] Collected: 04/10/25 1527     Updated: 04/10/25 1536    Narrative:      CT ABDOMEN PELVIS WO CONTRAST    Date of Exam: 4/10/2025 3:21 PM EDT    Indication: N/V, left flank pain.    Comparison: Chest CT 3/23/2025    Technique: Axial CT images were obtained of the abdomen and pelvis without the administration of contrast. Reconstructed coronal and sagittal images were also obtained. Automated exposure control and iterative construction methods were used.      Findings:    Included Chest: Bibasal atelectasis    Liver: No significant abnormality.     Gallbladder and biliary tree: No significant abnormality.     Spleen: No significant abnormality.     Pancreas: No significant abnormality.     Adrenal glands: Nonspecific and unchanged mild bilateral adrenal gland thickening.     Kidneys and ureters: No significant abnormality. No hydroureteronephrosis. No radiopaque calculi seen.     Stomach and duodenum:No significant abnormality.    Small and large bowel: Colonic diverticulosis. No evidence of bowel obstruction. Appendicoliths within an otherwise normal-appearing appendix. No significant pericolonic inflammatory changes seen.    Peritoneal cavity: No free fluid or free air.    Bladder: No significant abnormality.     Pelvic organs: No significant abnormality.     Vasculature: Atherosclerotic calcifications.     Lymph nodes: No pathologic appearing lymph nodes by imaging criteria.     Bones and soft tissues: Degenerative changes of the imaged spine. No acute osseous abnormality.      Impression:      Impression:  No acute findings in the abdomen or pelvis.      Electronically Signed: Gregg Colindres     4/10/2025 3:32 PM EDT    Workstation ID: [id number]            Results for orders placed during the hospital encounter of 04/10/25    Adult Transthoracic Echo Complete W/ Cont if Necessary Per Protocol    Interpretation Summary    Left ventricular systolic function is normal. Estimated left ventricular EF = 60%    No significant valvular abnormalities.          Discharge Details        Discharge Medications        PAUSE taking these medications        Instructions Start Date   metFORMIN 500 MG tablet  Wait to take this until your doctor or other care provider tells you to start again.  Commonly known as: GLUCOPHAGE   TAKE 2 TABLETS BY MOUTH ONCE DAILY WITH FOOD             New Medications        Instructions Start Date   acetaminophen 325 MG tablet  Commonly known as: TYLENOL   650 mg, Oral, Every 4 Hours PRN      insulin glargine 100 UNIT/ML injection  Commonly known as: LANTUS, SEMGLEE  Replaces: Lantus SoloStar 100 UNIT/ML injection pen   20 Units, Subcutaneous, Nightly      insulin glargine 100 UNIT/ML injection  Commonly known as: LANTUS, SEMGLEE   15 Units, Subcutaneous, Daily   Start Date: April 26, 2025     Insulin Lispro 100 UNIT/ML injection  Commonly known as: humaLOG   6 Units, Subcutaneous, 3 Times Daily With Meals      Insulin Lispro 100 UNIT/ML injection  Commonly known as: humaLOG   2-9 Units, Subcutaneous, 4 Times Daily Before Meals & Nightly      ipratropium-albuterol 0.5-2.5 mg/3 ml nebulizer  Commonly known as: DUO-NEB   3 mL, Nebulization, 4 Times Daily PRN      melatonin 5 MG tablet tablet   5 mg, Oral, Nightly PRN      polyethylene glycol 17 g packet  Commonly known as: MIRALAX   17 g, Oral, Daily PRN      saccharomyces boulardii 250 MG capsule  Commonly known as: FLORASTOR   250 mg, Oral, 2 Times Daily      sennosides-docusate 8.6-50 MG per tablet  Commonly known as: PERICOLACE   2 tablets, Oral, 2 Times Daily             Changes to Medications        Instructions Start Date   famotidine 20 MG  "tablet  Commonly known as: PEPCID  What changed:   medication strength  how much to take  when to take this   20 mg, Oral, Nightly      hydrOXYzine pamoate 25 MG capsule  Commonly known as: VISTARIL  What changed:   medication strength  how much to take   25 mg, Oral, 3 Times Daily PRN             Continue These Medications        Instructions Start Date   Accu-Chek Softclix Lancets lancets   USE 1  TO CHECK GLUCOSE 4 times a day DAILY dx e11.65 on insulin      OneTouch Delica Lancets 33G misc   Use to test blood sugar twice daily and as needed for symptoms of low blood sugar      atorvastatin 40 MG tablet  Commonly known as: LIPITOR   1 tablet, Daily      B-D UF III MINI PEN NEEDLES 31G X 5 MM misc  Generic drug: Insulin Pen Needle   1 Units, Subcutaneous, Daily      Blood Glucose Monitoring Suppl w/Device kit   Use daily to test blood sugars uses accu chek      busPIRone 15 MG tablet  Commonly known as: BUSPAR   1 tablet, Every 12 Hours Scheduled      citalopram 40 MG tablet  Commonly known as: CeleXA   40 mg, Oral, Daily      FreeStyle Sukhi 3 Plus Sensor   Not Applicable, Every 14 Days      FreeStyle Sukhi 3 Baudette device   1 Device, Not Applicable, 4 Times Daily      glucose 4 GM chewable tablet  Commonly known as: DEX4   16 g, Oral, As Needed      glucose monitor monitoring kit   Use as directed, E11.9      Insulin Syringe-Needle U-100 31G X 15/64\" 0.5 ML misc   Use 2 per day to administer insulin      Lancet Device misc   Use with glucometer up to QID as directed, E11.9, Z79.4      levothyroxine 200 MCG tablet  Commonly known as: SYNTHROID, LEVOTHROID   200 mcg, Oral, Daily      levothyroxine 25 MCG tablet  Commonly known as: SYNTHROID, LEVOTHROID   25 mcg, Oral, Every Early Morning      OneTouch Verio test strip  Generic drug: glucose blood   Use to test blood sugar twice daily and as needed for symptoms of low blood sugar      pantoprazole 40 MG EC tablet  Commonly known as: Protonix   40 mg, Oral, Daily   " Start Date: April 26, 2025     promethazine 12.5 MG tablet  Commonly known as: PHENERGAN   12.5 mg, Oral, Every 6 Hours PRN      risperiDONE 3 MG tablet  Commonly known as: risperDAL   3 mg, 2 Times Daily      Trelegy Ellipta 100-62.5-25 MCG/ACT inhaler  Generic drug: Fluticasone-Umeclidin-Vilant   1 puff, Inhalation, Daily      Ventolin  (90 Base) MCG/ACT inhaler  Generic drug: albuterol sulfate HFA   2 puffs, Inhalation, 4 Times Daily - RT             Stop These Medications      benztropine 0.5 MG tablet  Commonly known as: COGENTIN     ibuprofen 800 MG tablet  Commonly known as: ADVIL,MOTRIN     Lantus SoloStar 100 UNIT/ML injection pen  Generic drug: Insulin Glargine  Replaced by: insulin glargine 100 UNIT/ML injection     LORazepam 0.5 MG tablet  Commonly known as: ATIVAN     ondansetron 4 MG tablet  Commonly known as: Zofran              Allergies   Allergen Reactions    Doxepin Rash    Januvia [Sitagliptin] Other (See Comments)     Insomnia         Discharge Disposition:  Skilled Nursing Facility (DC - External)    Discharge Diet:  Diet Order   Procedures    Diet: Cardiac, Diabetic; Healthy Heart (2-3 Na+); Consistent Carbohydrate; No Mixed Consistencies, Feeding Assistance - Nursing; Texture: Mechanical Ground (NDD 2); Fluid Consistency: Nectar Thick       Discharge Activity:  Activity Instructions       Activity as Tolerated                CODE STATUS:    Code Status and Medical Interventions: CPR (Attempt to Resuscitate); Full Support   Ordered at: 04/10/25 2013     Code Status (Patient has no pulse and is not breathing):    CPR (Attempt to Resuscitate)     Medical Interventions (Patient has pulse or is breathing):    Full Support         Future Appointments   Date Time Provider Department Center   5/27/2025  2:15 PM Selina Lopez MD [facility]   9/19/2025  9:45 AM Vanessa Kaur MD [facility]       Additional Instructions for the Follow-ups that You Need to Schedule        Discharge Follow-up with PCP   As directed       Currently Documented PCP:    Vanessa Kaur MD    PCP Phone Number:    [phone]     Follow Up Details: to be seen by provider at facility on arrival, PCP 1 week of discharge              Electronically signed by ZIA Avery, 04/25/25, 1:02 PM EDT.      Time Spent on Discharge: I spent 45 minutes on this discharge activity which included: face-to-face encounter with the patient, reviewing the data in the system, coordination of the care with the nursing staff as well as consultants, documentation, and entering orders.        Electronically signed by Guerrero Archer MD at 04/26/25 0679

## 2025-05-22 ENCOUNTER — TELEPHONE (OUTPATIENT)
Dept: INTERNAL MEDICINE | Facility: CLINIC | Age: 55
End: 2025-05-22
Payer: MEDICARE

## 2025-05-22 NOTE — TELEPHONE ENCOUNTER
Caller: [facility]    Relationship: Home Health    Best call back number: [phone]      What was the call regarding: HOME HEALTH WANTS TO KNOW IF DR GEE IS GOING TO BE PCP FOR FOLLOWING HOME HEALTH    Is it okay if the provider responds through MyChart:

## 2025-05-27 ENCOUNTER — OFFICE VISIT (OUTPATIENT)
Dept: ENDOCRINOLOGY | Facility: CLINIC | Age: 55
End: 2025-05-27
Payer: MEDICARE

## 2025-05-27 VITALS
HEART RATE: 100 BPM | HEIGHT: 66 IN | WEIGHT: 209 LBS | DIASTOLIC BLOOD PRESSURE: 70 MMHG | SYSTOLIC BLOOD PRESSURE: 110 MMHG | BODY MASS INDEX: 33.59 KG/M2

## 2025-05-27 DIAGNOSIS — E03.8 OTHER SPECIFIED HYPOTHYROIDISM: ICD-10-CM

## 2025-05-27 DIAGNOSIS — E11.9 TYPE 2 DIABETES MELLITUS WITHOUT COMPLICATION, WITH LONG-TERM CURRENT USE OF INSULIN: Primary | ICD-10-CM

## 2025-05-27 DIAGNOSIS — Z79.4 TYPE 2 DIABETES MELLITUS WITHOUT COMPLICATION, WITH LONG-TERM CURRENT USE OF INSULIN: Primary | ICD-10-CM

## 2025-05-27 LAB
EXPIRATION DATE: ABNORMAL
GLUCOSE BLDC GLUCOMTR-MCNC: 174 MG/DL (ref 70–130)
Lab: ABNORMAL

## 2025-05-27 PROCEDURE — 3044F HG A1C LEVEL LT 7.0%: CPT | Performed by: INTERNAL MEDICINE

## 2025-05-27 PROCEDURE — 82947 ASSAY GLUCOSE BLOOD QUANT: CPT | Performed by: INTERNAL MEDICINE

## 2025-05-27 PROCEDURE — 99214 OFFICE O/P EST MOD 30 MIN: CPT | Performed by: INTERNAL MEDICINE

## 2025-05-27 PROCEDURE — 1159F MED LIST DOCD IN RCRD: CPT | Performed by: INTERNAL MEDICINE

## 2025-05-27 PROCEDURE — 1160F RVW MEDS BY RX/DR IN RCRD: CPT | Performed by: INTERNAL MEDICINE

## 2025-05-27 NOTE — PROGRESS NOTES
Vilma Ayala 54 y.o.  CC: follow up II Diabetes, hyperlipidemia, hypothyroid     [place]: follow up II Diabetes, Hyperlipidemia and Hypothyroid     Is taking lantus and novolog   Previously 70/30 but having nocturnal low sugar  Trial mounjaro with nausea and vomiting at starting dose  Hospitalized [facility] with pulm congestion, leukocytosis and UTI 4/25  Aspiration was seen during swallowing study done - monitored and cleared by speech therapy   Also developed CB with bumex- normal ef by echo  BP is good today   A1c 4/10 was 6.8%  Tremor worse- taking medication but [facility] has been out of it for the past week  Recent podiatry visit     Allergies   Allergen Reactions    Doxepin Rash    Januvia [Sitagliptin] Other (See Comments)     Insomnia       Current Outpatient Medications:     Accu-Chek Softclix Lancets lancets, USE 1  TO CHECK GLUCOSE 4 times a day DAILY dx e11.65 on insulin, Disp: 400 each, Rfl: 3    acetaminophen (TYLENOL) 325 MG tablet, Take 2 tablets by mouth Every 4 (Four) Hours As Needed for Mild Pain., Disp: , Rfl:     atorvastatin (LIPITOR) 40 MG tablet, Take 1 tablet by mouth Daily., Disp: , Rfl:     B-D UF III MINI PEN NEEDLES 31G X 5 MM misc, Inject 1 Units under the skin into the appropriate area as directed Daily., Disp: 100 each, Rfl: 1    Blood Glucose Monitoring Suppl w/Device kit, Use daily to test blood sugars uses accu chek, Disp: 1 each, Rfl: 0    busPIRone (BUSPAR) 15 MG tablet, Take 1 tablet by mouth Every 12 (Twelve) Hours., Disp: , Rfl:     citalopram (CeleXA) 40 MG tablet, Take 1 tablet by mouth Daily., Disp: 90 tablet, Rfl: 1    Continuous Glucose  (FreeStyle Sukhi 3 [place]) device, Use 1 Device 4 (Four) Times a Day., Disp: 1 each, Rfl: 3    Continuous Glucose Sensor (FreeStyle Sukhi 3 Plus Sensor), Use Every 14 (Fourteen) Days., Disp: 2 each, Rfl: 5    famotidine (PEPCID) 20 MG tablet, Take 1 tablet by mouth Every Night., Disp: , Rfl:     glucose (DEX4) 4 GM chewable tablet,  "Chew 4 tablets As Needed for Low Blood Sugar., Disp: 20 tablet, Rfl: 12    glucose monitor monitoring kit, Use as directed, E11.9, Disp: 1 each, Rfl: 0    hydrOXYzine pamoate (VISTARIL) 25 MG capsule, Take 1 capsule by mouth 3 (Three) Times a Day As Needed for Itching., Disp: , Rfl:     insulin glargine (LANTUS, SEMGLEE) 100 UNIT/ML injection, Inject 20 Units under the skin into the appropriate area as directed Every Night., Disp: , Rfl:     Insulin Lispro (humaLOG) 100 UNIT/ML injection, Inject 2-9 Units under the skin into the appropriate area as directed 4 (Four) Times a Day Before Meals & at Bedtime., Disp: , Rfl:     Insulin Syringe-Needle U-100 31G X 15/64\" 0.5 ML misc, Use 2 per day to administer insulin, Disp: 200 each, Rfl: 0    ipratropium-albuterol (DUO-NEB) 0.5-2.5 mg/3 ml nebulizer, Take 3 mL by nebulization 4 (Four) Times a Day As Needed for Shortness of Air., Disp: , Rfl:     Lancet Device [facility], Use with glucometer up to QID as directed, E11.9, Z79.4, Disp: 400 each, Rfl: 1    levothyroxine (SYNTHROID, LEVOTHROID) 200 MCG tablet, Take 1 tablet by mouth Daily., Disp: 90 tablet, Rfl: 0    levothyroxine (SYNTHROID, LEVOTHROID) 25 MCG tablet, Take 1 tablet by mouth Every Morning., Disp: 90 tablet, Rfl: 0    melatonin 5 MG tablet tablet, Take 1 tablet by mouth At Night As Needed (insomnia)., Disp: , Rfl:     [Paused] metFORMIN (GLUCOPHAGE) 500 MG tablet, TAKE 2 TABLETS BY MOUTH ONCE DAILY WITH FOOD, Disp: 60 tablet, Rfl: 5    OneTouch Delica Lancets 33G misc, Use to test blood sugar twice daily and as needed for symptoms of low blood sugar, Disp: 200 each, Rfl: 1    OneTouch Verio test strip, Use to test blood sugar twice daily and as needed for symptoms of low blood sugar, Disp: 200 each, Rfl: 1    pantoprazole (Protonix) 40 MG EC tablet, Take 1 tablet by mouth Daily for 326 doses., Disp: , Rfl:     polyethylene glycol (MIRALAX) 17 g packet, Take 17 g by mouth Daily As Needed (Use if senna-docusate is " ineffective)., Disp: , Rfl:     promethazine (PHENERGAN) 12.5 MG tablet, Take 1 tablet by mouth Every 6 (Six) Hours As Needed for Nausea or Vomiting., Disp: 30 tablet, Rfl: 2    risperiDONE (risperDAL) 3 MG tablet, Take 1 tablet by mouth 2 (Two) Times a Day., Disp: , Rfl:     saccharomyces boulardii (FLORASTOR) 250 MG capsule, Take 1 capsule by mouth 2 (Two) Times a Day., Disp: , Rfl:     sennosides-docusate (PERICOLACE) 8.6-50 MG per tablet, Take 2 tablets by mouth 2 (Two) Times a Day., Disp: , Rfl:     Trelegy Ellipta 100-62.5-25 MCG/ACT inhaler, INHALE 1 PUFF ONCE DAILY, Disp: 180 each, Rfl: 0    Ventolin  (90 Base) MCG/ACT inhaler, Inhale 2 puffs 4 (Four) Times a Day. (Patient taking differently: Inhale 2 puffs Every 6 (Six) Hours As Needed for Wheezing.), Disp: 18 g, Rfl: 0    Patient Active Problem List    Diagnosis     Acute kidney injury superimposed on CKD [N17.9, N18.9]     Acute kidney injury superimposed on chronic kidney disease [N17.9, N18.9]     Diarrhea [R19.7]     Dehydration [E86.0]     Essential tremor [G25.0]     Painful urination [R30.9]     Nausea [R11.0]     UTI (urinary tract infection) [N39.0]     NATASHA (obstructive sleep apnea) [G47.33]     Medicare annual wellness visit, subsequent [Z00.00]     Vitamin D deficiency [E55.9]     Obesity (BMI 35.0-39.9 without comorbidity) [E66.9]     Drug-induced parkinsonism [G21.19]     Encounter to establish care [Z76.89]     Mild persistent asthma [J45.30]     Decreased GFR [R94.4]     Microscopic hematuria [R31.29]     Nocturia [R35.1]     Nocturnal enuresis [N39.44]     Stress incontinence [N39.3]     Urge incontinence [N39.41]     Urinary incontinence [R32]     Type 2 diabetes mellitus without complication, with long-term current use of insulin [E11.9, Z79.4]     Dyspnea on exertion [R06.09]     Mixed hyperlipidemia [E78.2]     Anxiety and depression [F41.9, F32.A]     Uncontrolled type 2 diabetes mellitus [[id number]]     Other specified  "hypothyroidism [E03.8]     Obesity [E66.9]     Vitamin D deficiency, unspecified [E55.9]     Diabetic neuropathy, type II diabetes mellitus [E11.40]      Review of Systems   Constitutional:  Positive for activity change and appetite change. Negative for unexpected weight change.   HENT:  Negative for congestion and rhinorrhea.    Eyes:  Negative for visual disturbance.   Respiratory:  Negative for cough and shortness of breath.    Cardiovascular:  Positive for leg swelling. Negative for palpitations.   Gastrointestinal:  Negative for constipation, diarrhea and nausea.   Genitourinary:  Negative for hematuria.   Musculoskeletal:  Negative for arthralgias, back pain, gait problem, joint swelling and myalgias.   Skin:  Negative for color change, rash and wound.   Allergic/Immunologic: Negative for environmental allergies, food allergies and immunocompromised state.   Neurological:  Positive for tremors and weakness. Negative for dizziness and light-headedness.   Psychiatric/Behavioral:  Negative for confusion, decreased concentration, dysphoric mood and sleep disturbance. The patient is not nervous/anxious.      Social History     Socioeconomic History    Marital status: Single   Tobacco Use    Smoking status: Never    Smokeless tobacco: Never   Vaping Use    Vaping status: Never Used   Substance and Sexual Activity    Alcohol use: No    Drug use: No    Sexual activity: Not Currently     Family History   Problem Relation Age of Onset    Hypertension Other     Thyroid disease Other     Diabetes Other     Obesity Other     Diabetes Mother     Hypertension Mother     Heart disease Mother     Cancer Father     Diabetes Sister     Diabetes Brother     No Known Problems Brother      /70   Pulse 100   Ht 167.6 cm (65.98\")   Wt 94.8 kg (209 lb)   LMP  (LMP Unknown)   BMI 33.75 kg/m²   Physical Exam  Vitals and nursing note reviewed.   Constitutional:       Appearance: Normal appearance. She is well-developed. "   HENT:      Head: Normocephalic and atraumatic.   Eyes:      General: Lids are normal.      Conjunctiva/sclera: Conjunctivae normal.      Pupils: Pupils are equal, round, and reactive to light.   Neck:      Thyroid: No thyroid mass or thyromegaly.      Vascular: No carotid bruit.      Trachea: Trachea normal. No tracheal deviation.   Cardiovascular:      Rate and Rhythm: Normal rate and regular rhythm.      Heart sounds: Normal heart sounds. No murmur heard.     No friction rub. No gallop.   Pulmonary:      Effort: Pulmonary effort is normal. No respiratory distress.      Breath sounds: Normal breath sounds. No wheezing.   Musculoskeletal:         General: No deformity. Normal range of motion.      Cervical back: Normal range of motion and neck supple.      Right lower leg: Edema present.      Left lower leg: Edema present.   Lymphadenopathy:      Cervical: No cervical adenopathy.   Skin:     General: Skin is warm and dry.      Findings: No erythema or rash.      Nails: There is no clubbing.   Neurological:      Mental Status: She is alert and oriented to person, place, and time.      Cranial Nerves: No cranial nerve deficit.      Gait: Gait abnormal.      Deep Tendon Reflexes: Reflexes abnormal.      Comments: Tremor    Psychiatric:         Speech: Speech normal.         Behavior: Behavior normal.         Thought Content: Thought content normal.         Judgment: Judgment normal.       Results for orders placed or performed in visit on 05/27/25   POC Glucose, Blood    Collection Time: 05/27/25  2:07 PM    Specimen: Blood   Result Value Ref Range    Glucose 174 (A) 70 - 130 mg/dL    Lot Number [id number]     Expiration Date 08/30/2025      Diagnoses and all orders for this visit:    1. Type 2 diabetes mellitus without complication, with long-term current use of insulin (Primary)  Assessment & Plan:  A1c reviewed  Results for orders placed or performed in visit on 05/27/25   POC Glucose, Blood    Collection Time:  05/27/25  2:07 PM    Specimen: Blood   Result Value Ref Range    Glucose 174 (A) 70 - 130 mg/dL    Lot Number [id number]     Expiration Date 08/30/2025      Intol 70/30- nocturnal low sugar  Is taking lantus 20 u daily   Goal morning sugar 120 or less, day time less than 200 discussed  Discussed use of sensor and she does not want to do this   Update eye exam  No foot lesion   Ur alb due   F/u 3-4 months     Orders:  -     POC Glucose, Blood    2. Other specified hypothyroidism  Assessment & Plan:  Recent normal tsh - taking levothyroxine 225 mcg daily   Continue monitoring       Return in about 4 months (around 9/27/2025) for Recheck.    Electronically signed by: Selina Lopez MD

## 2025-05-28 NOTE — ASSESSMENT & PLAN NOTE
A1c reviewed  Results for orders placed or performed in visit on 05/27/25   POC Glucose, Blood    Collection Time: 05/27/25  2:07 PM    Specimen: Blood   Result Value Ref Range    Glucose 174 (A) 70 - 130 mg/dL    Lot Number [id number]     Expiration Date 08/30/2025      Intol 70/30- nocturnal low sugar  Is taking lantus 20 u daily   Goal morning sugar 120 or less, day time less than 200 discussed  Discussed use of sensor and she does not want to do this   Update eye exam  No foot lesion   Ur alb due   F/u 3-4 months

## 2025-05-29 ENCOUNTER — TELEPHONE (OUTPATIENT)
Dept: ENDOCRINOLOGY | Facility: CLINIC | Age: 55
End: 2025-05-29
Payer: MEDICARE

## 2025-05-29 ENCOUNTER — TELEPHONE (OUTPATIENT)
Dept: INTERNAL MEDICINE | Facility: CLINIC | Age: 55
End: 2025-05-29
Payer: MEDICARE

## 2025-05-29 ENCOUNTER — PRIOR AUTHORIZATION (OUTPATIENT)
Dept: INTERNAL MEDICINE | Facility: CLINIC | Age: 55
End: 2025-05-29
Payer: MEDICARE

## 2025-05-29 DIAGNOSIS — Z79.4 TYPE 2 DIABETES MELLITUS WITHOUT COMPLICATION, WITH LONG-TERM CURRENT USE OF INSULIN: ICD-10-CM

## 2025-05-29 DIAGNOSIS — E11.9 TYPE 2 DIABETES MELLITUS WITHOUT COMPLICATION, WITH LONG-TERM CURRENT USE OF INSULIN: ICD-10-CM

## 2025-05-29 RX ORDER — LANCETS 33 GAUGE
1 EACH MISCELLANEOUS DAILY
Qty: 100 EACH | Refills: 3 | Status: SHIPPED | OUTPATIENT
Start: 2025-05-29

## 2025-05-29 RX ORDER — GLUCOSAMINE HCL/CHONDROITIN SU 500-400 MG
CAPSULE ORAL
Qty: 100 EACH | Refills: 3 | Status: SHIPPED | OUTPATIENT
Start: 2025-05-29

## 2025-05-29 RX ORDER — BLOOD SUGAR DIAGNOSTIC
STRIP MISCELLANEOUS
Qty: 200 EACH | Refills: 3 | Status: SHIPPED | OUTPATIENT
Start: 2025-05-29 | End: 2025-05-29 | Stop reason: SDUPTHER

## 2025-05-29 NOTE — TELEPHONE ENCOUNTER
I, Hannah Ledesma, attest that I performed the duties of a scribe for this entire encounter in the presence of Dr. Ariel Washington and the patient.     HISTORY OF PRESENT ILLNESS  Lenny is a 68 year old male, here for a Medicare subsequent annual wellness visit and follow-up of his chronic medical problems. His blood pressure is 134/76 on Benicar 5 mg 2 tablets daily. His weight is 221 with a BMI of 33.6. The patient is no longer working because his 90 year old Mother is now living with him. We are monitoring his carotid stenosis. His coronary artery disease is stable and followed by Dr. Ward. The patient is on Coreg 25 mg twice a day and Lasix 40 mg a day for ischemic cardiomyopathy. He is status post ICD and is followed by Dr. Blackburn. An Echocardiogram was done on 01/23/19 with a LVEF of 52%. He is on Coumadin for chronic atrial fibrillation and is followed by the [facility]. The patient denies any palpitations. He wants to stay on Coumadin. He has a history of peptic ulcer disease and denies any dyspepsia. He is using Singulair 10 mg a day and Flonase nasal spray 2 sprays daily for rhinitis and complains of bilateral ear pressure and pain. We will consult Dr. Sykes in ENT for evaluation of bilateral ear pain. His chronic obstructive pulmonary disease is stable on Symbicort 80/4.5 mcg 2 puffs twice a day. His hyperlipidemia is being treated with Crestor 10 mg a day. A fasting lipid panel, PSA and [id number] will be scheduled because he is due. The patient will be notified with results. He is taking an Aspirin 81 mg daily. The patient is tolerating all of his medication well. I will see him back in 6 months with a pre-clinic basic metabolic panel, lipid panel, liver panel and [id number]. He was instructed to contact our office with any questions or concerns.    On other review of systems: The patient complains of chronic left shoulder pain. He has full range of motion. He denies any neck pain. We  PA submitted via cover my meds for FreeStyle Sukhi 3 Plus. KEY :[id number].   Awaiting Determination.    will consult Dr. Pritchett in Orthopedics for left shoulder pain. The patient has had a change in the caliper of his stool. It is now pencil size and he has increased frequency of bowel movements. We will consult Gastroenterology for change in bowel movements.     MEDICATIONS  Current Outpatient Medications   Medication Sig   • furosemide (LASIX) 40 MG tablet Take 1 tab (40 mg) in the am and half tab (20 mg) in the pm   • olmesartan (BENICAR) 5 MG tablet Take 2 tablets by mouth daily.   • rosuvastatin (CRESTOR) 10 MG tablet Take 1 tablet by mouth daily.   • carvedilol (COREG) 25 MG tablet Take 1 tablet by mouth 2 times daily (with meals).   • potassium chloride (KLOR-CON, K-TAB) 10 MEQ ER tablet Take 1 tablet by mouth daily.   • warfarin (COUMADIN) 5 MG tablet Take 1 tablet every Sun, Tues, Thurs, and Friday; or as directed by Coumadin Clinic. Uses in conjunction with 2.5 mg tablet.   • warfarin (COUMADIN) 2.5 MG tablet Take 1 tablet on Mondays, Wednesdays, and Saturdays. Or take as directed. Use with a 5 mg tablet.   • montelukast (SINGULAIR) 10 MG tablet Take 1 tablet by mouth every evening.   • niacin (NIASPAN) 500 MG CR tablet Take 1 tablet by mouth 3 times daily.   • blood glucose test strip Test blood sugar 2 times daily as directed. Diagnosis: E11.9. Meter: one touch   • fluticasone (FLONASE) 50 MCG/ACT nasal spray Spray 2 sprays in each nostril daily.   • magnesium oxide (MAG-OX) 400 MG tablet Take 1 tablet by mouth daily.   • vitamin B-12 (CYANOCOBALAMIN) 1000 MCG tablet Take 1,000 mcg by mouth daily.   • aspirin (ECOTRIN) 81 MG EC tablet Take 1 tablet by mouth daily.   • Ascorbic Acid (VITAMIN C) 500 MG tablet Take 500 mg by mouth daily.     • cholecalciferol (VITAMIN D3) 1000 UNIT tablet Take 1,000 Units by mouth daily.   • Multiple Vitamins-Minerals (MULTIVITAL) tablet Take 1 tablet by mouth daily.   • triamcinolone (ARISTOCORT) 0.1 % ointment Apply to the affected areas twice daily for 2 weeks as needed for  flares.   • SYMBICORT 80-4.5 MCG/ACT inhaler      No current facility-administered medications for this visit.      ALLERGIES: no known allergies.    HISTORY  Past Medical History:   Diagnosis Date   • Abnormal echocardiogram 03/21/2012   • Abnormal right ventricle 2013    Moderately decreased systolic function   • Anemia    • Aortic regurgitation 2013    Mild   • Aortic valve calcification 2013    Moderate   • Arthritis 07/11/2015    Patient states intermittent knee pain after working from knee up to hip   • Atrial dilatation, left     Severe   • Atypical angina (CMS/[facility])    • Cardiac LV ejection fraction 10-20% 03/21/2012    20% --> 55% (2015)   • Cataracts, bilateral    • Coronary artery disease 1995   • Diabetes mellitus type 2, uncontrolled (CMS/[facility]) 2012   • Duodenal ulcer 6/2014   • Hyperlipidemia    • Hypertension     borderline   • Ischemic cardiomyopathy    • Left ventricular dilatation 2013    Moderate   • Left ventricular hypertrophy 2013    Mild   • MI (myocardial infarction) (CMS/[facility])    • Obstructive sleep apnea     c-pap   • Persistent atrial fibrillation (CMS/[facility]) 1995    s/p failed cardioversion x3, failed ablation, AICD   • Pulmonary nodules     small   • S/P cardiac catheterization     Left   • S/P carotid endarterectomy Bilateral   • Sinusitis, chronic    • Status post ablation of atrial fibrillation     Left     Past Surgical History:   Procedure Laterality Date   • Cardiac defibrillator placement  10/2010    medtronic   • Cardioversion external  2010   • Colonoscopy  6/8/2014    internal hemorrhoid  diverticula   • Coronary angiogram - cv  5/29/2013   • Coronary angioplasty with stent placement  09/26/12     3.5x15mm bare metal Integrity stent  SVG to PDA   • Coronary artery bypass graft  1995    4 vessel   • Echocardiogram     • Ep ablation  7/2010    • Esophagogastroduodenoscopy  6/8/2014    duodenal ulcer   • Eye surgery Right 02/27/2018   • Open coronary endarterectomy  bilateral 1995   •  Reoper, carotid endartec>1 mon      bilateral   • Stress echocardiogram  13    Ed     Family History   Problem Relation Age of Onset   • Heart disease Father         abdominal aortic aneurysm,coronary artery and bypass surgery   • Cancer Mother         had lymphoma   • Heart disease Sister         CAD Stent     Social History     Socioeconomic History   • Marital status: /Civil Union     Spouse name: Darlin   • Number of children: 3   • Years of education: Not on file   • Highest education level: Not on file   Social Needs   • Financial resource strain: Not on file   • Food insecurity - worry: Not on file   • Food insecurity - inability: Not on file   • Transportation needs - medical: Not on file   • Transportation needs - non-medical: Not on file   Occupational History   • Occupation: Retired     Tobacco Use   • Smoking status: Former Smoker     Types: Cigarettes     Last attempt to quit: 1995     Years since quittin.1   • Smokeless tobacco: Never Used   Substance and Sexual Activity   • Alcohol use: No     Alcohol/week: 0.0 oz   • Drug use: No   • Sexual activity: Not on file   Other Topics Concern   • Not on file   Social History Narrative   • Not on file     Social History     Tobacco Use   Smoking Status Former Smoker   • Types: Cigarettes   • Last attempt to quit: 1995   • Years since quittin.1   Smokeless Tobacco Never Used     Immunization History   Administered Date(s) Administered   • Hep B, adult 2018   • Influenza, high dose seasonal, preservative-free 2018   • Influenza, injectable, quadrivalent 10/11/2018   • Influenza, seasonal, injectable, trivalent 2010, 2014, 10/01/2015, 10/19/2016   • Pneumococcal Conjugate 13 valent 2016   • Pneumococcal polysaccharide, adult, 23 valent 02/15/2017   • Shingles Zoster (Shingrix) 2019   • Tdap 2016   • Zoster Shingles 2016       REVIEW OF SYSTEMS  Pertinent positives  in History of Present Illness.  All other review of systems reviewed and negative.  I did a depression screen on the patient; he does not feel down, depressed or hopeless.  Denies anhedonia.  The patient's get up and go test was normal.  No unsteadiness.  Vision followed by ophthalmologist, including regular screening for glaucoma.      CURRENT PROVIDERS  Patient Care Team:  Ariel Washington MD as PCP - General (Internal Medicine)  Junior Butt MD (Gastroenterology)  CRISTI Gillis as Nurse Practitioner (Cardiology)  Daisha Blackburn MD as Electrophysiologist (Internal Medicine - Clinical Cardiac Electrophysiology)    FUNCTIONAL ABILITY AND SAFETY SCREEN  He is not working.  He lives at home.  He denies needing any help with phone, transportation, shopping or preparing meals.  Patient remains active and independent.  He does not have any dangerous situations in his home.  No need for grab bars or additional lighting. No risk factors for Hepatitis B.  We talked about advanced directives and he understands the importance of having one.  No detection of cognitive impairment by direct observation.     PHYSICAL EXAMINATION  VITAL SIGNS:    Visit Vitals  /76   Pulse 91   Resp 16   Ht 5' 8\" (1.727 m)   Wt 100.4 kg   SpO2 96%   BMI 33.64 kg/m²   .  GENERAL:  Well-developed male who appears stated age.  He is alert, oriented x3, and in no acute distress.  Non-ill appearing.     SKIN:  Dry.  No rashes.   LYMPH NODES:  No cervical or supraclavicular adenopathy.   HEENT:  Head normocephalic.  Ears are normal bilaterally.  Tympanic membranes are intact and external auditory canals normal.  Hearing within normal limits on whisper test; hearing aids, none.  Extraocular movements intact.  Eyes - Pupils are equal, round, reactive to light and accommodation.  Conjunctivae are clear.  Posterior pharynx without erythema or exudate.    NECK:  Supple, no palpable abnormalities.  Thyroid midline and symmetric.   Carotid upstrokes equal bilaterally, no bruit.   LUNGS:  Clear to auscultation.  Breath sounds equal bilaterally.   HEART:  S1 and S2 regular.  No murmur.   ABDOMEN:  Soft and nontender.  No masses.  No hepatomegaly.  No splenomegaly.   MALE GENITALIA:  Normal external male genitalia.  Testicles equal bilaterally, no hernias.   RECTAL:  Normal sphincter tone.  Prostate 3+ in size, nontender, no nodules.  Stool is guaiac negative.   EXTREMITIES:  No edema, cyanosis or clubbing.        LABS  Anti-Coag on 02/13/2019   Component Date Value Ref Range Status   • INR-POC 02/13/2019 2.3  2.0 - 3.0 Final        IMPRESSION   1. Medicare subsequent annual wellness visit.  2. Health maintenance topics reviewed.   3. Influenza and pneumonia vaccines reviewed and up to date.   4. Hypertension, at goal.  5. Obesity.  6. Coronary artery disease, Dr. Ward.  7. Chronic atrial fibrillation, Coumadin Clinic.  8. Ischemic cardiomyopathy, status post ICD-Dr. Blackburn.  9. History of peptic ulcer disease.  10. Non-insulin dependent diabetes mellitus, diet controlled.  11. Rhinitis, bilateral ear pressure and pain.  12. Hyperlipidemia, on Crestor 10 mg.  13. Chronic obstructive pulmonary disease, stable.  14. Left shoulder pain, chronic.  15. Change in bowel movements, caliper of stool.    PLAN  Lipid panel, PSA and [id number] today. Patient will be notified with results.  ENT consultation with Dr. Sykes for bilateral ear pain.  Gastroenterology consultation for change in bowel movements.  Orthopedic consultation with Dr. Pritchett for left shoulder pain.  Contact our office with any questions or concerns.  Patient should return to clinic in 6 months, or sooner as needed with a pre-clinic basic metabolic panel, lipid panel, liver panel and [id number].    On 02/19/19, Hannah BUITRAGO scribed the services personally performed by Ariel Washington MD.         The documentation recorded by the scribe accurately and completely reflects  the service(s) I personally performed and the decisions made by me.

## 2025-05-29 NOTE — TELEPHONE ENCOUNTER
There was another message asking for one touch test strips which I sent.  I will send in another rx for meter/strips/lancets

## 2025-05-29 NOTE — TELEPHONE ENCOUNTER
FAX REQUEST INSURANCE REQUIRES ONETOUCH BRAND. PHARMACY NEEDS PRESCRIPTION FOR ONE TOUCH TEST STRIPS.

## 2025-05-29 NOTE — TELEPHONE ENCOUNTER
[facility] pharmacy called, stated that they received ascript yesterday for accucheck test strips. Patients no longer covers accucheck, but will cover one touch. Pharmacy is wanting scripts for new one touch meter, test strips, and lancets.

## 2025-05-29 NOTE — TELEPHONE ENCOUNTER
Vanessa Chamorro from [facility] called needing verbal order for speech therapy.  1 wk 3    [phone]

## 2025-06-04 ENCOUNTER — TELEPHONE (OUTPATIENT)
Dept: INTERNAL MEDICINE | Facility: CLINIC | Age: 55
End: 2025-06-04
Payer: MEDICARE

## 2025-06-04 NOTE — TELEPHONE ENCOUNTER
Vanessa from [facility] called in and she stated that this patient was recently hospitalized due to silently aspirating. Patient returned to a regular diet without a swallow test and she wanted to know if Dr. Kaur could order an Outpatient Modified Barium Swallow Test for the patient at [facility]. She wanted to recheck to make sure that the patient isn't still silently aspirating. She wished to have a call back at [phone].

## 2025-06-06 DIAGNOSIS — T17.908A ASPIRATION INTO AIRWAY, INITIAL ENCOUNTER: Primary | ICD-10-CM

## 2025-06-07 ENCOUNTER — HOSPITAL ENCOUNTER (EMERGENCY)
Facility: HOSPITAL | Age: 55
Discharge: HOME | End: 2025-06-07
Attending: EMERGENCY MEDICINE
Payer: MEDICARE

## 2025-06-07 VITALS
BODY MASS INDEX: 35.45 KG/M2 | SYSTOLIC BLOOD PRESSURE: 119 MMHG | DIASTOLIC BLOOD PRESSURE: 83 MMHG | HEIGHT: 66 IN | WEIGHT: 220.6 LBS | HEART RATE: 95 BPM | TEMPERATURE: 98.2 F | RESPIRATION RATE: 20 BRPM | OXYGEN SATURATION: 99 %

## 2025-06-07 DIAGNOSIS — R25.1 TREMOR: Primary | ICD-10-CM

## 2025-06-07 PROCEDURE — 99283 EMERGENCY DEPT VISIT LOW MDM: CPT | Performed by: EMERGENCY MEDICINE

## 2025-06-07 PROCEDURE — 93005 ELECTROCARDIOGRAM TRACING: CPT | Performed by: PHYSICIAN ASSISTANT

## 2025-06-07 RX ORDER — HYDROXYZINE HYDROCHLORIDE 25 MG/1
25 TABLET, FILM COATED ORAL ONCE
Status: COMPLETED | OUTPATIENT
Start: 2025-06-07 | End: 2025-06-07

## 2025-06-07 RX ADMIN — HYDROXYZINE HYDROCHLORIDE 25 MG: 25 TABLET, FILM COATED ORAL at 16:28

## 2025-06-07 NOTE — FSED PROVIDER NOTE
Subjective   History of Present Illness  Patient is a 54-year-old female who presents emergency department complaining of tremors.  Patient reports that she has a history of tardive dyskinesia.  Patient reports that she was hospitalized in April for acute kidney injury.  She reports that during this they started her on Austedo for her tremors.  She reports after being discharged they put her on 30 mg ER once daily tablets, but states that she thought she was supposed to take them twice a day so she has been taking them twice a day for the last 2 weeks.  Patient reports that she only took a dose this morning, but noted that she had increased tremors this afternoon.  States that she became was concerned so she decided to come to the emergency department.  Patient denies chest pain, headache, neck pain, neck stiffness, numbness, tingling, loss of sensation, muscle pain, muscle tremors, fever, difficulty walking.  Patient has significant physical history of anxiety, depression, diabetes, hypertension    History provided by:  Patient   used: No        Review of Systems    Past Medical History:   Diagnosis Date    Anxiety     Chicken pox     Depression     Diabetes mellitus     Disease of thyroid gland     Measles     Type 2 diabetes mellitus        Allergies   Allergen Reactions    Doxepin Rash    Januvia [Sitagliptin] Other (See Comments)     Insomnia       Past Surgical History:   Procedure Laterality Date    EYE SURGERY      TONSILLECTOMY         Family History   Problem Relation Age of Onset    Hypertension Other     Thyroid disease Other     Diabetes Other     Obesity Other     Diabetes Mother     Hypertension Mother     Heart disease Mother     Cancer Father     Diabetes Sister     Diabetes Brother     No Known Problems Brother        Social History     Socioeconomic History    Marital status: Single   Tobacco Use    Smoking status: Never    Smokeless tobacco: Never   Vaping Use    Vaping  status: Never Used   Substance and Sexual Activity    Alcohol use: No    Drug use: No    Sexual activity: Not Currently           Objective   Physical Exam  Vitals and nursing note reviewed.   Constitutional:       Appearance: Normal appearance. She is normal weight.   HENT:      Head: Normocephalic.   Eyes:      Pupils: Pupils are equal, round, and reactive to light.   Cardiovascular:      Rate and Rhythm: Normal rate and regular rhythm.   Pulmonary:      Breath sounds: Normal breath sounds.   Abdominal:      General: Abdomen is flat.      Palpations: Abdomen is soft.      Tenderness: There is no abdominal tenderness.   Musculoskeletal:         General: Normal range of motion.      Cervical back: Normal range of motion.   Neurological:      General: No focal deficit present.      Mental Status: She is alert and oriented to person, place, and time. Mental status is at baseline.   Psychiatric:         Mood and Affect: Mood normal.         Behavior: Behavior normal.         Thought Content: Thought content normal.         Judgment: Judgment normal.         Procedures           ED Course                                           Medical Decision Making  54-year-old female presents emergency department complaining of tremors.  Differential diagnosis includes medication overdose, psychosomatic symptoms, anxiety.  On physical exam patient does have tremors, but they do stop when she is talking or focusing on an object.  EKG was performed.  EKG returned nonactionable.  Patient will be discharged home and told to discontinue the medication until she can follow-up appropriately.    Amount and/or Complexity of Data Reviewed  ECG/medicine tests: ordered. Decision-making details documented in ED Course.        Final diagnoses:   Tremor       ED Disposition  ED Disposition       ED Disposition   Discharge    Condition   Stable    Comment   --               Vanessa Kaur MD  [address]  [address]  [facility]  [address]  [phone]    Schedule an appointment as soon as possible for a visit in 2 days      [facility] EMERGENCY DEPARTMENT [place]  [address]  [facility] [address] [phone]  Go in 2 days  As needed, If symptoms worsen         Medication List        PAUSE taking these medications      metFORMIN 500 MG tablet  Wait to take this until your doctor or other care provider tells you to start again.  Commonly known as: GLUCOPHAGE  TAKE 2 TABLETS BY MOUTH ONCE DAILY WITH FOOD            Changed      Ventolin  (90 Base) MCG/ACT inhaler  Generic drug: albuterol sulfate HFA  Inhale 2 puffs 4 (Four) Times a Day.  What changed:   when to take this  reasons to take this

## 2025-06-07 NOTE — DISCHARGE INSTRUCTIONS
Please discontinue taking Austedo until you can follow-up with your primary care physician as you have been taking too much of this medication for the past 2 weeks.

## 2025-06-09 LAB
QT INTERVAL: 354 MS
QTC INTERVAL: 451 MS

## 2025-06-10 ENCOUNTER — HOSPITAL ENCOUNTER (EMERGENCY)
Facility: HOSPITAL | Age: 55
Discharge: HOME | End: 2025-06-10
Attending: EMERGENCY MEDICINE | Admitting: EMERGENCY MEDICINE
Payer: MEDICARE

## 2025-06-10 ENCOUNTER — PRIOR AUTHORIZATION (OUTPATIENT)
Dept: INTERNAL MEDICINE | Facility: CLINIC | Age: 55
End: 2025-06-10

## 2025-06-10 ENCOUNTER — TELEPHONE (OUTPATIENT)
Dept: INTERNAL MEDICINE | Facility: CLINIC | Age: 55
End: 2025-06-10

## 2025-06-10 ENCOUNTER — OFFICE VISIT (OUTPATIENT)
Dept: INTERNAL MEDICINE | Facility: CLINIC | Age: 55
End: 2025-06-10
Payer: MEDICARE

## 2025-06-10 VITALS
RESPIRATION RATE: 18 BRPM | WEIGHT: 208.6 LBS | TEMPERATURE: 97.1 F | BODY MASS INDEX: 33.52 KG/M2 | SYSTOLIC BLOOD PRESSURE: 128 MMHG | DIASTOLIC BLOOD PRESSURE: 84 MMHG | HEART RATE: 110 BPM | OXYGEN SATURATION: 96 % | HEIGHT: 66 IN

## 2025-06-10 VITALS
DIASTOLIC BLOOD PRESSURE: 107 MMHG | RESPIRATION RATE: 18 BRPM | OXYGEN SATURATION: 94 % | HEART RATE: 104 BPM | WEIGHT: 202 LBS | SYSTOLIC BLOOD PRESSURE: 146 MMHG | HEIGHT: 66 IN | BODY MASS INDEX: 32.47 KG/M2 | TEMPERATURE: 98.7 F

## 2025-06-10 DIAGNOSIS — E11.69 TYPE 2 DIABETES MELLITUS WITH OBESITY: ICD-10-CM

## 2025-06-10 DIAGNOSIS — E66.9 TYPE 2 DIABETES MELLITUS WITH OBESITY: ICD-10-CM

## 2025-06-10 DIAGNOSIS — G24.01 TARDIVE DYSKINESIA: Primary | ICD-10-CM

## 2025-06-10 DIAGNOSIS — F41.9 ANXIETY: ICD-10-CM

## 2025-06-10 PROCEDURE — 99283 EMERGENCY DEPT VISIT LOW MDM: CPT

## 2025-06-10 RX ORDER — DEUTETRABENAZINE 30 MG/1
1 TABLET, FILM COATED, EXTENDED RELEASE ORAL DAILY
COMMUNITY
Start: 2025-05-28 | End: 2025-06-10

## 2025-06-10 RX ORDER — DEUTETRABENAZINE 18 MG/1
18 TABLET, FILM COATED, EXTENDED RELEASE ORAL DAILY
Qty: 30 TABLET | Refills: 0 | Status: SHIPPED | OUTPATIENT
Start: 2025-06-10

## 2025-06-10 RX ORDER — CETIRIZINE HYDROCHLORIDE 10 MG/1
10 TABLET ORAL
COMMUNITY
Start: 2025-05-23

## 2025-06-10 RX ORDER — DIAZEPAM 5 MG/1
5 TABLET ORAL ONCE
Status: COMPLETED | OUTPATIENT
Start: 2025-06-10 | End: 2025-06-10

## 2025-06-10 RX ORDER — BENZTROPINE MESYLATE 1 MG/1
1 TABLET ORAL EVERY 12 HOURS SCHEDULED
COMMUNITY
Start: 2025-05-23

## 2025-06-10 RX ORDER — LORAZEPAM 0.5 MG/1
0.5 TABLET ORAL EVERY 12 HOURS SCHEDULED
COMMUNITY
Start: 2025-05-22

## 2025-06-10 RX ORDER — LAMOTRIGINE 25 MG/1
1 TABLET ORAL EVERY 12 HOURS SCHEDULED
COMMUNITY
Start: 2025-05-23

## 2025-06-10 RX ADMIN — DIAZEPAM 5 MG: 5 TABLET ORAL at 20:19

## 2025-06-10 NOTE — PATIENT INSTRUCTIONS
Please call our clinic today [phone] and let us know what dose of Risperdal you are taking and how many times a day you take it. We may need to decrease the dose and/or frequency.     We will restart Austedo. We are going to restart at a lower dose (18mg). This is a once daily medication.     We will need you to follow-up with your psychiatrist. When you call this afternoon, please provide their name in case I need to get in touch with them. I recommend reaching out them ASAP and requesting an urgent appointment for worsening tardive dyskinesia.

## 2025-06-10 NOTE — TELEPHONE ENCOUNTER
PA submitted through CoverMyMeds for Deutetrabenazine ER (Austedo XR) 18 MG     Key: [id number]    PA has been approved     Pharmacy has been notified

## 2025-06-10 NOTE — TELEPHONE ENCOUNTER
Caller: Vilma Ayala    Relationship to patient: Self      Best call back number:      Provider: DR GEE     Medication PA needed: MEDICATION FOR TREMORS PREVIOUSLY PRESCRIBED)    Reason for call/Prior Auth: PER PHARMACY    PLEASE CALL TO ADVISE WHEN THIS IS APPROVED

## 2025-06-10 NOTE — TELEPHONE ENCOUNTER
Called and spoke with  patients sister. She verifies that Risperdal is 3mg BID. Vilma thinks she is taking this TID. She follows with Dr. Sim Rouse. They will try to obtain most recent clinic notes for me. Jory often does not accompany Vilma to these visits.     Austedo was started while in the nursing home. She did great on Austedo. She did not have any tremors. Once she was discharged and started taking the medication on her own, she did worse. She was taking it twice daily. She is not able to handle her medications independent;y and has a caregiver for support.     Mental capacity has worsened a lot. Before she had a caregiver, she was able to take meds on her own.   She has had a caregiver for about 1 year. No other recent medication changes or major changes to mental health. This will need further evaluation.      plans to go visit tomorrow and help organized pills.

## 2025-06-10 NOTE — PROGRESS NOTES
Office Note     Name: Vilma Ayala    : 1970     MRN: [id number]     Chief Complaint  Hospital Follow Up Visit (Tremors )    Subjective     History of Present Illness:  Vilma Ayala is a 54 y.o. female who presents today for ED follow-up on tremors.     Went to the ED on Friday because her tremors seemed worse than usual.   She has known TD. She was recently started on Austedo. Dose reported as 30mg XR but she was taking it twice, she thinks. ED told her she might have overdosed on it.  Dose can be increased weekly in 6 mg/day incremenets. Mean dose 36   Dopamine blockers- risperdal; she thiks she is taking it 3 x a day.      [facility] for psych. Unsure who provider is. States provider is aware that she has TD.         Review of Systems:   Review of Systems    Past Medical History:   Past Medical History:   Diagnosis Date    Anxiety     Chicken pox     Depression     Diabetes mellitus     Disease of thyroid gland     Measles     Type 2 diabetes mellitus        Past Surgical History:   Past Surgical History:   Procedure Laterality Date    EYE SURGERY      TONSILLECTOMY         Family History:   Family History   Problem Relation Age of Onset    Hypertension Other     Thyroid disease Other     Diabetes Other     Obesity Other     Diabetes Mother     Hypertension Mother     Heart disease Mother     Cancer Father     Diabetes Sister     Diabetes Brother     No Known Problems Brother        Social History:   Social History     Socioeconomic History    Marital status: Single   Tobacco Use    Smoking status: Never    Smokeless tobacco: Never   Vaping Use    Vaping status: Never Used   Substance and Sexual Activity    Alcohol use: No    Drug use: No    Sexual activity: Not Currently       Immunizations:   Immunization History   Administered Date(s) Administered    COVID-19 (MODERNA) 12YRS+ (SPIKEVAX) 10/16/2023, 2025    COVID-19 (MODERNA) 1st,2nd,3rd Dose Monovalent 2021,  06/05/2021, 01/04/2022    COVID-19 (MODERNA) BIVALENT 12+YRS 10/26/2022    Flu Vaccine Quad PF >36MO 10/04/2017, 12/29/2021    Fluzone  >6mos 09/13/2024    Fluzone (or Fluarix & Flulaval for VFC) >6mos 10/04/2017, 12/29/2021, 10/26/2022, 11/14/2023    Fluzone Quad >6mos (Multi-dose) 10/04/2015    Hep A / Hep B 02/16/2024, 01/21/2025    Hepatitis A 05/09/2018, 05/09/2018, 10/04/2018, 01/10/2020, 01/10/2020    Influenza recombinant 01/21/2025    Influenza, Unspecified 10/04/2018, 10/26/2022    Pneumococcal Conjugate 20-Valent (PCV20) 09/11/2023    Pneumococcal Polysaccharide (PPSV23) 05/28/2016, 05/28/2016    Shingrix 12/29/2021, 09/11/2023, 02/16/2024    Tdap 05/24/2024    flucelvax quad pfs =>4 YRS 10/04/2018        Medications:     Current Outpatient Medications:     acetaminophen (TYLENOL) 325 MG tablet, Take 2 tablets by mouth Every 4 (Four) Hours As Needed for Mild Pain., Disp: , Rfl:     atorvastatin (LIPITOR) 40 MG tablet, Take 1 tablet by mouth Daily., Disp: , Rfl:     B-D UF III MINI PEN NEEDLES 31G X 5 MM misc, Inject 1 Units under the skin into the appropriate area as directed Daily., Disp: 100 each, Rfl: 1    benztropine (COGENTIN) 1 MG tablet, Take 1 tablet by mouth Every 12 (Twelve) Hours., Disp: , Rfl:     Blood Glucose Monitoring Suppl w/Device kit, Use as directed to check blood sugar dx e11.65 please dispense insurance preferred, Disp: 1 each, Rfl: 0    busPIRone (BUSPAR) 15 MG tablet, Take 1 tablet by mouth Every 12 (Twelve) Hours., Disp: , Rfl:     cetirizine (zyrTEC) 10 MG tablet, Take 1 tablet by mouth every night at bedtime., Disp: , Rfl:     citalopram (CeleXA) 40 MG tablet, Take 1 tablet by mouth Daily., Disp: 90 tablet, Rfl: 1    Continuous Glucose  (FreeStyle Sukhi 3 [place]) device, Use 1 Device 4 (Four) Times a Day., Disp: 1 each, Rfl: 3    Continuous Glucose Sensor (FreeStyle Sukhi 3 Plus Sensor), Use Every 14 (Fourteen) Days., Disp: 2 each, Rfl: 5    famotidine (PEPCID) 20 MG  "tablet, Take 1 tablet by mouth Every Night., Disp: , Rfl:     glucose (DEX4) 4 GM chewable tablet, Chew 4 tablets As Needed for Low Blood Sugar., Disp: 20 tablet, Rfl: 12    Glucose Blood (Blood Glucose Test) strip, Use to check blood sugar 1 time a day dx e11.65 please dispense strips with new meter, Disp: 100 each, Rfl: 3    hydrOXYzine pamoate (VISTARIL) 25 MG capsule, Take 1 capsule by mouth 3 (Three) Times a Day As Needed for Itching., Disp: , Rfl:     insulin glargine (LANTUS, SEMGLEE) 100 UNIT/ML injection, Inject 20 Units under the skin into the appropriate area as directed Every Night., Disp: , Rfl:     Insulin Lispro (humaLOG) 100 UNIT/ML injection, Inject 2-9 Units under the skin into the appropriate area as directed 4 (Four) Times a Day Before Meals & at Bedtime., Disp: , Rfl:     Insulin Syringe-Needle U-100 31G X 15/64\" 0.5 ML misc, Use 2 per day to administer insulin, Disp: 200 each, Rfl: 0    ipratropium-albuterol (DUO-NEB) 0.5-2.5 mg/3 ml nebulizer, Take 3 mL by nebulization 4 (Four) Times a Day As Needed for Shortness of Air., Disp: , Rfl:     lamoTRIgine (LaMICtal) 25 MG tablet, Take 1 tablet by mouth Every 12 (Twelve) Hours., Disp: , Rfl:     Lancets 33G misc, Use 1 each Daily. Dx e11.65 please dispense insurance preferred, Disp: 100 each, Rfl: 3    levothyroxine (SYNTHROID, LEVOTHROID) 200 MCG tablet, Take 1 tablet by mouth Daily., Disp: 90 tablet, Rfl: 0    levothyroxine (SYNTHROID, LEVOTHROID) 25 MCG tablet, Take 1 tablet by mouth Every Morning., Disp: 90 tablet, Rfl: 0    LORazepam (ATIVAN) 0.5 MG tablet, Take 0.5 tablets by mouth Every 12 (Twelve) Hours., Disp: , Rfl:     melatonin 5 MG tablet tablet, Take 1 tablet by mouth At Night As Needed (insomnia)., Disp: , Rfl:     [Paused] metFORMIN (GLUCOPHAGE) 500 MG tablet, TAKE 2 TABLETS BY MOUTH ONCE DAILY WITH FOOD, Disp: 60 tablet, Rfl: 5    pantoprazole (Protonix) 40 MG EC tablet, Take 1 tablet by mouth Daily for 326 doses., Disp: , Rfl:     " "polyethylene glycol (MIRALAX) 17 g packet, Take 17 g by mouth Daily As Needed (Use if senna-docusate is ineffective)., Disp: , Rfl:     promethazine (PHENERGAN) 12.5 MG tablet, Take 1 tablet by mouth Every 6 (Six) Hours As Needed for Nausea or Vomiting., Disp: 30 tablet, Rfl: 2    risperiDONE (risperDAL) 3 MG tablet, Take 1 tablet by mouth 2 (Two) Times a Day., Disp: , Rfl:     saccharomyces boulardii (FLORASTOR) 250 MG capsule, Take 1 capsule by mouth 2 (Two) Times a Day., Disp: , Rfl:     sennosides-docusate (PERICOLACE) 8.6-50 MG per tablet, Take 2 tablets by mouth 2 (Two) Times a Day., Disp: , Rfl:     Trelegy Ellipta 100-62.5-25 MCG/ACT inhaler, INHALE 1 PUFF ONCE DAILY, Disp: 180 each, Rfl: 0    Ventolin  (90 Base) MCG/ACT inhaler, Inhale 2 puffs 4 (Four) Times a Day. (Patient taking differently: Inhale 2 puffs Every 6 (Six) Hours As Needed for Wheezing.), Disp: 18 g, Rfl: 0    Deutetrabenazine ER (Austedo XR) 18 MG tablet sustained-release 24 hour, Take 18 mg by mouth Daily., Disp: 30 tablet, Rfl: 0    Allergies:   Allergies   Allergen Reactions    Doxepin Rash    Januvia [Sitagliptin] Other (See Comments)     Insomnia       Objective     Vital Signs  /84 (BP Location: Left arm, Patient Position: Sitting, Cuff Size: Adult)   Pulse 110   Temp 97.1 °F (36.2 °C) (Infrared)   Resp 18   Ht 167.6 cm (65.98\")   Wt 94.6 kg (208 lb 9.6 oz)   SpO2 96%   BMI 33.69 kg/m²   Estimated body mass index is 33.69 kg/m² as calculated from the following:    Height as of this encounter: 167.6 cm (65.98\").    Weight as of this encounter: 94.6 kg (208 lb 9.6 oz).           Physical Exam  Neurological:      Comments: Tremor of tongue with associated drooling. Uncontrollable jerky movements of bilateral upper extremities, worse RUE than left. Moderate frequency tremor of LUE, right worse than left. Tremor improves with action and against resistance.           Procedures     Results:  No results found for this or " any previous visit (from the past 24 hours).     Assessment and Plan     Assessment/Plan:  Diagnoses and all orders for this visit:    1. Tardive dyskinesia (Primary)    Other orders  -     Deutetrabenazine ER (Austedo XR) 18 MG tablet sustained-release 24 hour; Take 18 mg by mouth Daily.  Dispense: 30 tablet; Refill: 0      Assessment & Plan  Tardive dyskinesia  Chronic, worsening. Likely related to long-term Risperdal use but it is unclear the dose or frequency she is taking it. I do not have access to psych records so its not clear the indication for taking it; she denies Bipolar or schizophrenia but she does have MDD and anxiety. She states it is okay to obtain records from their office. I strongly encouraged her to reach out to her provider there and let them know how much worse her TD has become over the past 1-2 months. She may need dose reduction vs new medication. We have resumed Austedo 18mg XR. Advised to take for one week, then can increase to 30mg XR. Reiterated that this is a once daily medication. She reportedly has someone who helps manage her meds. This person was present for todays visit but is not sure the dose or frequency, either. I will follow-up with her in 2-3 weeks and in the meantime, hope that she can also be seen by psych. I strongly encouraged her to reach out to them. I am also going to call her sister, which patient tells me is fine, to get some clarification on med doses and reiterate the plan.           Follow Up  No follow-ups on file.    Vanessa Kaur MD   [facility]

## 2025-06-10 NOTE — ED PROVIDER NOTES
Subjective   History of Present Illness  Patient is a 54-year-old female well-known to the emergency department presenting to the emergency department with symptoms associated with a history of tardive dyskinesia.  Patient also has a significant behavioral health burden.  Patient also has a history of diabetes and depression.  Patient has recently been adjusting her medications.  I have reviewed the prior notes including the ER note from the [facility] emergency department which demonstrated the patient was concerned that she may have taken extra of her medications.  Patient talked to her behavioral health specialist today and was represcribed the medication Austedo.  Patient reports she has not filled the medications.  Patient apparently states that she needs a dose and then would be rated ago.  Patient has no other complaints at this time.    History provided by:  Patient and EMS personnel      Review of Systems    Past Medical History:   Diagnosis Date    Anxiety     Chicken pox     Depression     Diabetes mellitus     Disease of thyroid gland     Measles     Type 2 diabetes mellitus        Allergies   Allergen Reactions    Bactrim [Sulfamethoxazole-Trimethoprim] Urinary Retention    Doxepin Rash    Januvia [Sitagliptin] Other (See Comments)     Insomnia       Past Surgical History:   Procedure Laterality Date    EYE SURGERY      TONSILLECTOMY         Family History   Problem Relation Age of Onset    Hypertension Other     Thyroid disease Other     Diabetes Other     Obesity Other     Diabetes Mother     Hypertension Mother     Heart disease Mother     Cancer Father     Diabetes Sister     Diabetes Brother     No Known Problems Brother        Social History     Socioeconomic History    Marital status: Single   Tobacco Use    Smoking status: Never    Smokeless tobacco: Never   Vaping Use    Vaping status: Never Used   Substance and Sexual Activity    Alcohol use: No    Drug use: No    Sexual activity: Not  Currently           Objective   Physical Exam  Vitals and nursing note reviewed.   Constitutional:       General: She is not in acute distress.     Appearance: Normal appearance. She is obese. She is not toxic-appearing.   Cardiovascular:      Rate and Rhythm: Normal rate and regular rhythm.      Pulses: Normal pulses.   Pulmonary:      Effort: Pulmonary effort is normal. No respiratory distress.      Breath sounds: Normal breath sounds.   Musculoskeletal:         General: Normal range of motion.   Neurological:      Mental Status: She is alert. Mental status is at baseline.         Procedures           ED Course  ED Course as of 06/10/25 2215   Tue Nicholas 10, 2025   1912 I reviewed the prior records including the freestanding ED as well as the telehealth visit from today.  The patient has not filled the medications.  Of note, the patient's symptoms are worse when she is sitting there and people are in the room.  The symptoms significantly improved when people leave the room or when you are directly addressing the patient in talking with her.  We will plan to give the patient a dose of the closest medication that we have available here in the emergency department and discharge her to follow-up with her care team for ongoing care.  She is to return to the ER for concern. [RS]      ED Course User Index  [RS] Mike Bella MD                                                       Medical Decision Making  Problems Addressed:  Anxiety: complicated acute illness or injury  Tardive dyskinesia: complicated acute illness or injury  Type 2 diabetes mellitus with obesity: complicated acute illness or injury    Amount and/or Complexity of Data Reviewed  Independent Historian: EMS  Labs: ordered.    Risk  Prescription drug management.        Final diagnoses:   Tardive dyskinesia   Anxiety   Type 2 diabetes mellitus with obesity       ED Disposition  ED Disposition       ED Disposition   Discharge    Condition   Stable     Comment   --               Vanessa Kaur MD  [address]  [address]  [place] [id number]  [phone]      As soon as possible.    [facility] EMERG[facility]  [address]  [facility] [address] [phone]    As needed, If symptoms worsen or ANY concerns.         Medication List        PAUSE taking these medications      metFORMIN 500 MG tablet  Wait to take this until your doctor or other care provider tells you to start again.  Commonly known as: GLUCOPHAGE  TAKE 2 TABLETS BY MOUTH ONCE DAILY WITH FOOD            Changed      Ventolin  (90 Base) MCG/ACT inhaler  Generic drug: albuterol sulfate HFA  Inhale 2 puffs 4 (Four) Times a Day.  What changed:   when to take this  reasons to take this                 Mike Bella MD  06/10/25 [id number]

## 2025-06-10 NOTE — TELEPHONE ENCOUNTER
Pt called stating the new medication that was called in today needs authorized.  She said she has to have it today.

## 2025-06-10 NOTE — ASSESSMENT & PLAN NOTE
Chronic, worsening. Likely related to long-term Risperdal use but it is unclear the dose or frequency she is taking it. I do not have access to psych records so its not clear the indication for taking it; she denies Bipolar or schizophrenia but she does have MDD and anxiety. She states it is okay to obtain records from their office. I strongly encouraged her to reach out to her provider there and let them know how much worse her TD has become over the past 1-2 months. She may need dose reduction vs new medication. We have resumed Austedo 18mg XR. Advised to take for one week, then can increase to 30mg XR. Reiterated that this is a once daily medication. She reportedly has someone who helps manage her meds. This person was present for todays visit but is not sure the dose or frequency, either. I will follow-up with her in 2-3 weeks and in the meantime, hope that she can also be seen by psych. I strongly encouraged her to reach out to them. I am also going to call her sister, which patient tells me is fine, to get some clarification on med doses and reiterate the plan.

## 2025-06-11 ENCOUNTER — HOSPITAL ENCOUNTER (EMERGENCY)
Facility: HOSPITAL | Age: 55
Discharge: HOME | End: 2025-06-11
Attending: EMERGENCY MEDICINE
Payer: MEDICARE

## 2025-06-11 ENCOUNTER — TELEPHONE (OUTPATIENT)
Dept: INTERNAL MEDICINE | Facility: CLINIC | Age: 55
End: 2025-06-11
Payer: MEDICARE

## 2025-06-11 VITALS
WEIGHT: 202 LBS | TEMPERATURE: 97.8 F | OXYGEN SATURATION: 97 % | HEART RATE: 117 BPM | SYSTOLIC BLOOD PRESSURE: 139 MMHG | DIASTOLIC BLOOD PRESSURE: 84 MMHG | HEIGHT: 66 IN | BODY MASS INDEX: 32.47 KG/M2 | RESPIRATION RATE: 20 BRPM

## 2025-06-11 DIAGNOSIS — G24.01 TARDIVE DYSKINESIA: Primary | ICD-10-CM

## 2025-06-11 LAB
ALBUMIN SERPL-MCNC: 4.3 G/DL (ref 3.5–5.2)
ALBUMIN/GLOB SERPL: 1.3 G/DL
ALP SERPL-CCNC: 131 U/L (ref 39–117)
ALT SERPL W P-5'-P-CCNC: 17 U/L (ref 1–33)
ANION GAP SERPL CALCULATED.3IONS-SCNC: 15 MMOL/L (ref 5–15)
AST SERPL-CCNC: 19 U/L (ref 1–32)
BASOPHILS # BLD AUTO: 0.05 10*3/MM3 (ref 0–0.2)
BASOPHILS NFR BLD AUTO: 0.3 % (ref 0–1.5)
BILIRUB SERPL-MCNC: 0.6 MG/DL (ref 0–1.2)
BUN SERPL-MCNC: 13.9 MG/DL (ref 6–20)
BUN/CREAT SERPL: 8.9 (ref 7–25)
CALCIUM SPEC-SCNC: 10 MG/DL (ref 8.6–10.5)
CHLORIDE SERPL-SCNC: 100 MMOL/L (ref 98–107)
CK SERPL-CCNC: 235 U/L (ref 20–180)
CO2 SERPL-SCNC: 21 MMOL/L (ref 22–29)
CREAT SERPL-MCNC: 1.56 MG/DL (ref 0.57–1)
DEPRECATED RDW RBC AUTO: 48 FL (ref 37–54)
EGFRCR SERPLBLD CKD-EPI 2021: 39.3 ML/MIN/1.73
EOSINOPHIL # BLD AUTO: 0.04 10*3/MM3 (ref 0–0.4)
EOSINOPHIL NFR BLD AUTO: 0.2 % (ref 0.3–6.2)
ERYTHROCYTE [DISTWIDTH] IN BLOOD BY AUTOMATED COUNT: 14.3 % (ref 12.3–15.4)
GLOBULIN UR ELPH-MCNC: 3.2 GM/DL
GLUCOSE SERPL-MCNC: 193 MG/DL (ref 65–99)
HCT VFR BLD AUTO: 38 % (ref 34–46.6)
HGB BLD-MCNC: 12.5 G/DL (ref 12–15.9)
IMM GRANULOCYTES # BLD AUTO: 0.08 10*3/MM3 (ref 0–0.05)
IMM GRANULOCYTES NFR BLD AUTO: 0.5 % (ref 0–0.5)
LYMPHOCYTES # BLD AUTO: 2.58 10*3/MM3 (ref 0.7–3.1)
LYMPHOCYTES NFR BLD AUTO: 15.4 % (ref 19.6–45.3)
MCH RBC QN AUTO: 30 PG (ref 26.6–33)
MCHC RBC AUTO-ENTMCNC: 32.9 G/DL (ref 31.5–35.7)
MCV RBC AUTO: 91.3 FL (ref 79–97)
MONOCYTES # BLD AUTO: 0.61 10*3/MM3 (ref 0.1–0.9)
MONOCYTES NFR BLD AUTO: 3.7 % (ref 5–12)
NEUTROPHILS NFR BLD AUTO: 13.35 10*3/MM3 (ref 1.7–7)
NEUTROPHILS NFR BLD AUTO: 79.9 % (ref 42.7–76)
NRBC BLD AUTO-RTO: 0 /100 WBC (ref 0–0.2)
PLATELET # BLD AUTO: 403 10*3/MM3 (ref 140–450)
PMV BLD AUTO: 10 FL (ref 6–12)
POTASSIUM SERPL-SCNC: 3.8 MMOL/L (ref 3.5–5.2)
PROT SERPL-MCNC: 7.5 G/DL (ref 6–8.5)
RBC # BLD AUTO: 4.16 10*6/MM3 (ref 3.77–5.28)
SODIUM SERPL-SCNC: 136 MMOL/L (ref 136–145)
WBC NRBC COR # BLD AUTO: 16.71 10*3/MM3 (ref 3.4–10.8)

## 2025-06-11 PROCEDURE — 82550 ASSAY OF CK (CPK): CPT | Performed by: EMERGENCY MEDICINE

## 2025-06-11 PROCEDURE — 85025 COMPLETE CBC W/AUTO DIFF WBC: CPT | Performed by: EMERGENCY MEDICINE

## 2025-06-11 PROCEDURE — 36415 COLL VENOUS BLD VENIPUNCTURE: CPT

## 2025-06-11 PROCEDURE — 80053 COMPREHEN METABOLIC PANEL: CPT | Performed by: EMERGENCY MEDICINE

## 2025-06-11 PROCEDURE — 99283 EMERGENCY DEPT VISIT LOW MDM: CPT

## 2025-06-11 RX ORDER — DIAZEPAM 5 MG/1
10 TABLET ORAL ONCE
Status: COMPLETED | OUTPATIENT
Start: 2025-06-11 | End: 2025-06-11

## 2025-06-11 RX ADMIN — DIAZEPAM 10 MG: 5 TABLET ORAL at 13:29

## 2025-06-11 NOTE — TELEPHONE ENCOUNTER
Caller: Vilma Ayala    Relationship to patient: Self    Best call back number: [phone]     Patient is needing: PRIOR AUTHORIZATION FOR Deutetrabenazine ER (Austedo XR) 18 MG tablet sustained-release 24 hour       [address] [phone] [facility] [phone] FX

## 2025-06-11 NOTE — DISCHARGE INSTRUCTIONS
I believe that your doctor wants you to be on Austedo, likely each day taking a 30 mg tablet and an 18 mg tablet.  I think this is appropriate for you.  I recommend that you confirm with your doctor about this.    Our pharmacist has contacted your usual pharmacy, [facility] and they feel that they will be able to fill the prescription tomorrow morning.  If they do not please contact your doctor to discuss further.    If you have any concern of worsening condition please return to the emergency department.  
hair removal not indicated

## 2025-06-11 NOTE — ED PROVIDER NOTES
" EMERGENCY DEPARTMENT ENCOUNTER    Pt Name: Vilma Ayala  MRN: [id number]  Pt :   1970  Room Number:  VR03/V3  Date of encounter:  2025  PCP: Vanessa Kaur MD  ED Provider: Margarito Welch MD    Historian: patient and caregivers      HPI:  Chief Complaint: \"shaking real bad\"        Context: Vilma Ayala is a 54 y.o. female who presents to the ED c/o increased shaking.  The patient has been on Austedo for tardive dyskinesia.  She notes that when she accidentally was taking double doses of this medication it took care of all of her symptoms.  It was realized that she was taking too much and her dose was lowered.  Her shaking of her upper extremities has significantly worsened now over the last few days.  The patient was supposed to be able to fill her medication refill/order today but the pharmacy was not able to provide this to her.  They now feel that they can provide it to her tomorrow.  The patient is uncertain of the exact dose of the medication that her doctor wishes for her to take.  No fevers or chills.  No nausea or vomiting.  No other medication changes.  Patient's caregiver had her sister bring the patient into the emergency department.    PAST MEDICAL HISTORY  Past Medical History:   Diagnosis Date    Anxiety     Chicken pox     Depression     Diabetes mellitus     Disease of thyroid gland     Measles     Type 2 diabetes mellitus          PAST SURGICAL HISTORY  Past Surgical History:   Procedure Laterality Date    EYE SURGERY      TONSILLECTOMY           FAMILY HISTORY  Family History   Problem Relation Age of Onset    Hypertension Other     Thyroid disease Other     Diabetes Other     Obesity Other     Diabetes Mother     Hypertension Mother     Heart disease Mother     Cancer Father     Diabetes Sister     Diabetes Brother     No Known Problems Brother          SOCIAL HISTORY  Social History     Socioeconomic History    Marital status: Single   Tobacco Use    Smoking status: " Never    Smokeless tobacco: Never   Vaping Use    Vaping status: Never Used   Substance and Sexual Activity    Alcohol use: No    Drug use: No    Sexual activity: Not Currently         ALLERGIES  Bactrim [sulfamethoxazole-trimethoprim], Doxepin, and Januvia [sitagliptin]        REVIEW OF SYSTEMS  Review of Systems       All systems reviewed and negative except for those discussed in HPI.       PHYSICAL EXAM    I have reviewed the triage vital signs and nursing notes.    ED Triage Vitals [06/11/25 1235]   Temp Heart Rate Resp BP SpO2   97.8 °F (36.6 °C) 117 20 (!) 195/147 97 %      Temp src Heart Rate Source Patient Position BP Location FiO2 (%)   Oral Monitor Sitting Left arm --       Physical Exam  GENERAL:   Appears in some distress secondary to repetitive shaking of both upper extremities, alternating, but when patient has involuntary movements of her extremities to straighten out her glasses she does not have the shaking present.  HENT: Nares patent.  EYES: No scleral icterus.  CV: Regular rhythm, tachycardic rate.  No murmurs gallops rubs.  RESPIRATORY: Normal effort.  No audible wheezes, rales or rhonchi.  Clear to auscultation  ABDOMEN: Soft, nontender  MUSCULOSKELETAL: No deformities.   NEURO: Alert, moves all extremities, follows commands.  As above  SKIN: Warm, dry, no rash visualized.      LAB RESULTS  Recent Results (from the past 24 hours)   CK    Collection Time: 06/11/25  1:25 PM    Specimen: Blood   Result Value Ref Range    Creatine Kinase 235 (H) 20 - 180 U/L   Comprehensive Metabolic Panel    Collection Time: 06/11/25  1:25 PM    Specimen: Blood   Result Value Ref Range    Glucose 193 (H) 65 - 99 mg/dL    BUN 13.9 6.0 - 20.0 mg/dL    Creatinine 1.56 (H) 0.57 - 1.00 mg/dL    Sodium 136 136 - 145 mmol/L    Potassium 3.8 3.5 - 5.2 mmol/L    Chloride 100 98 - 107 mmol/L    CO2 21.0 (L) 22.0 - 29.0 mmol/L    Calcium 10.0 8.6 - 10.5 mg/dL    Total Protein 7.5 6.0 - 8.5 g/dL    Albumin 4.3 3.5 - 5.2 g/dL     ALT (SGPT) 17 1 - 33 U/L    AST (SGOT) 19 1 - 32 U/L    Alkaline Phosphatase 131 (H) 39 - 117 U/L    Total Bilirubin 0.6 0.0 - 1.2 mg/dL    Globulin 3.2 gm/dL    A/G Ratio 1.3 g/dL    BUN/Creatinine Ratio 8.9 7.0 - 25.0    Anion Gap 15.0 5.0 - 15.0 mmol/L    eGFR 39.3 (L) >60.0 mL/min/1.73   CBC Auto Differential    Collection Time: 06/11/25  1:25 PM    Specimen: Blood   Result Value Ref Range    WBC 16.71 (H) 3.40 - 10.80 10*3/mm3    RBC 4.16 3.77 - 5.28 10*6/mm3    Hemoglobin 12.5 12.0 - 15.9 g/dL    Hematocrit 38.0 34.0 - 46.6 %    MCV 91.3 79.0 - 97.0 fL    MCH 30.0 26.6 - 33.0 pg    MCHC 32.9 31.5 - 35.7 g/dL    RDW 14.3 12.3 - 15.4 %    RDW-SD 48.0 37.0 - 54.0 fl    MPV 10.0 6.0 - 12.0 fL    Platelets 403 140 - 450 10*3/mm3    Neutrophil % 79.9 (H) 42.7 - 76.0 %    Lymphocyte % 15.4 (L) 19.6 - 45.3 %    Monocyte % 3.7 (L) 5.0 - 12.0 %    Eosinophil % 0.2 (L) 0.3 - 6.2 %    Basophil % 0.3 0.0 - 1.5 %    Immature Grans % 0.5 0.0 - 0.5 %    Neutrophils, Absolute 13.35 (H) 1.70 - 7.00 10*3/mm3    Lymphocytes, Absolute 2.58 0.70 - 3.10 10*3/mm3    Monocytes, Absolute 0.61 0.10 - 0.90 10*3/mm3    Eosinophils, Absolute 0.04 0.00 - 0.40 10*3/mm3    Basophils, Absolute 0.05 0.00 - 0.20 10*3/mm3    Immature Grans, Absolute 0.08 (H) 0.00 - 0.05 10*3/mm3    nRBC 0.0 0.0 - 0.2 /100 WBC       If labs were ordered, I independently reviewed the results and considered them in treating the patient.        RADIOLOGY  No Radiology Exams Resulted Within Past 24 Hours      PROCEDURES    Procedures    No orders to display       MEDICATIONS GIVEN IN ER    Medications   diazePAM (VALIUM) tablet 10 mg (10 mg Oral Given 6/11/25 1329)         MEDICAL DECISION MAKING, PROGRESS, and CONSULTS    All labs, if obtained, have been independently reviewed by me.  All radiology studies, if obtained, have been reviewed by me and the radiologist dictating the report.  All EKGs, if obtained, have been independently viewed and interpreted by me/my  attending physician.      Discussion below represents my analysis of pertinent findings related to patient's condition, differential diagnosis, treatment plan and final disposition.                                          Differential diagnosis:    Tardive dyskinesia with medication changes likely affecting the severity of her symptoms.  Multiple other etiologies included sepsis, rhabdomyolysis, etc. considered as well.      Additional sources:    - Discussed/ obtained information from independent historians: I spoke with the patient's caregiver by telephone and with the caregiver sister in person.    - External (non-ED) record review: I reviewed last radiograph, outpatient dated 4/25/2025 chest x-ray showing hyper inspiratory film with atelectasis but no pneumonia.    - Chronic or social conditions impacting care: Lifelong non-smoker.  Has daily care giver.        Orders placed during this visit:  Orders Placed This Encounter   Procedures    CK    Comprehensive Metabolic Panel    CBC Auto Differential    Repeat BP now with arm as relaxed as possible.  Follow with manual bp if still same  Misc Nursing Order (Specify)    CBC & Differential         Additional orders considered but not ordered:  CT head    ED Course:    Consultants:      ED Course as of 06/11/25 1428   Wed Jun 11, 2025   1309 After discussion with Cecil our emergency department pharmacist who consulted on the patient yesterday I have ordered Valium 10 mg.  She received Valium 5 mg oral yesterday and had some improvement but did not become sleepy and still had significant symptoms.  I have also asked him to look into where this patient could not fill the prescription medication that the patient has thus far been unable to fill.  She is agreeable with this plan.  Her caregiver's sister is here and will look after her [MS]   8439 After significant research on this patient here is the summary:  The patient reportedly was on Austredo at double dose which  took care of her akathisia type movements.  This was realized and she was dropped down to half that dose.  Eventually she received a prescription to have another 50% strength dose added to her daily dose but has not yet been able to fill that.  I will have her follow-up closely with her PCP.  I have had our pharmacist work quite a bit with this patient to try to figure out the best course forward for her.   [MS]      ED Course User Index  [MS] Margarito Welch MD              Shared Decision Making:  After my consideration of clinical presentation and any laboratory/radiology studies obtained, I discussed the findings with the patient/patient representative who is in agreement with the treatment plan and the final disposition.   Risks and benefits of discharge and/or observation/admission were discussed.       AS OF 14:28 EDT VITALS:    BP - 139/84  HR - 117  TEMP - 97.8 °F (36.6 °C) (Oral)  O2 SATS - 97%                  DIAGNOSIS  Final diagnoses:   Tardive dyskinesia         DISPOSITION  DISCHARGE    Patient discharged in stable condition.    Reviewed implications of results, diagnosis, meds, responsibility to follow up, warning signs and symptoms of possible worsening, potential complications and reasons to return to ER.    Patient/Family voiced understanding of above instructions.    Discussed plan for discharge, as there is no emergent indication for admission.  Pt/family is agreeable and understands need for follow up and possible repeat testing.  Pt/family is aware that discharge does not mean that nothing is wrong but that it indicates no emergency is currently present that requires admission and they must continue care with follow-up as given below or with a physician of their choice.     FOLLOW-UP  Vanessa Kaur MD  [address]  [address]  [place] [id number] [phone]      NEXT AVAILABLE APPOINTMENT - RECHECK OF CONDITION    [facility] EMERGENCY DEPARTMENT  [address]  [facility] [id number] [phone]    IF YOU HAVE ANY CONCERN OF WORSENING CONDITION         Medication List        PAUSE taking these medications      metFORMIN 500 MG tablet  Wait to take this until your doctor or other care provider tells you to start again.  Commonly known as: GLUCOPHAGE  TAKE 2 TABLETS BY MOUTH ONCE DAILY WITH FOOD            Changed      Ventolin  (90 Base) MCG/ACT inhaler  Generic drug: albuterol sulfate HFA  Inhale 2 puffs 4 (Four) Times a Day.  What changed:   when to take this  reasons to take this                  Please note that portions of this document were completed with voice recognition software.        Margarito Welch MD  06/12/25 0022

## 2025-06-12 ENCOUNTER — TELEPHONE (OUTPATIENT)
Dept: INTERNAL MEDICINE | Facility: CLINIC | Age: 55
End: 2025-06-12

## 2025-06-12 NOTE — TELEPHONE ENCOUNTER
Left message on voicemail letting patient know PA has been completed and approved.  She can contact pharmacy about the refill.

## 2025-06-12 NOTE — TELEPHONE ENCOUNTER
Spoke with Teri from [facility] and she wanted to let the patients doctor know that the patient has been taken the wrong dose of Austedo ER. She said that the patient has been taken 30 mg because the pharmacy didn't have the 18 mg in stock. She said that she spoke with the pharmacy and that they are getting the 18mg in stock and will provide it to the patient.

## 2025-06-13 ENCOUNTER — TELEPHONE (OUTPATIENT)
Dept: INTERNAL MEDICINE | Facility: CLINIC | Age: 55
End: 2025-06-13

## 2025-06-13 NOTE — TELEPHONE ENCOUNTER
Caller: Vilma Ayala    Relationship to patient: Self    Best call back number: [phone]     Patient is needing: UPDATE ON WHEN SHE CAN DO THE SWALLOW TEST.

## 2025-06-13 NOTE — TELEPHONE ENCOUNTER
Caller: Vilma Ayala    Relationship: Self    Best call back number: [phone]     What is the best time to reach you: MORNING    Who are you requesting to speak with (clinical staff, provider,  specific staff member): DR GEE    What was the call regarding:PATIENT WANTS TO DISCUSS A SWALLOW TEST

## 2025-06-25 ENCOUNTER — OFFICE VISIT (OUTPATIENT)
Dept: INTERNAL MEDICINE | Facility: CLINIC | Age: 55
End: 2025-06-25
Payer: MEDICARE

## 2025-06-25 VITALS
DIASTOLIC BLOOD PRESSURE: 86 MMHG | SYSTOLIC BLOOD PRESSURE: 118 MMHG | TEMPERATURE: 98.4 F | BODY MASS INDEX: 32.62 KG/M2 | OXYGEN SATURATION: 96 % | HEIGHT: 66 IN | HEART RATE: 102 BPM

## 2025-06-25 DIAGNOSIS — R11.2 NAUSEA AND VOMITING, UNSPECIFIED VOMITING TYPE: ICD-10-CM

## 2025-06-25 DIAGNOSIS — G24.01 TARDIVE DYSKINESIA: Primary | ICD-10-CM

## 2025-06-25 DIAGNOSIS — R25.1 OCCASIONAL TREMORS: ICD-10-CM

## 2025-06-25 RX ORDER — PRIMIDONE 50 MG/1
50 TABLET ORAL 2 TIMES DAILY
Qty: 60 TABLET | Refills: 1 | Status: SHIPPED | OUTPATIENT
Start: 2025-06-25

## 2025-06-25 NOTE — PROGRESS NOTES
"Subjective   Vilma Ayala is a 54 y.o. female.         Chief Complaint   Patient presents with    Vomiting     Pt vommited on  during check in. Was not feeling well since last night       Visit Vitals  /86   Pulse 102   Temp 98.4 °F (36.9 °C)   Ht 167.6 cm (65.98\")   LMP  (LMP Unknown)   SpO2 96%   BMI 32.62 kg/m²         Vomiting   This is a new problem. The current episode started today. The problem occurs less than 2 times per day. The problem has been resolved. The emesis has an appearance of stomach contents. There has been no fever. Associated symptoms include diarrhea. Pertinent negatives include no abdominal pain, arthralgias, chest pain, chills, coughing, dizziness, fever, headaches, myalgias, sweats, URI or weight loss. She has tried nothing for the symptoms. The treatment provided significant relief.      Pt lives in assisted living at [facility]. Pt here with her caregiver. Caregiver helping with history.   Pt went to ER after taking too much Austedo 6/11/25. Pt was shaking in ER. Pt's dose was increased to 30 mg today and pt had vomiting today.   Pt's CK went up Creatinine was low.   Pt was having tremors in Rehab that was attributed to Risperidone and Austedo was started at 30 and then decreased to 18 after rehab for a week. It was increased yesterday to Austedo 30 mg last evening..  Pt took a 30 mg dose of Austedo today as well this morning.    Pt is still having tremors.,     Pt reports nausea is better.     The following portions of the patient's history were reviewed and updated as appropriate: allergies, current medications, past family history, past medical history, past social history, past surgical history, and problem list.    Past Medical History:   Diagnosis Date    Anxiety     Chicken pox     Depression     Diabetes mellitus     Disease of thyroid gland     Measles     Type 2 diabetes mellitus       Past Surgical History:   Procedure Laterality Date    EYE SURGERY "      TONSILLECTOMY        Family History   Problem Relation Age of Onset    Hypertension Other     Thyroid disease Other     Diabetes Other     Obesity Other     Diabetes Mother     Hypertension Mother     Heart disease Mother     Cancer Father     Diabetes Sister     Diabetes Brother     No Known Problems Brother       Social History     Socioeconomic History    Marital status: Single   Tobacco Use    Smoking status: Never    Smokeless tobacco: Never   Vaping Use    Vaping status: Never Used   Substance and Sexual Activity    Alcohol use: No    Drug use: No    Sexual activity: Not Currently      Allergies   Allergen Reactions    Bactrim [Sulfamethoxazole-Trimethoprim] Urinary Retention    Doxepin Rash    Januvia [Sitagliptin] Other (See Comments)     Insomnia       Review of Systems   Constitutional:  Negative for chills, fever and weight loss.   Respiratory:  Negative for cough.    Cardiovascular:  Negative for chest pain.   Gastrointestinal:  Positive for diarrhea and vomiting. Negative for abdominal pain.   Musculoskeletal:  Negative for arthralgias and myalgias.   Neurological:  Negative for dizziness and headaches.       Objective   Physical Exam  Vitals and nursing note reviewed.   Constitutional:       Appearance: She is well-developed.      Comments: Pt is in wheelchair.   HENT:      Head: Normocephalic.      Right Ear: External ear normal.      Left Ear: External ear normal.      Nose: Nose normal.   Eyes:      General: Lids are normal.      Conjunctiva/sclera: Conjunctivae normal.      Pupils: Pupils are equal, round, and reactive to light.   Neck:      Thyroid: No thyroid mass or thyromegaly.      Vascular: No carotid bruit.      Trachea: Trachea normal.   Cardiovascular:      Rate and Rhythm: Normal rate and regular rhythm.      Heart sounds: No murmur heard.  Pulmonary:      Effort: Pulmonary effort is normal. No respiratory distress.      Breath sounds: Normal breath sounds. No decreased breath  sounds, wheezing, rhonchi or rales.   Chest:      Chest wall: No tenderness.   Abdominal:      General: Bowel sounds are normal.      Palpations: Abdomen is soft.      Tenderness: There is no abdominal tenderness.   Musculoskeletal:         General: Normal range of motion.      Cervical back: Normal range of motion and neck supple.   Skin:     General: Skin is warm and dry.   Neurological:      Mental Status: She is alert and oriented to person, place, and time.      Motor: Tremor (intention tremor) present.   Psychiatric:         Behavior: Behavior normal.         Assessment & Plan   Problems Addressed this Visit          Neuro    Tardive dyskinesia - Primary    Relevant Medications    primidone (MYSOLINE) 50 MG tablet     Other Visit Diagnoses         Nausea and vomiting, unspecified vomiting type          Occasional tremors        Relevant Medications    primidone (MYSOLINE) 50 MG tablet          Diagnoses         Codes Comments      Tardive dyskinesia    -  Primary ICD-10-CM: G24.01  ICD-9-CM: 333.85       Nausea and vomiting, unspecified vomiting type     ICD-10-CM: R11.2  ICD-9-CM: 787.01       Occasional tremors     ICD-10-CM: R25.1  ICD-9-CM: 781.0             Gradually increase diet to normal.            Current Outpatient Medications:     acetaminophen (TYLENOL) 325 MG tablet, Take 2 tablets by mouth Every 4 (Four) Hours As Needed for Mild Pain., Disp: , Rfl:     atorvastatin (LIPITOR) 40 MG tablet, Take 1 tablet by mouth Daily., Disp: , Rfl:     B-D UF III MINI PEN NEEDLES 31G X 5 MM misc, Inject 1 Units under the skin into the appropriate area as directed Daily., Disp: 100 each, Rfl: 1    benztropine (COGENTIN) 1 MG tablet, Take 1 tablet by mouth Every 12 (Twelve) Hours., Disp: , Rfl:     Blood Glucose Monitoring Suppl w/Device kit, Use as directed to check blood sugar dx e11.65 please dispense insurance preferred, Disp: 1 each, Rfl: 0    busPIRone (BUSPAR) 15 MG tablet, Take 1 tablet by mouth Every 12  "(Twelve) Hours., Disp: , Rfl:     cetirizine (zyrTEC) 10 MG tablet, Take 1 tablet by mouth every night at bedtime., Disp: , Rfl:     citalopram (CeleXA) 40 MG tablet, Take 1 tablet by mouth Daily., Disp: 90 tablet, Rfl: 1    Continuous Glucose  (FreeStyle Sukhi 3 [place]) device, Use 1 Device 4 (Four) Times a Day., Disp: 1 each, Rfl: 3    Continuous Glucose Sensor (FreeStyle Sukhi 3 Plus Sensor), Use Every 14 (Fourteen) Days., Disp: 2 each, Rfl: 5    Deutetrabenazine ER (Austedo XR) 18 MG tablet sustained-release 24 hour, Take 18 mg by mouth Daily., Disp: 30 tablet, Rfl: 0    famotidine (PEPCID) 20 MG tablet, Take 1 tablet by mouth Every Night., Disp: , Rfl:     glucose (DEX4) 4 GM chewable tablet, Chew 4 tablets As Needed for Low Blood Sugar., Disp: 20 tablet, Rfl: 12    Glucose Blood (Blood Glucose Test) strip, Use to check blood sugar 1 time a day dx e11.65 please dispense strips with new meter, Disp: 100 each, Rfl: 3    hydrOXYzine pamoate (VISTARIL) 25 MG capsule, Take 1 capsule by mouth 3 (Three) Times a Day As Needed for Itching., Disp: , Rfl:     insulin glargine (LANTUS, SEMGLEE) 100 UNIT/ML injection, Inject 20 Units under the skin into the appropriate area as directed Every Night., Disp: , Rfl:     Insulin Lispro (humaLOG) 100 UNIT/ML injection, Inject 2-9 Units under the skin into the appropriate area as directed 4 (Four) Times a Day Before Meals & at Bedtime., Disp: , Rfl:     Insulin Syringe-Needle U-100 31G X 15/64\" 0.5 ML misc, Use 2 per day to administer insulin, Disp: 200 each, Rfl: 0    ipratropium-albuterol (DUO-NEB) 0.5-2.5 mg/3 ml nebulizer, Take 3 mL by nebulization 4 (Four) Times a Day As Needed for Shortness of Air., Disp: , Rfl:     lamoTRIgine (LaMICtal) 25 MG tablet, Take 1 tablet by mouth Every 12 (Twelve) Hours., Disp: , Rfl:     Lancets 33G misc, Use 1 each Daily. Dx e11.65 please dispense insurance preferred, Disp: 100 each, Rfl: 3    levothyroxine (SYNTHROID, LEVOTHROID) 200 " MCG tablet, Take 1 tablet by mouth Daily., Disp: 90 tablet, Rfl: 0    levothyroxine (SYNTHROID, LEVOTHROID) 25 MCG tablet, Take 1 tablet by mouth Every Morning., Disp: 90 tablet, Rfl: 0    LORazepam (ATIVAN) 0.5 MG tablet, Take 0.5 tablets by mouth Every 12 (Twelve) Hours., Disp: , Rfl:     melatonin 5 MG tablet tablet, Take 1 tablet by mouth At Night As Needed (insomnia)., Disp: , Rfl:     [Paused] metFORMIN (GLUCOPHAGE) 500 MG tablet, TAKE 2 TABLETS BY MOUTH ONCE DAILY WITH FOOD, Disp: 60 tablet, Rfl: 5    pantoprazole (Protonix) 40 MG EC tablet, Take 1 tablet by mouth Daily for 326 doses., Disp: , Rfl:     polyethylene glycol (MIRALAX) 17 g packet, Take 17 g by mouth Daily As Needed (Use if senna-docusate is ineffective)., Disp: , Rfl:     promethazine (PHENERGAN) 12.5 MG tablet, Take 1 tablet by mouth Every 6 (Six) Hours As Needed for Nausea or Vomiting., Disp: 30 tablet, Rfl: 2    risperiDONE (risperDAL) 3 MG tablet, Take 1 tablet by mouth 2 (Two) Times a Day., Disp: , Rfl:     saccharomyces boulardii (FLORASTOR) 250 MG capsule, Take 1 capsule by mouth 2 (Two) Times a Day., Disp: , Rfl:     sennosides-docusate (PERICOLACE) 8.6-50 MG per tablet, Take 2 tablets by mouth 2 (Two) Times a Day., Disp: , Rfl:     Trelegy Ellipta 100-62.5-25 MCG/ACT inhaler, INHALE 1 PUFF ONCE DAILY, Disp: 180 each, Rfl: 0    Ventolin  (90 Base) MCG/ACT inhaler, Inhale 2 puffs 4 (Four) Times a Day. (Patient taking differently: Inhale 2 puffs Every 6 (Six) Hours As Needed for Wheezing.), Disp: 18 g, Rfl: 0    primidone (MYSOLINE) 50 MG tablet, Take 1 tablet by mouth 2 (Two) Times a Day., Disp: 60 tablet, Rfl: 1    Return in about 13 days (around 7/8/2025), or if symptoms worsen or fail to improve, for Recheck, Next scheduled follow up.  Recent Results (from the past 3 weeks)   ECG 12 Lead Other; medication change    Collection Time: 06/07/25  4:19 PM   Result Value Ref Range    QT Interval 354 ms    QTC Interval 451 ms   CK     Collection Time: 06/11/25  1:25 PM    Specimen: Blood   Result Value Ref Range    Creatine Kinase 235 (H) 20 - 180 U/L   Comprehensive Metabolic Panel    Collection Time: 06/11/25  1:25 PM    Specimen: Blood   Result Value Ref Range    Glucose 193 (H) 65 - 99 mg/dL    BUN 13.9 6.0 - 20.0 mg/dL    Creatinine 1.56 (H) 0.57 - 1.00 mg/dL    Sodium 136 136 - 145 mmol/L    Potassium 3.8 3.5 - 5.2 mmol/L    Chloride 100 98 - 107 mmol/L    CO2 21.0 (L) 22.0 - 29.0 mmol/L    Calcium 10.0 8.6 - 10.5 mg/dL    Total Protein 7.5 6.0 - 8.5 g/dL    Albumin 4.3 3.5 - 5.2 g/dL    ALT (SGPT) 17 1 - 33 U/L    AST (SGOT) 19 1 - 32 U/L    Alkaline Phosphatase 131 (H) 39 - 117 U/L    Total Bilirubin 0.6 0.0 - 1.2 mg/dL    Globulin 3.2 gm/dL    A/G Ratio 1.3 g/dL    BUN/Creatinine Ratio 8.9 7.0 - 25.0    Anion Gap 15.0 5.0 - 15.0 mmol/L    eGFR 39.3 (L) >60.0 mL/min/1.73   CBC Auto Differential    Collection Time: 06/11/25  1:25 PM    Specimen: Blood   Result Value Ref Range    WBC 16.71 (H) 3.40 - 10.80 10*3/mm3    RBC 4.16 3.77 - 5.28 10*6/mm3    Hemoglobin 12.5 12.0 - 15.9 g/dL    Hematocrit 38.0 34.0 - 46.6 %    MCV 91.3 79.0 - 97.0 fL    MCH 30.0 26.6 - 33.0 pg    MCHC 32.9 31.5 - 35.7 g/dL    RDW 14.3 12.3 - 15.4 %    RDW-SD 48.0 37.0 - 54.0 fl    MPV 10.0 6.0 - 12.0 fL    Platelets 403 140 - 450 10*3/mm3    Neutrophil % 79.9 (H) 42.7 - 76.0 %    Lymphocyte % 15.4 (L) 19.6 - 45.3 %    Monocyte % 3.7 (L) 5.0 - 12.0 %    Eosinophil % 0.2 (L) 0.3 - 6.2 %    Basophil % 0.3 0.0 - 1.5 %    Immature Grans % 0.5 0.0 - 0.5 %    Neutrophils, Absolute 13.35 (H) 1.70 - 7.00 10*3/mm3    Lymphocytes, Absolute 2.58 0.70 - 3.10 10*3/mm3    Monocytes, Absolute 0.61 0.10 - 0.90 10*3/mm3    Eosinophils, Absolute 0.04 0.00 - 0.40 10*3/mm3    Basophils, Absolute 0.05 0.00 - 0.20 10*3/mm3    Immature Grans, Absolute 0.08 (H) 0.00 - 0.05 10*3/mm3    nRBC 0.0 0.0 - 0.2 /100 WBC

## 2025-06-27 ENCOUNTER — TELEPHONE (OUTPATIENT)
Dept: INTERNAL MEDICINE | Facility: CLINIC | Age: 55
End: 2025-06-27
Payer: MEDICARE

## 2025-06-27 NOTE — TELEPHONE ENCOUNTER
Caller: FEDERICA NIXON - PHYSICAL THERAPIST    Relationship: Home Health    Best call back number: [phone]     Which medication are you concerned about: primidone (MYSOLINE) 50 MG tablet AND risperiDONE (risperDAL) 3 MG tablet     Who prescribed you this medication: Elva Kitchen MD     What are your concerns: CREATES AN DRUG INTERACTION

## 2025-07-01 ENCOUNTER — TELEPHONE (OUTPATIENT)
Dept: INTERNAL MEDICINE | Facility: CLINIC | Age: 55
End: 2025-07-01

## 2025-07-01 NOTE — TELEPHONE ENCOUNTER
Vanessa speech Therapist at [facility] called to say the patient has been throwing up when she takes the Austedo 30 mg.  Patient was seen by Dr. Kitchen on 6/25/25 and started on primidone 50 mg.  Is the patient suppose to keep taking the Austedo?  Please advise.

## 2025-07-02 ENCOUNTER — OUTSIDE FACILITY SERVICE (OUTPATIENT)
Dept: INTERNAL MEDICINE | Facility: CLINIC | Age: 55
End: 2025-07-02
Payer: MEDICARE

## 2025-07-02 NOTE — TELEPHONE ENCOUNTER
Teri called from [facility] and wanted to know if patient is to continue the Austedo or just take the primidone.  I cannot tell from the office note.  Please advise.

## 2025-07-03 NOTE — TELEPHONE ENCOUNTER
Teri, nurse with [facility] has been notified and verbalized understanding.

## 2025-07-07 RX ORDER — HYDROCHLOROTHIAZIDE 12.5 MG/1
CAPSULE ORAL
Qty: 6 EACH | Refills: 1 | Status: SHIPPED | OUTPATIENT
Start: 2025-07-07

## 2025-07-08 ENCOUNTER — OFFICE VISIT (OUTPATIENT)
Dept: INTERNAL MEDICINE | Facility: CLINIC | Age: 55
End: 2025-07-08
Payer: MEDICARE

## 2025-07-08 ENCOUNTER — TELEPHONE (OUTPATIENT)
Dept: INTERNAL MEDICINE | Facility: CLINIC | Age: 55
End: 2025-07-08

## 2025-07-08 ENCOUNTER — LAB (OUTPATIENT)
Dept: INTERNAL MEDICINE | Facility: CLINIC | Age: 55
End: 2025-07-08
Payer: MEDICARE

## 2025-07-08 VITALS
WEIGHT: 208 LBS | OXYGEN SATURATION: 100 % | DIASTOLIC BLOOD PRESSURE: 86 MMHG | HEIGHT: 66 IN | HEART RATE: 86 BPM | TEMPERATURE: 98.4 F | SYSTOLIC BLOOD PRESSURE: 114 MMHG | BODY MASS INDEX: 33.43 KG/M2

## 2025-07-08 DIAGNOSIS — R53.81 PHYSICAL DECONDITIONING: ICD-10-CM

## 2025-07-08 DIAGNOSIS — R25.1 TREMOR: ICD-10-CM

## 2025-07-08 DIAGNOSIS — Z79.4 TYPE 2 DIABETES MELLITUS WITHOUT COMPLICATION, WITH LONG-TERM CURRENT USE OF INSULIN: ICD-10-CM

## 2025-07-08 DIAGNOSIS — N18.32 CKD STAGE 3B, GFR 30-44 ML/MIN: ICD-10-CM

## 2025-07-08 DIAGNOSIS — E11.9 TYPE 2 DIABETES MELLITUS WITHOUT COMPLICATION, WITH LONG-TERM CURRENT USE OF INSULIN: ICD-10-CM

## 2025-07-08 DIAGNOSIS — Z79.4 TYPE 2 DIABETES MELLITUS WITHOUT COMPLICATION, WITH LONG-TERM CURRENT USE OF INSULIN: Primary | ICD-10-CM

## 2025-07-08 DIAGNOSIS — F41.9 ANXIETY AND DEPRESSION: ICD-10-CM

## 2025-07-08 DIAGNOSIS — N17.9 AKI (ACUTE KIDNEY INJURY): ICD-10-CM

## 2025-07-08 DIAGNOSIS — E11.9 TYPE 2 DIABETES MELLITUS WITHOUT COMPLICATION, WITH LONG-TERM CURRENT USE OF INSULIN: Primary | ICD-10-CM

## 2025-07-08 DIAGNOSIS — F32.A ANXIETY AND DEPRESSION: ICD-10-CM

## 2025-07-08 DIAGNOSIS — T17.908D ASPIRATION INTO AIRWAY, SUBSEQUENT ENCOUNTER: ICD-10-CM

## 2025-07-08 LAB
ALBUMIN UR-MCNC: <1.2 MG/DL
ANION GAP SERPL CALCULATED.3IONS-SCNC: 12.4 MMOL/L (ref 5–15)
BACTERIA UR QL AUTO: ABNORMAL /HPF
BILIRUB UR QL STRIP: NEGATIVE
BUN SERPL-MCNC: 16 MG/DL (ref 6–20)
BUN/CREAT SERPL: 10.4 (ref 7–25)
CALCIUM SPEC-SCNC: 10.1 MG/DL (ref 8.6–10.5)
CHLORIDE SERPL-SCNC: 95 MMOL/L (ref 98–107)
CLARITY UR: ABNORMAL
CO2 SERPL-SCNC: 25.6 MMOL/L (ref 22–29)
COLOR UR: YELLOW
CREAT SERPL-MCNC: 1.54 MG/DL (ref 0.57–1)
CREAT UR-MCNC: 47.5 MG/DL
EGFRCR SERPLBLD CKD-EPI 2021: 40 ML/MIN/1.73
GLUCOSE SERPL-MCNC: 416 MG/DL (ref 65–99)
GLUCOSE UR STRIP-MCNC: ABNORMAL MG/DL
HGB UR QL STRIP.AUTO: NEGATIVE
HYALINE CASTS UR QL AUTO: ABNORMAL /LPF
KETONES UR QL STRIP: NEGATIVE
LEUKOCYTE ESTERASE UR QL STRIP.AUTO: ABNORMAL
MICROALBUMIN/CREAT UR: NORMAL MG/G{CREAT}
NITRITE UR QL STRIP: NEGATIVE
PH UR STRIP.AUTO: 6 [PH] (ref 5–8)
POTASSIUM SERPL-SCNC: 4.3 MMOL/L (ref 3.5–5.2)
PROT UR QL STRIP: NEGATIVE
RBC # UR STRIP: ABNORMAL /HPF
REF LAB TEST METHOD: ABNORMAL
SODIUM SERPL-SCNC: 133 MMOL/L (ref 136–145)
SP GR UR STRIP: 1.02 (ref 1–1.03)
SQUAMOUS #/AREA URNS HPF: ABNORMAL /HPF
UROBILINOGEN UR QL STRIP: ABNORMAL
WBC # UR STRIP: ABNORMAL /HPF

## 2025-07-08 PROCEDURE — 82570 ASSAY OF URINE CREATININE: CPT | Performed by: STUDENT IN AN ORGANIZED HEALTH CARE EDUCATION/TRAINING PROGRAM

## 2025-07-08 PROCEDURE — 81001 URINALYSIS AUTO W/SCOPE: CPT | Performed by: STUDENT IN AN ORGANIZED HEALTH CARE EDUCATION/TRAINING PROGRAM

## 2025-07-08 PROCEDURE — 82043 UR ALBUMIN QUANTITATIVE: CPT | Performed by: STUDENT IN AN ORGANIZED HEALTH CARE EDUCATION/TRAINING PROGRAM

## 2025-07-08 PROCEDURE — 80048 BASIC METABOLIC PNL TOTAL CA: CPT | Performed by: STUDENT IN AN ORGANIZED HEALTH CARE EDUCATION/TRAINING PROGRAM

## 2025-07-08 PROCEDURE — 36415 COLL VENOUS BLD VENIPUNCTURE: CPT | Performed by: STUDENT IN AN ORGANIZED HEALTH CARE EDUCATION/TRAINING PROGRAM

## 2025-07-08 RX ORDER — INSULIN GLARGINE-YFGN 100 [IU]/ML
INJECTION, SOLUTION SUBCUTANEOUS
COMMUNITY
Start: 2025-07-07

## 2025-07-08 NOTE — ASSESSMENT & PLAN NOTE
Progressive decline in physical and cognitive function after recent hospitalization. She was in the hospital and then nursing home for about 2 weeks. I suspect physical deconditioning and polypharmacy have contributed to this. I offered to place a referral to outside PT/OT but they have declined and will continue therapy through her assisted living facility.

## 2025-07-08 NOTE — ASSESSMENT & PLAN NOTE
Trial of Austedo and Primidone helped with tremors. She does not have tremors or violent shaking today. She is not drooling today. Sx improved with Primidone and she was d/c Austedo secondary to nausea/vomiting. Psychiatrist believes tremors are a product of anxiety rather than TD. She was encouraged to establish with a therapist to discuss grief after the loss of her mother. I have encouraged this as well. I am not sure the etiology of the tremors/shaking but I know she looks much better today than the last few times I have seen her.

## 2025-07-08 NOTE — ASSESSMENT & PLAN NOTE
Chronic, uncontrolled. She follows with an outside psychiatrist. She is on multiple psych medications and there is concern about polypharmacy. Psych meds include- Lamictal, Risperdal, Celexa, Hydroxyzine. She also takes Primidone and Bentrzopine. Time course of the addition of these meds in unknown as I dont have access to outside records. They are interested in reducing her burden of medication. I have send a referral to [facility] psychiatry to assistance in mood med management and eliminating medications that are no longer needed. I provided a list to the family today about all the dose and frequency of each prescribed medication.

## 2025-07-08 NOTE — PROGRESS NOTES
Office Note     Name: Vilma Ayala    : 1970     MRN: [id number]     Chief Complaint  Tremors (Follow up )    Subjective     History of Present Illness:  Vilma Ayala is a 54 y.o. female who presents today for tremor follow-up.     Presents with sister and care taker.   Saw outside psychiatrist who told her tremors were not TD bur rather anxiety and grief after losing her mother.   She was taken off Austedo by primary care secondary to nausea. Austedo initially helped her tremors but then she developed nausea and vomiting after taking it. She was then started on primidone and has done well with it .   There is confusion about the medications she is supposed to take.  Her mood is better as the tremors have improved.  Her sister is concerned about her progressive decline and deconditioning since being in the hospital. She does PT once a week at her home. She is using a walker to ambulate now.  There was elevated Cr on recent labs in the ED. Initially believed to be secondary to Bactrrim use but CB has persisted.     Review of Systems:   Review of Systems    Past Medical History:   Past Medical History:   Diagnosis Date   • Anxiety    • Chicken pox    • Depression    • Diabetes mellitus    • Disease of thyroid gland    • Measles    • Type 2 diabetes mellitus        Past Surgical History:   Past Surgical History:   Procedure Laterality Date   • EYE SURGERY     • TONSILLECTOMY         Family History:   Family History   Problem Relation Age of Onset   • Hypertension Other    • Thyroid disease Other    • Diabetes Other    • Obesity Other    • Diabetes Mother    • Hypertension Mother    • Heart disease Mother    • Cancer Father    • Diabetes Sister    • Diabetes Brother    • No Known Problems Brother        Social History:   Social History     Socioeconomic History   • Marital status: Single   Tobacco Use   • Smoking status: Never   • Smokeless tobacco: Never   Vaping Use   • Vaping status: Never  Used   Substance and Sexual Activity   • Alcohol use: No   • Drug use: No   • Sexual activity: Not Currently       Immunizations:   Immunization History   Administered Date(s) Administered   • COVID-19 (MODERNA) 12YRS+ (SPIKEVAX) 10/16/2023, 01/21/2025   • COVID-19 (MODERNA) 1st,2nd,3rd Dose Monovalent 05/06/2021, 06/05/2021, 01/04/2022   • COVID-19 (MODERNA) BIVALENT 12+YRS 10/26/2022   • Flu Vaccine Quad PF >36MO 10/04/2017, 12/29/2021   • Fluzone  >6mos 09/13/2024   • Fluzone (or Fluarix & Flulaval for VFC) >6mos 10/04/2017, 12/29/2021, 10/26/2022, 11/14/2023   • Fluzone Quad >6mos (Multi-dose) 10/04/2015   • Hep A / Hep B 02/16/2024, 01/21/2025   • Hepatitis A 05/09/2018, 05/09/2018, 10/04/2018, 01/10/2020, 01/10/2020   • Influenza recombinant 01/21/2025   • Influenza, Unspecified 10/04/2018, 10/26/2022   • Pneumococcal Conjugate 20-Valent (PCV20) 09/11/2023   • Pneumococcal Polysaccharide (PPSV23) 05/28/2016, 05/28/2016   • Shingrix 12/29/2021, 09/11/2023, 02/16/2024   • Tdap 05/24/2024   • flucelvax quad pfs =>4 YRS 10/04/2018        Medications:     Current Outpatient Medications:   •  acetaminophen (TYLENOL) 325 MG tablet, Take 2 tablets by mouth Every 4 (Four) Hours As Needed for Mild Pain., Disp: , Rfl:   •  atorvastatin (LIPITOR) 40 MG tablet, Take 1 tablet by mouth Daily., Disp: , Rfl:   •  B-D UF III MINI PEN NEEDLES 31G X 5 MM misc, Inject 1 Units under the skin into the appropriate area as directed Daily., Disp: 100 each, Rfl: 1  •  benztropine (COGENTIN) 1 MG tablet, Take 1 tablet by mouth Every 12 (Twelve) Hours., Disp: , Rfl:   •  Blood Glucose Monitoring Suppl w/Device kit, Use as directed to check blood sugar dx e11.65 please dispense insurance preferred, Disp: 1 each, Rfl: 0  •  busPIRone (BUSPAR) 15 MG tablet, Take 1 tablet by mouth Every 12 (Twelve) Hours., Disp: , Rfl:   •  cetirizine (zyrTEC) 10 MG tablet, Take 1 tablet by mouth every night at bedtime., Disp: , Rfl:   •  citalopram (CeleXA)  "40 MG tablet, Take 1 tablet by mouth Daily., Disp: 90 tablet, Rfl: 1  •  Continuous Glucose  (FreeStyle Sukhi 3 Dennison) device, Use 1 Device 4 (Four) Times a Day., Disp: 1 each, Rfl: 3  •  Continuous Glucose Sensor (FreeStyle Sukhi 3 Plus Sensor), APPLY AND CHANGE EVERY 15 DAYS, Disp: 6 each, Rfl: 1  •  famotidine (PEPCID) 20 MG tablet, Take 1 tablet by mouth Every Night., Disp: , Rfl:   •  glucose (DEX4) 4 GM chewable tablet, Chew 4 tablets As Needed for Low Blood Sugar., Disp: 20 tablet, Rfl: 12  •  Glucose Blood (Blood Glucose Test) strip, Use to check blood sugar 1 time a day dx e11.65 please dispense strips with new meter, Disp: 100 each, Rfl: 3  •  hydrOXYzine pamoate (VISTARIL) 25 MG capsule, Take 1 capsule by mouth 3 (Three) Times a Day As Needed for Itching., Disp: , Rfl:   •  insulin glargine (LANTUS, SEMGLEE) 100 UNIT/ML injection, Inject 20 Units under the skin into the appropriate area as directed Every Night., Disp: , Rfl:   •  Insulin Glargine-yfgn 100 UNIT/ML solution pen-injector, , Disp: , Rfl:   •  Insulin Lispro (humaLOG) 100 UNIT/ML injection, Inject 2-9 Units under the skin into the appropriate area as directed 4 (Four) Times a Day Before Meals & at Bedtime., Disp: , Rfl:   •  Insulin Syringe-Needle U-100 31G X 15/64\" 0.5 ML misc, Use 2 per day to administer insulin, Disp: 200 each, Rfl: 0  •  ipratropium-albuterol (DUO-NEB) 0.5-2.5 mg/3 ml nebulizer, Take 3 mL by nebulization 4 (Four) Times a Day As Needed for Shortness of Air., Disp: , Rfl:   •  Lancets 33G misc, Use 1 each Daily. Dx e11.65 please dispense insurance preferred, Disp: 100 each, Rfl: 3  •  levothyroxine (SYNTHROID, LEVOTHROID) 200 MCG tablet, Take 1 tablet by mouth Daily., Disp: 90 tablet, Rfl: 0  •  levothyroxine (SYNTHROID, LEVOTHROID) 25 MCG tablet, Take 1 tablet by mouth Every Morning., Disp: 90 tablet, Rfl: 0  •  melatonin 5 MG tablet tablet, Take 1 tablet by mouth At Night As Needed (insomnia)., Disp: , Rfl:   •  " "[Paused] metFORMIN (GLUCOPHAGE) 500 MG tablet, TAKE 2 TABLETS BY MOUTH ONCE DAILY WITH FOOD, Disp: 60 tablet, Rfl: 5  •  pantoprazole (Protonix) 40 MG EC tablet, Take 1 tablet by mouth Daily for 326 doses., Disp: , Rfl:   •  polyethylene glycol (MIRALAX) 17 g packet, Take 17 g by mouth Daily As Needed (Use if senna-docusate is ineffective)., Disp: , Rfl:   •  primidone (MYSOLINE) 50 MG tablet, Take 1 tablet by mouth 2 (Two) Times a Day., Disp: 60 tablet, Rfl: 1  •  promethazine (PHENERGAN) 12.5 MG tablet, Take 1 tablet by mouth Every 6 (Six) Hours As Needed for Nausea or Vomiting., Disp: 30 tablet, Rfl: 2  •  risperiDONE (risperDAL) 3 MG tablet, Take 1 tablet by mouth 2 (Two) Times a Day., Disp: , Rfl:   •  saccharomyces boulardii (FLORASTOR) 250 MG capsule, Take 1 capsule by mouth 2 (Two) Times a Day., Disp: , Rfl:   •  sennosides-docusate (PERICOLACE) 8.6-50 MG per tablet, Take 2 tablets by mouth 2 (Two) Times a Day., Disp: , Rfl:   •  Trelegy Ellipta 100-62.5-25 MCG/ACT inhaler, INHALE 1 PUFF ONCE DAILY, Disp: 180 each, Rfl: 0  •  Ventolin  (90 Base) MCG/ACT inhaler, Inhale 2 puffs 4 (Four) Times a Day. (Patient taking differently: Inhale 2 puffs Every 6 (Six) Hours As Needed for Wheezing.), Disp: 18 g, Rfl: 0    Allergies:   Allergies   Allergen Reactions   • Bactrim [Sulfamethoxazole-Trimethoprim] Urinary Retention   • Doxepin Rash   • Januvia [Sitagliptin] Other (See Comments)     Insomnia       Objective     Vital Signs  /86   Pulse 86   Temp 98.4 °F (36.9 °C) (Infrared)   Ht 167.6 cm (65.98\")   Wt 94.3 kg (208 lb)   SpO2 100%   BMI 33.59 kg/m²   Estimated body mass index is 33.59 kg/m² as calculated from the following:    Height as of this encounter: 167.6 cm (65.98\").    Weight as of this encounter: 94.3 kg (208 lb).           Physical Exam  Constitutional:       Appearance: Normal appearance.   HENT:      Head: Normocephalic and atraumatic.      Nose: Nose normal.   Eyes:      " Conjunctiva/sclera: Conjunctivae normal.      Pupils: Pupils are equal, round, and reactive to light.   Cardiovascular:      Rate and Rhythm: Normal rate and regular rhythm.   Pulmonary:      Effort: Pulmonary effort is normal.   Abdominal:      General: Abdomen is flat.   Neurological:      General: No focal deficit present.      Mental Status: She is alert and oriented to person, place, and time.      Comments: No tremors. + slurring of speech.    Psychiatric:         Mood and Affect: Mood normal.         Behavior: Behavior normal.         Thought Content: Thought content normal.          Procedures     Results:  No results found for this or any previous visit (from the past 24 hours).     Assessment and Plan     Assessment/Plan:  Diagnoses and all orders for this visit:    1. Type 2 diabetes mellitus without complication, with long-term current use of insulin (Primary)  -     Microalbumin / Creatinine Urine Ratio - Urine, Clean Catch; Future  -     Basic metabolic panel; Future    2. Anxiety and depression  -     Ambulatory Referral to Psychiatry    3. Tremor      Assessment & Plan  Anxiety and depression  Chronic, uncontrolled. She follows with an outside psychiatrist. She is on multiple psych medications and there is concern about polypharmacy. Psych meds include- Lamictal, Risperdal, Celexa, Hydroxyzine. She also takes Primidone and Bentrzopine. Time course of the addition of these meds in unknown as I dont have access to outside records. They are interested in reducing her burden of medication. I have send a referral to [facility] psychiatry to assistance in mood med management and eliminating medications that are no longer needed. I provided a list to the family today about all the dose and frequency of each prescribed medication.   Tremor  Trial of Austedo and Primidone helped with tremors. She does not have tremors or violent shaking today. She is not drooling today. Sx improved with Primidone and she was d/c  Austedo secondary to nausea/vomiting. Psychiatrist believes tremors are a product of anxiety rather than TD. She was encouraged to establish with a therapist to discuss grief after the loss of her mother. I have encouraged this as well. I am not sure the etiology of the tremors/shaking but I know she looks much better today than the last few times I have seen her.  Type 2 diabetes mellitus without complication, with long-term current use of insulin  Chronic. Follows with endo. Urine microalbumin today.   Physical deconditioning  Progressive decline in physical and cognitive function after recent hospitalization. She was in the hospital and then nursing home for about 2 weeks. I suspect physical deconditioning and polypharmacy have contributed to this. I offered to place a referral to outside PT/OT but they have declined and will continue therapy through her assisted living facility.   Aspiration into airway, subsequent encounter  Review of video fluoroscopy shows aspiration. Referral for speech therapy today.  CB (acute kidney injury)  Repeat BMP today. Continue to hold Metformin. If still abnormal needs further eval. Abnormal renal function often checked in ED in setting of illness.     I spent over 40 minutes, including face-to-face care and reviewing the medical record, with the patient. Over half of this time was spent in discussion of the diagnosis and the importance of compliance of their treatment plan and coordinating follow-up care. I have reviewed the medical record along with recent labs, ED visits and specialists notes.       Follow Up  No follow-ups on file.    Vanessa Kaur MD   [facility]

## 2025-07-08 NOTE — PATIENT INSTRUCTIONS
Morning Meds  Levothyroxine 225mcg - 1 hour before other meds or food  Risperdal 3 mg   Primidone 50mg  Protonix 40 mg   Lamictal 25mg  Celexa 40mg  Benztropine 1mg   Evening Meds   Risperdal 3 mg   Primidone 50mg  Benztropine 1mg   Atorvastatin 40mg  Lantus 30 U nightly     As needed:  Pepcid  Hydroxyzine 25mg 3x daily for anxiety

## 2025-07-14 ENCOUNTER — TELEPHONE (OUTPATIENT)
Dept: INTERNAL MEDICINE | Facility: CLINIC | Age: 55
End: 2025-07-14
Payer: MEDICARE

## 2025-07-14 ENCOUNTER — TELEPHONE (OUTPATIENT)
Dept: ENDOCRINOLOGY | Facility: CLINIC | Age: 55
End: 2025-07-14
Payer: MEDICARE

## 2025-07-14 NOTE — TELEPHONE ENCOUNTER
Patient called requesting a refill on her melatonin. Advised patient that Dr. Lopez did not prescribe that, so she would have to contact the office of the provider who prescribed it. She voiced understanding.

## 2025-07-14 NOTE — TELEPHONE ENCOUNTER
Caller: Vilma Ayala    Relationship: Self    Best call back number: [phone]     Requested Prescriptions:   Requested Prescriptions     Pending Prescriptions Disp Refills    cetirizine (zyrTEC) 10 MG tablet       Sig: Take 1 tablet by mouth every night at bedtime.    benztropine (COGENTIN) 1 MG tablet       Sig: Take 1 tablet by mouth Every 12 (Twelve) Hours.        Pharmacy where request should be sent: [address] [phone] [facility] [phone]      Last office visit with prescribing clinician: 7/8/2025   Last telemedicine visit with prescribing clinician: Visit date not found   Next office visit with prescribing clinician: 9/19/2025     Additional details provided by patient: PATIENT IS OUT.     Does the patient have less than a 3 day supply:  [x] Yes  [] No    Would you like a call back once the refill request has been completed: [] Yes [x] No    If the office needs to give you a call back, can they leave a voicemail: [] Yes [x] No    Cadance Dunaway, RegSched Rep   07/14/25 12:19 EDT

## 2025-07-14 NOTE — TELEPHONE ENCOUNTER
Patient called back in wanting to get more refills on her medication. Patient would like a phone call back to go over all the medications she was needing refills for. Patient's phone number is [phone]

## 2025-07-15 RX ORDER — BENZTROPINE MESYLATE 1 MG/1
1 TABLET ORAL EVERY 12 HOURS SCHEDULED
Qty: 30 TABLET | Refills: 3 | Status: SHIPPED | OUTPATIENT
Start: 2025-07-15

## 2025-07-15 RX ORDER — CETIRIZINE HYDROCHLORIDE 10 MG/1
10 TABLET ORAL
Qty: 30 TABLET | Refills: 3 | Status: SHIPPED | OUTPATIENT
Start: 2025-07-15

## 2025-07-16 ENCOUNTER — TELEPHONE (OUTPATIENT)
Dept: INTERNAL MEDICINE | Facility: CLINIC | Age: 55
End: 2025-07-16
Payer: MEDICARE

## 2025-07-16 NOTE — TELEPHONE ENCOUNTER
Prescription request for Levothyroxine 200 mcg   Valtrex Pregnancy And Lactation Text: this medication is Pregnancy Category B and is considered safe during pregnancy. This medication is not directly found in breast milk but it's metabolite acyclovir is present.

## 2025-07-16 NOTE — TELEPHONE ENCOUNTER
Rx Refill Note  Requested Prescriptions     Pending Prescriptions Disp Refills    levothyroxine (SYNTHROID, LEVOTHROID) 200 MCG tablet 90 tablet 0     Sig: Take 1 tablet by mouth Daily.        Last office visit with prescribing clinician: 5/27/2025      Next office visit with prescribing clinician: 9/25/2025       Lory Kenney (Jodi)  07/16/25, 13:58 EDT

## 2025-07-17 RX ORDER — LEVOTHYROXINE SODIUM 200 UG/1
200 TABLET ORAL DAILY
Qty: 90 TABLET | Refills: 0 | Status: SHIPPED | OUTPATIENT
Start: 2025-07-17

## 2025-07-18 ENCOUNTER — TELEPHONE (OUTPATIENT)
Dept: INTERNAL MEDICINE | Facility: CLINIC | Age: 55
End: 2025-07-18
Payer: MEDICARE

## 2025-07-18 NOTE — TELEPHONE ENCOUNTER
DISREGARD  PLEASE CALL [facility] NEEDING SIGNED PPWK THAT WAS FAXED OVER CALL [phone] -dawn

## 2025-07-21 NOTE — TELEPHONE ENCOUNTER
I spoke with [facility] and they needed the order for the wheeled walker faxed.  I have resent this through [employer].

## 2025-07-27 LAB
QT INTERVAL: 354 MS
QTC INTERVAL: 451 MS

## 2025-07-28 ENCOUNTER — OUTSIDE FACILITY SERVICE (OUTPATIENT)
Dept: INTERNAL MEDICINE | Facility: CLINIC | Age: 55
End: 2025-07-28
Payer: MEDICARE

## 2025-07-29 ENCOUNTER — TELEPHONE (OUTPATIENT)
Dept: INTERNAL MEDICINE | Facility: CLINIC | Age: 55
End: 2025-07-29

## 2025-07-30 DIAGNOSIS — J45.30 MILD PERSISTENT ASTHMA, UNSPECIFIED WHETHER COMPLICATED: ICD-10-CM

## 2025-07-30 RX ORDER — ALBUTEROL SULFATE 90 UG/1
2 AEROSOL, METERED RESPIRATORY (INHALATION) EVERY 6 HOURS PRN
Qty: 18 G | Refills: 0 | Status: SHIPPED | OUTPATIENT
Start: 2025-07-30

## 2025-08-04 ENCOUNTER — TELEPHONE (OUTPATIENT)
Dept: INTERNAL MEDICINE | Facility: CLINIC | Age: 55
End: 2025-08-04
Payer: MEDICARE

## 2025-08-06 DIAGNOSIS — N18.32 CKD STAGE 3B, GFR 30-44 ML/MIN: Primary | ICD-10-CM

## 2025-08-06 RX ORDER — CETIRIZINE HYDROCHLORIDE 10 MG/1
10 TABLET ORAL
Qty: 30 TABLET | Refills: 3 | Status: SHIPPED | OUTPATIENT
Start: 2025-08-06

## 2025-08-06 RX ORDER — LEVOTHYROXINE SODIUM 200 UG/1
200 TABLET ORAL DAILY
Qty: 90 TABLET | Refills: 0 | Status: SHIPPED | OUTPATIENT
Start: 2025-08-06

## 2025-08-06 RX ORDER — LEVOTHYROXINE SODIUM 25 UG/1
25 TABLET ORAL
Qty: 90 TABLET | Refills: 0 | Status: SHIPPED | OUTPATIENT
Start: 2025-08-06

## 2025-08-11 ENCOUNTER — TELEPHONE (OUTPATIENT)
Dept: INTERNAL MEDICINE | Facility: CLINIC | Age: 55
End: 2025-08-11
Payer: MEDICARE

## 2025-08-11 RX ORDER — LEVOTHYROXINE SODIUM 200 UG/1
200 TABLET ORAL DAILY
Qty: 90 TABLET | Refills: 0 | OUTPATIENT
Start: 2025-08-11

## 2025-08-11 RX ORDER — LEVOTHYROXINE SODIUM 25 UG/1
25 TABLET ORAL EVERY MORNING
Qty: 90 TABLET | Refills: 0 | OUTPATIENT
Start: 2025-08-11

## 2025-08-19 ENCOUNTER — TELEPHONE (OUTPATIENT)
Dept: INTERNAL MEDICINE | Facility: CLINIC | Age: 55
End: 2025-08-19
Payer: MEDICARE

## 2025-08-21 RX ORDER — INSULIN GLARGINE 100 [IU]/ML
INJECTION, SOLUTION SUBCUTANEOUS
Qty: 18 ML | Refills: 0 | Status: SHIPPED | OUTPATIENT
Start: 2025-08-21

## 2025-08-21 RX ORDER — INSULIN GLARGINE-YFGN 100 [IU]/ML
INJECTION, SOLUTION SUBCUTANEOUS
OUTPATIENT
Start: 2025-08-21

## 2025-08-21 RX ORDER — INSULIN LISPRO 100 [IU]/ML
INJECTION, SOLUTION INTRAVENOUS; SUBCUTANEOUS
Qty: 30 ML | Refills: 0 | Status: SHIPPED | OUTPATIENT
Start: 2025-08-21

## 2025-08-25 RX ORDER — INSULIN GLARGINE 100 [IU]/ML
INJECTION, SOLUTION SUBCUTANEOUS
Qty: 18 ML | Refills: 0 | OUTPATIENT
Start: 2025-08-25

## 2025-08-26 ENCOUNTER — TELEPHONE (OUTPATIENT)
Dept: ENDOCRINOLOGY | Facility: CLINIC | Age: 55
End: 2025-08-26
Payer: MEDICARE

## 2025-08-27 RX ORDER — INSULIN DEGLUDEC 100 U/ML
20 INJECTION, SOLUTION SUBCUTANEOUS DAILY
Qty: 15 ML | Refills: 1 | Status: SHIPPED | OUTPATIENT
Start: 2025-08-27